# Patient Record
Sex: FEMALE | Race: WHITE | NOT HISPANIC OR LATINO | Employment: OTHER | URBAN - NONMETROPOLITAN AREA
[De-identification: names, ages, dates, MRNs, and addresses within clinical notes are randomized per-mention and may not be internally consistent; named-entity substitution may affect disease eponyms.]

---

## 2017-01-16 RX ORDER — OMEPRAZOLE 20 MG/1
CAPSULE, DELAYED RELEASE ORAL
Qty: 90 CAP | Refills: 0 | Status: SHIPPED | OUTPATIENT
Start: 2017-01-16 | End: 2017-05-05 | Stop reason: SDUPTHER

## 2017-01-20 DIAGNOSIS — R00.2 PALPITATIONS: ICD-10-CM

## 2017-01-20 RX ORDER — METOPROLOL TARTRATE 50 MG/1
TABLET, FILM COATED ORAL
Qty: 180 TAB | Refills: 3 | Status: SHIPPED | OUTPATIENT
Start: 2017-01-20 | End: 2018-02-02 | Stop reason: SDUPTHER

## 2017-01-27 ENCOUNTER — HOSPITAL ENCOUNTER (OUTPATIENT)
Dept: LAB | Facility: MEDICAL CENTER | Age: 72
End: 2017-01-27
Attending: INTERNAL MEDICINE
Payer: MEDICARE

## 2017-01-27 ENCOUNTER — HOSPITAL ENCOUNTER (OUTPATIENT)
Dept: LAB | Facility: MEDICAL CENTER | Age: 72
End: 2017-01-27
Attending: NURSE PRACTITIONER
Payer: MEDICARE

## 2017-01-27 DIAGNOSIS — N18.4 TYPE 2 DIABETES MELLITUS WITH STAGE 4 CHRONIC KIDNEY DISEASE (HCC): ICD-10-CM

## 2017-01-27 DIAGNOSIS — E11.22 TYPE 2 DIABETES MELLITUS WITH STAGE 4 CHRONIC KIDNEY DISEASE (HCC): ICD-10-CM

## 2017-01-27 LAB
ALBUMIN SERPL BCP-MCNC: 4 G/DL (ref 3.2–4.9)
ALBUMIN/GLOB SERPL: 1.3 G/DL
ALP SERPL-CCNC: 63 U/L (ref 30–99)
ALT SERPL-CCNC: 17 U/L (ref 2–50)
ANION GAP SERPL CALC-SCNC: 7 MMOL/L (ref 0–11.9)
AST SERPL-CCNC: 17 U/L (ref 12–45)
BILIRUB SERPL-MCNC: 0.7 MG/DL (ref 0.1–1.5)
BUN SERPL-MCNC: 45 MG/DL (ref 8–22)
CALCIUM SERPL-MCNC: 9.4 MG/DL (ref 8.5–10.5)
CHLORIDE SERPL-SCNC: 107 MMOL/L (ref 96–112)
CHOLEST SERPL-MCNC: 238 MG/DL (ref 100–199)
CO2 SERPL-SCNC: 25 MMOL/L (ref 20–33)
CREAT SERPL-MCNC: 1.92 MG/DL (ref 0.5–1.4)
EST. AVERAGE GLUCOSE BLD GHB EST-MCNC: 200 MG/DL
GLOBULIN SER CALC-MCNC: 3.1 G/DL (ref 1.9–3.5)
GLUCOSE SERPL-MCNC: 174 MG/DL (ref 65–99)
HBA1C MFR BLD: 8.6 % (ref 0–5.6)
HDLC SERPL-MCNC: 52 MG/DL
LDLC SERPL CALC-MCNC: 122 MG/DL
POTASSIUM SERPL-SCNC: 4.6 MMOL/L (ref 3.6–5.5)
PROT SERPL-MCNC: 7.1 G/DL (ref 6–8.2)
SODIUM SERPL-SCNC: 139 MMOL/L (ref 135–145)
TRIGL SERPL-MCNC: 319 MG/DL (ref 0–149)

## 2017-01-27 PROCEDURE — 80053 COMPREHEN METABOLIC PANEL: CPT

## 2017-01-27 PROCEDURE — 83036 HEMOGLOBIN GLYCOSYLATED A1C: CPT

## 2017-01-28 LAB
ALBUMIN SERPL BCP-MCNC: 3.9 G/DL (ref 3.2–4.9)
APPEARANCE UR: ABNORMAL
BACTERIA #/AREA URNS HPF: ABNORMAL /HPF
BASOPHILS # BLD AUTO: 0.08 K/UL (ref 0–0.12)
BASOPHILS NFR BLD AUTO: 1.1 % (ref 0–1.8)
BILIRUB UR QL STRIP.AUTO: NEGATIVE
BUN SERPL-MCNC: 46 MG/DL (ref 8–22)
C3 SERPL-MCNC: 181 MG/DL (ref 87–200)
C4 SERPL-MCNC: 29 MG/DL (ref 19–52)
CALCIUM SERPL-MCNC: 9.3 MG/DL (ref 8.5–10.5)
CHLORIDE SERPL-SCNC: 107 MMOL/L (ref 96–112)
CO2 SERPL-SCNC: 25 MMOL/L (ref 20–33)
COLOR UR AUTO: ABNORMAL
CREAT SERPL-MCNC: 1.93 MG/DL (ref 0.5–1.4)
CREAT UR-MCNC: 106.5 MG/DL
CULTURE IF INDICATED INDCX: NO UA CULTURE
EOSINOPHIL # BLD: 0.33 K/UL (ref 0–0.51)
EOSINOPHIL NFR BLD AUTO: 4.5 % (ref 0–6.9)
EPITHELIAL CELLS 1715: ABNORMAL /HPF
ERYTHROCYTE [DISTWIDTH] IN BLOOD BY AUTOMATED COUNT: 46.9 FL (ref 35.9–50)
GLUCOSE SERPL-MCNC: 173 MG/DL (ref 65–99)
GLUCOSE UR STRIP.AUTO-MCNC: 70 MG/DL
HBV SURFACE AB SER RIA-ACNC: <3.1 MIU/ML (ref 0–10)
HBV SURFACE AG SERPL QL IA: NEGATIVE
HCT VFR BLD AUTO: 39.3 % (ref 37–47)
HCV AB S/CO SERPL IA: NEGATIVE
HGB BLD-MCNC: 12.8 G/DL (ref 12–16)
IMM GRANULOCYTES # BLD AUTO: 0.02 K/UL (ref 0–0.11)
IMM GRANULOCYTES NFR BLD AUTO: 0.3 % (ref 0–0.9)
KETONES UR STRIP.AUTO-MCNC: NEGATIVE MG/DL
LEUKOCYTE ESTERASE UR QL STRIP.AUTO: NEGATIVE
LYMPHOCYTES # BLD: 2.83 K/UL (ref 1–4.8)
LYMPHOCYTES NFR BLD AUTO: 38.5 % (ref 22–41)
MCH RBC QN AUTO: 30 PG (ref 27–33)
MCHC RBC AUTO-ENTMCNC: 32.6 G/DL (ref 33.6–35)
MCV RBC AUTO: 92.3 FL (ref 81.4–97.8)
MICRO URNS: ABNORMAL
MONOCYTES # BLD: 0.39 K/UL (ref 0–0.85)
MONOCYTES NFR BLD AUTO: 5.3 % (ref 0–13.4)
MUCOUS THREADS URNS QL MICRO: ABNORMAL /HPF
NEUTROPHILS # BLD: 3.71 K/UL (ref 2–7.15)
NEUTROPHILS NFR BLD AUTO: 50.3 % (ref 44–72)
NITRITE UR QL STRIP.AUTO: NEGATIVE
NRBC # BLD AUTO: 0 K/UL
NRBC BLD-RTO: 0 /100 WBC
PH UR: 6 [PH]
PHOSPHATE SERPL-MCNC: 3.5 MG/DL (ref 2.5–4.5)
PLATELET # BLD AUTO: 135 K/UL (ref 164–446)
PMV BLD AUTO: 10.6 FL (ref 9–12.9)
POTASSIUM SERPL-SCNC: 4.6 MMOL/L (ref 3.6–5.5)
PROT 24H UR-MRATE: 358.9 MG/DL (ref 0–15)
PROT UR QL STRIP: 300 MG/DL
PROT/CREAT UR: 3370 MG/G (ref 10–107)
RBC # BLD AUTO: 4.26 M/UL (ref 4.2–5.4)
RBC #/AREA URNS HPF: ABNORMAL /HPF
RBC UR QL AUTO: NEGATIVE
SODIUM SERPL-SCNC: 140 MMOL/L (ref 135–145)
SP GR UR STRIP.AUTO: 1.01
WBC # BLD AUTO: 7.4 K/UL (ref 4.8–10.8)
WBC #/AREA URNS HPF: ABNORMAL /HPF

## 2017-01-30 LAB — NUCLEAR IGG SER QL IA: NORMAL

## 2017-01-31 LAB
ALBUMIN SERPL-MCNC: 4.12 G/DL (ref 3.75–5.01)
ALPHA1 GLOB SERPL ELPH-MCNC: 0.36 G/DL (ref 0.19–0.46)
ALPHA2 GLOB SERPL ELPH-MCNC: 1.12 G/DL (ref 0.48–1.05)
B-GLOBULIN SERPL ELPH-MCNC: 0.84 G/DL (ref 0.48–1.1)
GAMMA GLOB SERPL ELPH-MCNC: 0.86 G/DL (ref 0.62–1.51)
INTERPRETATION SERPL IFE-IMP: ABNORMAL
PROT SERPL-MCNC: 7.3 G/DL (ref 6–8.3)

## 2017-02-02 ENCOUNTER — TELEPHONE (OUTPATIENT)
Dept: CARDIOLOGY | Facility: MEDICAL CENTER | Age: 72
End: 2017-02-02

## 2017-02-02 DIAGNOSIS — E78.5 DYSLIPIDEMIA: ICD-10-CM

## 2017-02-02 NOTE — TELEPHONE ENCOUNTER
----- Message from DON oMss sent at 1/30/2017 10:16 AM PST -----  Cholesterol not well managed with simvastatin now. Has her diet changed? Her triglycerides have doubled-watch her sugars, ETOH, and carb's. We can either increase her medication now or re-check in 3 months to review for changes. SC

## 2017-02-02 NOTE — TELEPHONE ENCOUNTER
Called pt re: 1/27 labs. Reviewed dietary measures. She chooses to work more on diet & repeat labs prior to June FV with Dr. Sorto. Lab orders mailed.

## 2017-02-04 ENCOUNTER — OFFICE VISIT (OUTPATIENT)
Dept: URGENT CARE | Facility: PHYSICIAN GROUP | Age: 72
End: 2017-02-04
Payer: MEDICARE

## 2017-02-04 VITALS
DIASTOLIC BLOOD PRESSURE: 78 MMHG | WEIGHT: 293 LBS | HEART RATE: 52 BPM | OXYGEN SATURATION: 97 % | SYSTOLIC BLOOD PRESSURE: 194 MMHG | HEIGHT: 67 IN | TEMPERATURE: 98.4 F | BODY MASS INDEX: 45.99 KG/M2 | RESPIRATION RATE: 16 BRPM

## 2017-02-04 DIAGNOSIS — H61.23 BILATERAL IMPACTED CERUMEN: Primary | ICD-10-CM

## 2017-02-04 DIAGNOSIS — H92.03 OTALGIA OF BOTH EARS: ICD-10-CM

## 2017-02-04 PROCEDURE — G8484 FLU IMMUNIZE NO ADMIN: HCPCS | Performed by: PHYSICIAN ASSISTANT

## 2017-02-04 PROCEDURE — 3017F COLORECTAL CA SCREEN DOC REV: CPT | Performed by: PHYSICIAN ASSISTANT

## 2017-02-04 PROCEDURE — 1036F TOBACCO NON-USER: CPT | Performed by: PHYSICIAN ASSISTANT

## 2017-02-04 PROCEDURE — 4040F PNEUMOC VAC/ADMIN/RCVD: CPT | Performed by: PHYSICIAN ASSISTANT

## 2017-02-04 PROCEDURE — 1101F PT FALLS ASSESS-DOCD LE1/YR: CPT | Performed by: PHYSICIAN ASSISTANT

## 2017-02-04 PROCEDURE — 3014F SCREEN MAMMO DOC REV: CPT | Performed by: PHYSICIAN ASSISTANT

## 2017-02-04 PROCEDURE — 69210 REMOVE IMPACTED EAR WAX UNI: CPT | Performed by: PHYSICIAN ASSISTANT

## 2017-02-04 PROCEDURE — G8432 DEP SCR NOT DOC, RNG: HCPCS | Performed by: PHYSICIAN ASSISTANT

## 2017-02-04 PROCEDURE — G8598 ASA/ANTIPLAT THER USED: HCPCS | Performed by: PHYSICIAN ASSISTANT

## 2017-02-04 PROCEDURE — G8419 CALC BMI OUT NRM PARAM NOF/U: HCPCS | Performed by: PHYSICIAN ASSISTANT

## 2017-02-04 NOTE — PROGRESS NOTES
"Subjective:      Whitley Frederick is a 71 y.o. female who presents with Ear Fullness    Pt PMH, SocHx, SurgHx, FamHx, Drug allergies and medications reviewed with pt/EPIC.      Family history reviewed, it is not pertinent to this complaint.           HPI Comments: Patient presents with:  Ear Fullness: bilateral ear stuffiness for \" a long time, with ringing in my ears, sort of feel painful in the canals.\" no recent URI , fever chills or HA        Ear Fullness  This is a new problem. The current episode started more than 1 month ago. The problem occurs constantly. The problem has been gradually worsening. Nothing aggravates the symptoms. Treatments tried: diluted hydrogen peroxide. The treatment provided no relief.       Review of Systems   HENT: Positive for ear pain and tinnitus. Negative for ear discharge.    All other systems reviewed and are negative.         Objective:     /78 mmHg  Pulse 52  Temp(Src) 36.9 °C (98.4 °F)  Resp 16  Ht 1.702 m (5' 7.01\")  Wt 135.626 kg (299 lb)  BMI 46.82 kg/m2  SpO2 97%     Physical Exam   Constitutional: She is oriented to person, place, and time. She appears well-developed and well-nourished. No distress.   HENT:   Head: Normocephalic.   Nose: Nose normal.   Mouth/Throat: Uvula is midline, oropharynx is clear and moist and mucous membranes are normal.   Bilateral cerumen impaction, TM not visualized.  Will irrigate and reevaluate.    Eyes: EOM are normal. Pupils are equal, round, and reactive to light.   Neck: Normal range of motion. Neck supple.   Cardiovascular: Normal rate and regular rhythm.    Pulmonary/Chest: Effort normal.   Musculoskeletal: Normal range of motion.   Neurological: She is alert and oriented to person, place, and time.   Skin: Skin is warm and dry.   Psychiatric: She has a normal mood and affect.   Nursing note and vitals reviewed.         Procedure: Cerumen Removal  Risks and benefits of procedure discussed  Cerumen removed with curette " and lavage after softening agent instilled  Patient tolerated well  Post procedure exam with clear canal and normal TM       Assessment/Plan:     1. Bilateral impacted cerumen     2. Otalgia of both ears       PT should follow up with PCP in 1-2 days for re-evaluation if symptoms have not improved.  Discussed red flags and reasons to return to UC or ED.  Pt and/or family verbalized understanding of diagnosis and follow up instructions and was given informational handout on diagnosis.  PT discharged.

## 2017-02-04 NOTE — PATIENT INSTRUCTIONS
Cerumen Impaction  The structures of the external ear canal secrete a waxy substance known as cerumen. Excess cerumen can build up in the ear canal, causing a condition known as cerumen impaction. Cerumen impaction can cause ear pain and disrupt the function of the ear.  The rate of cerumen production differs for each individual. In certain individuals, the configuration of the ear canal may decrease his or her ability to naturally remove cerumen.  CAUSES  Cerumen impaction is caused by excessive cerumen production or buildup.  RISK FACTORS  · Frequent use of swabs to clean ears.  · Having narrow ear canals.  · Having eczema.  · Being dehydrated.  SIGNS AND SYMPTOMS  · Diminished hearing.  · Ear drainage.  · Ear pain.  · Ear itch.  TREATMENT  Treatment may involve:  · Over-the-counter or prescription ear drops to soften the cerumen.  · Removal of cerumen by a health care provider. This may be done with:  ¨ Irrigation with warm water. This is the most common method of removal.  ¨ Ear curettes and other instruments.  ¨ Surgery. This may be done in severe cases.  HOME CARE INSTRUCTIONS  · Take medicines only as directed by your health care provider.  · Do not insert objects into the ear with the intent of cleaning the ear.  PREVENTION  · Do not insert objects into the ear, even with the intent of cleaning the ear. Removing cerumen as a part of normal hygiene is not necessary, and the use of swabs in the ear canal is not recommended.  · Drink enough water to keep your urine clear or pale yellow.  · Control your eczema if you have it.  SEEK MEDICAL CARE IF:  · You develop ear pain.  · You develop bleeding from the ear.  · The cerumen does not clear after you use ear drops as directed.     This information is not intended to replace advice given to you by your health care provider. Make sure you discuss any questions you have with your health care provider.     Document Released: 01/25/2006 Document Revised: 01/08/2016  Document Reviewed: 03/17/2011  [employer] Interactive Patient Education ©2016 [employer]

## 2017-02-04 NOTE — MR AVS SNAPSHOT
"        Whitley Frederick   2017 12:55 PM   Office Visit   MRN: [id number]    Department:  [facility]   Dept Phone:  [phone]    Description:  Female : 1945   Provider:  Larissa Cevallos PA-C           Reason for Visit     Ear Fullness bilateral ear stuffiness      Allergies as of 2017     Allergen Noted Reactions    Amlodipine 2016   Itching    Amaryl 2011       GI Problems    Avandia [Rosiglitazone Maleate] 2011       GI problems    Cipro Xr 2011       Possibly causes Chills.    Contrast Media With Iodine [Iodine] 2016   Hives    Glipizide 2011       GI Problems    Glucophage [Metformin Hydrochloride] 2011       dizzy    Hydralazine 2016       Rapid heart rate, chest tightness    Levaquin 2011       Metformin 2011       Relafen [Nabumetone] 2011       Itching      You were diagnosed with     Bilateral impacted cerumen   [684553]  -  Primary     Otalgia of both ears   [443976]         Vital Signs     Blood Pressure Pulse Temperature Respirations Height Weight    194/78 mmHg 52 36.9 °C (98.4 °F) 16 1.702 m (5' 7.01\") 135.626 kg (299 lb)    Body Mass Index Oxygen Saturation Smoking Status             46.82 kg/m2 97% Former Smoker         Basic Information     Date Of Birth Sex Race Ethnicity Preferred Language    1945 Female White Non- English      Your appointments     2017  8:20 AM   Established Patient with Shani Tineo M.D.   [facility])    [address] [place] [address] [phone]           You will be receiving a confirmation call a few days before your appointment from our automated call confirmation system.            Feb 10, 2017 11:45 AM   [facility]   [address] [address] [phone]            2017  1:00 PM   Pulmonary Function Test with " SPIROMETRY/6MW   [facility] (--)    [address]  [address]  [place] [id number] [phone]            Feb 28, 2017  2:00 PM   Established Patient Pul with EDILMA Mosher   [facility] (--)    [address]  [address]  [place] [id number] [phone]            Mar 21, 2017  2:40 PM   Diabetes Care Visit with EDILMA Leiva, LEAH DIABETES RN   [facility]    [address] [place] [address] [place] [address] [phone]           You will be receiving a confirmation call a few days before your appointment from our automated call confirmation system.            Jun 20, 2017 11:00 AM   FOLLOW UP with Fernando Sorto M.D.   [facility]    [address] [facility] [address] [phone]              Problem List              ICD-10-CM Priority Class Noted - Resolved    Type 2 diabetes mellitus with stage 4 chronic kidney disease (CMS-HCC) E11.22, N18.4 High  Unknown - Present    Left bundle branch block I44.7 Medium  3/5/2012 - Present    PAF (paroxysmal atrial fibrillation) (CMS-HCC) I48.0 High  7/12/2013 - Present    Hypothyroid E03.9 High  12/12/2013 - Present    Anxiety F41.9 High  1/9/2014 - Present    Carotid stenosis I65.29 Low  5/30/2014 - Present    Insulin long-term use (CMS-HCC) Z79.4 High  4/29/2015 - Present    Atherosclerosis of aorta (CMS-HCC) I70.0 High  4/29/2015 - Present    Chronic respiratory failure (CMS-HCC) J96.10 High  4/29/2015 - Present    Long term current use of anticoagulant therapy Z79.01 High  4/29/2015 - Present    Morbid obesity with BMI of 45.0-49.9, adult (CMS-HCC) E66.01, Z68.42 High  4/29/2015 - Present    Chronic gout M1A.9XX0 Medium  4/30/2015 - Present    Multiple thyroid nodules E04.2 Low  10/7/2015 - Present    Essential hypertension I10 High  5/10/2016 - Present    AVERY (obstructive sleep apnea) G47.33 Medium   5/10/2016 - Present    Chronic anticoagulation Z79.01 Medium  6/10/2016 - Present    Mild intermittent asthma without complication J45.20 Medium  8/24/2016 - Present    Strain of lumbar paraspinal muscle S39.012A High  9/23/2016 - Present    Combined forms of age-related cataract of right eye H25.811   10/25/2016 - Present    Dyslipidemia E78.5   12/14/2016 - Present      Health Maintenance        Date Due Completion Dates    COLON CANCER SCREENING ANNUAL FIT 1945 ---    IMM DTaP/Tdap/Td Vaccine (1 - Tdap) 3/19/1965 ---    IMM ZOSTER VACCINE 3/19/2006 ---    COLONOSCOPY 11/30/2012 11/30/2007 (Prv Comp)    Override on 11/30/2007: Previously completed    IMM INFLUENZA (1) 9/1/2016 ---    RETINAL SCREENING 10/7/2016 10/7/2015, 3/5/2015 (Prv Comp)    Override on 3/5/2015: Previously completed    URINE ACR / MICROALBUMIN 10/7/2016 10/7/2015, 10/10/2014, 8/12/2013 (Done), 3/5/2012    Override on 8/12/2013: Done    DIABETES MONOFILAMENT / LE EXAM 10/7/2016 10/7/2015 (Done)    Override on 10/7/2015: Done    IMM PNEUMOCOCCAL 65+ (ADULT) HIGH/HIGHEST RISK SERIES (2 of 2 - PPSV23) 10/19/2016 8/24/2016    MAMMOGRAM 5/13/2017 5/13/2016, 4/9/2015, 1/31/2013, 7/14/2011, 10/5/2007, 10/5/2007, 8/7/2006    A1C SCREENING 7/27/2017 1/27/2017, 1/7/2016, 10/7/2015, 5/27/2015, 1/5/2015 (Prv Comp), 1/6/2014, 5/2/2013 (Done), 3/5/2012    Override on 1/5/2015: Previously completed    Override on 5/2/2013: Done    FASTING LIPID PROFILE 1/27/2018 1/27/2017, 10/7/2015, 5/27/2015, 9/4/2014, 1/6/2014, 4/12/2013, 3/5/2012    SERUM CREATININE 1/27/2018 1/27/2017, 1/27/2017, 11/14/2016, 10/14/2016, 8/10/2016, 4/12/2016, 1/15/2016, 10/7/2015, 10/7/2015, 5/27/2015, 5/27/2015, 2/23/2015, 10/30/2014, 10/10/2014, 9/4/2014, 8/15/2014, 5/12/2014, 4/19/2014, 4/18/2014, 1/6/2014, 1/6/2014, 8/12/2013, 4/12/2013, 4/12/2013, 2/12/2013, 1/21/2013, 1/8/2013, 3/5/2012, 4/27/2006    BONE DENSITY 1/31/2018 1/31/2013            Current Immunizations      13-VALENT PCV PREVNAR 8/24/2016 10:45 AM      Below and/or attached are the medications your provider expects you to take. Review all of your home medications and newly ordered medications with your provider and/or pharmacist. Follow medication instructions as directed by your provider and/or pharmacist. Please keep your medication list with you and share with your provider. Update the information when medications are discontinued, doses are changed, or new medications (including over-the-counter products) are added; and carry medication information at all times in the event of emergency situations     Allergies:  AMLODIPINE - Itching     AMARYL - (reactions not documented)     AVANDIA - (reactions not documented)     CIPRO XR - (reactions not documented)     CONTRAST MEDIA WITH IODINE - Hives     GLIPIZIDE - (reactions not documented)     GLUCOPHAGE - (reactions not documented)     HYDRALAZINE - (reactions not documented)     LEVAQUIN - (reactions not documented)     METFORMIN - (reactions not documented)     RELAFEN - (reactions not documented)               Medications  Valid as of: February 04, 2017 -  4:27 PM    Generic Name Brand Name Tablet Size Instructions for use    Allopurinol (Tab) ZYLOPRIM 100 MG Take 1/2 pill qd        Blood Glucose Monitoring Suppl (Misc) Blood Glucose Monitoring Suppl SUPPLIES Test strips order: Test strips for Chely Contour meter. Sig: use tid  and prn ssx high or low sugar #100 RF x 3        Cholecalciferol (Cap) Vitamin D 2000 UNITS Take 4,000 Units by mouth every day.        ClonazePAM (Tab) KLONOPIN 0.5 MG Take 1 Tab by mouth 2 times a day as needed (insomnia, anxiety).        CloNIDine HCl (Tab) CATAPRES 0.1 MG Use between doses of isosorbide mononitrate if your blood pressure is higher than 170/90.        Diclofenac Sodium (Gel) Diclofenac Sodium 1 % Apply 2 g to skin as directed 4 times a day.        Dronedarone HCl (Tab) MULTAQ 400 MG TAKE ONE TABLET BY MOUTH TWICE DAILY  WITH MEALS        Glucose Blood (Strip) HAYLEE CONTOUR NEXT TEST          Insulin Glargine (Solution Pen-injector) TOUJEO SOLOSTAR 300 UNIT/ML INJECT 65 UNITS SUBCUTANEOUSLY AS INSTRUCTED EVERY DAY.        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        Levothyroxine Sodium (Tab) SYNTHROID 50 MCG Take 1 Tab by mouth every day.        Losartan Potassium (Tab) COZAAR 50 MG TAKE ONE TABLET BY MOUTH TWICE DAILY        Magnesium Oxide (Tab) MAG- (241.3 MG) MG Take 400 mg by mouth every day.        Metoprolol Tartrate (Tab) LOPRESSOR 50 MG TAKE ONE TABLET BY MOUTH TWICE DAILY ,  PATIENT  MAY  TAKE  AN  ADDITIONAL  HALF  TABLET  FOR  INCREASED  HEART  RATE.        Misc. Devices (Misc) Misc. Devices  Pen needles for Lantus Solstar. 29g 12mm.        Montelukast Sodium (Tab) SINGULAIR 10 MG 1 po qhs        Omeprazole (CAPSULE DELAYED RELEASE) PRILOSEC 20 MG TAKE ONE CAPSULE BY MOUTH ONCE DAILY        Potassium Chloride (Tab CR) KLOR-CON 10 MEQ Take 1 Tab by mouth every day.        Simvastatin (Tab) ZOCOR 20 MG TAKE ONE TABLET BY MOUTH IN THE EVENING        Warfarin Sodium (Tab) COUMADIN 3 MG Take 1 to 1.5 tablets by mouth daily as directed by the coumadin clinic        .                 Medicines prescribed today were sent to:     [address] [address]    Phone: [phone] Fax: [phone]    Open 24 Hours?: No      Medication refill instructions:       If your prescription bottle indicates you have medication refills left, it is not necessary to call your provider’s office. Please contact your pharmacy and they will refill your medication.    If your prescription bottle indicates you do not have any refills left, you may request  refills at any time through one of the following ways: The online [employer] system (except Urgent Care), by calling your provider’s office, or by asking your pharmacy to contact your provider’s office with a refill request. Medication refills are processed only during regular business hours and may not be available until the next business day. Your provider may request additional information or to have a follow-up visit with you prior to refilling your medication.   *Please Note: Medication refills are assigned a new Rx number when refilled electronically. Your pharmacy may indicate that no refills were authorized even though a new prescription for the same medication is available at the pharmacy. Please request the medicine by name with the pharmacy before contacting your provider for a refill.        Instructions    Cerumen Impaction  The structures of the external ear canal secrete a waxy substance known as cerumen. Excess cerumen can build up in the ear canal, causing a condition known as cerumen impaction. Cerumen impaction can cause ear pain and disrupt the function of the ear.  The rate of cerumen production differs for each individual. In certain individuals, the configuration of the ear canal may decrease his or her ability to naturally remove cerumen.  CAUSES  Cerumen impaction is caused by excessive cerumen production or buildup.  RISK FACTORS  · Frequent use of swabs to clean ears.  · Having narrow ear canals.  · Having eczema.  · Being dehydrated.  SIGNS AND SYMPTOMS  · Diminished hearing.  · Ear drainage.  · Ear pain.  · Ear itch.  TREATMENT  Treatment may involve:  · Over-the-counter or prescription ear drops to soften the cerumen.  · Removal of cerumen by a health care provider. This may be done with:  ¨ Irrigation with warm water. This is the most common method of removal.  ¨ Ear curettes and other instruments.  ¨ Surgery. This may be done in severe cases.  HOME CARE INSTRUCTIONS  · Take medicines  only as directed by your health care provider.  · Do not insert objects into the ear with the intent of cleaning the ear.  PREVENTION  · Do not insert objects into the ear, even with the intent of cleaning the ear. Removing cerumen as a part of normal hygiene is not necessary, and the use of swabs in the ear canal is not recommended.  · Drink enough water to keep your urine clear or pale yellow.  · Control your eczema if you have it.  SEEK MEDICAL CARE IF:  · You develop ear pain.  · You develop bleeding from the ear.  · The cerumen does not clear after you use ear drops as directed.     This information is not intended to replace advice given to you by your health care provider. Make sure you discuss any questions you have with your health care provider.     Document Released: 01/25/2006 Document Revised: 01/08/2016 Document Reviewed: 03/17/2011  1[employer] Interactive Patient Education ©2016 1[employer]         Other Notes About Your Plan     Cards: HTN, AF.           MyChart Access Code: Activation code not generated  Current [place] Pelletshart Status: Active

## 2017-02-06 ENCOUNTER — TELEPHONE (OUTPATIENT)
Dept: MEDICAL GROUP | Facility: PHYSICIAN GROUP | Age: 72
End: 2017-02-06

## 2017-02-08 DIAGNOSIS — I48.0 PAF (PAROXYSMAL ATRIAL FIBRILLATION) (HCC): Primary | ICD-10-CM

## 2017-02-13 ENCOUNTER — PATIENT MESSAGE (OUTPATIENT)
Dept: MEDICAL GROUP | Facility: PHYSICIAN GROUP | Age: 72
End: 2017-02-13

## 2017-02-13 DIAGNOSIS — E11.65 TYPE 2 DIABETES MELLITUS WITH HYPERGLYCEMIA, WITH LONG-TERM CURRENT USE OF INSULIN (HCC): ICD-10-CM

## 2017-02-13 DIAGNOSIS — Z79.4 TYPE 2 DIABETES MELLITUS WITH HYPERGLYCEMIA, WITH LONG-TERM CURRENT USE OF INSULIN (HCC): ICD-10-CM

## 2017-02-14 NOTE — TELEPHONE ENCOUNTER
From: Whitley Frederick  To: EDILMA Leiva  Sent: 2/13/2017 11:02 PM PST  Subject: Non-Urgent Medical Question    Whitley Bill 1945 .... requesting Toujeo Solo injectable long lasting insulin to be called into [facility] ... I have an appointment with Haydee Rice and the diabetic nurse to take over my diabetic care but not until March 17th ... I'm on my last pen . Could you please fill prescription as soon as possible ... my previous endocrinologist Dr Garnett was replaced by Dr Belinda De Leon I have never seen but has filled my scripts .... Thank You [phone]....

## 2017-02-15 ENCOUNTER — OFFICE VISIT (OUTPATIENT)
Dept: URGENT CARE | Facility: PHYSICIAN GROUP | Age: 72
End: 2017-02-15
Payer: MEDICARE

## 2017-02-15 VITALS
TEMPERATURE: 98.8 F | BODY MASS INDEX: 45.99 KG/M2 | HEIGHT: 67 IN | DIASTOLIC BLOOD PRESSURE: 72 MMHG | OXYGEN SATURATION: 96 % | RESPIRATION RATE: 16 BRPM | WEIGHT: 293 LBS | HEART RATE: 50 BPM | SYSTOLIC BLOOD PRESSURE: 150 MMHG

## 2017-02-15 DIAGNOSIS — L08.9 INFECTED CYST OF SKIN: ICD-10-CM

## 2017-02-15 DIAGNOSIS — L72.9 INFECTED CYST OF SKIN: ICD-10-CM

## 2017-02-15 PROCEDURE — 3014F SCREEN MAMMO DOC REV: CPT | Performed by: PHYSICIAN ASSISTANT

## 2017-02-15 PROCEDURE — 1101F PT FALLS ASSESS-DOCD LE1/YR: CPT | Performed by: PHYSICIAN ASSISTANT

## 2017-02-15 PROCEDURE — G8598 ASA/ANTIPLAT THER USED: HCPCS | Performed by: PHYSICIAN ASSISTANT

## 2017-02-15 PROCEDURE — 1036F TOBACCO NON-USER: CPT | Performed by: PHYSICIAN ASSISTANT

## 2017-02-15 PROCEDURE — G8484 FLU IMMUNIZE NO ADMIN: HCPCS | Performed by: PHYSICIAN ASSISTANT

## 2017-02-15 PROCEDURE — G8432 DEP SCR NOT DOC, RNG: HCPCS | Performed by: PHYSICIAN ASSISTANT

## 2017-02-15 PROCEDURE — G8419 CALC BMI OUT NRM PARAM NOF/U: HCPCS | Performed by: PHYSICIAN ASSISTANT

## 2017-02-15 PROCEDURE — 4040F PNEUMOC VAC/ADMIN/RCVD: CPT | Performed by: PHYSICIAN ASSISTANT

## 2017-02-15 PROCEDURE — 3017F COLORECTAL CA SCREEN DOC REV: CPT | Performed by: PHYSICIAN ASSISTANT

## 2017-02-15 PROCEDURE — 99214 OFFICE O/P EST MOD 30 MIN: CPT | Performed by: PHYSICIAN ASSISTANT

## 2017-02-15 RX ORDER — TERAZOSIN 1 MG/1
1 CAPSULE ORAL DAILY
COMMUNITY
Start: 2017-02-09 | End: 2017-12-13

## 2017-02-15 RX ORDER — HYDROCHLOROTHIAZIDE 25 MG/1
TABLET ORAL
COMMUNITY
Start: 2017-01-15 | End: 2017-05-18

## 2017-02-15 RX ORDER — DOXYCYCLINE HYCLATE 100 MG
100 TABLET ORAL 2 TIMES DAILY
Qty: 20 TAB | Refills: 0 | Status: SHIPPED | OUTPATIENT
Start: 2017-02-15 | End: 2017-05-18

## 2017-02-15 NOTE — Clinical Note
Just a heads up - I put her on doxy for an infected cyst... Didn't know if that would change her follow up appt. Thanks! Nora

## 2017-02-15 NOTE — MR AVS SNAPSHOT
"Whitley Frederick   2/15/2017 11:20 AM   Office Visit   MRN: [id number]    Department:  [facility]   Dept Phone:  [phone]    Description:  Female : 1945   Provider:  Sayra Castro PA-C           Reason for Visit     Cyst in genital area X 10 days      Allergies as of 2/15/2017     Allergen Noted Reactions    Amlodipine 2016   Itching    Amaryl 2011       GI Problems    Avandia [Rosiglitazone Maleate] 2011       GI problems    Cipro Xr 2011       Possibly causes Chills.    Contrast Media With Iodine [Iodine] 2016   Hives    Glipizide 2011       GI Problems    Glucophage [Metformin Hydrochloride] 2011       dizzy    Hydralazine 2016       Rapid heart rate, chest tightness    Levaquin 2011       Metformin 2011       Relafen [Nabumetone] 2011       Itching      You were diagnosed with     Infected cyst of skin   [962173]         Vital Signs     Blood Pressure Pulse Temperature Respirations Height Weight    150/72 mmHg 50 37.1 °C (98.8 °F) 16 1.702 m (5' 7.01\") 134.718 kg (297 lb)    Body Mass Index Oxygen Saturation Breastfeeding? Smoking Status          46.51 kg/m2 96% No Former Smoker        Basic Information     Date Of Birth Sex Race Ethnicity Preferred Language    1945 Female White Non- English      Your appointments     2017  3:30 PM   Anti-Coag Routine with LEAH ANTI-COAG   [facility] ([facility]    [address] [place] [address] [phone]            2017  1:00 PM   Pulmonary Function Test with SPIROMETRY/6MW   [facility] (--)    [address]  [place] [address] [phone]            2017  2:00 PM   Established Patient Pul with EDILMA Mosher   [facility] (--)    [address]  [place] [id number] [phone]            Mar 21, 2017  2:40 PM   "   Diabetes Care Visit with EDILMA Leiva, LEAH DIABETES RN   [facility] [place]    [address] [place] [address] [phone]           You will be receiving a confirmation call a few days before your appointment from our automated call confirmation system.            Jun 20, 2017 11:00 AM   FOLLOW UP with Fernando Sorto M.D.   [facility] (--)    [address] [place] [address] [phone]              Problem List              ICD-10-CM Priority Class Noted - Resolved    Type 2 diabetes mellitus with stage 4 chronic kidney disease (CMS-HCC) E11.22, N18.4 High  Unknown - Present    Left bundle branch block I44.7 Medium  3/5/2012 - Present    PAF (paroxysmal atrial fibrillation) (CMS-HCC) I48.0 High  7/12/2013 - Present    Hypothyroid E03.9 High  12/12/2013 - Present    Anxiety F41.9 High  1/9/2014 - Present    Carotid stenosis I65.29 Low  5/30/2014 - Present    Insulin long-term use (CMS-HCC) Z79.4 High  4/29/2015 - Present    Atherosclerosis of aorta (CMS-HCC) I70.0 High  4/29/2015 - Present    Chronic respiratory failure (CMS-HCC) J96.10 High  4/29/2015 - Present    Long term current use of anticoagulant therapy Z79.01 High  4/29/2015 - Present    Morbid obesity with BMI of 45.0-49.9, adult (CMS-HCC) E66.01, Z68.42 High  4/29/2015 - Present    Chronic gout M1A.9XX0 Medium  4/30/2015 - Present    Multiple thyroid nodules E04.2 Low  10/7/2015 - Present    Essential hypertension I10 High  5/10/2016 - Present    AVERY (obstructive sleep apnea) G47.33 Medium  5/10/2016 - Present    Chronic anticoagulation Z79.01 Medium  6/10/2016 - Present    Mild intermittent asthma without complication J45.20 Medium  8/24/2016 - Present    Strain of lumbar paraspinal muscle S39.012A High  9/23/2016 - Present    Combined forms of age-related cataract of right eye H25.811   10/25/2016 - Present    Dyslipidemia E78.5   12/14/2016  - Present      Health Maintenance        Date Due Completion Dates    COLON CANCER SCREENING ANNUAL FIT 1945 ---    IMM DTaP/Tdap/Td Vaccine (1 - Tdap) 3/19/1965 ---    IMM ZOSTER VACCINE 3/19/2006 ---    COLONOSCOPY 11/30/2012 11/30/2007 (Prv Comp)    Override on 11/30/2007: Previously completed    IMM INFLUENZA (1) 9/1/2016 ---    RETINAL SCREENING 10/7/2016 10/7/2015, 3/5/2015 (Prv Comp)    Override on 3/5/2015: Previously completed    URINE ACR / MICROALBUMIN 10/7/2016 10/7/2015, 10/10/2014, 8/12/2013 (Done), 3/5/2012    Override on 8/12/2013: Done    DIABETES MONOFILAMENT / LE EXAM 10/7/2016 10/7/2015 (Done)    Override on 10/7/2015: Done    IMM PNEUMOCOCCAL 65+ (ADULT) HIGH/HIGHEST RISK SERIES (2 of 2 - PPSV23) 10/19/2016 8/24/2016    MAMMOGRAM 5/13/2017 5/13/2016, 4/9/2015, 1/31/2013, 7/14/2011, 10/5/2007, 10/5/2007, 8/7/2006    A1C SCREENING 7/27/2017 1/27/2017, 1/7/2016, 10/7/2015, 5/27/2015, 1/5/2015 (Prv Comp), 1/6/2014, 5/2/2013 (Done), 3/5/2012    Override on 1/5/2015: Previously completed    Override on 5/2/2013: Done    FASTING LIPID PROFILE 1/27/2018 1/27/2017, 10/7/2015, 5/27/2015, 9/4/2014, 1/6/2014, 4/12/2013, 3/5/2012    SERUM CREATININE 1/27/2018 1/27/2017, 1/27/2017, 11/14/2016, 10/14/2016, 8/10/2016, 4/12/2016, 1/15/2016, 10/7/2015, 10/7/2015, 5/27/2015, 5/27/2015, 2/23/2015, 10/30/2014, 10/10/2014, 9/4/2014, 8/15/2014, 5/12/2014, 4/19/2014, 4/18/2014, 1/6/2014, 1/6/2014, 8/12/2013, 4/12/2013, 4/12/2013, 2/12/2013, 1/21/2013, 1/8/2013, 3/5/2012, 4/27/2006    BONE DENSITY 1/31/2018 1/31/2013            Current Immunizations     13-VALENT PCV PREVNAR 8/24/2016 10:45 AM      Below and/or attached are the medications your provider expects you to take. Review all of your home medications and newly ordered medications with your provider and/or pharmacist. Follow medication instructions as directed by your provider and/or pharmacist. Please keep your medication list with you and share with your  provider. Update the information when medications are discontinued, doses are changed, or new medications (including over-the-counter products) are added; and carry medication information at all times in the event of emergency situations     Allergies:  AMLODIPINE - Itching     AMARYL - (reactions not documented)     AVANDIA - (reactions not documented)     CIPRO XR - (reactions not documented)     CONTRAST MEDIA WITH IODINE - Hives     GLIPIZIDE - (reactions not documented)     GLUCOPHAGE - (reactions not documented)     HYDRALAZINE - (reactions not documented)     LEVAQUIN - (reactions not documented)     METFORMIN - (reactions not documented)     RELAFEN - (reactions not documented)               Medications  Valid as of: February 15, 2017 - 12:00 PM    Generic Name Brand Name Tablet Size Instructions for use    Allopurinol (Tab) ZYLOPRIM 100 MG Take 1/2 pill qd        Blood Glucose Monitoring Suppl (Misc) Blood Glucose Monitoring Suppl SUPPLIES Test strips order: Test strips for Chely Contour meter. Sig: use tid  and prn ssx high or low sugar #100 RF x 3        Cholecalciferol (Cap) Vitamin D 2000 UNITS Take 4,000 Units by mouth every day.        ClonazePAM (Tab) KLONOPIN 0.5 MG Take 1 Tab by mouth 2 times a day as needed (insomnia, anxiety).        CloNIDine HCl (Tab) CATAPRES 0.1 MG Use between doses of isosorbide mononitrate if your blood pressure is higher than 170/90.        Diclofenac Sodium (Gel) Diclofenac Sodium 1 % Apply 2 g to skin as directed 4 times a day.        Doxycycline Hyclate (Tab) VIBRAMYCIN 100 MG Take 1 Tab by mouth 2 times a day.        Dronedarone HCl (Tab) MULTAQ 400 MG TAKE ONE TABLET BY MOUTH TWICE DAILY WITH MEALS        Glucose Blood (Strip) CHELY CONTOUR NEXT TEST          HydroCHLOROthiazide (Tab) HYDRODIURIL 25 MG         Insulin Glargine (Solution Pen-injector) Insulin Glargine 300 UNIT/ML Inject 65 Units/oz/day as instructed every day.        Insulin Pen Needle (Misc) RELION  PEN NEEDLES 29G X 12MM USE ONE PEN NEEDLE SUBCUTANEOUSLY FOR LANTUS SOLOSTAR        Levalbuterol Tartrate (Aerosol) XOPENEX HFA 45 MCG/ACT Inhale 2 Puffs by mouth every four hours as needed for Shortness of Breath.        Levothyroxine Sodium (Tab) SYNTHROID 50 MCG Take 1 Tab by mouth every day.        Losartan Potassium (Tab) COZAAR 50 MG TAKE ONE TABLET BY MOUTH TWICE DAILY        Magnesium Oxide (Tab) MAG- (241.3 MG) MG Take 400 mg by mouth every day.        Metoprolol Tartrate (Tab) LOPRESSOR 50 MG TAKE ONE TABLET BY MOUTH TWICE DAILY ,  PATIENT  MAY  TAKE  AN  ADDITIONAL  HALF  TABLET  FOR  INCREASED  HEART  RATE.        Misc. Devices (Misc) Misc. Devices  Pen needles for Lantus Solstar. 29g 12mm.        Montelukast Sodium (Tab) SINGULAIR 10 MG 1 po qhs        Omeprazole (CAPSULE DELAYED RELEASE) PRILOSEC 20 MG TAKE ONE CAPSULE BY MOUTH ONCE DAILY        Potassium Chloride (Tab CR) KLOR-CON 10 MEQ Take 1 Tab by mouth every day.        Simvastatin (Tab) ZOCOR 20 MG TAKE ONE TABLET BY MOUTH IN THE EVENING        Terazosin HCl (Cap) HYTRIN 1 MG         Warfarin Sodium (Tab) COUMADIN 3 MG Take 1 to 1.5 tablets by mouth daily as directed by the coumadin clinic        .                 Medicines prescribed today were sent to:     [facility] [address] [place] [address]    Phone: [phone] Fax: [phone]    Open 24 Hours?: No      Medication refill instructions:       If your prescription bottle indicates you have medication refills left, it is not necessary to call your provider’s office. Please contact your pharmacy and they will refill your medication.    If your prescription bottle indicates you do not have any refills left, you may request refills at any time through one of the following ways: The online [employer] system (except Urgent Care), by calling your provider’s office, or by asking your pharmacy to contact your provider’s  office with a refill request. Medication refills are processed only during regular business hours and may not be available until the next business day. Your provider may request additional information or to have a follow-up visit with you prior to refilling your medication.   *Please Note: Medication refills are assigned a new Rx number when refilled electronically. Your pharmacy may indicate that no refills were authorized even though a new prescription for the same medication is available at the pharmacy. Please request the medicine by name with the pharmacy before contacting your provider for a refill.        Other Notes About Your Plan     Cards: HTN, AF.           MyChart Access Code: Activation code not generated  Current [employer] Status: Active

## 2017-02-15 NOTE — PROGRESS NOTES
Chief Complaint   Patient presents with   • Cyst     in genital area X 10 days       HISTORY OF PRESENT ILLNESS: Patient is a 71 y.o. female who presents today for evaluation of an infected cyst to the vaginal area. Patient states her symptoms started 2-3 days ago. She has had worsening pain and swelling in the area. She states that she always has nodules there. They are not usually painful. She has pain with simply sitting. She has not taken any over-the-counter medication. She denies any fever.    Patient Active Problem List    Diagnosis Date Noted   • Strain of lumbar paraspinal muscle 09/23/2016     Priority: High   • Essential hypertension 05/10/2016     Priority: High   • Insulin long-term use (CMS-HCC) 04/29/2015     Priority: High   • Atherosclerosis of aorta (CMS-HCC) 04/29/2015     Priority: High   • Chronic respiratory failure (CMS-HCC) 04/29/2015     Priority: High   • Long term current use of anticoagulant therapy 04/29/2015     Priority: High   • Morbid obesity with BMI of 45.0-49.9, adult (CMS-HCC) 04/29/2015     Priority: High   • Anxiety 01/09/2014     Priority: High   • Hypothyroid 12/12/2013     Priority: High   • PAF (paroxysmal atrial fibrillation) (CMS-HCC) 07/12/2013     Priority: High   • Type 2 diabetes mellitus with stage 4 chronic kidney disease (CMS-HCC)      Priority: High   • Mild intermittent asthma without complication 08/24/2016     Priority: Medium   • Chronic anticoagulation 06/10/2016     Priority: Medium   • AVERY (obstructive sleep apnea) 05/10/2016     Priority: Medium   • Chronic gout 04/30/2015     Priority: Medium   • Left bundle branch block 03/05/2012     Priority: Medium   • Multiple thyroid nodules 10/07/2015     Priority: Low   • Carotid stenosis 05/30/2014     Priority: Low   • Dyslipidemia 12/14/2016   • Combined forms of age-related cataract of right eye 10/25/2016       Allergies:Amlodipine; Amaryl; Avandia; Cipro xr; Contrast media with iodine; Glipizide; Glucophage;  Hydralazine; Levaquin; Metformin; and Relafen    Current Outpatient Prescriptions Ordered in [facility]   Medication Sig Dispense Refill   • hydrochlorothiazide (HYDRODIURIL) 25 MG Tab      • doxycycline (VIBRAMYCIN) 100 MG Tab Take 1 Tab by mouth 2 times a day. 20 Tab 0   • metoprolol (LOPRESSOR) 50 MG Tab TAKE ONE TABLET BY MOUTH TWICE DAILY ,  PATIENT  MAY  TAKE  AN  ADDITIONAL  HALF  TABLET  FOR  INCREASED  HEART  RATE. 180 Tab 3   • clonidine (CATAPRES) 0.1 MG Tab Use between doses of isosorbide mononitrate if your blood pressure is higher than 170/90. 30 Tab 0   • CHELY CONTOUR NEXT TEST strip      • warfarin (COUMADIN) 3 MG Tab Take 1 to 1.5 tablets by mouth daily as directed by the coumadin clinic 135 Tab 1   • Blood Glucose Monitoring Suppl SUPPLIES Misc Test strips order: Test strips for Chely Contour meter. Sig: use tid  and prn ssx high or low sugar #100 RF x 3 100 Strip 3   • montelukast (SINGULAIR) 10 MG Tab 1 po qhs 30 Tab 11   • MULTAQ 400 MG Tab TAKE ONE TABLET BY MOUTH TWICE DAILY WITH MEALS 60 Tab 6   • potassium chloride ER (KLOR-CON) 10 MEQ tablet Take 1 Tab by mouth every day. 90 Tab 3   • losartan (COZAAR) 50 MG Tab TAKE ONE TABLET BY MOUTH TWICE DAILY 180 Tab 3   • simvastatin (ZOCOR) 20 MG Tab TAKE ONE TABLET BY MOUTH IN THE EVENING 90 Tab 3   • RELION PEN NEEDLES 29G X 12MM Misc USE ONE PEN NEEDLE SUBCUTANEOUSLY FOR LANTUS SOLOSTAR 100 Each 3   • levothyroxine (SYNTHROID) 50 MCG Tab Take 1 Tab by mouth every day. (Patient taking differently: Take 50 mcg by mouth every day. Indications: Underactive Thyroid) 90 Tab 4   • Misc. Devices MISC Pen needles for Lantus Solstar. 29g 12mm. 90 Each 3   • allopurinol (ZYLOPRIM) 100 MG TABS Take 1/2 pill qd (Patient taking differently: Take 100 mg by mouth 2 Times a Day. Indications: Primary Gout) 90 Tab 4   • Cholecalciferol (VITAMIN D) 2000 UNITS CAPS Take 4,000 Units by mouth every day.     • magnesium oxide (MAGNESIUM OXIDE) 400 (241.3 MG) MG TABS tablet  Take 400 mg by mouth every day.     • terazosin (HYTRIN) 1 MG Cap      • Insulin Glargine (TOUJEO SOLOSTAR) 300 UNIT/ML Solution Pen-injector Inject 65 Units/oz/day as instructed every day. 6 PEN 0   • omeprazole (PRILOSEC) 20 MG delayed-release capsule TAKE ONE CAPSULE BY MOUTH ONCE DAILY 90 Cap 0   • levalbuterol (XOPENEX HFA) 45 MCG/ACT inhaler Inhale 2 Puffs by mouth every four hours as needed for Shortness of Breath. 1 Inhaler 3   • Diclofenac Sodium (VOLTAREN) 1 % Gel Apply 2 g to skin as directed 4 times a day. (Patient not taking: Reported on 2/15/2017) 100 g 0   • clonazepam (KLONOPIN) 0.5 MG Tab Take 1 Tab by mouth 2 times a day as needed (insomnia, anxiety). 60 Tab 0     No current Epic-ordered facility-administered medications on file.       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   • Allergy    • Anxiety    • Arrhythmia    • Arthritis    • CATARACT    • Goiter    • Headache(784.0)    • Migraine      Silent migraine   • Heart murmur    • IBD (inflammatory bowel disease)    • Urinary tract infection, site not specified    • Paroxysmal atrial fibrillation (CMS-HCC) 14     woke with palps, AFib per ekg in am.   • Asthma    • AVERY (obstructive sleep apnea)    • Positive reaction to tuberculin skin test    • Pneumonia      2015       Social History   Substance Use Topics   • Smoking status: Former Smoker -- 1.00 packs/day for 20 years     Types: Cigarettes     Quit date: 1983   • Smokeless tobacco: Never Used   • Alcohol Use: No       Family Status   Relation Status Death Age   • Maternal Aunt     • Mother     • Father     • Brother     • Maternal Grandmother     • Maternal Grandfather     • Paternal  "Grandmother     • Paternal Grandfather     • Son Alive    • Daughter Alive    • Grandchild Alive      Family History   Problem Relation Age of Onset   • Cancer Maternal Aunt    • Diabetes Maternal Aunt    • Heart Disease Maternal Aunt    • Hypertension Maternal Aunt    • Hyperlipidemia Maternal Aunt    • Cancer Mother      Leukemia   • Diabetes Mother    • Heart Disease Mother    • Hypertension Mother    • Hyperlipidemia Mother    • Lung Disease Father    • Cancer Father      Lung   • Hyperlipidemia Father    • Hypertension Father    • Heart Disease Father    • Diabetes Father    • Lung Disease Brother    • Stroke Brother    • Diabetes Brother    • Heart Disease Brother    • Hypertension Brother    • Hyperlipidemia Brother    • Diabetes Maternal Grandmother        ROS:   Review of Systems   Constitutional: Negative for fever, chills, weight loss and malaise/fatigue.   HENT: Negative for ear pain, nosebleeds, congestion, sore throat and neck pain.    Eyes: Negative for blurred vision.   Respiratory: Negative for cough, sputum production, shortness of breath and wheezing.    Cardiovascular: Negative for chest pain, palpitations, orthopnea and leg swelling.   Gastrointestinal: Negative for heartburn, nausea, vomiting and abdominal pain.   Genitourinary: Negative for dysuria, urgency and frequency.       Exam:  Blood pressure 150/72, pulse 50, temperature 37.1 °C (98.8 °F), resp. rate 16, height 1.702 m (5' 7.01\"), weight 134.718 kg (297 lb), SpO2 96 %, not currently breastfeeding.  General: Normal appearing. No distress.  HEENT:  Head is grossly normal.  Pulmonary:  No respiratory distress noted.  : Patient with multiple nodular lesions on the edge of the labia majora on the left side, in the skin fold of the groin. Most appear to be pustural in nature without erythematous or irritated appearing skin, appearing to be chronic in nature. One is approximately 1 cm diameter with fluctuance, very mild " erythema, and tenderness. No drainage is noted.  Psych: Normal mood. Alert and oriented x3. Judgment and insight is normal.    Assessment/Plan:  Discussed the benefits and risks of I&D given the patient's warfarin use and location of the cyst. Patient prefers to only take antibiotics at this time and declined I&D. She agrees to return to clinic for worsening symptoms. Recommend applying moist heat and/or soaking anytime to help promote drainage. Follow up for worsening symptoms.  1. Infected cyst of skin  doxycycline (VIBRAMYCIN) 100 MG Tab

## 2017-02-17 ENCOUNTER — ANTICOAGULATION VISIT (OUTPATIENT)
Dept: MEDICAL GROUP | Facility: PHYSICIAN GROUP | Age: 72
End: 2017-02-17
Payer: MEDICARE

## 2017-02-17 DIAGNOSIS — I48.0 PAF (PAROXYSMAL ATRIAL FIBRILLATION) (HCC): ICD-10-CM

## 2017-02-17 LAB — INR PPP: 2.1 (ref 2–3.5)

## 2017-02-17 PROCEDURE — 85610 PROTHROMBIN TIME: CPT | Performed by: PHYSICIAN ASSISTANT

## 2017-02-17 NOTE — PROGRESS NOTES
Anticoagulation Summary as of 2/17/2017     INR goal 2.0-3.0   Selected INR 2.1 (2/17/2017)   Maintenance plan 1.5 mg (3 mg x 0.5) on Fri; 3 mg (3 mg x 1) all other days   Weekly total 19.5 mg   Weekly max dose 21 mg   Plan last modified SEPIDEH DamonD (12/2/2016)   Next INR check 4/7/2017   Target end date Indefinite    Indications   PAF (paroxysmal atrial fibrillation) (CMS-HCC) [I48.0]  Warfarin therapy started (Resolved) [Z79.01]         Anticoagulation Episode Summary     INR check location     Preferred lab     Send INR reminders to     Comments       Anticoagulation Care Providers     Provider Role Specialty Phone number    Amy Lincoln M.D. Referring Cardiology [phone]    [facility] Responsible  [phone]        Anticoagulation Patient Findings      Whitley Laureen Otoniel seen in clinic today  INR  therapeutic.    Denies signs/symptoms of bleeding and/or thrombosis.    Denies changes to diet or medications.   Follow up appointment in 7 week(s).      Continue weekly warfarin dose as noted    Beto Sherwood, PHARMD

## 2017-02-17 NOTE — MR AVS SNAPSHOT
Whitley Frederick   2017 1:45 PM   Anticoagulation Visit   MRN: [id number]    Department:  [facility]   Dept Phone:  [phone]    Description:  Female : 1945   Provider:  LEAH ANTI-COAG           Allergies as of 2017     Allergen Noted Reactions    Amlodipine 2016   Itching    Amaryl 2011       GI Problems    Avandia [Rosiglitazone Maleate] 2011       GI problems    Cipro Xr 2011       Possibly causes Chills.    Contrast Media With Iodine [Iodine] 2016   Hives    Glipizide 2011       GI Problems    Glucophage [Metformin Hydrochloride] 2011       dizzy    Hydralazine 2016       Rapid heart rate, chest tightness    Levaquin 2011       Metformin 2011       Relafen [Nabumetone] 2011       Itching      You were diagnosed with     PAF (paroxysmal atrial fibrillation) (CMS-HCC)   [571679]         Vital Signs     Smoking Status                   Former Smoker           Basic Information     Date Of Birth Sex Race Ethnicity Preferred Language    1945 Female White Non- English      Your appointments     2017  1:45 PM   Anti-Coag Routine with LEAH ANTI-COAG   [facility] ([place])    [address] [facility] [address] [phone]            2017  1:00 PM   Pulmonary Function Test with SPIROMETRY/6MW   [facility] (--)    [address]  [facility] [address] [phone]            2017  2:00 PM   Established Patient Pul with EDILMA Mosher   [facility] (--)    [address]  [facility] [address] [phone]            Mar 21, 2017  2:40 PM   Diabetes Care Visit with EDILMA Leiva, LEAH DIABETES RN   [facility] ([place])    [facility] [facility] [address] [phone]           You will be receiving a confirmation call a  few days before your appointment from our automated call confirmation system.            Apr 07, 2017 11:00 AM   Anti-Coag Routine with ESTELA ANTI-COAG   [facility] [place] ([place])    [address] [facility]  [place] [address] [phone]            Jun 20, 2017 11:00 AM   FOLLOW UP with Fernando Sorto M.D.   [facility]    [address] [place] [address] [phone]              Problem List              ICD-10-CM Priority Class Noted - Resolved    Type 2 diabetes mellitus with stage 4 chronic kidney disease (CMS-HCC) E11.22, N18.4 High  Unknown - Present    Left bundle branch block I44.7 Medium  3/5/2012 - Present    PAF (paroxysmal atrial fibrillation) (CMS-HCC) I48.0 High  7/12/2013 - Present    Hypothyroid E03.9 High  12/12/2013 - Present    Anxiety F41.9 High  1/9/2014 - Present    Carotid stenosis I65.29 Low  5/30/2014 - Present    Insulin long-term use (CMS-HCC) Z79.4 High  4/29/2015 - Present    Atherosclerosis of aorta (CMS-HCC) I70.0 High  4/29/2015 - Present    Chronic respiratory failure (CMS-HCC) J96.10 High  4/29/2015 - Present    Long term current use of anticoagulant therapy Z79.01 High  4/29/2015 - Present    Morbid obesity with BMI of 45.0-49.9, adult (CMS-HCC) E66.01, Z68.42 High  4/29/2015 - Present    Chronic gout M1A.9XX0 Medium  4/30/2015 - Present    Multiple thyroid nodules E04.2 Low  10/7/2015 - Present    Essential hypertension I10 High  5/10/2016 - Present    AVERY (obstructive sleep apnea) G47.33 Medium  5/10/2016 - Present    Chronic anticoagulation Z79.01 Medium  6/10/2016 - Present    Mild intermittent asthma without complication J45.20 Medium  8/24/2016 - Present    Strain of lumbar paraspinal muscle S39.012A High  9/23/2016 - Present    Combined forms of age-related cataract of right eye H25.811   10/25/2016 - Present    Dyslipidemia E78.5   12/14/2016 - Present      Health Maintenance        Date  Due Completion Dates    COLON CANCER SCREENING ANNUAL FIT 1945 ---    IMM DTaP/Tdap/Td Vaccine (1 - Tdap) 3/19/1965 ---    IMM ZOSTER VACCINE 3/19/2006 ---    COLONOSCOPY 11/30/2012 11/30/2007 (Prv Comp)    Override on 11/30/2007: Previously completed    IMM INFLUENZA (1) 9/1/2016 ---    RETINAL SCREENING 10/7/2016 10/7/2015, 3/5/2015 (Prv Comp)    Override on 3/5/2015: Previously completed    URINE ACR / MICROALBUMIN 10/7/2016 10/7/2015, 10/10/2014, 8/12/2013 (Done), 3/5/2012    Override on 8/12/2013: Done    DIABETES MONOFILAMENT / LE EXAM 10/7/2016 10/7/2015 (Done)    Override on 10/7/2015: Done    IMM PNEUMOCOCCAL 65+ (ADULT) HIGH/HIGHEST RISK SERIES (2 of 2 - PPSV23) 10/19/2016 8/24/2016    MAMMOGRAM 5/13/2017 5/13/2016, 4/9/2015, 1/31/2013, 7/14/2011, 10/5/2007, 10/5/2007, 8/7/2006    A1C SCREENING 7/27/2017 1/27/2017, 1/7/2016, 10/7/2015, 5/27/2015, 1/5/2015 (Prv Comp), 1/6/2014, 5/2/2013 (Done), 3/5/2012    Override on 1/5/2015: Previously completed    Override on 5/2/2013: Done    FASTING LIPID PROFILE 1/27/2018 1/27/2017, 10/7/2015, 5/27/2015, 9/4/2014, 1/6/2014, 4/12/2013, 3/5/2012    SERUM CREATININE 1/27/2018 1/27/2017, 1/27/2017, 11/14/2016, 10/14/2016, 8/10/2016, 4/12/2016, 1/15/2016, 10/7/2015, 10/7/2015, 5/27/2015, 5/27/2015, 2/23/2015, 10/30/2014, 10/10/2014, 9/4/2014, 8/15/2014, 5/12/2014, 4/19/2014, 4/18/2014, 1/6/2014, 1/6/2014, 8/12/2013, 4/12/2013, 4/12/2013, 2/12/2013, 1/21/2013, 1/8/2013, 3/5/2012, 4/27/2006    BONE DENSITY 1/31/2018 1/31/2013            Results     POCT Protime      Component    INR    2.1    Comment:     ic valid [id number] 160 exp 10/2017                        Current Immunizations     13-VALENT PCV PREVNAR 8/24/2016 10:45 AM      Below and/or attached are the medications your provider expects you to take. Review all of your home medications and newly ordered medications with your provider and/or pharmacist. Follow medication instructions as directed by your provider  and/or pharmacist. Please keep your medication list with you and share with your provider. Update the information when medications are discontinued, doses are changed, or new medications (including over-the-counter products) are added; and carry medication information at all times in the event of emergency situations     Allergies:  AMLODIPINE - Itching     AMARYL - (reactions not documented)     AVANDIA - (reactions not documented)     CIPRO XR - (reactions not documented)     CONTRAST MEDIA WITH IODINE - Hives     GLIPIZIDE - (reactions not documented)     GLUCOPHAGE - (reactions not documented)     HYDRALAZINE - (reactions not documented)     LEVAQUIN - (reactions not documented)     METFORMIN - (reactions not documented)     RELAFEN - (reactions not documented)               Medications  Valid as of: February 17, 2017 -  1:18 PM    Generic Name Brand Name Tablet Size Instructions for use    Allopurinol (Tab) ZYLOPRIM 100 MG Take 1/2 pill qd        Blood Glucose Monitoring Suppl (Misc) Blood Glucose Monitoring Suppl SUPPLIES Test strips order: Test strips for Chely Contour meter. Sig: use tid  and prn ssx high or low sugar #100 RF x 3        Cholecalciferol (Cap) Vitamin D 2000 UNITS Take 4,000 Units by mouth every day.        ClonazePAM (Tab) KLONOPIN 0.5 MG Take 1 Tab by mouth 2 times a day as needed (insomnia, anxiety).        CloNIDine HCl (Tab) CATAPRES 0.1 MG Use between doses of isosorbide mononitrate if your blood pressure is higher than 170/90.        Diclofenac Sodium (Gel) Diclofenac Sodium 1 % Apply 2 g to skin as directed 4 times a day.        Doxycycline Hyclate (Tab) VIBRAMYCIN 100 MG Take 1 Tab by mouth 2 times a day.        Dronedarone HCl (Tab) MULTAQ 400 MG TAKE ONE TABLET BY MOUTH TWICE DAILY WITH MEALS        Glucose Blood (Strip) CHELY CONTOUR NEXT TEST          HydroCHLOROthiazide (Tab) HYDRODIURIL 25 MG         Insulin Glargine (Solution Pen-injector) Insulin Glargine 300 UNIT/ML Inject 65  Units/oz/day as instructed every day.        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        Levothyroxine Sodium (Tab) SYNTHROID 50 MCG Take 1 Tab by mouth every day.        Losartan Potassium (Tab) COZAAR 50 MG TAKE ONE TABLET BY MOUTH TWICE DAILY        Magnesium Oxide (Tab) MAG- (241.3 MG) MG Take 400 mg by mouth every day.        Metoprolol Tartrate (Tab) LOPRESSOR 50 MG TAKE ONE TABLET BY MOUTH TWICE DAILY ,  PATIENT  MAY  TAKE  AN  ADDITIONAL  HALF  TABLET  FOR  INCREASED  HEART  RATE.        Misc. Devices (Misc) Misc. Devices  Pen needles for Lantus Solstar. 29g 12mm.        Montelukast Sodium (Tab) SINGULAIR 10 MG 1 po qhs        Omeprazole (CAPSULE DELAYED RELEASE) PRILOSEC 20 MG TAKE ONE CAPSULE BY MOUTH ONCE DAILY        Potassium Chloride (Tab CR) KLOR-CON 10 MEQ Take 1 Tab by mouth every day.        Simvastatin (Tab) ZOCOR 20 MG TAKE ONE TABLET BY MOUTH IN THE EVENING        Terazosin HCl (Cap) HYTRIN 1 MG         Warfarin Sodium (Tab) COUMADIN 3 MG Take 1 to 1.5 tablets by mouth daily as directed by the coumadin clinic        .                 Medicines prescribed today were sent to:     [facility] [address] [place] [id number]    Phone: [phone] Fax: [phone]    Open 24 Hours?: No      Medication refill instructions:       If your prescription bottle indicates you have medication refills left, it is not necessary to call your provider’s office. Please contact your pharmacy and they will refill your medication.    If your prescription bottle indicates you do not have any refills left, you may request refills at any time through one of the following ways: The online [employer] system (except Urgent Care), by calling your  provider’s office, or by asking your pharmacy to contact your provider’s office with a refill request. Medication refills are processed only during regular business hours and may not be available until the next business day. Your provider may request additional information or to have a follow-up visit with you prior to refilling your medication.   *Please Note: Medication refills are assigned a new Rx number when refilled electronically. Your pharmacy may indicate that no refills were authorized even though a new prescription for the same medication is available at the pharmacy. Please request the medicine by name with the pharmacy before contacting your provider for a refill.        Other Notes About Your Plan     Cards: HTN, AF.        Warfarin Dosing Calendar   February 2017 Details    Sun Mon Tue Wed Thu Fri Sat        1               2               3               4                 5               6               7               8               9               10               11                 12               13               14               15               16               17   2.1   1.5 mg   See details      18      3 mg           19      3 mg         20      3 mg         21      3 mg         22      3 mg         23      3 mg         24      1.5 mg         25      3 mg           26      3 mg         27      3 mg         28      3 mg              Date Details   02/17 This INR check   INR: 2.1   ic valid [id number] 160 exp 10/2017               How to take your warfarin dose     To take:  1.5 mg Take 0.5 of a 3 mg tablet.    To take:  3 mg Take 1 of the 3 mg tablets.           Warfarin Dosing Calendar   March 2017 Details    Sun Mon Tue Wed Thu Fri Sat        1      3 mg         2      3 mg         3      1.5 mg         4      3 mg           5      3 mg         6      3 mg         7      3 mg         8      3 mg         9      3 mg         10      1.5 mg         11      3 mg           12      3 mg          13      3 mg         14      3 mg         15      3 mg         16      3 mg         17      1.5 mg         18      3 mg           19      3 mg         20      3 mg         21      3 mg         22      3 mg         23      3 mg         24      1.5 mg         25      3 mg           26      3 mg         27      3 mg         28      3 mg         29      3 mg         30      3 mg         31      1.5 mg           Date Details   No additional details            How to take your warfarin dose     To take:  1.5 mg Take 0.5 of a 3 mg tablet.    To take:  3 mg Take 1 of the 3 mg tablets.           Warfarin Dosing Calendar   April 2017 Details    Sun Mon Tue Wed Thu Fri Sat           1      3 mg           2      3 mg         3      3 mg         4      3 mg         5      3 mg         6      3 mg         7      1.5 mg         8                 9               10               11               12               13               14               15                 16               17               18               19               20               21               22                 23               24               25               26               27               28               29                 30                      Date Details   No additional details    Date of next INR:  4/7/2017         How to take your warfarin dose     To take:  1.5 mg Take 0.5 of a 3 mg tablet.    To take:  3 mg Take 1 of the 3 mg tablets.              [employer] Access Code: Activation code not generated  Current Educents Status: Active

## 2017-02-24 DIAGNOSIS — E03.9 HYPOTHYROIDISM, UNSPECIFIED TYPE: ICD-10-CM

## 2017-02-26 RX ORDER — LEVOTHYROXINE SODIUM 0.05 MG/1
50 TABLET ORAL
Qty: 90 TAB | Refills: 0 | Status: SHIPPED | OUTPATIENT
Start: 2017-02-26 | End: 2017-06-01 | Stop reason: SDUPTHER

## 2017-02-28 ENCOUNTER — NON-PROVIDER VISIT (OUTPATIENT)
Dept: PULMONOLOGY | Facility: HOSPICE | Age: 72
End: 2017-02-28
Payer: MEDICARE

## 2017-02-28 ENCOUNTER — OFFICE VISIT (OUTPATIENT)
Dept: PULMONOLOGY | Facility: HOSPICE | Age: 72
End: 2017-02-28
Payer: MEDICARE

## 2017-02-28 VITALS
TEMPERATURE: 97.3 F | HEIGHT: 67 IN | OXYGEN SATURATION: 95 % | SYSTOLIC BLOOD PRESSURE: 126 MMHG | RESPIRATION RATE: 16 BRPM | DIASTOLIC BLOOD PRESSURE: 80 MMHG | WEIGHT: 293 LBS | BODY MASS INDEX: 45.99 KG/M2 | HEART RATE: 44 BPM

## 2017-02-28 DIAGNOSIS — G47.33 OSA (OBSTRUCTIVE SLEEP APNEA): ICD-10-CM

## 2017-02-28 DIAGNOSIS — E66.01 MORBID OBESITY WITH BMI OF 45.0-49.9, ADULT (HCC): ICD-10-CM

## 2017-02-28 DIAGNOSIS — J45.20 MILD INTERMITTENT ASTHMA WITHOUT COMPLICATION: ICD-10-CM

## 2017-02-28 DIAGNOSIS — I10 ESSENTIAL HYPERTENSION: ICD-10-CM

## 2017-02-28 DIAGNOSIS — Z79.01 CHRONIC ANTICOAGULATION: ICD-10-CM

## 2017-02-28 DIAGNOSIS — J30.9 ALLERGIC RHINITIS, UNSPECIFIED ALLERGIC RHINITIS TRIGGER, UNSPECIFIED RHINITIS SEASONALITY: ICD-10-CM

## 2017-02-28 DIAGNOSIS — I48.0 PAF (PAROXYSMAL ATRIAL FIBRILLATION) (HCC): ICD-10-CM

## 2017-02-28 PROCEDURE — G8598 ASA/ANTIPLAT THER USED: HCPCS | Performed by: NURSE PRACTITIONER

## 2017-02-28 PROCEDURE — 1101F PT FALLS ASSESS-DOCD LE1/YR: CPT | Performed by: NURSE PRACTITIONER

## 2017-02-28 PROCEDURE — 99214 OFFICE O/P EST MOD 30 MIN: CPT | Performed by: NURSE PRACTITIONER

## 2017-02-28 PROCEDURE — G8484 FLU IMMUNIZE NO ADMIN: HCPCS | Performed by: NURSE PRACTITIONER

## 2017-02-28 PROCEDURE — G8432 DEP SCR NOT DOC, RNG: HCPCS | Performed by: NURSE PRACTITIONER

## 2017-02-28 PROCEDURE — 3017F COLORECTAL CA SCREEN DOC REV: CPT | Performed by: NURSE PRACTITIONER

## 2017-02-28 PROCEDURE — G8419 CALC BMI OUT NRM PARAM NOF/U: HCPCS | Performed by: NURSE PRACTITIONER

## 2017-02-28 PROCEDURE — 1036F TOBACCO NON-USER: CPT | Performed by: NURSE PRACTITIONER

## 2017-02-28 PROCEDURE — 4040F PNEUMOC VAC/ADMIN/RCVD: CPT | Performed by: NURSE PRACTITIONER

## 2017-02-28 PROCEDURE — 3014F SCREEN MAMMO DOC REV: CPT | Performed by: NURSE PRACTITIONER

## 2017-02-28 RX ORDER — AZITHROMYCIN 250 MG/1
TABLET, FILM COATED ORAL
Qty: 6 TAB | Refills: 1 | Status: SHIPPED
Start: 2017-02-28 | End: 2017-05-18

## 2017-02-28 RX ORDER — HYDROCHLOROTHIAZIDE 12.5 MG/1
CAPSULE, GELATIN COATED ORAL
COMMUNITY
Start: 2017-01-14 | End: 2017-06-07

## 2017-02-28 RX ORDER — MONTELUKAST SODIUM 10 MG/1
TABLET ORAL
Qty: 90 TAB | Refills: 3 | Status: SHIPPED | OUTPATIENT
Start: 2017-02-28 | End: 2018-03-05 | Stop reason: SDUPTHER

## 2017-02-28 ASSESSMENT — PULMONARY FUNCTION TESTS
FEV1/FVC_PERCENT_CHANGE: 183
FEV1/FVC: 79.85
FVC: 2.56
FEV1_PREDICTED: 76
FVC_PERCENT_PREDICTED: 76
FEV1: 1.95
FEV1_PREDICTED: 2.53
FEV1_PERCENT_CHANGE: 6
FVC: 2.73
FEV1: 2.18
FVC_PREDICTED: 3.34
FEV1/FVC_PREDICTED: 75.75
FEV1_PERCENT_CHANGE: 11
FEV1/FVC: 76
FEV1/FVC_PERCENT_PREDICTED: 100

## 2017-02-28 NOTE — MR AVS SNAPSHOT
"        Whitley Frederick   2017 2:00 PM   Office Visit   MRN: [id number]    Department:  [facility]   Dept Phone:  [phone]    Description:  Female : 1945   Provider:  EDILMA Mosher           Reason for Visit     Apnea           Allergies as of 2017     Allergen Noted Reactions    Amlodipine 2016   Itching    Amaryl 2011       GI Problems    Avandia [Rosiglitazone Maleate] 2011       GI problems    Cipro Xr 2011       Possibly causes Chills.    Contrast Media With Iodine [Iodine] 2016   Hives    Glipizide 2011       GI Problems    Glucophage [Metformin Hydrochloride] 2011       dizzy    Hydralazine 2016       Rapid heart rate, chest tightness    Levaquin 2011       Metformin 2011       Relafen [Nabumetone] 2011       Itching      You were diagnosed with     AVERY (obstructive sleep apnea)   [671444]       Mild intermittent asthma without complication   [270554]       Morbid obesity with BMI of 45.0-49.9, adult (CMS-HCC)   [616858]       PAF (paroxysmal atrial fibrillation) (CMS-HCC)   [798489]       Chronic anticoagulation   [493270]       Essential hypertension   [9162465]       Allergic rhinitis, unspecified allergic rhinitis trigger, unspecified rhinitis seasonality   [1527648]         Vital Signs     Blood Pressure Pulse Temperature Respirations Height Weight    126/80 mmHg 44 36.3 °C (97.3 °F) 16 1.702 m (5' 7.01\") 134.265 kg (296 lb)    Body Mass Index Oxygen Saturation Smoking Status             46.35 kg/m2 95% Former Smoker         Basic Information     Date Of Birth Sex Race Ethnicity Preferred Language    1945 Female White Non- English      Your appointments     Mar 21, 2017  2:40 PM   Diabetes Care Visit with ANU LeivaPLASHELL, LEAH DIABETES RN   [facility] [place] ([place]    [address] [place] [id number] [phone]           You will be " receiving a confirmation call a few days before your appointment from our automated call confirmation system.            Apr 07, 2017 11:00 AM   Anti-Coag Routine with LEAH ANTI-COAG   [facility] [place]    [address]  [place] [id number] [phone]            Jun 20, 2017 11:00 AM   FOLLOW UP with Fernando Sorto M.D.   [facility]    [address] [id number] [phone]            Aug 17, 2017  1:00 PM   Established Patient Pul with EDILMA Mosher   [facility] (--)    [address] [facility] [address]  [facility] [id number] [phone]              Problem List              ICD-10-CM Priority Class Noted - Resolved    Type 2 diabetes mellitus with stage 4 chronic kidney disease (CMS-HCC) E11.22, N18.4 High  Unknown - Present    Left bundle branch block I44.7 Medium  3/5/2012 - Present    PAF (paroxysmal atrial fibrillation) (CMS-HCC) I48.0 High  7/12/2013 - Present    Hypothyroid E03.9 High  12/12/2013 - Present    Anxiety F41.9 High  1/9/2014 - Present    Carotid stenosis I65.29 Low  5/30/2014 - Present    Insulin long-term use (CMS-HCC) Z79.4 High  4/29/2015 - Present    Atherosclerosis of aorta (CMS-HCC) I70.0 High  4/29/2015 - Present    Chronic respiratory failure (CMS-HCC) J96.10 High  4/29/2015 - Present    Long term current use of anticoagulant therapy Z79.01 High  4/29/2015 - Present    Morbid obesity with BMI of 45.0-49.9, adult (CMS-HCC) E66.01, Z68.42 High  4/29/2015 - Present    Chronic gout M1A.9XX0 Medium  4/30/2015 - Present    Multiple thyroid nodules E04.2 Low  10/7/2015 - Present    Essential hypertension I10 High  5/10/2016 - Present    AVERY (obstructive sleep apnea) G47.33 Medium  5/10/2016 - Present    Chronic anticoagulation Z79.01 Medium  6/10/2016 - Present    Mild intermittent asthma without complication J45.20 Medium  8/24/2016 - Present    Strain  of lumbar paraspinal muscle S39.012A High  9/23/2016 - Present    Combined forms of age-related cataract of right eye H25.811   10/25/2016 - Present    Dyslipidemia E78.5   12/14/2016 - Present      Health Maintenance        Date Due Completion Dates    COLON CANCER SCREENING ANNUAL FIT 1945 ---    IMM DTaP/Tdap/Td Vaccine (1 - Tdap) 3/19/1965 ---    IMM ZOSTER VACCINE 3/19/2006 ---    COLONOSCOPY 11/30/2012 11/30/2007 (Prv Comp)    Override on 11/30/2007: Previously completed    IMM INFLUENZA (1) 9/1/2016 ---    RETINAL SCREENING 10/7/2016 10/7/2015, 3/5/2015 (Prv Comp)    Override on 3/5/2015: Previously completed    URINE ACR / MICROALBUMIN 10/7/2016 10/7/2015, 10/10/2014, 8/12/2013 (Done), 3/5/2012    Override on 8/12/2013: Done    DIABETES MONOFILAMENT / LE EXAM 10/7/2016 10/7/2015 (Done)    Override on 10/7/2015: Done    IMM PNEUMOCOCCAL 65+ (ADULT) HIGH/HIGHEST RISK SERIES (2 of 2 - PPSV23) 10/19/2016 8/24/2016    MAMMOGRAM 5/13/2017 5/13/2016, 4/9/2015, 1/31/2013, 7/14/2011, 10/5/2007, 10/5/2007, 8/7/2006    A1C SCREENING 7/27/2017 1/27/2017, 1/7/2016, 10/7/2015, 5/27/2015, 1/5/2015 (Prv Comp), 1/6/2014, 5/2/2013 (Done), 3/5/2012    Override on 1/5/2015: Previously completed    Override on 5/2/2013: Done    FASTING LIPID PROFILE 1/27/2018 1/27/2017, 10/7/2015, 5/27/2015, 9/4/2014, 1/6/2014, 4/12/2013, 3/5/2012    SERUM CREATININE 1/27/2018 1/27/2017, 1/27/2017, 11/14/2016, 10/14/2016, 8/10/2016, 4/12/2016, 1/15/2016, 10/7/2015, 10/7/2015, 5/27/2015, 5/27/2015, 2/23/2015, 10/30/2014, 10/10/2014, 9/4/2014, 8/15/2014, 5/12/2014, 4/19/2014, 4/18/2014, 1/6/2014, 1/6/2014, 8/12/2013, 4/12/2013, 4/12/2013, 2/12/2013, 1/21/2013, 1/8/2013, 3/5/2012, 4/27/2006    BONE DENSITY 1/31/2018 1/31/2013            Current Immunizations     13-VALENT PCV PREVNAR 8/24/2016 10:45 AM      Below and/or attached are the medications your provider expects you to take. Review all of your home medications and newly ordered  medications with your provider and/or pharmacist. Follow medication instructions as directed by your provider and/or pharmacist. Please keep your medication list with you and share with your provider. Update the information when medications are discontinued, doses are changed, or new medications (including over-the-counter products) are added; and carry medication information at all times in the event of emergency situations     Allergies:  AMLODIPINE - Itching     AMARYL - (reactions not documented)     AVANDIA - (reactions not documented)     CIPRO XR - (reactions not documented)     CONTRAST MEDIA WITH IODINE - Hives     GLIPIZIDE - (reactions not documented)     GLUCOPHAGE - (reactions not documented)     HYDRALAZINE - (reactions not documented)     LEVAQUIN - (reactions not documented)     METFORMIN - (reactions not documented)     RELAFEN - (reactions not documented)               Medications  Valid as of: February 28, 2017 -  3:09 PM    Generic Name Brand Name Tablet Size Instructions for use    Allopurinol (Tab) ZYLOPRIM 100 MG Take 1/2 pill qd        Azithromycin (Tab) ZITHROMAX 250 MG Take 2 tablets on day 1, then take 1 tablet a day for 4 days.        Beclomethasone Dipropionate (Aero Soln) QVAR 40 MCG/ACT Inhale 2 Puffs by mouth 2 Times a Day. Use spacer. Rinse mouth after each use.        Blood Glucose Monitoring Suppl (Misc) Blood Glucose Monitoring Suppl SUPPLIES Test strips order: Test strips for Chely Contour meter. Sig: use tid  and prn ssx high or low sugar #100 RF x 3        Cholecalciferol (Cap) Vitamin D 2000 UNITS Take 4,000 Units by mouth every day.        ClonazePAM (Tab) KLONOPIN 0.5 MG Take 1 Tab by mouth 2 times a day as needed (insomnia, anxiety).        CloNIDine HCl (Tab) CATAPRES 0.1 MG Use between doses of isosorbide mononitrate if your blood pressure is higher than 170/90.        Diclofenac Sodium (Gel) Diclofenac Sodium 1 % Apply 2 g to skin as directed 4 times a day.         Doxycycline Hyclate (Tab) VIBRAMYCIN 100 MG Take 1 Tab by mouth 2 times a day.        Dronedarone HCl (Tab) MULTAQ 400 MG TAKE ONE TABLET BY MOUTH TWICE DAILY WITH MEALS        Glucose Blood (Strip) HAYLEE CONTOUR NEXT TEST          HydroCHLOROthiazide (Tab) HYDRODIURIL 25 MG         HydroCHLOROthiazide (Cap) MICROZIDE 12.5 MG         Insulin Glargine (Solution Pen-injector) Insulin Glargine 300 UNIT/ML Inject 65 Units/oz/day as instructed every day.        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        Levothyroxine Sodium (Tab) SYNTHROID 50 MCG Take 1 Tab by mouth every day. Indications: Underactive Thyroid        Losartan Potassium (Tab) COZAAR 50 MG TAKE ONE TABLET BY MOUTH TWICE DAILY        Magnesium Oxide (Tab) MAG- (241.3 MG) MG Take 400 mg by mouth every day.        Metoprolol Tartrate (Tab) LOPRESSOR 50 MG TAKE ONE TABLET BY MOUTH TWICE DAILY ,  PATIENT  MAY  TAKE  AN  ADDITIONAL  HALF  TABLET  FOR  INCREASED  HEART  RATE.        Misc. Devices (Misc) Misc. Devices  Pen needles for Lantus Solstar. 29g 12mm.        Montelukast Sodium (Tab) SINGULAIR 10 MG 1 po qhs        Omeprazole (CAPSULE DELAYED RELEASE) PRILOSEC 20 MG TAKE ONE CAPSULE BY MOUTH ONCE DAILY        Potassium Chloride (Tab CR) KLOR-CON 10 MEQ Take 1 Tab by mouth every day.        Simvastatin (Tab) ZOCOR 20 MG TAKE ONE TABLET BY MOUTH IN THE EVENING        Terazosin HCl (Cap) HYTRIN 1 MG         Warfarin Sodium (Tab) COUMADIN 3 MG Take 1 to 1.5 tablets by mouth daily as directed by the coumadin clinic        .                 Medicines prescribed today were sent to:     [facility] KRISSY LYNN - [facility] [facility]    [address] [address]    Phone: [phone] Fax: [phone]    Open 24  Hours?: No      Medication refill instructions:       If your prescription bottle indicates you have medication refills left, it is not necessary to call your provider’s office. Please contact your pharmacy and they will refill your medication.    If your prescription bottle indicates you do not have any refills left, you may request refills at any time through one of the following ways: The online [employer] system (except Urgent Care), by calling your provider’s office, or by asking your pharmacy to contact your provider’s office with a refill request. Medication refills are processed only during regular business hours and may not be available until the next business day. Your provider may request additional information or to have a follow-up visit with you prior to refilling your medication.   *Please Note: Medication refills are assigned a new Rx number when refilled electronically. Your pharmacy may indicate that no refills were authorized even though a new prescription for the same medication is available at the pharmacy. Please request the medicine by name with the pharmacy before contacting your provider for a refill.        Instructions      Plan:    1) Continue CPAP 10 CM H20 with 02 bleed in. She would like to change DME. Order sent to [facility] for new mask and supplies.  2) Add Qvar 40 mcg 2 puffs INH bid with spacer, rinse mouth after use. Continue Singulair 10 mg qhs and Xopenex HFA inhaler as needed. RX sent to local pharmacy for 90 day supply.   3) RX for Zpak provided to have on hand for prompt treatment.   4)  Sleep hygiene discussed. Weight loss recommended.  5) She is up to date on Prevnar 13 and Influenza vaccines. She will require an updated Pneumovax 23 in August 2017.   6) Follow up in 6 months, sooner if needed.        Other Notes About Your Plan     Cards: HTN, AF.           [employer] Access Code: Activation code not generated  Current [employer] Status: Active

## 2017-02-28 NOTE — PROCEDURES
Good patient effort & cooperation.  The results of this test meet the ATS standards for acceptability and repeatability.  Test was performed on the [employer] Branch[employer]/D spirometry system.  Predicted values were N-Prem.  A bronchodilator of Ventolin HFA - 2 puffs with a spacer was administered to the patient.    This is a pre-and post-bronchodilators spirometry  SPIROMETRY:  1. FVC was 2.56 L, 70% predicted  2. FEV1 was 1.95 L, 76% predicted   3. FEV1/FVC ratio was 80%  4. Flow volume loop was normal in shape   5. There was  no significant response to bronchodilators    IMPRESSION:  Patient has small FVC which is suggestive of restriction. Definitive restrictive ventilatory defect cannot be diagnosed without measuring the total lung capacity.  Recommend doing complete pulmonary function testing including lung volumes. Clinical correlation is required.

## 2017-02-28 NOTE — PROGRESS NOTES
Chief Complaint   Patient presents with   • Apnea       HPI:  Whitley Frederick is a 71 y.o. year old female here today for follow-up on her obstructive sleep apnea. Polysomnogram indicated an AHI of 29.3 with a REM related index of 90 and a low 02 saturation of 74%. She was titrated to CPAP at 8 cm water pressure with 5 L of oxygen per minute in addition because of persisting hypoxemia. She is currently on CPAP at 10 CM H20. Her compliance card download indicates an AHI of 0.7 with an average use of 7-8 hours at night. She tolerates the pressure well. She sleeps better on therapy. She wakes more refreshed. She denies any morning headaches. She has a nasal mask which she feels is a good fit.      She also has a history of mild Asthma. She notes an occasional cough which she feels is related to post nasal drip. She denies purulent mucous production. She denies any wheezing. She notes occasional tightness with exertion. She notes allergies as a trigger for her. She is using her Xopenex in have 3 times a month. he states she did require more frequent use of her Xopenex during the Spring months. Spirometry today in the office indicates an FEV1 of 1.95 L, 76% predicted with an FEV1/FVC ratio of 76 and a borderline positive bronchodilator response. She was placed on Singulair 10 mg qhs at her last office visit. She is unsure of whether or not the Singulair helps. However, she feels she would benefit from a maintenance inhaler. She states she had 2 respiratory infections since she was last seen. Once in November and one in December. She did not use antibiotics or Prednisone.     She had been on Xarelto in the past, apparently for paroxysmal atrial fibrillation, and is now on warfarin without unusual bleeding. She is followed by Dr. Sorto.        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   • Allergy    • Anxiety    • Arrhythmia    • Arthritis    • CATARACT    • Goiter    • Headache(784.0)    • Migraine      Silent migraine   • Heart murmur    • IBD (inflammatory bowel disease)    • Urinary tract infection, site not specified    • Paroxysmal atrial fibrillation (CMS-HCC) 7/12/13     woke with palps, AFib per ekg in am.   • Asthma    • AVERY (obstructive sleep apnea)    • Positive reaction to tuberculin skin test    • Pneumonia      Nov. 2015       Past Surgical History   Procedure Laterality Date   • Tonsillectomy     • Cholecystectomy     • Abdominal hysterectomy total     • Us-needle core bx-breast panel     • Cataract phaco with iol Right 10/25/2016     Procedure: CATARACT PHACO WITH IOL;  Surgeon: Zroan Gamble M.D.;  Location: SURGERY SAME DAY [facility];  Service:        Family History   Problem Relation Age of Onset   • Cancer Maternal Aunt    • Diabetes Maternal Aunt    • Heart Disease Maternal Aunt    • Hypertension Maternal Aunt    • Hyperlipidemia Maternal Aunt    • Cancer Mother      Leukemia   • Diabetes Mother    • Heart Disease Mother    • Hypertension Mother    • Hyperlipidemia Mother    • Lung Disease Father    • Cancer Father      Lung   • Hyperlipidemia Father    • Hypertension Father    • Heart Disease Father    • Diabetes Father    • Lung Disease Brother    • Stroke Brother    • Diabetes Brother    • Heart Disease Brother    • Hypertension Brother    • Hyperlipidemia Brother    • Diabetes Maternal Grandmother        Social History     Social History   • Marital Status:      Spouse Name: N/A   • Number of Children: N/A   • Years of Education: N/A     Occupational History   • Not on file.     Social History Main Topics   • Smoking status: Former Smoker -- 1.00 packs/day for 20 years     Types: Cigarettes     Quit date: 01/01/1983   • Smokeless tobacco: Never  Used   • Alcohol Use: No   • Drug Use: No   • Sexual Activity: No     Other Topics Concern   • Not on file     Social History Narrative       ROS:  Constitutional: Denies fevers, chills, sweats, fatigue, weight loss  Eyes: Denies glasses, vision loss, pain, drainage, double vision  Ears/Nose/Mouth/Throat: Denies ear ache, difficulty hearing, sore throat, persistent hoarseness, decayed teeth/toothache  Cardiovascular: Denies chest pain, tightness, palpitations, swelling in feet/legs, fainting, difficulty breathing when laying down  Respiratory: See HPI   GI: Denies heartburn, difficulty swallowing, nausea, vomiting, abdominal pain, diarrhea, constipation  : Denies frequent urination, painful urination  Integumentary: Denies rashes, lumps or color changes  MSK: Denies painful joints, sore muscles, and back pain.   Neurological: Denies frequent headaches, dizziness, weakness  Sleep: See HPI       Current Outpatient Prescriptions on File Prior to Visit   Medication Sig Dispense Refill   • Blood Glucose Monitoring Suppl SUPPLIES Misc Test strips order: Test strips for Outright Contour meter. Sig: use tid  and prn ssx high or low sugar #100 RF x 3 100 Strip 3   • allopurinol (ZYLOPRIM) 100 MG TABS Take 1/2 pill qd (Patient taking differently: Take 100 mg by mouth 2 Times a Day. Indications: Primary Gout) 90 Tab 4   • levothyroxine (SYNTHROID) 50 MCG Tab Take 1 Tab by mouth every day. Indications: Underactive Thyroid 90 Tab 0   • hydrochlorothiazide (HYDRODIURIL) 25 MG Tab      • terazosin (HYTRIN) 1 MG Cap      • doxycycline (VIBRAMYCIN) 100 MG Tab Take 1 Tab by mouth 2 times a day. 20 Tab 0   • Insulin Glargine (TOUJEO SOLOSTAR) 300 UNIT/ML Solution Pen-injector Inject 65 Units/oz/day as instructed every day. 6 PEN 0   • metoprolol (LOPRESSOR) 50 MG Tab TAKE ONE TABLET BY MOUTH TWICE DAILY ,  PATIENT  MAY  TAKE  AN  ADDITIONAL  HALF  TABLET  FOR  INCREASED  HEART  RATE. 180 Tab 3   • omeprazole (PRILOSEC) 20 MG  "delayed-release capsule TAKE ONE CAPSULE BY MOUTH ONCE DAILY 90 Cap 0   • levalbuterol (XOPENEX HFA) 45 MCG/ACT inhaler Inhale 2 Puffs by mouth every four hours as needed for Shortness of Breath. 1 Inhaler 3   • clonidine (CATAPRES) 0.1 MG Tab Use between doses of isosorbide mononitrate if your blood pressure is higher than 170/90. 30 Tab 0   • HAYLEE CONTOUR NEXT TEST strip      • warfarin (COUMADIN) 3 MG Tab Take 1 to 1.5 tablets by mouth daily as directed by the coumadin clinic 135 Tab 1   • montelukast (SINGULAIR) 10 MG Tab 1 po qhs 30 Tab 11   • MULTAQ 400 MG Tab TAKE ONE TABLET BY MOUTH TWICE DAILY WITH MEALS 60 Tab 6   • potassium chloride ER (KLOR-CON) 10 MEQ tablet Take 1 Tab by mouth every day. 90 Tab 3   • Diclofenac Sodium (VOLTAREN) 1 % Gel Apply 2 g to skin as directed 4 times a day. (Patient not taking: Reported on 2/15/2017) 100 g 0   • losartan (COZAAR) 50 MG Tab TAKE ONE TABLET BY MOUTH TWICE DAILY 180 Tab 3   • clonazepam (KLONOPIN) 0.5 MG Tab Take 1 Tab by mouth 2 times a day as needed (insomnia, anxiety). 60 Tab 0   • simvastatin (ZOCOR) 20 MG Tab TAKE ONE TABLET BY MOUTH IN THE EVENING 90 Tab 3   • RELION PEN NEEDLES 29G X 12MM Misc USE ONE PEN NEEDLE SUBCUTANEOUSLY FOR LANTUS SOLOSTAR 100 Each 3   • Misc. Devices MISC Pen needles for Lantus Solstar. 29g 12mm. 90 Each 3   • Cholecalciferol (VITAMIN D) 2000 UNITS CAPS Take 4,000 Units by mouth every day.     • magnesium oxide (MAGNESIUM OXIDE) 400 (241.3 MG) MG TABS tablet Take 400 mg by mouth every day.       No current facility-administered medications on file prior to visit.     Amlodipine; Amaryl; Avandia; Cipro xr; Contrast media with iodine; Glipizide; Glucophage; Hydralazine; Levaquin; Metformin; and Relafen    Blood pressure 126/80, pulse 44, temperature 36.3 °C (97.3 °F), resp. rate 16, height 1.702 m (5' 7.01\"), weight 134.265 kg (296 lb), SpO2 95 %.  PE:   Appearance: Well developed, well nourished, no acute distress  Eyes: PERRL, EOM " intact, sclera white, conjunctiva moist  Ears: no lesions or deformities  Hearing: grossly intact  Nose: no lesions or deformities  Oropharynx: tongue normal, posterior pharynx without erythema or exudate  Mallampati Classification: class 3  Neck: supple, trachea midline, no masses   Respiratory effort: no intercostal retractions or use of accessory muscles  Lung auscultation: no rales, rhonchi or wheezes  Heart auscultation: no murmur rub or gallop  Extremities: no cyanosis. Trace non pitting lower extremity edema.  Abdomen: soft ,non tender, no masses  Gait and Station: normal  Digits and nails: no clubbing, cyanosis, petechiae or nodes.  Cranial nerves: grossly intact  Skin: no rashes, lesions or ulcers noted  Orientation: Oriented to time, person and place  Mood and affect: mood and affect appropriate, normal interaction with examiner  Judgement: Intact          Assessment:  1. AVERY (obstructive sleep apnea)     2. Mild intermittent asthma without complication     3. Morbid obesity with BMI of 45.0-49.9, adult (CMS-[facility])     4. PAF (paroxysmal atrial fibrillation) (CMS-HCC)     5. Chronic anticoagulation     6. Essential hypertension     7. Allergic rhinitis, unspecified allergic rhinitis trigger, unspecified rhinitis seasonality           Plan:    1) Continue CPAP 10 CM H20 with 02 bleed in. She would like to change DME. Order [facility] for new mask and supplies.  2) Add Qvar 40 mcg 2 puffs INH bid with spacer, rinse mouth after use. Continue Singulair 10 mg qhs and Xopenex HFA inhaler as needed. RX sent to local pharmacy for 90 day supply.   3) RX for Zpak provided to have on hand for prompt treatment.   4)  Sleep hygiene discussed. Weight loss recommended.  5) She is up to date on Prevnar 13 and Influenza vaccines. She will require an updated Pneumovax 23 in August 2017.   6) Follow up in 6 months, sooner if needed.

## 2017-02-28 NOTE — PATIENT INSTRUCTIONS
Plan:    1) Continue CPAP 10 CM H20 with 02 bleed in. She would like to change DME. Order sent to [facility] for new mask and supplies.  2) Add Qvar 40 mcg 2 puffs INH bid with spacer, rinse mouth after use. Continue Singulair 10 mg qhs and Xopenex HFA inhaler as needed. RX sent to local pharmacy for 90 day supply.   3) RX for Zpak provided to have on hand for prompt treatment.   4)  Sleep hygiene discussed. Weight loss recommended.  5) She is up to date on Prevnar 13 and Influenza vaccines. She will require an updated Pneumovax 23 in August 2017.   6) Follow up in 6 months, sooner if needed.

## 2017-02-28 NOTE — MR AVS SNAPSHOT
Whitley Frederick   2017 1:00 PM   Non-Provider Visit   MRN: [id number]    Department:  [facility]   Dept Phone:  [phone]    Description:  Female : 1945   Provider:  Cristina Peralta M.D.           Reason for Visit     Asthma           Allergies as of 2017     Allergen Noted Reactions    Amlodipine 2016   Itching    Amaryl 2011       GI Problems    Avandia [Rosiglitazone Maleate] 2011       GI problems    Cipro Xr 2011       Possibly causes Chills.    Contrast Media With Iodine [Iodine] 2016   Hives    Glipizide 2011       GI Problems    Glucophage [Metformin Hydrochloride] 2011       dizzy    Hydralazine 2016       Rapid heart rate, chest tightness    Levaquin 2011       Metformin 2011       Relafen [Nabumetone] 2011       Itching      You were diagnosed with     Mild intermittent asthma without complication   [127925]         Vital Signs     Smoking Status                   Former Smoker           Basic Information     Date Of Birth Sex Race Ethnicity Preferred Language    1945 Female White Non- English      Your appointments     2017  2:00 PM   Established Patient Pul with EDILMA Mosher   [facility] Pul[facility] (--)    [address] [place] [address] [address]  [facility] [id number] [phone]            Mar 21, 2017  2:40 PM   Diabetes Care Visit with EDILMA Leiva, LEAH DIABETES RN   [facility] ([place])    [address] [place] [address] [phone]           You will be receiving a confirmation call a few days before your appointment from our automated call confirmation system.            2017 11:00 AM   Anti-Coag Routine with LEAH ANTI-COAG   [facility] ([address] [address] [phone]            2017 11:00 AM   FOLLOW UP with Fernando Sorto M.D.      [facility]    [address] [facility] [id number] [phone]              Problem List              ICD-10-CM Priority Class Noted - Resolved    Type 2 diabetes mellitus with stage 4 chronic kidney disease (CMS-HCC) E11.22, N18.4 High  Unknown - Present    Left bundle branch block I44.7 Medium  3/5/2012 - Present    PAF (paroxysmal atrial fibrillation) (CMS-HCC) I48.0 High  7/12/2013 - Present    Hypothyroid E03.9 High  12/12/2013 - Present    Anxiety F41.9 High  1/9/2014 - Present    Carotid stenosis I65.29 Low  5/30/2014 - Present    Insulin long-term use (CMS-HCC) Z79.4 High  4/29/2015 - Present    Atherosclerosis of aorta (CMS-HCC) I70.0 High  4/29/2015 - Present    Chronic respiratory failure (CMS-HCC) J96.10 High  4/29/2015 - Present    Long term current use of anticoagulant therapy Z79.01 High  4/29/2015 - Present    Morbid obesity with BMI of 45.0-49.9, adult (CMS-HCC) E66.01, Z68.42 High  4/29/2015 - Present    Chronic gout M1A.9XX0 Medium  4/30/2015 - Present    Multiple thyroid nodules E04.2 Low  10/7/2015 - Present    Essential hypertension I10 High  5/10/2016 - Present    AVERY (obstructive sleep apnea) G47.33 Medium  5/10/2016 - Present    Chronic anticoagulation Z79.01 Medium  6/10/2016 - Present    Mild intermittent asthma without complication J45.20 Medium  8/24/2016 - Present    Strain of lumbar paraspinal muscle S39.012A High  9/23/2016 - Present    Combined forms of age-related cataract of right eye H25.811   10/25/2016 - Present    Dyslipidemia E78.5   12/14/2016 - Present      Health Maintenance        Date Due Completion Dates    COLON CANCER SCREENING ANNUAL FIT 1945 ---    IMM DTaP/Tdap/Td Vaccine (1 - Tdap) 3/19/1965 ---    IMM ZOSTER VACCINE 3/19/2006 ---    COLONOSCOPY 11/30/2012 11/30/2007 (Prv Comp)    Override on 11/30/2007: Previously completed    IMM INFLUENZA (1) 9/1/2016 ---    RETINAL SCREENING 10/7/2016  10/7/2015, 3/5/2015 (Prv Comp)    Override on 3/5/2015: Previously completed    URINE ACR / MICROALBUMIN 10/7/2016 10/7/2015, 10/10/2014, 8/12/2013 (Done), 3/5/2012    Override on 8/12/2013: Done    DIABETES MONOFILAMENT / LE EXAM 10/7/2016 10/7/2015 (Done)    Override on 10/7/2015: Done    IMM PNEUMOCOCCAL 65+ (ADULT) HIGH/HIGHEST RISK SERIES (2 of 2 - PPSV23) 10/19/2016 8/24/2016    MAMMOGRAM 5/13/2017 5/13/2016, 4/9/2015, 1/31/2013, 7/14/2011, 10/5/2007, 10/5/2007, 8/7/2006    A1C SCREENING 7/27/2017 1/27/2017, 1/7/2016, 10/7/2015, 5/27/2015, 1/5/2015 (Prv Comp), 1/6/2014, 5/2/2013 (Done), 3/5/2012    Override on 1/5/2015: Previously completed    Override on 5/2/2013: Done    FASTING LIPID PROFILE 1/27/2018 1/27/2017, 10/7/2015, 5/27/2015, 9/4/2014, 1/6/2014, 4/12/2013, 3/5/2012    SERUM CREATININE 1/27/2018 1/27/2017, 1/27/2017, 11/14/2016, 10/14/2016, 8/10/2016, 4/12/2016, 1/15/2016, 10/7/2015, 10/7/2015, 5/27/2015, 5/27/2015, 2/23/2015, 10/30/2014, 10/10/2014, 9/4/2014, 8/15/2014, 5/12/2014, 4/19/2014, 4/18/2014, 1/6/2014, 1/6/2014, 8/12/2013, 4/12/2013, 4/12/2013, 2/12/2013, 1/21/2013, 1/8/2013, 3/5/2012, 4/27/2006    BONE DENSITY 1/31/2018 1/31/2013            Current Immunizations     13-VALENT PCV PREVNAR 8/24/2016 10:45 AM      Below and/or attached are the medications your provider expects you to take. Review all of your home medications and newly ordered medications with your provider and/or pharmacist. Follow medication instructions as directed by your provider and/or pharmacist. Please keep your medication list with you and share with your provider. Update the information when medications are discontinued, doses are changed, or new medications (including over-the-counter products) are added; and carry medication information at all times in the event of emergency situations     Allergies:  AMLODIPINE - Itching     AMARYL - (reactions not documented)     AVANDIA - (reactions not documented)     CIPRO XR  - (reactions not documented)     CONTRAST MEDIA WITH IODINE - Hives     GLIPIZIDE - (reactions not documented)     GLUCOPHAGE - (reactions not documented)     HYDRALAZINE - (reactions not documented)     LEVAQUIN - (reactions not documented)     METFORMIN - (reactions not documented)     RELAFEN - (reactions not documented)               Medications  Valid as of: February 28, 2017 -  1:18 PM    Generic Name Brand Name Tablet Size Instructions for use    Allopurinol (Tab) ZYLOPRIM 100 MG Take 1/2 pill qd        Blood Glucose Monitoring Suppl (Misc) Blood Glucose Monitoring Suppl SUPPLIES Test strips order: Test strips for Chely Contour meter. Sig: use tid  and prn ssx high or low sugar #100 RF x 3        Cholecalciferol (Cap) Vitamin D 2000 UNITS Take 4,000 Units by mouth every day.        ClonazePAM (Tab) KLONOPIN 0.5 MG Take 1 Tab by mouth 2 times a day as needed (insomnia, anxiety).        CloNIDine HCl (Tab) CATAPRES 0.1 MG Use between doses of isosorbide mononitrate if your blood pressure is higher than 170/90.        Diclofenac Sodium (Gel) Diclofenac Sodium 1 % Apply 2 g to skin as directed 4 times a day.        Doxycycline Hyclate (Tab) VIBRAMYCIN 100 MG Take 1 Tab by mouth 2 times a day.        Dronedarone HCl (Tab) MULTAQ 400 MG TAKE ONE TABLET BY MOUTH TWICE DAILY WITH MEALS        Glucose Blood (Strip) CHELY CONTOUR NEXT TEST          HydroCHLOROthiazide (Tab) HYDRODIURIL 25 MG         Insulin Glargine (Solution Pen-injector) Insulin Glargine 300 UNIT/ML Inject 65 Units/oz/day as instructed every day.        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        Levothyroxine Sodium (Tab) SYNTHROID 50 MCG Take 1 Tab by mouth every day. Indications: Underactive Thyroid        Losartan Potassium (Tab) COZAAR 50  MG TAKE ONE TABLET BY MOUTH TWICE DAILY        Magnesium Oxide (Tab) MAG- (241.3 MG) MG Take 400 mg by mouth every day.        Metoprolol Tartrate (Tab) LOPRESSOR 50 MG TAKE ONE TABLET BY MOUTH TWICE DAILY ,  PATIENT  MAY  TAKE  AN  ADDITIONAL  HALF  TABLET  FOR  INCREASED  HEART  RATE.        Misc. Devices (Misc) Misc. Devices  Pen needles for Lalitha Gastonar. 29g 12mm.        Montelukast Sodium (Tab) SINGULAIR 10 MG 1 po qhs        Omeprazole (CAPSULE DELAYED RELEASE) PRILOSEC 20 MG TAKE ONE CAPSULE BY MOUTH ONCE DAILY        Potassium Chloride (Tab CR) KLOR-CON 10 MEQ Take 1 Tab by mouth every day.        Simvastatin (Tab) ZOCOR 20 MG TAKE ONE TABLET BY MOUTH IN THE EVENING        Terazosin HCl (Cap) HYTRIN 1 MG         Warfarin Sodium (Tab) COUMADIN 3 MG Take 1 to 1.5 tablets by mouth daily as directed by the coumadin clinic        .                 Medicines prescribed today were sent to:     [facility] [address] [place] [address]    Phone: [phone] Fax: [phone]    Open 24 Hours?: No      Medication refill instructions:       If your prescription bottle indicates you have medication refills left, it is not necessary to call your provider’s office. Please contact your pharmacy and they will refill your medication.    If your prescription bottle indicates you do not have any refills left, you may request refills at any time through one of the following ways: The online [employer] system (except Urgent Care), by calling your provider’s office, or by asking your pharmacy to contact your provider’s office with a refill request. Medication refills are processed only during regular business hours and may not be available until the next business day. Your provider may request additional information or to have a follow-up visit with you prior to refilling your medication.   *Please Note: Medication refills are assigned a new Rx number when  refilled electronically. Your pharmacy may indicate that no refills were authorized even though a new prescription for the same medication is available at the pharmacy. Please request the medicine by name with the pharmacy before contacting your provider for a refill.        Other Notes About Your Plan     Cards: HTN, AF.           MyChart Access Code: Activation code not generated  Current MyChart Status: Active

## 2017-03-09 ENCOUNTER — PATIENT OUTREACH (OUTPATIENT)
Dept: HEALTH INFORMATION MANAGEMENT | Facility: OTHER | Age: 72
End: 2017-03-09

## 2017-03-10 ENCOUNTER — PATIENT MESSAGE (OUTPATIENT)
Dept: MEDICAL GROUP | Facility: PHYSICIAN GROUP | Age: 72
End: 2017-03-10

## 2017-03-10 DIAGNOSIS — E11.65 TYPE 2 DIABETES MELLITUS WITH HYPERGLYCEMIA, WITH LONG-TERM CURRENT USE OF INSULIN (HCC): ICD-10-CM

## 2017-03-10 DIAGNOSIS — Z79.4 TYPE 2 DIABETES MELLITUS WITH HYPERGLYCEMIA, WITH LONG-TERM CURRENT USE OF INSULIN (HCC): ICD-10-CM

## 2017-03-20 ENCOUNTER — NON-PROVIDER VISIT (OUTPATIENT)
Dept: OCCUPATIONAL MEDICINE | Facility: CLINIC | Age: 72
End: 2017-03-20

## 2017-03-20 DIAGNOSIS — Z11.1 PPD SCREENING TEST: ICD-10-CM

## 2017-03-20 PROCEDURE — 86580 TB INTRADERMAL TEST: CPT | Performed by: PREVENTIVE MEDICINE

## 2017-03-20 NOTE — MR AVS SNAPSHOT
Whitley Frederick   3/20/2017 4:20 PM   Non-Provider Visit   MRN: [id number]    Department:  [facility]   Dept Phone:  [phone]    Description:  Female : 1945   Provider:  CHERYL DARBY MA           Reason for Visit     PPD Placement           Allergies as of 3/20/2017     Allergen Noted Reactions    Amlodipine 2016   Itching    Amaryl 2011       GI Problems    Avandia [Rosiglitazone Maleate] 2011       GI problems    Cipro Xr 2011       Possibly causes Chills.    Contrast Media With Iodine [Iodine] 2016   Hives    Glipizide 2011       GI Problems    Glucophage [Metformin Hydrochloride] 2011       dizzy    Hydralazine 2016       Rapid heart rate, chest tightness    Levaquin 2011       Metformin 2011       Relafen [Nabumetone] 2011       Itching      You were diagnosed with     PPD screening test   [882809]         Vital Signs     Smoking Status                   Former Smoker           Basic Information     Date Of Birth Sex Race Ethnicity Preferred Language    1945 Female White Non- English      Your appointments     Mar 23, 2017 11:00 AM   Access New To You with EDILMA Elder   [facility])    [address] [facility] [id number] [phone]            2017 11:00 AM   Anti-Coag Routine with LAYANLFAYE ANTI-COAG   [facility] ([place])    [address] [facility] [address] [phone]            2017  1:15 PM   ECHO with ECHO [facility] RM2   ECHOCARDIOLOGY [facility])    [address]  [facility] [address] [phone]           No prep            2017 11:00 AM   FOLLOW UP with Fernando Sorto M.D.   [facility]    [address] [facility] [address]   [phone]            Aug 17, 2017  1:00 PM   Established Patient Pul with Lubna OCHOA  EDILMA Lara   [facility] (--)    [address]  [place] [address]  [facility] [address] [phone]              Problem List              ICD-10-CM Priority Class Noted - Resolved    Type 2 diabetes mellitus with stage 4 chronic kidney disease (CMS-HCC) E11.22, N18.4 High  Unknown - Present    Left bundle branch block I44.7 Medium  3/5/2012 - Present    PAF (paroxysmal atrial fibrillation) (CMS-HCC) I48.0 High  7/12/2013 - Present    Hypothyroid E03.9 High  12/12/2013 - Present    Anxiety F41.9 High  1/9/2014 - Present    Carotid stenosis I65.29 Low  5/30/2014 - Present    Insulin long-term use (CMS-HCC) Z79.4 High  4/29/2015 - Present    Atherosclerosis of aorta (CMS-HCC) I70.0 High  4/29/2015 - Present    Chronic respiratory failure (CMS-HCC) J96.10 High  4/29/2015 - Present    Long term current use of anticoagulant therapy Z79.01 High  4/29/2015 - Present    Morbid obesity with BMI of 45.0-49.9, adult (CMS-HCC) E66.01, Z68.42 High  4/29/2015 - Present    Chronic gout M1A.9XX0 Medium  4/30/2015 - Present    Multiple thyroid nodules E04.2 Low  10/7/2015 - Present    Essential hypertension I10 High  5/10/2016 - Present    AVERY (obstructive sleep apnea) G47.33 Medium  5/10/2016 - Present    Chronic anticoagulation Z79.01 Medium  6/10/2016 - Present    Mild intermittent asthma without complication J45.20 Medium  8/24/2016 - Present    Strain of lumbar paraspinal muscle S39.012A High  9/23/2016 - Present    Combined forms of age-related cataract of right eye H25.811   10/25/2016 - Present    Dyslipidemia E78.5   12/14/2016 - Present      Health Maintenance        Date Due Completion Dates    COLON CANCER SCREENING ANNUAL FIT 1945 ---    IMM DTaP/Tdap/Td Vaccine (1 - Tdap) 3/19/1965 ---    IMM ZOSTER VACCINE 3/19/2006 ---    COLONOSCOPY 11/30/2012 11/30/2007 (Prv Comp)    Override on 11/30/2007: Previously completed    IMM INFLUENZA (1) 9/1/2016 ---    RETINAL SCREENING 10/7/2016 10/7/2015,  3/5/2015 (Prv Comp)    Override on 3/5/2015: Previously completed    URINE ACR / MICROALBUMIN 10/7/2016 10/7/2015, 10/10/2014, 8/12/2013 (Done), 3/5/2012    Override on 8/12/2013: Done    DIABETES MONOFILAMENT / LE EXAM 10/7/2016 10/7/2015 (Done)    Override on 10/7/2015: Done    IMM PNEUMOCOCCAL 65+ (ADULT) HIGH/HIGHEST RISK SERIES (2 of 2 - PPSV23) 10/19/2016 8/24/2016    MAMMOGRAM 5/13/2017 5/13/2016, 4/9/2015, 1/31/2013, 7/14/2011, 10/5/2007, 10/5/2007, 8/7/2006    A1C SCREENING 7/27/2017 1/27/2017, 1/7/2016, 10/7/2015, 5/27/2015, 1/5/2015 (Prv Comp), 1/6/2014, 5/2/2013 (Done), 3/5/2012    Override on 1/5/2015: Previously completed    Override on 5/2/2013: Done    FASTING LIPID PROFILE 1/27/2018 1/27/2017, 10/7/2015, 5/27/2015, 9/4/2014, 1/6/2014, 4/12/2013, 3/5/2012    SERUM CREATININE 1/27/2018 1/27/2017, 1/27/2017, 11/14/2016, 10/14/2016, 8/10/2016, 4/12/2016, 1/15/2016, 10/7/2015, 10/7/2015, 5/27/2015, 5/27/2015, 2/23/2015, 10/30/2014, 10/10/2014, 9/4/2014, 8/15/2014, 5/12/2014, 4/19/2014, 4/18/2014, 1/6/2014, 1/6/2014, 8/12/2013, 4/12/2013, 4/12/2013, 2/12/2013, 1/21/2013, 1/8/2013, 3/5/2012, 4/27/2006    BONE DENSITY 1/31/2018 1/31/2013            Current Immunizations     13-VALENT PCV PREVNAR 8/24/2016 10:45 AM    Tuberculin Skin Test 3/20/2017  4:39 PM      Below and/or attached are the medications your provider expects you to take. Review all of your home medications and newly ordered medications with your provider and/or pharmacist. Follow medication instructions as directed by your provider and/or pharmacist. Please keep your medication list with you and share with your provider. Update the information when medications are discontinued, doses are changed, or new medications (including over-the-counter products) are added; and carry medication information at all times in the event of emergency situations     Allergies:  AMLODIPINE - Itching     AMARYL - (reactions not documented)     AVANDIA -  (reactions not documented)     CIPRO XR - (reactions not documented)     CONTRAST MEDIA WITH IODINE - Hives     GLIPIZIDE - (reactions not documented)     GLUCOPHAGE - (reactions not documented)     HYDRALAZINE - (reactions not documented)     LEVAQUIN - (reactions not documented)     METFORMIN - (reactions not documented)     RELAFEN - (reactions not documented)               Medications  Valid as of: March 20, 2017 -  5:18 PM    Generic Name Brand Name Tablet Size Instructions for use    Allopurinol (Tab) ZYLOPRIM 100 MG Take 1/2 pill qd        Azithromycin (Tab) ZITHROMAX 250 MG Take 2 tablets on day 1, then take 1 tablet a day for 4 days.        Beclomethasone Dipropionate (Aero Soln) QVAR 40 MCG/ACT Inhale 2 Puffs by mouth 2 Times a Day. Use spacer. Rinse mouth after each use.        Blood Glucose Monitoring Suppl (Misc) Blood Glucose Monitoring Suppl SUPPLIES Test strips order: Test strips for Chely Contour meter. Sig: use tid  and prn ssx high or low sugar #100 RF x 3        Cholecalciferol (Cap) Vitamin D 2000 UNITS Take 4,000 Units by mouth every day.        ClonazePAM (Tab) KLONOPIN 0.5 MG Take 1 Tab by mouth 2 times a day as needed (insomnia, anxiety).        CloNIDine HCl (Tab) CATAPRES 0.1 MG Use between doses of isosorbide mononitrate if your blood pressure is higher than 170/90.        Diclofenac Sodium (Gel) Diclofenac Sodium 1 % Apply 2 g to skin as directed 4 times a day.        Doxycycline Hyclate (Tab) VIBRAMYCIN 100 MG Take 1 Tab by mouth 2 times a day.        Dronedarone HCl (Tab) MULTAQ 400 MG TAKE ONE TABLET BY MOUTH TWICE DAILY WITH MEALS        Glucose Blood (Strip) CHELY CONTOUR NEXT TEST          HydroCHLOROthiazide (Tab) HYDRODIURIL 25 MG         HydroCHLOROthiazide (Cap) MICROZIDE 12.5 MG         Insulin Glargine (Solution Pen-injector) Insulin Glargine 300 UNIT/ML Inject 65 Units/oz/day as instructed every day.        Insulin Pen Needle (Misc) RELION PEN NEEDLES 29G X 12MM USE ONE PEN  NEEDLE SUBCUTANEOUSLY FOR LANTUS SOLOSTAR        Levalbuterol Tartrate (Aerosol) XOPENEX HFA 45 MCG/ACT Inhale 2 Puffs by mouth every four hours as needed for Shortness of Breath.        Levothyroxine Sodium (Tab) SYNTHROID 50 MCG Take 1 Tab by mouth every day. Indications: Underactive Thyroid        Losartan Potassium (Tab) COZAAR 50 MG TAKE ONE TABLET BY MOUTH TWICE DAILY        Magnesium Oxide (Tab) MAG- (241.3 MG) MG Take 400 mg by mouth every day.        Metoprolol Tartrate (Tab) LOPRESSOR 50 MG TAKE ONE TABLET BY MOUTH TWICE DAILY ,  PATIENT  MAY  TAKE  AN  ADDITIONAL  HALF  TABLET  FOR  INCREASED  HEART  RATE.        Misc. Devices (Misc) Misc. Devices  Pen needles for Lantus Solstar. 29g 12mm.        Montelukast Sodium (Tab) SINGULAIR 10 MG 1 po qhs        Omeprazole (CAPSULE DELAYED RELEASE) PRILOSEC 20 MG TAKE ONE CAPSULE BY MOUTH ONCE DAILY        Potassium Chloride (Tab CR) KLOR-CON 10 MEQ Take 1 Tab by mouth every day.        Simvastatin (Tab) ZOCOR 20 MG TAKE ONE TABLET BY MOUTH IN THE EVENING        Terazosin HCl (Cap) HYTRIN 1 MG         Warfarin Sodium (Tab) COUMADIN 3 MG Take 1 to 1.5 tablets by mouth daily as directed by the coumadin clinic        .                 Medicines prescribed today were sent to:     [facility] [address] [place] [address]    Phone: [phone] Fax: [phone]    Open 24 Hours?: No      Medication refill instructions:       If your prescription bottle indicates you have medication refills left, it is not necessary to call your provider’s office. Please contact your pharmacy and they will refill your medication.    If your prescription bottle indicates you do not have any refills left, you may request refills at any time through one of the following ways: The online [employer] system (except Urgent Care), by calling your provider’s office, or by asking your pharmacy to contact your provider’s office  with a refill request. Medication refills are processed only during regular business hours and may not be available until the next business day. Your provider may request additional information or to have a follow-up visit with you prior to refilling your medication.   *Please Note: Medication refills are assigned a new Rx number when refilled electronically. Your pharmacy may indicate that no refills were authorized even though a new prescription for the same medication is available at the pharmacy. Please request the medicine by name with the pharmacy before contacting your provider for a refill.        Other Notes About Your Plan     Cards: HTN, AF.           MyChart Access Code: Activation code not generated  Current Nitrohart Status: Active

## 2017-03-22 ENCOUNTER — NON-PROVIDER VISIT (OUTPATIENT)
Dept: OCCUPATIONAL MEDICINE | Facility: CLINIC | Age: 72
End: 2017-03-22

## 2017-03-22 LAB — TB WHEAL 3D P 5 TU DIAM: NORMAL MM

## 2017-03-23 ENCOUNTER — OFFICE VISIT (OUTPATIENT)
Dept: MEDICAL GROUP | Facility: PHYSICIAN GROUP | Age: 72
End: 2017-03-23
Payer: MEDICARE

## 2017-03-23 ENCOUNTER — TELEPHONE (OUTPATIENT)
Dept: MEDICAL GROUP | Facility: PHYSICIAN GROUP | Age: 72
End: 2017-03-23

## 2017-03-23 ENCOUNTER — HOSPITAL ENCOUNTER (OUTPATIENT)
Dept: LAB | Facility: MEDICAL CENTER | Age: 72
End: 2017-03-23
Attending: INTERNAL MEDICINE
Payer: MEDICARE

## 2017-03-23 VITALS
TEMPERATURE: 97.7 F | OXYGEN SATURATION: 99 % | RESPIRATION RATE: 16 BRPM | WEIGHT: 291.2 LBS | SYSTOLIC BLOOD PRESSURE: 140 MMHG | HEART RATE: 60 BPM | HEIGHT: 67 IN | BODY MASS INDEX: 45.71 KG/M2 | DIASTOLIC BLOOD PRESSURE: 58 MMHG

## 2017-03-23 DIAGNOSIS — I48.0 PAF (PAROXYSMAL ATRIAL FIBRILLATION) (HCC): ICD-10-CM

## 2017-03-23 DIAGNOSIS — Z76.89 ENCOUNTER TO ESTABLISH CARE WITH NEW DOCTOR: ICD-10-CM

## 2017-03-23 DIAGNOSIS — E11.22 TYPE 2 DIABETES MELLITUS WITH STAGE 4 CHRONIC KIDNEY DISEASE, UNSPECIFIED LONG TERM INSULIN USE STATUS: ICD-10-CM

## 2017-03-23 DIAGNOSIS — I10 ESSENTIAL HYPERTENSION: ICD-10-CM

## 2017-03-23 DIAGNOSIS — G89.29 CHRONIC PAIN OF BOTH SHOULDERS: ICD-10-CM

## 2017-03-23 DIAGNOSIS — J32.9 SINUSITIS, UNSPECIFIED CHRONICITY, UNSPECIFIED LOCATION: ICD-10-CM

## 2017-03-23 DIAGNOSIS — E11.8 TYPE 2 DIABETES MELLITUS WITH COMPLICATION, WITH LONG-TERM CURRENT USE OF INSULIN (HCC): ICD-10-CM

## 2017-03-23 DIAGNOSIS — N18.4 TYPE 2 DIABETES MELLITUS WITH STAGE 4 CHRONIC KIDNEY DISEASE, UNSPECIFIED LONG TERM INSULIN USE STATUS: ICD-10-CM

## 2017-03-23 DIAGNOSIS — M25.511 CHRONIC PAIN OF BOTH SHOULDERS: ICD-10-CM

## 2017-03-23 DIAGNOSIS — M25.512 CHRONIC PAIN OF BOTH SHOULDERS: ICD-10-CM

## 2017-03-23 DIAGNOSIS — Z79.4 TYPE 2 DIABETES MELLITUS WITH COMPLICATION, WITH LONG-TERM CURRENT USE OF INSULIN (HCC): ICD-10-CM

## 2017-03-23 LAB
ALBUMIN SERPL BCP-MCNC: 3.6 G/DL (ref 3.2–4.9)
BASOPHILS # BLD AUTO: 0.05 K/UL (ref 0–0.12)
BASOPHILS NFR BLD AUTO: 0.7 % (ref 0–1.8)
BUN SERPL-MCNC: 75 MG/DL (ref 8–22)
CALCIUM SERPL-MCNC: 9 MG/DL (ref 8.5–10.5)
CHLORIDE SERPL-SCNC: 106 MMOL/L (ref 96–112)
CO2 SERPL-SCNC: 23 MMOL/L (ref 20–33)
CREAT SERPL-MCNC: 2.35 MG/DL (ref 0.5–1.4)
EOSINOPHIL # BLD: 0.18 K/UL (ref 0–0.51)
EOSINOPHIL NFR BLD AUTO: 2.4 % (ref 0–6.9)
ERYTHROCYTE [DISTWIDTH] IN BLOOD BY AUTOMATED COUNT: 47.8 FL (ref 35.9–50)
GLUCOSE SERPL-MCNC: 175 MG/DL (ref 65–99)
HCT VFR BLD AUTO: 35.7 % (ref 37–47)
HGB BLD-MCNC: 11.7 G/DL (ref 12–16)
IMM GRANULOCYTES # BLD AUTO: 0.02 K/UL (ref 0–0.11)
IMM GRANULOCYTES NFR BLD AUTO: 0.3 % (ref 0–0.9)
LYMPHOCYTES # BLD: 2.23 K/UL (ref 1–4.8)
LYMPHOCYTES NFR BLD AUTO: 30.1 % (ref 22–41)
MCH RBC QN AUTO: 29.9 PG (ref 27–33)
MCHC RBC AUTO-ENTMCNC: 32.8 G/DL (ref 33.6–35)
MCV RBC AUTO: 91.3 FL (ref 81.4–97.8)
MONOCYTES # BLD: 0.43 K/UL (ref 0–0.85)
MONOCYTES NFR BLD AUTO: 5.8 % (ref 0–13.4)
NEUTROPHILS # BLD: 4.51 K/UL (ref 2–7.15)
NEUTROPHILS NFR BLD AUTO: 60.7 % (ref 44–72)
NRBC # BLD AUTO: 0 K/UL
NRBC BLD-RTO: 0 /100 WBC
PHOSPHATE SERPL-MCNC: 3.6 MG/DL (ref 2.5–4.5)
PLATELET # BLD AUTO: 152 K/UL (ref 164–446)
PMV BLD AUTO: 11.3 FL (ref 9–12.9)
POTASSIUM SERPL-SCNC: 4.2 MMOL/L (ref 3.6–5.5)
RBC # BLD AUTO: 3.91 M/UL (ref 4.2–5.4)
SODIUM SERPL-SCNC: 140 MMOL/L (ref 135–145)
WBC # BLD AUTO: 7.4 K/UL (ref 4.8–10.8)

## 2017-03-23 PROCEDURE — 99214 OFFICE O/P EST MOD 30 MIN: CPT | Performed by: NURSE PRACTITIONER

## 2017-03-23 PROCEDURE — 3014F SCREEN MAMMO DOC REV: CPT | Performed by: NURSE PRACTITIONER

## 2017-03-23 PROCEDURE — 82784 ASSAY IGA/IGD/IGG/IGM EACH: CPT

## 2017-03-23 PROCEDURE — 36415 COLL VENOUS BLD VENIPUNCTURE: CPT

## 2017-03-23 PROCEDURE — 82784 ASSAY IGA/IGD/IGG/IGM EACH: CPT | Mod: 91

## 2017-03-23 PROCEDURE — 80069 RENAL FUNCTION PANEL: CPT

## 2017-03-23 PROCEDURE — 86334 IMMUNOFIX E-PHORESIS SERUM: CPT

## 2017-03-23 PROCEDURE — G8484 FLU IMMUNIZE NO ADMIN: HCPCS | Performed by: NURSE PRACTITIONER

## 2017-03-23 PROCEDURE — 4040F PNEUMOC VAC/ADMIN/RCVD: CPT | Performed by: NURSE PRACTITIONER

## 2017-03-23 PROCEDURE — G8432 DEP SCR NOT DOC, RNG: HCPCS | Performed by: NURSE PRACTITIONER

## 2017-03-23 PROCEDURE — 1036F TOBACCO NON-USER: CPT | Performed by: NURSE PRACTITIONER

## 2017-03-23 PROCEDURE — G8598 ASA/ANTIPLAT THER USED: HCPCS | Performed by: NURSE PRACTITIONER

## 2017-03-23 PROCEDURE — 84160 ASSAY OF PROTEIN ANY SOURCE: CPT

## 2017-03-23 PROCEDURE — G8419 CALC BMI OUT NRM PARAM NOF/U: HCPCS | Performed by: NURSE PRACTITIONER

## 2017-03-23 PROCEDURE — 84165 PROTEIN E-PHORESIS SERUM: CPT

## 2017-03-23 PROCEDURE — 1101F PT FALLS ASSESS-DOCD LE1/YR: CPT | Performed by: NURSE PRACTITIONER

## 2017-03-23 PROCEDURE — 3045F PR MOST RECENT HEMOGLOBIN A1C LEVEL 7.0-9.0%: CPT | Performed by: NURSE PRACTITIONER

## 2017-03-23 PROCEDURE — 85025 COMPLETE CBC W/AUTO DIFF WBC: CPT

## 2017-03-23 RX ORDER — AMOXICILLIN AND CLAVULANATE POTASSIUM 875; 125 MG/1; MG/1
1 TABLET, FILM COATED ORAL 2 TIMES DAILY
Qty: 20 TAB | Refills: 0 | Status: SHIPPED | OUTPATIENT
Start: 2017-03-23 | End: 2017-05-18

## 2017-03-23 RX ORDER — FUROSEMIDE 80 MG
80 TABLET ORAL
COMMUNITY
End: 2018-05-07 | Stop reason: SDUPTHER

## 2017-03-23 NOTE — TELEPHONE ENCOUNTER
1. Caller Name: [facility] pharmacy                                         Call Back Number: [phone]      Patient approves a detailed voicemail message: N\A    Pharmacy sent fax asking to specify frequency.  Thank you

## 2017-03-23 NOTE — MR AVS SNAPSHOT
"Whitley Frederick   3/23/2017 11:00 AM   Office Visit   MRN: [id number]    Department:  [facility]   Dept Phone:  [phone]    Description:  Female : 1945   Provider:  EDILMA Elder           Reason for Visit     Establish Care           Allergies as of 3/23/2017     Allergen Noted Reactions    Amlodipine 2016   Itching    Amaryl 2011       GI Problems    Avandia [Rosiglitazone Maleate] 2011       GI problems    Cipro Xr 2011       Possibly causes Chills.    Contrast Media With Iodine [Iodine] 2016   Hives    Glipizide 2011       GI Problems    Glucophage [Metformin Hydrochloride] 2011       dizzy    Hydralazine 2016       Rapid heart rate, chest tightness    Levaquin 2011       Metformin 2011       Relafen [Nabumetone] 2011       Itching      You were diagnosed with     Encounter to establish care with new doctor   [[id number]]       Sinusitis, unspecified chronicity, unspecified location   [[id number]]       Chronic pain of both shoulders   [[id number]]       Type 2 diabetes mellitus with complication, with long-term current use of insulin (CMS-[facility])   [[id number]]         Vital Signs     Blood Pressure Pulse Temperature Respirations Height Weight    140/58 mmHg 60 36.5 °C (97.7 °F) 16 1.702 m (5' 7.01\") 132.087 kg (291 lb 3.2 oz)    Body Mass Index Oxygen Saturation Smoking Status             45.60 kg/m2 99% Former Smoker         Basic Information     Date Of Birth Sex Race Ethnicity Preferred Language    1945 Female White Non- English      Your appointments     2017 11:00 AM   Anti-Coag Routine with LEAH ANTI-COAG   [facility] [place]    [address]  [place] [address] [phone]            2017  1:15 PM   ECHO with ECHO [facility]   [facility]ARDIOLOGY [facility])    [address]  [place] [address]   [phone]           No prep           "    Jun 20, 2017 11:00 AM   FOLLOW UP with Fernando Sorto M.D.   [facility] ([facility]    [address] [place] [id number] [phone]            Aug 17, 2017  1:00 PM   Established Patient Pul with EDILMA Mosher   [facility] (--)    [address]  [address]  [place] [id number] [phone]              Problem List              ICD-10-CM Priority Class Noted - Resolved    Type 2 diabetes mellitus with stage 4 chronic kidney disease (CMS-HCC) E11.22, N18.4 High  Unknown - Present    Left bundle branch block I44.7 Medium  3/5/2012 - Present    PAF (paroxysmal atrial fibrillation) (CMS-HCC) I48.0 High  7/12/2013 - Present    Hypothyroid E03.9 High  12/12/2013 - Present    Anxiety F41.9 High  1/9/2014 - Present    Carotid stenosis I65.29 Low  5/30/2014 - Present    Insulin long-term use (CMS-HCC) Z79.4 High  4/29/2015 - Present    Atherosclerosis of aorta (CMS-HCC) I70.0 High  4/29/2015 - Present    Chronic respiratory failure (CMS-HCC) J96.10 High  4/29/2015 - Present    Long term current use of anticoagulant therapy Z79.01 High  4/29/2015 - Present    Morbid obesity with BMI of 45.0-49.9, adult (CMS-HCC) E66.01, Z68.42 High  4/29/2015 - Present    Chronic gout M1A.9XX0 Medium  4/30/2015 - Present    Multiple thyroid nodules E04.2 Low  10/7/2015 - Present    Essential hypertension I10 High  5/10/2016 - Present    AVERY (obstructive sleep apnea) G47.33 Medium  5/10/2016 - Present    Chronic anticoagulation Z79.01 Medium  6/10/2016 - Present    Mild intermittent asthma without complication J45.20 Medium  8/24/2016 - Present    Strain of lumbar paraspinal muscle S39.012A High  9/23/2016 - Present    Combined forms of age-related cataract of right eye H25.811   10/25/2016 - Present    Dyslipidemia E78.5   12/14/2016 - Present    Encounter to establish care with new doctor Z71.89   3/23/2017 - Present      Health Maintenance         Date Due Completion Dates    COLON CANCER SCREENING ANNUAL FIT 1945 ---    IMM DTaP/Tdap/Td Vaccine (1 - Tdap) 3/19/1965 ---    IMM ZOSTER VACCINE 3/19/2006 ---    COLONOSCOPY 11/30/2012 11/30/2007 (Prv Comp)    Override on 11/30/2007: Previously completed    IMM INFLUENZA (1) 9/1/2016 ---    RETINAL SCREENING 10/7/2016 10/7/2015, 3/5/2015 (Prv Comp)    Override on 3/5/2015: Previously completed    URINE ACR / MICROALBUMIN 10/7/2016 10/7/2015, 10/10/2014, 8/12/2013 (Done), 3/5/2012    Override on 8/12/2013: Done    DIABETES MONOFILAMENT / LE EXAM 10/7/2016 10/7/2015 (Done)    Override on 10/7/2015: Done    IMM PNEUMOCOCCAL 65+ (ADULT) HIGH/HIGHEST RISK SERIES (2 of 2 - PPSV23) 10/19/2016 8/24/2016    MAMMOGRAM 5/13/2017 5/13/2016, 4/9/2015, 1/31/2013, 7/14/2011, 10/5/2007, 10/5/2007, 8/7/2006    A1C SCREENING 7/27/2017 1/27/2017, 1/7/2016, 10/7/2015, 5/27/2015, 1/5/2015 (Prv Comp), 1/6/2014, 5/2/2013 (Done), 3/5/2012    Override on 1/5/2015: Previously completed    Override on 5/2/2013: Done    FASTING LIPID PROFILE 1/27/2018 1/27/2017, 10/7/2015, 5/27/2015, 9/4/2014, 1/6/2014, 4/12/2013, 3/5/2012    SERUM CREATININE 1/27/2018 1/27/2017, 1/27/2017, 11/14/2016, 10/14/2016, 8/10/2016, 4/12/2016, 1/15/2016, 10/7/2015, 10/7/2015, 5/27/2015, 5/27/2015, 2/23/2015, 10/30/2014, 10/10/2014, 9/4/2014, 8/15/2014, 5/12/2014, 4/19/2014, 4/18/2014, 1/6/2014, 1/6/2014, 8/12/2013, 4/12/2013, 4/12/2013, 2/12/2013, 1/21/2013, 1/8/2013, 3/5/2012, 4/27/2006    BONE DENSITY 1/31/2018 1/31/2013            Current Immunizations     13-VALENT PCV PREVNAR 8/24/2016 10:45 AM    Tuberculin Skin Test 3/20/2017  4:39 PM      Below and/or attached are the medications your provider expects you to take. Review all of your home medications and newly ordered medications with your provider and/or pharmacist. Follow medication instructions as directed by your provider and/or pharmacist. Please keep your medication list with you and share with  your provider. Update the information when medications are discontinued, doses are changed, or new medications (including over-the-counter products) are added; and carry medication information at all times in the event of emergency situations     Allergies:  AMLODIPINE - Itching     AMARYL - (reactions not documented)     AVANDIA - (reactions not documented)     CIPRO XR - (reactions not documented)     CONTRAST MEDIA WITH IODINE - Hives     GLIPIZIDE - (reactions not documented)     GLUCOPHAGE - (reactions not documented)     HYDRALAZINE - (reactions not documented)     LEVAQUIN - (reactions not documented)     METFORMIN - (reactions not documented)     RELAFEN - (reactions not documented)               Medications  Valid as of: March 23, 2017 - 11:43 AM    Generic Name Brand Name Tablet Size Instructions for use    Allopurinol (Tab) ZYLOPRIM 100 MG Take 1/2 pill qd        Amoxicillin-Pot Clavulanate (Tab) AUGMENTIN 875-125 MG Take 1 Tab by mouth 2 times a day.        Azithromycin (Tab) ZITHROMAX 250 MG Take 2 tablets on day 1, then take 1 tablet a day for 4 days.        Beclomethasone Dipropionate (Aero Soln) QVAR 40 MCG/ACT Inhale 2 Puffs by mouth 2 Times a Day. Use spacer. Rinse mouth after each use.        Blood Glucose Monitoring Suppl (Misc) Blood Glucose Monitoring Suppl SUPPLIES Test strips order: Test strips for [employer] Contour meter. Sig: use tid  and prn ssx high or low sugar #100 RF x 3        Cholecalciferol (Cap) Vitamin D 2000 UNITS Take 4,000 Units by mouth every day.        ClonazePAM (Tab) KLONOPIN 0.5 MG Take 1 Tab by mouth 2 times a day as needed (insomnia, anxiety).        CloNIDine HCl (Tab) CATAPRES 0.1 MG Use between doses of isosorbide mononitrate if your blood pressure is higher than 170/90.        Diclofenac Sodium (Gel) Diclofenac Sodium 1 % Apply 2 g to skin as directed 4 times a day.        Diclofenac Sodium (Gel) Diclofenac Sodium 1 % Apply 2 gm to bilateral shoulder as needed.         Doxycycline Hyclate (Tab) VIBRAMYCIN 100 MG Take 1 Tab by mouth 2 times a day.        Dronedarone HCl (Tab) MULTAQ 400 MG TAKE ONE TABLET BY MOUTH TWICE DAILY WITH MEALS        Furosemide (Tab) LASIX 80 MG Take 80 mg by mouth every day.        Glucose Blood (Strip) HAYLEE CONTOUR NEXT TEST          HydroCHLOROthiazide (Tab) HYDRODIURIL 25 MG         HydroCHLOROthiazide (Cap) MICROZIDE 12.5 MG         Insulin Glargine (Solution Pen-injector) Insulin Glargine 300 UNIT/ML Inject 65 Units/oz/day as instructed every day.        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        Levothyroxine Sodium (Tab) SYNTHROID 50 MCG Take 1 Tab by mouth every day. Indications: Underactive Thyroid        Losartan Potassium (Tab) COZAAR 50 MG TAKE ONE TABLET BY MOUTH TWICE DAILY        Magnesium Oxide (Tab) MAG- (241.3 MG) MG Take 400 mg by mouth every day.        Metoprolol Tartrate (Tab) LOPRESSOR 50 MG TAKE ONE TABLET BY MOUTH TWICE DAILY ,  PATIENT  MAY  TAKE  AN  ADDITIONAL  HALF  TABLET  FOR  INCREASED  HEART  RATE.        Misc. Devices (Misc) Misc. Devices  Pen needles for Lantus Solstar. 29g 12mm.        Montelukast Sodium (Tab) SINGULAIR 10 MG 1 po qhs        Omeprazole (CAPSULE DELAYED RELEASE) PRILOSEC 20 MG TAKE ONE CAPSULE BY MOUTH ONCE DAILY        Potassium Chloride (Tab CR) KLOR-CON 10 MEQ Take 1 Tab by mouth every day.        Simvastatin (Tab) ZOCOR 20 MG TAKE ONE TABLET BY MOUTH IN THE EVENING        Terazosin HCl (Cap) HYTRIN 1 MG         Warfarin Sodium (Tab) COUMADIN 3 MG Take 1 to 1.5 tablets by mouth daily as directed by the coumadin clinic        .                 Medicines prescribed today were sent to:     [facility] [address] [place] [address], [place] - [address]    [address] [place] [address]     Phone: [phone] Fax: [phone]    Open 24 Hours?: No      Medication refill instructions:       If your prescription bottle indicates you have medication refills left, it is not necessary to call your provider’s office. Please contact your pharmacy and they will refill your medication.    If your prescription bottle indicates you do not have any refills left, you may request refills at any time through one of the following ways: The online [employer] system (except Urgent Care), by calling your provider’s office, or by asking your pharmacy to contact your provider’s office with a refill request. Medication refills are processed only during regular business hours and may not be available until the next business day. Your provider may request additional information or to have a follow-up visit with you prior to refilling your medication.   *Please Note: Medication refills are assigned a new Rx number when refilled electronically. Your pharmacy may indicate that no refills were authorized even though a new prescription for the same medication is available at the pharmacy. Please request the medicine by name with the pharmacy before contacting your provider for a refill.        Referral     A referral request has been sent to our patient care coordination department. Please allow 3-5 business days for us to process this request and contact you either by phone or mail. If you do not hear from us by the 5th business day, please call us at [phone].        Instructions    Wei Patterson PA-C, endocrinology  Dr. Farhad Carvalho - [facility]       Other Notes About Your Plan     Cards: HTN, AF.           MyChart Access Code: Activation code not generated  Current MyChart Status: Active

## 2017-03-24 NOTE — ASSESSMENT & PLAN NOTE
Started about 2 weeks ago with sinus pressure and congestion.  Has not improved.  Drainage is yellow/green.  Discussed plan.

## 2017-03-24 NOTE — PROGRESS NOTES
Chief Complaint   Patient presents with   • Establish Care       HISTORY OF PRESENT ILLNESS: Patient is a 72 y.o. female new patient who presents today to discuss the following issues:    Encounter to establish care with new doctor  Is here to establish with a new primary care provider.  Was previously seen in [place].      Type 2 diabetes mellitus with stage 4 chronic kidney disease  Would like a referral to a new endocrinologist.  Discussed options, and she would like a referral to Wei Patterson PA-C.    Essential hypertension  Stable on meds, followed by cardiology.    PAF (paroxysmal atrial fibrillation)  Stable on meds, followed by cardiology.    Chronic pain of both shoulders  Has dealt with chronic shoulder discomfort for years.  Would like a refill of voltaren gel.      Patient Active Problem List    Diagnosis Date Noted   • Strain of lumbar paraspinal muscle 09/23/2016     Priority: High   • Essential hypertension 05/10/2016     Priority: High   • Insulin long-term use (CMS-HCC) 04/29/2015     Priority: High   • Atherosclerosis of aorta (CMS-HCC) 04/29/2015     Priority: High   • Chronic respiratory failure (CMS-HCC) 04/29/2015     Priority: High   • Long term current use of anticoagulant therapy 04/29/2015     Priority: High   • Morbid obesity with BMI of 45.0-49.9, adult (CMS-HCC) 04/29/2015     Priority: High   • Anxiety 01/09/2014     Priority: High   • Hypothyroid 12/12/2013     Priority: High   • PAF (paroxysmal atrial fibrillation) (CMS-HCC) 07/12/2013     Priority: High   • Type 2 diabetes mellitus with stage 4 chronic kidney disease (CMS-HCC)      Priority: High   • Mild intermittent asthma without complication 08/24/2016     Priority: Medium   • Chronic anticoagulation 06/10/2016     Priority: Medium   • AVERY (obstructive sleep apnea) 05/10/2016     Priority: Medium   • Chronic gout 04/30/2015     Priority: Medium   • Left bundle branch block 03/05/2012     Priority: Medium   • Multiple thyroid  nodules 10/07/2015     Priority: Low   • Carotid stenosis 05/30/2014     Priority: Low   • Encounter to establish care with new doctor 03/23/2017   • Chronic pain of both shoulders 03/23/2017   • Sinusitis 03/23/2017   • Dyslipidemia 12/14/2016   • Combined forms of age-related cataract of right eye 10/25/2016       Allergies:Amlodipine; Amaryl; Avandia; Cipro xr; Contrast media with iodine; Glipizide; Glucophage; Hydralazine; Levaquin; Metformin; and Relafen    Current Outpatient Prescriptions   Medication Sig Dispense Refill   • furosemide (LASIX) 80 MG Tab Take 80 mg by mouth every day.     • amoxicillin-clavulanate (AUGMENTIN) 875-125 MG Tab Take 1 Tab by mouth 2 times a day. 20 Tab 0   • Diclofenac Sodium 1 % Gel Apply 2 gm to bilateral shoulder as needed. 6 Tube 2   • Insulin Glargine (TOUJEO SOLOSTAR) 300 UNIT/ML Solution Pen-injector Inject 65 Units/oz/day as instructed every day. 6 PEN 0   • montelukast (SINGULAIR) 10 MG Tab 1 po qhs 90 Tab 3   • beclomethasone (QVAR) 40 MCG/ACT inhaler Inhale 2 Puffs by mouth 2 Times a Day. Use spacer. Rinse mouth after each use. 3 Inhaler 3   • levothyroxine (SYNTHROID) 50 MCG Tab Take 1 Tab by mouth every day. Indications: Underactive Thyroid 90 Tab 0   • terazosin (HYTRIN) 1 MG Cap      • doxycycline (VIBRAMYCIN) 100 MG Tab Take 1 Tab by mouth 2 times a day. 20 Tab 0   • metoprolol (LOPRESSOR) 50 MG Tab TAKE ONE TABLET BY MOUTH TWICE DAILY ,  PATIENT  MAY  TAKE  AN  ADDITIONAL  HALF  TABLET  FOR  INCREASED  HEART  RATE. 180 Tab 3   • omeprazole (PRILOSEC) 20 MG delayed-release capsule TAKE ONE CAPSULE BY MOUTH ONCE DAILY 90 Cap 0   • levalbuterol (XOPENEX HFA) 45 MCG/ACT inhaler Inhale 2 Puffs by mouth every four hours as needed for Shortness of Breath. 1 Inhaler 3   • HAYLEE CONTOUR NEXT TEST strip      • warfarin (COUMADIN) 3 MG Tab Take 1 to 1.5 tablets by mouth daily as directed by the coumadin clinic 135 Tab 1   • Blood Glucose Monitoring Suppl SUPPLIES Misc Test  strips order: Test strips for [employer] Contour meter. Sig: use tid  and prn ssx high or low sugar #100 RF x 3 100 Strip 3   • MULTAQ 400 MG Tab TAKE ONE TABLET BY MOUTH TWICE DAILY WITH MEALS 60 Tab 6   • potassium chloride ER (KLOR-CON) 10 MEQ tablet Take 1 Tab by mouth every day. 90 Tab 3   • Diclofenac Sodium (VOLTAREN) 1 % Gel Apply 2 g to skin as directed 4 times a day. 100 g 0   • losartan (COZAAR) 50 MG Tab TAKE ONE TABLET BY MOUTH TWICE DAILY 180 Tab 3   • clonazepam (KLONOPIN) 0.5 MG Tab Take 1 Tab by mouth 2 times a day as needed (insomnia, anxiety). 60 Tab 0   • simvastatin (ZOCOR) 20 MG Tab TAKE ONE TABLET BY MOUTH IN THE EVENING 90 Tab 3   • RELION PEN NEEDLES 29G X 12MM Misc USE ONE PEN NEEDLE SUBCUTANEOUSLY FOR LANTUS SOLOSTAR 100 Each 3   • Misc. Devices MISC Pen needles for Lantus Solstar. 29g 12mm. 90 Each 3   • allopurinol (ZYLOPRIM) 100 MG TABS Take 1/2 pill qd (Patient taking differently: Take 100 mg by mouth 2 Times a Day. Indications: Primary Gout) 90 Tab 4   • Cholecalciferol (VITAMIN D) 2000 UNITS CAPS Take 4,000 Units by mouth every day.     • magnesium oxide (MAGNESIUM OXIDE) 400 (241.3 MG) MG TABS tablet Take 400 mg by mouth every day.     • hydrochlorothiazide (MICROZIDE) 12.5 MG capsule      • azithromycin (ZITHROMAX) 250 MG Tab Take 2 tablets on day 1, then take 1 tablet a day for 4 days. 6 Tab 1   • hydrochlorothiazide (HYDRODIURIL) 25 MG Tab      • clonidine (CATAPRES) 0.1 MG Tab Use between doses of isosorbide mononitrate if your blood pressure is higher than 170/90. 30 Tab 0     No current facility-administered medications for this visit.       Social History   Substance Use Topics   • Smoking status: Former Smoker -- 1.00 packs/day for 20 years     Types: Cigarettes     Quit date: 1983   • Smokeless tobacco: Never Used   • Alcohol Use: No       Family Status   Relation Status Death Age   • Maternal Aunt     • Mother     • Father     • Brother      • Maternal Grandmother     • Maternal Grandfather     • Paternal Grandmother     • Paternal Grandfather     • Son Alive    • Daughter Alive    • Grandchild Alive      Family History   Problem Relation Age of Onset   • Cancer Maternal Aunt    • Diabetes Maternal Aunt    • Heart Disease Maternal Aunt    • Hypertension Maternal Aunt    • Hyperlipidemia Maternal Aunt    • Cancer Mother      Leukemia   • Diabetes Mother    • Heart Disease Mother    • Hypertension Mother    • Hyperlipidemia Mother    • Lung Disease Father    • Cancer Father      Lung   • Hyperlipidemia Father    • Hypertension Father    • Heart Disease Father    • Diabetes Father    • Lung Disease Brother    • Stroke Brother    • Diabetes Brother    • Heart Disease Brother    • Hypertension Brother    • Hyperlipidemia Brother    • Diabetes Maternal Grandmother        Review of Systems:   Constitutional: Negative for fever, chills, weight loss and malaise/fatigue.   HENT: Negative for ear pain, nosebleeds, sore throat, and neck pain. Positive for sinus pressure and congestion.  Eyes: Negative for blurred vision.   Respiratory: Negative for cough, sputum production, shortness of breath and wheezing.    Cardiovascular: Negative for chest pain, palpitations, orthopnea and leg swelling.   Gastrointestinal: Negative for heartburn, nausea, vomiting and abdominal pain.   Genitourinary: Negative for dysuria, urgency and frequency.   Musculoskeletal: Negative for myalgias, joint pain, and back pain.  Skin: Negative for rash and itching.   Neurological: Negative for dizziness, tingling, tremors, sensory change, focal weakness and headaches.   Endo/Heme/Allergies: Does not bruise/bleed easily.   Psychiatric/Behavioral: Negative for depression, suicidal ideas and memory loss.  The patient is not nervous/anxious and does not have insomnia.    All other systems reviewed and are negative except as in HPI.    Exam:  Blood pressure 140/58,  "pulse 60, temperature 36.5 °C (97.7 °F), resp. rate 16, height 1.702 m (5' 7.01\"), weight 132.087 kg (291 lb 3.2 oz), SpO2 99 %.  General:  Well nourished, well developed female in NAD  Head: Grossly normal. Turbinates red and enlarged, with exudate.  Neck: Supple without JVD or bruit. Thyroid is not enlarged.  Pulmonary: Clear to ausculation. Normal effort. No rales, ronchi, or wheezing.  Cardiovascular: Regular rate and rhythm without murmur.   Extremities: No clubbing, cyanosis, or edema.  Skin: Intact with no obvious rashes or lesions.  Neuro: Grossly intact.  Psych: Alert and oriented x 3.  Mood and affect appropriate.    Medical decision-making and discussion: Whitley is here to establish with a new primary care provider.  We reviewed her past medical history and discussed her current medications. Prescriptions for augmentin and voltaren gel were sent to her pharmacy, and she was referred to endocrinology.  She will sign up with [facility], and she will plan to follow-up here as needed.         Assessment/Plan:  1. Encounter to establish care with new doctor     2. Sinusitis, unspecified chronicity, unspecified location  amoxicillin-clavulanate (AUGMENTIN) 875-125 MG Tab   3. Chronic pain of both shoulders  Diclofenac Sodium 1 % Gel   4. Type 2 diabetes mellitus with complication, with long-term current use of insulin ([facility])  REFERRAL TO ENDOCRINOLOGY   5. Type 2 diabetes mellitus with stage 4 chronic kidney disease, unspecified long term insulin use status (CMS-[facility])     6. Essential hypertension     7. PAF (paroxysmal atrial fibrillation) (CMS-HCC)         Return if symptoms worsen or fail to improve.    Please note that this dictation was created using voice recognition software. I have made every reasonable attempt to correct obvious errors, but I expect that there are errors of grammar and possibly content that I did not discover before finalizing the note.      "

## 2017-03-24 NOTE — ASSESSMENT & PLAN NOTE
Would like a referral to a new endocrinologist.  Discussed options, and she would like a referral to Wei Patterson PA-C.

## 2017-03-26 LAB
ALBUMIN SERPL-MCNC: 3.79 G/DL (ref 3.75–5.01)
ALPHA1 GLOB SERPL ELPH-MCNC: 0.37 G/DL (ref 0.19–0.46)
ALPHA2 GLOB SERPL ELPH-MCNC: 1.12 G/DL (ref 0.48–1.05)
B-GLOBULIN SERPL ELPH-MCNC: 0.81 G/DL (ref 0.48–1.1)
GAMMA GLOB SERPL ELPH-MCNC: 0.81 G/DL (ref 0.62–1.51)
IGA SERPL-MCNC: 145 MG/DL (ref 68–408)
IGG SERPL-MCNC: 708 MG/DL (ref 768–1632)
IGM SERPL-MCNC: 170 MG/DL (ref 35–263)
INTERPRETATION SERPL IFE-IMP: ABNORMAL
INTERPRETATION SERPL IFE-IMP: ABNORMAL
PATHOLOGY STUDY: ABNORMAL
PROT SERPL-MCNC: 6.9 G/DL (ref 6–8.3)

## 2017-03-27 ENCOUNTER — HOSPITAL ENCOUNTER (OUTPATIENT)
Dept: LAB | Facility: MEDICAL CENTER | Age: 72
End: 2017-03-27
Attending: INTERNAL MEDICINE
Payer: MEDICARE

## 2017-03-27 ENCOUNTER — HOSPITAL ENCOUNTER (OUTPATIENT)
Facility: MEDICAL CENTER | Age: 72
End: 2017-03-27
Attending: NURSE PRACTITIONER
Payer: MEDICARE

## 2017-03-27 PROCEDURE — 36415 COLL VENOUS BLD VENIPUNCTURE: CPT

## 2017-03-27 PROCEDURE — 80053 COMPREHEN METABOLIC PANEL: CPT

## 2017-03-27 RX ORDER — INSULIN GLARGINE 300 U/ML
INJECTION, SOLUTION SUBCUTANEOUS
Qty: 6 PEN | Refills: 0 | Status: SHIPPED | OUTPATIENT
Start: 2017-03-27 | End: 2017-04-21 | Stop reason: SDUPTHER

## 2017-03-27 NOTE — TELEPHONE ENCOUNTER
Was the patient seen in the last year in this department? Yes     Does patient have an active prescription for medications requested? No     Received Request Via: Pharmacy      Pt met protocol?: Yes, dx discussed at OV last week. A1c checked 1/17

## 2017-03-27 NOTE — TELEPHONE ENCOUNTER
Pt has referral to see Dr. Patterson from a few days ago. Will send and RX for 6 pens to pharmacy.

## 2017-03-28 LAB
ALBUMIN SERPL BCP-MCNC: 3.8 G/DL (ref 3.2–4.9)
ALBUMIN SERPL BCP-MCNC: NORMAL G/DL (ref 3.2–4.9)
ALBUMIN/GLOB SERPL: 1.1 G/DL
ALBUMIN/GLOB SERPL: NORMAL G/DL
ALP SERPL-CCNC: 67 U/L (ref 30–99)
ALP SERPL-CCNC: NORMAL U/L (ref 30–99)
ALT SERPL-CCNC: 15 U/L (ref 2–50)
ALT SERPL-CCNC: NORMAL U/L (ref 2–50)
ANION GAP SERPL CALC-SCNC: 10 MMOL/L (ref 0–11.9)
ANION GAP SERPL CALC-SCNC: NORMAL MMOL/L (ref 0–11.9)
AST SERPL-CCNC: 17 U/L (ref 12–45)
AST SERPL-CCNC: NORMAL U/L (ref 12–45)
BILIRUB SERPL-MCNC: 0.7 MG/DL (ref 0.1–1.5)
BILIRUB SERPL-MCNC: NORMAL MG/DL (ref 0.1–1.5)
BUN SERPL-MCNC: 77 MG/DL (ref 8–22)
BUN SERPL-MCNC: NORMAL MG/DL (ref 8–22)
CALCIUM SERPL-MCNC: 9.5 MG/DL (ref 8.5–10.5)
CALCIUM SERPL-MCNC: NORMAL MG/DL (ref 8.5–10.5)
CHLORIDE SERPL-SCNC: 104 MMOL/L (ref 96–112)
CHLORIDE SERPL-SCNC: NORMAL MMOL/L (ref 96–112)
CO2 SERPL-SCNC: 25 MMOL/L (ref 20–33)
CO2 SERPL-SCNC: NORMAL MMOL/L (ref 20–33)
CREAT SERPL-MCNC: 2.58 MG/DL (ref 0.5–1.4)
CREAT SERPL-MCNC: NORMAL MG/DL (ref 0.5–1.4)
GLOBULIN SER CALC-MCNC: 3.5 G/DL (ref 1.9–3.5)
GLOBULIN SER CALC-MCNC: NORMAL G/DL (ref 1.9–3.5)
GLUCOSE SERPL-MCNC: 112 MG/DL (ref 65–99)
GLUCOSE SERPL-MCNC: NORMAL MG/DL (ref 65–99)
POTASSIUM SERPL-SCNC: 4 MMOL/L (ref 3.6–5.5)
POTASSIUM SERPL-SCNC: NORMAL MMOL/L (ref 3.6–5.5)
PROT SERPL-MCNC: 7.3 G/DL (ref 6–8.2)
PROT SERPL-MCNC: NORMAL G/DL (ref 6–8.2)
SODIUM SERPL-SCNC: 139 MMOL/L (ref 135–145)
SODIUM SERPL-SCNC: NORMAL MMOL/L (ref 135–145)

## 2017-04-07 ENCOUNTER — HOSPITAL ENCOUNTER (OUTPATIENT)
Dept: LAB | Facility: MEDICAL CENTER | Age: 72
End: 2017-04-07
Attending: INTERNAL MEDICINE
Payer: MEDICARE

## 2017-04-07 ENCOUNTER — ANTICOAGULATION VISIT (OUTPATIENT)
Dept: VASCULAR LAB | Facility: MEDICAL CENTER | Age: 72
End: 2017-04-07
Attending: INTERNAL MEDICINE
Payer: MEDICARE

## 2017-04-07 DIAGNOSIS — I48.0 PAF (PAROXYSMAL ATRIAL FIBRILLATION) (HCC): ICD-10-CM

## 2017-04-07 LAB
ANION GAP SERPL CALC-SCNC: 11 MMOL/L (ref 0–11.9)
BUN SERPL-MCNC: 74 MG/DL (ref 8–22)
CALCIUM SERPL-MCNC: 9.5 MG/DL (ref 8.5–10.5)
CHLORIDE SERPL-SCNC: 101 MMOL/L (ref 96–112)
CO2 SERPL-SCNC: 24 MMOL/L (ref 20–33)
CREAT SERPL-MCNC: 2.81 MG/DL (ref 0.5–1.4)
GFR SERPL CREATININE-BSD FRML MDRD: 17 ML/MIN/1.73 M 2
GLUCOSE SERPL-MCNC: 242 MG/DL (ref 65–99)
INR PPP: 2.6 (ref 2–3.5)
POTASSIUM SERPL-SCNC: 4.8 MMOL/L (ref 3.6–5.5)
SODIUM SERPL-SCNC: 136 MMOL/L (ref 135–145)

## 2017-04-07 PROCEDURE — 99211 OFF/OP EST MAY X REQ PHY/QHP: CPT

## 2017-04-07 PROCEDURE — 85610 PROTHROMBIN TIME: CPT

## 2017-04-07 NOTE — PROGRESS NOTES
Anticoagulation Summary as of 4/7/2017     INR goal 2.0-3.0   Selected INR 2.6 (4/7/2017)   Maintenance plan 1.5 mg (3 mg x 0.5) on Fri; 3 mg (3 mg x 1) all other days   Weekly total 19.5 mg   Weekly max dose 21 mg   Plan last modified SEPIDEH DamonD (12/2/2016)   Next INR check 6/2/2017   Target end date Indefinite    Indications   PAF (paroxysmal atrial fibrillation) (CMS-HCC) [I48.0]  Warfarin therapy started (Resolved) [Z79.01]         Anticoagulation Episode Summary     INR check location     Preferred lab     Send INR reminders to     Comments       Anticoagulation Care Providers     Provider Role Specialty Phone number    Amy Lincoln M.D. Referring Cardiology [phone]    [facility] Responsible  [phone]        Anticoagulation Patient Findings    Patient's INR was therapeutic today in clinic.    Pt denies any unusual s/s of bleeding, bruising, clotting or any changes to diet or medications.  Confirmed dosing regimen.  Pt is to continue with current warfarin dosing regimen.  Follow up in 8 weeks.    Juan Bonds, PHARMD

## 2017-04-07 NOTE — MR AVS SNAPSHOT
Whitley Frederick   2017 1:00 PM   Anticoagulation Visit   MRN: [id number]    Department:  Vascular Medicine   Dept Phone:  [phone]    Description:  Female : 1945   Provider:  [facility] EXAM 4           Allergies as of 2017     Allergen Noted Reactions    Amlodipine 2016   Itching    Amaryl 2011       GI Problems    Avandia [Rosiglitazone Maleate] 2011       GI problems    Cipro Xr 2011       Possibly causes Chills.    Contrast Media With Iodine [Iodine] 2016   Hives    Glipizide 2011       GI Problems    Glucophage [Metformin Hydrochloride] 2011       dizzy    Hydralazine 2016       Rapid heart rate, chest tightness    Levaquin 2011       Metformin 2011       Relafen [Nabumetone] 2011       Itching      You were diagnosed with     PAF (paroxysmal atrial fibrillation) (CMS-HCC)   [586436]         Vital Signs     Smoking Status                   Former Smoker           Basic Information     Date Of Birth Sex Race Ethnicity Preferred Language    1945 Female White Non- English      Your appointments     2017  1:15 PM   ECHO with ECHO [facility] RM2   ECHOCARDIOLOGY [facility])    [address]  [facility] [address]   [phone]           No prep            2017  1:00 PM   Established Patient with [facility] EXAM 5   [facility]  (--)    [address] [address]   [phone]            2017 11:00 AM   FOLLOW UP with Fernando Sorto M.D.   [facility]    [address] [address] [phone]            Aug 17, 2017  1:00 PM   Established Patient Pul with EDILMA Mosher   [facility] (--)    [address]  [address]  [facility] [id number] [phone]              Problem List              ICD-10-CM Priority Class Noted -  Resolved    Type 2 diabetes mellitus with stage 4 chronic kidney disease (CMS-HCC) E11.22, N18.4 High  Unknown - Present    Left bundle branch block I44.7 Medium  3/5/2012 - Present    PAF (paroxysmal atrial fibrillation) (CMS-HCC) I48.0 High  7/12/2013 - Present    Hypothyroid E03.9 High  12/12/2013 - Present    Anxiety F41.9 High  1/9/2014 - Present    Carotid stenosis I65.29 Low  5/30/2014 - Present    Insulin long-term use (CMS-HCC) Z79.4 High  4/29/2015 - Present    Atherosclerosis of aorta (CMS-HCC) I70.0 High  4/29/2015 - Present    Chronic respiratory failure (CMS-HCC) J96.10 High  4/29/2015 - Present    Long term current use of anticoagulant therapy Z79.01 High  4/29/2015 - Present    Morbid obesity with BMI of 45.0-49.9, adult (CMS-HCC) E66.01, Z68.42 High  4/29/2015 - Present    Chronic gout M1A.9XX0 Medium  4/30/2015 - Present    Multiple thyroid nodules E04.2 Low  10/7/2015 - Present    Essential hypertension I10 High  5/10/2016 - Present    AVERY (obstructive sleep apnea) G47.33 Medium  5/10/2016 - Present    Chronic anticoagulation Z79.01 Medium  6/10/2016 - Present    Mild intermittent asthma without complication J45.20 Medium  8/24/2016 - Present    Strain of lumbar paraspinal muscle S39.012A High  9/23/2016 - Present    Combined forms of age-related cataract of right eye H25.811   10/25/2016 - Present    Dyslipidemia E78.5   12/14/2016 - Present    Encounter to establish care with new doctor Z71.89   3/23/2017 - Present    Chronic pain of both shoulders M25.512, G89.29, M25.511   3/23/2017 - Present    Sinusitis J32.9   3/23/2017 - Present      Health Maintenance        Date Due Completion Dates    COLON CANCER SCREENING ANNUAL FIT 1945 ---    IMM DTaP/Tdap/Td Vaccine (1 - Tdap) 3/19/1965 ---    IMM ZOSTER VACCINE 3/19/2006 ---    COLONOSCOPY 11/30/2012 11/30/2007 (Prv Comp)    Override on 11/30/2007: Previously completed    RETINAL SCREENING 10/7/2016 10/7/2015, 3/5/2015 (Prv Comp)    Override  on 3/5/2015: Previously completed    URINE ACR / MICROALBUMIN 10/7/2016 10/7/2015, 10/10/2014, 8/12/2013 (Done), 3/5/2012    Override on 8/12/2013: Done    DIABETES MONOFILAMENT / LE EXAM 10/7/2016 10/7/2015 (Done)    Override on 10/7/2015: Done    IMM PNEUMOCOCCAL 65+ (ADULT) HIGH/HIGHEST RISK SERIES (2 of 2 - PPSV23) 10/19/2016 8/24/2016    MAMMOGRAM 5/13/2017 5/13/2016, 4/9/2015, 1/31/2013, 7/14/2011, 10/5/2007, 10/5/2007, 8/7/2006    A1C SCREENING 7/27/2017 1/27/2017, 1/7/2016, 10/7/2015, 5/27/2015, 1/5/2015 (Prv Comp), 1/6/2014, 5/2/2013 (Done), 3/5/2012    Override on 1/5/2015: Previously completed    Override on 5/2/2013: Done    FASTING LIPID PROFILE 1/27/2018 1/27/2017, 10/7/2015, 5/27/2015, 9/4/2014, 1/6/2014, 4/12/2013, 3/5/2012    BONE DENSITY 1/31/2018 1/31/2013    SERUM CREATININE 3/27/2018 3/27/2017, 3/27/2017, 3/23/2017, 1/27/2017, 1/27/2017, 11/14/2016, 10/14/2016, 8/10/2016, 4/12/2016, 1/15/2016, 10/7/2015, 10/7/2015, 5/27/2015, 5/27/2015, 2/23/2015, 10/30/2014, 10/10/2014, 9/4/2014, 8/15/2014, 5/12/2014, 4/19/2014, 4/18/2014, 1/6/2014, 1/6/2014, 8/12/2013, 4/12/2013, 4/12/2013, 2/12/2013, 1/21/2013, 1/8/2013, 3/5/2012, 4/27/2006            Results     POCT Protime      Component    INR    2.6                        Current Immunizations     13-VALENT PCV PREVNAR 8/24/2016 10:45 AM    Tuberculin Skin Test 3/20/2017  4:39 PM      Below and/or attached are the medications your provider expects you to take. Review all of your home medications and newly ordered medications with your provider and/or pharmacist. Follow medication instructions as directed by your provider and/or pharmacist. Please keep your medication list with you and share with your provider. Update the information when medications are discontinued, doses are changed, or new medications (including over-the-counter products) are added; and carry medication information at all times in the event of emergency situations     Allergies:   AMLODIPINE - Itching     AMARYL - (reactions not documented)     AVANDIA - (reactions not documented)     CIPRO XR - (reactions not documented)     CONTRAST MEDIA WITH IODINE - Hives     GLIPIZIDE - (reactions not documented)     GLUCOPHAGE - (reactions not documented)     HYDRALAZINE - (reactions not documented)     LEVAQUIN - (reactions not documented)     METFORMIN - (reactions not documented)     RELAFEN - (reactions not documented)               Medications  Valid as of: April 07, 2017 -  1:06 PM    Generic Name Brand Name Tablet Size Instructions for use    Allopurinol (Tab) ZYLOPRIM 100 MG Take 1/2 pill qd        Amoxicillin-Pot Clavulanate (Tab) AUGMENTIN 875-125 MG Take 1 Tab by mouth 2 times a day.        Azithromycin (Tab) ZITHROMAX 250 MG Take 2 tablets on day 1, then take 1 tablet a day for 4 days.        Beclomethasone Dipropionate (Aero Soln) QVAR 40 MCG/ACT Inhale 2 Puffs by mouth 2 Times a Day. Use spacer. Rinse mouth after each use.        Blood Glucose Monitoring Suppl (Misc) Blood Glucose Monitoring Suppl SUPPLIES Test strips order: Test strips for ECOtality Contour meter. Sig: use tid  and prn ssx high or low sugar #100 RF x 3        Cholecalciferol (Cap) Vitamin D 2000 UNITS Take 4,000 Units by mouth every day.        ClonazePAM (Tab) KLONOPIN 0.5 MG Take 1 Tab by mouth 2 times a day as needed (insomnia, anxiety).        CloNIDine HCl (Tab) CATAPRES 0.1 MG Use between doses of isosorbide mononitrate if your blood pressure is higher than 170/90.        Diclofenac Sodium (Gel) Diclofenac Sodium 1 % Apply 2 g to skin as directed 4 times a day.        Diclofenac Sodium (Gel) Diclofenac Sodium 1 % Apply 2 gm to bilateral shoulder as needed.        Doxycycline Hyclate (Tab) VIBRAMYCIN 100 MG Take 1 Tab by mouth 2 times a day.        Dronedarone HCl (Tab) MULTAQ 400 MG TAKE ONE TABLET BY MOUTH TWICE DAILY WITH MEALS        Furosemide (Tab) LASIX 80 MG Take 80 mg by mouth every day.        Glucose Blood  (Strip) HAYLEE CONTOUR NEXT TEST          HydroCHLOROthiazide (Tab) HYDRODIURIL 25 MG         HydroCHLOROthiazide (Cap) MICROZIDE 12.5 MG         Insulin Glargine (Solution Pen-injector) TOUJEO SOLOSTAR 300 UNIT/ML INJECT 65 UNITS SUBCUTANEOUSLY ONCE DAILY AS DIRECTED        Insulin Pen Needle (Misc) RELION PEN NEEDLES 29G X 12MM USE ONE PEN NEEDLE SUBCUTANEOUSLY FOR LANTUS SOLOSTAR        Levalbuterol Tartrate (Aerosol) XOPENEX HFA 45 MCG/ACT Inhale 2 Puffs by mouth every four hours as needed for Shortness of Breath.        Levothyroxine Sodium (Tab) SYNTHROID 50 MCG Take 1 Tab by mouth every day. Indications: Underactive Thyroid        Losartan Potassium (Tab) COZAAR 50 MG TAKE ONE TABLET BY MOUTH TWICE DAILY        Magnesium Oxide (Tab) MAG- (241.3 MG) MG Take 400 mg by mouth every day.        Metoprolol Tartrate (Tab) LOPRESSOR 50 MG TAKE ONE TABLET BY MOUTH TWICE DAILY ,  PATIENT  MAY  TAKE  AN  ADDITIONAL  HALF  TABLET  FOR  INCREASED  HEART  RATE.        Misc. Devices (Misc) Misc. Devices  Pen needles for Lantus Solstar. 29g 12mm.        Montelukast Sodium (Tab) SINGULAIR 10 MG 1 po qhs        Omeprazole (CAPSULE DELAYED RELEASE) PRILOSEC 20 MG TAKE ONE CAPSULE BY MOUTH ONCE DAILY        Potassium Chloride (Tab CR) KLOR-CON 10 MEQ Take 1 Tab by mouth every day.        Simvastatin (Tab) ZOCOR 20 MG TAKE ONE TABLET BY MOUTH IN THE EVENING        Terazosin HCl (Cap) HYTRIN 1 MG         Warfarin Sodium (Tab) COUMADIN 3 MG Take 1 to 1.5 tablets by mouth daily as directed by the coumadin clinic        .                 Medicines prescribed today were sent to:     [facility] [address] [place] [address] - [address] [place] [address]    Phone: [phone] Fax: [phone]    Open 24 Hours?: No      Medication refill instructions:       If your prescription bottle indicates you have medication refills left, it is not necessary to call your provider’s office. Please contact your  pharmacy and they will refill your medication.    If your prescription bottle indicates you do not have any refills left, you may request refills at any time through one of the following ways: The online [employer] system (except Urgent Care), by calling your provider’s office, or by asking your pharmacy to contact your provider’s office with a refill request. Medication refills are processed only during regular business hours and may not be available until the next business day. Your provider may request additional information or to have a follow-up visit with you prior to refilling your medication.   *Please Note: Medication refills are assigned a new Rx number when refilled electronically. Your pharmacy may indicate that no refills were authorized even though a new prescription for the same medication is available at the pharmacy. Please request the medicine by name with the pharmacy before contacting your provider for a refill.        Other Notes About Your Plan     Cards: HTN, AF.        Warfarin Dosing Calendar   April 2017 Details    Sun Mon Tue Wed Thu Fri Sat           1                 2               3               4               5               6               7   2.6   1.5 mg   See details      8      3 mg           9      3 mg         10      3 mg         11      3 mg         12      3 mg         13      3 mg         14      1.5 mg         15      3 mg           16      3 mg         17      3 mg         18      3 mg         19      3 mg         20      3 mg         21      1.5 mg         22      3 mg           23      3 mg         24      3 mg         25      3 mg         26      3 mg         27      3 mg         28      1.5 mg         29      3 mg           30      3 mg                Date Details   04/07 This INR check   INR: 2.6               How to take your warfarin dose     To take:  1.5 mg Take 0.5 of a 3 mg tablet.    To take:  3 mg Take 1 of the 3 mg tablets.           Warfarin Dosing Calendar     May 2017 Details    Sun Mon Tue Wed Thu Fri Sat      1      3 mg         2      3 mg         3      3 mg         4      3 mg         5      1.5 mg         6      3 mg           7      3 mg         8      3 mg         9      3 mg         10      3 mg         11      3 mg         12      1.5 mg         13      3 mg           14      3 mg         15      3 mg         16      3 mg         17      3 mg         18      3 mg         19      1.5 mg         20      3 mg           21      3 mg         22      3 mg         23      3 mg         24      3 mg         25      3 mg         26      1.5 mg         27      3 mg           28      3 mg         29      3 mg         30      3 mg         31      3 mg             Date Details   No additional details            How to take your warfarin dose     To take:  1.5 mg Take 0.5 of a 3 mg tablet.    To take:  3 mg Take 1 of the 3 mg tablets.           Warfarin Dosing Calendar   June 2017 Details    Sun Mon Tue Wed Thu Fri Sat         1      3 mg         2      1.5 mg         3                 4               5               6               7               8               9               10                 11               12               13               14               15               16               17                 18               19               20               21               22               23               24                 25               26               27               28               29               30                 Date Details   No additional details    Date of next INR:  6/2/2017         How to take your warfarin dose     To take:  1.5 mg Take 0.5 of a 3 mg tablet.    To take:  3 mg Take 1 of the 3 mg tablets.              MD-IT Access Code: Activation code not generated  Current MD-IT Status: Active

## 2017-04-10 LAB — INR BLD: 2.6 (ref 0.9–1.2)

## 2017-04-11 ENCOUNTER — HOSPITAL ENCOUNTER (OUTPATIENT)
Dept: CARDIOLOGY | Facility: MEDICAL CENTER | Age: 72
End: 2017-04-11
Attending: INTERNAL MEDICINE
Payer: MEDICARE

## 2017-04-11 DIAGNOSIS — I15.9 SECONDARY HYPERTENSION: ICD-10-CM

## 2017-04-11 LAB
LV EJECT FRACT  99904: 55
LV EJECT FRACT MOD 2C 99903: 55.08
LV EJECT FRACT MOD 4C 99902: 53.57
LV EJECT FRACT MOD BP 99901: 48.62

## 2017-04-11 PROCEDURE — 93306 TTE W/DOPPLER COMPLETE: CPT

## 2017-04-11 PROCEDURE — 93306 TTE W/DOPPLER COMPLETE: CPT | Mod: 26 | Performed by: INTERNAL MEDICINE

## 2017-04-18 DIAGNOSIS — I48.0 PAF (PAROXYSMAL ATRIAL FIBRILLATION) (HCC): ICD-10-CM

## 2017-04-18 NOTE — TELEPHONE ENCOUNTER
Was the patient seen in the last year in this department? Yes     Does patient have an active prescription for medications requested? No     Received Request Via: Pharmacy      Pt met protocol?: Yes, OV last month, INR checked 4/7/17 (2.5 H)

## 2017-04-19 RX ORDER — WARFARIN SODIUM 3 MG/1
TABLET ORAL
Qty: 135 TAB | Refills: 1 | Status: SHIPPED | OUTPATIENT
Start: 2017-04-19 | End: 2017-08-18 | Stop reason: SDUPTHER

## 2017-04-20 ENCOUNTER — HOSPITAL ENCOUNTER (OUTPATIENT)
Dept: LAB | Facility: MEDICAL CENTER | Age: 72
End: 2017-04-20
Attending: NURSE PRACTITIONER
Payer: MEDICARE

## 2017-04-20 LAB
ALBUMIN SERPL BCP-MCNC: 3.8 G/DL (ref 3.2–4.9)
ALBUMIN/GLOB SERPL: 1.2 G/DL
ALP SERPL-CCNC: 75 U/L (ref 30–99)
ALT SERPL-CCNC: 16 U/L (ref 2–50)
ANION GAP SERPL CALC-SCNC: 11 MMOL/L (ref 0–11.9)
AST SERPL-CCNC: 19 U/L (ref 12–45)
BILIRUB SERPL-MCNC: 0.8 MG/DL (ref 0.1–1.5)
BUN SERPL-MCNC: 63 MG/DL (ref 8–22)
CALCIUM SERPL-MCNC: 9.4 MG/DL (ref 8.5–10.5)
CHLORIDE SERPL-SCNC: 106 MMOL/L (ref 96–112)
CO2 SERPL-SCNC: 23 MMOL/L (ref 20–33)
CREAT SERPL-MCNC: 2.62 MG/DL (ref 0.5–1.4)
EST. AVERAGE GLUCOSE BLD GHB EST-MCNC: 192 MG/DL
GFR SERPL CREATININE-BSD FRML MDRD: 18 ML/MIN/1.73 M 2
GLOBULIN SER CALC-MCNC: 3.3 G/DL (ref 1.9–3.5)
GLUCOSE SERPL-MCNC: 141 MG/DL (ref 65–99)
HBA1C MFR BLD: 8.3 % (ref 0–5.6)
POTASSIUM SERPL-SCNC: 4.4 MMOL/L (ref 3.6–5.5)
PROT SERPL-MCNC: 7.1 G/DL (ref 6–8.2)
SODIUM SERPL-SCNC: 140 MMOL/L (ref 135–145)

## 2017-04-20 PROCEDURE — 80053 COMPREHEN METABOLIC PANEL: CPT

## 2017-04-20 PROCEDURE — 83036 HEMOGLOBIN GLYCOSYLATED A1C: CPT

## 2017-04-20 PROCEDURE — 36415 COLL VENOUS BLD VENIPUNCTURE: CPT

## 2017-04-21 NOTE — TELEPHONE ENCOUNTER
Was the patient seen in the last year in this department? Yes     Does patient have an active prescription for medications requested? No     Received Request Via: Pharmacy      Pt met protocol?: Yes, OV 3/17 A1C 4/17 8.3, ON PHARMACY END THEY RECEIVED RX ELECTRONIC AND IT SAID 6 SO THEY TOOK IT AS 6ML AND GAVE HER ONE BOX, THEY ASKED TO HAVE A NEW RX SENT OVER STATING 6 PENS

## 2017-04-21 NOTE — TELEPHONE ENCOUNTER
Was the patient seen in the last year in this department? Yes 03/23/2017    Does patient have an active prescription for medications requested? No     Received Request Via: Patient

## 2017-05-01 ENCOUNTER — HOSPITAL ENCOUNTER (OUTPATIENT)
Dept: LAB | Facility: MEDICAL CENTER | Age: 72
End: 2017-05-01
Attending: INTERNAL MEDICINE
Payer: MEDICARE

## 2017-05-01 ENCOUNTER — TELEPHONE (OUTPATIENT)
Dept: CARDIOLOGY | Facility: MEDICAL CENTER | Age: 72
End: 2017-05-01

## 2017-05-01 LAB
ALBUMIN SERPL BCP-MCNC: 3.7 G/DL (ref 3.2–4.9)
BUN SERPL-MCNC: 98 MG/DL (ref 8–22)
CALCIUM SERPL-MCNC: 9.1 MG/DL (ref 8.5–10.5)
CHLORIDE SERPL-SCNC: 107 MMOL/L (ref 96–112)
CO2 SERPL-SCNC: 21 MMOL/L (ref 20–33)
CREAT SERPL-MCNC: 2.91 MG/DL (ref 0.5–1.4)
GFR SERPL CREATININE-BSD FRML MDRD: 16 ML/MIN/1.73 M 2
GLUCOSE SERPL-MCNC: 252 MG/DL (ref 65–99)
MAGNESIUM SERPL-MCNC: 2.1 MG/DL (ref 1.5–2.5)
PHOSPHATE SERPL-MCNC: 4.7 MG/DL (ref 2.5–4.5)
POTASSIUM SERPL-SCNC: 4.2 MMOL/L (ref 3.6–5.5)
SODIUM SERPL-SCNC: 138 MMOL/L (ref 135–145)
URATE SERPL-MCNC: 6.7 MG/DL (ref 1.9–8.2)

## 2017-05-01 PROCEDURE — 83735 ASSAY OF MAGNESIUM: CPT

## 2017-05-01 PROCEDURE — 36415 COLL VENOUS BLD VENIPUNCTURE: CPT

## 2017-05-01 PROCEDURE — 84550 ASSAY OF BLOOD/URIC ACID: CPT

## 2017-05-01 PROCEDURE — 80069 RENAL FUNCTION PANEL: CPT

## 2017-05-01 NOTE — TELEPHONE ENCOUNTER
WOJCIECH/Virgie     Please call Rd at [facility] in [place] at [phone]. He needs to discuss drug interaction between Multaq and Simvastatin.            Discussed with pharmacist that per last OV note on 12/14/2016, MD was aware that pt was taking both medications. Pharmacist to note in system. Pt has FU with Dr. Sorto 6/20

## 2017-05-02 ENCOUNTER — HOSPITAL ENCOUNTER (OUTPATIENT)
Facility: MEDICAL CENTER | Age: 72
End: 2017-05-02
Attending: INTERNAL MEDICINE
Payer: MEDICARE

## 2017-05-02 LAB
CREAT UR-MCNC: 100.1 MG/DL
PROT UR-MCNC: 75.7 MG/DL (ref 0–15)
PROT/CREAT UR: 756 MG/G (ref 10–107)

## 2017-05-02 PROCEDURE — 84156 ASSAY OF PROTEIN URINE: CPT

## 2017-05-02 PROCEDURE — 82570 ASSAY OF URINE CREATININE: CPT

## 2017-05-05 DIAGNOSIS — K21.9 GASTROESOPHAGEAL REFLUX DISEASE, ESOPHAGITIS PRESENCE NOT SPECIFIED: ICD-10-CM

## 2017-05-05 NOTE — TELEPHONE ENCOUNTER
Was the patient seen in the last year in this department? Yes     Does patient have an active prescription for medications requested? No     Received Request Via: Pharmacy      Pt met protocol?: Yes    LAST OV 03/23/2017

## 2017-05-07 RX ORDER — OMEPRAZOLE 20 MG/1
20 CAPSULE, DELAYED RELEASE ORAL DAILY
Qty: 90 CAP | Refills: 3 | Status: SHIPPED | OUTPATIENT
Start: 2017-05-07 | End: 2018-05-09 | Stop reason: SDUPTHER

## 2017-05-17 ENCOUNTER — PATIENT OUTREACH (OUTPATIENT)
Dept: HEALTH INFORMATION MANAGEMENT | Facility: OTHER | Age: 72
End: 2017-05-17

## 2017-05-17 DIAGNOSIS — I10 ESSENTIAL (PRIMARY) HYPERTENSION: ICD-10-CM

## 2017-05-18 ENCOUNTER — PATIENT MESSAGE (OUTPATIENT)
Dept: HEALTH INFORMATION MANAGEMENT | Facility: OTHER | Age: 72
End: 2017-05-18

## 2017-05-18 ENCOUNTER — OFFICE VISIT (OUTPATIENT)
Dept: MEDICAL GROUP | Facility: PHYSICIAN GROUP | Age: 72
End: 2017-05-18
Payer: MEDICARE

## 2017-05-18 VITALS
SYSTOLIC BLOOD PRESSURE: 128 MMHG | DIASTOLIC BLOOD PRESSURE: 70 MMHG | WEIGHT: 285 LBS | BODY MASS INDEX: 44.73 KG/M2 | HEIGHT: 67 IN | RESPIRATION RATE: 16 BRPM | TEMPERATURE: 98 F | OXYGEN SATURATION: 98 % | HEART RATE: 48 BPM

## 2017-05-18 DIAGNOSIS — E66.3 OVERWEIGHT(278.02): ICD-10-CM

## 2017-05-18 DIAGNOSIS — L03.90 CELLULITIS, UNSPECIFIED CELLULITIS SITE: ICD-10-CM

## 2017-05-18 DIAGNOSIS — R15.9 INCONTINENCE OF FECES, UNSPECIFIED FECAL INCONTINENCE TYPE: ICD-10-CM

## 2017-05-18 PROBLEM — Z76.89 ENCOUNTER TO ESTABLISH CARE WITH NEW DOCTOR: Status: RESOLVED | Noted: 2017-03-23 | Resolved: 2017-05-18

## 2017-05-18 PROCEDURE — 4040F PNEUMOC VAC/ADMIN/RCVD: CPT | Performed by: NURSE PRACTITIONER

## 2017-05-18 PROCEDURE — 1101F PT FALLS ASSESS-DOCD LE1/YR: CPT | Performed by: NURSE PRACTITIONER

## 2017-05-18 PROCEDURE — 3014F SCREEN MAMMO DOC REV: CPT | Performed by: NURSE PRACTITIONER

## 2017-05-18 PROCEDURE — G8432 DEP SCR NOT DOC, RNG: HCPCS | Performed by: NURSE PRACTITIONER

## 2017-05-18 PROCEDURE — G8419 CALC BMI OUT NRM PARAM NOF/U: HCPCS | Performed by: NURSE PRACTITIONER

## 2017-05-18 PROCEDURE — 99214 OFFICE O/P EST MOD 30 MIN: CPT | Performed by: NURSE PRACTITIONER

## 2017-05-18 PROCEDURE — 1036F TOBACCO NON-USER: CPT | Performed by: NURSE PRACTITIONER

## 2017-05-18 PROCEDURE — G8598 ASA/ANTIPLAT THER USED: HCPCS | Performed by: NURSE PRACTITIONER

## 2017-05-18 RX ORDER — DOXYCYCLINE HYCLATE 100 MG
100 TABLET ORAL 2 TIMES DAILY
Qty: 20 TAB | Refills: 0 | Status: SHIPPED | OUTPATIENT
Start: 2017-05-18 | End: 2017-06-07

## 2017-05-18 ASSESSMENT — PATIENT HEALTH QUESTIONNAIRE - PHQ9: CLINICAL INTERPRETATION OF PHQ2 SCORE: 0

## 2017-05-18 NOTE — PROGRESS NOTES
Outbound call to patient for 2nd medication reconciliation. Updated allergy and medication lists.    Patient demonstrates adherence to medication schedule and understanding of indications for medications.    Meds that meet Beer's criteria for potentially inappropriate use in patients over 65 include: terazosin. Patient states she had a difficult time tolerating antihypertensive agents in the past (clonidine caused intolerable daytime drowsiness, amlodipine caused edema). Denies any orthostatic hypotension, drowsiness or dizziness on current regimen.     Interaction with simvastatin and Multaq has been addressed by Dr. Sorto. Patient denies muscle pain.  No CPK level noted.    She states she does not take QVar because she does not feel she needs it.  [place]  States she has only used Xopenex once since March.      Patient denies any side effects from medications.     Has appropriate medication regimen for current disease states. She states her BP has been difficult to control, but has since d/c'd the clonidine and amlodipine and started lasix. States she has no side effects and BP have been at goal.  Patient interested in RHM program. Will reach out to Dr. Vallejo for RHM order.    Patient feels satisfied with medication regimen.      Patient can afford medications.  Qualified for state assistance and has no copay while in the [facility].    Plan:  Offer remote home monitoring for patient to Dr. Vallejo.  Keep appt with diabetic nurse educator.  Send pt mychart msg with [facility] contact info, per pt request.

## 2017-05-19 NOTE — ASSESSMENT & PLAN NOTE
Started about a week ago with redness around her left great toe nail.  Has since had a pedicure and her nail lady cleaned it up a bit.  Tried to make an appointment with her podiatrist, but he can't see here until early June.  Discussed plan.

## 2017-05-19 NOTE — PROGRESS NOTES
Chief Complaint   Patient presents with   • Nail Problem     possible infection        HISTORY OF PRESENT ILLNESS: Patient is a 72 y.o. female established patient who presents today to discuss the following issues:    Cellulitis  Started about a week ago with redness around her left great toe nail.  Has since had a pedicure and her nail lady cleaned it up a bit.  Tried to make an appointment with her podiatrist, but he can't see here until early June.  Discussed plan.    Incontinence of feces  Has been having issues with fecal incontinence.  Would like a referral to GI.  Discussed plan.        Patient Active Problem List    Diagnosis Date Noted   • Strain of lumbar paraspinal muscle 09/23/2016     Priority: High   • Essential hypertension 05/10/2016     Priority: High   • Insulin long-term use (CMS-HCC) 04/29/2015     Priority: High   • Atherosclerosis of aorta (CMS-HCC) 04/29/2015     Priority: High   • Chronic respiratory failure (CMS-HCC) 04/29/2015     Priority: High   • Long term current use of anticoagulant therapy 04/29/2015     Priority: High   • Morbid obesity with BMI of 45.0-49.9, adult (CMS-HCC) 04/29/2015     Priority: High   • Anxiety 01/09/2014     Priority: High   • Hypothyroid 12/12/2013     Priority: High   • PAF (paroxysmal atrial fibrillation) (CMS-HCC) 07/12/2013     Priority: High   • Type 2 diabetes mellitus with stage 4 chronic kidney disease (CMS-HCC)      Priority: High   • Mild intermittent asthma without complication 08/24/2016     Priority: Medium   • Chronic anticoagulation 06/10/2016     Priority: Medium   • AVERY (obstructive sleep apnea) 05/10/2016     Priority: Medium   • Chronic gout 04/30/2015     Priority: Medium   • Left bundle branch block 03/05/2012     Priority: Medium   • Multiple thyroid nodules 10/07/2015     Priority: Low   • Carotid stenosis 05/30/2014     Priority: Low   • Cellulitis 05/18/2017   • Incontinence of feces 05/18/2017   • Chronic pain of both shoulders  03/23/2017   • Sinusitis 03/23/2017   • Dyslipidemia 12/14/2016   • Combined forms of age-related cataract of right eye 10/25/2016       Allergies:Amlodipine; Amaryl; Avandia; Cipro xr; Contrast media with iodine; Glipizide; Glucophage; Hydralazine; Levaquin; Metformin; and Relafen    Current Outpatient Prescriptions   Medication Sig Dispense Refill   • doxycycline (VIBRAMYCIN) 100 MG Tab Take 1 Tab by mouth 2 times a day. 20 Tab 0   • omeprazole (PRILOSEC) 20 MG delayed-release capsule Take 1 Cap by mouth every day. 90 Cap 3   • Insulin Glargine (TOUJEO SOLOSTAR) 300 UNIT/ML Solution Pen-injector Inject 65 Units as instructed every day. 5 PEN 2   • warfarin (COUMADIN) 3 MG Tab Take 1 to 1.5 tablets by mouth daily as directed by the coumadin clinic 135 Tab 1   • furosemide (LASIX) 80 MG Tab Take 80 mg by mouth every day.     • hydrochlorothiazide (MICROZIDE) 12.5 MG capsule      • montelukast (SINGULAIR) 10 MG Tab 1 po qhs 90 Tab 3   • beclomethasone (QVAR) 40 MCG/ACT inhaler Inhale 2 Puffs by mouth 2 Times a Day. Use spacer. Rinse mouth after each use. 3 Inhaler 3   • levothyroxine (SYNTHROID) 50 MCG Tab Take 1 Tab by mouth every day. Indications: Underactive Thyroid 90 Tab 0   • terazosin (HYTRIN) 1 MG Cap      • metoprolol (LOPRESSOR) 50 MG Tab TAKE ONE TABLET BY MOUTH TWICE DAILY ,  PATIENT  MAY  TAKE  AN  ADDITIONAL  HALF  TABLET  FOR  INCREASED  HEART  RATE. 180 Tab 3   • levalbuterol (XOPENEX HFA) 45 MCG/ACT inhaler Inhale 2 Puffs by mouth every four hours as needed for Shortness of Breath. 1 Inhaler 3   • CHELY CONTOUR NEXT TEST strip      • Blood Glucose Monitoring Suppl SUPPLIES Misc Test strips order: Test strips for Chely Contour meter. Sig: use tid  and prn ssx high or low sugar #100 RF x 3 100 Strip 3   • MULTAQ 400 MG Tab TAKE ONE TABLET BY MOUTH TWICE DAILY WITH MEALS 60 Tab 6   • potassium chloride ER (KLOR-CON) 10 MEQ tablet Take 1 Tab by mouth every day. 90 Tab 3   • Diclofenac Sodium (VOLTAREN) 1  % Gel Apply 2 g to skin as directed 4 times a day. 100 g 0   • losartan (COZAAR) 50 MG Tab TAKE ONE TABLET BY MOUTH TWICE DAILY 180 Tab 3   • clonazepam (KLONOPIN) 0.5 MG Tab Take 1 Tab by mouth 2 times a day as needed (insomnia, anxiety). 60 Tab 0   • simvastatin (ZOCOR) 20 MG Tab TAKE ONE TABLET BY MOUTH IN THE EVENING 90 Tab 3   • RELION PEN NEEDLES 29G X 12MM Misc USE ONE PEN NEEDLE SUBCUTANEOUSLY FOR LANTUS SOLOSTAR 100 Each 3   • Misc. Devices MISC Pen needles for Lantus Solstar. 29g 12mm. 90 Each 3   • allopurinol (ZYLOPRIM) 100 MG TABS Take 1/2 pill qd (Patient taking differently: Take 100 mg by mouth 2 Times a Day. Indications: Primary Gout) 90 Tab 4   • Cholecalciferol (VITAMIN D) 2000 UNITS CAPS Take 4,000 Units by mouth every day.     • magnesium oxide (MAGNESIUM OXIDE) 400 (241.3 MG) MG TABS tablet Take 400 mg by mouth every day.       No current facility-administered medications for this visit.       Social History   Substance Use Topics   • Smoking status: Former Smoker -- 1.00 packs/day for 20 years     Types: Cigarettes     Quit date: 1983   • Smokeless tobacco: Never Used   • Alcohol Use: No       Family Status   Relation Status Death Age   • Maternal Aunt     • Mother     • Father     • Brother     • Maternal Grandmother     • Maternal Grandfather     • Paternal Grandmother     • Paternal Grandfather     • Son Alive    • Daughter Alive    • Grandchild Alive      Family History   Problem Relation Age of Onset   • Cancer Maternal Aunt    • Diabetes Maternal Aunt    • Heart Disease Maternal Aunt    • Hypertension Maternal Aunt    • Hyperlipidemia Maternal Aunt    • Cancer Mother      Leukemia   • Diabetes Mother    • Heart Disease Mother    • Hypertension Mother    • Hyperlipidemia Mother    • Lung Disease Father    • Cancer Father      Lung   • Hyperlipidemia Father    • Hypertension Father    • Heart Disease Father    • Diabetes  "Father    • Lung Disease Brother    • Stroke Brother    • Diabetes Brother    • Heart Disease Brother    • Hypertension Brother    • Hyperlipidemia Brother    • Diabetes Maternal Grandmother        Review of Systems:   Constitutional: Negative for fever, chills, weight loss and malaise/fatigue.   HENT: Negative for ear pain, nosebleeds, congestion, sore throat and neck pain.    Eyes: Negative for blurred vision.   Respiratory: Negative for cough, sputum production, shortness of breath and wheezing.    Cardiovascular: Negative for chest pain, palpitations, orthopnea and leg swelling.   Gastrointestinal: Negative for heartburn, nausea, vomiting and abdominal pain.  Positive for fecal incontinence.  Genitourinary: Negative for dysuria, urgency and frequency.   Musculoskeletal: Negative for myalgias, joint pain, and back pain.  Skin: Negative for rash and itching.  Redness and swelling of right great toe.  Neurological: Negative for dizziness, tingling, tremors, sensory change, focal weakness and headaches.   Endo/Heme/Allergies: Does not bruise/bleed easily.   Psychiatric/Behavioral: Negative for depression, suicidal ideas and memory loss.  The patient is not nervous/anxious and does not have insomnia.    All other systems reviewed and are negative except as in HPI.    Exam:  Blood pressure 128/70, pulse 48, temperature 36.7 °C (98 °F), resp. rate 16, height 1.702 m (5' 7.01\"), weight 129.275 kg (285 lb), SpO2 98 %.  General:  Well nourished, well developed female in NAD  Head: Grossly normal.  Pulmonary: Clear to ausculation. Normal effort. No rales, ronchi, or wheezing.  Cardiovascular: Regular rate and rhythm without murmur.   Extremities: No clubbing, cyanosis, or edema.  Skin: Intact with no obvious rashes or lesions.  Cellulitis right great toe.  Neuro: Grossly intact.  Psych: Alert and oriented x 3.  Mood and affect appropriate.    Medical decision-making and discussion: Whitley is here today to discuss an " infected toe.  A prescription for vibramycin was sent to her pharmacy, and she will proceed with warm epsom salt soaks.  A referral was also sent to gastroenterology.  She is to keep her appointment with podiatry for follow-up.  She understands that if her symptoms get worse, she is to proceed to the ER for possible IV antibiotics. She will follow-up here as needed.          Assessment/Plan:  1. Cellulitis, unspecified cellulitis site  doxycycline (VIBRAMYCIN) 100 MG Tab   2. Incontinence of feces, unspecified fecal incontinence type  REFERRAL TO GASTROENTEROLOGY   3. Overweight  Patient identified as having weight management issue.  Appropriate orders and counseling given.       Return if symptoms worsen or fail to improve.    Please note that this dictation was created using voice recognition software. I have made every reasonable attempt to correct obvious errors, but I expect that there are errors of grammar and possibly content that I did not discover before finalizing the note.

## 2017-05-22 ENCOUNTER — TELEPHONE (OUTPATIENT)
Dept: HEALTH INFORMATION MANAGEMENT | Facility: OTHER | Age: 72
End: 2017-05-22

## 2017-05-22 DIAGNOSIS — I10 ESSENTIAL HYPERTENSION: ICD-10-CM

## 2017-05-22 NOTE — TELEPHONE ENCOUNTER
Dear Dr. Sorto,    I am a clinical pharmacist with [facility]. I had the opportunity to complete a med review with Whitley. She states her blood pressures have varied recently and is interested in remote home monitoring.  If you would be interested in enrolling the patient in the pharmacist managed hypertension protocol and RHM, please consider signing pended order for AMB Referral to Pharmacotherapy Service in [facility].    Thanks for your time,  Zi Madrid, AraceliD

## 2017-05-24 ENCOUNTER — TELEPHONE (OUTPATIENT)
Dept: PULMONOLOGY | Facility: HOSPICE | Age: 72
End: 2017-05-24

## 2017-05-24 DIAGNOSIS — G47.33 OBSTRUCTIVE SLEEP APNEA: ICD-10-CM

## 2017-05-24 DIAGNOSIS — J45.909 UNCOMPLICATED ASTHMA, UNSPECIFIED ASTHMA SEVERITY: ICD-10-CM

## 2017-05-30 ENCOUNTER — PATIENT OUTREACH (OUTPATIENT)
Dept: HEALTH INFORMATION MANAGEMENT | Facility: OTHER | Age: 72
End: 2017-05-30

## 2017-05-30 DIAGNOSIS — E11.65 UNCONTROLLED TYPE 2 DIABETES MELLITUS WITH COMPLICATION, UNSPECIFIED LONG TERM INSULIN USE STATUS: ICD-10-CM

## 2017-05-30 DIAGNOSIS — E11.8 UNCONTROLLED TYPE 2 DIABETES MELLITUS WITH COMPLICATION, UNSPECIFIED LONG TERM INSULIN USE STATUS: ICD-10-CM

## 2017-05-30 DIAGNOSIS — Z59.9 FINANCIAL DIFFICULTIES: ICD-10-CM

## 2017-05-30 SDOH — ECONOMIC STABILITY - INCOME SECURITY: PROBLEM RELATED TO HOUSING AND ECONOMIC CIRCUMSTANCES, UNSPECIFIED: Z59.9

## 2017-05-30 NOTE — TELEPHONE ENCOUNTER
Per WOJCIECH, okay to enroll pt in program. Called Zi at ext [phone] and notified that JI agreed to enroll. Pending orders signed.

## 2017-05-30 NOTE — PROGRESS NOTES
Outbound call to patient to let her know that Dr. Sorto approved order for RHM and she will be receiving a kit from [facility] within the next week.  Patient states that she would like all values sent to Dr. Vallejo at [facility] as well as Dr. Sorto.  Info for the clinic is as follows:    RN-Kina  P) [phone]  F) [phone]    Patient has been living with daughter because she cannot find an alternate living situation that is affordable to her.  Would like a referral to [facility] SW to see if she qualifies for any reduced income housing.      Patient admits she needs help controlling her DM and would like some accountability (A1c 8.3 on 4/20/17).  Would also like a referral to CC RN for follow up calls regarding DM.    Plan:  Set up Whitley as a new patient in [facility].  Will follow up with patient next week to make sure delivery and set up of RHM equipment was completed.  Place referral to patient to CC SW and CC RN.

## 2017-06-01 ENCOUNTER — PATIENT OUTREACH (OUTPATIENT)
Dept: HEALTH INFORMATION MANAGEMENT | Facility: OTHER | Age: 72
End: 2017-06-01

## 2017-06-01 ENCOUNTER — ANTICOAGULATION MONITORING (OUTPATIENT)
Dept: VASCULAR LAB | Facility: MEDICAL CENTER | Age: 72
End: 2017-06-01

## 2017-06-01 DIAGNOSIS — E11.8 TYPE 2 DIABETES MELLITUS WITH COMPLICATION, UNSPECIFIED LONG TERM INSULIN USE STATUS: ICD-10-CM

## 2017-06-01 DIAGNOSIS — I48.0 PAF (PAROXYSMAL ATRIAL FIBRILLATION) (HCC): ICD-10-CM

## 2017-06-01 DIAGNOSIS — E03.9 HYPOTHYROIDISM, UNSPECIFIED TYPE: ICD-10-CM

## 2017-06-01 NOTE — TELEPHONE ENCOUNTER
Was the patient seen in the last year in this department? Yes     Does patient have an active prescription for medications requested? No     Received Request Via: Pharmacy      Pt met protocol?: No    LAST OV 05/18/2017    LAST TSH 10/07/2015

## 2017-06-01 NOTE — PROGRESS NOTES
Spoke with pt on the phone regarding HTN referral. Pt states her HTN is managmed by Dr. Vallejo and her kidney doctor and would not like to come in for this appointment.     Nydia Michel, PHARMD

## 2017-06-01 NOTE — PROGRESS NOTES
New referral to  from CC Manager. Manager reports that pt needs affordable housing assistance.    LSW outreach phone call to pt. No answer. Left voicemail.

## 2017-06-01 NOTE — TELEPHONE ENCOUNTER
Was the patient seen in the last year in this department? Yes     Does patient have an active prescription for medications requested? No     Received Request Via: Pharmacy      Pt met protocol?: No    LAST OV 05/18/2017    LAST TSH 10/07/2015    Lab Results   Component Value Date/Time    GLYCOHEMOGLOBIN 8.3* 04/20/2017 10:07 AM        Lab Results   Component Value Date/Time    HDL 52 01/27/2017 12:36 PM

## 2017-06-02 RX ORDER — LEVOTHYROXINE SODIUM 0.05 MG/1
50 TABLET ORAL
Qty: 90 TAB | Refills: 0 | Status: SHIPPED | OUTPATIENT
Start: 2017-06-02 | End: 2017-09-05 | Stop reason: SDUPTHER

## 2017-06-02 NOTE — TELEPHONE ENCOUNTER
Please advise pt that she is due for fasting thyroid labs. Will send 3 months to pharmacy. Ordered TSH and T4.

## 2017-06-06 RX ORDER — SIMVASTATIN 20 MG
TABLET ORAL
Qty: 90 TAB | Refills: 0 | Status: SHIPPED | OUTPATIENT
Start: 2017-06-06 | End: 2017-09-05 | Stop reason: SDUPTHER

## 2017-06-07 ENCOUNTER — PATIENT OUTREACH (OUTPATIENT)
Dept: HEALTH INFORMATION MANAGEMENT | Facility: OTHER | Age: 72
End: 2017-06-07

## 2017-06-07 NOTE — PROGRESS NOTES
RHM alerted 199/82 HR 53. Outbound call to Whitley. Whitley retested and BP transmitted 166/81 HR 48. She also has an [employer] monitor which she checked and it read 160/82 HR 55. Patient denies dizziness, sob, or chest pain.     Whitley does describe having a sharp pain that worsens with movement in her shoulder region. She states the pain does not radiate to arm or neck. Denies jaw pain. She believes it is muscle related and states that it has been occuring on and off for about a month. She has tried heat and acetaminophen with little to no relief. I advised patient to get this checked out by her provider. Advised patient to go to ER if having chest pain, if pain radiates, if patient feels weak or very fatigued, if vomiting, or having trouble breathing.     Reviewed proper BP technique with patient. Instructed patient to bring in RHM monitor to her clinic visit this Friday 6/9 to have pharmacist assess technique and compare monitor against clinic monitor. Patient is agreeable.     Discussed DASH eating plan and lifestyle modifications. Discussed portion control and increasing physical activity. Patient states she has neuropathic pain in feet. Recommended patient can try OTC lidocaine cream. If not relieved, advised patient to follow-up with Dr. Patterson - may consider gabapentin. Patient was prescribed this in the past and was concerned about interactions. Performed a medication review and advised patient that side effects could occur with combination of gabapentin and clonazepam. Patient reports not having to use clonazepam often. Patient will follow-up with Dr. Patterson at next appointment on 6/28.     Plan:     Set alarm on [email or web address] equipment to have patient take BP on Mondays, Wednesdays, and Fridays at 7:30 am. Patient is agreeable. She states will also take her BP with [employer] meter 2 hours after taking BP medications.     Mailed the following handouts to patient regarding HTN:     1) Hypertension (Elsevier 2016)      -- Risk Factors    -- Signs and Symptoms    -- Diagnosis     -- Treatment    -- Home Care Instructions    -- Seeking Medical Care    -- Seeking Immediate Medical Care    2) Controlling High Blood Pressure (Justin)    -- Choose Heart Healthy Foods    -- Maintaining a Healthy Weight    -- Get Up and Get Active    -- Manage Stress    -- Limit Alcohol and Quit Smoking    3) Taking Your Blood Pressure (Justin)    4) DASH Eating Plan (Elsevier 2016)    LPOC goal added to achieve BP goal <140/90.     Provided patient with my contact information should patient have questions or concerns.     Patient will bring in RHM monitor to clinic visit on 6/9 to have pharmacist assess technique and compare RHM monitor to clinic monitor.     Will continue to monitor BP on RHM.     Laila Corral, PHARMD

## 2017-06-08 ENCOUNTER — PATIENT OUTREACH (OUTPATIENT)
Dept: HEALTH INFORMATION MANAGEMENT | Facility: OTHER | Age: 72
End: 2017-06-08

## 2017-06-08 NOTE — PROGRESS NOTES
Received incoming call from [facility] requesting call back regarding BP readings. Outbound call to [facility]. [facility] equipment was set up to remind patient to transmit vitals Monday, Wednesday, and Fridays. Patient stated that the machine still alerted her this morning. [facility] and confirmed that patient will only transmit Mondays, Wednesdays, and Fridays. She will bring BP cuff and monitor to her scheduled appointment tomorrow 6/9.     Continue to monitor.     Laila Corral, PHARMD

## 2017-06-09 ENCOUNTER — ANTICOAGULATION VISIT (OUTPATIENT)
Dept: VASCULAR LAB | Facility: MEDICAL CENTER | Age: 72
End: 2017-06-09
Attending: INTERNAL MEDICINE
Payer: MEDICARE

## 2017-06-09 ENCOUNTER — PATIENT OUTREACH (OUTPATIENT)
Dept: HEALTH INFORMATION MANAGEMENT | Facility: OTHER | Age: 72
End: 2017-06-09

## 2017-06-09 VITALS — HEART RATE: 58 BPM | SYSTOLIC BLOOD PRESSURE: 160 MMHG | DIASTOLIC BLOOD PRESSURE: 60 MMHG

## 2017-06-09 DIAGNOSIS — I48.0 PAF (PAROXYSMAL ATRIAL FIBRILLATION) (HCC): ICD-10-CM

## 2017-06-09 LAB — INR PPP: 2.6 (ref 2–3.5)

## 2017-06-09 PROCEDURE — 99211 OFF/OP EST MAY X REQ PHY/QHP: CPT

## 2017-06-09 PROCEDURE — 85610 PROTHROMBIN TIME: CPT

## 2017-06-09 NOTE — MR AVS SNAPSHOT
Whitley Frederick   2017 1:45 PM   Anticoagulation Visit   MRN: [id number]    Department:  Vascular Medicine   Dept Phone:  [phone]    Description:  Female : 1945   Provider:  [facility] EXAM 4           Allergies as of 2017     Allergen Noted Reactions    Amlodipine 2016   Itching    Amaryl 2011       GI Problems    Avandia [Rosiglitazone Maleate] 2011       GI problems    Cipro Xr 2011       Possibly causes Chills.    Clonidine 2017       Rapid heart rate, swelling     Contrast Media With Iodine [Iodine] 2016   Hives    Glipizide 2011       GI Problems    Glucophage [Metformin Hydrochloride] 2011       dizzy    Hydralazine 2016       Rapid heart rate, chest tightness    Levaquin 2011       Metformin 2011       Relafen [Nabumetone] 2011       Itching      You were diagnosed with     PAF (paroxysmal atrial fibrillation) (CMS-[facility])   [802281]         Vital Signs     Blood Pressure Pulse Smoking Status             160/60 mmHg 58 Former Smoker         Basic Information     Date Of Birth Sex Race Ethnicity Preferred Language    1945 Female White Non- English      Your appointments     Rai 15, 2017  3:00 PM   CARE MANAGEMENT OUTREACH with Cesilia Feldman R.N.   [facility] (--)    [address] [place] [address] [phone]           ***IMPORTANT**** This is a phone visit only. Do not come into the office. The Care Manager will call you at the scheduled time for Care Manager Telephone Visit.            2017  1:00 PM   KRISTOPHER OWENSN10 with [facility] MG 3   [facility] ( [address])    [address]  [address]  [facility] [id number] [phone]           No deodorant, powder, perfume or lotion under the arm or breast area.            2017 10:30 AM   New Patient with José Luis Patterson PA-C   [facility]    [address], [address]  [place] [address]   [phone]           Please bring Photo ID, Insurance Cards, All Medication Bottles and copies of any legal documents (such as Living Will, Power of ) If speaking a language besides English please bring an adult . Please arrive 30 minutes prior for check in and registration. You will be receiving a confirmation call a few days before your appointment from our automated call confirmation system.            Jul 07, 2017  1:15 PM   Established Patient with [facility] EXAM 4   [facility]  (--)    [address]  [place] [address]   [phone]            Aug 17, 2017  1:20 PM   Established Patient Pul with EDILMA Mosher   [facility] (--)    [address]  [address]  [place] [address] [phone]            Aug 18, 2017 11:00 AM   FOLLOW UP with Fernando Sorto M.D.   [facility] (--)    [address]  [place] [address]   [phone]              Problem List              ICD-10-CM Priority Class Noted - Resolved    Type 2 diabetes mellitus with stage 4 chronic kidney disease (CMS-HCC) E11.22, N18.4 High  Unknown - Present    Left bundle branch block I44.7 Medium  3/5/2012 - Present    PAF (paroxysmal atrial fibrillation) (CMS-HCC) I48.0 High  7/12/2013 - Present    Hypothyroid E03.9 High  12/12/2013 - Present    Anxiety F41.9 High  1/9/2014 - Present    Carotid stenosis I65.29 Low  5/30/2014 - Present    Insulin long-term use (CMS-HCC) Z79.4 High  4/29/2015 - Present    Atherosclerosis of aorta (CMS-HCC) I70.0 High  4/29/2015 - Present    Chronic respiratory failure (CMS-HCC) J96.10 High  4/29/2015 - Present    Long term current use of anticoagulant therapy Z79.01 High  4/29/2015 - Present    Morbid obesity with BMI of 45.0-49.9, adult (CMS-HCC) E66.01, Z68.42 High  4/29/2015 - Present    Chronic gout M1A.9XX0 Medium  4/30/2015  - Present    Multiple thyroid nodules E04.2 Low  10/7/2015 - Present    Essential hypertension I10 High  5/10/2016 - Present    AVERY (obstructive sleep apnea) G47.33 Medium  5/10/2016 - Present    Chronic anticoagulation Z79.01 Medium  6/10/2016 - Present    Mild intermittent asthma without complication J45.20 Medium  8/24/2016 - Present    Strain of lumbar paraspinal muscle S39.012A High  9/23/2016 - Present    Combined forms of age-related cataract of right eye H25.811   10/25/2016 - Present    Dyslipidemia E78.5   12/14/2016 - Present    Chronic pain of both shoulders M25.512, G89.29, M25.511   3/23/2017 - Present    Sinusitis J32.9   3/23/2017 - Present    Cellulitis L03.90   5/18/2017 - Present    Incontinence of feces R15.9   5/18/2017 - Present      Health Maintenance        Date Due Completion Dates    COLON CANCER SCREENING ANNUAL FIT 1945 ---    IMM DTaP/Tdap/Td Vaccine (1 - Tdap) 3/19/1965 ---    IMM ZOSTER VACCINE 3/19/2006 ---    COLONOSCOPY 11/30/2012 11/30/2007 (Prv Comp)    Override on 11/30/2007: Previously completed    RETINAL SCREENING 10/7/2016 10/7/2015, 3/5/2015 (Prv Comp)    Override on 3/5/2015: Previously completed    URINE ACR / MICROALBUMIN 10/7/2016 10/7/2015, 10/10/2014, 8/12/2013 (Done), 3/5/2012    Override on 8/12/2013: Done    DIABETES MONOFILAMENT / LE EXAM 10/7/2016 10/7/2015 (Done)    Override on 10/7/2015: Done    IMM PNEUMOCOCCAL 65+ (ADULT) HIGH/HIGHEST RISK SERIES (2 of 2 - PPSV23) 10/19/2016 8/24/2016    MAMMOGRAM 5/13/2017 5/13/2016, 4/9/2015, 1/31/2013, 7/14/2011, 10/5/2007, 10/5/2007, 8/7/2006    A1C SCREENING 10/20/2017 4/20/2017, 1/27/2017, 1/7/2016, 10/7/2015, 5/27/2015, 1/5/2015 (Prv Comp), 1/6/2014, 5/2/2013 (Done), 3/5/2012    Override on 1/5/2015: Previously completed    Override on 5/2/2013: Done    FASTING LIPID PROFILE 1/27/2018 1/27/2017, 10/7/2015, 5/27/2015, 9/4/2014, 1/6/2014, 4/12/2013, 3/5/2012    BONE DENSITY 1/31/2018 1/31/2013    SERUM CREATININE  5/1/2018 5/1/2017, 4/20/2017, 4/7/2017, 3/27/2017, 3/27/2017, 3/23/2017, 1/27/2017, 1/27/2017, 11/14/2016, 10/14/2016, 8/10/2016, 4/12/2016, 1/15/2016, 10/7/2015, 10/7/2015, 5/27/2015, 5/27/2015, 2/23/2015, 10/30/2014, 10/10/2014, 9/4/2014, 8/15/2014, 5/12/2014, 4/19/2014, 4/18/2014, 1/6/2014, 1/6/2014, 8/12/2013, 4/12/2013, 4/12/2013, 2/12/2013, 1/21/2013, 1/8/2013, 3/5/2012, 4/27/2006            Results     POCT Protime      Component    INR    2.6                        Current Immunizations     13-VALENT PCV PREVNAR 8/24/2016 10:45 AM    Tuberculin Skin Test 3/20/2017  4:39 PM      Below and/or attached are the medications your provider expects you to take. Review all of your home medications and newly ordered medications with your provider and/or pharmacist. Follow medication instructions as directed by your provider and/or pharmacist. Please keep your medication list with you and share with your provider. Update the information when medications are discontinued, doses are changed, or new medications (including over-the-counter products) are added; and carry medication information at all times in the event of emergency situations     Allergies:  AMLODIPINE - Itching     AMARYL - (reactions not documented)     AVANDIA - (reactions not documented)     CIPRO XR - (reactions not documented)     CLONIDINE - (reactions not documented)     CONTRAST MEDIA WITH IODINE - Hives     GLIPIZIDE - (reactions not documented)     GLUCOPHAGE - (reactions not documented)     HYDRALAZINE - (reactions not documented)     LEVAQUIN - (reactions not documented)     METFORMIN - (reactions not documented)     RELAFEN - (reactions not documented)               Medications  Valid as of: June 09, 2017 -  2:04 PM    Generic Name Brand Name Tablet Size Instructions for use    Allopurinol (Tab) ZYLOPRIM 100 MG Take 1/2 pill qd        Beclomethasone Dipropionate (Aero Soln) QVAR 40 MCG/ACT Inhale 2 Puffs by mouth 2 Times a Day. Use spacer.  Rinse mouth after each use.        Blood Glucose Monitoring Suppl (Misc) Blood Glucose Monitoring Suppl SUPPLIES Test strips order: Test strips for Chely Contour meter. Sig: use tid  and prn ssx high or low sugar #100 RF x 3        Blood Glucose Monitoring Suppl (Kit) Blood Glucose Monitoring Suppl W/DEVICE 1 Each by Does not apply route 2 times a day as needed.        Blood Glucose Monitoring Suppl (Misc) Blood Glucose Monitoring Suppl SUPPLIES Test strips order: Test strips for Accucheck meter. Sig: use BID and prn ssx high or low sugar #100 RF x 3        Cholecalciferol (Cap) Vitamin D 2000 UNITS Take 4,000 Units by mouth every day.        ClonazePAM (Tab) KLONOPIN 0.5 MG Take 1 Tab by mouth 2 times a day as needed (insomnia, anxiety).        Diclofenac Sodium (Gel) Diclofenac Sodium 1 % Apply 2 g to skin as directed 4 times a day.        Dronedarone HCl (Tab) MULTAQ 400 MG TAKE ONE TABLET BY MOUTH TWICE DAILY WITH MEALS        Furosemide (Tab) LASIX 80 MG Take 80 mg by mouth every 48 hours.        Glucose Blood (Strip) CHELY CONTOUR NEXT TEST          Insulin Glargine (Solution Pen-injector) Insulin Glargine 300 UNIT/ML Inject 65 Units as instructed every day.        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        Levothyroxine Sodium (Tab) SYNTHROID 50 MCG Take 1 Tab by mouth every day. Indications: Underactive Thyroid        Losartan Potassium (Tab) COZAAR 50 MG TAKE ONE TABLET BY MOUTH TWICE DAILY        Magnesium Oxide (Tab) MAG- (241.3 MG) MG Take 400 mg by mouth every day.        Metoprolol Tartrate (Tab) LOPRESSOR 50 MG TAKE ONE TABLET BY MOUTH TWICE DAILY ,  PATIENT  MAY  TAKE  AN  ADDITIONAL  HALF  TABLET  FOR  INCREASED  HEART  RATE.        Misc. Devices (Misc) Misc. Devices  Pen needles for  Lalitha Cooley. 29g 12mm.        Montelukast Sodium (Tab) SINGULAIR 10 MG 1 po qhs        Omeprazole (CAPSULE DELAYED RELEASE) PRILOSEC 20 MG Take 1 Cap by mouth every day.        Simvastatin (Tab) ZOCOR 20 MG TAKE ONE TABLET BY MOUTH ONCE DAILY IN THE EVENING        Terazosin HCl (Cap) HYTRIN 1 MG 1 Cap every day.        Warfarin Sodium (Tab) COUMADIN 3 MG Take 1 to 1.5 tablets by mouth daily as directed by the coumadin clinic        .                 Medicines prescribed today were sent to:     [facility] [address] [place] [address], [place] - [address] [place] [address]    Phone: [phone] Fax: [phone]    Open 24 Hours?: No      Medication refill instructions:       If your prescription bottle indicates you have medication refills left, it is not necessary to call your provider’s office. Please contact your pharmacy and they will refill your medication.    If your prescription bottle indicates you do not have any refills left, you may request refills at any time through one of the following ways: The online [employer] system (except Urgent Care), by calling your provider’s office, or by asking your pharmacy to contact your provider’s office with a refill request. Medication refills are processed only during regular business hours and may not be available until the next business day. Your provider may request additional information or to have a follow-up visit with you prior to refilling your medication.   *Please Note: Medication refills are assigned a new Rx number when refilled electronically. Your pharmacy may indicate that no refills were authorized even though a new prescription for the same medication is available at the pharmacy. Please request the medicine by name with the pharmacy before contacting your provider for a refill.        Other Notes About Your Plan     Cards: HTN, AF.        Warfarin Dosing Calendar   June 2017 Details    Sun Mon Tue Wed Thu Fri Sat         1                2               3                 4               5               6               7               8               9   2.6   1.5 mg   See details      10      3 mg           11      3 mg         12      3 mg         13      3 mg         14      3 mg         15      3 mg         16      1.5 mg         17      3 mg           18      3 mg         19      3 mg         20      3 mg         21      3 mg         22      3 mg         23      1.5 mg         24      3 mg           25      3 mg         26      3 mg         27      3 mg         28      3 mg         29      3 mg         30      1.5 mg           Date Details   06/09 This INR check   INR: 2.6               How to take your warfarin dose     To take:  1.5 mg Take 0.5 of a 3 mg tablet.    To take:  3 mg Take 1 of the 3 mg tablets.           Warfarin Dosing Calendar   July 2017 Details    Sun Mon Tue Wed Thu Fri Sat           1      3 mg           2      3 mg         3      3 mg         4      3 mg         5      3 mg         6      3 mg         7      1.5 mg         8                 9               10               11               12               13               14               15                 16               17               18               19               20               21               22                 23               24               25               26               27               28               29                 30               31                     Date Details   No additional details    Date of next INR:  7/7/2017         How to take your warfarin dose     To take:  1.5 mg Take 0.5 of a 3 mg tablet.    To take:  3 mg Take 1 of the 3 mg tablets.              [employer] Access Code: Activation code not generated  Current [employer] Status: Active

## 2017-06-09 NOTE — PROGRESS NOTES
Anticoagulation Summary as of 6/9/2017     INR goal 2.0-3.0   Selected INR 2.6 (6/9/2017)   Maintenance plan 1.5 mg (3 mg x 0.5) on Fri; 3 mg (3 mg x 1) all other days   Weekly total 19.5 mg   Weekly max dose 21 mg   Plan last modified Brigitte Horn, PHARMD (12/2/2016)   Next INR check 7/7/2017   Target end date Indefinite    Indications   PAF (paroxysmal atrial fibrillation) (CMS-HCC) [I48.0]  Warfarin therapy started (Resolved) [Z79.01]         Anticoagulation Episode Summary     INR check location     Preferred lab     Send INR reminders to     Comments       Anticoagulation Care Providers     Provider Role Specialty Phone number    Amy Lincoln M.D. Referring Cardiology [phone]    [facility] Responsible  [phone]        Anticoagulation Patient Findings   Negatives Missed Doses, Extra Doses, Medication Changes, Antibiotic Use, Diet Changes, Dental/Other Procedures, Hospitalization, Bleeding Gums, Nose Bleeds, Blood in Urine, Blood in Stool, Any Bruising, Other Complaints        Saw patient in clinic with therapeutic INR of 2.6. Denies any changes to her diet or medication.    Will have patient continue with current dosing regimen as outlined above.    Compared pt home blood pressure monitors to clinic- they were within range with similar readings.    Follow up in 4 weeks.    Cortney Olsen, PharmD.  PGY-1 Resident  [phone]

## 2017-06-09 NOTE — PROGRESS NOTES
RHM alerted 171/67. Patient is scheduled to be seen in coumadin clinic and will be taking RHM monitor into clinic to have pharmacist assess technique and compare RHM monitor to clinic monitor. Will continue to monitor.     Laila Corral, PHARMD

## 2017-06-12 ENCOUNTER — PATIENT OUTREACH (OUTPATIENT)
Dept: HEALTH INFORMATION MANAGEMENT | Facility: OTHER | Age: 72
End: 2017-06-12

## 2017-06-12 ENCOUNTER — HOSPITAL ENCOUNTER (OUTPATIENT)
Dept: LAB | Facility: MEDICAL CENTER | Age: 72
End: 2017-06-12
Attending: INTERNAL MEDICINE
Payer: MEDICARE

## 2017-06-12 ENCOUNTER — PATIENT MESSAGE (OUTPATIENT)
Dept: HEALTH INFORMATION MANAGEMENT | Facility: OTHER | Age: 72
End: 2017-06-12

## 2017-06-12 LAB
25(OH)D3 SERPL-MCNC: 33 NG/ML (ref 30–100)
ALBUMIN SERPL BCP-MCNC: 3.7 G/DL (ref 3.2–4.9)
BASOPHILS # BLD AUTO: 0.9 % (ref 0–1.8)
BASOPHILS # BLD: 0.06 K/UL (ref 0–0.12)
BUN SERPL-MCNC: 68 MG/DL (ref 8–22)
CALCIUM SERPL-MCNC: 8.9 MG/DL (ref 8.5–10.5)
CHLORIDE SERPL-SCNC: 108 MMOL/L (ref 96–112)
CO2 SERPL-SCNC: 25 MMOL/L (ref 20–33)
CREAT SERPL-MCNC: 2.36 MG/DL (ref 0.5–1.4)
CREAT UR-MCNC: 81 MG/DL
EOSINOPHIL # BLD AUTO: 0.26 K/UL (ref 0–0.51)
EOSINOPHIL NFR BLD: 3.9 % (ref 0–6.9)
ERYTHROCYTE [DISTWIDTH] IN BLOOD BY AUTOMATED COUNT: 50.5 FL (ref 35.9–50)
GFR SERPL CREATININE-BSD FRML MDRD: 20 ML/MIN/1.73 M 2
GLUCOSE SERPL-MCNC: 208 MG/DL (ref 65–99)
HCT VFR BLD AUTO: 33.1 % (ref 37–47)
HGB BLD-MCNC: 10.8 G/DL (ref 12–16)
IMM GRANULOCYTES # BLD AUTO: 0.02 K/UL (ref 0–0.11)
IMM GRANULOCYTES NFR BLD AUTO: 0.3 % (ref 0–0.9)
INR BLD: 2.6 (ref 0.9–1.2)
LYMPHOCYTES # BLD AUTO: 2.59 K/UL (ref 1–4.8)
LYMPHOCYTES NFR BLD: 39 % (ref 22–41)
MCH RBC QN AUTO: 29.9 PG (ref 27–33)
MCHC RBC AUTO-ENTMCNC: 32.6 G/DL (ref 33.6–35)
MCV RBC AUTO: 91.7 FL (ref 81.4–97.8)
MONOCYTES # BLD AUTO: 0.4 K/UL (ref 0–0.85)
MONOCYTES NFR BLD AUTO: 6 % (ref 0–13.4)
NEUTROPHILS # BLD AUTO: 3.31 K/UL (ref 2–7.15)
NEUTROPHILS NFR BLD: 49.9 % (ref 44–72)
NRBC # BLD AUTO: 0 K/UL
NRBC BLD AUTO-RTO: 0 /100 WBC
PHOSPHATE SERPL-MCNC: 3.3 MG/DL (ref 2.5–4.5)
PLATELET # BLD AUTO: 128 K/UL (ref 164–446)
PMV BLD AUTO: 11.2 FL (ref 9–12.9)
POTASSIUM SERPL-SCNC: 4.8 MMOL/L (ref 3.6–5.5)
PROT UR-MCNC: 80.1 MG/DL (ref 0–15)
PROT/CREAT UR: 989 MG/G (ref 10–107)
PTH-INTACT SERPL-MCNC: 137.9 PG/ML (ref 14–72)
RBC # BLD AUTO: 3.61 M/UL (ref 4.2–5.4)
SODIUM SERPL-SCNC: 140 MMOL/L (ref 135–145)
WBC # BLD AUTO: 6.6 K/UL (ref 4.8–10.8)

## 2017-06-12 PROCEDURE — 36415 COLL VENOUS BLD VENIPUNCTURE: CPT

## 2017-06-12 PROCEDURE — 82570 ASSAY OF URINE CREATININE: CPT

## 2017-06-12 PROCEDURE — 85025 COMPLETE CBC W/AUTO DIFF WBC: CPT

## 2017-06-12 PROCEDURE — 80069 RENAL FUNCTION PANEL: CPT

## 2017-06-12 PROCEDURE — 82306 VITAMIN D 25 HYDROXY: CPT

## 2017-06-12 PROCEDURE — 84156 ASSAY OF PROTEIN URINE: CPT

## 2017-06-12 PROCEDURE — 83970 ASSAY OF PARATHORMONE: CPT

## 2017-06-12 NOTE — PROGRESS NOTES
Received incoming call from Whitley regarding montelukast. Patient was concerned that medication name states montelukast sodium and this may affect her blood pressure. Informed patient that this is the chemical name and should not affect her sodium levels or blood pressure.     Patient was advised by Dr. Vallejo to take BP readings 2 hours after taking her BP medication. Requested alarm on [employer] equipment to be changed to 10 am. Alarm was reset. Patient will take her medications in the morning and test BP as soon as she is home from her morning errands.     Will continue to monitor BP on M.     Laila Corral, PHARMD

## 2017-06-12 NOTE — PROGRESS NOTES
New referral to  from CC Manager. Manager indicates that pt needs assistance with housing.    LSW outreach phone call to pt and introduced self. LSW inquired if pt had received list of housing options from LSW. Pt reports that she lives with daughter and is unaware if it has come, but that pt will check tonight with daughter. LSW agreeable.     LSW inquired if pt would like to discuss housing options upon receiving list. Pt reports that she would and requested that LSW send contact information via My Chart, as pt was driving at time of telephone call. LSW agreeable.    Pt to follow up with LSW regarding questions or concerns with housing.

## 2017-06-14 ENCOUNTER — TELEPHONE (OUTPATIENT)
Dept: PULMONOLOGY | Facility: HOSPICE | Age: 72
End: 2017-06-14

## 2017-06-14 NOTE — TELEPHONE ENCOUNTER
MEDICATION PRIOR AUTHORIZATION NEEDED:    1. Name of Medication: Xopenex HFA 45 mcg    2. Requested By (Name of Pharmacy): [facility]     3. Is insurance on file current? yes    4. What is the name & phone number of the 3rd party payor? [facility] [phone]

## 2017-06-15 NOTE — TELEPHONE ENCOUNTER
DOCUMENTATION OF PRIOR AUTH STATUS    1. Medication name and dose: Xopenex HFA 45 mcg    2. Name and Phone # of Prescription coverage company: [facility] [phone]    3. Date Prior Auth was submitted: 6/14/2017    4. What information was given to obtain insurance decision: Clinical notes    5. Prior Auth letter Approved or Denied: Approved    6. Pharmacy notified: Yes    7. Patient notified: Yes

## 2017-06-19 DIAGNOSIS — R06.02 SOB (SHORTNESS OF BREATH): ICD-10-CM

## 2017-06-19 RX ORDER — LEVALBUTEROL TARTRATE 45 UG/1
2 AEROSOL, METERED ORAL EVERY 4 HOURS PRN
Qty: 1 INHALER | Refills: 3 | Status: SHIPPED | OUTPATIENT
Start: 2017-06-19 | End: 2018-12-05 | Stop reason: SDUPTHER

## 2017-06-20 ENCOUNTER — PATIENT OUTREACH (OUTPATIENT)
Dept: HEALTH INFORMATION MANAGEMENT | Facility: OTHER | Age: 72
End: 2017-06-20

## 2017-06-20 NOTE — PROGRESS NOTES
RHM alerted /66 on 6/19. Patient states she was riled up from caring for foster children. She re-tested BP on her Omron meter and it was 146/75. She denies any symptoms of hypertension - no chest pain, no headaches, no dizziness. Patient is transmitting vitals 2 hours after taking her BP medications as directed by her physician. She confirmed that she received the hypertension materials that were sent via mail. She has not had a chance to read them over. Patient has my contact information should any questions arise regarding the material sent.     Will continue to monitor BP on RH.     Laila Corral, PHARMD

## 2017-06-22 ENCOUNTER — PATIENT OUTREACH (OUTPATIENT)
Dept: HEALTH INFORMATION MANAGEMENT | Facility: OTHER | Age: 72
End: 2017-06-22

## 2017-06-22 NOTE — PROGRESS NOTES
RHM alerted /70. Establishing baseline readings for patient. Will continue to monitor.     Laila Corral, SEPIDEHD

## 2017-06-26 ENCOUNTER — PATIENT OUTREACH (OUTPATIENT)
Dept: HEALTH INFORMATION MANAGEMENT | Facility: OTHER | Age: 72
End: 2017-06-26

## 2017-06-26 NOTE — PROGRESS NOTES
M alerted on empty packets. Outbound call to Whitley. Patient took BP but vitals did not transmit. Whitley states her systolic BP was 170 and 169 on [employer] monitor, then it was 155 on her Omron monitor. Patient will retest. Reviewed proper BP technique. Whitley denies any chest pain, dizziness, or headaches.     Will continue to monitor.     Laila Corral, SEPIDEHD

## 2017-06-27 ENCOUNTER — PATIENT OUTREACH (OUTPATIENT)
Dept: HEALTH INFORMATION MANAGEMENT | Facility: OTHER | Age: 72
End: 2017-06-27

## 2017-06-27 NOTE — PROGRESS NOTES
Patient emailed me her past week's BP readings from her Omron home monitor:       Will continue to monitor.     Laila Corral, PHARMD

## 2017-06-27 NOTE — PROGRESS NOTES
RHM alerted of HR 45 yesterday evening 6/26 at 6:14 pm. BP reading 131/74. Outbound call to Whitley. Patient reports her usual HR trends in the upper 40s to low 50s. She denies dizziness or lightheadedness. She states her cardiologist and PCP are aware of her lower HRs. She is taking metoprolol for blood pressure and atrial fibrillation. She has been on a beta-blocker for several years and tolerates this well.     Whitley will begin emailing me her BP values from her Omron meter. Will continue to monitor BP on [facility].     Laila Corral, PHARMD

## 2017-06-29 ENCOUNTER — TELEPHONE (OUTPATIENT)
Dept: PULMONOLOGY | Facility: HOSPICE | Age: 72
End: 2017-06-29

## 2017-06-29 DIAGNOSIS — G47.33 OBSTRUCTIVE SLEEP APNEA: ICD-10-CM

## 2017-06-29 NOTE — TELEPHONE ENCOUNTER
Pt states her concentrator has been set at 3 lpm and had a CNOX that showed this was sufficient    She is switching to [facility] and does not want the high flow concentrator if she does not have to have it    Holding for results from CLAIRE

## 2017-06-29 NOTE — TELEPHONE ENCOUNTER
Received CNOX from Alex  Verified that it was done on PAP and 3 lpm    Please sign for o2 @ 3 lpm    cnox scanned into MEDIA

## 2017-07-03 ENCOUNTER — PATIENT OUTREACH (OUTPATIENT)
Dept: HEALTH INFORMATION MANAGEMENT | Facility: OTHER | Age: 72
End: 2017-07-03

## 2017-07-03 NOTE — PROGRESS NOTES
RHM alerted BP on 6/28 of 155/61 HR 50 and on 6/30 of 157/60 HR 47. These are trending in patient's usual RHM values. Patient is also reporting BP values to her cardiologist, Dr. Vallejo. Will continue to monitor.     Laila Corral, SEPIDEHD

## 2017-07-06 ENCOUNTER — PATIENT OUTREACH (OUTPATIENT)
Dept: HEALTH INFORMATION MANAGEMENT | Facility: OTHER | Age: 72
End: 2017-07-06

## 2017-07-06 NOTE — PROGRESS NOTES
RHM alerted /68 on 7/5. This is trending in patient's usual RHM values. Will continue to monitor.     Laila Corral, SEPIDEHD'

## 2017-07-07 ENCOUNTER — APPOINTMENT (OUTPATIENT)
Dept: VASCULAR LAB | Facility: MEDICAL CENTER | Age: 72
End: 2017-07-07
Payer: MEDICARE

## 2017-07-10 ENCOUNTER — PATIENT OUTREACH (OUTPATIENT)
Dept: HEALTH INFORMATION MANAGEMENT | Facility: OTHER | Age: 72
End: 2017-07-10

## 2017-07-10 NOTE — PROGRESS NOTES
RHM alerted /65 HR 47 on 7/7. BP and HR are trending in patient's usual RHM values. Will continue to monitor.    Laila Corral, SEPIDEHD

## 2017-07-12 ENCOUNTER — PATIENT OUTREACH (OUTPATIENT)
Dept: HEALTH INFORMATION MANAGEMENT | Facility: OTHER | Age: 72
End: 2017-07-12

## 2017-07-12 NOTE — PROGRESS NOTES
RHM alerted BP of 181/67 HR 51 at 16:57 on 7/10 and 162/68 HR 46 at 17:00 on 7/10. Outbound call to Whitley. Patient states she is feeling fine. She has been stressed lately due to her dog and housing situation. Patient reports taking BP on Omron monitor last night and reports reading of 162/78 HR 54.     Patient emailed me the following values from her Omron monitor:       These are the Lifestream readings from her Trapollo monitor:       Patient states she typically uses a large cuff but her Trapollo monitor is a standard cuff size. This could be attributing to the higher readings. Submitted a ticket to [employer] for large cuff to be sent to patient. Scheduled initial hypertension appointment for patient on Thursday 7/20 at 2pm to have pharmacist verify technique and appropriate cuff size for RHM equipment. Patient instructed to bring RHM monitor to her appointment. Patient is agreeable and states she will also bring her Omron monitor to appointment.     Provided patient with [facility]'s contact information so patient can discuss her housing situation.  Will continue to monitor BP on RHM.     Laila Corral, PHARMD

## 2017-07-13 ENCOUNTER — ANTICOAGULATION VISIT (OUTPATIENT)
Dept: VASCULAR LAB | Facility: MEDICAL CENTER | Age: 72
End: 2017-07-13
Attending: INTERNAL MEDICINE
Payer: MEDICARE

## 2017-07-13 VITALS — HEART RATE: 56 BPM | DIASTOLIC BLOOD PRESSURE: 57 MMHG | SYSTOLIC BLOOD PRESSURE: 181 MMHG

## 2017-07-13 DIAGNOSIS — I48.0 PAF (PAROXYSMAL ATRIAL FIBRILLATION) (HCC): ICD-10-CM

## 2017-07-13 LAB — INR PPP: 2.2 (ref 2–3.5)

## 2017-07-13 PROCEDURE — 85610 PROTHROMBIN TIME: CPT

## 2017-07-13 PROCEDURE — 99211 OFF/OP EST MAY X REQ PHY/QHP: CPT

## 2017-07-13 NOTE — PROGRESS NOTES
Anticoagulation Summary as of 7/13/2017     INR goal 2.0-3.0   Selected INR 2.2 (7/13/2017)   Maintenance plan 1.5 mg (3 mg x 0.5) on Fri; 3 mg (3 mg x 1) all other days   Weekly total 19.5 mg   Weekly max dose 21 mg   Plan last modified Brigitte Horn, PHARMD (12/2/2016)   Next INR check 8/24/2017   Target end date Indefinite    Indications   PAF (paroxysmal atrial fibrillation) (CMS-HCC) [I48.0]  Warfarin therapy started (Resolved) [Z79.01]         Anticoagulation Episode Summary     INR check location     Preferred lab     Send INR reminders to     Comments       Anticoagulation Care Providers     Provider Role Specialty Phone number    Amy Lincoln M.D. Referring Cardiology [phone]    [facility] Responsible  [phone]        Anticoagulation Patient Findings      Current Outpatient Prescriptions on File Prior to Visit   Medication Sig Dispense Refill   • levalbuterol (XOPENEX HFA) 45 MCG/ACT inhaler Inhale 2 Puffs by mouth every four hours as needed for Shortness of Breath. 1 Inhaler 3   • simvastatin (ZOCOR) 20 MG Tab TAKE ONE TABLET BY MOUTH ONCE DAILY IN THE EVENING 90 Tab 0   • levothyroxine (SYNTHROID) 50 MCG Tab Take 1 Tab by mouth every day. Indications: Underactive Thyroid 90 Tab 0   • Insulin Pen Needle (RELION PEN NEEDLES) 29G X 12MM Misc USE ONE PEN NEEDLE SUBCUTANEOUSLY FOR LANTUS SOLOSTAR 100 Each 3   • Blood Glucose Monitoring Suppl W/DEVICE Kit 1 Each by Does not apply route 2 times a day as needed. 1 Kit 0   • Blood Glucose Monitoring Suppl SUPPLIES Misc Test strips order: Test strips for Accucheck meter. Sig: use BID and prn ssx high or low sugar #100 RF x 3 100 Strip 3   • omeprazole (PRILOSEC) 20 MG delayed-release capsule Take 1 Cap by mouth every day. 90 Cap 3   • Insulin Glargine (TOUJEO SOLOSTAR) 300 UNIT/ML Solution Pen-injector Inject 65 Units as instructed every day. 5 PEN 2   • warfarin (COUMADIN) 3 MG Tab Take 1 to 1.5 tablets by mouth daily as  directed by the coumadin clinic 135 Tab 1   • furosemide (LASIX) 80 MG Tab Take 80 mg by mouth every 48 hours.     • montelukast (SINGULAIR) 10 MG Tab 1 po qhs 90 Tab 3   • beclomethasone (QVAR) 40 MCG/ACT inhaler Inhale 2 Puffs by mouth 2 Times a Day. Use spacer. Rinse mouth after each use. 3 Inhaler 3   • terazosin (HYTRIN) 1 MG Cap 1 Cap every day.     • metoprolol (LOPRESSOR) 50 MG Tab TAKE ONE TABLET BY MOUTH TWICE DAILY ,  PATIENT  MAY  TAKE  AN  ADDITIONAL  HALF  TABLET  FOR  INCREASED  HEART  RATE. 180 Tab 3   • CHELY CONTOUR NEXT TEST strip      • Blood Glucose Monitoring Suppl SUPPLIES Misc Test strips order: Test strips for Chely Contour meter. Sig: use tid  and prn ssx high or low sugar #100 RF x 3 100 Strip 3   • MULTAQ 400 MG Tab TAKE ONE TABLET BY MOUTH TWICE DAILY WITH MEALS 60 Tab 6   • Diclofenac Sodium (VOLTAREN) 1 % Gel Apply 2 g to skin as directed 4 times a day. 100 g 0   • losartan (COZAAR) 50 MG Tab TAKE ONE TABLET BY MOUTH TWICE DAILY 180 Tab 3   • clonazepam (KLONOPIN) 0.5 MG Tab Take 1 Tab by mouth 2 times a day as needed (insomnia, anxiety). 60 Tab 0   • Misc. Devices MISC Pen needles for Lantus Solstar. 29g 12mm. 90 Each 3   • allopurinol (ZYLOPRIM) 100 MG TABS Take 1/2 pill qd (Patient taking differently: Take 100 mg by mouth 2 Times a Day. Indications: Primary Gout) 90 Tab 4   • Cholecalciferol (VITAMIN D) 2000 UNITS CAPS Take 4,000 Units by mouth every day.     • magnesium oxide (MAGNESIUM OXIDE) 400 (241.3 MG) MG TABS tablet Take 400 mg by mouth every day.       No current facility-administered medications on file prior to visit.       Lab Results   Component Value Date/Time    SODIUM 140 06/12/2017 02:12 PM    POTASSIUM 4.8 06/12/2017 02:12 PM    CHLORIDE 108 06/12/2017 02:12 PM    CO2 25 06/12/2017 02:12 PM    GLUCOSE 208* 06/12/2017 02:12 PM    BUN 68* 06/12/2017 02:12 PM    CREATININE 2.36* 06/12/2017 02:12 PM          Whitley Frederick seen in clinic today  INR  therapeutic.     Denies signs/symptoms of bleeding and/or thrombosis.    Denies changes to diet or medications.   Follow up appointment in 6 week(s).      Continue weekly warfarin dose as noted     Beto Sherwood, PHARMD

## 2017-07-17 ENCOUNTER — PATIENT OUTREACH (OUTPATIENT)
Dept: HEALTH INFORMATION MANAGEMENT | Facility: OTHER | Age: 72
End: 2017-07-17

## 2017-07-17 NOTE — PROGRESS NOTES
Received incoming email from Whitley regarding her BP readings from her Omrom monitor:         Patient has initial HTN visit scheduled 7/20 to compare home monitors vs. Clinic monitor. Will continue to monitor BP on RHM.     Laila Corral, PHARMD

## 2017-07-18 ENCOUNTER — PATIENT OUTREACH (OUTPATIENT)
Dept: HEALTH INFORMATION MANAGEMENT | Facility: OTHER | Age: 72
End: 2017-07-18

## 2017-07-18 PROBLEM — Z91.89 POTENTIAL FOR DEFICIENT KNOWLEDGE OF DIABETES MELLITUS: Status: ACTIVE | Noted: 2017-06-15

## 2017-07-18 NOTE — PROGRESS NOTES
RHM alerted BP of 173/65 HR 53 at 20:18 on 7/17. This is trending within patient's usual values on RHM. Patient has scheduled appointment with HTN clinic on 7/20 to validate BP technique and compare RHM monitor to clinic monitor.     Will continue to monitor BP on RHM.     Laila Corral, PHARMD

## 2017-07-20 ENCOUNTER — PATIENT OUTREACH (OUTPATIENT)
Dept: HEALTH INFORMATION MANAGEMENT | Facility: OTHER | Age: 72
End: 2017-07-20

## 2017-07-20 ENCOUNTER — APPOINTMENT (OUTPATIENT)
Dept: VASCULAR LAB | Facility: MEDICAL CENTER | Age: 72
End: 2017-07-20
Payer: MEDICARE

## 2017-07-20 NOTE — PROGRESS NOTES
Received message from patient requesting call back. Outbound call to Whitley. Whitley states she has not yet received the large cuff size for her RHM equipment. She would like to reschedule the initial HTN clinic visit for when she has the appropriate cuff. Rescheduled appointment for next Thursday 7/27 at 3 pm. Will contact Amy to determine status of large cuff order.     Laila Corral, PHARMD

## 2017-07-20 NOTE — PROGRESS NOTES
Contacted Amy. There had been an error in the sending of the equipment but the order now went through. The status on the cuff states shipped. Outbound call to Whitley to notify her that the large cuff was just shipped today and she should be receiving it early next week. Whitley agrees to continue monitoring BP on her Omron monitor and will email me her results on Monday.     Laila Corral, PHARMD

## 2017-07-24 ENCOUNTER — PATIENT OUTREACH (OUTPATIENT)
Dept: HEALTH INFORMATION MANAGEMENT | Facility: OTHER | Age: 72
End: 2017-07-24

## 2017-07-24 NOTE — PROGRESS NOTES
RHM alerted /58 HR 54. Patient is awaiting Large cuff from [employer].   Whitley emailed me her blood pressures from her Omron meter.         Patient has initial HTN visit scheduled in clinic on 7/27 to evaluate technique and compare BP monitor to clinic monitor.   Will continue to monitor.     Laila Corral, PHARMD

## 2017-07-25 ENCOUNTER — PATIENT OUTREACH (OUTPATIENT)
Dept: HEALTH INFORMATION MANAGEMENT | Facility: OTHER | Age: 72
End: 2017-07-25

## 2017-07-25 NOTE — PROGRESS NOTES
Outbound call to patient on CCM, monthly follow up. Patient requested call back as she is in a public place.

## 2017-07-25 NOTE — PROGRESS NOTES
Received incoming call from Whitley to discuss BP medications. Patient was concerned on Friday evening that her BP was too low - they read 117/70 and 119/71. She denies dizziness or lightheadedness/ I educated patient that these BPs are ideal. If she is having pressures of <90/60 or is symptomatic (ie.experiencing dizziness or lightheadedness) then we would want to adjust her BP medications. Because she thought these values were too low, she did not take her BP medications that evening. Advised patient to take BP medications at the same time every day as prescribed. If her values are trending too low, we can make adjustments. Patient is agreeable.     Patient is not expected to get her large BP cuff for RHM until late Thursday 7/27. Rescheduled initial HTN visit for Tuesday 8/1 to evaluate BP technique and compare RHM monitor with clinic monitor. Patient will switch to using large BP cuff once she receives and will transmit BP values daily until seen in clinic.     Patient had question regarding her warfarin and an upcoming procedure at the end of August. Directed patient to address this with [facility].     Will continue to monitor.     Laila Corral, SEPIDEHD

## 2017-07-25 NOTE — PROGRESS NOTES
Received incoming message from Whitley requesting call back to discuss BP medications. Outbound call to Whitley. Whitley was unavailable but will return my call later.     Laila Corral, PHARMD

## 2017-07-27 ENCOUNTER — PATIENT OUTREACH (OUTPATIENT)
Dept: HEALTH INFORMATION MANAGEMENT | Facility: OTHER | Age: 72
End: 2017-07-27

## 2017-07-27 ENCOUNTER — APPOINTMENT (OUTPATIENT)
Dept: VASCULAR LAB | Facility: MEDICAL CENTER | Age: 72
End: 2017-07-27
Payer: MEDICARE

## 2017-07-27 NOTE — PROGRESS NOTES
RHM alerted BP of 162/67 HR 49. This is trending in patient's usual RHM values. Patient is expected to receive large BP cuff by the end of today. Patient has scheduled visit in HTN clinic on Tues. 8/1 to evaluate technique and compare RHM monitor to clinic monitor.     Will continue to monitor.     Laila Corral, PHARMD

## 2017-07-27 NOTE — PROGRESS NOTES
Received incoming call from Whitley informing me that she received her large BP cuff from [employer]. She states that when she removed the cuff from the box and uncurled the tube there was a crimp in the tubing. I advised patient to contact Amy. She verbalized understanding. Advised patient to bring in both cuffs to her HTN clinic visit on Tues. 8/1 to have pharmacist evaluate appropriate cuff size. Patient is agreeable.     Whitley reports her BP was 158/80 on her Omron meter yesterday. She plans to take her BP on the [facility] monitor using the large cuff tomorrow. Will continue to monitor.     Laila Corral, PHARMD

## 2017-07-28 ENCOUNTER — PATIENT OUTREACH (OUTPATIENT)
Dept: HEALTH INFORMATION MANAGEMENT | Facility: OTHER | Age: 72
End: 2017-07-28

## 2017-07-28 NOTE — PROGRESS NOTES
RHM alerted BP of 148/76 HR 49. Patient obtained her large BP cuff yesterday. Patient will bring both the standard cuff and large cuff to the appointment scheduled 8/1 to evaluate which is the appropriate cuff size for the patient.     Will continue to monitor.     Laila Corral, PHARMD

## 2017-07-31 ENCOUNTER — PATIENT OUTREACH (OUTPATIENT)
Dept: HEALTH INFORMATION MANAGEMENT | Facility: OTHER | Age: 72
End: 2017-07-31

## 2017-07-31 NOTE — PROGRESS NOTES
Received email from patient documenting her blood pressures from her Omron home monitor.         Patient has initial visit in HTN clinic tomorrow and will bring RHM monitor and both cuff sizes (standard and large) to have BP technique evaluated and compare monitor to clinic monitor.     Will continue to monitor.     Laila Corral, PHARMD

## 2017-08-01 ENCOUNTER — PATIENT OUTREACH (OUTPATIENT)
Dept: HEALTH INFORMATION MANAGEMENT | Facility: OTHER | Age: 72
End: 2017-08-01

## 2017-08-01 NOTE — PROGRESS NOTES
Received incoming call from Whitley requesting to reschedule her HTN appointment. She is unable to make it because her son-in-law is out of town and she is watching the children. Rescheduled appointment for next Tuesday 8/8 at 4 pm. Patient will bring in the RHM monitor and Omron monitors to compare against clinic monitor.    Whitley states she tried to take her vitals yesterday on RHM but the value did not appear on the screen - RHM alerted of empty packet. She states that her value was in the 150s. She reports that she missed her morning medications. Discussed importance of adherence to BP medications. Patient will attempt to re-test BP this afternoon. If the value does not transmit she will call and I will open a ticket with [employer].     Patient would like me to inform her CC RN that she would prefer to be reached after August 12th in the late afternoon. Left message with CC RN Francia.     Will continue to monitor BP on RHM.     Laila Corral, PHARMD

## 2017-08-03 ENCOUNTER — PATIENT OUTREACH (OUTPATIENT)
Dept: HEALTH INFORMATION MANAGEMENT | Facility: OTHER | Age: 72
End: 2017-08-03

## 2017-08-03 NOTE — PROGRESS NOTES
[facility] has been alerting empty packet. Outbound call to Whitley. Whitley states that she is able to take her blood pressure but the value has not been showing up on her tablet. She states that it seems to be timing out. Opened a ticket with Tj and informed patient that a technician from [facility] should be reaching out today or tomorrow to troubleshoot.     Will continue to monitor on RHM.     Laila Corral, PHARMD

## 2017-08-07 ENCOUNTER — PATIENT OUTREACH (OUTPATIENT)
Dept: HEALTH INFORMATION MANAGEMENT | Facility: OTHER | Age: 72
End: 2017-08-07

## 2017-08-07 NOTE — PROGRESS NOTES
RHM alerted on empty packet on Friday. Outbound call to Whitley. A ticket was opened with Amy on Thursday 8/3 but patient has not yet heard from a technician. Requested Amy to reach out to patient as soon as possible to troubleshoot equipment. Whitley emailed me her readings from her Omron meter:         Discussed patient's upcoming appointment with HTN clinic tomorrow 8/8 at 4 pm. Requested patient to bring her RHM BP monitor along with the standard cuff and large cuff as well as her Omron monitor. Patient agreeable.     Will continue to monitor.     Laila Corral, PHARMD

## 2017-08-07 NOTE — PROGRESS NOTES
Received incoming call from Whitley informing me that while she was on the phone with [employer] technician, the BP value transmitted. Transmitted value of 149/66 HR 48. This is trending in patient's usual RHM values.     Will continue to monitor BP on RHM.     Laila Corral, PHARMD

## 2017-08-08 ENCOUNTER — PATIENT OUTREACH (OUTPATIENT)
Dept: HEALTH INFORMATION MANAGEMENT | Facility: OTHER | Age: 72
End: 2017-08-08

## 2017-08-08 ENCOUNTER — NON-PROVIDER VISIT (OUTPATIENT)
Dept: VASCULAR LAB | Facility: MEDICAL CENTER | Age: 72
End: 2017-08-08
Attending: INTERNAL MEDICINE
Payer: MEDICARE

## 2017-08-08 PROCEDURE — 99212 OFFICE O/P EST SF 10 MIN: CPT

## 2017-08-08 NOTE — PROGRESS NOTES
Received incoming call from Whitley. Whitley wanted to know if an earlier appointment was available in the HTN clinic today. Unfortunately, there are no available appointments. Patient confirmed she will attend the 4 pm appointment. Will forward her BP values from [facility] to pharmacist Kaela who will be seeing patient in clinic today.         Laila Corral, PHARMD

## 2017-08-08 NOTE — MR AVS SNAPSHOT
Whitley Frederick   2017 4:00 PM   Non-Provider Visit   MRN: [id number]    Department:  Vascular Medicine   Dept Phone:  [phone]    Description:  Female : 1945   Provider:  [facility] EXAM 5           Reason for Visit     Hypertension           Allergies as of 2017     Allergen Noted Reactions    Amlodipine 2016   Itching    Amaryl 2011       GI Problems    Avandia [Rosiglitazone Maleate] 2011       GI problems    Cipro Xr 2011       Possibly causes Chills.    Clonidine 2017       Rapid heart rate, swelling     Contrast Media With Iodine [Iodine] 2016   Hives    Glipizide 2011       GI Problems    Glucophage [Metformin Hydrochloride] 2011       dizzy    Hydralazine 2016       Rapid heart rate, chest tightness    Levaquin 2011       Metformin 2011       Relafen [Nabumetone] 2011       Itching      Vital Signs     Smoking Status                   Former Smoker           Basic Information     Date Of Birth Sex Race Ethnicity Preferred Language    1945 Female White Non- English      Your appointments     Aug 11, 2017  2:00 PM   New Patient with José Luis Patterson PA-C   [facility]    [address], [address]  [place] [address] [phone]           Please bring Photo ID, Insurance Cards, All Medication Bottles and copies of any legal documents (such as Living Will, Power of ) If speaking a language besides English please bring an adult . Please arrive 30 minutes prior for check in and registration. You will be receiving a confirmation call a few days before your appointment from our automated call confirmation system.            Aug 18, 2017 11:15 AM   Established Patient with [facility] EXAM 5   [facility]  (--)    [address]  [place] [address] [phone]            Aug 21, 2017   2:40 PM   MA [id number] with [facility] MG 2   [facility] ([address])    [address]  [place] [id number] [phone]           No deodorant, powder, perfume or lotion under the arm or breast area.            Sep 06, 2017 11:00 AM   Access New To You with EDILMA Rodríguez   [facility] (--)    [address]  [place] [id number] [phone]            Sep 08, 2017  1:30 PM   CARE MANAGEMENT OUTREACH with Cesilia Feldman R.N.   [facility] (--)    [address]  [place] [address]   [phone]           ***IMPORTANT**** This is a phone visit only. Do not come into the office. The Care Manager will call you at the scheduled time for Care Manager Telephone Visit.            Sep 27, 2017 12:00 PM   Established Patient Pul with EDILMA Mosher   [facility] (--)    [address]  [address]  [place] [id number] [phone]            Nov 02, 2017  1:20 PM   FOLLOW UP with Fernando Sorto M.D.   [facility] (--)    [address]  [place] [id number] [phone]              Problem List              ICD-10-CM Priority Class Noted - Resolved    Type 2 diabetes mellitus with stage 4 chronic kidney disease (CMS-HCC) E11.22, N18.4 High  Unknown - Present    Left bundle branch block I44.7 Medium  3/5/2012 - Present    PAF (paroxysmal atrial fibrillation) (CMS-HCC) I48.0 High  7/12/2013 - Present    Hypothyroid E03.9 High  12/12/2013 - Present    Anxiety F41.9 High  1/9/2014 - Present    Carotid stenosis I65.29 Low  5/30/2014 - Present    Insulin long-term use (CMS-HCC) Z79.4 High  4/29/2015 - Present    Atherosclerosis of aorta (CMS-HCC) I70.0 High  4/29/2015 - Present    Chronic respiratory failure (CMS-HCC) J96.10 High  4/29/2015 - Present    Long term current use of anticoagulant therapy Z79.01 High  4/29/2015 - Present    Morbid obesity with  BMI of 45.0-49.9, adult (CMS-HCC) E66.01, Z68.42 High  4/29/2015 - Present    Chronic gout M1A.9XX0 Medium  4/30/2015 - Present    Multiple thyroid nodules E04.2 Low  10/7/2015 - Present    Essential hypertension I10 High  5/10/2016 - Present    AVERY (obstructive sleep apnea) G47.33 Medium  5/10/2016 - Present    Chronic anticoagulation Z79.01 Medium  6/10/2016 - Present    Mild intermittent asthma without complication J45.20 Medium  8/24/2016 - Present    Strain of lumbar paraspinal muscle S39.012A High  9/23/2016 - Present    Combined forms of age-related cataract of right eye H25.811   10/25/2016 - Present    Dyslipidemia E78.5   12/14/2016 - Present    Chronic pain of both shoulders M25.512, G89.29, M25.511   3/23/2017 - Present    Sinusitis J32.9   3/23/2017 - Present    Cellulitis L03.90   5/18/2017 - Present    Incontinence of feces R15.9   5/18/2017 - Present    Potential for deficient knowledge of diabetes mellitus Z91.89   6/15/2017 - Present      Health Maintenance        Date Due Completion Dates    IMM DTaP/Tdap/Td Vaccine (1 - Tdap) 3/19/1965 ---    COLON CANCER SCREENING ANNUAL FIT 3/19/1996 ---    IMM ZOSTER VACCINE 3/19/2006 ---    COLONOSCOPY 11/30/2012 11/30/2007 (Prv Comp)    Override on 11/30/2007: Previously completed    RETINAL SCREENING 10/7/2016 10/7/2015, 3/5/2015 (Prv Comp)    Override on 3/5/2015: Previously completed    DIABETES MONOFILAMENT / LE EXAM 10/7/2016 10/7/2015 (Done)    Override on 10/7/2015: Done    IMM PNEUMOCOCCAL 65+ (ADULT) HIGH/HIGHEST RISK SERIES (2 of 2 - PPSV23) 10/19/2016 8/24/2016    MAMMOGRAM 5/13/2017 5/13/2016, 4/9/2015, 1/31/2013, 7/14/2011, 10/5/2007, 10/5/2007, 8/7/2006    IMM INFLUENZA (1) 9/1/2017 ---    A1C SCREENING 10/20/2017 4/20/2017, 1/27/2017, 1/7/2016, 10/7/2015, 5/27/2015, 1/5/2015 (Prv Comp), 1/6/2014, 5/2/2013 (Done), 3/5/2012    Override on 1/5/2015: Previously completed    Override on 5/2/2013: Done    FASTING LIPID PROFILE 1/27/2018 1/27/2017,  10/7/2015, 5/27/2015, 9/4/2014, 1/6/2014, 4/12/2013, 3/5/2012    BONE DENSITY 1/31/2018 1/31/2013    URINE ACR / MICROALBUMIN 6/12/2018 6/12/2017, 5/2/2017, 1/27/2017, 11/14/2016, 10/14/2016, 8/10/2016, 1/15/2016, 10/7/2015, 10/7/2015, 5/27/2015, 2/23/2015, 10/10/2014, 9/4/2014, 5/12/2014, 8/12/2013 (Done), 8/12/2013, 4/12/2013, 1/21/2013, 3/5/2012    Override on 8/12/2013: Done    SERUM CREATININE 6/12/2018 6/12/2017, 5/1/2017, 4/20/2017, 4/7/2017, 3/27/2017, 3/27/2017, 3/23/2017, 1/27/2017, 1/27/2017, 11/14/2016, 10/14/2016, 8/10/2016, 4/12/2016, 1/15/2016, 10/7/2015, 10/7/2015, 5/27/2015, 5/27/2015, 2/23/2015, 10/30/2014, 10/10/2014, 9/4/2014, 8/15/2014, 5/12/2014, 4/19/2014, 4/18/2014, 1/6/2014, 1/6/2014, 8/12/2013, 4/12/2013, 4/12/2013, 2/12/2013, 1/21/2013, 1/8/2013, 3/5/2012, 4/27/2006            Current Immunizations     13-VALENT PCV PREVNAR 8/24/2016 10:45 AM    Tuberculin Skin Test 3/20/2017  4:39 PM      Below and/or attached are the medications your provider expects you to take. Review all of your home medications and newly ordered medications with your provider and/or pharmacist. Follow medication instructions as directed by your provider and/or pharmacist. Please keep your medication list with you and share with your provider. Update the information when medications are discontinued, doses are changed, or new medications (including over-the-counter products) are added; and carry medication information at all times in the event of emergency situations     Allergies:  AMLODIPINE - Itching     AMARYL - (reactions not documented)     AVANDIA - (reactions not documented)     CIPRO XR - (reactions not documented)     CLONIDINE - (reactions not documented)     CONTRAST MEDIA WITH IODINE - Hives     GLIPIZIDE - (reactions not documented)     GLUCOPHAGE - (reactions not documented)     HYDRALAZINE - (reactions not documented)     LEVAQUIN - (reactions not documented)     METFORMIN - (reactions not documented)      RELAFEN - (reactions not documented)               Medications  Valid as of: August 08, 2017 -  4:40 PM    Generic Name Brand Name Tablet Size Instructions for use    Allopurinol (Tab) ZYLOPRIM 100 MG Take 1/2 pill qd        Beclomethasone Dipropionate (Aero Soln) QVAR 40 MCG/ACT Inhale 2 Puffs by mouth 2 Times a Day. Use spacer. Rinse mouth after each use.        Blood Glucose Monitoring Suppl (Misc) Blood Glucose Monitoring Suppl SUPPLIES Test strips order: Test strips for Chely Contour meter. Sig: use tid  and prn ssx high or low sugar #100 RF x 3        Blood Glucose Monitoring Suppl (Kit) Blood Glucose Monitoring Suppl W/DEVICE 1 Each by Does not apply route 2 times a day as needed.        Blood Glucose Monitoring Suppl (Misc) Blood Glucose Monitoring Suppl SUPPLIES Test strips order: Test strips for Accucheck meter. Sig: use BID and prn ssx high or low sugar #100 RF x 3        Cholecalciferol (Cap) Vitamin D 2000 UNITS Take 4,000 Units by mouth every day.        ClonazePAM (Tab) KLONOPIN 0.5 MG Take 1 Tab by mouth 2 times a day as needed (insomnia, anxiety).        Diclofenac Sodium (Gel) Diclofenac Sodium 1 % Apply 2 g to skin as directed 4 times a day.        Dronedarone HCl (Tab) MULTAQ 400 MG TAKE ONE TABLET BY MOUTH TWICE DAILY WITH MEALS        Furosemide (Tab) LASIX 80 MG Take 80 mg by mouth every 48 hours.        Glucose Blood (Strip) CHELY CONTOUR NEXT TEST          Insulin Glargine (Solution Pen-injector) Insulin Glargine 300 UNIT/ML Inject 65 Units as instructed every day.        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        Levothyroxine Sodium (Tab) SYNTHROID 50 MCG Take 1 Tab by mouth every day. Indications: Underactive Thyroid        Losartan Potassium (Tab) COZAAR 50 MG TAKE ONE TABLET  BY MOUTH TWICE DAILY        Magnesium Oxide (Tab) MAG- (241.3 MG) MG Take 400 mg by mouth every day.        Metoprolol Tartrate (Tab) LOPRESSOR 50 MG TAKE ONE TABLET BY MOUTH TWICE DAILY ,  PATIENT  MAY  TAKE  AN  ADDITIONAL  HALF  TABLET  FOR  INCREASED  HEART  RATE.        Misc. Devices (Misc) Misc. Devices  Pen needles for Lancyrus Mccarthystar. 29g 12mm.        Montelukast Sodium (Tab) SINGULAIR 10 MG 1 po qhs        Omeprazole (CAPSULE DELAYED RELEASE) PRILOSEC 20 MG Take 1 Cap by mouth every day.        Simvastatin (Tab) ZOCOR 20 MG TAKE ONE TABLET BY MOUTH ONCE DAILY IN THE EVENING        Terazosin HCl (Cap) HYTRIN 1 MG 1 Cap every day.        Warfarin Sodium (Tab) COUMADIN 3 MG Take 1 to 1.5 tablets by mouth daily as directed by the coumadin clinic        .                 Medicines prescribed today were sent to:     [facility] [address] [place] [address] - [address] [address]    Phone: [phone] Fax: [phone]    Open 24 Hours?: No      Medication refill instructions:       If your prescription bottle indicates you have medication refills left, it is not necessary to call your provider’s office. Please contact your pharmacy and they will refill your medication.    If your prescription bottle indicates you do not have any refills left, you may request refills at any time through one of the following ways: The online Ever[employer]ge system (except Urgent Care), by calling your provider’s office, or by asking your pharmacy to contact your provider’s office with a refill request. Medication refills are processed only during regular business hours and may not be available until the next business day. Your provider may request additional information or to have a follow-up visit with you prior to refilling your medication.   *Please Note: Medication refills are assigned a new Rx number when refilled electronically. Your pharmacy may indicate that no refills were authorized even  though a new prescription for the same medication is available at the pharmacy. Please request the medicine by name with the pharmacy before contacting your provider for a refill.        Other Notes About Your Plan     Cards: HTN, AF.           MyChart Access Code: Activation code not generated  Current MyChart Status: Active

## 2017-08-08 NOTE — PROGRESS NOTES
Date of Service: 8/8/2017    Initial Visit.   She was referred to us via [facility]. We will verify her home BP monitors to our BP monitors to make sure the HMs are accurate. She has a very complicated medical history related to her BP.  Most recent BPs not in range as seen below. She has CKD which complicates and is a contributor to her BP.  She takes her BP meds about 5 hrs before she takes her BP reading    Home BP Readings:       8/8/2017  Omron home monitor-   LA-139/66  LA-145/64  LA-150/62  LA-138/61     RHM monitor-  LA -149/63 hr 58 (1st reading)  LA -143/58 58    Clinic monitor   RA- 151/45 hr 55  LA- 134/42 hr 54  LA- 125/45 hr 52      Patient Active Problem List    Diagnosis Date Noted   • Strain of lumbar paraspinal muscle 09/23/2016     Priority: High   • Essential hypertension 05/10/2016     Priority: High   • Insulin long-term use (CMS-HCC) 04/29/2015     Priority: High   • Atherosclerosis of aorta (CMS-HCC) 04/29/2015     Priority: High   • Chronic respiratory failure (CMS-HCC) 04/29/2015     Priority: High   • Long term current use of anticoagulant therapy 04/29/2015     Priority: High   • Morbid obesity with BMI of 45.0-49.9, adult (CMS-HCC) 04/29/2015     Priority: High   • Anxiety 01/09/2014     Priority: High   • Hypothyroid 12/12/2013     Priority: High   • PAF (paroxysmal atrial fibrillation) (CMS-HCC) 07/12/2013     Priority: High   • Type 2 diabetes mellitus with stage 4 chronic kidney disease (CMS-HCC)      Priority: High   • Mild intermittent asthma without complication 08/24/2016     Priority: Medium   • Chronic anticoagulation 06/10/2016     Priority: Medium   • AVERY (obstructive sleep apnea) 05/10/2016     Priority: Medium   • Chronic gout 04/30/2015     Priority: Medium   • Left bundle branch block 03/05/2012     Priority: Medium   • Multiple thyroid nodules 10/07/2015     Priority: Low   • Carotid stenosis 05/30/2014     Priority: Low   • Potential for deficient knowledge of  diabetes mellitus 06/15/2017   • Cellulitis 05/18/2017   • Incontinence of feces 05/18/2017   • Chronic pain of both shoulders 03/23/2017   • Sinusitis 03/23/2017   • Dyslipidemia 12/14/2016   • Combined forms of age-related cataract of right eye 10/25/2016     Current Outpatient Prescriptions on File Prior to Visit   Medication Sig Dispense Refill   • levalbuterol (XOPENEX HFA) 45 MCG/ACT inhaler Inhale 2 Puffs by mouth every four hours as needed for Shortness of Breath. 1 Inhaler 3   • simvastatin (ZOCOR) 20 MG Tab TAKE ONE TABLET BY MOUTH ONCE DAILY IN THE EVENING 90 Tab 0   • levothyroxine (SYNTHROID) 50 MCG Tab Take 1 Tab by mouth every day. Indications: Underactive Thyroid 90 Tab 0   • Insulin Pen Needle (RELION PEN NEEDLES) 29G X 12MM Misc USE ONE PEN NEEDLE SUBCUTANEOUSLY FOR LANTUS SOLOSTAR 100 Each 3   • Blood Glucose Monitoring Suppl W/DEVICE Kit 1 Each by Does not apply route 2 times a day as needed. 1 Kit 0   • Blood Glucose Monitoring Suppl SUPPLIES Misc Test strips order: Test strips for Accucheck meter. Sig: use BID and prn ssx high or low sugar #100 RF x 3 100 Strip 3   • omeprazole (PRILOSEC) 20 MG delayed-release capsule Take 1 Cap by mouth every day. 90 Cap 3   • Insulin Glargine (TOUJEO SOLOSTAR) 300 UNIT/ML Solution Pen-injector Inject 65 Units as instructed every day. 5 PEN 2   • warfarin (COUMADIN) 3 MG Tab Take 1 to 1.5 tablets by mouth daily as directed by the coumadin clinic 135 Tab 1   • furosemide (LASIX) 80 MG Tab Take 80 mg by mouth every 48 hours.     • montelukast (SINGULAIR) 10 MG Tab 1 po qhs 90 Tab 3   • beclomethasone (QVAR) 40 MCG/ACT inhaler Inhale 2 Puffs by mouth 2 Times a Day. Use spacer. Rinse mouth after each use. 3 Inhaler 3   • terazosin (HYTRIN) 1 MG Cap 1 Cap every day.     • metoprolol (LOPRESSOR) 50 MG Tab TAKE ONE TABLET BY MOUTH TWICE DAILY ,  PATIENT  MAY  TAKE  AN  ADDITIONAL  HALF  TABLET  FOR  INCREASED  HEART  RATE. 180 Tab 3   • HAYLEE CONTOUR NEXT TEST strip       • Blood Glucose Monitoring Suppl SUPPLIES Misc Test strips order: Test strips for Chely Contour meter. Sig: use tid  and prn ssx high or low sugar #100 RF x 3 100 Strip 3   • MULTAQ 400 MG Tab TAKE ONE TABLET BY MOUTH TWICE DAILY WITH MEALS 60 Tab 6   • Diclofenac Sodium (VOLTAREN) 1 % Gel Apply 2 g to skin as directed 4 times a day. 100 g 0   • losartan (COZAAR) 50 MG Tab TAKE ONE TABLET BY MOUTH TWICE DAILY 180 Tab 3   • clonazepam (KLONOPIN) 0.5 MG Tab Take 1 Tab by mouth 2 times a day as needed (insomnia, anxiety). 60 Tab 0   • Misc. Devices MISC Pen needles for Lantus Solstar. 29g 12mm. 90 Each 3   • allopurinol (ZYLOPRIM) 100 MG TABS Take 1/2 pill qd (Patient taking differently: Take 100 mg by mouth 2 Times a Day. Indications: Primary Gout) 90 Tab 4   • Cholecalciferol (VITAMIN D) 2000 UNITS CAPS Take 4,000 Units by mouth every day.     • magnesium oxide (MAGNESIUM OXIDE) 400 (241.3 MG) MG TABS tablet Take 400 mg by mouth every day.       No current facility-administered medications on file prior to visit.        Any Current Side Effects Potentially Related to Antihypertensive Medications ?  NO    Current Adherence to BP Medications:  complete     Previously Attempted BP medications:   Amlodipine-itching and high HR.   Clonidine-rapid HR  Hydralazine-rapid HR  HCTZ-did not get her to goal.   Lisinopril/HCTZ    Any Current Interfering Substances?  no        Any Current Lifestyle Issues Affecting BP?   Yes, not exercising and overweight.     Most Recent Electrolytes and GFR:    Lab Results   Component Value Date/Time    SODIUM 140 06/12/2017 02:12 PM    POTASSIUM 4.8 06/12/2017 02:12 PM    CHLORIDE 108 06/12/2017 02:12 PM    CO2 25 06/12/2017 02:12 PM    GLUCOSE 208* 06/12/2017 02:12 PM    BUN 68* 06/12/2017 02:12 PM    CREATININE 2.36* 06/12/2017 02:12 PM       ASSESSMENT AND PLAN  Her BP in clinic is near the same as her BP with her [facility] (about 5-10 less in clinic). She is using proper technique with both.   I will not make any changes to her meds today as she is near her goal. The Omron home monitor is more accurate and easier to use then the other cuff.   She is very sensitive to small changes in meds and her BP is very labile. All the readings above were back to back, one was 150 then the next was 138 without changing the cuff.  She has reported low BP at times and being on a anticoagulant had fears about increasing her BP meds today. In the past she has had SBP near 200 but did not tolerate clonidine well.      JNC8 BP Goal Office:  140/90    Home BP Goal:  135/85    BP Considered Controlled:  SBP at or near goal in clinic today, DBP at goal.    Details: most of her BPs at home are not controlled but the longer we sit in clinic the lower her BPs are.     Lifestyle Recommendations From Today’s Visit:   Continue to exercise and eat low salt and sugar diet    Medication Recommendations from Today’s Visit  ACEI/ARB: continue with Losartan 50mg   Diuretic: continue with Lasix 80mg every 48hrs   Calcium Channel Blocker, none  Other Class(s): continue with metoprolol 50mg and Hytrin      Importance of Adherence Discussed.   Medications Reconciled    Follow-Up: population health team and a 24hr monitor might be a good option if BP continue to be labile then referral to resistant hypertension clinic if needed.    Cc:  Bloch.

## 2017-08-08 NOTE — PROGRESS NOTES
Outbound call to patient on [facility], monthly follow up.     Patient states she checks her blood sugar at least 2-3x weekly, and has not been really good about checking it more frequently. She reports her numbers to be .    Educated on signs and symptoms of hypoglycemia and hyperglycemia, and what to do.     Recommended using a blood sugar log and including a food diary to have a better understanding of what causes her blood sugar to be elevated. Patient does not watch what she eats, stating that there are too many restrictions that she just gets tired of it.Educated on the importance of adhering to diabetic diet, and the complications that can arise from uncontrolled diabetes and how it damages end organs.    Patient has an appointment with José Luis Patterson 8/11/17. Reminded patient about this appointment and instructed to bring her glucose meter and blood sugar log.    Verbalized understanding of all education provided.    Will follow up in a month, sooner if needed.

## 2017-08-09 ENCOUNTER — HOSPITAL ENCOUNTER (OUTPATIENT)
Dept: LAB | Facility: MEDICAL CENTER | Age: 72
End: 2017-08-09
Attending: INTERNAL MEDICINE
Payer: MEDICARE

## 2017-08-09 ENCOUNTER — TELEPHONE (OUTPATIENT)
Dept: PULMONOLOGY | Facility: HOSPICE | Age: 72
End: 2017-08-09

## 2017-08-09 LAB
ALBUMIN SERPL BCP-MCNC: 3.8 G/DL (ref 3.2–4.9)
BASOPHILS # BLD AUTO: 0.8 % (ref 0–1.8)
BASOPHILS # BLD: 0.07 K/UL (ref 0–0.12)
BUN SERPL-MCNC: 56 MG/DL (ref 8–22)
CALCIUM SERPL-MCNC: 9.6 MG/DL (ref 8.5–10.5)
CHLORIDE SERPL-SCNC: 107 MMOL/L (ref 96–112)
CO2 SERPL-SCNC: 29 MMOL/L (ref 20–33)
CREAT SERPL-MCNC: 2.2 MG/DL (ref 0.5–1.4)
EOSINOPHIL # BLD AUTO: 0.31 K/UL (ref 0–0.51)
EOSINOPHIL NFR BLD: 3.5 % (ref 0–6.9)
ERYTHROCYTE [DISTWIDTH] IN BLOOD BY AUTOMATED COUNT: 49.9 FL (ref 35.9–50)
FERRITIN SERPL-MCNC: 60.4 NG/ML (ref 10–291)
GFR SERPL CREATININE-BSD FRML MDRD: 22 ML/MIN/1.73 M 2
GLUCOSE SERPL-MCNC: 79 MG/DL (ref 65–99)
HCT VFR BLD AUTO: 36.8 % (ref 37–47)
HGB BLD-MCNC: 11.9 G/DL (ref 12–16)
IMM GRANULOCYTES # BLD AUTO: 0.03 K/UL (ref 0–0.11)
IMM GRANULOCYTES NFR BLD AUTO: 0.3 % (ref 0–0.9)
IRON SATN MFR SERPL: 15 % (ref 15–55)
IRON SERPL-MCNC: 55 UG/DL (ref 40–170)
LYMPHOCYTES # BLD AUTO: 3.11 K/UL (ref 1–4.8)
LYMPHOCYTES NFR BLD: 34.7 % (ref 22–41)
MAGNESIUM SERPL-MCNC: 2.2 MG/DL (ref 1.5–2.5)
MCH RBC QN AUTO: 30 PG (ref 27–33)
MCHC RBC AUTO-ENTMCNC: 32.3 G/DL (ref 33.6–35)
MCV RBC AUTO: 92.7 FL (ref 81.4–97.8)
MONOCYTES # BLD AUTO: 0.6 K/UL (ref 0–0.85)
MONOCYTES NFR BLD AUTO: 6.7 % (ref 0–13.4)
NEUTROPHILS # BLD AUTO: 4.84 K/UL (ref 2–7.15)
NEUTROPHILS NFR BLD: 54 % (ref 44–72)
NRBC # BLD AUTO: 0 K/UL
NRBC BLD AUTO-RTO: 0 /100 WBC
PHOSPHATE SERPL-MCNC: 3.7 MG/DL (ref 2.5–4.5)
PLATELET # BLD AUTO: 151 K/UL (ref 164–446)
PMV BLD AUTO: 11.5 FL (ref 9–12.9)
POTASSIUM SERPL-SCNC: 4.2 MMOL/L (ref 3.6–5.5)
RBC # BLD AUTO: 3.97 M/UL (ref 4.2–5.4)
SODIUM SERPL-SCNC: 140 MMOL/L (ref 135–145)
TIBC SERPL-MCNC: 358 UG/DL (ref 250–450)
WBC # BLD AUTO: 9 K/UL (ref 4.8–10.8)

## 2017-08-09 PROCEDURE — 83550 IRON BINDING TEST: CPT

## 2017-08-09 PROCEDURE — 80069 RENAL FUNCTION PANEL: CPT

## 2017-08-09 PROCEDURE — 85025 COMPLETE CBC W/AUTO DIFF WBC: CPT

## 2017-08-09 PROCEDURE — 83540 ASSAY OF IRON: CPT

## 2017-08-09 PROCEDURE — 36415 COLL VENOUS BLD VENIPUNCTURE: CPT

## 2017-08-09 PROCEDURE — 83735 ASSAY OF MAGNESIUM: CPT

## 2017-08-09 PROCEDURE — 82728 ASSAY OF FERRITIN: CPT

## 2017-08-09 NOTE — TELEPHONE ENCOUNTER
MEDICATION PRIOR AUTHORIZATION NEEDED:    1. Name of Medication: Xpoenex HFA 45 mcg    2. Requested By (Name of Pharmacy): [facility]     3. Is insurance on file current? yes    4. What is the name & phone number of the 3rd party payor? [facility] [phone]

## 2017-08-11 ENCOUNTER — OFFICE VISIT (OUTPATIENT)
Dept: ENDOCRINOLOGY | Facility: MEDICAL CENTER | Age: 72
End: 2017-08-11
Payer: MEDICARE

## 2017-08-11 VITALS
SYSTOLIC BLOOD PRESSURE: 120 MMHG | OXYGEN SATURATION: 97 % | BODY MASS INDEX: 44.26 KG/M2 | WEIGHT: 282 LBS | DIASTOLIC BLOOD PRESSURE: 42 MMHG | HEART RATE: 65 BPM | HEIGHT: 67 IN

## 2017-08-11 DIAGNOSIS — N18.4 TYPE 2 DIABETES MELLITUS WITH STAGE 4 CHRONIC KIDNEY DISEASE, UNSPECIFIED LONG TERM INSULIN USE STATUS: ICD-10-CM

## 2017-08-11 DIAGNOSIS — E11.22 TYPE 2 DIABETES MELLITUS WITH STAGE 4 CHRONIC KIDNEY DISEASE, UNSPECIFIED LONG TERM INSULIN USE STATUS: ICD-10-CM

## 2017-08-11 DIAGNOSIS — Z79.4 ENCOUNTER FOR LONG-TERM (CURRENT) USE OF INSULIN (HCC): ICD-10-CM

## 2017-08-11 LAB
HBA1C MFR BLD: 7.6 % (ref ?–5.8)
INT CON NEG: NEGATIVE
INT CON POS: POSITIVE

## 2017-08-11 PROCEDURE — 99214 OFFICE O/P EST MOD 30 MIN: CPT | Performed by: PHYSICIAN ASSISTANT

## 2017-08-11 PROCEDURE — 83036 HEMOGLOBIN GLYCOSYLATED A1C: CPT | Performed by: PHYSICIAN ASSISTANT

## 2017-08-11 NOTE — MR AVS SNAPSHOT
"Whitley Frederick   2017 2:00 PM   Office Visit   MRN: [id number]    Department:  [facility]   Dept Phone:  [phone]    Description:  Female : 1945   Provider:  José Luis Patterson PA-C           Reason for Visit     New Patient           Allergies as of 2017     Allergen Noted Reactions    Amlodipine 2016   Itching    Amaryl 2011       GI Problems    Avandia [Rosiglitazone Maleate] 2011       GI problems    Cipro Xr 2011       Possibly causes Chills.    Clonidine 2017       Rapid heart rate, swelling     Contrast Media With Iodine [Iodine] 2016   Hives    Glipizide 2011       GI Problems    Glucophage [Metformin Hydrochloride] 2011       dizzy    Hydralazine 2016       Rapid heart rate, chest tightness    Levaquin 2011       Metformin 2011       Relafen [Nabumetone] 2011       Itching      You were diagnosed with     Type 2 diabetes mellitus with stage 4 chronic kidney disease, unspecified long term insulin use status (CMS-HCC)   [1626384]       Encounter for long-term (current) use of insulin (CMS-HCC)   [V58.67.ICD-9-CM]         Vital Signs     Blood Pressure Pulse Height Weight Body Mass Index Oxygen Saturation    120/42 mmHg 65 1.702 m (5' 7.01\") 127.914 kg (282 lb) 44.16 kg/m2 97%    Smoking Status                   Former Smoker           Basic Information     Date Of Birth Sex Race Ethnicity Preferred Language    1945 Female White Non- English      Your appointments     Aug 18, 2017 11:15 AM   Established Patient with IHV EXAM 5   [facility]  (--)    [address]  [place] [address]   [phone]            Aug 21, 2017  2:40 PM   MA SCRN10 with RBHC MG 2   [facility] [address]    [address]  [place] [address] [phone]           No deodorant, powder, perfume or lotion " under the arm or breast area.            Aug 23, 2017 11:00 AM   Established Patient with Haley Trevino M.D.   [facility]    [address]  [place] [id number] [phone]           You will be receiving a confirmation call a few days before your appointment from our automated call confirmation system.            Sep 06, 2017 11:00 AM   Access New To You with EDILMA Rodríguez   [facility] (--)    [address]  [address]  [place] [id number] [phone]            Sep 08, 2017  1:30 PM   CARE MANAGEMENT OUTREACH with Cesilia Feldman R.N.   [facility] (--)    [address] [place] [address] [phone]           ***IMPORTANT**** This is a phone visit only. Do not come into the office. The Care Manager will call you at the scheduled time for Care Manager Telephone Visit.            Sep 27, 2017 12:00 PM   Established Patient Pul with EDILMA Mohser   [facility] (--)    [address]  [address]  [place] [id number] [phone]            Nov 02, 2017  1:20 PM   FOLLOW UP with Fernando Sorto M.D.   [facility] (--)    [address]  [place] [id number] [phone]              Problem List              ICD-10-CM Priority Class Noted - Resolved    Type 2 diabetes mellitus with stage 4 chronic kidney disease (CMS-[facility]) E11.22, N18.4 High  Unknown - Present    Left bundle branch block I44.7 Medium  3/5/2012 - Present    PAF (paroxysmal atrial fibrillation) (CMS-HCC) I48.0 High  7/12/2013 - Present    Hypothyroid E03.9 High  12/12/2013 - Present    Anxiety F41.9 High  1/9/2014 - Present    Carotid stenosis I65.29 Low  5/30/2014 - Present    Insulin long-term use (CMS-HCC) Z79.4 High  4/29/2015 - Present    Atherosclerosis of aorta (CMS-HCC) I70.0 High  4/29/2015 - Present    Chronic respiratory failure (CMS-HCC)  J96.10 High  4/29/2015 - Present    Long term current use of anticoagulant therapy Z79.01 High  4/29/2015 - Present    Morbid obesity with BMI of 45.0-49.9, adult (CMS-HCC) E66.01, Z68.42 High  4/29/2015 - Present    Chronic gout M1A.9XX0 Medium  4/30/2015 - Present    Multiple thyroid nodules E04.2 Low  10/7/2015 - Present    Essential hypertension I10 High  5/10/2016 - Present    AVERY (obstructive sleep apnea) G47.33 Medium  5/10/2016 - Present    Chronic anticoagulation Z79.01 Medium  6/10/2016 - Present    Mild intermittent asthma without complication J45.20 Medium  8/24/2016 - Present    Strain of lumbar paraspinal muscle S39.012A High  9/23/2016 - Present    Combined forms of age-related cataract of right eye H25.811   10/25/2016 - Present    Dyslipidemia E78.5   12/14/2016 - Present    Chronic pain of both shoulders M25.512, G89.29, M25.511   3/23/2017 - Present    Sinusitis J32.9   3/23/2017 - Present    Cellulitis L03.90   5/18/2017 - Present    Incontinence of feces R15.9   5/18/2017 - Present    Potential for deficient knowledge of diabetes mellitus Z91.89   6/15/2017 - Present    Encounter for long-term (current) use of insulin (CMS-HCC) Z79.4   8/11/2017 - Present      Health Maintenance        Date Due Completion Dates    IMM DTaP/Tdap/Td Vaccine (1 - Tdap) 3/19/1965 ---    COLON CANCER SCREENING ANNUAL FIT 3/19/1996 ---    IMM ZOSTER VACCINE 3/19/2006 ---    COLONOSCOPY 11/30/2012 11/30/2007 (Prv Comp)    Override on 11/30/2007: Previously completed    RETINAL SCREENING 10/7/2016 10/7/2015, 3/5/2015 (Prv Comp)    Override on 3/5/2015: Previously completed    DIABETES MONOFILAMENT / LE EXAM 10/7/2016 10/7/2015 (Done)    Override on 10/7/2015: Done    IMM PNEUMOCOCCAL 65+ (ADULT) HIGH/HIGHEST RISK SERIES (2 of 2 - PPSV23) 10/19/2016 8/24/2016    MAMMOGRAM 5/13/2017 5/13/2016, 4/9/2015, 1/31/2013, 7/14/2011, 10/5/2007, 10/5/2007, 8/7/2006    IMM INFLUENZA (1) 9/1/2017 ---    A1C SCREENING 10/20/2017  4/20/2017, 1/27/2017, 1/7/2016, 10/7/2015, 5/27/2015, 1/5/2015 (Prv Comp), 1/6/2014, 5/2/2013 (Done), 3/5/2012    Override on 1/5/2015: Previously completed    Override on 5/2/2013: Done    FASTING LIPID PROFILE 1/27/2018 1/27/2017, 10/7/2015, 5/27/2015, 9/4/2014, 1/6/2014, 4/12/2013, 3/5/2012    BONE DENSITY 1/31/2018 1/31/2013    URINE ACR / MICROALBUMIN 6/12/2018 6/12/2017, 5/2/2017, 1/27/2017, 11/14/2016, 10/14/2016, 8/10/2016, 1/15/2016, 10/7/2015, 10/7/2015, 5/27/2015, 2/23/2015, 10/10/2014, 9/4/2014, 5/12/2014, 8/12/2013 (Done), 8/12/2013, 4/12/2013, 1/21/2013, 3/5/2012    Override on 8/12/2013: Done    SERUM CREATININE 8/9/2018 8/9/2017, 6/12/2017, 5/1/2017, 4/20/2017, 4/7/2017, 3/27/2017, 3/27/2017, 3/23/2017, 1/27/2017, 1/27/2017, 11/14/2016, 10/14/2016, 8/10/2016, 4/12/2016, 1/15/2016, 10/7/2015, 10/7/2015, 5/27/2015, 5/27/2015, 2/23/2015, 10/30/2014, 10/10/2014, 9/4/2014, 8/15/2014, 5/12/2014, 4/19/2014, 4/18/2014, 1/6/2014, 1/6/2014, 8/12/2013, 4/12/2013, 4/12/2013, 2/12/2013, 1/21/2013, 1/8/2013, 3/5/2012, 4/27/2006            Results     POCT A1C      Component    Glycohemoglobin    7.6    Internal Control Negative    Negative    Internal Control Positive    Positive                        Current Immunizations     13-VALENT PCV PREVNAR 8/24/2016 10:45 AM    Tuberculin Skin Test 3/20/2017  4:39 PM      Below and/or attached are the medications your provider expects you to take. Review all of your home medications and newly ordered medications with your provider and/or pharmacist. Follow medication instructions as directed by your provider and/or pharmacist. Please keep your medication list with you and share with your provider. Update the information when medications are discontinued, doses are changed, or new medications (including over-the-counter products) are added; and carry medication information at all times in the event of emergency situations     Allergies:  AMLODIPINE - Itching     AMARYL -  (reactions not documented)     AVANDIA - (reactions not documented)     CIPRO XR - (reactions not documented)     CLONIDINE - (reactions not documented)     CONTRAST MEDIA WITH IODINE - Hives     GLIPIZIDE - (reactions not documented)     GLUCOPHAGE - (reactions not documented)     HYDRALAZINE - (reactions not documented)     LEVAQUIN - (reactions not documented)     METFORMIN - (reactions not documented)     RELAFEN - (reactions not documented)               Medications  Valid as of: August 11, 2017 -  2:17 PM    Generic Name Brand Name Tablet Size Instructions for use    Allopurinol (Tab) ZYLOPRIM 100 MG Take 1/2 pill qd        Beclomethasone Dipropionate (Aero Soln) QVAR 40 MCG/ACT Inhale 2 Puffs by mouth 2 Times a Day. Use spacer. Rinse mouth after each use.        Blood Glucose Monitoring Suppl (Misc) Blood Glucose Monitoring Suppl SUPPLIES Test strips order: Test strips for Chely Contour meter. Sig: use tid  and prn ssx high or low sugar #100 RF x 3        Blood Glucose Monitoring Suppl (Kit) Blood Glucose Monitoring Suppl W/DEVICE 1 Each by Does not apply route 2 times a day as needed.        Blood Glucose Monitoring Suppl (Misc) Blood Glucose Monitoring Suppl SUPPLIES Test strips order: Test strips for Accucheck meter. Sig: use BID and prn ssx high or low sugar #100 RF x 3        Cholecalciferol (Cap) Vitamin D 2000 UNITS Take 4,000 Units by mouth every day.        ClonazePAM (Tab) KLONOPIN 0.5 MG Take 1 Tab by mouth 2 times a day as needed (insomnia, anxiety).        Diclofenac Sodium (Gel) Diclofenac Sodium 1 % Apply 2 g to skin as directed 4 times a day.        Dronedarone HCl (Tab) MULTAQ 400 MG TAKE ONE TABLET BY MOUTH TWICE DAILY WITH MEALS        Furosemide (Tab) LASIX 80 MG Take 80 mg by mouth every 48 hours.        Glucose Blood (Strip) CHELY CONTOUR NEXT TEST          Insulin Glargine (Solution Pen-injector) Insulin Glargine 300 UNIT/ML Inject 65 Units as instructed every day.        Insulin Pen  Needle (Misc) Insulin Pen Needle 29G X 12MM USE ONE PEN NEEDLE SUBCUTANEOUSLY FOR LANTUS SOLOSTAR        Levalbuterol Tartrate (Aerosol) XOPENEX HFA 45 MCG/ACT Inhale 2 Puffs by mouth every four hours as needed for Shortness of Breath.        Levothyroxine Sodium (Tab) SYNTHROID 50 MCG Take 1 Tab by mouth every day. Indications: Underactive Thyroid        Losartan Potassium (Tab) COZAAR 50 MG TAKE ONE TABLET BY MOUTH TWICE DAILY        Magnesium Oxide (Tab) MAG- (241.3 MG) MG Take 400 mg by mouth every day.        Metoprolol Tartrate (Tab) LOPRESSOR 50 MG TAKE ONE TABLET BY MOUTH TWICE DAILY ,  PATIENT  MAY  TAKE  AN  ADDITIONAL  HALF  TABLET  FOR  INCREASED  HEART  RATE.        Misc. Devices (Misc) Misc. Devices  Pen needles for Lantus Solstar. 29g 12mm.        Montelukast Sodium (Tab) SINGULAIR 10 MG 1 po qhs        Omeprazole (CAPSULE DELAYED RELEASE) PRILOSEC 20 MG Take 1 Cap by mouth every day.        Simvastatin (Tab) ZOCOR 20 MG TAKE ONE TABLET BY MOUTH ONCE DAILY IN THE EVENING        Terazosin HCl (Cap) HYTRIN 1 MG 1 Cap every day.        Warfarin Sodium (Tab) COUMADIN 3 MG Take 1 to 1.5 tablets by mouth daily as directed by the coumadin clinic        .                 Medicines prescribed today were sent to:     [facility] [address] [place] [id number]    Phone: [phone] Fax: [phone]    Open 24 Hours?: No      Medication refill instructions:       If your prescription bottle indicates you have medication refills left, it is not necessary to call your provider’s office. Please contact your pharmacy and they will refill your medication.    If your prescription bottle indicates you do not have any refills left, you may request refills at any time through one of the following ways: The online [employer] system (except Urgent Care), by calling your provider’s office, or by asking your pharmacy to contact your provider’s office with a refill  request. Medication refills are processed only during regular business hours and may not be available until the next business day. Your provider may request additional information or to have a follow-up visit with you prior to refilling your medication.   *Please Note: Medication refills are assigned a new Rx number when refilled electronically. Your pharmacy may indicate that no refills were authorized even though a new prescription for the same medication is available at the pharmacy. Please request the medicine by name with the pharmacy before contacting your provider for a refill.        Your To Do List     Future Labs/Procedures Complete By Expires    C-PEPTIDE  As directed 8/11/2018    NINOSKA-65  As directed 8/11/2018    TSH  As directed 8/11/2018      Referral     A referral request has been sent to our patient care coordination department. Please allow 3-5 business days for us to process this request and contact you either by phone or mail. If you do not hear from us by the 5th business day, please call us at [phone].        Instructions    STOP:  1.  Toujeo    START:  1.   Tresiba 40 units at night  3.   Victoza 5 clicks.  At night on 8/18/17 go to 0.6       Other Notes About Your Plan     Cards: HTN, AF.           MyChart Access Code: Activation code not generated  Current [facility] Status: Active

## 2017-08-11 NOTE — PATIENT INSTRUCTIONS
STOP:  1.  Toujeo    START:  1.   Tresiba 40 units at night  3.   Victoza 5 clicks.  At night on 8/18/17 go to 0.6

## 2017-08-11 NOTE — PROGRESS NOTES
New Patient Consult Note  Referred by: EDILMA Bledsoe    Reason for consult: Diabetes Management Type 2    HPI:  Whitley Frederick is a 72 y.o. old patient who is seeing us today for diabetes care.  This is a pleasant patient with diabetes and I appreciate the opportunity to participate in the care of this patient.  This is a new patient with me today.    Her labs of 8/11/17 HbA1c 7.6, GFR 22,     BG Diary:8/11/2017  In the AM: did not bring     Has been Diabetic since 1985  Has a Glucagon pen at home: no    Has seen 3 different endos in the past.  And is just on an Basal insulin.      Having a fistula placed next month    1. Type 2 diabetes mellitus with stage 4 chronic kidney disease, unspecified long term insulin use status (CMS-HCC)  This is a new patient on 8/11/17  She is on:  1.  Toujeo  65    STOP:  1.  Toujeo    START:  1.   Tresiba 40 units at night  3.   Victoza 5 clicks.  At night on 8/18/17 go to 0.6    2. Encounter for long-term (current) use of insulin (CMS-HCC)  Is on a high risk medication Insulin and we will continue to follow       ROS:   Constitutional: No change in weight , No fatigue, No night sweats.  HEENT: No Headache.  Eyes:  No blurred vision, No visual changes.  Cardiac: No chest pain, No palpitations.  Resp: No shortness of breath, No cough,   Gastro: No nausea or vomiting, No diarrhea.  Neuro: Denies numbness or tinging in bilateral feet or hands, and no loss of sensation.  Endo: No heat or cold intolerance.  : No polyuria, No polydipsia, No chronic UTI's.  Lower extremities: No lower leg edema bilateral.  All other systems were reviewed and were negative.    Past Medical History:  Patient Active Problem List    Diagnosis Date Noted   • Strain of lumbar paraspinal muscle 09/23/2016     Priority: High   • Essential hypertension 05/10/2016     Priority: High   • Insulin long-term use (CMS-HCC) 04/29/2015     Priority: High   • Atherosclerosis of aorta (CMS-HCC) 04/29/2015      Priority: High   • Chronic respiratory failure (CMS-HCC) 04/29/2015     Priority: High   • Long term current use of anticoagulant therapy 04/29/2015     Priority: High   • Morbid obesity with BMI of 45.0-49.9, adult (CMS-HCC) 04/29/2015     Priority: High   • Anxiety 01/09/2014     Priority: High   • Hypothyroid 12/12/2013     Priority: High   • PAF (paroxysmal atrial fibrillation) (CMS-HCC) 07/12/2013     Priority: High   • Type 2 diabetes mellitus with stage 4 chronic kidney disease (CMS-HCC)      Priority: High   • Mild intermittent asthma without complication 08/24/2016     Priority: Medium   • Chronic anticoagulation 06/10/2016     Priority: Medium   • AVERY (obstructive sleep apnea) 05/10/2016     Priority: Medium   • Chronic gout 04/30/2015     Priority: Medium   • Left bundle branch block 03/05/2012     Priority: Medium   • Multiple thyroid nodules 10/07/2015     Priority: Low   • Carotid stenosis 05/30/2014     Priority: Low   • Encounter for long-term (current) use of insulin (CMS-HCC) 08/11/2017   • Potential for deficient knowledge of diabetes mellitus 06/15/2017   • Cellulitis 05/18/2017   • Incontinence of feces 05/18/2017   • Chronic pain of both shoulders 03/23/2017   • Sinusitis 03/23/2017   • Dyslipidemia 12/14/2016   • Combined forms of age-related cataract of right eye 10/25/2016       Past Surgical History:  Past Surgical History   Procedure Laterality Date   • Tonsillectomy     • Cholecystectomy     • Abdominal hysterectomy total     • Us-needle core bx-breast panel     • Cataract phaco with iol Right 10/25/2016     Procedure: CATARACT PHACO WITH IOL;  Surgeon: Zoran Gamble M.D.;  Location: SURGERY SAME DAY [facility];  Service:        Allergies:  Amlodipine; Amaryl; Avandia; Cipro xr; Clonidine; Contrast media with iodine; Glipizide; Glucophage; Hydralazine; Levaquin; Metformin; and Relafen    Social History:  Social History     Social History   • Marital Status:      Spouse  Name: N/A   • Number of Children: N/A   • Years of Education: N/A     Occupational History   • Not on file.     Social History Main Topics   • Smoking status: Former Smoker -- 1.00 packs/day for 20 years     Types: Cigarettes     Quit date: 01/01/1983   • Smokeless tobacco: Never Used   • Alcohol Use: No   • Drug Use: No   • Sexual Activity: No     Other Topics Concern   • Not on file     Social History Narrative       Family History:  Family History   Problem Relation Age of Onset   • Cancer Maternal Aunt    • Diabetes Maternal Aunt    • Heart Disease Maternal Aunt    • Hypertension Maternal Aunt    • Hyperlipidemia Maternal Aunt    • Cancer Mother      Leukemia   • Diabetes Mother    • Heart Disease Mother    • Hypertension Mother    • Hyperlipidemia Mother    • Lung Disease Father    • Cancer Father      Lung   • Hyperlipidemia Father    • Hypertension Father    • Heart Disease Father    • Diabetes Father    • Lung Disease Brother    • Stroke Brother    • Diabetes Brother    • Heart Disease Brother    • Hypertension Brother    • Hyperlipidemia Brother    • Diabetes Maternal Grandmother        Medications:    Current outpatient prescriptions:   •  levalbuterol (XOPENEX HFA) 45 MCG/ACT inhaler, Inhale 2 Puffs by mouth every four hours as needed for Shortness of Breath., Disp: 1 Inhaler, Rfl: 3  •  simvastatin (ZOCOR) 20 MG Tab, TAKE ONE TABLET BY MOUTH ONCE DAILY IN THE EVENING, Disp: 90 Tab, Rfl: 0  •  levothyroxine (SYNTHROID) 50 MCG Tab, Take 1 Tab by mouth every day. Indications: Underactive Thyroid, Disp: 90 Tab, Rfl: 0  •  Insulin Pen Needle (RELION PEN NEEDLES) 29G X 12MM Misc, USE ONE PEN NEEDLE SUBCUTANEOUSLY FOR LANTUS SOLOSTAR, Disp: 100 Each, Rfl: 3  •  Blood Glucose Monitoring Suppl W/DEVICE Kit, 1 Each by Does not apply route 2 times a day as needed., Disp: 1 Kit, Rfl: 0  •  Blood Glucose Monitoring Suppl SUPPLIES Misc, Test strips order: Test strips for Accucheck meter. Sig: use BID and prn ssx high  or low sugar #100 RF x 3, Disp: 100 Strip, Rfl: 3  •  omeprazole (PRILOSEC) 20 MG delayed-release capsule, Take 1 Cap by mouth every day., Disp: 90 Cap, Rfl: 3  •  Insulin Glargine (TOUJEO SOLOSTAR) 300 UNIT/ML Solution Pen-injector, Inject 65 Units as instructed every day., Disp: 5 PEN, Rfl: 2  •  warfarin (COUMADIN) 3 MG Tab, Take 1 to 1.5 tablets by mouth daily as directed by the coumadin clinic, Disp: 135 Tab, Rfl: 1  •  montelukast (SINGULAIR) 10 MG Tab, 1 po qhs, Disp: 90 Tab, Rfl: 3  •  beclomethasone (QVAR) 40 MCG/ACT inhaler, Inhale 2 Puffs by mouth 2 Times a Day. Use spacer. Rinse mouth after each use., Disp: 3 Inhaler, Rfl: 3  •  metoprolol (LOPRESSOR) 50 MG Tab, TAKE ONE TABLET BY MOUTH TWICE DAILY ,  PATIENT  MAY  TAKE  AN  ADDITIONAL  HALF  TABLET  FOR  INCREASED  HEART  RATE., Disp: 180 Tab, Rfl: 3  •  Blood Glucose Monitoring Suppl SUPPLIES Misc, Test strips order: Test strips for Chely Contour meter. Sig: use tid  and prn ssx high or low sugar #100 RF x 3, Disp: 100 Strip, Rfl: 3  •  MULTAQ 400 MG Tab, TAKE ONE TABLET BY MOUTH TWICE DAILY WITH MEALS, Disp: 60 Tab, Rfl: 6  •  Diclofenac Sodium (VOLTAREN) 1 % Gel, Apply 2 g to skin as directed 4 times a day., Disp: 100 g, Rfl: 0  •  losartan (COZAAR) 50 MG Tab, TAKE ONE TABLET BY MOUTH TWICE DAILY, Disp: 180 Tab, Rfl: 3  •  clonazepam (KLONOPIN) 0.5 MG Tab, Take 1 Tab by mouth 2 times a day as needed (insomnia, anxiety)., Disp: 60 Tab, Rfl: 0  •  Misc. Devices MISC, Pen needles for Lantus Solstar. 29g 12mm., Disp: 90 Each, Rfl: 3  •  allopurinol (ZYLOPRIM) 100 MG TABS, Take 1/2 pill qd (Patient taking differently: Take 100 mg by mouth 2 Times a Day. Indications: Primary Gout), Disp: 90 Tab, Rfl: 4  •  furosemide (LASIX) 80 MG Tab, Take 80 mg by mouth every 48 hours., Disp: , Rfl:   •  terazosin (HYTRIN) 1 MG Cap, 1 Cap every day., Disp: , Rfl:   •  CHELY CONTOUR NEXT TEST strip, , Disp: , Rfl:   •  Cholecalciferol (VITAMIN D) 2000 UNITS CAPS, Take  "4,000 Units by mouth every day., Disp: , Rfl:   •  magnesium oxide (MAGNESIUM OXIDE) 400 (241.3 MG) MG TABS tablet, Take 400 mg by mouth every day., Disp: , Rfl:       Physical Examination:   Vital signs: /42 mmHg  Pulse 65  Ht 1.702 m (5' 7.01\")  Wt 127.914 kg (282 lb)  BMI 44.16 kg/m2  SpO2 97%  General: No distress, cooperative, well dressed and well nourished.   Eyes: No scleral icterus or discharge, No hyposphagma  ENMT: Normal on external inspection of nose, lips, No nasal drainage   Neck: No abnormal masses on inspection  Resp: Normal effort, Bilateral clear to auscultation, No wheezing, No rales  CVS: Regular rate and rhythm, S1 S2 normal, No murmur. No gallop  Extremities: No edema bilateral extremities  Neuro: Alert and oriented  Skin: No rash, No Ulcers  Psych: Normal mood and affect    Assessment and Plan:    1. Type 2 diabetes mellitus with stage 4 chronic kidney disease, unspecified long term insulin use status (CMS-HCC)    STOP:  1.  Toujeo    START:  1.   Tresiba 40 units at night  3.   Victoza 5 clicks.  At night on 8/18/17 go to 0.6      2. Encounter for long-term (current) use of insulin (CMS-HCC)  Is on a high risk medication Insulin and we will continue to follow     Return in about 2 weeks (around 8/25/2017).    Blood glucose log: Check BG in the morning when wake up, before lunch or dinner and before bed.  So three times a day.  Always bring BG diary to the next office visit.     This patient during there office visit was started on new medication Victoza and Tresiba.  Side effects of new medications were discussed with the patient today in the office. The patient was supplied paperwork on this new medication.    Thank you kindly for allowing me to participate in the diabetes care plan for this patient.    Wei Patterson PA-C, BC-ADM  Board Certified - Advanced Diabetes Management  08/11/2017    CC:   Kin Pina, A.PMarquisNMarquis      "

## 2017-08-14 ENCOUNTER — PATIENT OUTREACH (OUTPATIENT)
Dept: HEALTH INFORMATION MANAGEMENT | Facility: OTHER | Age: 72
End: 2017-08-14

## 2017-08-14 NOTE — PROGRESS NOTES
Received incoming email from Whitley regarding her Omron BP values over past week. Listed below:         Whitley had initial HTN clinic visit on 8/8. Verified with pharmacist Bozeat that the large cuff size is appropriate for patient. [facility] has had empty packet alerts last Wednesday and Friday. Outbound call to Whitley to see if there are problems with monitor. Left a voice message requesting patient to call back.     Will continue to monitor.     Laila Corral, PHARMD

## 2017-08-14 NOTE — PROGRESS NOTES
"Received incoming call from Whitley. Whitley states that her blood pressure monitor transmitted fine today but would not transmit last week. She described the machine as \"tempermental.\" Instructed patient to call if transmission errors continue and I will open a ticket to have technician troubleshoot. Patient agreeable. Whitley transmitted BP of 135/76.     Will continue to monitor.     Laila Corral, PHARMD    "

## 2017-08-18 ENCOUNTER — PATIENT OUTREACH (OUTPATIENT)
Dept: HEALTH INFORMATION MANAGEMENT | Facility: OTHER | Age: 72
End: 2017-08-18

## 2017-08-18 ENCOUNTER — HOSPITAL ENCOUNTER (OUTPATIENT)
Dept: LAB | Facility: MEDICAL CENTER | Age: 72
End: 2017-08-18
Attending: ORTHOPAEDIC SURGERY
Payer: MEDICARE

## 2017-08-18 ENCOUNTER — APPOINTMENT (OUTPATIENT)
Dept: VASCULAR LAB | Facility: MEDICAL CENTER | Age: 72
End: 2017-08-18
Payer: MEDICARE

## 2017-08-18 ENCOUNTER — ANTICOAGULATION VISIT (OUTPATIENT)
Dept: VASCULAR LAB | Facility: MEDICAL CENTER | Age: 72
End: 2017-08-18
Attending: INTERNAL MEDICINE
Payer: MEDICARE

## 2017-08-18 DIAGNOSIS — N18.4 TYPE 2 DIABETES MELLITUS WITH STAGE 4 CHRONIC KIDNEY DISEASE, UNSPECIFIED LONG TERM INSULIN USE STATUS: ICD-10-CM

## 2017-08-18 DIAGNOSIS — I48.0 PAF (PAROXYSMAL ATRIAL FIBRILLATION) (HCC): ICD-10-CM

## 2017-08-18 DIAGNOSIS — Z79.4 ENCOUNTER FOR LONG-TERM (CURRENT) USE OF INSULIN (HCC): ICD-10-CM

## 2017-08-18 DIAGNOSIS — E11.22 TYPE 2 DIABETES MELLITUS WITH STAGE 4 CHRONIC KIDNEY DISEASE, UNSPECIFIED LONG TERM INSULIN USE STATUS: ICD-10-CM

## 2017-08-18 LAB
INR PPP: 2.3 (ref 2–3.5)
TSH SERPL DL<=0.005 MIU/L-ACNC: 1.49 UIU/ML (ref 0.3–3.7)

## 2017-08-18 PROCEDURE — 85610 PROTHROMBIN TIME: CPT

## 2017-08-18 PROCEDURE — 99211 OFF/OP EST MAY X REQ PHY/QHP: CPT | Performed by: NURSE PRACTITIONER

## 2017-08-18 RX ORDER — WARFARIN SODIUM 3 MG/1
TABLET ORAL
Qty: 135 TAB | Refills: 1 | Status: SHIPPED | OUTPATIENT
Start: 2017-08-18 | End: 2019-01-10 | Stop reason: SDUPTHER

## 2017-08-18 NOTE — PROGRESS NOTES
M alerted empty packet Wednesday 8/16 and today. Outbound call to Whitley. Left a voice message requesting patient to call back.     Will continue to monitor.     Laila Corral, SEPIDEHD

## 2017-08-18 NOTE — PROGRESS NOTES
Anticoagulation Summary as of 8/18/2017     INR goal 2.0-3.0   Selected INR 2.3 (8/18/2017)   Maintenance plan 1.5 mg (3 mg x 0.5) on Fri; 3 mg (3 mg x 1) all other days   Weekly total 19.5 mg   Weekly max dose 21 mg   Plan last modified Brigitte Horn, PHARMD (12/2/2016)   Next INR check 10/5/2017   Target end date Indefinite    Indications   PAF (paroxysmal atrial fibrillation) (CMS-HCC) [I48.0]  Warfarin therapy started (Resolved) [Z79.01]         Anticoagulation Episode Summary     INR check location     Preferred lab     Send INR reminders to     Comments       Anticoagulation Care Providers     Provider Role Specialty Phone number    Amy Lincoln M.D. Referring Cardiology [phone]    [facility] Responsible  [phone]        Patient is therapeutic today. Denies any medication or diet changes. No current symptoms of bleeding or thrombosis reported. Continue current regimen. Follow up in 7 weeks.    Next Appointment: Thursday, October 5, 2017 at 1:30 pm.     Jaylene BURGER

## 2017-08-18 NOTE — MR AVS SNAPSHOT
Whitley Frederick   2017 1:45 PM   Anticoagulation Visit   MRN: [id number]    Department:  Vascular Medicine   Dept Phone:  [phone]    Description:  Female : 1945   Provider:  [facility] EXAM 5           Allergies as of 2017     Allergen Noted Reactions    Amlodipine 2016   Itching    Amaryl 2011       GI Problems    Avandia [Rosiglitazone Maleate] 2011       GI problems    Cipro Xr 2011       Possibly causes Chills.    Clonidine 2017       Rapid heart rate, swelling     Contrast Media With Iodine [Iodine] 2016   Hives    Glipizide 2011       GI Problems    Glucophage [Metformin Hydrochloride] 2011       dizzy    Hydralazine 2016       Rapid heart rate, chest tightness    Levaquin 2011       Metformin 2011       Relafen [Nabumetone] 2011       Itching      You were diagnosed with     PAF (paroxysmal atrial fibrillation) (CMS-HCC)   [027899]         Vital Signs     Smoking Status                   Former Smoker           Basic Information     Date Of Birth Sex Race Ethnicity Preferred Language    1945 Female White Non- English      Your appointments     Aug 21, 2017  2:40 PM   MA [id number] with [facility] MG 2   [facility] ([address])    [address] [place] [address] [address]  [place] [address] [phone]           No deodorant, powder, perfume or lotion under the arm or breast area.            Aug 23, 2017 11:00 AM   Established Patient with Haley Trevino M.D.   [facility])    [address], [address]  [place] [address] [phone]           You will be receiving a confirmation call a few days before your appointment from our automated call confirmation system.            Sep 06, 2017 11:00 AM   Access New To You with EDILMA Rodríguez   [facility] (--)    [address] [address]  [place] [address]      [phone]            Sep 08, 2017  1:30 PM   CARE MANAGEMENT OUTREACH with Cesilia Feldman R.N.   [facility] (--)    [address] [facility] [address]   [phone]           ***IMPORTANT**** This is a phone visit only. Do not come into the office. The Care Manager will call you at the scheduled time for Care Manager Telephone Visit.            Sep 27, 2017 12:00 PM   Established Patient Pul with EDILMA Mosher   [facility] (--)    [address]  [address]  [place] [address] [phone]            Oct 05, 2017  1:30 PM   Established Patient with [facility] EXAM 4   [facility]  (--)    [address] [facility] [address]   [phone]            Nov 02, 2017  1:20 PM   FOLLOW UP with Fernando Sorto M.D.   [facility] (--)    [address]  [place] [id number] [phone]              Problem List              ICD-10-CM Priority Class Noted - Resolved    Type 2 diabetes mellitus with stage 4 chronic kidney disease (CMS-HCC) E11.22, N18.4 High  Unknown - Present    Left bundle branch block I44.7 Medium  3/5/2012 - Present    PAF (paroxysmal atrial fibrillation) (CMS-HCC) I48.0 High  7/12/2013 - Present    Hypothyroid E03.9 High  12/12/2013 - Present    Anxiety F41.9 High  1/9/2014 - Present    Carotid stenosis I65.29 Low  5/30/2014 - Present    Insulin long-term use (CMS-HCC) Z79.4 High  4/29/2015 - Present    Atherosclerosis of aorta (CMS-HCC) I70.0 High  4/29/2015 - Present    Chronic respiratory failure (CMS-HCC) J96.10 High  4/29/2015 - Present    Long term current use of anticoagulant therapy Z79.01 High  4/29/2015 - Present    Morbid obesity with BMI of 45.0-49.9, adult (CMS-HCC) E66.01, Z68.42 High  4/29/2015 - Present    Chronic gout M1A.9XX0 Medium  4/30/2015 - Present    Multiple thyroid nodules E04.2 Low  10/7/2015 - Present    Essential hypertension  I10 High  5/10/2016 - Present    AVERY (obstructive sleep apnea) G47.33 Medium  5/10/2016 - Present    Chronic anticoagulation Z79.01 Medium  6/10/2016 - Present    Mild intermittent asthma without complication J45.20 Medium  8/24/2016 - Present    Strain of lumbar paraspinal muscle S39.012A High  9/23/2016 - Present    Combined forms of age-related cataract of right eye H25.811   10/25/2016 - Present    Dyslipidemia E78.5   12/14/2016 - Present    Chronic pain of both shoulders M25.512, G89.29, M25.511   3/23/2017 - Present    Sinusitis J32.9   3/23/2017 - Present    Cellulitis L03.90   5/18/2017 - Present    Incontinence of feces R15.9   5/18/2017 - Present    Potential for deficient knowledge of diabetes mellitus Z91.89   6/15/2017 - Present    Encounter for long-term (current) use of insulin (CMS-[facility]) Z79.4   8/11/2017 - Present      Health Maintenance        Date Due Completion Dates    IMM DTaP/Tdap/Td Vaccine (1 - Tdap) 3/19/1965 ---    COLON CANCER SCREENING ANNUAL FIT 3/19/1996 ---    IMM ZOSTER VACCINE 3/19/2006 ---    COLONOSCOPY 11/30/2012 11/30/2007 (Prv Comp)    Override on 11/30/2007: Previously completed    RETINAL SCREENING 10/7/2016 10/7/2015, 3/5/2015 (Prv Comp)    Override on 3/5/2015: Previously completed    DIABETES MONOFILAMENT / LE EXAM 10/7/2016 10/7/2015 (Done)    Override on 10/7/2015: Done    IMM PNEUMOCOCCAL 65+ (ADULT) HIGH/HIGHEST RISK SERIES (2 of 2 - PPSV23) 10/19/2016 8/24/2016    MAMMOGRAM 5/13/2017 5/13/2016, 4/9/2015, 1/31/2013, 7/14/2011, 10/5/2007, 10/5/2007, 8/7/2006    IMM INFLUENZA (1) 9/1/2017 ---    FASTING LIPID PROFILE 1/27/2018 1/27/2017, 10/7/2015, 5/27/2015, 9/4/2014, 1/6/2014, 4/12/2013, 3/5/2012    BONE DENSITY 1/31/2018 1/31/2013    A1C SCREENING 2/11/2018 8/11/2017, 4/20/2017, 1/27/2017, 1/7/2016, 10/7/2015, 5/27/2015, 1/5/2015 (Prv Comp), 1/6/2014, 5/2/2013 (Done), 3/5/2012    Override on 1/5/2015: Previously completed    Override on 5/2/2013: Done    URINE ACR /  MICROALBUMIN 6/12/2018 6/12/2017, 5/2/2017, 1/27/2017, 11/14/2016, 10/14/2016, 8/10/2016, 1/15/2016, 10/7/2015, 10/7/2015, 5/27/2015, 2/23/2015, 10/10/2014, 9/4/2014, 5/12/2014, 8/12/2013 (Done), 8/12/2013, 4/12/2013, 1/21/2013, 3/5/2012    Override on 8/12/2013: Done    SERUM CREATININE 8/9/2018 8/9/2017, 6/12/2017, 5/1/2017, 4/20/2017, 4/7/2017, 3/27/2017, 3/27/2017, 3/23/2017, 1/27/2017, 1/27/2017, 11/14/2016, 10/14/2016, 8/10/2016, 4/12/2016, 1/15/2016, 10/7/2015, 10/7/2015, 5/27/2015, 5/27/2015, 2/23/2015, 10/30/2014, 10/10/2014, 9/4/2014, 8/15/2014, 5/12/2014, 4/19/2014, 4/18/2014, 1/6/2014, 1/6/2014, 8/12/2013, 4/12/2013, 4/12/2013, 2/12/2013, 1/21/2013, 1/8/2013, 3/5/2012, 4/27/2006            Results     POCT Protime      Component    INR    2.3                        Current Immunizations     13-VALENT PCV PREVNAR 8/24/2016 10:45 AM    Tuberculin Skin Test 3/20/2017  4:39 PM      Below and/or attached are the medications your provider expects you to take. Review all of your home medications and newly ordered medications with your provider and/or pharmacist. Follow medication instructions as directed by your provider and/or pharmacist. Please keep your medication list with you and share with your provider. Update the information when medications are discontinued, doses are changed, or new medications (including over-the-counter products) are added; and carry medication information at all times in the event of emergency situations     Allergies:  AMLODIPINE - Itching     AMARYL - (reactions not documented)     AVANDIA - (reactions not documented)     CIPRO XR - (reactions not documented)     CLONIDINE - (reactions not documented)     CONTRAST MEDIA WITH IODINE - Hives     GLIPIZIDE - (reactions not documented)     GLUCOPHAGE - (reactions not documented)     HYDRALAZINE - (reactions not documented)     LEVAQUIN - (reactions not documented)     METFORMIN - (reactions not documented)     RELAFEN - (reactions  not documented)               Medications  Valid as of: August 18, 2017 -  1:46 PM    Generic Name Brand Name Tablet Size Instructions for use    Allopurinol (Tab) ZYLOPRIM 100 MG Take 1/2 pill qd        Beclomethasone Dipropionate (Aero Soln) QVAR 40 MCG/ACT Inhale 2 Puffs by mouth 2 Times a Day. Use spacer. Rinse mouth after each use.        Blood Glucose Monitoring Suppl (Misc) Blood Glucose Monitoring Suppl Supplies Test strips order: Test strips for Chely Contour meter. Sig: use tid  and prn ssx high or low sugar #100 RF x 3        Blood Glucose Monitoring Suppl (Kit) Blood Glucose Monitoring Suppl w/Device 1 Each by Does not apply route 2 times a day as needed.        Blood Glucose Monitoring Suppl (Misc) Blood Glucose Monitoring Suppl Supplies Test strips order: Test strips for Accucheck meter. Sig: use BID and prn ssx high or low sugar #100 RF x 3        Cholecalciferol (Cap) Vitamin D 2000 units Take 4,000 Units by mouth every day.        ClonazePAM (Tab) KLONOPIN 0.5 MG Take 1 Tab by mouth 2 times a day as needed (insomnia, anxiety).        Diclofenac Sodium (Gel) Diclofenac Sodium 1 % Apply 2 g to skin as directed 4 times a day.        Dronedarone HCl (Tab) MULTAQ 400 MG TAKE ONE TABLET BY MOUTH TWICE DAILY WITH MEALS        Furosemide (Tab) LASIX 80 MG Take 80 mg by mouth every 48 hours.        Glucose Blood (Strip) CHELY CONTOUR NEXT TEST          Insulin Glargine (Solution Pen-injector) Insulin Glargine 300 UNIT/ML Inject 65 Units as instructed every day.        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        Levothyroxine Sodium (Tab) SYNTHROID 50 MCG Take 1 Tab by mouth every day. Indications: Underactive Thyroid        Losartan Potassium (Tab) COZAAR 50 MG TAKE ONE TABLET BY MOUTH TWICE DAILY         Magnesium Oxide (Tab) MAG- (241.3 Mg) MG Take 400 mg by mouth every day.        Metoprolol Tartrate (Tab) LOPRESSOR 50 MG TAKE ONE TABLET BY MOUTH TWICE DAILY ,  PATIENT  MAY  TAKE  AN  ADDITIONAL  HALF  TABLET  FOR  INCREASED  HEART  RATE.        Misc. Devices (Misc) Misc. Devices  Pen needles for Lancyrus Gastonar. 29g 12mm.        Montelukast Sodium (Tab) SINGULAIR 10 MG 1 po qhs        Omeprazole (CAPSULE DELAYED RELEASE) PRILOSEC 20 MG Take 1 Cap by mouth every day.        Simvastatin (Tab) ZOCOR 20 MG TAKE ONE TABLET BY MOUTH ONCE DAILY IN THE EVENING        Terazosin HCl (Cap) HYTRIN 1 MG 1 Cap every day.        Warfarin Sodium (Tab) COUMADIN 3 MG Take 1 to 1.5 tablets by mouth daily as directed by the coumadin clinic        .                 Medicines prescribed today were sent to:     [facility] [address] [place] [address] - [address] [place] [address]    Phone: [phone] Fax: [phone]    Open 24 Hours?: No      Medication refill instructions:       If your prescription bottle indicates you have medication refills left, it is not necessary to call your provider’s office. Please contact your pharmacy and they will refill your medication.    If your prescription bottle indicates you do not have any refills left, you may request refills at any time through one of the following ways: The online [employer] system (except Urgent Care), by calling your provider’s office, or by asking your pharmacy to contact your provider’s office with a refill request. Medication refills are processed only during regular business hours and may not be available until the next business day. Your provider may request additional information or to have a follow-up visit with you prior to refilling your medication.   *Please Note: Medication refills are assigned a new Rx number when refilled electronically. Your pharmacy may indicate that no refills were authorized even though a new  prescription for the same medication is available at the pharmacy. Please request the medicine by name with the pharmacy before contacting your provider for a refill.        Other Notes About Your Plan     Cards: HTN, AF.        Warfarin Dosing Calendar   August 2017 Details    Sun Mon Tue Wed Thu Fri Sat       1               2               3               4               5                 6               7               8               9               10               11               12                 13               14               15               16               17               18   2.3   1.5 mg   See details      19      3 mg           20      3 mg         21      3 mg         22      3 mg         23      3 mg         24      3 mg         25      1.5 mg         26      3 mg           27      3 mg         28      3 mg         29      3 mg         30      3 mg         31      3 mg            Date Details   08/18 This INR check   INR: 2.3               How to take your warfarin dose     To take:  1.5 mg Take 0.5 of a 3 mg tablet.    To take:  3 mg Take 1 of the 3 mg tablets.           Warfarin Dosing Calendar   September 2017 Details    Sun Mon Tue Wed Thu Fri Sat          1      1.5 mg         2      3 mg           3      3 mg         4      3 mg         5      3 mg         6      3 mg         7      3 mg         8      1.5 mg         9      3 mg           10      3 mg         11      3 mg         12      3 mg         13      3 mg         14      3 mg         15      1.5 mg         16      3 mg           17      3 mg         18      3 mg         19      3 mg         20      3 mg         21      3 mg         22      1.5 mg         23      3 mg           24      3 mg         25      3 mg         26      3 mg         27      3 mg         28      3 mg         29      1.5 mg         30      3 mg          Date Details   No additional details            How to take your warfarin dose     To take:  1.5 mg Take  0.5 of a 3 mg tablet.    To take:  3 mg Take 1 of the 3 mg tablets.           Warfarin Dosing Calendar   October 2017 Details    Sun Mon Tue Wed Thu Fri Sat     1      3 mg         2      3 mg         3      3 mg         4      3 mg         5      3 mg         6               7                 8               9               10               11               12               13               14                 15               16               17               18               19               20               21                 22               23               24               25               26               27               28                 29               30               31                    Date Details   No additional details    Date of next INR:  10/5/2017         How to take your warfarin dose     To take:  3 mg Take 1 of the 3 mg tablets.              [facility] Access Code: Activation code not generated  Current Hudl Status: Active

## 2017-08-20 LAB — C PEPTIDE SERPL-MCNC: 3.1 NG/ML (ref 0.8–3.5)

## 2017-08-21 ENCOUNTER — APPOINTMENT (OUTPATIENT)
Dept: RADIOLOGY | Facility: MEDICAL CENTER | Age: 72
End: 2017-08-21
Attending: SURGERY
Payer: MEDICARE

## 2017-08-21 ENCOUNTER — PATIENT OUTREACH (OUTPATIENT)
Dept: HEALTH INFORMATION MANAGEMENT | Facility: OTHER | Age: 72
End: 2017-08-21

## 2017-08-21 LAB — GAD65 AB SER IA-ACNC: <5 IU/ML (ref 0–5)

## 2017-08-21 NOTE — PROGRESS NOTES
RHM alerted BP of 152/74 HR 64 on Sunday 8/20. Received email from Whitley with this past week's BP values from her home [employer] meter:         Will continue to monitor.     Laila Corral, SEPIDEHD

## 2017-08-22 ENCOUNTER — PATIENT OUTREACH (OUTPATIENT)
Dept: HEALTH INFORMATION MANAGEMENT | Facility: OTHER | Age: 72
End: 2017-08-22

## 2017-08-22 NOTE — PROGRESS NOTES
Received incoming message from Whitley regarding BP readings. Patient states that the values will not transmit because the tablet keeps timing out. Placed a call to [employer] customer support. Spoke with Jeffry at [employer] who created a ticket for a technician to contact patient to troubleshoot the issue. Outbound call to Whitley to inform her of above. Left a voice message informing patient that [employer] will be reaching out to her.     Laila Corral, PHARMD

## 2017-08-22 NOTE — TELEPHONE ENCOUNTER
DOCUMENTATION OF PRIOR AUTH STATUS    1. Medication name and dose: Xopenex HFA 45 mcg    2. Name and Phone # of Prescription coverage company: [facility] [phone]    3. Date Prior Auth was submitted: 8/9/2017    4. What information was given to obtain insurance decision: Clinical notes    5. Prior Auth letter Approved or Denied: Approved through 12/31/2017    6. Pharmacy notified: Yes    7. Patient notified: Yes

## 2017-08-23 ENCOUNTER — PATIENT OUTREACH (OUTPATIENT)
Dept: HEALTH INFORMATION MANAGEMENT | Facility: OTHER | Age: 72
End: 2017-08-23

## 2017-08-23 NOTE — PROGRESS NOTES
LSW outreach to pt to discuss housing resource provided to pt by LSW Darwin on housing. No answer, LVM with contact information requesting TC back.     Plan:  Will attempt to reach out to pt at a later time/date.

## 2017-08-23 NOTE — PROGRESS NOTES
Received incoming call from Whitley. Patient states that she was contacted by [employer] technician to troubleshoot the RHM equipment. She stated that after multiple attempts the reading finally transmitted when the cuff was on her right arm. She states the technician attributed it to cuff sensitivity/ improper placement. Whitley had technique evaluated in HTN clinic. She states while at HTN clinic, the values would not transmit either while having BP measurement done by pharmacist.     Whitley states her reading was elevated because she is undergoing stress and she was talking on the phone at the time of the measurement. RHM reading alerted 157/71 HR 56. Patient denies chest pain, headache, or dizziness. Whitley states she had some dizziness last night but that has resolved. Patient mentions she may just want to use her own home monitor if RHM equipment continues to not work properly. Advised patient not to stress over equipment, if the monitor will not transmit I will initiate another ticket.     Whitley inquired if her recent medication changes of victoza and tresiba could be contributing to higher heart rate. Patient's HR has been running in the 50s-60s as opposed to the 40s. Informed patient that typical heart rate is .  Informed patient that victoza has been attributed to increased heart rate in clinical trials - it has low incidence of tachycardia - about 0.6%. Advised patient to follow-up with her endocrinologist if she is concerned.     Patient admits to having stressors at home due to her housing situation. She had been given a list of housing options by EBONY Granados but would like to discuss this list. Asked EBONY Zhu to reach out to patient.     Will continue to monitor BP on RHM.     Laila Corral, PHARMD

## 2017-08-24 LAB — INR BLD: 2.3 (ref 0.9–1.2)

## 2017-08-25 ENCOUNTER — PATIENT OUTREACH (OUTPATIENT)
Dept: HEALTH INFORMATION MANAGEMENT | Facility: OTHER | Age: 72
End: 2017-08-25

## 2017-08-25 NOTE — PROGRESS NOTES
Incoming TC from pt returning this LSW's phone call. Pt reports living with a lot of family members and is hoping to move out soon. Pt inquiring re: list sent to her by LSSANDEE Granados. LSW discussed pt calling locations that interest her and adding herself to the waiting list. Pt verbalized understanding. LSW discussed that if pt is having a difficult time filling out applications etc, LSW could assist in completing applications. Pt agreeable.     Provided pt with contact information and encouraged pt to call LSW with any questions/concerns.

## 2017-08-25 NOTE — PROGRESS NOTES
RHM alerted of empty packet. Outbound call to Whitley to see if this was because she did not take vitals or if the machine is still not transmitting vitals. Left message requesting patient to call back.     Laila Corral, SEPIDEHD

## 2017-08-27 ENCOUNTER — SUPERVISING PHYSICIAN REVIEW (OUTPATIENT)
Dept: ENDOCRINOLOGY | Facility: MEDICAL CENTER | Age: 72
End: 2017-08-27

## 2017-08-27 NOTE — PROGRESS NOTES
I have reviewed and agree with history, assessment and plan for office encounter on 08/11/17 with midlevel provider: José Luis Patterson PA-C.  Face to face encounter/direct observation: No  Suggested changes to plan or follow-up: none    Specific comments on Progress Note:  Assessment and plan sections does not need to be repeated in HPI section. This will allow note to be short and more precise.     Felipe Ng M.D.

## 2017-08-29 ENCOUNTER — PATIENT OUTREACH (OUTPATIENT)
Dept: HEALTH INFORMATION MANAGEMENT | Facility: OTHER | Age: 72
End: 2017-08-29

## 2017-08-29 NOTE — PROGRESS NOTES
RHM transmitted reading of 128/71 HR 61. Received email of BPs from Whitley for this past week from her [employer] home monitor:      Will continue to monitor.     Laila Corral, PharmD

## 2017-08-31 ENCOUNTER — OFFICE VISIT (OUTPATIENT)
Dept: ENDOCRINOLOGY | Facility: MEDICAL CENTER | Age: 72
End: 2017-08-31
Payer: MEDICARE

## 2017-08-31 VITALS
WEIGHT: 275 LBS | HEART RATE: 69 BPM | SYSTOLIC BLOOD PRESSURE: 136 MMHG | HEIGHT: 67 IN | BODY MASS INDEX: 43.16 KG/M2 | DIASTOLIC BLOOD PRESSURE: 60 MMHG | OXYGEN SATURATION: 97 %

## 2017-08-31 DIAGNOSIS — N18.4 TYPE 2 DIABETES MELLITUS WITH STAGE 4 CHRONIC KIDNEY DISEASE, WITHOUT LONG-TERM CURRENT USE OF INSULIN (HCC): ICD-10-CM

## 2017-08-31 DIAGNOSIS — E11.22 TYPE 2 DIABETES MELLITUS WITH STAGE 4 CHRONIC KIDNEY DISEASE, WITHOUT LONG-TERM CURRENT USE OF INSULIN (HCC): ICD-10-CM

## 2017-08-31 PROCEDURE — 99214 OFFICE O/P EST MOD 30 MIN: CPT | Performed by: PHYSICIAN ASSISTANT

## 2017-08-31 NOTE — PATIENT INSTRUCTIONS
She is now on:  1.   Tresiba 40 units at night (DECREASE to 20)  3.   Victoza  0.6 at night (INCREASE to 1.2)  If doing well no nausea by 9/10 then INCREASE Victoza to 1.8 and stop Tresiba altogether.

## 2017-08-31 NOTE — PROGRESS NOTES
Return to office Patient Consult Note  Referred by: EDILMA Rodríguez    Reason for consult: Diabetes Management Type 2    HPI:  Whitley Frederick is a 72 y.o. old patient who is seeing us today for diabetes care.  This is a pleasant patient with diabetes and I appreciate the opportunity to participate in the care of this patient.    BG Diary:8/31/2017  In the AM:  Did not bring    Her labs of 8/11/17 HbA1c 7.6, GFR 22,      BG Diary:8/11/2017  In the AM: did not bring      Has been Diabetic since 1985  Has a Glucagon pen at home: no     Has seen 3 different endos in the past.  And is just on an Basal insulin.    Having a fistula placed next month     1. Type 2 diabetes mellitus with stage 4 chronic kidney disease, unspecified long term insulin use status (CMS-HCC)  This is a new patient on 8/11/17  She is on:  1.  Toujeo  65     STOP:  1.  Toujeo     She is now on:  1.   Tresiba 40 units at night  3.   Victoza  0.6 at night      ROS:   Constitutional: No night sweats.  Eyes:  No visual changes.  Cardiac: No chest pain, No palpitations or racing heart rate.  Resp: No shortness of breath, No cough,   Gi: No Diarrhea    All other systems were reviewed and were/are negative.  The ROS was revised/revisited during this office visit from the patients first office visit with me on 8/11/17  Please review the full ROS during the first office visit.    Past Medical History:  Patient Active Problem List    Diagnosis Date Noted   • Strain of lumbar paraspinal muscle 09/23/2016     Priority: High   • Essential hypertension 05/10/2016     Priority: High   • Insulin long-term use (CMS-HCC) 04/29/2015     Priority: High   • Atherosclerosis of aorta (CMS-HCC) 04/29/2015     Priority: High   • Chronic respiratory failure (CMS-HCC) 04/29/2015     Priority: High   • Long term current use of anticoagulant therapy 04/29/2015     Priority: High   • Morbid obesity with BMI of 45.0-49.9, adult (CMS-HCC) 04/29/2015     Priority:  High   • Anxiety 01/09/2014     Priority: High   • Hypothyroid 12/12/2013     Priority: High   • PAF (paroxysmal atrial fibrillation) (CMS-HCC) 07/12/2013     Priority: High   • Type 2 diabetes mellitus with stage 4 chronic kidney disease (CMS-HCC)      Priority: High   • Mild intermittent asthma without complication 08/24/2016     Priority: Medium   • Chronic anticoagulation 06/10/2016     Priority: Medium   • AVERY (obstructive sleep apnea) 05/10/2016     Priority: Medium   • Chronic gout 04/30/2015     Priority: Medium   • Left bundle branch block 03/05/2012     Priority: Medium   • Multiple thyroid nodules 10/07/2015     Priority: Low   • Carotid stenosis 05/30/2014     Priority: Low   • Encounter for long-term (current) use of insulin (CMS-HCC) 08/11/2017   • Potential for deficient knowledge of diabetes mellitus 06/15/2017   • Cellulitis 05/18/2017   • Incontinence of feces 05/18/2017   • Chronic pain of both shoulders 03/23/2017   • Sinusitis 03/23/2017   • Dyslipidemia 12/14/2016   • Combined forms of age-related cataract of right eye 10/25/2016       Past Surgical History:  Past Surgical History:   Procedure Laterality Date   • CATARACT PHACO WITH IOL Right 10/25/2016    Procedure: CATARACT PHACO WITH IOL;  Surgeon: Zoran Gamble M.D.;  Location: SURGERY SAME DAY [facility];  Service:    • ABDOMINAL HYSTERECTOMY TOTAL     • CHOLECYSTECTOMY     • TONSILLECTOMY     • US-NEEDLE CORE BX-BREAST PANEL         Allergies:  Amlodipine; Amaryl; Avandia [rosiglitazone maleate]; Cipro xr; Clonidine; Contrast media with iodine [iodine]; Glipizide; Glucophage [metformin hydrochloride]; Hydralazine; Levaquin; Metformin; and Relafen [nabumetone]    Social History:  Social History     Social History   • Marital status:      Spouse name: N/A   • Number of children: N/A   • Years of education: N/A     Occupational History   • Not on file.     Social History Main Topics   • Smoking status: Former Smoker      Packs/day: 1.00     Years: 20.00     Types: Cigarettes     Quit date: 1/1/1983   • Smokeless tobacco: Never Used   • Alcohol use No   • Drug use: No   • Sexual activity: No     Other Topics Concern   • Not on file     Social History Narrative   • No narrative on file       Family History:  Family History   Problem Relation Age of Onset   • Cancer Maternal Aunt    • Diabetes Maternal Aunt    • Heart Disease Maternal Aunt    • Hypertension Maternal Aunt    • Hyperlipidemia Maternal Aunt    • Cancer Mother      Leukemia   • Diabetes Mother    • Heart Disease Mother    • Hypertension Mother    • Hyperlipidemia Mother    • Lung Disease Father    • Cancer Father      Lung   • Hyperlipidemia Father    • Hypertension Father    • Heart Disease Father    • Diabetes Father    • Lung Disease Brother    • Stroke Brother    • Diabetes Brother    • Heart Disease Brother    • Hypertension Brother    • Hyperlipidemia Brother    • Diabetes Maternal Grandmother        Medications:    Current Outpatient Prescriptions:   •  levalbuterol (XOPENEX HFA) 45 MCG/ACT inhaler, Inhale 2 Puffs by mouth every four hours as needed for Shortness of Breath., Disp: 1 Inhaler, Rfl: 3  •  simvastatin (ZOCOR) 20 MG Tab, TAKE ONE TABLET BY MOUTH ONCE DAILY IN THE EVENING, Disp: 90 Tab, Rfl: 0  •  levothyroxine (SYNTHROID) 50 MCG Tab, Take 1 Tab by mouth every day. Indications: Underactive Thyroid, Disp: 90 Tab, Rfl: 0  •  Insulin Pen Needle (RELION PEN NEEDLES) 29G X 12MM Misc, USE ONE PEN NEEDLE SUBCUTANEOUSLY FOR LANTUS SOLOSTAR, Disp: 100 Each, Rfl: 3  •  Blood Glucose Monitoring Suppl W/DEVICE Kit, 1 Each by Does not apply route 2 times a day as needed., Disp: 1 Kit, Rfl: 0  •  Blood Glucose Monitoring Suppl SUPPLIES Misc, Test strips order: Test strips for Accucheck meter. Sig: use BID and prn ssx high or low sugar #100 RF x 3, Disp: 100 Strip, Rfl: 3  •  omeprazole (PRILOSEC) 20 MG delayed-release capsule, Take 1 Cap by mouth every day., Disp: 90  Cap, Rfl: 3  •  Insulin Glargine (TOUJEO SOLOSTAR) 300 UNIT/ML Solution Pen-injector, Inject 65 Units as instructed every day., Disp: 5 PEN, Rfl: 2  •  montelukast (SINGULAIR) 10 MG Tab, 1 po qhs, Disp: 90 Tab, Rfl: 3  •  beclomethasone (QVAR) 40 MCG/ACT inhaler, Inhale 2 Puffs by mouth 2 Times a Day. Use spacer. Rinse mouth after each use., Disp: 3 Inhaler, Rfl: 3  •  metoprolol (LOPRESSOR) 50 MG Tab, TAKE ONE TABLET BY MOUTH TWICE DAILY ,  PATIENT  MAY  TAKE  AN  ADDITIONAL  HALF  TABLET  FOR  INCREASED  HEART  RATE., Disp: 180 Tab, Rfl: 3  •  Blood Glucose Monitoring Suppl SUPPLIES Misc, Test strips order: Test strips for Chely Contour meter. Sig: use tid  and prn ssx high or low sugar #100 RF x 3, Disp: 100 Strip, Rfl: 3  •  MULTAQ 400 MG Tab, TAKE ONE TABLET BY MOUTH TWICE DAILY WITH MEALS, Disp: 60 Tab, Rfl: 6  •  Diclofenac Sodium (VOLTAREN) 1 % Gel, Apply 2 g to skin as directed 4 times a day., Disp: 100 g, Rfl: 0  •  losartan (COZAAR) 50 MG Tab, TAKE ONE TABLET BY MOUTH TWICE DAILY, Disp: 180 Tab, Rfl: 3  •  clonazepam (KLONOPIN) 0.5 MG Tab, Take 1 Tab by mouth 2 times a day as needed (insomnia, anxiety)., Disp: 60 Tab, Rfl: 0  •  Misc. Devices MISC, Pen needles for Lantus Solstar. 29g 12mm., Disp: 90 Each, Rfl: 3  •  allopurinol (ZYLOPRIM) 100 MG TABS, Take 1/2 pill qd (Patient taking differently: Take 100 mg by mouth 2 Times a Day. Indications: Primary Gout), Disp: 90 Tab, Rfl: 4  •  warfarin (COUMADIN) 3 MG Tab, Take 1 to 1.5 tablets by mouth daily as directed by the coumadin clinic, Disp: 135 Tab, Rfl: 1  •  furosemide (LASIX) 80 MG Tab, Take 80 mg by mouth every 48 hours., Disp: , Rfl:   •  terazosin (HYTRIN) 1 MG Cap, 1 Cap every day., Disp: , Rfl:   •  CHELY CONTOUR NEXT TEST strip, , Disp: , Rfl:   •  Cholecalciferol (VITAMIN D) 2000 UNITS CAPS, Take 4,000 Units by mouth every day., Disp: , Rfl:   •  magnesium oxide (MAGNESIUM OXIDE) 400 (241.3 MG) MG TABS tablet, Take 400 mg by mouth every day.,  "Disp: , Rfl:         Physical Examination:   Vital signs: /60   Pulse 69   Ht 1.702 m (5' 7\")   Wt 124.7 kg (275 lb)   SpO2 97%   BMI 43.07 kg/m²   General: No distress, cooperative, well dressed and well nourished.   Eyes: No scleral icterus or discharge, No hyposphagma  ENMT: Normal on external inspection of nose, lips, No nasal drainage   Neck: No abnormal masses on inspection  Resp: Normal effort, Bilateral clear to auscultation, No wheezing, No rales  CVS: Regular rate and rhythm, S1 S2 normal, No murmur. No gallop  Extremities: No edema bilateral extremities  Neuro: Alert and oriented  Skin: No rash, No Ulcers  Psych: Normal mood and affect      Assessment and Plan:    1. Type 2 diabetes mellitus with stage 4 chronic kidney disease, without long-term current use of insulin (CMS-[facility])    She is now on:  1.   Tresiba 40 units at night (DECREASE to 20)  3.   Victoza  0.6 at night (INCREASE to 1.2) If doing well no nausea by 9/10 then INCREASE Victoza to 1.8 and stop Tresiba altogether.     The total time spent seeing this patient today face to face in consultation, and formulating an action plan for this visit was greater than 25 minutes. > Than 50% of this time was spent counseling, discussing problems documented above and below, coordinating care and answering questions by the physician assistant.  We developed a diabetes care plan for this patient today.    Return in about 1 month (around 9/30/2017).    Blood glucose log: Check BG in the morning when wake up, before lunch or dinner and before bed.  So three times a day.  Always bring BG diary to the next office visit.     Thank you kindly for allowing me to participate in the diabetes care plan for this patient.    Wei Patterson PA-C, BC-ADM  Board Certified - Advanced Diabetes Management  08/31/17    CC:   EDILMA Rodríguez    "

## 2017-09-01 ENCOUNTER — PATIENT OUTREACH (OUTPATIENT)
Dept: HEALTH INFORMATION MANAGEMENT | Facility: OTHER | Age: 72
End: 2017-09-01

## 2017-09-01 DIAGNOSIS — I48.0 PAF (PAROXYSMAL ATRIAL FIBRILLATION) (HCC): ICD-10-CM

## 2017-09-01 RX ORDER — SIMVASTATIN 20 MG
TABLET ORAL
Refills: 0 | OUTPATIENT
Start: 2017-09-01

## 2017-09-01 RX ORDER — DRONEDARONE 400 MG/1
TABLET, FILM COATED ORAL
Qty: 60 TAB | Refills: 2 | Status: SHIPPED | OUTPATIENT
Start: 2017-09-01 | End: 2017-12-03 | Stop reason: SDUPTHER

## 2017-09-01 NOTE — PROGRESS NOTES
Received incoming message from patient requesting a call back. Outbound call to Whitley. Whitley states that she had a headache earlier but she took tylenol and it has completely subsided. Denies dizziness or chest pain. Patient re-tested BP while on the phone and states her reading was 154/80 HR 54.      Patient states she was seen by endocrinology yesterday and her BP was 136/60. She had questions regarding her lab work - C-peptide and NINOSKA-65. I informed patient that C-peptide can show how much insulin is being made by the pancreas. Encouraged patient to use [employer] to ask her provider any questions she may have regarding her labs.     Discussed recent medication changes. Patient has increased her victoza and decreased the tresiba as instructed by endocrinology. Patient reports that she had a 7 lb weight loss since seeing endocrinology. Reviewed patient's A1C and discussed dietary and lifestyle changes. Patient states that she is getting more activity and trying to make better choices with her diet - she is limiting fast food.     Will continue to monitor BP on RHM.     Laila Corral, PharmD

## 2017-09-01 NOTE — TELEPHONE ENCOUNTER
CALLED [facility] 09/01 ASKED FOR THIS REQUEST TO BE RE-ROUTED TO CORRECT PCP OFFICE.

## 2017-09-01 NOTE — PROGRESS NOTES
M alerted BP of 185/78. Received incoming email from patient stating that she forgot to take her terazosin last night and plans to re-transmit her BP reading 2 hours after taking her medications. Outbound call to Whitley. Left a voice message requesting patient to call back.     Laila Corral, PharmD

## 2017-09-05 ENCOUNTER — TELEPHONE (OUTPATIENT)
Dept: MEDICAL GROUP | Facility: LAB | Age: 72
End: 2017-09-05

## 2017-09-05 ENCOUNTER — PATIENT OUTREACH (OUTPATIENT)
Dept: HEALTH INFORMATION MANAGEMENT | Facility: OTHER | Age: 72
End: 2017-09-05

## 2017-09-05 RX ORDER — SIMVASTATIN 20 MG
TABLET ORAL
Qty: 90 TAB | Refills: 0 | Status: SHIPPED | OUTPATIENT
Start: 2017-09-05 | End: 2017-09-06 | Stop reason: SDUPTHER

## 2017-09-05 RX ORDER — LEVOTHYROXINE SODIUM 0.05 MG/1
50 TABLET ORAL
Qty: 90 TAB | Refills: 1 | Status: SHIPPED | OUTPATIENT
Start: 2017-09-05 | End: 2018-02-28 | Stop reason: SDUPTHER

## 2017-09-05 NOTE — TELEPHONE ENCOUNTER
Was the patient seen in the last year in this department? No     Does patient have an active prescription for medications requested? No     Received Request Via: Patient     Patient is going to see you tomorrow for the first time

## 2017-09-05 NOTE — TELEPHONE ENCOUNTER
ESTABLISHED PATIENT PRE-VISIT PLANNING     Note: Patient will not be contacted if there is no indication to call.     1.  Reviewed notes from the last few office visits within the medical group: Yes.  Has been a patient in [place].  Last seen locally at [facility] with Kin Castaneda on 5/18/17 for cellulitis of the toe.  [place] Lubna L[place] [facility] [place] for AVERY and performed PFT.  Last seen on 8/31/17 with Dr. Patterson at Endocrinology  for DM management.  Had ov with Dr. Badillo at Nephrology  on 6/21/17 for ART, HTN, and CKD.  Saw Dr. Sorto at [facility] on 12/14/16 for annual exam for PAF.  Seen Dr. Gamble on 11/17/16 for post op fu and retinal eye exam.         2.  If any orders were placed at last visit or intended to be done for this visit (i.e. 6 mos follow-up), do we have Results/Consult Notes?        •  Labs - Labs were not ordered at last office visit.  Done 8/18/17 with Dr. Patterson.       •  Imaging - Imaging was not ordered at last office visit.  Mammogram completed on 5/13/16.  Has appointment on 9/8/17.  Bone density on 1/31/13.  Had ECHO done on 4/11/17.  Last colonoscopy done with Dr. Wong at [facility] on 11/30/07.  Last scheduled procedure on 8/31/17 was declined by patient.       •  Referrals - No referrals were ordered at last office visit.    3. Is this appointment scheduled as a Hospital Follow-Up? No    4.  Immunizations were updated in [facility] using WebIZ?: Yes       •  Web Iz Recommendations: FLU, PNEUMOVAX (PPSV23), TDAP and ZOSTAVAX (Shingles)    5.  Patient is due for the following Health Maintenance Topics:   Health Maintenance Due   Topic Date Due   • IMM DTaP/Tdap/Td Vaccine (1 - Tdap) 03/19/1964   • COLON CANCER SCREENING ANNUAL FIT  03/19/1996   • IMM ZOSTER VACCINE  03/19/2006   • COLONOSCOPY  11/30/2012   • RETINAL SCREENING  10/07/2016   • DIABETES MONOFILAMENT / LE EXAM  10/07/2016   • IMM PNEUMOCOCCAL 65+ (ADULT) HIGH/HIGHEST RISK SERIES (2 of 2 - PPSV23) 10/19/2016    • Annual Wellness Visit  05/11/2017   • MAMMOGRAM  05/13/2017   • IMM INFLUENZA (1) 09/01/2017       - Patient has completed PREVNAR (PCV13)  Immunization(s) per WebIZ. Chart has been updated.      6.  Patient was NOT informed to arrive 15 min prior to their scheduled appointment and bring in their medication bottles.

## 2017-09-05 NOTE — PROGRESS NOTES
Received incoming message from patient requesting call back regarding weight loss and Victoza and Tresiba. Outbound call to Whitley. Left a voice message requesting patient call back.     Laila Corral, AraceliD

## 2017-09-06 ENCOUNTER — OFFICE VISIT (OUTPATIENT)
Dept: MEDICAL GROUP | Facility: LAB | Age: 72
End: 2017-09-06
Payer: MEDICARE

## 2017-09-06 VITALS
WEIGHT: 271.3 LBS | OXYGEN SATURATION: 97 % | SYSTOLIC BLOOD PRESSURE: 120 MMHG | TEMPERATURE: 98.7 F | DIASTOLIC BLOOD PRESSURE: 58 MMHG | BODY MASS INDEX: 42.58 KG/M2 | HEART RATE: 70 BPM | RESPIRATION RATE: 12 BRPM | HEIGHT: 67 IN

## 2017-09-06 DIAGNOSIS — F41.9 ANXIETY: ICD-10-CM

## 2017-09-06 DIAGNOSIS — N18.4 CHRONIC KIDNEY DISEASE, STAGE 4 (SEVERE) (HCC): ICD-10-CM

## 2017-09-06 DIAGNOSIS — N18.4 TYPE 2 DIABETES MELLITUS WITH STAGE 4 CHRONIC KIDNEY DISEASE, WITH LONG-TERM CURRENT USE OF INSULIN (HCC): ICD-10-CM

## 2017-09-06 DIAGNOSIS — R15.9 INCONTINENCE OF FECES, UNSPECIFIED FECAL INCONTINENCE TYPE: ICD-10-CM

## 2017-09-06 DIAGNOSIS — Z23 NEED FOR PNEUMOCOCCAL VACCINATION: ICD-10-CM

## 2017-09-06 DIAGNOSIS — I70.0 ATHEROSCLEROSIS OF AORTA (HCC): ICD-10-CM

## 2017-09-06 DIAGNOSIS — Z79.4 TYPE 2 DIABETES MELLITUS WITH STAGE 4 CHRONIC KIDNEY DISEASE, WITH LONG-TERM CURRENT USE OF INSULIN (HCC): ICD-10-CM

## 2017-09-06 DIAGNOSIS — I10 ESSENTIAL HYPERTENSION: ICD-10-CM

## 2017-09-06 DIAGNOSIS — Z12.11 SPECIAL SCREENING FOR MALIGNANT NEOPLASM OF COLON: ICD-10-CM

## 2017-09-06 DIAGNOSIS — B37.2 CANDIDAL INTERTRIGO: ICD-10-CM

## 2017-09-06 DIAGNOSIS — I48.0 PAF (PAROXYSMAL ATRIAL FIBRILLATION) (HCC): ICD-10-CM

## 2017-09-06 DIAGNOSIS — E03.9 HYPOTHYROIDISM, UNSPECIFIED TYPE: ICD-10-CM

## 2017-09-06 DIAGNOSIS — J96.11 CHRONIC RESPIRATORY FAILURE WITH HYPOXIA (HCC): ICD-10-CM

## 2017-09-06 DIAGNOSIS — E11.22 TYPE 2 DIABETES MELLITUS WITH STAGE 4 CHRONIC KIDNEY DISEASE, WITH LONG-TERM CURRENT USE OF INSULIN (HCC): ICD-10-CM

## 2017-09-06 DIAGNOSIS — E78.5 DYSLIPIDEMIA: ICD-10-CM

## 2017-09-06 PROBLEM — J32.9 SINUSITIS: Status: RESOLVED | Noted: 2017-03-23 | Resolved: 2017-09-06

## 2017-09-06 PROCEDURE — 90732 PPSV23 VACC 2 YRS+ SUBQ/IM: CPT | Performed by: NURSE PRACTITIONER

## 2017-09-06 PROCEDURE — G0009 ADMIN PNEUMOCOCCAL VACCINE: HCPCS | Performed by: NURSE PRACTITIONER

## 2017-09-06 PROCEDURE — 99214 OFFICE O/P EST MOD 30 MIN: CPT | Mod: 25 | Performed by: NURSE PRACTITIONER

## 2017-09-06 RX ORDER — NYSTATIN 100000 U/G
OINTMENT TOPICAL
Qty: 240 G | Refills: 2 | Status: SHIPPED | OUTPATIENT
Start: 2017-09-06 | End: 2017-11-07

## 2017-09-06 RX ORDER — SIMVASTATIN 20 MG
TABLET ORAL
Qty: 90 TAB | Refills: 3 | Status: SHIPPED | OUTPATIENT
Start: 2017-09-06 | End: 2018-11-28 | Stop reason: SDUPTHER

## 2017-09-06 RX ORDER — FERROUS GLUCONATE 324(38)MG
324 TABLET ORAL
COMMUNITY
End: 2019-01-09

## 2017-09-06 NOTE — ASSESSMENT & PLAN NOTE
Chronic issue.  Dx DM 1985.  Followed by Wei @ our diabetes clinic.  Currently Victoza 1.2, increasing to 1.8 9/10 and dropping Tresiba 20 units.  Checking BG tid.  Blood sugars running around 170 right now.  Not exercising.  On ARB and statin.  Eye doctor - Dr. Gamble.  Cardiology: Dr. Vallejo and Noreen.  Podiatry (Dr. Menezes) and has clean pedicure monthly.  Nephrology : Dr. Badillo.

## 2017-09-06 NOTE — ASSESSMENT & PLAN NOTE
Chronic issue for pt.  Anticoagulated with coumadin.  Dx 7/2013.  Followed by cardiology - Dr. Sorto or Dr. Vallejo in [place], [place].

## 2017-09-06 NOTE — PROGRESS NOTES
Chief Complaint   Patient presents with   • Establish Care       HPI  Whitley is a 72 year-old female, new patient to me today. She is here to follow-up on chronic medical issues, to establish care as well as obtain a few medication refills. She is followed by cardiology, nephrology, ophthalmology, occasionally podiatry and gastroenterology. She is currently single and living with a friend. She quit smoking a few decades ago. She rarely drinks alcohol. She does not exercise regularly. She is retired.    PAF (paroxysmal atrial fibrillation) (CMS-HCC)  Chronic issue for pt.  Anticoagulated with coumadin.  Dx 7/2013.  Followed by cardiology - Dr. Sorto or Dr. Vallejo in [place], [place].      Type 2 diabetes mellitus with stage 4 chronic kidney disease (CMS-HCC)  Chronic issue.  Dx DM 1984.  Followed by Wei @ our diabetes clinic.  Currently Victoza 1.2, increasing to 1.8 9/10 and dropping Tresiba 20 units.  Checking BG tid.  Blood sugars running around 170 right now.  Not exercising.  On ARB and statin.  Eye doctor - Dr. Gamble.  Cardiology: Dr. Vallejo and Noreen.  Podiatry (Dr. Menezes) and has clean pedicure monthly.  Nephrology : Dr. Badillo.      Hypothyroid  Chronic issue.  Dx in the past few years.  Taking 50 mcg levothyroxine.  Last blood work 8/2017.      Anxiety  Chronic issue.  Rarely taking clonazepam.  Denies anxiety attacks.  Worries a lot.      Atherosclerosis of aorta (CMS-HCC)  Seen on cxr 2014 and checked via US 2015 - mild. On statin.  Quit smoking over 20 years ago.     Chronic respiratory failure  Sleep study showed sleep apnea a few years ago.  Using cpap nightly x 5 years - oxygen 3 L is bled in.  Last overnight pulse oximetry was 6 mo ago.  Seeing Lubna Lara at least yearly.  Pneumonia shot due, last one year ago.     Essential hypertension  Chronic and stable.  Taking metoprolol, losartan, lasix q 48 hours, terazosin.  Reports that she is not having any issues with chest pain.    Incontinence of  "feces  Hx of per pt - improved with iron supplementation. Last colonoscopy showed a polyp, she is due this year.     Chronic renal failure:  Followed by nephrology, Dr. Badillo.  Patient reports that her last GFR was up to 22, has been as low as 16. Met with Dr. Marcial but did not have a fistula placed yet. She is trying to stay well-hydrated, decrease her protein intake, she avoids NSAIDs and tries to follow a low-sodium diet. She reports that she sees nephrology about every 6 weeks.    HCM:  Working on scheduling mammogram - has been bothered with yeast under breasts  Planning on scheduling colonoscopy with Dr. Bonilla.   Has blood work done regularly.     Past medical, surgical, family, and social history is reviewed and updated in Epic chart by me today.   Medications and allergies reviewed and updated in Epic chart by me today.     ROS:   As documented in history of present illness above    Exam:  Blood pressure 120/58, pulse 70, temperature 37.1 °C (98.7 °F), resp. rate 12, height 1.702 m (5' 7\"), weight 123.1 kg (271 lb 4.8 oz), SpO2 97 %.  Constitutional: Overweight female, Alert, no distress, plus 3 vital signs  Skin:   Warm, dry, no rashes invisible areas  Eye: Equal, round and reactive, conjunctiva clear  ENMT: Lips without lesions, good dentition, oropharynx clear    Neck: Trachea midline, no masses, no thyromegaly  Respiratory: Unlabored respiration, lungs clear to auscultation, no wheezes, no rhonchi  Cardiovascular: Regular rate  Psych: Alert, pleasant, well-groomed, normal affect    A/P:  1. PAF (paroxysmal atrial fibrillation) (CMS-HCC)  -Anticoagulated and in normal sinus rhythm on multaq. Followed by cardiology yearly.    2. Type 2 diabetes mellitus with stage 4 chronic kidney disease, with long-term current use of insulin (CMS-HCC)  -Followed closely by endocrinology. Tolerating new medications well. Encouraged her to start an exercise program. Updated her pneumonia vaccine.    3. Hypothyroidism, " unspecified type  -Labs are up-to-date.    4. Anxiety  -Sparing use of clonazepam. Stable.    5. Atherosclerosis of aorta (CMS-[facility])  -Reported as mild. On statin therapy. Discussed a high vegetable diet.    6. Chronic respiratory failure with hypoxia (CMS-[facility])  -Compliant with nightly CPAP, oxygen fed him. Has rescue inhaler. Not using maintenance inhaler. Use of rescue inhaler is less than twice per week.    7. Essential hypertension  -Stable today. Continue same.    8. Incontinence of feces, unspecified fecal incontinence type  -Improved with iron supplementation.    9. Need for pneumococcal vaccination  I have placed the below orders and discussed them with Dr. Celestin. the MA is performing the below orders under the direction of Dr. GUEVARA  - PNEUMOCOCCAL POLYSACCHARIDE VACCINE 23-VALENT =>3YO SQ/IM    10. Dyslipidemia  -Recommended an updated lipid panel with next lab work for her nephrologist  - simvastatin (ZOCOR) 20 MG Tab; TAKE ONE TABLET BY MOUTH ONCE DAILY IN THE EVENING  Dispense: 90 Tab; Refill: 3  - LIPID PROFILE; Future    11. Special screening for malignant neoplasm of colon  - OCCULT BLOOD FECES IMMUNOASSAY; Future      Total face to face time over 40 minutes of which over 50% of this visit is spent in counseling, education and outlining a plan of treatment and coordination of care for the above conditions. This included but was not limited to discussion of medication options and potential risks related to the medications, referral and specialty care options. All patient questions were answered

## 2017-09-06 NOTE — ASSESSMENT & PLAN NOTE
Chronic issue.  Dx in the past few years.  Taking 50 mcg levothyroxine.  Last blood work 8/2017.

## 2017-09-06 NOTE — ASSESSMENT & PLAN NOTE
Sleep study showed sleep apnea a few years ago.  Using cpap nightly x 5 years - oxygen 3 L is bled in.  Last overnight pulse oximetry was 6 mo ago.  Seeing Lubna Lara at least yearly.  Pneumonia shot due, last one year ago.

## 2017-09-08 ENCOUNTER — HOSPITAL ENCOUNTER (OUTPATIENT)
Dept: RADIOLOGY | Facility: MEDICAL CENTER | Age: 72
End: 2017-09-08
Attending: SURGERY
Payer: MEDICARE

## 2017-09-08 DIAGNOSIS — Z30.09 SCREENING AND EVALUATION FOR FEMALE STERILIZATION: ICD-10-CM

## 2017-09-08 PROCEDURE — G0202 SCR MAMMO BI INCL CAD: HCPCS

## 2017-09-11 ENCOUNTER — PATIENT OUTREACH (OUTPATIENT)
Dept: HEALTH INFORMATION MANAGEMENT | Facility: OTHER | Age: 72
End: 2017-09-11

## 2017-09-11 NOTE — PROGRESS NOTES
"Outbound call to patient on [facility], monthly follow up. Patient states she checks her blood sugar daily, could not give her ranges but reports these to be \"good\". Recommended keeping a blood sugar log but she reports to use an kassidy on her phone called [employer] that connects to her glucometer via bluetooth and records her readings. Reviewed changes in her DM medications per recommended by ZUHAIR Patterson and she reports to be compliant with the changes. Reviewed signs and symptoms of hypoglycemia and hyperglycemia and what to do. Recommended getting glucose tablets in case of hypoglycemic episodes especially when she not home. Discussed the importance of adhering to her diet, medications, and performing simple exercises to keep her blood sugar within acceptable levels. Discussed the effects of uncontrolled blood sugar levels to end organs. Reviewed diet for diabetics.    Patient reports that she is looking to move into a senior apartment as she is currently living with her girlfriend. She states she already talked to [facility] [employer] and she is choosing from quite a few.    Verbalized understanding of all education provided.    Will follow up in a month, sooner if needed  "

## 2017-09-11 NOTE — PROGRESS NOTES
M alerted of empty packet x 2. Outbound call to Whitley. Whitley reports that she just moved into her friend's house and is getting settled. She will try to transmit a reading this evening. Whitley reports feeling well. She is tolerating the Victoza and is now using 1.8 mg nightly and is off of the Tresiba per endocrinologist's recommendation. Last week's BP readings have been mostly at goal. Will continue to monitor.         Laila Corral, AraceliD

## 2017-09-15 ENCOUNTER — PATIENT OUTREACH (OUTPATIENT)
Dept: HEALTH INFORMATION MANAGEMENT | Facility: OTHER | Age: 72
End: 2017-09-15

## 2017-09-22 ENCOUNTER — PATIENT OUTREACH (OUTPATIENT)
Dept: HEALTH INFORMATION MANAGEMENT | Facility: OTHER | Age: 72
End: 2017-09-22

## 2017-09-22 NOTE — PROGRESS NOTES
RHM alerted of empty packet x 2. Outbound call to Whitley. Whitley plans to transmit a reading shortly. Reviewed patient's BP readings. Have been within goal for past 3 weeks. Patient states that when she went up to Victoza 1.8 mg she felt very tired and weak. She states she has gone back to 1.2 mg and several clicks. Recommended patient follow-up with her endocrinologist. Patient has follow-up appointment on 9/29. Patient admits that she is more aware of her diet but since moving in with her friend, has not been as strict. Encouraged heart healthy and diabetic diet.         Will continue to monitor BP on RHM.     Laila Corral, PharmD

## 2017-09-27 ENCOUNTER — OFFICE VISIT (OUTPATIENT)
Dept: PULMONOLOGY | Facility: HOSPICE | Age: 72
End: 2017-09-27
Payer: MEDICARE

## 2017-09-27 ENCOUNTER — PATIENT OUTREACH (OUTPATIENT)
Dept: HEALTH INFORMATION MANAGEMENT | Facility: OTHER | Age: 72
End: 2017-09-27

## 2017-09-27 ENCOUNTER — TELEPHONE (OUTPATIENT)
Dept: PULMONOLOGY | Facility: HOSPICE | Age: 72
End: 2017-09-27

## 2017-09-27 VITALS
WEIGHT: 275 LBS | OXYGEN SATURATION: 96 % | BODY MASS INDEX: 43.16 KG/M2 | HEART RATE: 64 BPM | HEIGHT: 67 IN | SYSTOLIC BLOOD PRESSURE: 138 MMHG | RESPIRATION RATE: 16 BRPM | DIASTOLIC BLOOD PRESSURE: 74 MMHG

## 2017-09-27 DIAGNOSIS — G47.33 OSA (OBSTRUCTIVE SLEEP APNEA): ICD-10-CM

## 2017-09-27 DIAGNOSIS — I48.0 PAF (PAROXYSMAL ATRIAL FIBRILLATION) (HCC): ICD-10-CM

## 2017-09-27 DIAGNOSIS — J45.20 MILD INTERMITTENT ASTHMA WITHOUT COMPLICATION: ICD-10-CM

## 2017-09-27 DIAGNOSIS — Z79.01 CHRONIC ANTICOAGULATION: ICD-10-CM

## 2017-09-27 DIAGNOSIS — J30.9 ALLERGIC RHINITIS, UNSPECIFIED ALLERGIC RHINITIS TRIGGER, UNSPECIFIED RHINITIS SEASONALITY: ICD-10-CM

## 2017-09-27 DIAGNOSIS — R09.81 SINUS CONGESTION: ICD-10-CM

## 2017-09-27 PROCEDURE — 99214 OFFICE O/P EST MOD 30 MIN: CPT | Performed by: NURSE PRACTITIONER

## 2017-09-27 RX ORDER — FLUTICASONE PROPIONATE 50 MCG
1-2 SPRAY, SUSPENSION (ML) NASAL DAILY
Qty: 1 BOTTLE | Refills: 6 | Status: SHIPPED | OUTPATIENT
Start: 2017-09-27 | End: 2018-11-06 | Stop reason: SDUPTHER

## 2017-09-27 NOTE — PATIENT INSTRUCTIONS
Plan:    1) Continue CPAP at 10 CM H20 with 02 bleed in. Order to DME for new mask and supplies and to fit for chin strap of choice.  2) Add Flonase nasal spray 1-2 sprays/nostril 30 minutes prior to use of CPAP.  3) Continue Singulair and Xopenex HFA inhaler.   4) She is up to date on Prevnar 13 and Pneumovax 23 vaccinations. She would like to hold off on getting her Influenza vaccine.  5) Sleep hygiene discussed. Weight loss recommended.  6) Follow up in 6 months with updated PFT's, sooner if needed.

## 2017-09-27 NOTE — PROGRESS NOTES
Chief Complaint   Patient presents with   • Apnea     10CM H2O   • Asthma       HPI:  Whitley Frederick is a 72 y.o. year old female here today for follow-up on her Asthma and obstructive sleep apnea. Polysomnogram indicated an AHI of 29.3 with a REM related index of 90 and a low 02 saturation of 74%. She was titrated to CPAP at 8 cm water pressure with 5 L of oxygen per minute in addition because of persisting hypoxemia. She is currently on CPAP at 10 CM H20. Her compliance card download indicates an AHI of 0.2 with an average use of 8.5 hours at night. She tolerates the pressure well. She sleeps better on therapy. She wakes more refreshed. She denies any morning headaches. She has a nasal mask which she feels is a good fit. She has had some dry mouth and feels she may be opening her mouth at night. She does have occasional sinus pressure that effects tolerance.      She also has a history of mild Asthma. She notes allergies as a trigger for her. She has felt her Allergy symptoms are often worse in the Spring months. She is compliant on Singulair and a Xopenex HFA inhaler. Spirometry 2/28/2017 indicated an FEV1 of 1.95 L, 76% predicted with an FEV1/FVC ratio of 76 and a borderline positive bronchodilator response. She was experiencing an increased cough at her last office visit. She was ordered Qvar 40 mcg 2 puffs INH bid. She states she never started the Qvar as her Asthma symptoms have been more stable. She notes mild dyspnea with exertion which she feels is related to her weight. She denies current cough, mucous or wheeze. She has not required use of Xopenex recently for rescue.      She had been on Xarelto in the past, apparently for paroxysmal atrial fibrillation, and is now on warfarin without unusual bleeding. She is followed by Dr. Sorto, Cardiology.        Past Medical History:   Diagnosis Date   • Paroxysmal atrial fibrillation (CMS-[facility]) 7/12/13    woke with palps, AFib per ekg in am.   • Allergy    •  Anxiety    • Arrhythmia    • Arthritis    • Asthma    • Carotid bruit     Left   • CATARACT    • Chest pain    • DIABETES MELLITUS    • Esophageal reflux    • Goiter    • Headache    • Heart murmur    • Hyperlipidemia    • Hypertension    • IBD (inflammatory bowel disease)    • Migraine     Silent migraine   • AVERY (obstructive sleep apnea)    • Overweight    • Palpitations    • Pneumonia     Nov. 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   • Urinary tract infection, site not specified        Past Surgical History:   Procedure Laterality Date   • CATARACT PHACO WITH IOL Right 10/25/2016    Procedure: CATARACT PHACO WITH IOL;  Surgeon: Zoran Gamble M.D.;  Location: SURGERY SAME [facility];  Service:    • ABDOMINAL HYSTERECTOMY TOTAL     • CHOLECYSTECTOMY     • TONSILLECTOMY     • US-NEEDLE CORE BX-BREAST PANEL         Family History   Problem Relation Age of Onset   • Cancer Maternal Aunt    • Diabetes Maternal Aunt    • Heart Disease Maternal Aunt    • Hypertension Maternal Aunt    • Hyperlipidemia Maternal Aunt    • Cancer Mother      Leukemia   • Diabetes Mother    • Heart Disease Mother    • Hypertension Mother    • Hyperlipidemia Mother    • Lung Disease Father    • Cancer Father      Lung   • Hyperlipidemia Father    • Hypertension Father    • Heart Disease Father    • Diabetes Father    • Lung Disease Brother    • Stroke Brother    • Diabetes Brother    • Heart Disease Brother    • Hypertension Brother    • Hyperlipidemia Brother    • Diabetes Maternal Grandmother        Social History     Social History   • Marital status:      Spouse name: N/A   • Number of children: N/A   • Years of education: N/A     Occupational History   • Not on file.     Social History Main Topics   • Smoking status: Former Smoker     Packs/day: 1.00     Years: 20.00     Types: Cigarettes     Quit date: 1/1/1983   • Smokeless tobacco:  Never Used   • Alcohol use No   • Drug use: No   • Sexual activity: No     Other Topics Concern   • Not on file     Social History Narrative   • No narrative on file         ROS:  Constitutional: Denies fevers, chills, sweats, fatigue, weight loss  Eyes: Denies glasses, vision loss, pain, drainage, double vision  Ears/Nose/Mouth/Throat: Denies  ear ache, difficulty hearing, sore throat, persistent hoarseness, decayed teeth/toothache  Cardiovascular: Denies chest pain, tightness, palpitations, swelling in feet/legs, fainting, difficulty breathing when laying down  Respiratory: See HPI   GI: Denies heartburn, difficulty swallowing, nausea, vomiting, abdominal pain, diarrhea, constipation  : Denies frequent urination, painful urination  Integumentary: Denies rashes, lumps or color changes  MSK: Denies painful joints, sore muscles, and back pain.   Neurological: Denies frequent headaches, dizziness, weakness        Current Outpatient Prescriptions   Medication Sig Dispense Refill   • ferrous gluconate (FERGON) 324 (38 Fe) MG Tab Take 324 mg by mouth 3 times a day, with meals.     • liraglutide (VICTOZA) 18 MG/3ML Solution Pen-injector injection Inject 0.2 mL as instructed every day. 6 mL    • simvastatin (ZOCOR) 20 MG Tab TAKE ONE TABLET BY MOUTH ONCE DAILY IN THE EVENING 90 Tab 3   • nystatin (MYCOSTATIN) 420839 UNIT/GM Ointment AAA up to twice daily 240 g 2   • levothyroxine (SYNTHROID) 50 MCG Tab Take 1 Tab by mouth every day. 90 Tab 1   • MULTAQ 400 MG Tab TAKE ONE TABLET BY MOUTH TWICE DAILY WITH MEALS 60 Tab 2   • warfarin (COUMADIN) 3 MG Tab Take 1 to 1.5 tablets by mouth daily as directed by the coumadin clinic 135 Tab 1   • Insulin Pen Needle (RELION PEN NEEDLES) 29G X 12MM Misc USE ONE PEN NEEDLE SUBCUTANEOUSLY FOR LANTUS SOLOSTAR 100 Each 3   • Blood Glucose Monitoring Suppl W/DEVICE Kit 1 Each by Does not apply route 2 times a day as needed. 1 Kit 0   • Blood Glucose Monitoring Suppl SUPPLIES Misc Test  strips order: Test strips for Accucheck meter. Sig: use BID and prn ssx high or low sugar #100 RF x 3 100 Strip 3   • omeprazole (PRILOSEC) 20 MG delayed-release capsule Take 1 Cap by mouth every day. 90 Cap 3   • furosemide (LASIX) 80 MG Tab Take 80 mg by mouth every 48 hours.     • montelukast (SINGULAIR) 10 MG Tab 1 po qhs 90 Tab 3   • terazosin (HYTRIN) 1 MG Cap 1 Cap every day.     • metoprolol (LOPRESSOR) 50 MG Tab TAKE ONE TABLET BY MOUTH TWICE DAILY ,  PATIENT  MAY  TAKE  AN  ADDITIONAL  HALF  TABLET  FOR  INCREASED  HEART  RATE. 180 Tab 3   • CHELY CONTOUR NEXT TEST strip      • Blood Glucose Monitoring Suppl SUPPLIES Misc Test strips order: Test strips for Chely Contour meter. Sig: use tid  and prn ssx high or low sugar #100 RF x 3 100 Strip 3   • losartan (COZAAR) 50 MG Tab TAKE ONE TABLET BY MOUTH TWICE DAILY 180 Tab 3   • Misc. Devices MISC Pen needles for Lancyrus Mccarthystar. 29g 12mm. 90 Each 3   • allopurinol (ZYLOPRIM) 100 MG TABS Take 1/2 pill qd (Patient taking differently: Take 100 mg by mouth 2 Times a Day. Indications: Primary Gout) 90 Tab 4   • Cholecalciferol (VITAMIN D) 2000 UNITS CAPS Take 4,000 Units by mouth every day.     • magnesium oxide (MAGNESIUM OXIDE) 400 (241.3 MG) MG TABS tablet Take 400 mg by mouth every day.     • levalbuterol (XOPENEX HFA) 45 MCG/ACT inhaler Inhale 2 Puffs by mouth every four hours as needed for Shortness of Breath. 1 Inhaler 3   • Diclofenac Sodium (VOLTAREN) 1 % Gel Apply 2 g to skin as directed 4 times a day. 100 g 0   • clonazepam (KLONOPIN) 0.5 MG Tab Take 1 Tab by mouth 2 times a day as needed (insomnia, anxiety). 60 Tab 0     No current facility-administered medications for this visit.        Allergies   Allergen Reactions   • Amlodipine Itching   • Amaryl      GI Problems   • Avandia [Rosiglitazone Maleate]      GI problems   • Cipro Xr      Possibly causes Chills.   • Clonidine      Rapid heart rate, swelling    • Contrast Media With Iodine [Iodine] Hives  "  • Glipizide      GI Problems   • Glucophage [Metformin Hydrochloride]      dizzy   • Hydralazine      Rapid heart rate, chest tightness   • Levaquin      Possible allergy - chills with cipro but sick   • Relafen [Nabumetone]      Itching; avoids all nsaids       Blood pressure 138/74, pulse 64, resp. rate 16, height 1.702 m (5' 7\"), weight 124.7 kg (275 lb), SpO2 96 %.    PE:   Appearance: Well developed, well nourished, no acute distress  Eyes: PERRL, EOM intact, sclera white, conjunctiva moist  Ears: no lesions or deformities  Hearing: grossly intact  Nose: no lesions or deformities  Oropharynx: tongue normal, posterior pharynx without erythema or exudate  Mallampati Classification: class 3   Neck: supple, trachea midline, no masses   Respiratory effort: no intercostal retractions or use of accessory muscles  Lung auscultation: no rales, rhonchi or wheezes  Heart auscultation: no murmur rub or gallop  Extremities: no cyanosis or edema  Abdomen: soft ,non tender, no masses  Gait and Station: normal  Digits and nails: no clubbing, cyanosis, petechiae or nodes.  Cranial nerves: grossly intact  Skin: no rashes, lesions or ulcers noted  Orientation: Oriented to time, person and place  Mood and affect: mood and affect appropriate, normal interaction with examiner  Judgement: Intact          Assessment:  1. Mild intermittent asthma without complication     2. AVERY (obstructive sleep apnea)     3. BMI 40.0-44.9, adult (CMS-HCC)     4. Allergic rhinitis, unspecified allergic rhinitis trigger, unspecified rhinitis seasonality     5. PAF (paroxysmal atrial fibrillation) (CMS-HCC)     6. Chronic anticoagulation           Plan:    1) Continue CPAP at 10 CM H20 with 02 bleed in. Order to DME for new mask and supplies and to fit for chin strap of choice.  2) Add Flonase nasal spray 1-2 sprays/nostril 30 minutes prior to use of CPAP.  3) Continue Singulair and Xopenex HFA inhaler.   4) She is up to date on Prevnar 13 and " Pneumovax 23 vaccinations. She would like to hold off on getting her Influenza vaccine.  5) Sleep hygiene discussed. Weight loss recommended.  6) Follow up in 6 months with updated PFT's, sooner if needed.

## 2017-09-29 ENCOUNTER — OFFICE VISIT (OUTPATIENT)
Dept: ENDOCRINOLOGY | Facility: MEDICAL CENTER | Age: 72
End: 2017-09-29
Payer: MEDICARE

## 2017-09-29 ENCOUNTER — PATIENT OUTREACH (OUTPATIENT)
Dept: HEALTH INFORMATION MANAGEMENT | Facility: OTHER | Age: 72
End: 2017-09-29

## 2017-09-29 VITALS
HEART RATE: 75 BPM | BODY MASS INDEX: 42.85 KG/M2 | OXYGEN SATURATION: 96 % | HEIGHT: 67 IN | SYSTOLIC BLOOD PRESSURE: 136 MMHG | DIASTOLIC BLOOD PRESSURE: 62 MMHG | WEIGHT: 273 LBS

## 2017-09-29 DIAGNOSIS — E11.22 TYPE 2 DIABETES MELLITUS WITH STAGE 4 CHRONIC KIDNEY DISEASE, WITHOUT LONG-TERM CURRENT USE OF INSULIN (HCC): ICD-10-CM

## 2017-09-29 DIAGNOSIS — N18.4 TYPE 2 DIABETES MELLITUS WITH STAGE 4 CHRONIC KIDNEY DISEASE, WITHOUT LONG-TERM CURRENT USE OF INSULIN (HCC): ICD-10-CM

## 2017-09-29 PROCEDURE — 99214 OFFICE O/P EST MOD 30 MIN: CPT | Performed by: PHYSICIAN ASSISTANT

## 2017-09-29 NOTE — PROGRESS NOTES
Return to office Patient Consult Note  Referred by: EDILMA Whelan    Reason for consult: Diabetes Management Type 2    HPI:  Wihtley Frederick is a 72 y.o. old patient who is seeing us today for diabetes care.  This is a pleasant patient with diabetes and I appreciate the opportunity to participate in the care of this patient.    BG Diary:9/29/2017  In the AM: did not bring    BG Diary:8/31/2017  In the AM:  Did not bring     Her labs of 8/11/17 HbA1c 7.6, GFR 22, c-peptide is 3.1     BG Diary:8/11/2017  In the AM: did not bring      Has been Diabetic since 1985  Has a Glucagon pen at home: no     Has seen 3 different endos in the past.  And is just on an Basal insulin.    Having a fistula placed next month     1. Type 2 diabetes mellitus with stage 4 chronic kidney disease, without long-term current use of insulin (CMS-HCC)  This is a new patient on 8/11/17  She was on:  1.  Toujeo  65     STOP:  1.  Toujeo     She is now on:  1.   Tresiba 40 units at night  3.   Victoza  0.6 at night       ROS:   Constitutional: No night sweats.  Eyes:  No visual changes.  Cardiac: No chest pain, No palpitations or racing heart rate.  Resp: No shortness of breath, No cough,   Gi: No Diarrhea    All other systems were reviewed and were/are negative.  The ROS was revised/revisited during this office visit from the patients first office visit with me on 8/11/17Please review the full ROS during the first office visit.    Past Medical History:  Patient Active Problem List    Diagnosis Date Noted   • Essential hypertension 05/10/2016     Priority: High   • Insulin long-term use (CMS-HCC) 04/29/2015     Priority: High   • Atherosclerosis of aorta (CMS-HCC) 04/29/2015     Priority: High   • Chronic respiratory failure (CMS-HCC) 04/29/2015     Priority: High   • Long term current use of anticoagulant therapy 04/29/2015     Priority: High   • Morbid obesity with BMI of 45.0-49.9, adult (CMS-HCC) 04/29/2015     Priority:  High   • Anxiety 01/09/2014     Priority: High   • Hypothyroid 12/12/2013     Priority: High   • PAF (paroxysmal atrial fibrillation) (CMS-HCC) 07/12/2013     Priority: High   • Type 2 diabetes mellitus with stage 4 chronic kidney disease (CMS-HCC)      Priority: High   • Mild intermittent asthma without complication 08/24/2016     Priority: Medium   • Chronic anticoagulation 06/10/2016     Priority: Medium   • AVERY (obstructive sleep apnea) 05/10/2016     Priority: Medium   • Chronic gout 04/30/2015     Priority: Medium   • Left bundle branch block 03/05/2012     Priority: Medium   • Multiple thyroid nodules 10/07/2015     Priority: Low   • Carotid stenosis 05/30/2014     Priority: Low   • Encounter for long-term (current) use of insulin (CMS-HCC) 08/11/2017   • Potential for deficient knowledge of diabetes mellitus 06/15/2017   • Cellulitis 05/18/2017   • Incontinence of feces 05/18/2017   • Chronic pain of both shoulders 03/23/2017   • Dyslipidemia 12/14/2016   • Combined forms of age-related cataract of right eye 10/25/2016       Past Surgical History:  Past Surgical History:   Procedure Laterality Date   • CATARACT PHACO WITH IOL Right 10/25/2016    Procedure: CATARACT PHACO WITH IOL;  Surgeon: Zoran Gamble M.D.;  Location: SURGERY SAME DAY [facility];  Service:    • ABDOMINAL HYSTERECTOMY TOTAL     • CHOLECYSTECTOMY     • TONSILLECTOMY     • US-NEEDLE CORE BX-BREAST PANEL         Allergies:  Amlodipine; Amaryl; Avandia [rosiglitazone maleate]; Cipro xr; Clonidine; Contrast media with iodine [iodine]; Glipizide; Glucophage [metformin hydrochloride]; Hydralazine; Levaquin; and Relafen [nabumetone]    Social History:  Social History     Social History   • Marital status:      Spouse name: N/A   • Number of children: N/A   • Years of education: N/A     Occupational History   • Not on file.     Social History Main Topics   • Smoking status: Former Smoker     Packs/day: 1.00     Years: 20.00     Types:  Cigarettes     Quit date: 1/1/1983   • Smokeless tobacco: Never Used   • Alcohol use No   • Drug use: No   • Sexual activity: No     Other Topics Concern   • Not on file     Social History Narrative   • No narrative on file       Family History:  Family History   Problem Relation Age of Onset   • Cancer Maternal Aunt    • Diabetes Maternal Aunt    • Heart Disease Maternal Aunt    • Hypertension Maternal Aunt    • Hyperlipidemia Maternal Aunt    • Cancer Mother      Leukemia   • Diabetes Mother    • Heart Disease Mother    • Hypertension Mother    • Hyperlipidemia Mother    • Lung Disease Father    • Cancer Father      Lung   • Hyperlipidemia Father    • Hypertension Father    • Heart Disease Father    • Diabetes Father    • Lung Disease Brother    • Stroke Brother    • Diabetes Brother    • Heart Disease Brother    • Hypertension Brother    • Hyperlipidemia Brother    • Diabetes Maternal Grandmother        Medications:    Current Outpatient Prescriptions:   •  ferrous gluconate (FERGON) 324 (38 Fe) MG Tab, Take 324 mg by mouth 3 times a day, with meals., Disp: , Rfl:   •  liraglutide (VICTOZA) 18 MG/3ML Solution Pen-injector injection, Inject 0.2 mL as instructed every day., Disp: 6 mL, Rfl:   •  simvastatin (ZOCOR) 20 MG Tab, TAKE ONE TABLET BY MOUTH ONCE DAILY IN THE EVENING, Disp: 90 Tab, Rfl: 3  •  nystatin (MYCOSTATIN) 945456 UNIT/GM Ointment, AAA up to twice daily, Disp: 240 g, Rfl: 2  •  levothyroxine (SYNTHROID) 50 MCG Tab, Take 1 Tab by mouth every day., Disp: 90 Tab, Rfl: 1  •  MULTAQ 400 MG Tab, TAKE ONE TABLET BY MOUTH TWICE DAILY WITH MEALS, Disp: 60 Tab, Rfl: 2  •  warfarin (COUMADIN) 3 MG Tab, Take 1 to 1.5 tablets by mouth daily as directed by the coumadin clinic, Disp: 135 Tab, Rfl: 1  •  Insulin Pen Needle (RELION PEN NEEDLES) 29G X 12MM Misc, USE ONE PEN NEEDLE SUBCUTANEOUSLY FOR LANTUS SOLOSTAR, Disp: 100 Each, Rfl: 3  •  omeprazole (PRILOSEC) 20 MG delayed-release capsule, Take 1 Cap by mouth  every day., Disp: 90 Cap, Rfl: 3  •  furosemide (LASIX) 80 MG Tab, Take 80 mg by mouth every 48 hours., Disp: , Rfl:   •  montelukast (SINGULAIR) 10 MG Tab, 1 po qhs, Disp: 90 Tab, Rfl: 3  •  terazosin (HYTRIN) 1 MG Cap, 1 Cap every day., Disp: , Rfl:   •  metoprolol (LOPRESSOR) 50 MG Tab, TAKE ONE TABLET BY MOUTH TWICE DAILY ,  PATIENT  MAY  TAKE  AN  ADDITIONAL  HALF  TABLET  FOR  INCREASED  HEART  RATE., Disp: 180 Tab, Rfl: 3  •  CHELY CONTOUR NEXT TEST strip, , Disp: , Rfl:   •  Blood Glucose Monitoring Suppl SUPPLIES Misc, Test strips order: Test strips for Chely Contour meter. Sig: use tid  and prn ssx high or low sugar #100 RF x 3, Disp: 100 Strip, Rfl: 3  •  losartan (COZAAR) 50 MG Tab, TAKE ONE TABLET BY MOUTH TWICE DAILY, Disp: 180 Tab, Rfl: 3  •  clonazepam (KLONOPIN) 0.5 MG Tab, Take 1 Tab by mouth 2 times a day as needed (insomnia, anxiety)., Disp: 60 Tab, Rfl: 0  •  Misc. Devices MISC, Pen needles for Lantus Solstar. 29g 12mm., Disp: 90 Each, Rfl: 3  •  allopurinol (ZYLOPRIM) 100 MG TABS, Take 1/2 pill qd (Patient taking differently: Take 100 mg by mouth 2 Times a Day. Indications: Primary Gout), Disp: 90 Tab, Rfl: 4  •  Cholecalciferol (VITAMIN D) 2000 UNITS CAPS, Take 4,000 Units by mouth every day., Disp: , Rfl:   •  magnesium oxide (MAGNESIUM OXIDE) 400 (241.3 MG) MG TABS tablet, Take 400 mg by mouth every day., Disp: , Rfl:   •  fluticasone (FLONASE) 50 MCG/ACT nasal spray, Spray 1-2 Sprays in nose every day. Each nostil, 30 minutes prior to use of CPAP, Disp: 1 Bottle, Rfl: 6  •  levalbuterol (XOPENEX HFA) 45 MCG/ACT inhaler, Inhale 2 Puffs by mouth every four hours as needed for Shortness of Breath., Disp: 1 Inhaler, Rfl: 3  •  Blood Glucose Monitoring Suppl W/DEVICE Kit, 1 Each by Does not apply route 2 times a day as needed., Disp: 1 Kit, Rfl: 0  •  Blood Glucose Monitoring Suppl SUPPLIES Misc, Test strips order: Test strips for Accucheck meter. Sig: use BID and prn ssx high or low sugar #100  "RF x 3, Disp: 100 Strip, Rfl: 3  •  Diclofenac Sodium (VOLTAREN) 1 % Gel, Apply 2 g to skin as directed 4 times a day., Disp: 100 g, Rfl: 0        Physical Examination:   Vital signs: /62   Pulse 75   Ht 1.702 m (5' 7\")   Wt 123.8 kg (273 lb)   SpO2 96%   BMI 42.76 kg/m²   General: No distress, cooperative, well dressed and well nourished.   Eyes: No scleral icterus or discharge, No hyposphagma  ENMT: Normal on external inspection of nose, lips, No nasal drainage   Neck: No abnormal masses on inspection  Resp: Normal effort, Bilateral clear to auscultation, No wheezing, No rales  CVS: Regular rate and rhythm, S1 S2 normal, No murmur. No gallop  Extremities: No edema bilateral extremities  Neuro: Alert and oriented  Skin: No rash, No Ulcers  Psych: Normal mood and affect      Assessment and Plan:    1. Type 2 diabetes mellitus with stage 4 chronic kidney disease, without long-term current use of insulin (CMS-[facility])    She is now on:  1.   Tresiba 10 units at night  3.   Victoza  1.8 at night     Return in about 2 weeks (around 10/13/2017).    Blood glucose log: Check BG in the morning when wake up, before lunch or dinner and before bed.  So three times a day.  Always bring BG diary to the next office visit.     Thank you kindly for allowing me to participate in the diabetes care plan for this patient.    Wei Patterson PA-C, BC-ADM  Board Certified - Advanced Diabetes Management  09/29/17    CC:   EDILMA Whelan    "

## 2017-09-29 NOTE — PATIENT INSTRUCTIONS
WRITE NUMBERS IN A BOOK    She is now on:  1.   Tresiba 10 units at night (INCREASE to 16 if not betwenn 100-130 in the AM after 4 days)  3.   Victoza  0.6 at night (INCREASE TO 1.2)

## 2017-10-04 ENCOUNTER — HOSPITAL ENCOUNTER (OUTPATIENT)
Dept: LAB | Facility: MEDICAL CENTER | Age: 72
End: 2017-10-04
Attending: NURSE PRACTITIONER
Payer: MEDICARE

## 2017-10-04 ENCOUNTER — ANTICOAGULATION VISIT (OUTPATIENT)
Dept: VASCULAR LAB | Facility: MEDICAL CENTER | Age: 72
End: 2017-10-04
Attending: INTERNAL MEDICINE
Payer: MEDICARE

## 2017-10-04 ENCOUNTER — HOSPITAL ENCOUNTER (OUTPATIENT)
Dept: LAB | Facility: MEDICAL CENTER | Age: 72
End: 2017-10-04
Attending: INTERNAL MEDICINE
Payer: MEDICARE

## 2017-10-04 VITALS — HEART RATE: 76 BPM | SYSTOLIC BLOOD PRESSURE: 159 MMHG | DIASTOLIC BLOOD PRESSURE: 76 MMHG

## 2017-10-04 DIAGNOSIS — E78.5 DYSLIPIDEMIA: ICD-10-CM

## 2017-10-04 DIAGNOSIS — I48.0 PAF (PAROXYSMAL ATRIAL FIBRILLATION) (HCC): ICD-10-CM

## 2017-10-04 LAB
25(OH)D3 SERPL-MCNC: 36 NG/ML (ref 30–100)
ALBUMIN SERPL BCP-MCNC: 3.7 G/DL (ref 3.2–4.9)
BASOPHILS # BLD AUTO: 0.9 % (ref 0–1.8)
BASOPHILS # BLD: 0.06 K/UL (ref 0–0.12)
BUN SERPL-MCNC: 68 MG/DL (ref 8–22)
CALCIUM SERPL-MCNC: 9.4 MG/DL (ref 8.5–10.5)
CHLORIDE SERPL-SCNC: 107 MMOL/L (ref 96–112)
CHOLEST SERPL-MCNC: 171 MG/DL (ref 100–199)
CO2 SERPL-SCNC: 20 MMOL/L (ref 20–33)
CREAT SERPL-MCNC: 2.54 MG/DL (ref 0.5–1.4)
CREAT UR-MCNC: 130.2 MG/DL
EOSINOPHIL # BLD AUTO: 0.22 K/UL (ref 0–0.51)
EOSINOPHIL NFR BLD: 3.3 % (ref 0–6.9)
ERYTHROCYTE [DISTWIDTH] IN BLOOD BY AUTOMATED COUNT: 48.6 FL (ref 35.9–50)
GFR SERPL CREATININE-BSD FRML MDRD: 19 ML/MIN/1.73 M 2
GLUCOSE SERPL-MCNC: 188 MG/DL (ref 65–99)
HCT VFR BLD AUTO: 35.9 % (ref 37–47)
HDLC SERPL-MCNC: 40 MG/DL
HGB BLD-MCNC: 11.9 G/DL (ref 12–16)
IMM GRANULOCYTES # BLD AUTO: 0.01 K/UL (ref 0–0.11)
IMM GRANULOCYTES NFR BLD AUTO: 0.2 % (ref 0–0.9)
INR BLD: 2.8 (ref 0.9–1.2)
INR PPP: 2.8 (ref 2–3.5)
LDLC SERPL CALC-MCNC: 73 MG/DL
LYMPHOCYTES # BLD AUTO: 2.14 K/UL (ref 1–4.8)
LYMPHOCYTES NFR BLD: 32.6 % (ref 22–41)
MCH RBC QN AUTO: 30.3 PG (ref 27–33)
MCHC RBC AUTO-ENTMCNC: 33.1 G/DL (ref 33.6–35)
MCV RBC AUTO: 91.3 FL (ref 81.4–97.8)
MONOCYTES # BLD AUTO: 0.38 K/UL (ref 0–0.85)
MONOCYTES NFR BLD AUTO: 5.8 % (ref 0–13.4)
NEUTROPHILS # BLD AUTO: 3.76 K/UL (ref 2–7.15)
NEUTROPHILS NFR BLD: 57.2 % (ref 44–72)
NRBC # BLD AUTO: 0 K/UL
NRBC BLD AUTO-RTO: 0 /100 WBC
PHOSPHATE SERPL-MCNC: 3.1 MG/DL (ref 2.5–4.5)
PLATELET # BLD AUTO: 134 K/UL (ref 164–446)
PMV BLD AUTO: 11.4 FL (ref 9–12.9)
POTASSIUM SERPL-SCNC: 4.6 MMOL/L (ref 3.6–5.5)
PROT UR-MCNC: 147.8 MG/DL (ref 0–15)
PROT/CREAT UR: 1135 MG/G (ref 10–107)
PTH-INTACT SERPL-MCNC: 106.3 PG/ML (ref 14–72)
RBC # BLD AUTO: 3.93 M/UL (ref 4.2–5.4)
SODIUM SERPL-SCNC: 139 MMOL/L (ref 135–145)
TRIGL SERPL-MCNC: 288 MG/DL (ref 0–149)
URATE SERPL-MCNC: 6.8 MG/DL (ref 1.9–8.2)
WBC # BLD AUTO: 6.6 K/UL (ref 4.8–10.8)

## 2017-10-04 PROCEDURE — 85025 COMPLETE CBC W/AUTO DIFF WBC: CPT

## 2017-10-04 PROCEDURE — 83970 ASSAY OF PARATHORMONE: CPT

## 2017-10-04 PROCEDURE — 82306 VITAMIN D 25 HYDROXY: CPT

## 2017-10-04 PROCEDURE — 85610 PROTHROMBIN TIME: CPT

## 2017-10-04 PROCEDURE — 82570 ASSAY OF URINE CREATININE: CPT

## 2017-10-04 PROCEDURE — 84550 ASSAY OF BLOOD/URIC ACID: CPT

## 2017-10-04 PROCEDURE — 99211 OFF/OP EST MAY X REQ PHY/QHP: CPT

## 2017-10-04 PROCEDURE — 84156 ASSAY OF PROTEIN URINE: CPT

## 2017-10-04 PROCEDURE — 36415 COLL VENOUS BLD VENIPUNCTURE: CPT

## 2017-10-04 PROCEDURE — 80069 RENAL FUNCTION PANEL: CPT

## 2017-10-04 PROCEDURE — 80061 LIPID PANEL: CPT

## 2017-10-04 NOTE — PROGRESS NOTES
Anticoagulation Summary  As of 10/4/2017    INR goal:   2.0-3.0   TTR:   81.7 % (2 y)   Today's INR:   2.8   Maintenance plan:   1.5 mg (3 mg x 0.5) on Fri; 3 mg (3 mg x 1) all other days   Weekly total:   19.5 mg   Weekly max dose:   21 mg   Plan last modified:   Brigitte Horn, PharmD (12/2/2016)   Next INR check:   11/29/2017   Target end date:   Indefinite    Indications    PAF (paroxysmal atrial fibrillation) (CMS-HCC) [I48.0]  Warfarin therapy started (Resolved) [Z79.01]             Anticoagulation Episode Summary     INR check location:       Preferred lab:       Send INR reminders to:       Comments:         Anticoagulation Care Providers     Provider Role Specialty Phone number    Amy Lincoln M.D. Referring Cardiology [phone]    [facility]tico[facility] Responsible  [phone]        Anticoagulation Patient Findings      HPI:  Whitleyeben Frederick seen in clinic today, on anticoagulation therapy with warfarin for PAF  Changes to current medical/health status since last appt: None  Denies signs/symptoms of bleeding and/or thrombosis since the last appt.    Denies any interval changes to diet  Denies any interval changes to medications since last appt.   Denies any complications or cost restrictions with current therapy.   BP recorded in vitals.  Pt will report High BP values to community pharmacist.      A/P   INR  therapeutic.   Pt is to continue with current warfarin dosing regimen.     Follow up appointment in 8 week(s).    Juan Bonds, PharmD

## 2017-10-06 ENCOUNTER — PATIENT OUTREACH (OUTPATIENT)
Dept: HEALTH INFORMATION MANAGEMENT | Facility: OTHER | Age: 72
End: 2017-10-06

## 2017-10-10 ENCOUNTER — NON-PROVIDER VISIT (OUTPATIENT)
Dept: MEDICAL GROUP | Facility: LAB | Age: 72
End: 2017-10-10
Payer: MEDICARE

## 2017-10-10 DIAGNOSIS — Z23 NEED FOR INFLUENZA VACCINATION: ICD-10-CM

## 2017-10-10 PROCEDURE — 90662 IIV NO PRSV INCREASED AG IM: CPT | Performed by: NURSE PRACTITIONER

## 2017-10-10 PROCEDURE — G0008 ADMIN INFLUENZA VIRUS VAC: HCPCS | Performed by: NURSE PRACTITIONER

## 2017-10-10 NOTE — PROGRESS NOTES
"Whitley Frederick is a 72 y.o. female here for a non-provider visit for:   FLU    Reason for immunization: Annual Flu Vaccine  Immunization records indicate need for vaccine: Yes, confirmed with Epic  Minimum interval has been met for this vaccine: Yes  ABN completed: Not Indicated    Order and dose verified by: DEEPAK  VIS Dated  8/7/15 was given to patient: Yes  All IAC Questionnaire questions were answered \"No.\"    Patient tolerated injection and no adverse effects were observed or reported: Yes    Pt scheduled for next dose in series: Not Indicated    "

## 2017-10-11 ENCOUNTER — PATIENT OUTREACH (OUTPATIENT)
Dept: HEALTH INFORMATION MANAGEMENT | Facility: OTHER | Age: 72
End: 2017-10-11

## 2017-10-11 DIAGNOSIS — Z59.9 INADEQUATE COMMUNITY RESOURCES: ICD-10-CM

## 2017-10-11 PROBLEM — Z75.4 INADEQUATE COMMUNITY RESOURCES: Status: ACTIVE | Noted: 2017-10-11

## 2017-10-11 SDOH — ECONOMIC STABILITY - INCOME SECURITY: PROBLEM RELATED TO HOUSING AND ECONOMIC CIRCUMSTANCES, UNSPECIFIED: Z59.9

## 2017-10-11 NOTE — PROGRESS NOTES
Outbound call to patient on [facility], monthly follow up.     Patient reports that she has been checking her blood sugar 1-2 x daily, with readings ranging from 150 to over 200.     Reviewed signs and symptoms of hypoglycemia and hyperglycemia and, what to do.     Discussed the importance of adhering to diabetic diet to prevent complications. Patient reports that she is working on this.    Instructed patient to keep a log of her blood sugar readings and bring to her next appointment with Wei Patterson (10/17/17).    Reviewed changes in her diabetes medications: Tresiba 40 units at night and Victoza  0.6 at night. Patient reports that she is compliant with these medications.    Patient verbalized understanding of all education provided.    Will follow up in a month, sooner if needed.

## 2017-10-11 NOTE — PROGRESS NOTES
One month follow-up: Outreach call to pt to follow-up resources provided to pt on housing. Pt reports that she is currently living with her best friend right now. She reports that she has put her name on the waitlist on multiple apartment complexes. She mentions that her current housing situation is better than it was previously. LSW encouraged pt to also look for housing listings on [employer]. Pt verbalized understanding and wrote website down. Pt reports that she uses the computer often.     LSW inquired if pt is in need of any additional resources. Pt reports that her provider appointments can be expensive and was wondering if there were any resources to assist with this. LSW discussed that this writer is not aware of any way to reduce her doctor visit costs other than making sure the doctors she visits are covered under her insurance coverage. LSW discussed pt utilizing other resources to save her money on her medical care.     Pt reports she gets most of her medications at [facility], but that her diabetes medications can be expensive. LSW discussed [email or web address]'s program to assist with diabetic supplies monthly as well as their assistance towards medications yearly $100. Informed pt that she would have to meet their income guidelines, but encouraged her to call them to see if she qualifies and fill out an application online. Pt verbalized understanding and wrote down care chest's information.     Pt also reports that she has dental coverage with [employer], but her out-of-pocket for her dental care is around $2,000 sometimes. Provided pt with [facility] program & SHIP. Recommended pt talk to a SHIP counselor to see if pt is in the right dental coverage plan. Encouraged her to call [facility] and discuss their program as well and she can weigh the two to decide which better fits for her. Pt verbalized understanding and wrote down information on above two  resources.    LSW encouraged pt to call this LSW with any questions/concerns. LSW discussed that this LSW will discuss possible graduation from   next month, should pt not need anything further. Pt verbalized understanding and reported being grateful for  Services.     Plan:   LSW to follow-up with pt next month

## 2017-10-13 ENCOUNTER — PATIENT OUTREACH (OUTPATIENT)
Dept: HEALTH INFORMATION MANAGEMENT | Facility: OTHER | Age: 72
End: 2017-10-13

## 2017-10-18 ENCOUNTER — APPOINTMENT (OUTPATIENT)
Dept: ENDOCRINOLOGY | Facility: MEDICAL CENTER | Age: 72
End: 2017-10-18
Payer: MEDICARE

## 2017-10-20 ENCOUNTER — PATIENT OUTREACH (OUTPATIENT)
Dept: HEALTH INFORMATION MANAGEMENT | Facility: OTHER | Age: 72
End: 2017-10-20

## 2017-10-20 NOTE — PROGRESS NOTES
"Received patient email: \" Just been very tired the past few days Been having allergy problems and lungs  Have been crackling .... taking only singular as always and inhaler a couple times... back to blood pressure low Numbers are they ok .... no dizziness... just wobbling/walking off course now and then but that's not unusual....Thank you Whitley.\"      Outbound call to Whitley.     Reviewed BP readings:         Patient denies dizziness or lightheadedness. Informed patient that her readings look great. If her BP drops below 90/60 or patient is exhibiting symptoms that we will reevaluate medications. Instructed patient to continue current regimen. Patient agreeable.     Patient notes having a cold - advised patient she can use her inhaler as directed 2 puffs q 4 hours as needed. Patient reports having some nausea that she believes is related to her iron supplements. Advised patient take with food and space out the iron supplements three times daily.     Will continue to monitor BP on RHM.     Laila Corral, PharmD    "

## 2017-10-23 ENCOUNTER — PATIENT OUTREACH (OUTPATIENT)
Dept: HEALTH INFORMATION MANAGEMENT | Facility: OTHER | Age: 72
End: 2017-10-23

## 2017-10-23 ENCOUNTER — HOSPITAL ENCOUNTER (OUTPATIENT)
Dept: RADIOLOGY | Facility: MEDICAL CENTER | Age: 72
End: 2017-10-23
Attending: PHYSICIAN ASSISTANT
Payer: MEDICARE

## 2017-10-23 ENCOUNTER — OFFICE VISIT (OUTPATIENT)
Dept: MEDICAL GROUP | Age: 72
End: 2017-10-23
Payer: MEDICARE

## 2017-10-23 VITALS
HEIGHT: 67 IN | SYSTOLIC BLOOD PRESSURE: 108 MMHG | TEMPERATURE: 97.4 F | WEIGHT: 269.8 LBS | OXYGEN SATURATION: 95 % | DIASTOLIC BLOOD PRESSURE: 64 MMHG | HEART RATE: 77 BPM | BODY MASS INDEX: 42.35 KG/M2

## 2017-10-23 DIAGNOSIS — J40 BRONCHITIS: ICD-10-CM

## 2017-10-23 DIAGNOSIS — R05.9 COUGH: ICD-10-CM

## 2017-10-23 DIAGNOSIS — J45.21 MILD INTERMITTENT ASTHMA WITH ACUTE EXACERBATION: ICD-10-CM

## 2017-10-23 PROBLEM — Z59.9 INADEQUATE COMMUNITY RESOURCES: Status: RESOLVED | Noted: 2017-10-11 | Resolved: 2017-10-23

## 2017-10-23 PROBLEM — Z91.89 POTENTIAL FOR DEFICIENT KNOWLEDGE OF DIABETES MELLITUS: Status: RESOLVED | Noted: 2017-06-15 | Resolved: 2017-10-23

## 2017-10-23 PROBLEM — Z75.4 INADEQUATE COMMUNITY RESOURCES: Status: RESOLVED | Noted: 2017-10-11 | Resolved: 2017-10-23

## 2017-10-23 PROBLEM — L03.90 CELLULITIS: Status: RESOLVED | Noted: 2017-05-18 | Resolved: 2017-10-23

## 2017-10-23 PROCEDURE — 99214 OFFICE O/P EST MOD 30 MIN: CPT | Performed by: PHYSICIAN ASSISTANT

## 2017-10-23 PROCEDURE — 71020 DX-CHEST-2 VIEWS: CPT

## 2017-10-23 RX ORDER — METHYLPREDNISOLONE 4 MG/1
TABLET ORAL
Qty: 1 KIT | Refills: 0 | Status: SHIPPED | OUTPATIENT
Start: 2017-10-23 | End: 2017-11-03

## 2017-10-23 NOTE — PROGRESS NOTES
"Subjective:     Chief Complaint   Patient presents with   • Cough     Since Wednesday   • Chills   • Shortness of Breath     Whitley Frederick is a 72 y.o. female here today for same day access evaluation and management of:    Cough  Problem and patient are new to me.   Reports she has had postnasal drainage \"forever\" that has worsened over the last couple of weeks.  Approximately 4 days ago she developed a cough, chills, fatigue.  She's been checking her temperature but has not had anything over 98°F.  Reports she does have asthma for which she uses her Xopenex inhaler intermittently.  She does not use any albuterol at other rescue inhalers due to her atrial fibrillation.  She is really worried about her rate and rhythm.  She states she is under the care of pulmonology where she was recently given a sample of a steroid inhaler.  She never started as she was concerned with have similar effects his rescue inhalers on her heart.  Reports her friend is a retired doctor and he listened to her lungs yesterday and was concerned as she had a lot of adventitious sounds.  She is concerned that she had pneumonia 2 years ago in the left lower lobe.    She denies any sick contacts.  Nodes recent travel.  No night sweats.    Treatments tried: APAP 1000 mg every 6 hours. Xopenex this morning.       ROS   No nausea or vomiting. No diarrhea. No chest pains.  Reports shortness of breath.       Allergies   Allergen Reactions   • Amlodipine Itching   • Amaryl      GI Problems   • Avandia [Rosiglitazone Maleate]      GI problems   • Cipro Xr      Possibly causes Chills.   • Clonidine      Rapid heart rate, swelling    • Contrast Media With Iodine [Iodine] Hives   • Glipizide      GI Problems   • Glucophage [Metformin Hydrochloride]      dizzy   • Hydralazine      Rapid heart rate, chest tightness   • Levaquin      Possible allergy - chills with cipro but sick   • Relafen [Nabumetone]      Itching; avoids all nsaids       Current " medicines (including changes today)  Current Outpatient Prescriptions   Medication Sig Dispense Refill   • MethylPREDNISolone (MEDROL DOSEPAK) 4 MG Tablet Therapy Pack follow package directions 1 Kit 0   • liraglutide (VICTOZA) 18 MG/3ML Solution Pen-injector injection Inject 0.2 mL as instructed every day. 6 mL 11   • Insulin Degludec (TRESIBA FLEXTOUCH) 200 UNIT/ML Solution Pen-injector Inject 50 Units as instructed every bedtime. 3 PEN 2   • fluticasone (FLONASE) 50 MCG/ACT nasal spray Spray 1-2 Sprays in nose every day. Each nostil, 30 minutes prior to use of CPAP 1 Bottle 6   • ferrous gluconate (FERGON) 324 (38 Fe) MG Tab Take 324 mg by mouth 3 times a day, with meals.     • simvastatin (ZOCOR) 20 MG Tab TAKE ONE TABLET BY MOUTH ONCE DAILY IN THE EVENING 90 Tab 3   • nystatin (MYCOSTATIN) 416976 UNIT/GM Ointment AAA up to twice daily 240 g 2   • levothyroxine (SYNTHROID) 50 MCG Tab Take 1 Tab by mouth every day. 90 Tab 1   • MULTAQ 400 MG Tab TAKE ONE TABLET BY MOUTH TWICE DAILY WITH MEALS 60 Tab 2   • warfarin (COUMADIN) 3 MG Tab Take 1 to 1.5 tablets by mouth daily as directed by the coumadin clinic 135 Tab 1   • levalbuterol (XOPENEX HFA) 45 MCG/ACT inhaler Inhale 2 Puffs by mouth every four hours as needed for Shortness of Breath. 1 Inhaler 3   • Insulin Pen Needle (RELION PEN NEEDLES) 29G X 12MM Misc USE ONE PEN NEEDLE SUBCUTANEOUSLY FOR LANTUS SOLOSTAR 100 Each 3   • Blood Glucose Monitoring Suppl W/DEVICE Kit 1 Each by Does not apply route 2 times a day as needed. 1 Kit 0   • Blood Glucose Monitoring Suppl SUPPLIES Misc Test strips order: Test strips for Accucheck meter. Sig: use BID and prn ssx high or low sugar #100 RF x 3 100 Strip 3   • omeprazole (PRILOSEC) 20 MG delayed-release capsule Take 1 Cap by mouth every day. 90 Cap 3   • furosemide (LASIX) 80 MG Tab Take 80 mg by mouth every 48 hours.     • montelukast (SINGULAIR) 10 MG Tab 1 po qhs 90 Tab 3   • terazosin (HYTRIN) 1 MG Cap 1 Cap every day.      • metoprolol (LOPRESSOR) 50 MG Tab TAKE ONE TABLET BY MOUTH TWICE DAILY ,  PATIENT  MAY  TAKE  AN  ADDITIONAL  HALF  TABLET  FOR  INCREASED  HEART  RATE. 180 Tab 3   • CHELY CONTOUR NEXT TEST strip      • Blood Glucose Monitoring Suppl SUPPLIES Misc Test strips order: Test strips for Chely Contour meter. Sig: use tid  and prn ssx high or low sugar #100 RF x 3 100 Strip 3   • Diclofenac Sodium (VOLTAREN) 1 % Gel Apply 2 g to skin as directed 4 times a day. 100 g 0   • losartan (COZAAR) 50 MG Tab TAKE ONE TABLET BY MOUTH TWICE DAILY 180 Tab 3   • clonazepam (KLONOPIN) 0.5 MG Tab Take 1 Tab by mouth 2 times a day as needed (insomnia, anxiety). 60 Tab 0   • Misc. Devices MISC Pen needles for Lantus Solstar. 29g 12mm. 90 Each 3   • allopurinol (ZYLOPRIM) 100 MG TABS Take 1/2 pill qd (Patient taking differently: Take 100 mg by mouth 2 Times a Day. Indications: Primary Gout) 90 Tab 4   • Cholecalciferol (VITAMIN D) 2000 UNITS CAPS Take 4,000 Units by mouth every day.     • magnesium oxide (MAGNESIUM OXIDE) 400 (241.3 MG) MG TABS tablet Take 400 mg by mouth every day.       No current facility-administered medications for this visit.        Patient Active Problem List    Diagnosis Date Noted   • Essential hypertension 05/10/2016     Priority: High   • Atherosclerosis of aorta (CMS-HCC) 04/29/2015     Priority: High   • Chronic respiratory failure (CMS-HCC) 04/29/2015     Priority: High   • Morbid obesity with BMI of 45.0-49.9, adult (CMS-HCC) 04/29/2015     Priority: High   • Anxiety 01/09/2014     Priority: High   • Hypothyroid 12/12/2013     Priority: High   • PAF (paroxysmal atrial fibrillation) (CMS-HCC) 07/12/2013     Priority: High   • Type 2 diabetes mellitus with stage 4 chronic kidney disease (CMS-HCC)      Priority: High   • Mild intermittent asthma without complication 08/24/2016     Priority: Medium   • Chronic anticoagulation 06/10/2016     Priority: Medium   • AVERY (obstructive sleep apnea) 05/10/2016      "Priority: Medium   • Chronic gout 04/30/2015     Priority: Medium   • Left bundle branch block 03/05/2012     Priority: Medium   • Multiple thyroid nodules 10/07/2015     Priority: Low   • Carotid stenosis 05/30/2014     Priority: Low   • Encounter for long-term (current) use of insulin (CMS-[facility]) 08/11/2017   • Incontinence of feces 05/18/2017   • Chronic pain of both shoulders 03/23/2017   • Dyslipidemia 12/14/2016   • Combined forms of age-related cataract of right eye 10/25/2016       Family History   Problem Relation Age of Onset   • Cancer Maternal Aunt    • Diabetes Maternal Aunt    • Heart Disease Maternal Aunt    • Hypertension Maternal Aunt    • Hyperlipidemia Maternal Aunt    • Cancer Mother      Leukemia   • Diabetes Mother    • Heart Disease Mother    • Hypertension Mother    • Hyperlipidemia Mother    • Lung Disease Father    • Cancer Father      Lung   • Hyperlipidemia Father    • Hypertension Father    • Heart Disease Father    • Diabetes Father    • Lung Disease Brother    • Stroke Brother    • Diabetes Brother    • Heart Disease Brother    • Hypertension Brother    • Hyperlipidemia Brother    • Diabetes Maternal Grandmother           Objective:     Blood pressure 108/64, pulse 77, temperature 36.3 °C (97.4 °F), height 1.702 m (5' 7\"), weight 122.4 kg (269 lb 12.8 oz), SpO2 95 %. Body mass index is 42.26 kg/m².     Physical Exam:  Gen: alert, No conversational dyspnea. No audible wheeze, nontoxic. Morbidly obese.  PERRL, conjunctiva noninjected. No photophobia. No eye discharge.  Ears: normal pinnae. TM: pearly gray bilaterally  Nose: Nares patent with thin mucus. Mucosa edematous with erythema.  Sinuses non tender over maxillary / frontal sinuses  Throat: erythematous injection. No exudate.   Neck: supple, with  no adenopathy in the neck or supraclavicular regions  CV: Regular rate and rhythm.  Lungs: Effort is normal.  rhonchi diffusely.   Abdomen soft. No HSM.   Skin: warm and dry. No " rash.    Dx-chest-2 Views  Result Date: 10/23/2017  Impression: Unremarkable two view chest.    Reviewed and discussed above CXR with patient in office.      Assessment and Plan:   The following treatment plan was discussed:     1. Bronchitis - chest x-ray in office was negative.  She declines in an office nebulizer treatment.  I advised a prednisone burst, however, patient was insistent on a Medrol Dosepak as it has worked well for her in the past.  We discussed likely viral etiology at this time, however, she does have a follow-up appointment scheduled with her PCP in 2 days for which she can be given antibiotics if needed.  Advised that there is any worsening between now and then she should return to clinic or go to urgent care.   DX-CHEST-2 VIEWS    MethylPREDNISolone (MEDROL DOSEPAK) 4 MG Tablet Therapy Pack   2. Mild intermittent asthma with acute exacerbation - see #1.  She will start her maintenance inhaler prescribed by pulmonology today.  Reminded her to rinse her mouth out after use.  Discussed the difference between rescue and maintenance inhalers.   MethylPREDNISolone (MEDROL DOSEPAK) 4 MG Tablet Therapy Pack     - HM:Patient is under the care of complex care coordination where she is being followed by a nurse , pharmacist, and .  She feels that she is being well taken care of.  We clean up her problem list today and sorted through her medications.  She reports she does have some concerns that her blood pressure may be overtreated as she is recording her readings in the lower 100s systolically.  She does have an upcoming appointment with cardiology in the next couple months.  She also has an appointment with her PCP in a couple days for which she'll discuss this further.   - We did discuss her weight she is currently losing weight on Victoza.  She reports about a 20 pound weight loss the last 5 months.  She is working on her diet and exercise were appropriate. Patient's body  mass index is 42.26 kg/m². Exercise and nutrition counseling were performed at this visit.    -Any change or worsening of signs or symptoms, patient encouraged to follow-up or report to emergency room for further evaluation. Patient verbalizes understanding and agrees.    Followup: Return in about 2 days (around 10/25/2017) for follow-up on with PCP, sooner if needed.

## 2017-10-25 ENCOUNTER — TELEPHONE (OUTPATIENT)
Dept: MEDICAL GROUP | Age: 72
End: 2017-10-25

## 2017-10-25 ENCOUNTER — OFFICE VISIT (OUTPATIENT)
Dept: MEDICAL GROUP | Facility: LAB | Age: 72
End: 2017-10-25
Payer: MEDICARE

## 2017-10-25 ENCOUNTER — TELEPHONE (OUTPATIENT)
Dept: MEDICAL GROUP | Facility: LAB | Age: 72
End: 2017-10-25

## 2017-10-25 VITALS
DIASTOLIC BLOOD PRESSURE: 60 MMHG | RESPIRATION RATE: 12 BRPM | HEIGHT: 67 IN | HEART RATE: 75 BPM | SYSTOLIC BLOOD PRESSURE: 124 MMHG | BODY MASS INDEX: 42.22 KG/M2 | TEMPERATURE: 97.7 F | OXYGEN SATURATION: 94 % | WEIGHT: 269 LBS

## 2017-10-25 DIAGNOSIS — J20.9 ACUTE BRONCHITIS, UNSPECIFIED ORGANISM: ICD-10-CM

## 2017-10-25 PROCEDURE — 99214 OFFICE O/P EST MOD 30 MIN: CPT | Performed by: NURSE PRACTITIONER

## 2017-10-25 RX ORDER — DOXYCYCLINE HYCLATE 100 MG
100 TABLET ORAL 2 TIMES DAILY
Qty: 14 TAB | Refills: 0 | Status: SHIPPED | OUTPATIENT
Start: 2017-10-25 | End: 2017-11-01

## 2017-10-25 NOTE — PROGRESS NOTES
CC  Possible pneumonia    HPI  (72-year-old established female here with complaint of possible pneumonia - new to me. She states that about a week ago she began not feeling well. Onset runny nose, congestion and cough x the past 7 days.  Mentions deep cough / wheezing started 2 d after initial symptoms.  Mucus is yellow.  Was SOB at rest a few d ago but this is improving.  rx medrol pack 2 d ago but did not start it b/c of insomnia side effects she typically gets.  Has xopenex at home - helps.  Has QVAR but not using.  No OTC meds. Not currently on antibiotics. Positive history of pneumonia.  pmhx of sleep apnea and on cpap with 3 L oxygen fed in.  Seeing pulmology every 6 mo.  Quit smoking in 1983.      Past medical, surgical, family, and social history is reviewed and updated in Epic chart by me today.   Medications and allergies reviewed and updated in Epic chart by me today.     ROS:   As documented in history of present illness above    Exam:    Constitutional: Alert, no distress, plus 3 vital signs  Skin:  Warm, dry, no rashes invisible areas  Eye: Equal, round and reactive, conjunctiva clear  ENMT: Lips without lesions, good dentition, oropharynx clear    Neck: Trachea midline  Respiratory: Unlabored respiration. She does have expiratory rhonchi and wheezing in her upper lobes. No rales  Cardiovascular: Normal rate and rhythm, no murmur, no edema  Psych: Alert, pleasant, well-groomed, normal affect    A/P:  1. Acute bronchitis, unspecified organism  -Encouraged her to start using her Qvar 2 puffs twice daily and rinsing her mouth out after use. She prefers to refrain from steroids at this time. Reviewed her chest x-ray with her which was negative for pneumonia. Discussed the importance of deep breathing exercises, high water intake and following up here within 5 days if not improving, sooner with any worsening.  - doxycycline (VIBRAMYCIN) 100 MG Tab; Take 1 Tab by mouth 2 times a day for 7 days.  Dispense: 14  Tab; Refill: 0

## 2017-10-25 NOTE — TELEPHONE ENCOUNTER
VOICEMAIL  1. Caller Name: Whitley Frederick                      Call Back Number: [phone] (home)     2. Message: Pt called stating that she would like a steroid pack and doxycyline. I have tried to call the pt back. But there was no answer. I have left her a message to call us back.    3. Patient approves office to leave a detailed voicemail/Bitstripshart message: no

## 2017-10-25 NOTE — TELEPHONE ENCOUNTER
ANNUAL WELLNESS VISIT PRE-VISIT PLANNING     1.  Reviewed note from last office visit with PCP: YES    2.  If any orders were placed at last visit, do we have Results/Consult Notes?        •  Labs - Labs were not ordered at last office visit.   Note: If patient appointment is for lab review and patient did not complete labs,                check with provider if OK to reschedule patient until labs completed.       •  Imaging - Imaging ordered, completed and results are in chart.       •  Referrals - No referrals were ordered at last office visit.    3.  Immunizations were updated in [facility] using WebIZ?: Yes       •  [employer] Recommendations: TDAP and ZOSTAVAX (Shingles)       •  Is patient due for Tdap? YES. Patient was notified of copay/out of pocket cost.       •  Is patient due for Shingles? YES. Patient was notified of copay/out of pocket cost.     4.  Patient is due for the following Health Maintenance Topics:   Health Maintenance Due   Topic Date Due   • IMM DTaP/Tdap/Td Vaccine (1 - Tdap) 03/19/1964   • COLON CANCER SCREENING ANNUAL FIT  03/19/1996   • IMM ZOSTER VACCINE  03/19/2006   • RETINAL SCREENING  10/07/2016   • DIABETES MONOFILAMENT / LE EXAM  10/07/2016   • Annual Wellness Visit  05/11/2017       - Patient has completed FLU, PNEUMOVAX (PPSV23) and PREVNAR (PCV13)  Immunization(s) per Web[employer]. Chart has been updated.      5.  Reviewed/Updated the following with patient:       •   Preferred Pharmacy? YES       •   Preferred Lab? YES       •   Preferred Communication? YES       •   Allergies? YES       •   Medications? YES. Was Abstract Encounter opened and chart updated? YES       •   Social History? YES. Was Abstract Encounter opened and chart updated? YES       •   Family History (document living status of immediate family members and if + hx of cancer, diabetes, hypertension, hyperlipidemia, heart attack, stroke) YES. Was Abstract Encounter opened and chart updated? YES    6.  Care Team Updated:       •    DME Company (gait device, O2, CPAP, etc.): N\A       •   Other Specialists (eye doctor, derm, GYN, cardiology, endo, etc): YES    7.  Patient has the following Care Path diagnoses on Problem List:  DIABETES    Has patient ever had diabetes education? Yes, and is NOT interested in more at this time.        8.  Specialty Comments was updated with diagnosis information provided by SCP: N\A    9.  Patient was advised: “This is a free wellness visit. The provider will screen for medical conditions to help you stay healthy. If you have other concerns to address you may be asked to discuss these at a separate visit or there may be an additional fee.”     6.  Patient was informed to arrive 15 min prior to their scheduled appointment and bring in their medication bottles.

## 2017-10-30 ENCOUNTER — PATIENT OUTREACH (OUTPATIENT)
Dept: HEALTH INFORMATION MANAGEMENT | Facility: OTHER | Age: 72
End: 2017-10-30

## 2017-10-30 NOTE — PROGRESS NOTES
[facility] alerted empty packet x 2. Outbound call to Whitley. Whitley plans to transmit reading this afternoon. Whitley reports feeling better on the course of antibiotics. She reports using inhalers as prescribed. Reviewed BP readings from last 2 weeks. BP has been well-controlled.         Laila Corral, PharmD

## 2017-11-01 ENCOUNTER — APPOINTMENT (OUTPATIENT)
Dept: MEDICAL GROUP | Facility: LAB | Age: 72
End: 2017-11-01
Payer: MEDICARE

## 2017-11-01 ENCOUNTER — PATIENT OUTREACH (OUTPATIENT)
Dept: HEALTH INFORMATION MANAGEMENT | Facility: OTHER | Age: 72
End: 2017-11-01

## 2017-11-01 NOTE — PROGRESS NOTES
Outbound call to Whitley to discuss BP readings. Patient had concern that her BP values have been too low. She denies dizziness but states that she will get wobbly. Given her recent values of systolic BPs in the low 100s and recommendation to avoid terazosin in treatment of hypertension, advised that Whitley discontinue terazosin 1 mg. Patient states that her nephrologist Dr. Badillo had prescribed this medication and would like her input prior to discontinuing. Placed call to Dr. Badillo's office and left message with May.    Plan: will follow-up with patient after discussion with Dr. Badillo.     Laila Corral, PharmD

## 2017-11-02 ENCOUNTER — PATIENT OUTREACH (OUTPATIENT)
Dept: HEALTH INFORMATION MANAGEMENT | Facility: OTHER | Age: 72
End: 2017-11-02

## 2017-11-02 ENCOUNTER — OFFICE VISIT (OUTPATIENT)
Dept: MEDICAL GROUP | Facility: LAB | Age: 72
End: 2017-11-02
Payer: MEDICARE

## 2017-11-02 VITALS
BODY MASS INDEX: 42.38 KG/M2 | HEIGHT: 67 IN | HEART RATE: 58 BPM | OXYGEN SATURATION: 97 % | DIASTOLIC BLOOD PRESSURE: 62 MMHG | TEMPERATURE: 97.8 F | SYSTOLIC BLOOD PRESSURE: 108 MMHG | RESPIRATION RATE: 14 BRPM | WEIGHT: 270 LBS

## 2017-11-02 DIAGNOSIS — I48.0 PAF (PAROXYSMAL ATRIAL FIBRILLATION) (HCC): ICD-10-CM

## 2017-11-02 DIAGNOSIS — R15.9 INCONTINENCE OF FECES, UNSPECIFIED FECAL INCONTINENCE TYPE: ICD-10-CM

## 2017-11-02 DIAGNOSIS — G47.33 OSA (OBSTRUCTIVE SLEEP APNEA): ICD-10-CM

## 2017-11-02 DIAGNOSIS — E03.9 HYPOTHYROIDISM, UNSPECIFIED TYPE: ICD-10-CM

## 2017-11-02 DIAGNOSIS — Z99.81 DEPENDENCE ON NOCTURNAL OXYGEN THERAPY: ICD-10-CM

## 2017-11-02 DIAGNOSIS — M25.512 CHRONIC PAIN OF BOTH SHOULDERS: ICD-10-CM

## 2017-11-02 DIAGNOSIS — E78.5 DYSLIPIDEMIA: ICD-10-CM

## 2017-11-02 DIAGNOSIS — E11.22 TYPE 2 DIABETES MELLITUS WITH STAGE 4 CHRONIC KIDNEY DISEASE, WITH LONG-TERM CURRENT USE OF INSULIN (HCC): ICD-10-CM

## 2017-11-02 DIAGNOSIS — N18.4 TYPE 2 DIABETES MELLITUS WITH STAGE 4 CHRONIC KIDNEY DISEASE, WITH LONG-TERM CURRENT USE OF INSULIN (HCC): ICD-10-CM

## 2017-11-02 DIAGNOSIS — Z79.4 ENCOUNTER FOR LONG-TERM (CURRENT) USE OF INSULIN (HCC): ICD-10-CM

## 2017-11-02 DIAGNOSIS — M1A.9XX0 CHRONIC GOUT INVOLVING TOE WITHOUT TOPHUS, UNSPECIFIED CAUSE, UNSPECIFIED LATERALITY: ICD-10-CM

## 2017-11-02 DIAGNOSIS — E11.41 DIABETIC MONONEUROPATHY ASSOCIATED WITH TYPE 2 DIABETES MELLITUS (HCC): ICD-10-CM

## 2017-11-02 DIAGNOSIS — M25.511 CHRONIC PAIN OF BOTH SHOULDERS: ICD-10-CM

## 2017-11-02 DIAGNOSIS — I70.0 ATHEROSCLEROSIS OF AORTA (HCC): ICD-10-CM

## 2017-11-02 DIAGNOSIS — Z79.4 TYPE 2 DIABETES MELLITUS WITH STAGE 4 CHRONIC KIDNEY DISEASE, WITH LONG-TERM CURRENT USE OF INSULIN (HCC): ICD-10-CM

## 2017-11-02 DIAGNOSIS — F41.9 ANXIETY: ICD-10-CM

## 2017-11-02 DIAGNOSIS — I65.23 BILATERAL CAROTID ARTERY STENOSIS: ICD-10-CM

## 2017-11-02 DIAGNOSIS — J45.20 MILD INTERMITTENT ASTHMA WITHOUT COMPLICATION: ICD-10-CM

## 2017-11-02 DIAGNOSIS — Z00.00 MEDICARE ANNUAL WELLNESS VISIT, SUBSEQUENT: ICD-10-CM

## 2017-11-02 DIAGNOSIS — I44.7 LEFT BUNDLE BRANCH BLOCK: ICD-10-CM

## 2017-11-02 DIAGNOSIS — E66.01 MORBID OBESITY WITH BMI OF 45.0-49.9, ADULT (HCC): ICD-10-CM

## 2017-11-02 DIAGNOSIS — J96.11 CHRONIC RESPIRATORY FAILURE WITH HYPOXIA (HCC): ICD-10-CM

## 2017-11-02 DIAGNOSIS — E04.2 MULTIPLE THYROID NODULES: ICD-10-CM

## 2017-11-02 DIAGNOSIS — Z79.01 CHRONIC ANTICOAGULATION: ICD-10-CM

## 2017-11-02 DIAGNOSIS — G89.29 CHRONIC PAIN OF BOTH SHOULDERS: ICD-10-CM

## 2017-11-02 DIAGNOSIS — I10 ESSENTIAL HYPERTENSION: ICD-10-CM

## 2017-11-02 PROCEDURE — G0439 PPPS, SUBSEQ VISIT: HCPCS | Performed by: NURSE PRACTITIONER

## 2017-11-02 ASSESSMENT — PATIENT HEALTH QUESTIONNAIRE - PHQ9: CLINICAL INTERPRETATION OF PHQ2 SCORE: 0

## 2017-11-02 NOTE — PROGRESS NOTES
Chief Complaint   Patient presents with   • Annual Wellness Visit         HPI:  Whitley is a 72 y.o. here for Medicare Annual Wellness Visit.  Feeling much better in terms of URI symptoms.      Patient Active Problem List    Diagnosis Date Noted   • Essential hypertension 05/10/2016     Priority: High   • Atherosclerosis of aorta (CMS-HCC) 04/29/2015     Priority: High   • Chronic respiratory failure (CMS-HCC) 04/29/2015     Priority: High   • Morbid obesity with BMI of 45.0-49.9, adult (CMS-HCC) 04/29/2015     Priority: High   • Anxiety 01/09/2014     Priority: High   • Hypothyroid 12/12/2013     Priority: High   • PAF (paroxysmal atrial fibrillation) (CMS-HCC) 07/12/2013     Priority: High   • Type 2 diabetes mellitus with stage 4 chronic kidney disease (CMS-HCC)      Priority: High   • Mild intermittent asthma without complication 08/24/2016     Priority: Medium   • Chronic anticoagulation 06/10/2016     Priority: Medium   • AVERY (obstructive sleep apnea) 05/10/2016     Priority: Medium   • Chronic gout 04/30/2015     Priority: Medium   • Left bundle branch block 03/05/2012     Priority: Medium   • Multiple thyroid nodules 10/07/2015     Priority: Low   • Carotid stenosis 05/30/2014     Priority: Low   • Encounter for long-term (current) use of insulin (CMS-HCC) 08/11/2017   • Incontinence of feces 05/18/2017   • Chronic pain of both shoulders 03/23/2017   • Dyslipidemia 12/14/2016   • Combined forms of age-related cataract of right eye 10/25/2016       Current Outpatient Prescriptions   Medication Sig Dispense Refill   • liraglutide (VICTOZA) 18 MG/3ML Solution Pen-injector injection Inject 0.2 mL as instructed every day. 6 mL 11   • Insulin Degludec (TRESIBA FLEXTOUCH) 200 UNIT/ML Solution Pen-injector Inject 50 Units as instructed every bedtime. 3 PEN 2   • simvastatin (ZOCOR) 20 MG Tab TAKE ONE TABLET BY MOUTH ONCE DAILY IN THE EVENING 90 Tab 3   • nystatin (MYCOSTATIN) 329488 UNIT/GM Ointment AAA up to twice  daily 240 g 2   • levothyroxine (SYNTHROID) 50 MCG Tab Take 1 Tab by mouth every day. 90 Tab 1   • MULTAQ 400 MG Tab TAKE ONE TABLET BY MOUTH TWICE DAILY WITH MEALS 60 Tab 2   • levalbuterol (XOPENEX HFA) 45 MCG/ACT inhaler Inhale 2 Puffs by mouth every four hours as needed for Shortness of Breath. 1 Inhaler 3   • Blood Glucose Monitoring Suppl W/DEVICE Kit 1 Each by Does not apply route 2 times a day as needed. 1 Kit 0   • Blood Glucose Monitoring Suppl SUPPLIES Misc Test strips order: Test strips for Accucheck meter. Sig: use BID and prn ssx high or low sugar #100 RF x 3 100 Strip 3   • omeprazole (PRILOSEC) 20 MG delayed-release capsule Take 1 Cap by mouth every day. 90 Cap 3   • furosemide (LASIX) 80 MG Tab Take 80 mg by mouth every 48 hours.     • montelukast (SINGULAIR) 10 MG Tab 1 po qhs 90 Tab 3   • terazosin (HYTRIN) 1 MG Cap 1 Cap every day.     • metoprolol (LOPRESSOR) 50 MG Tab TAKE ONE TABLET BY MOUTH TWICE DAILY ,  PATIENT  MAY  TAKE  AN  ADDITIONAL  HALF  TABLET  FOR  INCREASED  HEART  RATE. 180 Tab 3   • Blood Glucose Monitoring Suppl SUPPLIES Misc Test strips order: Test strips for Chely Contour meter. Sig: use tid  and prn ssx high or low sugar #100 RF x 3 100 Strip 3   • Diclofenac Sodium (VOLTAREN) 1 % Gel Apply 2 g to skin as directed 4 times a day. 100 g 0   • losartan (COZAAR) 50 MG Tab TAKE ONE TABLET BY MOUTH TWICE DAILY 180 Tab 3   • clonazepam (KLONOPIN) 0.5 MG Tab Take 1 Tab by mouth 2 times a day as needed (insomnia, anxiety). 60 Tab 0   • Misc. Devices MISC Pen needles for Lantus Solstar. 29g 12mm. 90 Each 3   • allopurinol (ZYLOPRIM) 100 MG TABS Take 1/2 pill qd (Patient taking differently: Take 100 mg by mouth 2 Times a Day. Indications: Primary Gout) 90 Tab 4   • Cholecalciferol (VITAMIN D) 2000 UNITS CAPS Take 4,000 Units by mouth every day.     • magnesium oxide (MAGNESIUM OXIDE) 400 (241.3 MG) MG TABS tablet Take 400 mg by mouth every day.     • MethylPREDNISolone (MEDROL DOSEPAK)  4 MG Tablet Therapy Pack follow package directions (Patient not taking: Reported on 11/2/2017) 1 Kit 0   • fluticasone (FLONASE) 50 MCG/ACT nasal spray Spray 1-2 Sprays in nose every day. Each nostil, 30 minutes prior to use of CPAP (Patient not taking: Reported on 11/2/2017) 1 Bottle 6   • ferrous gluconate (FERGON) 324 (38 Fe) MG Tab Take 324 mg by mouth 3 times a day, with meals.     • warfarin (COUMADIN) 3 MG Tab Take 1 to 1.5 tablets by mouth daily as directed by the coumadin clinic 135 Tab 1   • Insulin Pen Needle (RELION PEN NEEDLES) 29G X 12MM Misc USE ONE PEN NEEDLE SUBCUTANEOUSLY FOR LANTUS SOLOSTAR 100 Each 3   • HAYLEE CONTOUR NEXT TEST strip        No current facility-administered medications for this visit.         Patient is taking medications as noted in medication list.  Current supplements as per medication list.     Allergies: Amlodipine; Amaryl; Avandia [rosiglitazone maleate]; Cipro xr; Clonidine; Contrast media with iodine [iodine]; Glipizide; Glucophage [metformin hydrochloride]; Hydralazine; Levaquin; and Relafen [nabumetone]    Current social contact/activities: [facility] [employer] and watching TV    Is patient current with immunizations? No, due for TDAP. Patient is interested in receiving NONE today.    She  reports that she quit smoking about 34 years ago. Her smoking use included Cigarettes. She has a 20.00 pack-year smoking history. She has never used smokeless tobacco. She reports that she does not drink alcohol or use drugs.  Counseling given: Yes        DPA/Advanced directive: Patient has Advanced Directive, but it is not on file. Instructed to bring in a copy to scan into their chart.    ROS:    Gait: Uses no assistive device   Ostomy: no   Other tubes: no   Amputations: no   Chronic oxygen use :  Yes, 3L fed into cpap  Last eye exam 10/2016   Wears hearing aids: no   : Reports incontinence.       Screening:    DIABETES    Has patient ever had  diabetes education? Yes, and is NOT interested in more at this time.      Depression Screening    Little interest or pleasure in doing things?  0 - not at all  Feeling down, depressed, or hopeless? 0 - not at all  Patient Health Questionnaire Score: 0    If depressive symptoms identified deferred to follow up visit unless specifically addressed in assessment and plan.    Interpretation of PHQ-9 Total Score   Score Severity   1-4 No Depression   5-9 Mild Depression   10-14 Moderate Depression   15-19 Moderately Severe Depression   20-27 Severe Depression    Screening for Cognitive Impairment    Three Minute Recall (apple, watch, rose)  2/3 Apple, rose,table  Draw clock face with all 12 numbers set to the hand to show 10 minutes past 11 o'clock  1 5/5  If cognitive concerns identified, deferred for follow up unless specifically addressed in assessment and plan.    Fall Risk Assessment    Has the patient had two or more falls in the last year or any fall with injury in the last year?  No  If fall risk identified, deferred for follow up unless specifically addressed in assessment and plan.    Safety Assessment    Throw rugs on floor.  No  Handrails on all stairs.  Yes  Good lighting in all hallways.  Yes  Difficulty hearing.  Yes  Patient counseled about all safety risks that were identified.    Functional Assessment ADLs    Are there any barriers preventing you from cooking for yourself or meeting nutritional needs?  No.    Are there any barriers preventing you from driving safely or obtaining transportation?  No.    Are there any barriers preventing you from using a telephone or calling for help?  No.    Are there any barriers preventing you from shopping?  No.    Are there any barriers preventing you from taking care of your own finances?  No.    Are there any barriers preventing you from managing your medications?  No.    Are you currently engaging any exercise or physical activity?  Yes.  Walking daily    Health  Maintenance Summary                IMM DTaP/Tdap/Td Vaccine Overdue 3/19/1965     COLON CANCER SCREENING ANNUAL FIT Overdue 3/19/1996     IMM ZOSTER VACCINE Overdue 3/19/2006     RETINAL SCREENING Overdue 10/7/2016      Done 10/7/2015 AMB REFERRAL FOR RETINAL SCREENING EXAM     Patient has more history with this topic...    DIABETES MONOFILAMENT / LE EXAM Overdue 10/7/2016      Done 10/7/2015     Annual Wellness Visit Overdue 5/11/2017      Done 5/10/2016 Visit Dx: Medicare annual wellness visit, subsequent     Patient has more history with this topic...    BONE DENSITY Next Due 1/31/2018      Done 1/31/2013 DS-BONE DENSITY STUDY (DEXA)    A1C SCREENING Next Due 2/11/2018      Done 8/11/2017 POCT A1C     Patient has more history with this topic...    MAMMOGRAM Next Due 9/8/2018      Done 9/8/2017 MA-MAMMO SCREENING BILAT W/TOMOSYNTHESIS W/CAD     Patient has more history with this topic...    FASTING LIPID PROFILE Next Due 10/4/2018      Done 10/4/2017 LIPID PROFILE (A)     Patient has more history with this topic...    URINE ACR / MICROALBUMIN Next Due 10/4/2018      Done 10/4/2017 PROTEIN/CREAT RATIO URINE (A)     Patient has more history with this topic...    SERUM CREATININE Next Due 10/4/2018      Done 10/4/2017 RENAL FUNCTION PANEL (A)     Patient has more history with this topic...          Patient Care Team:  ANU WhelanP.NMarquis as PCP - General (Family Medicine)  [facility]  Ania Badillo M.D. as Consulting Physician (Nephrology)  Zoran Gamble M.D. as Consulting Physician (Ophthalmology)  Fernando Sorto M.D. as Consulting Physician (Cardiology)  Seth Wong M.D. as Consulting Physician (Gastroenterology)  Laila Corral, PharmD as Care Coordinator (Pharmacy)  Wei Patterson P.A.-C. as Consulting Physician (Endocrinology)  ANU MosherPLASHELL as Mid Level Provider (Pulmonary Medicine)    Social History   Substance Use Topics   • Smoking status: Former Smoker     " Packs/day: 1.00     Years: 20.00     Types: Cigarettes     Quit date: 1/1/1983   • Smokeless tobacco: Never Used   • Alcohol use No     Family History   Problem Relation Age of Onset   • Cancer Maternal Aunt    • Diabetes Maternal Aunt    • Heart Disease Maternal Aunt    • Hypertension Maternal Aunt    • Hyperlipidemia Maternal Aunt    • Cancer Mother      Leukemia   • Diabetes Mother    • Heart Disease Mother    • Hypertension Mother    • Hyperlipidemia Mother    • Lung Disease Father    • Cancer Father      Lung   • Hyperlipidemia Father    • Hypertension Father    • Heart Disease Father    • Diabetes Father    • Lung Disease Brother    • Stroke Brother    • Diabetes Brother    • Heart Disease Brother    • Hypertension Brother    • Hyperlipidemia Brother    • Diabetes Maternal Grandmother      She  has a past medical history of Allergy; Anxiety; Arrhythmia; Arthritis; Asthma; Carotid bruit; CATARACT; Chest pain; DIABETES MELLITUS; Esophageal reflux; Goiter; Headache(784.0); Heart murmur; Hyperlipidemia; Hypertension; IBD (inflammatory bowel disease); Migraine; AVERY (obstructive sleep apnea); Overweight(278.02); Palpitations; Paroxysmal atrial fibrillation (CMS-HCC) (7/12/13); Pneumonia; Positive reaction to tuberculin skin test; Renal disease; and Urinary tract infection, site not specified. She also has no past medical history of Breast cancer (CMS-HCC) or Tuberculosis.   Past Surgical History:   Procedure Laterality Date   • CATARACT PHACO WITH IOL Right 10/25/2016    Procedure: CATARACT PHACO WITH IOL;  Surgeon: Zoran Gamble M.D.;  Location: SURGERY SAME DAY [facility];  Service:    • ABDOMINAL HYSTERECTOMY TOTAL     • CHOLECYSTECTOMY     • TONSILLECTOMY     • US-NEEDLE CORE BX-BREAST PANEL             Exam:     Blood pressure 108/62, pulse (!) 58, temperature 36.6 °C (97.8 °F), resp. rate 14, height 1.702 m (5' 7\"), weight 122.5 kg (270 lb), SpO2 97 %. Body mass index is 42.29 kg/m².    Hearing " excellent.    Dentition fair  Alert, oriented in no acute distress.  Eye contact is good, speech goal directed, affect calm    Assessment and Plan. The following treatment and monitoring plan is recommended:    1. Medicare annual wellness visit, subsequent    2. Essential hypertension  -stable.  Possibly coming off terazosin    3. Atherosclerosis of aorta (CMS-[facility])  -stable per US 6/2015.  On statin    4. PAF (paroxysmal atrial fibrillation) (CMS-HCC)  -rare issue per pt, on multaq and coumadin    5. Hypothyroidism, unspecified type  -stable, labs UTD    6. Anxiety  -stable with prn use of clonazepam      7. Chronic respiratory failure with hypoxia (CMS-HCC)  -stable.  o2 use at night.      8. Dependence on nocturnal oxygen therapy  -stable.  Using 3L at night into cpap machine    9. Encounter for long-term (current) use of insulin (CMS-HCC)  -stable.  Also managed by endocrinology.      10. Dyslipidemia  -stable.  LP UTD.  Recommend reduction of white carbs / sugars.     11. 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  No current symptoms.  On allopurinol    12. Incontinence of feces, unspecified fecal incontinence type  -will f/u with Dr. Bonilla as her GI retired. Chronic issue per pt and not worsening.    13. Multiple thyroid nodules  -will update US and refer back to Dr. Simpson if enlarged.     14. Bilateral carotid artery stenosis  -stable per US this year - <50%.  On statin.    15. Mild intermittent asthma without complication  -stable.  URI symptoms improved.     16. Chronic anticoagulation  -stable.  INR 2.8 10/4/2017.  Will return for INR next week    17. AVERY (obstructive sleep apnea)  -stable on cpap.  Followed by pulmonology.      18. Chronic pain of both shoulders  -persistent / stable.  Comes and goes per pt.  No desire for ortho / imaging    19. Type 2 diabetes mellitus with stage 4 chronic kidney disease, with long-term current use of insulin  (CMS-[facility])  -stable.  Avg .  Continues with endocrinology as well.  Monofilament updated today    20.  CKD:  -labs UTD.  Next appt 12/5/2017.          Services suggested: No services needed at this time  Health Care Screening recommendations as per orders if indicated.  Referrals offered: PT/OT/Nutrition counseling/Behavioral Health/Smoking cessation as per orders if indicated.    Discussion today about general wellness and lifestyle habits:    · Prevent falls and reduce trip hazards; Cautioned about securing or removing rugs.  · Have a working fire alarm and carbon monoxide detector;   · Engage in regular physical activity and social activities       Follow-up: No Follow-up on file.

## 2017-11-03 ENCOUNTER — OFFICE VISIT (OUTPATIENT)
Dept: ENDOCRINOLOGY | Facility: MEDICAL CENTER | Age: 72
End: 2017-11-03
Payer: MEDICARE

## 2017-11-03 ENCOUNTER — PATIENT OUTREACH (OUTPATIENT)
Dept: HEALTH INFORMATION MANAGEMENT | Facility: OTHER | Age: 72
End: 2017-11-03

## 2017-11-03 VITALS
OXYGEN SATURATION: 97 % | HEART RATE: 69 BPM | BODY MASS INDEX: 42.06 KG/M2 | DIASTOLIC BLOOD PRESSURE: 58 MMHG | SYSTOLIC BLOOD PRESSURE: 132 MMHG | WEIGHT: 268 LBS | HEIGHT: 67 IN

## 2017-11-03 DIAGNOSIS — E11.22 TYPE 2 DIABETES MELLITUS WITH STAGE 4 CHRONIC KIDNEY DISEASE, WITHOUT LONG-TERM CURRENT USE OF INSULIN (HCC): ICD-10-CM

## 2017-11-03 DIAGNOSIS — N18.4 TYPE 2 DIABETES MELLITUS WITH STAGE 4 CHRONIC KIDNEY DISEASE, WITHOUT LONG-TERM CURRENT USE OF INSULIN (HCC): ICD-10-CM

## 2017-11-03 PROCEDURE — 99214 OFFICE O/P EST MOD 30 MIN: CPT | Performed by: PHYSICIAN ASSISTANT

## 2017-11-03 NOTE — PROGRESS NOTES
Patient with empty packet on RHM. Checked chart notes and pharmacy is following for blood pressure, CC for diabetic management monthly call.

## 2017-11-03 NOTE — PROGRESS NOTES
Return to office Patient Consult Note  Referred by: EDILMA Whelan    Reason for consult: Diabetes Management Type 2    HPI:  Whitley Frederick is a 72 y.o. old patient who is seeing us today for diabetes care.  This is a pleasant patient with diabetes and I appreciate the opportunity to participate in the care of this patient.    BG Diary:11/3/2017  In the AM: 136, 116, 123, 109, 147, 150  Before meal:  Before Bed: 209     BG Diary:8/31/2017  In the AM:  Did not bring     Her labs of 8/11/17 HbA1c 7.6, GFR 22,      BG Diary:8/11/2017  In the AM: did not bring      Has been Diabetic since 1985  Has a Glucagon pen at home: no     Has seen 3 different endos in the past.  And is just on an Basal insulin.    Having a fistula placed next month      1. Type 2 diabetes mellitus with stage 4 chronic kidney disease, without long-term current use of insulin (CMS-HCC)    This is a new patient on 8/11/17  She is on:  1.  Toujeo  65     STOP:  1.  Toujeo     She is now on:  1.   Tresiba 16 units at night  3.   Victoza  1.2 at night         ROS:   Constitutional: No night sweats.  Eyes:  No visual changes.  Cardiac: No chest pain, No palpitations or racing heart rate.  Resp: No shortness of breath, No cough,   Gi: No Diarrhea    All other systems were reviewed and were/are negative.  The ROS was revised/revisited during this office visit from the patients first office visit with me on 8/11/17. Please review the full ROS during the first office visit.    Past Medical History:  Patient Active Problem List    Diagnosis Date Noted   • Essential hypertension 05/10/2016     Priority: High   • Atherosclerosis of aorta (CMS-HCC) 04/29/2015     Priority: High   • Chronic respiratory failure (CMS-HCC) 04/29/2015     Priority: High   • Morbid obesity with BMI of 45.0-49.9, adult (CMS-HCC) 04/29/2015     Priority: High   • Anxiety 01/09/2014     Priority: High   • Hypothyroid 12/12/2013     Priority: High   • PAF (paroxysmal  atrial fibrillation) (CMS-HCC) 07/12/2013     Priority: High   • Type 2 diabetes mellitus with stage 4 chronic kidney disease (CMS-HCC)      Priority: High   • Mild intermittent asthma without complication 08/24/2016     Priority: Medium   • Chronic anticoagulation 06/10/2016     Priority: Medium   • AVERY (obstructive sleep apnea) 05/10/2016     Priority: Medium   • Chronic gout involving toe without tophus 04/30/2015     Priority: Medium   • Left bundle branch block 03/05/2012     Priority: Medium   • Multiple thyroid nodules 10/07/2015     Priority: Low   • Carotid stenosis 05/30/2014     Priority: Low   • Dependence on nocturnal oxygen therapy 11/02/2017   • Diabetic mononeuropathy associated with type 2 diabetes mellitus (CMS-HCC) 11/02/2017   • Encounter for long-term (current) use of insulin (CMS-HCC) 08/11/2017   • Incontinence of feces 05/18/2017   • Chronic pain of both shoulders 03/23/2017   • Dyslipidemia 12/14/2016       Past Surgical History:  Past Surgical History:   Procedure Laterality Date   • CATARACT PHACO WITH IOL Right 10/25/2016    Procedure: CATARACT PHACO WITH IOL;  Surgeon: Zoran Gamble M.D.;  Location: SURGERY SAME DAY [facility];  Service:    • ABDOMINAL HYSTERECTOMY TOTAL     • CHOLECYSTECTOMY     • TONSILLECTOMY     • US-NEEDLE CORE BX-BREAST PANEL         Allergies:  Amlodipine; Amaryl; Avandia [rosiglitazone maleate]; Cipro xr; Clonidine; Contrast media with iodine [iodine]; Glipizide; Glucophage [metformin hydrochloride]; Hydralazine; Levaquin; and Relafen [nabumetone]    Social History:  Social History     Social History   • Marital status:      Spouse name: N/A   • Number of children: N/A   • Years of education: N/A     Occupational History   • Not on file.     Social History Main Topics   • Smoking status: Former Smoker     Packs/day: 1.00     Years: 20.00     Types: Cigarettes     Quit date: 1/1/1983   • Smokeless tobacco: Never Used   • Alcohol use No   • Drug use:  No   • Sexual activity: No     Other Topics Concern   • Not on file     Social History Narrative   • No narrative on file       Family History:  Family History   Problem Relation Age of Onset   • Cancer Maternal Aunt    • Diabetes Maternal Aunt    • Heart Disease Maternal Aunt    • Hypertension Maternal Aunt    • Hyperlipidemia Maternal Aunt    • Cancer Mother      Leukemia   • Diabetes Mother    • Heart Disease Mother    • Hypertension Mother    • Hyperlipidemia Mother    • Lung Disease Father    • Cancer Father      Lung   • Hyperlipidemia Father    • Hypertension Father    • Heart Disease Father    • Diabetes Father    • Lung Disease Brother    • Stroke Brother    • Diabetes Brother    • Heart Disease Brother    • Hypertension Brother    • Hyperlipidemia Brother    • Diabetes Maternal Grandmother        Medications:    Current Outpatient Prescriptions:   •  Insulin Degludec (TRESIBA FLEXTOUCH) 200 UNIT/ML Solution Pen-injector, Inject 50 Units as instructed every bedtime., Disp: 3 PEN, Rfl: 2  •  liraglutide (VICTOZA) 18 MG/3ML Solution Pen-injector injection, Inject 0.3 mL as instructed every day., Disp: 3 PEN, Rfl: 11  •  fluticasone (FLONASE) 50 MCG/ACT nasal spray, Spray 1-2 Sprays in nose every day. Each nostil, 30 minutes prior to use of CPAP, Disp: 1 Bottle, Rfl: 6  •  ferrous gluconate (FERGON) 324 (38 Fe) MG Tab, Take 324 mg by mouth 3 times a day, with meals., Disp: , Rfl:   •  simvastatin (ZOCOR) 20 MG Tab, TAKE ONE TABLET BY MOUTH ONCE DAILY IN THE EVENING, Disp: 90 Tab, Rfl: 3  •  nystatin (MYCOSTATIN) 507842 UNIT/GM Ointment, AAA up to twice daily, Disp: 240 g, Rfl: 2  •  levothyroxine (SYNTHROID) 50 MCG Tab, Take 1 Tab by mouth every day., Disp: 90 Tab, Rfl: 1  •  MULTAQ 400 MG Tab, TAKE ONE TABLET BY MOUTH TWICE DAILY WITH MEALS, Disp: 60 Tab, Rfl: 2  •  warfarin (COUMADIN) 3 MG Tab, Take 1 to 1.5 tablets by mouth daily as directed by the coumadin clinic, Disp: 135 Tab, Rfl: 1  •  levalbuterol  (XOPENEX HFA) 45 MCG/ACT inhaler, Inhale 2 Puffs by mouth every four hours as needed for Shortness of Breath., Disp: 1 Inhaler, Rfl: 3  •  Insulin Pen Needle (RELION PEN NEEDLES) 29G X 12MM Misc, USE ONE PEN NEEDLE SUBCUTANEOUSLY FOR LANTUS SOLOSTAR, Disp: 100 Each, Rfl: 3  •  Blood Glucose Monitoring Suppl W/DEVICE Kit, 1 Each by Does not apply route 2 times a day as needed., Disp: 1 Kit, Rfl: 0  •  Blood Glucose Monitoring Suppl SUPPLIES Misc, Test strips order: Test strips for Accucheck meter. Sig: use BID and prn ssx high or low sugar #100 RF x 3, Disp: 100 Strip, Rfl: 3  •  omeprazole (PRILOSEC) 20 MG delayed-release capsule, Take 1 Cap by mouth every day., Disp: 90 Cap, Rfl: 3  •  furosemide (LASIX) 80 MG Tab, Take 80 mg by mouth every 48 hours., Disp: , Rfl:   •  montelukast (SINGULAIR) 10 MG Tab, 1 po qhs, Disp: 90 Tab, Rfl: 3  •  terazosin (HYTRIN) 1 MG Cap, 1 Cap every day., Disp: , Rfl:   •  metoprolol (LOPRESSOR) 50 MG Tab, TAKE ONE TABLET BY MOUTH TWICE DAILY ,  PATIENT  MAY  TAKE  AN  ADDITIONAL  HALF  TABLET  FOR  INCREASED  HEART  RATE., Disp: 180 Tab, Rfl: 3  •  CHELY CONTOUR NEXT TEST strip, , Disp: , Rfl:   •  Blood Glucose Monitoring Suppl SUPPLIES Misc, Test strips order: Test strips for Chley Contour meter. Sig: use tid  and prn ssx high or low sugar #100 RF x 3, Disp: 100 Strip, Rfl: 3  •  Diclofenac Sodium (VOLTAREN) 1 % Gel, Apply 2 g to skin as directed 4 times a day., Disp: 100 g, Rfl: 0  •  losartan (COZAAR) 50 MG Tab, TAKE ONE TABLET BY MOUTH TWICE DAILY, Disp: 180 Tab, Rfl: 3  •  clonazepam (KLONOPIN) 0.5 MG Tab, Take 1 Tab by mouth 2 times a day as needed (insomnia, anxiety)., Disp: 60 Tab, Rfl: 0  •  Misc. Devices MISC, Pen needles for Lantus Solstar. 29g 12mm., Disp: 90 Each, Rfl: 3  •  allopurinol (ZYLOPRIM) 100 MG TABS, Take 1/2 pill qd (Patient taking differently: Take 100 mg by mouth 2 Times a Day. Indications: Primary Gout), Disp: 90 Tab, Rfl: 4  •  Cholecalciferol (VITAMIN D)  "2000 UNITS CAPS, Take 4,000 Units by mouth every day., Disp: , Rfl:   •  magnesium oxide (MAGNESIUM OXIDE) 400 (241.3 MG) MG TABS tablet, Take 400 mg by mouth every day., Disp: , Rfl:         Physical Examination:   Vital signs: /58   Pulse 69   Ht 1.702 m (5' 7\")   Wt 121.6 kg (268 lb)   SpO2 97%   BMI 41.97 kg/m²   General: No distress, cooperative, well dressed and well nourished.   Eyes: No scleral icterus or discharge, No hyposphagma  ENMT: Normal on external inspection of nose, lips, No nasal drainage   Neck: No abnormal masses on inspection  Resp: Normal effort, Bilateral clear to auscultation, No wheezing, No rales  CVS: Regular rate and rhythm, S1 S2 normal, No murmur. No gallop  Extremities: No edema bilateral extremities  Neuro: Alert and oriented  Skin: No rash, No Ulcers  Psych: Normal mood and affect      Assessment and Plan:    1. Type 2 diabetes mellitus with stage 4 chronic kidney disease, without long-term current use of insulin (CMS-[facility])    She is now on:  1.   Tresiba 16 units at night  3.   Victoza  1.2 at night (INCREASE to 1.8)    The total time spent seeing this patient today face to face in consultation, and formulating an action plan for this visit was greater than 25 minutes. > Than 50% of this time was spent counseling, discussing problems documented above and below, coordinating care and answering questions by the physician assistant.  We developed a diabetes care plan for this patient today.    Return in about 3 months (around 2/3/2018).    Blood glucose log: Check BG in the morning when wake up, before lunch or dinner and before bed.  So three times a day.  Always bring BG diary to the next office visit.     Thank you kindly for allowing me to participate in the diabetes care plan for this patient.    Wei Patterson PA-C, BC-ADM  Board Certified - Advanced Diabetes Management  11/03/17    CC:   EDILMA Whelan    "

## 2017-11-03 NOTE — PATIENT INSTRUCTIONS
She is now on:  1.   Tresiba 16 units at night  3.   Victoza  1.2 at night (INCREASE to 1.8)

## 2017-11-06 ENCOUNTER — HOSPITAL ENCOUNTER (OUTPATIENT)
Dept: RADIOLOGY | Facility: MEDICAL CENTER | Age: 72
End: 2017-11-06
Attending: NURSE PRACTITIONER
Payer: MEDICARE

## 2017-11-06 ENCOUNTER — PATIENT OUTREACH (OUTPATIENT)
Dept: HEALTH INFORMATION MANAGEMENT | Facility: OTHER | Age: 72
End: 2017-11-06

## 2017-11-06 ENCOUNTER — TELEPHONE (OUTPATIENT)
Dept: ENDOCRINOLOGY | Facility: MEDICAL CENTER | Age: 72
End: 2017-11-06

## 2017-11-06 DIAGNOSIS — E04.2 MULTIPLE THYROID NODULES: ICD-10-CM

## 2017-11-06 PROCEDURE — 76536 US EXAM OF HEAD AND NECK: CPT

## 2017-11-06 NOTE — PROGRESS NOTES
11/3 - Received email from Whitley requesting BP values be faxed to Dr. Badillo's office. Faxed past 60 days of BP readings from [facility] to Dr. Badillo's office.     11/5 - Received email from Whitley indicating BP reading of 122/70 HR 59 with plans to send a reading late Monday. Patient notes no problems with BP transmission once she stopped sitting directly in front of tablet when taking her BP.     Will continue to monitor BP on RHM and await response from Dr. Badillo's office regarding terazosin.     Laila Corral, PharmD

## 2017-11-06 NOTE — TELEPHONE ENCOUNTER
Pt insurance [facility] plus called stated Tresiba is not in their formulary, to please change to something covered. Did not give alternatives.

## 2017-11-07 ENCOUNTER — ANTICOAGULATION VISIT (OUTPATIENT)
Dept: VASCULAR LAB | Facility: MEDICAL CENTER | Age: 72
End: 2017-11-07
Attending: INTERNAL MEDICINE
Payer: MEDICARE

## 2017-11-07 VITALS — HEART RATE: 60 BPM | SYSTOLIC BLOOD PRESSURE: 142 MMHG | DIASTOLIC BLOOD PRESSURE: 60 MMHG

## 2017-11-07 DIAGNOSIS — I48.0 PAF (PAROXYSMAL ATRIAL FIBRILLATION) (HCC): ICD-10-CM

## 2017-11-07 LAB
INR BLD: 2.9 (ref 0.9–1.2)
INR PPP: 2.9 (ref 2–3.5)

## 2017-11-07 PROCEDURE — 99211 OFF/OP EST MAY X REQ PHY/QHP: CPT

## 2017-11-07 PROCEDURE — 85610 PROTHROMBIN TIME: CPT

## 2017-11-07 NOTE — PROGRESS NOTES
Anticoagulation Summary  As of 11/7/2017    INR goal:   2.0-3.0   TTR:   82.5 % (2 y)   Today's INR:   2.9   Maintenance plan:   1.5 mg (3 mg x 0.5) on Fri; 3 mg (3 mg x 1) all other days   Weekly total:   19.5 mg   Weekly max dose:   21 mg   Plan last modified:   Brigitte Horn PharmD (12/2/2016)   Next INR check:   1/2/2018   Target end date:   Indefinite    Indications    PAF (paroxysmal atrial fibrillation) (CMS-HCC) [I48.0]  Warfarin therapy started (Resolved) [Z79.01]             Anticoagulation Episode Summary     INR check location:       Preferred lab:       Send INR reminders to:       Comments:         Anticoagulation Care Providers     Provider Role Specialty Phone number    Amy Lincoln M.D. Referring Cardiology [phone]    [facility] Responsible  [phone]        Anticoagulation Patient Findings  Patient Findings     Negatives:   Signs/symptoms of thrombosis, Signs/symptoms of bleeding, Laboratory test error suspected, Change in health, Change in alcohol use, Change in activity, Upcoming invasive procedure, Emergency department visit, Upcoming dental procedure, Missed doses, Extra doses, Change in medications, Change in diet/appetite, Hospital admission, Bruising, Other complaints        History of Present Illness: follow up appointment for chronic anticoagulation for atrial fibrillation      Pt remains therapeutic today.Pt is to continue with current warfarin dosing regimen.  Pt denies any unusual s/s of bleeding, bruising, clotting or any changes to diet or medications.  Follow up in 8 weeks.    Brigitte Horn PharmD

## 2017-11-08 ENCOUNTER — PATIENT OUTREACH (OUTPATIENT)
Dept: HEALTH INFORMATION MANAGEMENT | Facility: OTHER | Age: 72
End: 2017-11-08

## 2017-11-08 NOTE — PROGRESS NOTES
Received incoming call from Whitley. Transmitted BP reading of 109/65 HR 65. Patient heard back from Dr. Badillo to discontinue the terazosin 1 mg. She was asked by Dr. Badillo to keep the medication on-hand in case her pressures increase. Whitley mentioned that she gained about 5 lbs over Halloween due to increased consumption of candy. Encouraged patient to limit sweets due to diabetes. Reviewed the plate method with patient to help with portion control. Patient sees endocrinologist in Feb. 2018.     With recent discontinuation of terazosin - will continue to monitor BP on RHM.     Laila Corral, PharmD

## 2017-11-09 ENCOUNTER — PATIENT OUTREACH (OUTPATIENT)
Dept: HEALTH INFORMATION MANAGEMENT | Facility: OTHER | Age: 72
End: 2017-11-09

## 2017-11-16 ENCOUNTER — PATIENT OUTREACH (OUTPATIENT)
Dept: HEALTH INFORMATION MANAGEMENT | Facility: OTHER | Age: 72
End: 2017-11-16

## 2017-11-22 ENCOUNTER — PATIENT OUTREACH (OUTPATIENT)
Dept: HEALTH INFORMATION MANAGEMENT | Facility: OTHER | Age: 72
End: 2017-11-22

## 2017-11-25 ENCOUNTER — PATIENT MESSAGE (OUTPATIENT)
Dept: PULMONOLOGY | Facility: HOSPICE | Age: 72
End: 2017-11-25

## 2017-11-25 DIAGNOSIS — J45.909 UNCOMPLICATED ASTHMA, UNSPECIFIED ASTHMA SEVERITY, UNSPECIFIED WHETHER PERSISTENT: ICD-10-CM

## 2017-11-27 ENCOUNTER — PATIENT OUTREACH (OUTPATIENT)
Dept: HEALTH INFORMATION MANAGEMENT | Facility: OTHER | Age: 72
End: 2017-11-27

## 2017-11-27 NOTE — TELEPHONE ENCOUNTER
From: Whitley Frederick  To: EDILMA Mosher  Sent: 11/25/2017 8:03 PM PST  Subject: Prescription Question    Hello .... at one of my appointments I received a sample   QVar 40mcg and now I need a prescription for it ...   I use [facility] on [address] ... Thank you ...

## 2017-11-30 ENCOUNTER — PATIENT OUTREACH (OUTPATIENT)
Dept: HEALTH INFORMATION MANAGEMENT | Facility: OTHER | Age: 72
End: 2017-11-30

## 2017-12-01 ENCOUNTER — PATIENT OUTREACH (OUTPATIENT)
Dept: HEALTH INFORMATION MANAGEMENT | Facility: OTHER | Age: 72
End: 2017-12-01

## 2017-12-01 NOTE — PROGRESS NOTES
Received incoming email from Whitley with BP readings from home Omron meter:       Reviewed M readings from last 2 weeks:       BP has been well-controlled. Plan to discuss graduation from [facility]N program.     Laila Corral, PharmD

## 2017-12-03 DIAGNOSIS — I48.0 PAF (PAROXYSMAL ATRIAL FIBRILLATION) (HCC): ICD-10-CM

## 2017-12-04 ENCOUNTER — TELEPHONE (OUTPATIENT)
Dept: ENDOCRINOLOGY | Facility: MEDICAL CENTER | Age: 72
End: 2017-12-04

## 2017-12-05 RX ORDER — DRONEDARONE 400 MG/1
TABLET, FILM COATED ORAL
Qty: 180 TAB | Refills: 0 | Status: SHIPPED | OUTPATIENT
Start: 2017-12-05 | End: 2018-03-04 | Stop reason: SDUPTHER

## 2017-12-06 ENCOUNTER — PATIENT OUTREACH (OUTPATIENT)
Dept: HEALTH INFORMATION MANAGEMENT | Facility: OTHER | Age: 72
End: 2017-12-06

## 2017-12-06 NOTE — PROGRESS NOTES
Follow-up with pt to discuss resources provided to her by this LSW.     Outbound call to pt. Inquired how she was doing. Pt reports that she is doing well, but that she has not utilized any of the resources provided by this LSW as she had forgotten where she wrote down the information. LSW inquired if pt is still needing information on affordable dental care as well as potentially signing up for dental insurance coverage. Pt states that she would like this information again and requested LSW send her this information via mail. Confirmed address on file is correct.     Inquired if pt was able to follow-up with [employer] to see if she could get her diabetic supplies through them free of charge, monthly. Pt reports that she does not have their information as well. LSW offered to meet with pt in person to complete their application. Pt denied, but requested LSW mail her their application. Explained their application process and income guidelines. Discussed that if she is over their income limit, their program can sometimes do an expense report and make exceptions. Pt verbalized understanding. She reports that she has a car to drive herself to their location and has the needed documentation to complete application (proof of income and residency).     Also inquired if pt has heard anything on the housing lists she has placed herself on. She reports that she is still on the housing list for [employer] and is excited to come up on their waiting list. She reports that she believes she will be able to move into there around June-pending openings.     Pt denies needing anything further.     Plan:  Pt to follow-up with resources provided to her by this LSW   Pt to reach out to LSW with any questions/concerns

## 2017-12-06 NOTE — LETTER
December 6, 2017        Whitley Frederick  [address] [place] [address]        Dear Whitley:    It was nice speaking with you over the phone the other day. As per our conversation, attached are the following resources:   1) [employer] application: this company can potentially assist with providing your diabetic supplies monthly-free of cost (if you qualify income-wise). Complete their application and turn it in in-person with proof of income and residency.   2) Access to Healthcare: This program can provide dental and vision services with their dental plus program. They have income guidelines too, but this may be a good option for you. 3) SHIP: This program is available for individuals signing up for Medicare plans, to meet with individuals who are trained in the different Medicare plans and can assist in signing up for supplemental plans for dental coverage.     If you have any questions or concerns, please don't hesitate to call.        Sincerely,        Lynnette Zhu, DARIUSW, MSW  Care Coordination  [phone]  [email or web address]    Electronically Signed

## 2017-12-07 ENCOUNTER — PATIENT OUTREACH (OUTPATIENT)
Dept: HEALTH INFORMATION MANAGEMENT | Facility: OTHER | Age: 72
End: 2017-12-07

## 2017-12-07 ENCOUNTER — HOSPITAL ENCOUNTER (OUTPATIENT)
Dept: LAB | Facility: MEDICAL CENTER | Age: 72
End: 2017-12-07
Attending: INTERNAL MEDICINE
Payer: MEDICARE

## 2017-12-07 LAB
ALBUMIN SERPL BCP-MCNC: 3.5 G/DL (ref 3.2–4.9)
BASOPHILS # BLD AUTO: 0.7 % (ref 0–1.8)
BASOPHILS # BLD: 0.06 K/UL (ref 0–0.12)
BUN SERPL-MCNC: 58 MG/DL (ref 8–22)
CALCIUM SERPL-MCNC: 9.5 MG/DL (ref 8.5–10.5)
CHLORIDE SERPL-SCNC: 107 MMOL/L (ref 96–112)
CO2 SERPL-SCNC: 25 MMOL/L (ref 20–33)
CREAT SERPL-MCNC: 2.25 MG/DL (ref 0.5–1.4)
EOSINOPHIL # BLD AUTO: 0.19 K/UL (ref 0–0.51)
EOSINOPHIL NFR BLD: 2.2 % (ref 0–6.9)
ERYTHROCYTE [DISTWIDTH] IN BLOOD BY AUTOMATED COUNT: 51.2 FL (ref 35.9–50)
GFR SERPL CREATININE-BSD FRML MDRD: 21 ML/MIN/1.73 M 2
GLUCOSE SERPL-MCNC: 138 MG/DL (ref 65–99)
HCT VFR BLD AUTO: 37.3 % (ref 37–47)
HGB BLD-MCNC: 11.9 G/DL (ref 12–16)
IMM GRANULOCYTES # BLD AUTO: 0.02 K/UL (ref 0–0.11)
IMM GRANULOCYTES NFR BLD AUTO: 0.2 % (ref 0–0.9)
LYMPHOCYTES # BLD AUTO: 3.42 K/UL (ref 1–4.8)
LYMPHOCYTES NFR BLD: 40 % (ref 22–41)
MCH RBC QN AUTO: 30 PG (ref 27–33)
MCHC RBC AUTO-ENTMCNC: 31.9 G/DL (ref 33.6–35)
MCV RBC AUTO: 94 FL (ref 81.4–97.8)
MONOCYTES # BLD AUTO: 0.49 K/UL (ref 0–0.85)
MONOCYTES NFR BLD AUTO: 5.7 % (ref 0–13.4)
NEUTROPHILS # BLD AUTO: 4.38 K/UL (ref 2–7.15)
NEUTROPHILS NFR BLD: 51.2 % (ref 44–72)
NRBC # BLD AUTO: 0 K/UL
NRBC BLD AUTO-RTO: 0 /100 WBC
PHOSPHATE SERPL-MCNC: 3.6 MG/DL (ref 2.5–4.5)
PLATELET # BLD AUTO: 152 K/UL (ref 164–446)
PMV BLD AUTO: 10.4 FL (ref 9–12.9)
POTASSIUM SERPL-SCNC: 4.6 MMOL/L (ref 3.6–5.5)
RBC # BLD AUTO: 3.97 M/UL (ref 4.2–5.4)
SODIUM SERPL-SCNC: 140 MMOL/L (ref 135–145)
WBC # BLD AUTO: 8.6 K/UL (ref 4.8–10.8)

## 2017-12-07 PROCEDURE — 80069 RENAL FUNCTION PANEL: CPT

## 2017-12-07 PROCEDURE — 85025 COMPLETE CBC W/AUTO DIFF WBC: CPT

## 2017-12-07 PROCEDURE — 36415 COLL VENOUS BLD VENIPUNCTURE: CPT

## 2017-12-13 ENCOUNTER — PATIENT OUTREACH (OUTPATIENT)
Dept: HEALTH INFORMATION MANAGEMENT | Facility: OTHER | Age: 72
End: 2017-12-13

## 2017-12-13 NOTE — PROGRESS NOTES
Whitley called concerning about low BPs - 97/50 P66; 99/50 P68; and 96/57 P70. She states she is feeling fine, denies any symptoms of hypotension - no dizziness, no weakness, no fatigue, no confusion or blurry vision. Whitley states she took it right after waking from a nap. Requested patient to retest. Patient reports reading of    129/65 P73. Advised patient to take BP in the morning prior to her medications and we will continue to monitor on RHM.     Laila Corral, PharmD

## 2017-12-13 NOTE — PROGRESS NOTES
Received incoming email from Whitley reporting BP readings from Omron home monitor:         Reviewed last 14 days of readings from M:         Outbound call to Whitley to discuss graduation. Whitley would like to continue BP monitoring with [facility] program.    Will continue to monitor.     Laila Corral, PharmD

## 2017-12-14 ENCOUNTER — PATIENT OUTREACH (OUTPATIENT)
Dept: HEALTH INFORMATION MANAGEMENT | Facility: OTHER | Age: 72
End: 2017-12-14

## 2017-12-20 ENCOUNTER — PATIENT OUTREACH (OUTPATIENT)
Dept: HEALTH INFORMATION MANAGEMENT | Facility: OTHER | Age: 72
End: 2017-12-20

## 2017-12-20 NOTE — PROGRESS NOTES
Received incoming email from patient reporting her readings from Omron home monitor.         These readings are similar to readings from RHM monitor:       Values are mainly within goal of <140/90. Will continue to monitor.     Laila Corral, AraceliD

## 2017-12-21 ENCOUNTER — PATIENT OUTREACH (OUTPATIENT)
Dept: HEALTH INFORMATION MANAGEMENT | Facility: OTHER | Age: 72
End: 2017-12-21

## 2017-12-27 ENCOUNTER — PATIENT OUTREACH (OUTPATIENT)
Dept: HEALTH INFORMATION MANAGEMENT | Facility: OTHER | Age: 72
End: 2017-12-27

## 2017-12-27 NOTE — PROGRESS NOTES
Received incoming email from Whitley with BP readings from Omron home monitor:         Continue to monitor BP on RHM.     Laila Corral, PharmD

## 2017-12-28 ENCOUNTER — PATIENT OUTREACH (OUTPATIENT)
Dept: HEALTH INFORMATION MANAGEMENT | Facility: OTHER | Age: 72
End: 2017-12-28

## 2017-12-28 NOTE — PROGRESS NOTES
"Received incoming call from Whitley with a question regarding azelastine nasal spray with her kidney function. Per [employer], \"there are no dosage adjustments provided in the 's labeling.\" Whitley may take as prescribed. Whitley reports feeling well. She believes her BP was slightly elevated on [place] due to diet.       Discussed that I will continue to monitor BPs on [facility] for the next 2 weeks. If BP remains well-controlled, will have patient continue to monitor BP with home Omron meter and graduate from [facility]. Patient agreeable to plan. Patient can send Omron readings to her providers. Whitley reports average blood sugars in the 150s. She continues to follow with endocrinology. Verified appointments with antico and endocrinology.     Laila Corral, PharmD    "

## 2018-01-02 ENCOUNTER — PATIENT OUTREACH (OUTPATIENT)
Dept: HEALTH INFORMATION MANAGEMENT | Facility: OTHER | Age: 73
End: 2018-01-02

## 2018-01-02 ENCOUNTER — APPOINTMENT (OUTPATIENT)
Dept: MEDICAL GROUP | Facility: PHYSICIAN GROUP | Age: 73
End: 2018-01-02
Payer: MEDICARE

## 2018-01-03 ENCOUNTER — PATIENT OUTREACH (OUTPATIENT)
Dept: HEALTH INFORMATION MANAGEMENT | Facility: OTHER | Age: 73
End: 2018-01-03

## 2018-01-03 NOTE — PROGRESS NOTES
Received incoming email from patient reporting her BP values from home Omron meter:         Will continue to monitor BP on RHM.     Laila Corral, PharmD

## 2018-01-04 ENCOUNTER — PATIENT OUTREACH (OUTPATIENT)
Dept: HEALTH INFORMATION MANAGEMENT | Facility: OTHER | Age: 73
End: 2018-01-04

## 2018-01-04 RX ORDER — SIMVASTATIN 20 MG
TABLET ORAL
Qty: 90 TAB | Refills: 1 | Status: SHIPPED | OUTPATIENT
Start: 2018-01-04 | End: 2018-02-23

## 2018-01-04 NOTE — PROGRESS NOTES
Outbound call to patient on CCM. Patient will be discharged from the program effective today.    Patient is able to verbalize signs and symptoms of hypoglycemia and hyperglycemia, their complications, and wht to do. Patient reports compliance with diet, and medications. Reminded on the importance of adhering to diet to keep her readings within acceptable limits.     Patient reports sinus congestion for a week now, accompanied by yellowish discharge, facial pain/pressure and headache. Recommended scheduling an appointment with PCP or going to the urgent care.    Instructed to call RN CC with any questions, concerns, or assistance needed.

## 2018-01-04 NOTE — PROGRESS NOTES
"Received alert from [facility] on elevated /72. Received incoming email from Whitley : \"The BP is before medications... shouldn't of done that .... I also have sinus infection... I just remembered I forgot yesterday's BP ... I was to hasty and I made a disappointing phone call ... ????... otherwise I'm fine no head ache etc... thank you...\"  Asked patient to retest and retransmit BP reading 2 hours after taking medications. Will continue to monitor on [facility].     Laila Corral, PharmD     "

## 2018-01-05 ENCOUNTER — OFFICE VISIT (OUTPATIENT)
Dept: MEDICAL GROUP | Facility: PHYSICIAN GROUP | Age: 73
End: 2018-01-05
Payer: MEDICARE

## 2018-01-05 VITALS
BODY MASS INDEX: 42.85 KG/M2 | SYSTOLIC BLOOD PRESSURE: 124 MMHG | OXYGEN SATURATION: 99 % | DIASTOLIC BLOOD PRESSURE: 68 MMHG | RESPIRATION RATE: 14 BRPM | WEIGHT: 273 LBS | HEIGHT: 67 IN | TEMPERATURE: 98.2 F | HEART RATE: 58 BPM

## 2018-01-05 DIAGNOSIS — J06.9 UPPER RESPIRATORY TRACT INFECTION, UNSPECIFIED TYPE: ICD-10-CM

## 2018-01-05 PROCEDURE — 99214 OFFICE O/P EST MOD 30 MIN: CPT | Performed by: NURSE PRACTITIONER

## 2018-01-05 RX ORDER — DOXYCYCLINE HYCLATE 100 MG
100 TABLET ORAL 2 TIMES DAILY
Qty: 20 TAB | Refills: 0 | Status: SHIPPED | OUTPATIENT
Start: 2018-01-05 | End: 2018-02-23

## 2018-01-05 NOTE — PROGRESS NOTES
Subjective:     Chief Complaint   Patient presents with   • URI     w/ mucas    Established patient of Juana Berger.    HPI:  Whitley Frederick is a 72 y.o. female here today to discuss the following:    URI (upper respiratory infection)  Patient reports upper respiratory symptoms for a week. She reports sore throat, nasal and sinus congestion and cough with clear mucus. She denies any fever, chills, ear pain, chest pain or shortness of breath.         Current medicines (including changes today)  Current Outpatient Prescriptions   Medication Sig Dispense Refill   • doxycycline (VIBRAMYCIN) 100 MG Tab Take 1 Tab by mouth 2 times a day. 20 Tab 0   • simvastatin (ZOCOR) 20 MG Tab TAKE ONE TABLET BY MOUTH IN THE EVENING 90 Tab 1   • MULTAQ 400 MG Tab TAKE ONE TABLET BY MOUTH TWICE DAILY WITH MEALS 180 Tab 0   • liraglutide (VICTOZA) 18 MG/3ML Solution Pen-injector injection Inject 0.3 mL as instructed every day. 3 PEN 11   • beclomethasone (QVAR) 40 MCG/ACT inhaler Inhale 2 Puffs by mouth 2 Times a Day. Use with spacer.  Rinse mouth after each use. 1 Inhaler 11   • Blood Glucose Monitoring Suppl Supplies Misc Test strips order: Test strips for Accucheck meter. Sig: use BID and prn ssx high or low sugar #100 RF x 3 100 Strip 11   • Insulin Pen Needle 32G X 4 MM Misc 1 Applicator by Does not apply route 3 times a day. 100 Each 11   • Insulin Degludec (TRESIBA FLEXTOUCH) 200 UNIT/ML Solution Pen-injector Inject 50 Units as instructed every bedtime. 3 PEN 2   • fluticasone (FLONASE) 50 MCG/ACT nasal spray Spray 1-2 Sprays in nose every day. Each nostil, 30 minutes prior to use of CPAP 1 Bottle 6   • ferrous gluconate (FERGON) 324 (38 Fe) MG Tab Take 324 mg by mouth 3 times a day, with meals.     • simvastatin (ZOCOR) 20 MG Tab TAKE ONE TABLET BY MOUTH ONCE DAILY IN THE EVENING 90 Tab 3   • levothyroxine (SYNTHROID) 50 MCG Tab Take 1 Tab by mouth every day. 90 Tab 1   • warfarin (COUMADIN) 3 MG Tab Take 1 to 1.5  tablets by mouth daily as directed by the coumadin clinic 135 Tab 1   • levalbuterol (XOPENEX HFA) 45 MCG/ACT inhaler Inhale 2 Puffs by mouth every four hours as needed for Shortness of Breath. 1 Inhaler 3   • Insulin Pen Needle (RELION PEN NEEDLES) 29G X 12MM Misc USE ONE PEN NEEDLE SUBCUTANEOUSLY FOR LANTUS SOLOSTAR 100 Each 3   • Blood Glucose Monitoring Suppl W/DEVICE Kit 1 Each by Does not apply route 2 times a day as needed. 1 Kit 0   • omeprazole (PRILOSEC) 20 MG delayed-release capsule Take 1 Cap by mouth every day. 90 Cap 3   • furosemide (LASIX) 80 MG Tab Take 80 mg by mouth every 48 hours.     • montelukast (SINGULAIR) 10 MG Tab 1 po qhs 90 Tab 3   • metoprolol (LOPRESSOR) 50 MG Tab TAKE ONE TABLET BY MOUTH TWICE DAILY ,  PATIENT  MAY  TAKE  AN  ADDITIONAL  HALF  TABLET  FOR  INCREASED  HEART  RATE. 180 Tab 3   • CHELY CONTOUR NEXT TEST strip      • Blood Glucose Monitoring Suppl SUPPLIES Misc Test strips order: Test strips for Chely Contour meter. Sig: use tid  and prn ssx high or low sugar #100 RF x 3 100 Strip 3   • Diclofenac Sodium (VOLTAREN) 1 % Gel Apply 2 g to skin as directed 4 times a day. 100 g 0   • losartan (COZAAR) 50 MG Tab TAKE ONE TABLET BY MOUTH TWICE DAILY 180 Tab 3   • clonazepam (KLONOPIN) 0.5 MG Tab Take 1 Tab by mouth 2 times a day as needed (insomnia, anxiety). 60 Tab 0   • Misc. Devices MISC Pen needles for Lantus Solstar. 29g 12mm. 90 Each 3   • allopurinol (ZYLOPRIM) 100 MG TABS Take 1/2 pill qd (Patient taking differently: Take 100 mg by mouth 2 Times a Day. Indications: Primary Gout) 90 Tab 4   • Cholecalciferol (VITAMIN D) 2000 UNITS CAPS Take 4,000 Units by mouth every day.     • magnesium oxide (MAGNESIUM OXIDE) 400 (241.3 MG) MG TABS tablet Take 400 mg by mouth every day.       No current facility-administered medications for this visit.        She  has a past medical history of Allergy; Anxiety; Arrhythmia; Arthritis; Asthma; Carotid bruit; CATARACT; Chest pain; DIABETES  "MELLITUS; Esophageal reflux; Goiter; Headache(784.0); Heart murmur; Hyperlipidemia; Hypertension; IBD (inflammatory bowel disease); Migraine; AVERY (obstructive sleep apnea); Overweight(278.02); Palpitations; Paroxysmal atrial fibrillation (CMS-HCC) (7/12/13); Pneumonia; Positive reaction to tuberculin skin test; Renal disease; and Urinary tract infection, site not specified. She also has no past medical history of Breast cancer (CMS-HCC) or Tuberculosis.    ROS   Review of Systems   Constitutional: Negative for fever, chills, weight loss and malaise/fatigue.   HENT: Negative for ear pain, nosebleeds, and positive forcongestion, sore throat and neck pain.    Respiratory: positive for productive cough with clear phlegm  Cardiovascular: Negative for chest pain, palpitations,  and leg swelling.   Gastrointestinal: Negative for heartburn, nausea, vomiting, diarrhea and abdominal pain.   Neurological: positive for headache with facial pain  All other systems reviewed and are negative except as in HPI.   Objective:   Physical Exam:  Blood pressure 124/68, pulse (!) 58, temperature 36.8 °C (98.2 °F), resp. rate 14, height 1.702 m (5' 7\"), weight 123.8 kg (273 lb), SpO2 99 %. Body mass index is 42.76 kg/m².   Physical Exam:  Alert, oriented in no acute distress.  Eye contact is good, speech goal directed, affect calm  HEENT: conjunctiva non-injected, sclera non-icteric.  Pinna normal. And ear canals are clear and TMs within normal limits. Oropharynx is clear without erythema or edema, there is visible thick postnasal drip. Frontal and maxillary tenderness to palpation  Neck No adenopathy or masses in the neck or supraclavicular regions.  Lungs: clear to auscultation bilaterally with good excursion.  CV: regular rate and rhythm.   Abdomen: soft, nontender, No CVAT. Normal bowel sounds.  Ext: no edema, color normal, vascularity normal, temperature normal  MS: Normal gait and station    Health Maintenance Due   Topic Date Due "   • COLON CANCER SCREENING ANNUAL FIT  03/19/1996   • RETINAL SCREENING  10/07/2016     Assessment and Plan:   The following treatment plan was discussed   1. Upper respiratory tract infection, unspecified type  doxycycline (VIBRAMYCIN) 100 MG Tab     Acute sinusitis  Followup: Return if symptoms worsen or fail to improve.   Please note that this dictation was created using voice recognition software. I have made every reasonable attempt to correct obvious errors, but I expect that there are errors of grammar and possibly content that I did not discover before finalizing the note.

## 2018-01-08 ENCOUNTER — PATIENT OUTREACH (OUTPATIENT)
Dept: HEALTH INFORMATION MANAGEMENT | Facility: OTHER | Age: 73
End: 2018-01-08

## 2018-01-09 ENCOUNTER — ANTICOAGULATION VISIT (OUTPATIENT)
Dept: MEDICAL GROUP | Facility: PHYSICIAN GROUP | Age: 73
End: 2018-01-09
Payer: MEDICARE

## 2018-01-09 DIAGNOSIS — I48.0 PAF (PAROXYSMAL ATRIAL FIBRILLATION) (HCC): ICD-10-CM

## 2018-01-09 LAB — INR PPP: 1.8 (ref 2–3.5)

## 2018-01-09 PROCEDURE — 85610 PROTHROMBIN TIME: CPT | Performed by: INTERNAL MEDICINE

## 2018-01-09 PROCEDURE — 99211 OFF/OP EST MAY X REQ PHY/QHP: CPT | Performed by: INTERNAL MEDICINE

## 2018-01-09 NOTE — PROGRESS NOTES
Anticoagulation Summary  As of 1/9/2018    INR goal:   2.0-3.0   TTR:   82.5 % (2.2 y)   Today's INR:   1.8!   Maintenance plan:   1.5 mg (3 mg x 0.5) on Fri; 3 mg (3 mg x 1) all other days   Weekly total:   19.5 mg   Weekly max dose:   21 mg   Plan last modified:   Brigitte Horn, PharmD (12/2/2016)   Next INR check:   2/6/2018   Target end date:   Indefinite    Indications    PAF (paroxysmal atrial fibrillation) (CMS-HCC) [I48.0]  Warfarin therapy started (Resolved) [Z79.01]             Anticoagulation Episode Summary     INR check location:       Preferred lab:       Send INR reminders to:       Comments:         Anticoagulation Care Providers     Provider Role Specialty Phone number    Amy Lincoln M.D. Referring Cardiology [phone]    [facility] Responsible  [phone]        Anticoagulation Patient Findings  Patient Findings     Negatives:   Signs/symptoms of thrombosis, Signs/symptoms of bleeding, Laboratory test error suspected, Change in health, Change in alcohol use, Change in activity, Upcoming invasive procedure, Emergency department visit, Upcoming dental procedure, Missed doses, Extra doses, Change in medications, Change in diet/appetite, Hospital admission, Bruising, Other complaints        HPI:   Whitley Frederick seen in clinic today, on anticoagulation therapy with warfarin for stroke prevention due to history of paroxysmal atrial fibrillation.    Patient's previous INR was therapeutic at 2.9 on 11-7-17, at which time patient was instructed to continue with current warfarin regimen.  She returns to clinic today to recheck INR to ensure it is therapeutic and thus preventing possible clotting and/or bleeding/bruising complications.    CHADS-VASc = at least 4  (unadjusted ischemic stroke risk/year:  4.8%, which is moderate risk)    Does patient have any changes to current medical/health status since last appt (Y/N):  NO  Does patient have any signs/symptoms of bleeding  and/or thrombosis since the last appt (Y/N):  NO  Does patient have any interval changes to diet or medications since last appt (Y/N):  NO  Are there any complications or cost restrictions with current therapy (Y/N):  NO      Vitals:  Patient declines BP/vitals check at today's visit as cuff is too small and uncomfortable    Asssessment:      INR therapeutic at 1.8, therefore increasing risk of stroke due to a-fib.   Reason(s) for out of range INR today:  No identifiable causes for low INR      Plan:  Instructed patient to bolus with 4.5mg X 1, then resume current warfarin regimen.  This will return INR to therapeutic range and maintain for patient.     Follow up:  Because warfarin is a high risk medication and current CHEST guidelines recommend regular monitoring intervals (few days up to 12 weeks), will have patient return to clinic in 4 weeks to recheck INR.    Eh Enamorado, PharmD

## 2018-01-09 NOTE — PROGRESS NOTES
Received incoming email from Whitley reporting her BP readings from her home Omron monitor:       Reviewed RHM readings from last 2 weeks:           Overall, patient's BP is well-controlled. Will continue to monitor BP on RHM.     Laila Corral, PharmD

## 2018-01-15 ENCOUNTER — PATIENT OUTREACH (OUTPATIENT)
Dept: HEALTH INFORMATION MANAGEMENT | Facility: OTHER | Age: 73
End: 2018-01-15

## 2018-01-15 NOTE — ASSESSMENT & PLAN NOTE
Patient reports upper respiratory symptoms for a week. She reports sore throat, nasal and sinus congestion and cough with clear mucus. She denies any fever, chills, ear pain, chest pain or shortness of breath.

## 2018-01-15 NOTE — PROGRESS NOTES
Received incoming email from Whitley regarding BP values obtained from her home Omron BP monitor:         Will continue to monitor.     Laila Corral, PharmD

## 2018-01-16 ENCOUNTER — TELEPHONE (OUTPATIENT)
Dept: ENDOCRINOLOGY | Facility: MEDICAL CENTER | Age: 73
End: 2018-01-16

## 2018-01-16 NOTE — TELEPHONE ENCOUNTER
Was the patient seen in the last year in this department? Yes     Does patient have an active prescription for medications requested? Yes     Received Request Via: Pharmacy     We received a request from pharmacy stating we need a new RX for Freestyle strips needed for INS. I believe she is still using the Accu-check Guide.

## 2018-01-24 ENCOUNTER — PATIENT OUTREACH (OUTPATIENT)
Dept: HEALTH INFORMATION MANAGEMENT | Facility: OTHER | Age: 73
End: 2018-01-24

## 2018-01-24 NOTE — PROGRESS NOTES
"Received [facility] alert of /77 HR 57. Received incoming email from Whitley \"Doing my bills and trying to register online with my new dental plan and IRS not working ... Aníbalr Lionel ... that's why it's high ... I take it later.\"     Review of BP readings show over all good control:         Will continue to monitor.     Laila Corral, PharmD    "

## 2018-01-26 ENCOUNTER — PATIENT OUTREACH (OUTPATIENT)
Dept: HEALTH INFORMATION MANAGEMENT | Facility: OTHER | Age: 73
End: 2018-01-26

## 2018-01-26 NOTE — PROGRESS NOTES
One month follow-up scheduled on this date to discuss resources provided to pt including: Care Chest for expensive medications. Inquired if pt qualifies for the Extra Help program through the social security administration. Pt reports that she makes around $1,650/monthly. Informed pt that she is over the income guidelines for this program. Pt reports having the SPAP program for when she is in the Medicare gap. She reports that she is on a few expensive medications, but that she is still purchasing them. DARIUSW discussed manufacturing assistance programs with pt and gave her the website of [email or web address] to look up if she qualifies for any of the programs. Pt declining meeting with this LSW at this time as she would like to look over the applications before meeting.       Plan:  LSW to follow up with pt in one month on 02/26/18 at 3:30pm

## 2018-01-29 ENCOUNTER — PATIENT OUTREACH (OUTPATIENT)
Dept: HEALTH INFORMATION MANAGEMENT | Facility: OTHER | Age: 73
End: 2018-01-29

## 2018-01-29 NOTE — PROGRESS NOTES
"Received incoming email from Whitley on Sat 01/27 explaining low BP value of 80/65 HR 61 received on RHM.     \"80/65 pulse 61..I'm fine no symptoms ...I think cuff wasn't in a good position...I took it again and it's 115/65 pulse 60...I took it on the Omron 108/65 pulse 61...By the way I tried to make an annual appointment with Dr Vallejo and he is no longer seeing patients in [place]...I will send them to Dr Badillo my Kidney Doctor...\"    Will continue to monitor BP on RHM.     Laila Corral, PharmD    "

## 2018-02-01 ENCOUNTER — PATIENT OUTREACH (OUTPATIENT)
Dept: HEALTH INFORMATION MANAGEMENT | Facility: OTHER | Age: 73
End: 2018-02-01

## 2018-02-01 NOTE — PROGRESS NOTES
"Received RHM alert BP of 141/63 HR 67 yesterday afternoon. Received email from patient  \"Oops I forgot to take my meds ... gonna take them now 4:49pm and I will send you a new BP in about 2 hours ... thanks Whitley.\" Patient re-transmitted vitals at 20:26 with reading of 124/66 HR 63.     Will continue to monitor BP on RHM.     Laila Corral, PharmD    "

## 2018-02-02 DIAGNOSIS — R00.2 PALPITATIONS: ICD-10-CM

## 2018-02-02 RX ORDER — METOPROLOL TARTRATE 50 MG/1
TABLET, FILM COATED ORAL
Refills: 11 | OUTPATIENT
Start: 2018-02-02

## 2018-02-02 RX ORDER — METOPROLOL TARTRATE 50 MG/1
50 TABLET, FILM COATED ORAL 2 TIMES DAILY
Qty: 180 TAB | Refills: 3 | Status: SHIPPED | OUTPATIENT
Start: 2018-02-02 | End: 2019-02-01 | Stop reason: SDUPTHER

## 2018-02-03 NOTE — TELEPHONE ENCOUNTER
Spoke with patient who needed urgent refill of metoprolol 50mg PO Bid.  Electronically sent to her pharmacy.

## 2018-02-06 ENCOUNTER — ANTICOAGULATION VISIT (OUTPATIENT)
Dept: MEDICAL GROUP | Facility: PHYSICIAN GROUP | Age: 73
End: 2018-02-06
Payer: MEDICARE

## 2018-02-06 DIAGNOSIS — Z79.01 CHRONIC ANTICOAGULATION: ICD-10-CM

## 2018-02-06 LAB — INR PPP: 1.5 (ref 2–3.5)

## 2018-02-06 PROCEDURE — 85610 PROTHROMBIN TIME: CPT | Performed by: NURSE PRACTITIONER

## 2018-02-06 PROCEDURE — 99211 OFF/OP EST MAY X REQ PHY/QHP: CPT | Performed by: NURSE PRACTITIONER

## 2018-02-06 NOTE — PROGRESS NOTES
Anticoagulation Summary  As of 2/6/2018    INR goal:   2.0-3.0   TTR:   79.7 % (2.3 y)   Today's INR:   1.5!   Maintenance plan:   3 mg (3 mg x 1) every day   Weekly total:   21 mg   Weekly max dose:   21 mg   Plan last modified:   Eh Enamorado, PharmD (2/6/2018)   Next INR check:   2/20/2018   Target end date:   Indefinite    Indications    Chronic anticoagulation [Z79.01]  Chronic anticoagulation (Resolved) [Z79.01]  PAF (paroxysmal atrial fibrillation) (CMS-[facility]) [I48.0]             Anticoagulation Episode Summary     INR check location:       Preferred lab:       Send INR reminders to:       Comments:         Anticoagulation Care Providers     Provider Role Specialty Phone number    Amy Lincoln M.D. Referring Cardiology [phone]    [facility] Responsible  [phone]        Anticoagulation Patient Findings  Patient Findings     Negatives:   Signs/symptoms of thrombosis, Signs/symptoms of bleeding, Laboratory test error suspected, Change in health, Change in alcohol use, Change in activity, Upcoming invasive procedure, Emergency department visit, Upcoming dental procedure, Missed doses, Extra doses, Change in medications, Change in diet/appetite, Hospital admission, Bruising, Other complaints        HPI:   Whitley Frederick seen in clinic today, on anticoagulation therapy with warfarin for stroke prevention due to history of paroxysmal atrial fibrillation.    Patient's previous INR was subtherapeutic at 1.8 on 1-9-18, at which time patient was instructed to bolus with one dose, then resume current warfarin regimen.  She returns to clinic today to recheck INR to ensure it is therapeutic and thus preventing possible clotting and/or bleeding/bruising complications.    CHADS-VASc = at least 4  (unadjusted ischemic stroke risk/year:  4.8%, which is moderate risk)    Does patient have any changes to current medical/health status since last appt (Y/N):  NO  Does patient have any  signs/symptoms of bleeding and/or thrombosis since the last appt (Y/N):  NO  Does patient have any interval changes to diet or medications since last appt (Y/N):  NO  Are there any complications or cost restrictions with current therapy (Y/N):  NO      Vitals:  Patient declines BP/vitals check today as cuff is too small and uncomfortable.     Asssessment:      INR remains subtherapeutic at 1.5, therefore increasing patient's risk of stroke due to a-fib   Reason(s) for out of range INR today:  No identifiable causes for low INR, denies large increase in VitK rich foods, missed doses, or V8 juice.      Plan:  Instructed patient to bolus with 6mg X 1, then increase weekly warfarin regimen by ~7% as detailed above in order to bring INR back to therapeutic range.     Follow up:  Because warfarin is a high risk medication and current CHEST guidelines recommend regular monitoring intervals (few days up to 12 weeks), will have patient return to clinic in 2 weeks to recheck INR.    Eh Enamorado, AraceliD

## 2018-02-09 ENCOUNTER — PATIENT OUTREACH (OUTPATIENT)
Dept: HEALTH INFORMATION MANAGEMENT | Facility: OTHER | Age: 73
End: 2018-02-09

## 2018-02-09 ENCOUNTER — TELEPHONE (OUTPATIENT)
Dept: MEDICAL GROUP | Facility: LAB | Age: 73
End: 2018-02-09

## 2018-02-09 DIAGNOSIS — Z78.0 MENOPAUSE: ICD-10-CM

## 2018-02-09 DIAGNOSIS — Z12.11 SCREENING FOR COLON CANCER: ICD-10-CM

## 2018-02-09 DIAGNOSIS — Z12.11 SPECIAL SCREENING FOR MALIGNANT NEOPLASM OF COLON: ICD-10-CM

## 2018-02-09 NOTE — PROGRESS NOTES
1. Attempt #: Final    2. HealthConnect Verified: yes    3. Verify PCP: yes    4. Care Team Updated:       •   DME Company (gait device, O2, CPAP, etc.): YES/ [facility]       •   Other Specialists (eye doctor, derm, GYN, cardiology, endo, etc): YES    5.  Reviewed/Updated the following with patient:       •   Communication Preference Obtained? YES       •   Preferred Pharmacy? YES       •   Preferred Lab? YES       •   Family History (document living status of immediate family members and if + hx of cancer, diabetes, hypertension, hyperlipidemia, heart attack, stroke) YES. Was Abstract Encounter opened and chart updated? YES    6. [facility] Activation: already active    7. [facility] Dara: yes    8. Annual Wellness Visit Scheduling  Scheduling Status:Scheduled      9. Care Gap Scheduling (Attempt to Schedule EACH Overdue Care Gap!)     Health Maintenance Due   Topic Date Due   • COLON CANCER SCREENING ANNUAL FIT  03/19/1996 / Mailed Fit Test   • RETINAL SCREENING  10/07/2016 Has an appt scheduled   • BONE DENSITY  01/31/2018        Scheduled patient for Annual Wellness Visit/ Dexa    10. Patient was advised: “This is a free wellness visit. The provider will screen for medical conditions to help you stay healthy. If you have other concerns to address you may be asked to discuss these at a separate visit or there may be an additional fee.”     11. Patient was informed to arrive 15 min prior to their scheduled appointment and bring in their medication bottles.

## 2018-02-13 ENCOUNTER — TELEPHONE (OUTPATIENT)
Dept: HEALTH INFORMATION MANAGEMENT | Facility: OTHER | Age: 73
End: 2018-02-13

## 2018-02-13 ENCOUNTER — PATIENT OUTREACH (OUTPATIENT)
Dept: HEALTH INFORMATION MANAGEMENT | Facility: OTHER | Age: 73
End: 2018-02-13

## 2018-02-13 DIAGNOSIS — I10 ESSENTIAL HYPERTENSION: ICD-10-CM

## 2018-02-13 NOTE — TELEPHONE ENCOUNTER
Dear Juana,     Your patient has graduated from the remote health monitoring hypertension program. Her BP values have been well-controlled on metoprolol tartrate 50 mg BID, losartan 50 mg BID, and furosemide 80 mg every other day.         Patient will continue to monitor her blood pressures using her home Omron meter and will follow with you and her nephrologist Dr. Badillo for blood pressure control.     Thank you,   Laila Corral, PharmD  X [phone]

## 2018-02-13 NOTE — PROGRESS NOTES
"Received incoming email from Whitley on 02/12 ....\"On my Carmen immediately after WILNER's BP machine 112/73 pulse 67.\" Reviewed patient's BP readings on [facility]:         Patient's BP has been well-controlled on metoprolol tartrate 50 mg BID, losartan 50 mg BID, and furosemide 80 mg every other day. Discussed graduation from [facility] program and patient agrees with plan. She is familiar with proper BP technique. Discussed maintaining a low-sodium diet and patient will continue to monitor her blood pressure on her Omron meter and will report values to her PCP and nephrologist. Patient has follow-up with her endocrinologist on 02/26. Patient states her blood sugar values are good when she eats the right things. Recently fasting sugars have been around 160.     Sent order for end of program to care coordination MA. Sent message to PCP and Dr. Badillo regarding patient's graduation from [facility] HTN program. Patient has my contact information for medication questions/ concerns.     Laila Corral, PharmD    "

## 2018-02-14 ENCOUNTER — HOSPITAL ENCOUNTER (OUTPATIENT)
Dept: LAB | Facility: MEDICAL CENTER | Age: 73
End: 2018-02-14
Attending: INTERNAL MEDICINE
Payer: MEDICARE

## 2018-02-14 ENCOUNTER — TELEPHONE (OUTPATIENT)
Dept: CARDIOLOGY | Facility: MEDICAL CENTER | Age: 73
End: 2018-02-14

## 2018-02-14 LAB
ALBUMIN SERPL BCP-MCNC: 3.1 G/DL (ref 3.2–4.9)
BASOPHILS # BLD AUTO: 0.8 % (ref 0–1.8)
BASOPHILS # BLD: 0.06 K/UL (ref 0–0.12)
BUN SERPL-MCNC: 49 MG/DL (ref 8–22)
CALCIUM SERPL-MCNC: 9.2 MG/DL (ref 8.5–10.5)
CHLORIDE SERPL-SCNC: 108 MMOL/L (ref 96–112)
CO2 SERPL-SCNC: 25 MMOL/L (ref 20–33)
CREAT SERPL-MCNC: 1.83 MG/DL (ref 0.5–1.4)
CREAT UR-MCNC: 105.6 MG/DL
EOSINOPHIL # BLD AUTO: 0.19 K/UL (ref 0–0.51)
EOSINOPHIL NFR BLD: 2.4 % (ref 0–6.9)
ERYTHROCYTE [DISTWIDTH] IN BLOOD BY AUTOMATED COUNT: 46.8 FL (ref 35.9–50)
FERRITIN SERPL-MCNC: 92 NG/ML (ref 10–291)
GLUCOSE SERPL-MCNC: 251 MG/DL (ref 65–99)
HCT VFR BLD AUTO: 39.8 % (ref 37–47)
HGB BLD-MCNC: 13.2 G/DL (ref 12–16)
IMM GRANULOCYTES # BLD AUTO: 0.02 K/UL (ref 0–0.11)
IMM GRANULOCYTES NFR BLD AUTO: 0.3 % (ref 0–0.9)
IRON SATN MFR SERPL: 21 % (ref 15–55)
IRON SERPL-MCNC: 74 UG/DL (ref 40–170)
LYMPHOCYTES # BLD AUTO: 2.86 K/UL (ref 1–4.8)
LYMPHOCYTES NFR BLD: 36.3 % (ref 22–41)
MAGNESIUM SERPL-MCNC: 2.1 MG/DL (ref 1.5–2.5)
MCH RBC QN AUTO: 31.1 PG (ref 27–33)
MCHC RBC AUTO-ENTMCNC: 33.2 G/DL (ref 33.6–35)
MCV RBC AUTO: 93.6 FL (ref 81.4–97.8)
MONOCYTES # BLD AUTO: 0.51 K/UL (ref 0–0.85)
MONOCYTES NFR BLD AUTO: 6.5 % (ref 0–13.4)
NEUTROPHILS # BLD AUTO: 4.24 K/UL (ref 2–7.15)
NEUTROPHILS NFR BLD: 53.7 % (ref 44–72)
NRBC # BLD AUTO: 0 K/UL
NRBC BLD-RTO: 0 /100 WBC
PHOSPHATE SERPL-MCNC: 2.7 MG/DL (ref 2.5–4.5)
PLATELET # BLD AUTO: 136 K/UL (ref 164–446)
PMV BLD AUTO: 10.3 FL (ref 9–12.9)
POTASSIUM SERPL-SCNC: 4.7 MMOL/L (ref 3.6–5.5)
PROT UR-MCNC: 100.5 MG/DL (ref 0–15)
PROT/CREAT UR: 952 MG/G (ref 10–107)
RBC # BLD AUTO: 4.25 M/UL (ref 4.2–5.4)
SODIUM SERPL-SCNC: 140 MMOL/L (ref 135–145)
TIBC SERPL-MCNC: 358 UG/DL (ref 250–450)
WBC # BLD AUTO: 7.9 K/UL (ref 4.8–10.8)

## 2018-02-14 PROCEDURE — 84156 ASSAY OF PROTEIN URINE: CPT

## 2018-02-14 PROCEDURE — 83540 ASSAY OF IRON: CPT

## 2018-02-14 PROCEDURE — 82570 ASSAY OF URINE CREATININE: CPT

## 2018-02-14 PROCEDURE — 83735 ASSAY OF MAGNESIUM: CPT

## 2018-02-14 PROCEDURE — 83550 IRON BINDING TEST: CPT

## 2018-02-14 PROCEDURE — 85025 COMPLETE CBC W/AUTO DIFF WBC: CPT

## 2018-02-14 PROCEDURE — 82728 ASSAY OF FERRITIN: CPT

## 2018-02-14 PROCEDURE — 80069 RENAL FUNCTION PANEL: CPT

## 2018-02-14 PROCEDURE — 36415 COLL VENOUS BLD VENIPUNCTURE: CPT

## 2018-02-14 NOTE — TELEPHONE ENCOUNTER
----- Message from Preeti Madsen sent at 2/14/2018 10:01 AM PST -----  Regarding: Problem with having PVC's  WOJCIECH/Alfonso    Patient said that she has been having a lot more PVC's in the last 2 weeks and she wants a call back at [phone].    -------------------------------------------------------------------------    S/w pt about symptoms. She reports occasional episodes of palpitations that cause her anxiety but no dizziness, lightheadedness or SOB. She relates the episodes to her positioning (happens when she is lying on her right side or sitting leaning to one side). She is taking all medications as prescribed and has no other concerning symptoms. She did not say that she checks her HR during these episodes.  BP well controlled. She is on Multaq and warfarin for PAF.     Pt is over due for a 1 year follow-up. Scheduled pt a sooner follow-up 2/23 and advised if symptoms worsen to call office to notify.

## 2018-02-15 ENCOUNTER — PATIENT OUTREACH (OUTPATIENT)
Dept: HEALTH INFORMATION MANAGEMENT | Facility: OTHER | Age: 73
End: 2018-02-15

## 2018-02-15 NOTE — PROGRESS NOTES
LSW outreach to pt to graduate her from   as pt has all needed resource to follow-up with independently. No answer, LVM with contact information requesting TC back.       Plan:  LSW to attempt to reach pt once more, unless LSW hears from pt sooner.

## 2018-02-20 ENCOUNTER — PATIENT OUTREACH (OUTPATIENT)
Dept: HEALTH INFORMATION MANAGEMENT | Facility: OTHER | Age: 73
End: 2018-02-20

## 2018-02-20 ENCOUNTER — ANTICOAGULATION VISIT (OUTPATIENT)
Dept: MEDICAL GROUP | Facility: PHYSICIAN GROUP | Age: 73
End: 2018-02-20
Payer: MEDICARE

## 2018-02-20 DIAGNOSIS — Z79.01 CHRONIC ANTICOAGULATION: ICD-10-CM

## 2018-02-20 LAB — INR PPP: 2.2 (ref 2–3.5)

## 2018-02-20 PROCEDURE — 85610 PROTHROMBIN TIME: CPT | Performed by: FAMILY MEDICINE

## 2018-02-20 PROCEDURE — 99999 PR NO CHARGE: CPT | Performed by: FAMILY MEDICINE

## 2018-02-20 NOTE — PROGRESS NOTES
Anticoagulation Summary  As of 2/20/2018    INR goal:   2.0-3.0   TTR:   78.9 % (2.3 y)   Today's INR:   2.2   Maintenance plan:   3 mg (3 mg x 1) every day   Weekly total:   21 mg   Weekly max dose:   21 mg   Plan last modified:   Eh Enamorado, PharmD (2/6/2018)   Next INR check:   3/13/2018   Target end date:   Indefinite    Indications    Chronic anticoagulation [Z79.01]  Chronic anticoagulation (Resolved) [Z79.01]  PAF (paroxysmal atrial fibrillation) (CMS-HCC) [I48.0]             Anticoagulation Episode Summary     INR check location:       Preferred lab:       Send INR reminders to:       Comments:         Anticoagulation Care Providers     Provider Role Specialty Phone number    Amy Lincoln M.D. Referring Cardiology [phone]    [facility] Responsible  [phone]        Anticoagulation Patient Findings    HPI:   Whitley Frederick seen in clinic today, on anticoagulation therapy with warfarin for stroke prevention due to history of paroxysmal atrial fibrillation.    Patient's previous INR was subtherapeutic at 1.5 on 2-6-18, at which time patient was instructed to bolus with one dose, then increase weekly warfarin regimen.  She returns to clinic today to recheck INR to ensure it is therapeutic and thus preventing possible clotting and/or bleeding/bruising complications.    CHADS-VASc = at least 4  (unadjusted ischemic stroke risk/year:  4.8%, which is moderate risk)    Does patient have any changes to current medical/health status since last appt (Y/N):  NO  Does patient have any signs/symptoms of bleeding and/or thrombosis since the last appt (Y/N):  NO  Does patient have any interval changes to diet or medications since last appt (Y/N):  NO  Are there any complications or cost restrictions with current therapy (Y/N):  NO      Vitals:  Patient declines BP/vitals check as cuff is too small and uncomfortable    Asssessment:      INR therapeutic at 2.2, therefore decreasing  patient's risk of stroke and/or bleeding/bruising complications.   Reason(s) for out of range INR today:  n/a      Plan:  Pt is to continue with current warfarin dosing regimen as this dose has brought INR back to therapeutic range.     Follow up:  Because warfarin is a high risk medication and current CHEST guidelines recommend regular monitoring intervals (few days up to 12 weeks), will have patient return to clinic in 3 weeks to recheck INR.    Eh Enamorado, PharmD

## 2018-02-20 NOTE — PROGRESS NOTES
Outbound call to pt to discharge her from  . Pt reports that she is pursuing resources independently such as: Care Chest and needymeds.org. She reports no difficulty in doing this, she just started the process as she has been very busy. No other social determinants to health identified at this time. Pt reports that she plans to purchase her diabetic supplies and meds soon as she has set aside money for this. Pt agreeable to graduation from [facility] as she has all needed resources.     Plan:  PLACIDO to graduate pt from [facility].

## 2018-02-23 ENCOUNTER — OFFICE VISIT (OUTPATIENT)
Dept: CARDIOLOGY | Facility: MEDICAL CENTER | Age: 73
End: 2018-02-23
Payer: MEDICARE

## 2018-02-23 VITALS
OXYGEN SATURATION: 96 % | HEART RATE: 60 BPM | SYSTOLIC BLOOD PRESSURE: 146 MMHG | WEIGHT: 274 LBS | BODY MASS INDEX: 43 KG/M2 | DIASTOLIC BLOOD PRESSURE: 68 MMHG | HEIGHT: 67 IN

## 2018-02-23 DIAGNOSIS — I48.91 ATRIAL FIBRILLATION, UNSPECIFIED TYPE (HCC): ICD-10-CM

## 2018-02-23 DIAGNOSIS — I65.23 BILATERAL CAROTID ARTERY STENOSIS: ICD-10-CM

## 2018-02-23 DIAGNOSIS — Z79.01 CHRONIC ANTICOAGULATION: ICD-10-CM

## 2018-02-23 DIAGNOSIS — I10 ESSENTIAL HYPERTENSION: ICD-10-CM

## 2018-02-23 DIAGNOSIS — E78.5 DYSLIPIDEMIA: ICD-10-CM

## 2018-02-23 DIAGNOSIS — I49.3 PVC (PREMATURE VENTRICULAR CONTRACTION): ICD-10-CM

## 2018-02-23 PROCEDURE — 99214 OFFICE O/P EST MOD 30 MIN: CPT | Performed by: INTERNAL MEDICINE

## 2018-02-23 ASSESSMENT — ENCOUNTER SYMPTOMS
BLURRED VISION: 0
SHORTNESS OF BREATH: 0
INSOMNIA: 0
NERVOUS/ANXIOUS: 1
PALPITATIONS: 1
LOSS OF CONSCIOUSNESS: 0
DEPRESSION: 1
PND: 0
CHILLS: 0
HEARTBURN: 1
ABDOMINAL PAIN: 0
DIZZINESS: 0
ORTHOPNEA: 0
FEVER: 0
MYALGIAS: 0

## 2018-02-23 NOTE — LETTER
[facility]   [address], [facility] [address]  [place] [address]  Phone: [phone]  Fax: [phone]              Whitley Frederick  1945    Encounter Date: 2/23/2018    Fernando Sorto M.D.          PROGRESS NOTE:  Subjective:   Whitley Frederick is a 71 y.o. female who presents today for annual follow up of atrial fibrillation on chronic anticoagulation therapy.    Since the patient's last visit on 12/14/16, she has been doing poorly. She was started on amlodipine and clonidine by Dr. Badillo for elevated blood pressure, however, she could not tolerate either medications due to side effects. She denies shortness of breath, nausea/vomiting or diaphoresis. Her blood pressure has been high.    Past Medical History:   Diagnosis Date   • Allergy    • Anxiety    • Arrhythmia    • Arthritis    • Asthma    • Carotid bruit     Left   • CATARACT    • Chest pain    • DIABETES MELLITUS    • Esophageal reflux    • Goiter    • Headache(784.0)    • Heart murmur    • Hyperlipidemia    • Hypertension    • IBD (inflammatory bowel disease)    • Migraine     Silent migraine   • AVERY (obstructive sleep apnea)    • Overweight(278.02)    • Palpitations    • Paroxysmal atrial fibrillation (CMS-HCC) 7/12/13    woke with palps, AFib per ekg in am.   • Pneumonia     Nov. 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 Badillo   • Urinary tract infection, site not specified      Past Surgical History:   Procedure Laterality Date   • ABDOMINAL HYSTERECTOMY TOTAL     • CATARACT PHACO WITH IOL Right 10/25/2016    Procedure: CATARACT PHACO WITH IOL;  Surgeon: Zoran Gamble M.D.;  Location: SURGERY SAME DAY [facility];  Service:    • CHOLECYSTECTOMY     • TONSILLECTOMY     • US-NEEDLE CORE BX-BREAST PANEL       Family History   Problem Relation Age of Onset   • Cancer Maternal Aunt    • Diabetes  Maternal Aunt    • Heart Disease Maternal Aunt    • Hypertension Maternal Aunt    • Hyperlipidemia Maternal Aunt    • Cancer Mother      Leukemia   • Hypertension Mother    • Diabetes Mother    • Lung Disease Father    • Cancer Father      Lung   • Lung Disease Brother    • Stroke Brother    • Diabetes Brother    • Heart Disease Brother    • Diabetes Maternal Grandmother    • Hypertension Brother    • Diabetes Brother      History   Smoking Status   • Former Smoker   • Packs/day: 1.00   • Years: 20.00   • Types: Cigarettes   • Quit date: 1/1/1983   Smokeless Tobacco   • Never Used     Allergies   Allergen Reactions   • Amlodipine Itching   • Amaryl      GI Problems   • Avandia [Rosiglitazone Maleate]      GI problems   • Cipro Xr      Possibly causes Chills.   • Clonidine      Rapid heart rate, swelling    • Contrast Media With Iodine [Iodine] Hives   • Glipizide      GI Problems   • Glucophage [Metformin Hydrochloride]      dizzy   • Hydralazine      Rapid heart rate, chest tightness   • Levaquin      Possible allergy - chills with cipro but sick   • Relafen [Nabumetone]      Itching; avoids all nsaids     Medications reviewed.    Outpatient Encounter Prescriptions as of 2/23/2018   Medication Sig Dispense Refill   • Urine Glucose-Ketones Test Strip 1 Strip by In Vitro route every day. 100 Strip 2   • metoprolol (LOPRESSOR) 50 MG Tab Take 1 Tab by mouth 2 times a day. 180 Tab 3   • glucose blood (FREESTYLE LITE) strip 1 Strip by Other route 2 times a day, with meals. 50 Strip 11   • MULTAQ 400 MG Tab TAKE ONE TABLET BY MOUTH TWICE DAILY WITH MEALS 180 Tab 0   • liraglutide (VICTOZA) 18 MG/3ML Solution Pen-injector injection Inject 0.3 mL as instructed every day. 3 PEN 11   • beclomethasone (QVAR) 40 MCG/ACT inhaler Inhale 2 Puffs by mouth 2 Times a Day. Use with spacer.  Rinse mouth after each use. 1 Inhaler 11   • Blood Glucose Monitoring Suppl Supplies Misc Test strips order: Test strips for Accucheck meter. Sig:  use BID and prn ssx high or low sugar #100 RF x 3 100 Strip 11   • Insulin Pen Needle 32G X 4 MM Misc 1 Applicator by Does not apply route 3 times a day. 100 Each 11   • Insulin Degludec (TRESIBA FLEXTOUCH) 200 UNIT/ML Solution Pen-injector Inject 50 Units as instructed every bedtime. 3 PEN 2   • fluticasone (FLONASE) 50 MCG/ACT nasal spray Spray 1-2 Sprays in nose every day. Each nostil, 30 minutes prior to use of CPAP 1 Bottle 6   • ferrous gluconate (FERGON) 324 (38 Fe) MG Tab Take 324 mg by mouth 3 times a day, with meals.     • simvastatin (ZOCOR) 20 MG Tab TAKE ONE TABLET BY MOUTH ONCE DAILY IN THE EVENING 90 Tab 3   • levothyroxine (SYNTHROID) 50 MCG Tab Take 1 Tab by mouth every day. 90 Tab 1   • warfarin (COUMADIN) 3 MG Tab Take 1 to 1.5 tablets by mouth daily as directed by the coumadin clinic 135 Tab 1   • levalbuterol (XOPENEX HFA) 45 MCG/ACT inhaler Inhale 2 Puffs by mouth every four hours as needed for Shortness of Breath. 1 Inhaler 3   • Insulin Pen Needle (RELION PEN NEEDLES) 29G X 12MM Misc USE ONE PEN NEEDLE SUBCUTANEOUSLY FOR LANTUS SOLOSTAR 100 Each 3   • Blood Glucose Monitoring Suppl W/DEVICE Kit 1 Each by Does not apply route 2 times a day as needed. 1 Kit 0   • omeprazole (PRILOSEC) 20 MG delayed-release capsule Take 1 Cap by mouth every day. 90 Cap 3   • furosemide (LASIX) 80 MG Tab Take 80 mg by mouth every 48 hours.     • montelukast (SINGULAIR) 10 MG Tab 1 po qhs 90 Tab 3   • Blood Glucose Monitoring Suppl SUPPLIES Misc Test strips order: Test strips for Chely Contour meter. Sig: use tid  and prn ssx high or low sugar #100 RF x 3 100 Strip 3   • Diclofenac Sodium (VOLTAREN) 1 % Gel Apply 2 g to skin as directed 4 times a day. 100 g 0   • losartan (COZAAR) 50 MG Tab TAKE ONE TABLET BY MOUTH TWICE DAILY 180 Tab 3   • clonazepam (KLONOPIN) 0.5 MG Tab Take 1 Tab by mouth 2 times a day as needed (insomnia, anxiety). 60 Tab 0   • Misc. Devices MISC Pen needles for Lantus Solstar. 29g 12mm. 90  "Each 3   • allopurinol (ZYLOPRIM) 100 MG TABS Take 1/2 pill qd (Patient taking differently: Take 100 mg by mouth 2 Times a Day. Indications: Primary Gout) 90 Tab 4   • Cholecalciferol (VITAMIN D) 2000 UNITS CAPS Take 4,000 Units by mouth every day.     • magnesium oxide (MAGNESIUM OXIDE) 400 (241.3 MG) MG TABS tablet Take 400 mg by mouth every day.     • doxycycline (VIBRAMYCIN) 100 MG Tab Take 1 Tab by mouth 2 times a day. (Patient not taking: Reported on 2/23/2018) 20 Tab 0   • simvastatin (ZOCOR) 20 MG Tab TAKE ONE TABLET BY MOUTH IN THE EVENING 90 Tab 1   • HAYLEE CONTOUR NEXT TEST strip        No facility-administered encounter medications on file as of 2/23/2018.      Review of Systems   Constitutional: Negative for chills and fever.        Weight gain.   HENT: Negative for congestion.    Eyes: Negative for blurred vision.   Respiratory: Negative for shortness of breath.         Occasional asthma attacks.   Cardiovascular: Positive for palpitations. Negative for chest pain, orthopnea, leg swelling and PND.   Gastrointestinal: Positive for heartburn. Negative for abdominal pain.   Genitourinary: Negative for dysuria.   Musculoskeletal: Negative for joint pain and myalgias.   Skin: Negative for rash.   Neurological: Negative for dizziness and loss of consciousness.   Psychiatric/Behavioral: Positive for depression. The patient is nervous/anxious. The patient does not have insomnia.         Stress.        Objective:   /68   Pulse 60   Ht 1.702 m (5' 7\")   Wt 124.3 kg (274 lb)   SpO2 96%   BMI 42.91 kg/m²      Physical Exam   Constitutional: She is oriented to person, place, and time. She appears well-developed and well-nourished.   HENT:   Head: Normocephalic and atraumatic.   Eyes: Conjunctivae are normal.   Neck: Normal range of motion. Neck supple.   Cardiovascular: Normal rate and regular rhythm.    Pulmonary/Chest: Effort normal and breath sounds normal.   Abdominal: Soft. Bowel sounds are normal.  "   Musculoskeletal: Normal range of motion. She exhibits no edema.   Neurological: She is alert and oriented to person, place, and time.   Skin: Skin is warm and dry.   Psychiatric: Her mood appears anxious. She exhibits a depressed mood.     CARDIAC STUDIES/PROCEDURES:     CARDIAC CATHETERIZATION CONCLUSIONS (04/18/14)   1. No angiographic evidence of coronary artery disease.   2. Normal left ventricular systolic function with ejection fraction of 50-55%.   3. Elevated left ventricular end-diastolic pressure.     CARDIAC PET (04/08/14)   1. Evidence of distal anterior apical myocardial infarction with a moderate   amount of septal and mid anterior wall ischemia.   2. Left bundle branch block.   3. Dipyridamole-induced angina.   4. Significant left ventricular dysfunction with anterior apical hypokinesis   with ejection fraction of 45% which decreased with stress.     CAROTID ULTRASOUND (06/16/14)  Mild bilateral internal carotid artery stenosis (<50%).   Antegrade flow, bilateral vertebral arteries.     CAROTID ULTRASOUND (03/05/12)  Atherosclerotic disease as described, left greater than right. Resistant stenosis is less than 50%.    ECHOCARDIOGRAM CONCLUSIONS (04/10/14)   Normal left ventricular chamber size. Normal left ventricular systolic   function. Grade II diastolic dysfunction is present. Abnormal   (paradoxical) septal motion consistent with left bundle branch block.   Mild concentric left ventricular hypertrophy. Left ventricular ejection   fraction is 50% to 55%.  Severely dilated left atrium. 48 ml/m^2.  Mild to moderate mitral regurgitation.  The aortic valve is not well visualized. Aortic sclerosis without   stenosis. Trace aortic insufficiency.  Trace tricuspid regurgitation. Right ventricular systolic pressure is   estimated to be 27 mmHg.  No pericardial effusion seen.   Normal aortic root diameter 3.0 cm  Compared to prior study done on 7/12/13 no significant change seen   prior study pt was in  atrial fibrillation. Current study pt in NSR.     EKG performed on (04/18/14) EKG shows normal sinus rhythm with left bundle branch block.     Laboratory results of (10/17/15) were reviewed. Cholesterol profile of 195/138/51/116 noted.    Assessment:     1. Atrial fibrillation    2. Chronic anticoagulation    3. Hypertension    4. Hyperlipidemia    5. Left bundle branch block    6. Carotid stenosis, bilateral      Medical Decision Making:  Today's Assessment / Status / Plan:      1. Atrial fibrillation on anticoagulation therapy (warfarin): She is doing well in normal sinus rhythm on Multaq and chronic anticoagulation therapy.   2. Hypertension: Blood pressure is high today. She could not tolerate amlodipine due to lower extremity edema. She will continue with clonidine half tablet BID and have her follow up with Dr. Badillo.  3. Hyperlipidemia: She is doing well on statin therapy without myalgia symptoms. We will repeat labs including fasting lipid profile.  4. Carotid artery stenosis: Clinically stable on medical therapy.  5. Renal insufficiency (managed by Dr. Badillo)    We will follow up the patient in one year.    CC Haydee Garcia and Ania Garcia           No Recipients

## 2018-02-23 NOTE — PROGRESS NOTES
Subjective:   Whitley Frederick is a 72 y.o. female who presents today for follow up of palpitations with premature ventricular contractions, atrial fibrillation on chronic anticoagulation therapy.    Since the patient's last visit on 12/14/16, she has been doing poorly. She has been experiencing moderate to severe palpitations which occurs daily for the 3 weeks. She denies shortness of breath, nausea/vomiting or diaphoresis.     Past Medical History:   Diagnosis Date   • Allergy    • Anxiety    • Arrhythmia    • Arthritis    • Asthma    • Carotid bruit     Left   • CATARACT    • Chest pain    • DIABETES MELLITUS    • Esophageal reflux    • Goiter    • Headache(784.0)    • Heart murmur    • Hyperlipidemia    • Hypertension    • IBD (inflammatory bowel disease)    • Migraine     Silent migraine   • AVERY (obstructive sleep apnea)    • Overweight(278.02)    • Palpitations    • Paroxysmal atrial fibrillation (CMS-HCC) 7/12/13    woke with palps, AFib per ekg in am.   • Pneumonia     Nov. 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 Badillo   • Urinary tract infection, site not specified      Past Surgical History:   Procedure Laterality Date   • ABDOMINAL HYSTERECTOMY TOTAL     • CATARACT PHACO WITH IOL Right 10/25/2016    Procedure: CATARACT PHACO WITH IOL;  Surgeon: Zoran Gamble M.D.;  Location: SURGERY SAME DAY [facility];  Service:    • CHOLECYSTECTOMY     • TONSILLECTOMY     • US-NEEDLE CORE BX-BREAST PANEL       Family History   Problem Relation Age of Onset   • Cancer Maternal Aunt    • Diabetes Maternal Aunt    • Heart Disease Maternal Aunt    • Hypertension Maternal Aunt    • Hyperlipidemia Maternal Aunt    • Cancer Mother      Leukemia   • Hypertension Mother    • Diabetes Mother    • Lung Disease Father    • Cancer Father      Lung   • Lung Disease Brother    • Stroke Brother    • Diabetes Brother    •  Heart Disease Brother    • Diabetes Maternal Grandmother    • Hypertension Brother    • Diabetes Brother      History   Smoking Status   • Former Smoker   • Packs/day: 1.00   • Years: 20.00   • Types: Cigarettes   • Quit date: 1/1/1983   Smokeless Tobacco   • Never Used     Allergies   Allergen Reactions   • Amlodipine Itching   • Amaryl      GI Problems   • Avandia [Rosiglitazone Maleate]      GI problems   • Cipro Xr      Possibly causes Chills.   • Clonidine      Rapid heart rate, swelling    • Contrast Media With Iodine [Iodine] Hives   • Glipizide      GI Problems   • Glucophage [Metformin Hydrochloride]      dizzy   • Hydralazine      Rapid heart rate, chest tightness   • Levaquin      Possible allergy - chills with cipro but sick   • Relafen [Nabumetone]      Itching; avoids all nsaids     Medications reviewed.    Outpatient Encounter Prescriptions as of 2/23/2018   Medication Sig Dispense Refill   • Urine Glucose-Ketones Test Strip 1 Strip by In Vitro route every day. 100 Strip 2   • metoprolol (LOPRESSOR) 50 MG Tab Take 1 Tab by mouth 2 times a day. 180 Tab 3   • glucose blood (FREESTYLE LITE) strip 1 Strip by Other route 2 times a day, with meals. 50 Strip 11   • MULTAQ 400 MG Tab TAKE ONE TABLET BY MOUTH TWICE DAILY WITH MEALS 180 Tab 0   • liraglutide (VICTOZA) 18 MG/3ML Solution Pen-injector injection Inject 0.3 mL as instructed every day. 3 PEN 11   • beclomethasone (QVAR) 40 MCG/ACT inhaler Inhale 2 Puffs by mouth 2 Times a Day. Use with spacer.  Rinse mouth after each use. 1 Inhaler 11   • Blood Glucose Monitoring Suppl Supplies Misc Test strips order: Test strips for Accucheck meter. Sig: use BID and prn ssx high or low sugar #100 RF x 3 100 Strip 11   • Insulin Pen Needle 32G X 4 MM Misc 1 Applicator by Does not apply route 3 times a day. 100 Each 11   • Insulin Degludec (TRESIBA FLEXTOUCH) 200 UNIT/ML Solution Pen-injector Inject 50 Units as instructed every bedtime. 3 PEN 2   • fluticasone  (FLONASE) 50 MCG/ACT nasal spray Spray 1-2 Sprays in nose every day. Each nostil, 30 minutes prior to use of CPAP 1 Bottle 6   • ferrous gluconate (FERGON) 324 (38 Fe) MG Tab Take 324 mg by mouth 3 times a day, with meals.     • simvastatin (ZOCOR) 20 MG Tab TAKE ONE TABLET BY MOUTH ONCE DAILY IN THE EVENING 90 Tab 3   • levothyroxine (SYNTHROID) 50 MCG Tab Take 1 Tab by mouth every day. 90 Tab 1   • warfarin (COUMADIN) 3 MG Tab Take 1 to 1.5 tablets by mouth daily as directed by the coumadin clinic 135 Tab 1   • levalbuterol (XOPENEX HFA) 45 MCG/ACT inhaler Inhale 2 Puffs by mouth every four hours as needed for Shortness of Breath. 1 Inhaler 3   • Insulin Pen Needle (RELION PEN NEEDLES) 29G X 12MM Misc USE ONE PEN NEEDLE SUBCUTANEOUSLY FOR LANTUS SOLOSTAR 100 Each 3   • Blood Glucose Monitoring Suppl W/DEVICE Kit 1 Each by Does not apply route 2 times a day as needed. 1 Kit 0   • omeprazole (PRILOSEC) 20 MG delayed-release capsule Take 1 Cap by mouth every day. 90 Cap 3   • furosemide (LASIX) 80 MG Tab Take 80 mg by mouth every 48 hours.     • montelukast (SINGULAIR) 10 MG Tab 1 po qhs 90 Tab 3   • Blood Glucose Monitoring Suppl SUPPLIES Misc Test strips order: Test strips for Chely Contour meter. Sig: use tid  and prn ssx high or low sugar #100 RF x 3 100 Strip 3   • Diclofenac Sodium (VOLTAREN) 1 % Gel Apply 2 g to skin as directed 4 times a day. 100 g 0   • losartan (COZAAR) 50 MG Tab TAKE ONE TABLET BY MOUTH TWICE DAILY 180 Tab 3   • clonazepam (KLONOPIN) 0.5 MG Tab Take 1 Tab by mouth 2 times a day as needed (insomnia, anxiety). 60 Tab 0   • Misc. Devices MISC Pen needles for Lantus Solstar. 29g 12mm. 90 Each 3   • allopurinol (ZYLOPRIM) 100 MG TABS Take 1/2 pill qd (Patient taking differently: Take 100 mg by mouth 2 Times a Day. Indications: Primary Gout) 90 Tab 4   • Cholecalciferol (VITAMIN D) 2000 UNITS CAPS Take 4,000 Units by mouth every day.     • magnesium oxide (MAGNESIUM OXIDE) 400 (241.3 MG) MG TABS  "tablet Take 400 mg by mouth every day.     • doxycycline (VIBRAMYCIN) 100 MG Tab Take 1 Tab by mouth 2 times a day. (Patient not taking: Reported on 2/23/2018) 20 Tab 0   • simvastatin (ZOCOR) 20 MG Tab TAKE ONE TABLET BY MOUTH IN THE EVENING 90 Tab 1   • HAYLEE CONTOUR NEXT TEST strip        No facility-administered encounter medications on file as of 2/23/2018.      Review of Systems   Constitutional: Negative for chills and fever.        Weight gain.   HENT: Negative for congestion.    Eyes: Negative for blurred vision.   Respiratory: Negative for shortness of breath.         Occasional asthma attacks.   Cardiovascular: Positive for palpitations. Negative for chest pain, orthopnea, leg swelling and PND.   Gastrointestinal: Positive for heartburn. Negative for abdominal pain.   Genitourinary: Negative for dysuria.   Musculoskeletal: Negative for joint pain and myalgias.   Skin: Negative for rash.   Neurological: Negative for dizziness and loss of consciousness.   Psychiatric/Behavioral: Positive for depression. The patient is nervous/anxious. The patient does not have insomnia.         Stress.        Objective:   /68   Pulse 60   Ht 1.702 m (5' 7\")   Wt 124.3 kg (274 lb)   SpO2 96%   BMI 42.91 kg/m²     Physical Exam   Constitutional: She is oriented to person, place, and time. She appears well-developed and well-nourished.   HENT:   Head: Normocephalic and atraumatic.   Eyes: Conjunctivae are normal.   Neck: Normal range of motion. Neck supple.   Cardiovascular: Normal rate and regular rhythm.    Pulmonary/Chest: Effort normal and breath sounds normal.   Abdominal: Soft. Bowel sounds are normal.   Musculoskeletal: Normal range of motion. She exhibits no edema.   Neurological: She is alert and oriented to person, place, and time.   Skin: Skin is warm and dry.   Psychiatric: Her mood appears anxious. She exhibits a depressed mood.     CARDIAC STUDIES/PROCEDURES:     CARDIAC CATHETERIZATION CONCLUSIONS " (04/18/14)   1. No angiographic evidence of coronary artery disease.   2. Normal left ventricular systolic function with ejection fraction of 50-55%.   3. Elevated left ventricular end-diastolic pressure.     CARDIAC PET (04/08/14)   1. Evidence of distal anterior apical myocardial infarction with a moderate   amount of septal and mid anterior wall ischemia.   2. Left bundle branch block.   3. Dipyridamole-induced angina.   4. Significant left ventricular dysfunction with anterior apical hypokinesis   with ejection fraction of 45% which decreased with stress.     CAROTID ULTRASOUND (06/16/14)  Mild bilateral internal carotid artery stenosis (<50%).   Antegrade flow, bilateral vertebral arteries.     CAROTID ULTRASOUND (03/05/12)  Atherosclerotic disease as described, left greater than right. Resistant stenosis is less than 50%.    ECHOCARDIOGRAM CONCLUSIONS (04/11/17)  Normal left ventricular size and systolic function. Mild concentric   left ventricular hypertrophy. Left ventricular ejection fraction is   visually estimated to be 55%. Normal regional wall motion. Grade I   diastolic dysfunction.  Mildly dilated left atrium. Left atrial volume index is 37 mL/sq m.  Structurally normal mitral valve. Probably moderate mitral   regurgitation. PISA not well visualized.  Compared to the report of the study done 4/2014- there has been an   improvement in LA size and diastolic function.     ECHOCARDIOGRAM CONCLUSIONS (04/10/14)   Normal left ventricular chamber size. Normal left ventricular systolic   function. Grade II diastolic dysfunction is present. Abnormal   (paradoxical) septal motion consistent with left bundle branch block.   Mild concentric left ventricular hypertrophy. Left ventricular ejection   fraction is 50% to 55%.  Severely dilated left atrium. 48 ml/m^2.  Mild to moderate mitral regurgitation.  The aortic valve is not well visualized. Aortic sclerosis without   stenosis. Trace aortic insufficiency.  Trace  tricuspid regurgitation. Right ventricular systolic pressure is   estimated to be 27 mmHg.  No pericardial effusion seen.   Normal aortic root diameter 3.0 cm  Compared to prior study done on 7/12/13 no significant change seen   prior study pt was in atrial fibrillation. Current study pt in NSR.     EKG performed on (04/18/14) EKG shows normal sinus rhythm with left bundle branch block.     Laboratory results of (10/04/17) were reviewed. Cholesterol profile of 171/288/40/73 noted.  Laboratory results of (10/17/15) Cholesterol profile of 195/138/51/116 noted.    Assessment:     1. Atrial fibrillation    2. Chronic anticoagulation    3. Hypertension    4. Hyperlipidemia    5. Left bundle branch block    6. Carotid stenosis, bilateral      Medical Decision Making:  Today's Assessment / Status / Plan:      1. Atrial fibrillation on anticoagulation therapy (warfarin): She is doing well in normal sinus rhythm on Multaq and chronic anticoagulation therapy.   2. Hypertension: Blood pressure is high today. She could not tolerate amlodipine due to lower extremity edema. She will continue with clonidine half tablet BID and have her follow up with Dr. Badillo.  3. Hyperlipidemia: She is doing well on statin therapy without myalgia symptoms. We will repeat labs including fasting lipid profile.  4. Carotid artery stenosis: Clinically stable on medical therapy.  5. Renal insufficiency (managed by Dr. Badillo)    We will follow up the patient in one year.    CC Haydee Garcia and Ania Garcia

## 2018-02-26 ENCOUNTER — OFFICE VISIT (OUTPATIENT)
Dept: MEDICAL GROUP | Facility: LAB | Age: 73
End: 2018-02-26
Payer: MEDICARE

## 2018-02-26 ENCOUNTER — OFFICE VISIT (OUTPATIENT)
Dept: ENDOCRINOLOGY | Facility: MEDICAL CENTER | Age: 73
End: 2018-02-26
Payer: MEDICARE

## 2018-02-26 VITALS
DIASTOLIC BLOOD PRESSURE: 68 MMHG | HEART RATE: 72 BPM | OXYGEN SATURATION: 95 % | SYSTOLIC BLOOD PRESSURE: 156 MMHG | WEIGHT: 275 LBS | HEIGHT: 67 IN | BODY MASS INDEX: 43.16 KG/M2

## 2018-02-26 VITALS
HEART RATE: 61 BPM | SYSTOLIC BLOOD PRESSURE: 122 MMHG | HEIGHT: 67 IN | OXYGEN SATURATION: 95 % | TEMPERATURE: 98.4 F | BODY MASS INDEX: 43.16 KG/M2 | DIASTOLIC BLOOD PRESSURE: 82 MMHG | WEIGHT: 275 LBS | RESPIRATION RATE: 12 BRPM

## 2018-02-26 DIAGNOSIS — I10 ESSENTIAL HYPERTENSION: ICD-10-CM

## 2018-02-26 DIAGNOSIS — E11.22 TYPE 2 DIABETES MELLITUS WITH STAGE 4 CHRONIC KIDNEY DISEASE, WITH LONG-TERM CURRENT USE OF INSULIN (HCC): ICD-10-CM

## 2018-02-26 DIAGNOSIS — F41.9 ANXIETY: ICD-10-CM

## 2018-02-26 DIAGNOSIS — E11.22 TYPE 2 DIABETES MELLITUS WITH STAGE 4 CHRONIC KIDNEY DISEASE, WITHOUT LONG-TERM CURRENT USE OF INSULIN (HCC): ICD-10-CM

## 2018-02-26 DIAGNOSIS — L82.1 SEBORRHEIC KERATOSES: ICD-10-CM

## 2018-02-26 DIAGNOSIS — Z79.4 TYPE 2 DIABETES MELLITUS WITH STAGE 4 CHRONIC KIDNEY DISEASE, WITH LONG-TERM CURRENT USE OF INSULIN (HCC): ICD-10-CM

## 2018-02-26 DIAGNOSIS — N18.4 TYPE 2 DIABETES MELLITUS WITH STAGE 4 CHRONIC KIDNEY DISEASE, WITHOUT LONG-TERM CURRENT USE OF INSULIN (HCC): ICD-10-CM

## 2018-02-26 DIAGNOSIS — N18.4 TYPE 2 DIABETES MELLITUS WITH STAGE 4 CHRONIC KIDNEY DISEASE, WITH LONG-TERM CURRENT USE OF INSULIN (HCC): ICD-10-CM

## 2018-02-26 DIAGNOSIS — R00.2 PALPITATIONS: ICD-10-CM

## 2018-02-26 DIAGNOSIS — I48.0 PAF (PAROXYSMAL ATRIAL FIBRILLATION) (HCC): ICD-10-CM

## 2018-02-26 LAB
HBA1C MFR BLD: 7.6 % (ref ?–5.8)
INT CON NEG: NORMAL
INT CON POS: NORMAL

## 2018-02-26 PROCEDURE — 83036 HEMOGLOBIN GLYCOSYLATED A1C: CPT | Performed by: PHYSICIAN ASSISTANT

## 2018-02-26 PROCEDURE — 99214 OFFICE O/P EST MOD 30 MIN: CPT | Performed by: PHYSICIAN ASSISTANT

## 2018-02-26 PROCEDURE — 99214 OFFICE O/P EST MOD 30 MIN: CPT | Mod: 25 | Performed by: NURSE PRACTITIONER

## 2018-02-26 PROCEDURE — 17110 DESTRUCTION B9 LES UP TO 14: CPT | Performed by: NURSE PRACTITIONER

## 2018-02-26 RX ORDER — CLONAZEPAM 0.5 MG/1
0.5 TABLET ORAL
Qty: 30 TAB | Refills: 1 | Status: SHIPPED
Start: 2018-02-26 | End: 2018-03-14

## 2018-02-26 RX ORDER — PIOGLITAZONEHYDROCHLORIDE 30 MG/1
30 TABLET ORAL DAILY
Qty: 30 TAB | Refills: 11 | Status: SHIPPED | OUTPATIENT
Start: 2018-02-26 | End: 2018-07-24

## 2018-02-26 NOTE — PROGRESS NOTES
Return to office Patient Consult Note  Referred by: EDILMA Whelan    Reason for consult: Diabetes Management Type 2    HPI:  Whitley Frederick is a 72 y.o. old patient who is seeing us today for diabetes care.  This is a pleasant patient with diabetes and I appreciate the opportunity to participate in the care of this patient.    BG Diary:2/26/2018  In the AM: 129, 167, 177, 153, 148, 142  Before Bed: 183, 157, 193, 5, 242, 230, 234    BG Diary:11/3/2017  In the AM: 136, 116, 123, 109, 147, 150  Before Bed: 209      BG Diary:8/31/2017  In the AM:  Did not bring     Labs of 2/26/18 HbA1c is 7.6  labs of 8/11/17 HbA1c 7.6, GFR 22,      BG Diary:8/11/2017  In the AM: did not bring      Has been Diabetic since 1985      1. Type 2 diabetes mellitus with stage 4 chronic kidney disease, without long-term current use of insulin (CMS-HCC)  This is a new patient on 8/11/17  She is on:  1.  Toujeo  65     STOP:  1.  Toujeo     She is now on:  1.   Tresiba 16 units at night  3.   Victoza  1.8 at night    2. Essential hypertension  This is high and I will follow    ROS:   Constitutional: No night sweats.  Eyes:  No visual changes.  Cardiac: No chest pain, No palpitations or racing heart rate.  Resp: No shortness of breath, No cough,   Gi: No Diarrhea    All other systems were reviewed and were/are negative.  The ROS was revised/revisited during this office visit from the patients first office visit with me on 8/11/17Please review the full ROS during the first office visit.    Past Medical History:  Patient Active Problem List    Diagnosis Date Noted   • Essential hypertension 05/10/2016     Priority: High   • Atherosclerosis of aorta (CMS-HCC) 04/29/2015     Priority: High   • Chronic respiratory failure (CMS-HCC) 04/29/2015     Priority: High   • Morbid obesity with BMI of 45.0-49.9, adult (CMS-HCC) 04/29/2015     Priority: High   • Anxiety 01/09/2014     Priority: High   • Hypothyroid 12/12/2013     Priority:  High   • PAF (paroxysmal atrial fibrillation) (CMS-HCC) 07/12/2013     Priority: High   • Type 2 diabetes mellitus with stage 4 chronic kidney disease (CMS-HCC)      Priority: High   • Palpitations      Priority: High   • Mild intermittent asthma without complication 08/24/2016     Priority: Medium   • Chronic anticoagulation 06/10/2016     Priority: Medium   • AVERY (obstructive sleep apnea) 05/10/2016     Priority: Medium   • Chronic gout involving 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 tophus 04/30/2015     Priority: Medium   • Left bundle branch block 03/05/2012     Priority: Medium   • Multiple thyroid nodules 10/07/2015     Priority: Low   • Carotid stenosis 05/30/2014     Priority: Low   • PVC (premature ventricular contraction) 02/23/2018   • Dependence on nocturnal oxygen therapy 11/02/2017   • Diabetic mononeuropathy associated with type 2 diabetes mellitus (CMS-HCC) 11/02/2017   • Encounter for long-term (current) use of insulin (CMS-HCC) 08/11/2017   • Incontinence of feces 05/18/2017   • Chronic pain of both shoulders 03/23/2017   • Dyslipidemia 12/14/2016   • URI (upper respiratory infection) 09/01/2015   • Atrial fibrillation (CMS-HCC) 05/30/2014       Past Surgical History:  Past Surgical History:   Procedure Laterality Date   • CATARACT PHACO WITH IOL Right 10/25/2016    Procedure: CATARACT PHACO WITH IOL;  Surgeon: Zoran Gamble M.D.;  Location: SURGERY SAME DAY [facility];  Service:    • ABDOMINAL HYSTERECTOMY TOTAL     • CHOLECYSTECTOMY     • TONSILLECTOMY     • US-NEEDLE CORE BX-BREAST PANEL         Allergies:  Amlodipine; Amaryl; Avandia [rosiglitazone maleate]; Cipro xr; Clonidine; Contrast media with iodine [iodine]; Glipizide; Glucophage [metformin hydrochloride]; Hydralazine; Levaquin; and Relafen [nabumetone]    Social History:  Social History     Social History   • Marital status:      Spouse name: N/A   • Number of children: N/A   • Years of education: N/A     Occupational History   • Not on file.      Social History Main Topics   • Smoking status: Former Smoker     Packs/day: 1.00     Years: 20.00     Types: Cigarettes     Quit date: 1/1/1983   • Smokeless tobacco: Never Used   • Alcohol use No   • Drug use: No   • Sexual activity: No     Other Topics Concern   • Not on file     Social History Narrative   • No narrative on file       Family History:  Family History   Problem Relation Age of Onset   • Cancer Maternal Aunt    • Diabetes Maternal Aunt    • Heart Disease Maternal Aunt    • Hypertension Maternal Aunt    • Hyperlipidemia Maternal Aunt    • Cancer Mother      Leukemia   • Hypertension Mother    • Diabetes Mother    • Lung Disease Father    • Cancer Father      Lung   • Lung Disease Brother    • Stroke Brother    • Diabetes Brother    • Heart Disease Brother    • Diabetes Maternal Grandmother    • Hypertension Brother    • Diabetes Brother        Medications:    Current Outpatient Prescriptions:   •  Urine Glucose-Ketones Test Strip, 1 Strip by In Vitro route every day., Disp: 100 Strip, Rfl: 2  •  metoprolol (LOPRESSOR) 50 MG Tab, Take 1 Tab by mouth 2 times a day., Disp: 180 Tab, Rfl: 3  •  glucose blood (FREESTYLE LITE) strip, 1 Strip by Other route 2 times a day, with meals., Disp: 50 Strip, Rfl: 11  •  MULTAQ 400 MG Tab, TAKE ONE TABLET BY MOUTH TWICE DAILY WITH MEALS, Disp: 180 Tab, Rfl: 0  •  liraglutide (VICTOZA) 18 MG/3ML Solution Pen-injector injection, Inject 0.3 mL as instructed every day., Disp: 3 PEN, Rfl: 11  •  beclomethasone (QVAR) 40 MCG/ACT inhaler, Inhale 2 Puffs by mouth 2 Times a Day. Use with spacer.  Rinse mouth after each use., Disp: 1 Inhaler, Rfl: 11  •  Blood Glucose Monitoring Suppl Supplies Misc, Test strips order: Test strips for Accucheck meter. Sig: use BID and prn ssx high or low sugar #100 RF x 3, Disp: 100 Strip, Rfl: 11  •  Insulin Pen Needle 32G X 4 MM Misc, 1 Applicator by Does not apply route 3 times a day., Disp: 100 Each, Rfl: 11  •  Insulin Degludec (TRESIBA  FLEXTOUCH) 200 UNIT/ML Solution Pen-injector, Inject 50 Units as instructed every bedtime., Disp: 3 PEN, Rfl: 2  •  fluticasone (FLONASE) 50 MCG/ACT nasal spray, Spray 1-2 Sprays in nose every day. Each nostil, 30 minutes prior to use of CPAP, Disp: 1 Bottle, Rfl: 6  •  ferrous gluconate (FERGON) 324 (38 Fe) MG Tab, Take 324 mg by mouth 3 times a day, with meals., Disp: , Rfl:   •  simvastatin (ZOCOR) 20 MG Tab, TAKE ONE TABLET BY MOUTH ONCE DAILY IN THE EVENING, Disp: 90 Tab, Rfl: 3  •  levothyroxine (SYNTHROID) 50 MCG Tab, Take 1 Tab by mouth every day., Disp: 90 Tab, Rfl: 1  •  warfarin (COUMADIN) 3 MG Tab, Take 1 to 1.5 tablets by mouth daily as directed by the coumadin clinic, Disp: 135 Tab, Rfl: 1  •  levalbuterol (XOPENEX HFA) 45 MCG/ACT inhaler, Inhale 2 Puffs by mouth every four hours as needed for Shortness of Breath., Disp: 1 Inhaler, Rfl: 3  •  Insulin Pen Needle (RELION PEN NEEDLES) 29G X 12MM Misc, USE ONE PEN NEEDLE SUBCUTANEOUSLY FOR LANTUS SOLOSTAR, Disp: 100 Each, Rfl: 3  •  Blood Glucose Monitoring Suppl W/DEVICE Kit, 1 Each by Does not apply route 2 times a day as needed., Disp: 1 Kit, Rfl: 0  •  omeprazole (PRILOSEC) 20 MG delayed-release capsule, Take 1 Cap by mouth every day., Disp: 90 Cap, Rfl: 3  •  furosemide (LASIX) 80 MG Tab, Take 80 mg by mouth every 48 hours., Disp: , Rfl:   •  montelukast (SINGULAIR) 10 MG Tab, 1 po qhs, Disp: 90 Tab, Rfl: 3  •  Blood Glucose Monitoring Suppl SUPPLIES Misc, Test strips order: Test strips for [employer] Contour meter. Sig: use tid  and prn ssx high or low sugar #100 RF x 3, Disp: 100 Strip, Rfl: 3  •  Diclofenac Sodium (VOLTAREN) 1 % Gel, Apply 2 g to skin as directed 4 times a day., Disp: 100 g, Rfl: 0  •  losartan (COZAAR) 50 MG Tab, TAKE ONE TABLET BY MOUTH TWICE DAILY, Disp: 180 Tab, Rfl: 3  •  clonazepam (KLONOPIN) 0.5 MG Tab, Take 1 Tab by mouth 2 times a day as needed (insomnia, anxiety)., Disp: 60 Tab, Rfl: 0  •  Misc. Devices MISC, Pen needles for  "Lalitha Gastonar. 29g 12mm., Disp: 90 Each, Rfl: 3  •  allopurinol (ZYLOPRIM) 100 MG TABS, Take 1/2 pill qd (Patient taking differently: Take 100 mg by mouth 2 Times a Day. Indications: Primary Gout), Disp: 90 Tab, Rfl: 4  •  Cholecalciferol (VITAMIN D) 2000 UNITS CAPS, Take 4,000 Units by mouth every day., Disp: , Rfl:   •  magnesium oxide (MAGNESIUM OXIDE) 400 (241.3 MG) MG TABS tablet, Take 400 mg by mouth every day., Disp: , Rfl:         Physical Examination:  Vital signs: /68   Pulse 72   Ht 1.702 m (5' 7.01\")   Wt 124.7 kg (275 lb)   SpO2 95%   BMI 43.06 kg/m²   General: No distress, cooperative, well dressed and well nourished.   Eyes: No scleral icterus or discharge, No hyposphagma  ENMT: Normal on external inspection of nose, lips, No nasal drainage   Neck: No abnormal masses on inspection  Resp: Normal effort, Bilateral clear to auscultation, No wheezing, No rales  CVS: Regular rate and rhythm, S1 S2 normal, No murmur. No gallop  Extremities: No edema bilateral extremities  Neuro: Alert and oriented  Skin: No rash, No Ulcers  Psych: Normal mood and affect      Assessment and Plan:    1. Type 2 diabetes mellitus with stage 4 chronic kidney disease, without long-term current use of insulin (CMS-[facility])    She is now on:  1.   Tresiba 16 units at night (Increase to 20)  3.   Victoza  1.8 at night  4.   Actos 30mg one a day    I may need to have her start Novolog at dinner?    2. Essential hypertension    This is evelated and I will foollow    Return in about 1 month (around 3/26/2018).    Blood glucose log: Check BG in the morning when wake up, before lunch or dinner and before bed.  So three times a day.  Always bring BG diary to the next office visit.     Thank you kindly for allowing me to participate in the diabetes care plan for this patient.    Wei Patterson PA-C, BC-ADM  Board Certified - Advanced Diabetes Management  02/26/18    CC:   EDILMA Whelan    "

## 2018-02-26 NOTE — PROGRESS NOTES
"Chief Complaint   Patient presents with   • Actinic Keratosis     pt has some spots on her back she would like looked at, possible cryotherpy        HPI  Whitley is a 71 yo est female here to f/u on a few things:   #1- anxiety:  Relieved with rare use of clonazepam, last refill was almost 2 years ago and she is requesting a refill today. Does not take it daily medicine for anxiety. Is not experiencing very on daily basis.  #2- heart palpitations:  Intermittent issue but becoming more common. Requesting a refill of clonazepam that she has found these helpful when she is having increased heart palpitations. She did see her cardiologist and is set up for a zio patch.  #3-keratoses: Chronic issue for the patient. States they are all over her back and periodically will she will have a few that really itch and bother her. She would like a few keratoses frozen today that seemed to really be itching her, despite application of Vaseline and Neosporin.  #4-hypertension: Blood pressure has been elevated periodically at various specialists office. Today her blood pressures great. She has been checking her blood pressure at home and has typically been below 120/80. She's not having chest pain. She continues on metoprolol.  #5-type 2 diabetes: Chronic issue. Seen by her endocrinologist today and A1c was stable at 7.6. Her Tresiba was increased because home blood sugar readings were inconsistent, per patient.    Past medical, surgical, family, and social history is reviewed and updated in Epic chart by me today.   Medications and allergies reviewed and updated in Epic chart by me today.     ROS:   As documented in history of present illness above    Exam:  Blood pressure 122/82, pulse 61, temperature 36.9 °C (98.4 °F), resp. rate 12, height 1.702 m (5' 7.01\"), weight 124.7 kg (275 lb), SpO2 95 %.  Constitutional: Obese female, Alert, no distress, plus 3 vital signs  Skin:  Warm, dry. Her back is covered and seborrheic keratoses. " She has 5 to her mid back that she has obviously been scratching with a back scratcher or her hand, one of which is scabbed. All 5 were frozen with liquid nitrogen for proximally 5 seconds, 3 times. She tolerated freezing very well.  Eye: Equal, round and reactive, conjunctiva clear  Respiratory: Unlabored respiration, lungs clear to auscultation, no wheezes, no rhonchi  Cardiovascular: Normal rate  Psych: Alert, pleasant, well-groomed, normal affect    A/P:  1. Essential hypertension  -Stable today at our visit. Continue same. Continue to monitor.    2. Type 2 diabetes mellitus with stage 4 chronic kidney disease, with long-term current use of insulin (CMS-HCC)  -Reviewed her note with endocrinology as well as all of her current medications. Stable. Reminded her of a diabetic diet. Encouraged her to exercise.    3. SK (seb keratosis)  -see procedure note above.  5 lesions frozen today. Discussed wound care instructions.     4. PAF (paroxysmal atrial fibrillation) (CMS-HCC)  -anticoagulated and on multaq.  Reviewed note from Dr Vaz.      5. Palpitations  -UTD with Dr. Sorto.  Awaiting Zio patch. On beta blocker therapy. Refilled clonazepam. Last  profile was 6/2016. She understands that she should not drive a car or mix clonazepam with alcohol.

## 2018-02-28 ENCOUNTER — PATIENT OUTREACH (OUTPATIENT)
Dept: HEALTH INFORMATION MANAGEMENT | Facility: OTHER | Age: 73
End: 2018-02-28

## 2018-02-28 RX ORDER — LEVOTHYROXINE SODIUM 0.05 MG/1
50 TABLET ORAL
Qty: 90 TAB | Refills: 1 | Status: SHIPPED | OUTPATIENT
Start: 2018-02-28 | End: 2018-09-05 | Stop reason: SDUPTHER

## 2018-02-28 NOTE — PROGRESS NOTES
Received incoming call from Whitley with questions regarding her newly prescribed Actos. She wanted to know if it would affect the heart or kidneys. Discussed warnings associated with actos and encouraged patient to notify provider if she develops rapid weight gain, dyspnea, and edema. Patient does not have history of heart failure.  Informed patient that actos does not need to be renally dose adjusted. Patient reports not yet having the medication. Receipt of prescription was confirmed in [facility] by pharmacy. Informed patient that it would be filled under pioglitazone. Patient plans to  and start the medication today. Encouraged patient to contact her provider if experiencing adverse effects.     Laila Corral, PharmD

## 2018-03-01 ENCOUNTER — HOSPITAL ENCOUNTER (OUTPATIENT)
Facility: MEDICAL CENTER | Age: 73
End: 2018-03-01
Attending: NURSE PRACTITIONER
Payer: MEDICARE

## 2018-03-01 PROCEDURE — 82274 ASSAY TEST FOR BLOOD FECAL: CPT

## 2018-03-04 DIAGNOSIS — I48.0 PAF (PAROXYSMAL ATRIAL FIBRILLATION) (HCC): ICD-10-CM

## 2018-03-05 ENCOUNTER — NON-PROVIDER VISIT (OUTPATIENT)
Dept: CARDIOLOGY | Facility: MEDICAL CENTER | Age: 73
End: 2018-03-05
Attending: INTERNAL MEDICINE
Payer: MEDICARE

## 2018-03-05 DIAGNOSIS — I49.3 PVC (PREMATURE VENTRICULAR CONTRACTION): ICD-10-CM

## 2018-03-05 DIAGNOSIS — I48.91 ATRIAL FIBRILLATION, UNSPECIFIED TYPE (HCC): ICD-10-CM

## 2018-03-05 DIAGNOSIS — J45.20 MILD INTERMITTENT ASTHMA WITHOUT COMPLICATION: ICD-10-CM

## 2018-03-05 RX ORDER — MONTELUKAST SODIUM 10 MG/1
TABLET ORAL
Qty: 30 TAB | Refills: 11 | Status: SHIPPED | OUTPATIENT
Start: 2018-03-05 | End: 2019-01-09

## 2018-03-05 RX ORDER — DRONEDARONE 400 MG/1
TABLET, FILM COATED ORAL
Qty: 180 TAB | Refills: 2 | Status: SHIPPED | OUTPATIENT
Start: 2018-03-05 | End: 2018-10-23 | Stop reason: SDUPTHER

## 2018-03-05 NOTE — TELEPHONE ENCOUNTER
Have we ever prescribed this med? Yes.  If yes, what date? 02/27/2017    Last OV: 09/27/2017 - Lubna Lara    Next OV: 02/28/2017 - Lubna Lara    DX: Asthma    Medications: Montelukast

## 2018-03-06 ENCOUNTER — APPOINTMENT (OUTPATIENT)
Dept: RADIOLOGY | Facility: MEDICAL CENTER | Age: 73
End: 2018-03-06
Payer: MEDICARE

## 2018-03-06 DIAGNOSIS — Z12.11 SPECIAL SCREENING FOR MALIGNANT NEOPLASM OF COLON: ICD-10-CM

## 2018-03-06 LAB — HEMOCCULT STL QL IA: NEGATIVE

## 2018-03-14 ENCOUNTER — NON-PROVIDER VISIT (OUTPATIENT)
Dept: PULMONOLOGY | Facility: HOSPICE | Age: 73
End: 2018-03-14
Payer: MEDICARE

## 2018-03-14 ENCOUNTER — OFFICE VISIT (OUTPATIENT)
Dept: PULMONOLOGY | Facility: HOSPICE | Age: 73
End: 2018-03-14
Payer: MEDICARE

## 2018-03-14 VITALS
OXYGEN SATURATION: 99 % | BODY MASS INDEX: 43.32 KG/M2 | HEIGHT: 67 IN | TEMPERATURE: 98.1 F | DIASTOLIC BLOOD PRESSURE: 66 MMHG | HEART RATE: 59 BPM | WEIGHT: 276 LBS | RESPIRATION RATE: 16 BRPM | SYSTOLIC BLOOD PRESSURE: 134 MMHG

## 2018-03-14 VITALS — WEIGHT: 276 LBS | BODY MASS INDEX: 43.21 KG/M2

## 2018-03-14 DIAGNOSIS — Z79.01 CHRONIC ANTICOAGULATION: ICD-10-CM

## 2018-03-14 DIAGNOSIS — I48.0 PAF (PAROXYSMAL ATRIAL FIBRILLATION) (HCC): ICD-10-CM

## 2018-03-14 DIAGNOSIS — J30.9 ALLERGIC RHINITIS, UNSPECIFIED CHRONICITY, UNSPECIFIED SEASONALITY, UNSPECIFIED TRIGGER: ICD-10-CM

## 2018-03-14 DIAGNOSIS — J45.20 MILD INTERMITTENT ASTHMA WITHOUT COMPLICATION: ICD-10-CM

## 2018-03-14 DIAGNOSIS — J32.9 RECURRENT SINUSITIS: ICD-10-CM

## 2018-03-14 DIAGNOSIS — G47.33 OSA (OBSTRUCTIVE SLEEP APNEA): ICD-10-CM

## 2018-03-14 PROCEDURE — 99214 OFFICE O/P EST MOD 30 MIN: CPT | Performed by: NURSE PRACTITIONER

## 2018-03-14 PROCEDURE — 94729 DIFFUSING CAPACITY: CPT | Performed by: INTERNAL MEDICINE

## 2018-03-14 PROCEDURE — 94726 PLETHYSMOGRAPHY LUNG VOLUMES: CPT | Performed by: INTERNAL MEDICINE

## 2018-03-14 PROCEDURE — 94060 EVALUATION OF WHEEZING: CPT | Performed by: INTERNAL MEDICINE

## 2018-03-14 RX ORDER — METHYLPREDNISOLONE 4 MG/1
TABLET ORAL
Qty: 21 TAB | Refills: 0 | Status: SHIPPED | OUTPATIENT
Start: 2018-03-14 | End: 2018-04-10

## 2018-03-14 RX ORDER — SULFAMETHOXAZOLE AND TRIMETHOPRIM 800; 160 MG/1; MG/1
1 TABLET ORAL 2 TIMES DAILY
Qty: 28 TAB | Refills: 0 | Status: SHIPPED | OUTPATIENT
Start: 2018-03-14 | End: 2018-04-10

## 2018-03-14 ASSESSMENT — PULMONARY FUNCTION TESTS
FEV1_PERCENT_PREDICTED: 99
FEV1/FVC_PERCENT_PREDICTED: 106
FEV1/FVC: 81
FEV1/FVC_PERCENT_CHANGE: 125
FEV1_LLN: 2.09
FEV1_PERCENT_CHANGE: 4
FEV1/FVC_PERCENT_CHANGE: 0
FVC_LLN: 2.76
FVC_PERCENT_PREDICTED: 92
FVC_PREDICTED: 3.3
FEV1: 2.5
FEV1/FVC_PERCENT_PREDICTED: 108
FVC: 3.06
FEV1/FVC: 82
FEV1/FVC_PERCENT_LLN: 63
FEV1/FVC_PERCENT_PREDICTED: 107
FEV1_PREDICTED: 2.5
FEV1_PERCENT_CHANGE: 5
FEV1/FVC_PERCENT_PREDICTED: 107
FVC_PERCENT_PREDICTED: 88
FEV1/FVC_PERCENT_PREDICTED: 76
FEV1/FVC: 81.7
FEV1/FVC: 81
FEV1/FVC_PREDICTED: 76
FEV1: 2.37
FVC: 2.93
FEV1_PERCENT_PREDICTED: 94

## 2018-03-14 NOTE — PROGRESS NOTES
Chief Complaint   Patient presents with   • Apnea     CPAP 10 CM H2O with 2LPM O2 BLEED IN   • Asthma     PFT Results       HPI:  Whitley Frederick is a 72 y.o. year old female here today for follow-up on her Asthma and obstructive sleep apnea. Polysomnogram indicated an AHI of 29.3 with a REM related index of 90 and a low 02 saturation of 74%. She was titrated to CPAP at 8 cm water pressure with 5 L of oxygen per minute in addition because of persisting hypoxemia. She is currently on CPAP at 10 CM H20 with 3 LPM 02 bleed in. Her compliance card download today in the office indicates an AHI of 0.3 with an average use of 7.5 hours at night. She tolerates the pressure well. She sleeps better on therapy. She wakes more refreshed. She denies any morning headaches. She has a nasal mask which she feels is a good fit. She had some dry mouth and a chin strap was added at her last office visit. She does not feel it is a good fit.       She also has a history of mild Asthma. She notes allergies as a trigger for her. She has felt her Allergy symptoms are often worse in the Spring months. She is compliant on Qvar, Singulair and a Xopenex HFA inhaler. Spirometry 2/28/2017 indicated an FEV1 of 1.95 L, 76% predicted with an FEV1/FVC ratio of 76 and a borderline positive bronchodilator response. She had been on Qvar in the past, but stopped due to symptomatic improvement. PFT's today in the office indicate an FEV1 of 2.37 L, 94% predicted with an FEV1/FVC ratio of 81 and a DLCO of 130% predicted.   She notes intermittent mild dyspnea with anxiety or allergy triggers. She denies current cough or mucous. She does have frequent sinus drainage. She states she recently completed a course of Doxycycline for a sinus infection prescribed by the Urgent Care. She does feel it helped and her sinus pressure has improved. She does still have occasional thick sinus drainage. She is using the saline rinse. She is also using a Flonase nasal  spray. She has seen ENT in the past as well. She was referred back to ENT and was placed on an Astelin nasal spray. She states she has had recurrent sinus infections worse over the past year.      She had been on Xarelto in the past, apparently for paroxysmal atrial fibrillation, and is now on warfarin without unusual bleeding. She is followed by Dr. Sorto, Cardiology.        Past Medical History:   Diagnosis Date   • Allergy    • Anxiety    • Arrhythmia    • Arthritis    • Asthma    • Carotid bruit     Left   • CATARACT    • Chest pain    • DIABETES MELLITUS    • Esophageal reflux    • Goiter    • Headache(784.0)    • Heart murmur    • Hyperlipidemia    • Hypertension    • IBD (inflammatory bowel disease)    • Migraine     Silent migraine   • AVERY (obstructive sleep apnea)    • Overweight(278.02)    • Palpitations    • Paroxysmal atrial fibrillation (CMS-HCC) 7/12/13    woke with palps, AFib per ekg in am.   • Pneumonia     Nov. 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 Badillo   • Urinary tract infection, site not specified        Past Surgical History:   Procedure Laterality Date   • CATARACT PHACO WITH IOL Right 10/25/2016    Procedure: CATARACT PHACO WITH IOL;  Surgeon: Zoran Gamble M.D.;  Location: SURGERY SAME DAY [facility];  Service:    • ABDOMINAL HYSTERECTOMY TOTAL     • CHOLECYSTECTOMY     • TONSILLECTOMY     • US-NEEDLE CORE BX-BREAST PANEL         Family History   Problem Relation Age of Onset   • Cancer Maternal Aunt    • Diabetes Maternal Aunt    • Heart Disease Maternal Aunt    • Hypertension Maternal Aunt    • Hyperlipidemia Maternal Aunt    • Cancer Mother      Leukemia   • Hypertension Mother    • Diabetes Mother    • Lung Disease Father    • Cancer Father      Lung   • Lung Disease Brother    • Stroke Brother    • Diabetes Brother    • Heart Disease Brother    • Diabetes Maternal  Grandmother    • Hypertension Brother    • Diabetes Brother        Social History     Social History   • Marital status:      Spouse name: N/A   • Number of children: N/A   • Years of education: N/A     Occupational History   • Not on file.     Social History Main Topics   • Smoking status: Former Smoker     Packs/day: 1.00     Years: 20.00     Types: Cigarettes     Quit date: 1/1/1983   • Smokeless tobacco: Never Used   • Alcohol use No   • Drug use: No   • Sexual activity: No     Other Topics Concern   • Not on file     Social History Narrative   • No narrative on file         ROS:  Constitutional: Denies fevers, chills, sweats, weight loss  Eyes: Denies glasses, vision loss, pain, drainage, double vision  Ears/Nose/Mouth/Throat: Denies ear ache, difficulty hearing, sore throat, persistent hoarseness, decayed teeth/toothache  Cardiovascular: Denies chest pain, tightness, palpitations, swelling in feet/legs, fainting, difficulty breathing when laying down  Respiratory: See HPI   GI: Denies heartburn, difficulty swallowing, nausea, vomiting, abdominal pain, diarrhea, constipation  : Denies frequent urination, painful urination  Integumentary: Denies rashes, lumps or color changes  MSK: Denies painful joints, sore muscles, and back pain.   Neurological: Denies frequent headaches, dizziness, weakness  Sleep: See HPI       Current Outpatient Prescriptions   Medication Sig Dispense Refill   • AZELASTINE-FLUTICASONE NA Spray  in nose.     • MULTAQ 400 MG Tab TAKE ONE TABLET BY MOUTH TWICE DAILY WITH MEALS 180 Tab 2   • montelukast (SINGULAIR) 10 MG Tab TAKE ONE TABLET BY MOUTH ONCE DAILY AT BEDTIME 30 Tab 11   • levothyroxine (SYNTHROID) 50 MCG Tab Take 1 Tab by mouth every day. 90 Tab 1   • pioglitazone (ACTOS) 30 MG Tab Take 1 Tab by mouth every day. 30 Tab 11   • Urine Glucose-Ketones Test Strip 1 Strip by In Vitro route every day. 100 Strip 2   • metoprolol (LOPRESSOR) 50 MG Tab Take 1 Tab by mouth 2 times  a day. 180 Tab 3   • glucose blood (FREESTYLE LITE) strip 1 Strip by Other route 2 times a day, with meals. 50 Strip 11   • liraglutide (VICTOZA) 18 MG/3ML Solution Pen-injector injection Inject 0.3 mL as instructed every day. 3 PEN 11   • beclomethasone (QVAR) 40 MCG/ACT inhaler Inhale 2 Puffs by mouth 2 Times a Day. Use with spacer.  Rinse mouth after each use. 1 Inhaler 11   • Blood Glucose Monitoring Suppl Supplies Misc Test strips order: Test strips for Accucheck meter. Sig: use BID and prn ssx high or low sugar #100 RF x 3 100 Strip 11   • Insulin Pen Needle 32G X 4 MM Misc 1 Applicator by Does not apply route 3 times a day. 100 Each 11   • Insulin Degludec (TRESIBA FLEXTOUCH) 200 UNIT/ML Solution Pen-injector Inject 50 Units as instructed every bedtime. 3 PEN 2   • fluticasone (FLONASE) 50 MCG/ACT nasal spray Spray 1-2 Sprays in nose every day. Each nostil, 30 minutes prior to use of CPAP 1 Bottle 6   • ferrous gluconate (FERGON) 324 (38 Fe) MG Tab Take 324 mg by mouth 3 times a day, with meals.     • simvastatin (ZOCOR) 20 MG Tab TAKE ONE TABLET BY MOUTH ONCE DAILY IN THE EVENING 90 Tab 3   • warfarin (COUMADIN) 3 MG Tab Take 1 to 1.5 tablets by mouth daily as directed by the coumadin clinic 135 Tab 1   • levalbuterol (XOPENEX HFA) 45 MCG/ACT inhaler Inhale 2 Puffs by mouth every four hours as needed for Shortness of Breath. 1 Inhaler 3   • Insulin Pen Needle (RELION PEN NEEDLES) 29G X 12MM Misc USE ONE PEN NEEDLE SUBCUTANEOUSLY FOR LANTUS SOLOSTAR 100 Each 3   • Blood Glucose Monitoring Suppl W/DEVICE Kit 1 Each by Does not apply route 2 times a day as needed. 1 Kit 0   • omeprazole (PRILOSEC) 20 MG delayed-release capsule Take 1 Cap by mouth every day. 90 Cap 3   • furosemide (LASIX) 80 MG Tab Take 80 mg by mouth every 48 hours.     • Blood Glucose Monitoring Suppl SUPPLIES Misc Test strips order: Test strips for Chely Contour meter. Sig: use tid  and prn ssx high or low sugar #100 RF x 3 100 Strip 3   •  "losartan (COZAAR) 50 MG Tab TAKE ONE TABLET BY MOUTH TWICE DAILY 180 Tab 3   • clonazepam (KLONOPIN) 0.5 MG Tab Take 1 Tab by mouth 2 times a day as needed (insomnia, anxiety). 60 Tab 0   • allopurinol (ZYLOPRIM) 100 MG TABS Take 1/2 pill qd (Patient taking differently: Take 100 mg by mouth 2 Times a Day. Indications: Primary Gout) 90 Tab 4   • Cholecalciferol (VITAMIN D) 2000 UNITS CAPS Take 4,000 Units by mouth every day.     • Diclofenac Sodium (VOLTAREN) 1 % Gel Apply 2 g to skin as directed 4 times a day. 100 g 0   • Misc. Devices MISC Pen needles for Lantus Solstar. 29g 12mm. 90 Each 3   • magnesium oxide (MAGNESIUM OXIDE) 400 (241.3 MG) MG TABS tablet Take 400 mg by mouth every day.       No current facility-administered medications for this visit.        Allergies   Allergen Reactions   • Amlodipine Itching   • Amaryl      GI Problems   • Avandia [Rosiglitazone Maleate]      GI problems   • Cipro Xr      Possibly causes Chills.   • Clonidine      Rapid heart rate, swelling    • Contrast Media With Iodine [Iodine] Hives   • Glipizide      GI Problems   • Glucophage [Metformin Hydrochloride]      dizzy   • Hydralazine      Rapid heart rate, chest tightness   • Levaquin      Possible allergy - chills with cipro but sick   • Relafen [Nabumetone]      Itching; avoids all nsaids       Blood pressure 134/66, pulse (!) 59, temperature 36.7 °C (98.1 °F), resp. rate 16, height 1.702 m (5' 7.01\"), weight (!) 125.2 kg (276 lb), SpO2 99 %.    PE:   Appearance: Well developed, well nourished, no acute distress  Eyes: PERRL, EOM intact, sclera white, conjunctiva moist  Ears: no lesions or deformities  Hearing: grossly intact  Nose: no lesions or deformities  Oropharynx: tongue normal, posterior pharynx without erythema or exudate  Mallampati Classification: Class 3  Neck: supple, trachea midline, no masses   Respiratory effort: no intercostal retractions or use of accessory muscles  Lung auscultation: no rales, rhonchi or " wheezes  Heart auscultation: no murmur rub or gallop  Extremities: no cyanosis or edema  Abdomen: soft ,non tender, no masses  Gait and Station: normal  Digits and nails: no clubbing, cyanosis, petechiae or nodes.  Cranial nerves: grossly intact  Skin: no rashes, lesions or ulcers noted  Orientation: Oriented to time, person and place  Mood and affect: mood and affect appropriate, normal interaction with examiner  Judgement: Intact          Assessment:  1. Mild intermittent asthma without complication  beclomethasone (QVAR) 80 MCG/ACT inhaler   2. AVERY (obstructive sleep apnea)     3. BMI 40.0-44.9, adult (CMS-HCC)  Patient identified as having weight management issue.  Appropriate orders and counseling given.   4. Allergic rhinitis, unspecified chronicity, unspecified seasonality, unspecified trigger     5. PAF (paroxysmal atrial fibrillation) (CMS-HCC)     6. Chronic anticoagulation     7. Recurrent sinusitis  sulfamethoxazole-trimethoprim (BACTRIM DS) 800-160 MG tablet    MethylPREDNISolone (MEDROL DOSEPAK) 4 MG Tablet Therapy Pack         Plan:    1) PFT's are stable. She feels her Asthma symptoms have been stable. Continue Qvar 40 mcg, Singulair 10 mg and Xopenex. Cost of the Qvar is an issue. RX sent to sample Pharmacy for Qvar 80 mcg samples.   2) She has had recurrent sinus infections. She does not feel the recent course of Doxycycline helped. RX for Bactrim DS and a Medrol dose pack. She is encouraged to monitor or INR levels closely while on the antibiotic. If her sinus symptoms persist she is encouraged follow up with ENT as further work up with CT sinus and or cultures may be needed. Continue saline rinse, Flonase and Astelin nasal sprays.   3) She is up to date on her Prevnar 13, Pneumovax 23 and Influenza vaccinations.  4) Continue CPAP at 10 CM H20 with 3 LPM 02 bleed in. Sleep hygiene discussed. Weight loss recommended.   5) Follow up in 6 months, sooner if needed.

## 2018-03-14 NOTE — PROCEDURES
Technician Barbara Reyes, [facility] Comments:Good patient effort & cooperation.  The results of this test meet the ATS/ERS standards for acceptability & reproducibility.  Test was performed on the PeakStream Body Plethysmograph-Elite DX system.  Predicted values were NHanes-3 for spirometry, [facility] for DLCO, ITS for Lung Volumes.  The DLCO was uncorrected for Hgb.  A bronchodilator of Ventolin HFA -2puffs via spacer administered.  DLCO performed during dilation period.    Interpretation:    Lung function testing completed on March 14, 2018 shows normal spirometry, no change after bronchodilators. Of note the patient is on long-acting maintenance inhalers, these may have produced better than expected results, clinical correlation required. Lungs lungs demonstrate mild hyperinflation. Oxygen transfer was normal. Delirium loop looks normal but there is coving of the expiratory limb consistent with a history of airway obstruction, although current spirometry looks normal.

## 2018-03-14 NOTE — PATIENT INSTRUCTIONS
Plan:    1) PFT's are stable. She feels her Asthma symptoms have been stable. Continue Qvar 40 mcg, Singulair 10 mg and Xopenex. Cost of the Qvar is an issue. RX sent to sample Pharmacy for Qvar 80 mcg samples.   2) She has had recurrent sinus infections. She does not feel the recent course of Doxycycline helped. RX for Bactrim DS and a Medrol dose pack. She is encouraged to monitor or INR levels closely while on the antibiotic. If her sinus symptoms persist she is encouraged follow up with ENT as further work up with CT sinus and or cultures may be needed. Continue saline rinse, Flonase and Astelin nasal sprays.   3) She is up to date on her Prevnar 13, Pneumovax 23 and Influenza vaccinations.  4) Continue CPAP at 10 CM H20 with 3 LPM 02 bleed in. Sleep hygiene discussed. Weight loss recommended.   5) Follow up in 6 months, sooner if needed.

## 2018-03-16 ENCOUNTER — APPOINTMENT (OUTPATIENT)
Dept: RADIOLOGY | Facility: MEDICAL CENTER | Age: 73
End: 2018-03-16
Attending: NURSE PRACTITIONER
Payer: MEDICARE

## 2018-03-20 ENCOUNTER — ANTICOAGULATION VISIT (OUTPATIENT)
Dept: MEDICAL GROUP | Facility: PHYSICIAN GROUP | Age: 73
End: 2018-03-20
Payer: MEDICARE

## 2018-03-20 DIAGNOSIS — Z79.01 CHRONIC ANTICOAGULATION: ICD-10-CM

## 2018-03-20 DIAGNOSIS — I48.0 PAF (PAROXYSMAL ATRIAL FIBRILLATION) (HCC): ICD-10-CM

## 2018-03-20 LAB — INR PPP: 2.6 (ref 2–3.5)

## 2018-03-20 PROCEDURE — 85610 PROTHROMBIN TIME: CPT | Performed by: FAMILY MEDICINE

## 2018-03-20 PROCEDURE — 99999 PR NO CHARGE: CPT | Performed by: FAMILY MEDICINE

## 2018-03-20 NOTE — PROGRESS NOTES
Anticoagulation Summary  As of 3/20/2018    INR goal:   2.0-3.0   TTR:   79.5 % (2.4 y)   Today's INR:   2.6   Maintenance plan:   3 mg (3 mg x 1) every day   Weekly total:   21 mg   Weekly max dose:   21 mg   Plan last modified:   Eh Enamorado, PharmD (2/6/2018)   Next INR check:   4/24/2018   Target end date:   Indefinite    Indications    Chronic anticoagulation [Z79.01]  Chronic anticoagulation (Resolved) [Z79.01]  PAF (paroxysmal atrial fibrillation) (CMS-[facility]) [I48.0]             Anticoagulation Episode Summary     INR check location:       Preferred lab:       Send INR reminders to:       Comments:         Anticoagulation Care Providers     Provider Role Specialty Phone number    Amy Lincoln M.D. Referring Cardiology [phone]    [facility] Responsible  [phone]        Anticoagulation Patient Findings      HPI:  Whitley Frederick seen in clinic today, on anticoagulation therapy with warfarin for PAF.   Reason for today's visit (per our collaborative practice policy) is because their last INR was 2.2 on 2/20. Intervention at the last visit:none.  Changes to current medical/health status since last appt: none   Some slight epistaxis but it is provoked.  No other signs/symptoms of bleeding and/or thrombosis since the last appt.    No interval changes to diet or any interval changes to medications since last appt.   No complications or cost restrictions with current therapy.   Unable to obtain vitals.    Confirmed dosing regimen.     A/P   INR  therapeutic.     Pt is to continue with current warfarin dosing regimen.     Follow up appointment in 5 weeks to reduce risk of adverse events related to this high risk medication, Warfarin.    Purpose of next visit:  .   They have a TTR of 80% which is not at target (TTR target/goal is 100%) and requires close follow up to prevent a adverse event (the lower the TTR the higher risk of clots, strokes, or bleeding).     Other info:  Pt  educated to contact our clinic with any changes in medications or s/s of bleeding or thrombosis  CHEST guidelines recommend frequent INR monitoring at regular intervals (a few days up to a max of 12 weeks) to ensure they are on the proper dose of warfarin and not having any complications from therapy. INRs can dramatically change over a short time period due to diet, medications, and medical conditions.     Juan Bonds, PharmD

## 2018-03-22 ENCOUNTER — TELEPHONE (OUTPATIENT)
Dept: ENDOCRINOLOGY | Facility: MEDICAL CENTER | Age: 73
End: 2018-03-22

## 2018-03-22 NOTE — TELEPHONE ENCOUNTER
----- Message from Gwyn Patterson P.A.-C. sent at 3/20/2018  1:20 PM PDT -----  Regarding: FW: Prescription Question  Contact: [phone]  Can you call the number this patient supplied and do as she asks please    gwyn  ----- Message -----  From: Whitley Frederick  Sent: 3/20/2018  10:34 AM  To: Gwyn Patterson P.A.-C.  Subject: RE: Prescription Question                        Miles Carvajal, Well I was called by my primary Rx Coverage [employer] and told that your office called them and that i dont need a prior authorization for my Victoza which i knew because i have a verification letter  ... I need for your office to call my GAP coverage [employer] [phone] the [employer] GAP Coverage Help Desk so they can Override the Transaction Field so it wont take 14 days to get the authorization in place.... I am coming to your office shortly to  the sample ... I appreciated your help very much... thank you Whitley Frederick 1945 [phone]....  ----- Message -----  From: Gwyn Patterson P.A.-C.  Sent: 3/20/2018  7:11 AM PDT  To: Whitley Frederick  Subject: RE: Prescription Question  Hello    We are here from 7am to 530pm today    Gwyn    ----- Message -----     From: Whitley Frederick     Sent: 3/19/2018  5:24 PM PDT       To: Gwyn Patterson P.A.-C.  Subject: RE: Prescription Question    Thanks so much I read this message at 5:22 or I would have come right out to your office  ... I’ll be there tomorrow... Whitley  ----- Message -----  From: Gwyn Patterson P.A.-C.  Sent: 3/19/2018  4:18 PM PDT  To: Whitley Frederick  Subject: RE: Prescription Question  Please come in and get a sample at out office while this gets sorted out    gwyn    ----- Message -----     From: Whitley Frederick     Sent: 3/19/2018  1:07 PM PDT       To: Gwyn Patterson P.A.-C.  Subject: Prescription Question    Hi Gwyn:  Whitley Frederick 1945 [phone]. I am in urgent need of a Victoza sample.  I understand [facility] has a SAMPLE pharmacy at [address] [place] [address]. I  utilized it the other day for a QVar Inhaler. I only had a 1/2 dose last night and ran out. I have been dealing now for about 3-4 days trying to get my prescription filled then I find out that I have fallen into the GAP/Donut Hole. I have GAP coverage but now I need a Authorization for my GAP Coverage [employer] for my Victoza. I left a Message for your MA but that could take up to 48 hours. Here is the information again it is [employer] pharmacy HELP Desk number is [phone] so they can override Transaction Field. My member number is [id number].  Thank you for your immediate attentions.

## 2018-03-22 NOTE — TELEPHONE ENCOUNTER
Spoke with her Insurance company (not [employer]) and had them submit a gap coverage request. Should get results back in 1-2 days.

## 2018-03-26 ENCOUNTER — TELEPHONE (OUTPATIENT)
Dept: CARDIOLOGY | Facility: MEDICAL CENTER | Age: 73
End: 2018-03-26

## 2018-03-27 PROCEDURE — 0296T PR EXT ECG > 48HR TO 21 DAY RCRD W/CONECT INTL RCRD: CPT | Performed by: INTERNAL MEDICINE

## 2018-03-27 PROCEDURE — 0298T PR EXT ECG > 48HR TO 21 DAY REVIEW AND INTERPRETATN: CPT | Performed by: INTERNAL MEDICINE

## 2018-04-02 ENCOUNTER — APPOINTMENT (OUTPATIENT)
Dept: RADIOLOGY | Facility: MEDICAL CENTER | Age: 73
End: 2018-04-02
Attending: NURSE PRACTITIONER
Payer: MEDICARE

## 2018-04-02 DIAGNOSIS — E03.9 HYPOTHYROIDISM, UNSPECIFIED TYPE: ICD-10-CM

## 2018-04-02 DIAGNOSIS — E11.9 DIABETES MELLITUS WITHOUT COMPLICATION (HCC): ICD-10-CM

## 2018-04-02 NOTE — TELEPHONE ENCOUNTER
Was the patient seen in the last year in this department? Yes     Does patient have an active prescription for medications requested? No     Received Request Via: Pharmacy     Patient is receiving assistance from the [facility]. Can we fax the request for testing supplies to [phone] does not need to specify which brand

## 2018-04-03 ENCOUNTER — HOSPITAL ENCOUNTER (OUTPATIENT)
Dept: LAB | Facility: MEDICAL CENTER | Age: 73
End: 2018-04-03
Attending: NURSE PRACTITIONER
Payer: MEDICARE

## 2018-04-03 ENCOUNTER — TELEPHONE (OUTPATIENT)
Dept: MEDICAL GROUP | Facility: LAB | Age: 73
End: 2018-04-03

## 2018-04-03 DIAGNOSIS — E03.9 HYPOTHYROIDISM, UNSPECIFIED TYPE: ICD-10-CM

## 2018-04-03 PROCEDURE — 84443 ASSAY THYROID STIM HORMONE: CPT

## 2018-04-03 PROCEDURE — 84439 ASSAY OF FREE THYROXINE: CPT

## 2018-04-03 PROCEDURE — 36415 COLL VENOUS BLD VENIPUNCTURE: CPT

## 2018-04-03 NOTE — TELEPHONE ENCOUNTER
ANNUAL WELLNESS VISIT PRE-VISIT PLANNING WITHOUT OUTREACH  Future Appointments       Provider Department [place]    4/10/2018 12:40 PM Jeffry Montoya M.D. [facility]     4/10/2018 2:00 PM EDILMA Whelan; [facility]  [facility] [place]     4/24/2018 1:15 PM VISTA PHARMACIST [facility] VIST[facility]    5/9/2018 1:20 PM Fernando Sorto M.D. [facility]     6/6/2018 1:05 PM RB BD [address] [facility] [address]    9/12/2018 1:00 PM ANU MosherP.ALICE. [facility] Pulmonary Medicine           1.  Reviewed note from last office visit with PCP: YES    2.  If any orders were placed at last visit, do we have Results/Consult Notes?        •  Labs - Labs were not ordered at last office visit.       •  Imaging - Imaging was not ordered at last office visit.       •  Referrals - No referrals were ordered at last office visit.    3.  Immunizations were updated in [facility] using WebIZ?: Yes       •  WebIZ Recommendations: TDAP and ZOSTAVAX (Shingles)        •  Is patient due for Tdap? YES. Patient was notified of copay/out of pocket cost.       •  Is patient due for Shingles? YES. Patient was notified of copay/out of pocket cost.     4.  Patient is due for the following Health Maintenance Topics:   Health Maintenance Due   Topic Date Due   • RETINAL SCREENING  10/07/2016   • BONE DENSITY  01/31/2018       - Patient has completed FLU, PNEUMOVAX (PPSV23) and PREVNAR (PCV13)  Immunization(s) per WebIZ. Chart has been updated.    5.  Reviewed/Updated the following with patient:       •   Preferred Pharmacy? YES       •   Preferred Lab? YES       •   Preferred Communication? YES       •   Allergies? YES       •   Medications? YES. Was Abstract Encounter opened and chart updated? YES       •   Social History? YES. Was Abstract Encounter opened and chart updated? YES        •   Family History (document living status of immediate family members and if + hx of  cancer, diabetes, hypertension, hyperlipidemia, heart attack, stroke) YES. Was Abstract Encounter opened and chart updated? YES    6.  Care Team Updated:       •   DME Company (gait device, O2, CPAP, etc.): YES       •   Other Specialists (eye doctor, derm, GYN, cardiology, endo, etc): YES    7.  Patient has the following Care Path diagnoses on Problem List:  DIABETES    Has patient ever had diabetes education? Yes, and is NOT interested in more at this time.      8.  MDX printed and highlighted for Provider? YES    9.  Patient was advised: “This is a free wellness visit. The provider will screen for medical conditions to help you stay healthy. If you have other concerns to address you may be asked to discuss these at a separate visit or there may be an additional fee.”     10.  Patient was informed to arrive 15 min prior to their scheduled appointment and bring in their medication bottles.

## 2018-04-04 DIAGNOSIS — I10 ESSENTIAL HYPERTENSION: ICD-10-CM

## 2018-04-04 LAB
T4 FREE SERPL-MCNC: 0.89 NG/DL (ref 0.53–1.43)
TSH SERPL DL<=0.005 MIU/L-ACNC: 1.22 UIU/ML (ref 0.38–5.33)

## 2018-04-05 RX ORDER — LOSARTAN POTASSIUM 50 MG/1
50 TABLET ORAL DAILY
Qty: 180 TAB | Refills: 3 | OUTPATIENT
Start: 2018-04-05 | End: 2019-03-27 | Stop reason: SDUPTHER

## 2018-04-10 ENCOUNTER — OFFICE VISIT (OUTPATIENT)
Dept: MEDICAL GROUP | Facility: LAB | Age: 73
End: 2018-04-10
Payer: MEDICARE

## 2018-04-10 ENCOUNTER — OFFICE VISIT (OUTPATIENT)
Dept: CARDIOLOGY | Facility: MEDICAL CENTER | Age: 73
End: 2018-04-10
Payer: MEDICARE

## 2018-04-10 VITALS
HEIGHT: 67 IN | WEIGHT: 286 LBS | BODY MASS INDEX: 44.89 KG/M2 | DIASTOLIC BLOOD PRESSURE: 80 MMHG | HEART RATE: 70 BPM | SYSTOLIC BLOOD PRESSURE: 140 MMHG | OXYGEN SATURATION: 96 %

## 2018-04-10 VITALS
HEIGHT: 67 IN | RESPIRATION RATE: 16 BRPM | HEART RATE: 58 BPM | TEMPERATURE: 97.9 F | WEIGHT: 287.6 LBS | BODY MASS INDEX: 45.14 KG/M2 | OXYGEN SATURATION: 97 % | SYSTOLIC BLOOD PRESSURE: 128 MMHG | DIASTOLIC BLOOD PRESSURE: 78 MMHG

## 2018-04-10 DIAGNOSIS — Z86.59 HISTORY OF DEPRESSION: ICD-10-CM

## 2018-04-10 DIAGNOSIS — R15.9 INCONTINENCE OF FECES, UNSPECIFIED FECAL INCONTINENCE TYPE: ICD-10-CM

## 2018-04-10 DIAGNOSIS — Z00.00 MEDICARE ANNUAL WELLNESS VISIT, SUBSEQUENT: ICD-10-CM

## 2018-04-10 DIAGNOSIS — M25.512 CHRONIC PAIN OF BOTH SHOULDERS: ICD-10-CM

## 2018-04-10 DIAGNOSIS — I49.3 PVC (PREMATURE VENTRICULAR CONTRACTION): ICD-10-CM

## 2018-04-10 DIAGNOSIS — Z99.81 DEPENDENCE ON NOCTURNAL OXYGEN THERAPY: ICD-10-CM

## 2018-04-10 DIAGNOSIS — E78.5 DYSLIPIDEMIA: ICD-10-CM

## 2018-04-10 DIAGNOSIS — G47.33 OSA (OBSTRUCTIVE SLEEP APNEA): ICD-10-CM

## 2018-04-10 DIAGNOSIS — M1A.0790 CHRONIC IDIOPATHIC GOUT INVOLVING TOE WITHOUT TOPHUS, UNSPECIFIED LATERALITY: ICD-10-CM

## 2018-04-10 DIAGNOSIS — I70.0 ATHEROSCLEROSIS OF AORTA (HCC): ICD-10-CM

## 2018-04-10 DIAGNOSIS — Z79.4 ENCOUNTER FOR LONG-TERM (CURRENT) USE OF INSULIN (HCC): ICD-10-CM

## 2018-04-10 DIAGNOSIS — I10 ESSENTIAL HYPERTENSION: ICD-10-CM

## 2018-04-10 DIAGNOSIS — F41.9 ANXIETY: ICD-10-CM

## 2018-04-10 DIAGNOSIS — E11.41 DIABETIC MONONEUROPATHY ASSOCIATED WITH TYPE 2 DIABETES MELLITUS (HCC): ICD-10-CM

## 2018-04-10 DIAGNOSIS — G89.29 CHRONIC PAIN OF BOTH SHOULDERS: ICD-10-CM

## 2018-04-10 DIAGNOSIS — R00.2 PALPITATIONS: ICD-10-CM

## 2018-04-10 DIAGNOSIS — E04.2 MULTIPLE THYROID NODULES: ICD-10-CM

## 2018-04-10 DIAGNOSIS — N18.4 TYPE 2 DIABETES MELLITUS WITH STAGE 4 CHRONIC KIDNEY DISEASE, WITH LONG-TERM CURRENT USE OF INSULIN (HCC): ICD-10-CM

## 2018-04-10 DIAGNOSIS — E11.22 TYPE 2 DIABETES MELLITUS WITH STAGE 4 CHRONIC KIDNEY DISEASE, WITH LONG-TERM CURRENT USE OF INSULIN (HCC): ICD-10-CM

## 2018-04-10 DIAGNOSIS — Z79.899 ON MULTAQ THERAPY: ICD-10-CM

## 2018-04-10 DIAGNOSIS — E03.4 HYPOTHYROIDISM DUE TO ACQUIRED ATROPHY OF THYROID: ICD-10-CM

## 2018-04-10 DIAGNOSIS — J45.20 MILD INTERMITTENT ASTHMA WITHOUT COMPLICATION: ICD-10-CM

## 2018-04-10 DIAGNOSIS — Z79.4 TYPE 2 DIABETES MELLITUS WITH STAGE 4 CHRONIC KIDNEY DISEASE, WITH LONG-TERM CURRENT USE OF INSULIN (HCC): ICD-10-CM

## 2018-04-10 DIAGNOSIS — I65.29 STENOSIS OF CAROTID ARTERY, UNSPECIFIED LATERALITY: ICD-10-CM

## 2018-04-10 DIAGNOSIS — N25.81 SECONDARY HYPERPARATHYROIDISM OF RENAL ORIGIN (HCC): ICD-10-CM

## 2018-04-10 DIAGNOSIS — M25.511 CHRONIC PAIN OF BOTH SHOULDERS: ICD-10-CM

## 2018-04-10 DIAGNOSIS — I48.0 PAF (PAROXYSMAL ATRIAL FIBRILLATION) (HCC): ICD-10-CM

## 2018-04-10 DIAGNOSIS — Z79.01 CHRONIC ANTICOAGULATION: ICD-10-CM

## 2018-04-10 DIAGNOSIS — J96.11 CHRONIC RESPIRATORY FAILURE WITH HYPOXIA (HCC): ICD-10-CM

## 2018-04-10 DIAGNOSIS — N18.4 CKD (CHRONIC KIDNEY DISEASE) STAGE 4, GFR 15-29 ML/MIN (HCC): ICD-10-CM

## 2018-04-10 DIAGNOSIS — E66.01 MORBID OBESITY WITH BMI OF 45.0-49.9, ADULT (HCC): ICD-10-CM

## 2018-04-10 DIAGNOSIS — I48.91 ATRIAL FIBRILLATION, UNSPECIFIED TYPE (HCC): ICD-10-CM

## 2018-04-10 DIAGNOSIS — I44.7 LEFT BUNDLE BRANCH BLOCK: ICD-10-CM

## 2018-04-10 PROCEDURE — 99214 OFFICE O/P EST MOD 30 MIN: CPT | Performed by: INTERNAL MEDICINE

## 2018-04-10 PROCEDURE — G0439 PPPS, SUBSEQ VISIT: HCPCS | Performed by: NURSE PRACTITIONER

## 2018-04-10 PROCEDURE — 93000 ELECTROCARDIOGRAM COMPLETE: CPT | Performed by: INTERNAL MEDICINE

## 2018-04-10 ASSESSMENT — ACTIVITIES OF DAILY LIVING (ADL): BATHING_REQUIRES_ASSISTANCE: 0

## 2018-04-10 ASSESSMENT — PATIENT HEALTH QUESTIONNAIRE - PHQ9
CLINICAL INTERPRETATION OF PHQ2 SCORE: 1
5. POOR APPETITE OR OVEREATING: 1 - SEVERAL DAYS
SUM OF ALL RESPONSES TO PHQ QUESTIONS 1-9: 3

## 2018-04-10 NOTE — PROGRESS NOTES
Arrhythmia Clinic Note (New patient)     DOS: 4/10/2018    Referring physician: Fernando Sorto    Chief complaint/Reason for consult: PVCs    HPI:  Patient is a 74 yo F with history of PAF, HTN, T2DM, on Multaq therapy. She has been getting stressed out lately about difficulty in her housing situation. She says for the last several months she has been getting more palpitations. She feels pauses in her HR and pounding. Zio was done which was essentially unremarkable. She has a very low PVC/PAC burden. No AF. Single PVCs were symptomatic, resulting in her activating the the event monitor. Otherwise her activations correlated to sinus rhythm.    ROS (+ highlighted in red):  Constitutional: Fevers/chills/fatigue/weightloss  HEENT: Blurry vision/eye pain/sore throat/hearing loss  Respiratory: Shortness of breath/cough  Cardiovascular: Chest pain/palpitations/edema/orthopnea/syncope  GI: Nausea/vomitting/diarrhea  MSK: Arthralgias/myagias/muscle weakness  Skin: Rash/sores  Neurological: Numbness/tremors/vertigo  Endocrine: Excessive thirst/polyuria/cold intolerance/heat intolerance  Psych: Depression/anxiety    Past Medical History:   Diagnosis Date   • Allergy    • Anxiety    • Arrhythmia    • Arthritis    • Asthma    • Carotid bruit     Left   • CATARACT    • Chest pain    • DIABETES MELLITUS    • Esophageal reflux    • Goiter    • Headache(784.0)    • Heart murmur    • Hyperlipidemia    • Hypertension    • IBD (inflammatory bowel disease)    • Migraine     Silent migraine   • AVERY (obstructive sleep apnea)    • Overweight(278.02)    • Palpitations    • Paroxysmal atrial fibrillation (CMS-[facility]) 7/12/13    woke with palps, AFib per ekg in am.   • Pneumonia     Nov. 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 Badillo   • Urinary tract infection, site not specified        Past Surgical History:   Procedure Laterality Date   •  CATARACT PHACO WITH IOL Right 10/25/2016    Procedure: CATARACT PHACO WITH IOL;  Surgeon: Zoran Gamble M.D.;  Location: SURGERY SAME DAY [facility];  Service:    • ABDOMINAL HYSTERECTOMY TOTAL     • CHOLECYSTECTOMY     • TONSILLECTOMY     • US-NEEDLE CORE BX-BREAST PANEL         Social History     Social History   • Marital status:      Spouse name: N/A   • Number of children: N/A   • Years of education: N/A     Occupational History   • Not on file.     Social History Main Topics   • Smoking status: Former Smoker     Packs/day: 1.00     Years: 20.00     Types: Cigarettes     Quit date: 1/1/1983   • Smokeless tobacco: Never Used   • Alcohol use No   • Drug use: No   • Sexual activity: No     Other Topics Concern   • Not on file     Social History Narrative   • No narrative on file       Family History   Problem Relation Age of Onset   • Cancer Maternal Aunt    • Diabetes Maternal Aunt    • Heart Disease Maternal Aunt    • Hypertension Maternal Aunt    • Hyperlipidemia Maternal Aunt    • Cancer Mother      Leukemia   • Hypertension Mother    • Diabetes Mother    • Lung Disease Father    • Cancer Father      Lung   • Lung Disease Brother    • Stroke Brother    • Diabetes Brother    • Heart Disease Brother    • Diabetes Maternal Grandmother    • Hypertension Brother    • Diabetes Brother        Allergies   Allergen Reactions   • Amlodipine Itching   • Amaryl      GI Problems   • Avandia [Rosiglitazone Maleate]      GI problems   • Cipro Xr      Possibly causes Chills.   • Clonidine      Rapid heart rate, swelling    • Contrast Media With Iodine [Iodine] Hives   • Glipizide      GI Problems   • Glucophage [Metformin Hydrochloride]      dizzy   • Hydralazine      Rapid heart rate, chest tightness   • Levaquin      Possible allergy - chills with cipro but sick   • Relafen [Nabumetone]      Itching; avoids all nsaids       Current Outpatient Prescriptions   Medication Sig Dispense Refill   • losartan (COZAAR)  50 MG Tab Take 1 Tab by mouth every day. TWICE DAILY 180 Tab 3   • Blood Glucose Monitoring Suppl Supplies Misc Test strips order: Test strips for Accucheck meter. Sig: use BID and prn ssx high or low sugar #100 RF x 3 100 Strip 11   • Blood Glucose Monitoring Suppl Supplies Misc Test strips order: Test strips for Chely Contour meter. Sig: use tid  and prn ssx high or low sugar #100 RF x 3 100 Strip 3   • AZELASTINE-FLUTICASONE NA Spray  in nose.     • beclomethasone (QVAR) 80 MCG/ACT inhaler Inhale 2 Puffs by mouth 2 times a day. Use with spacer.  Rinse mouth after each use. 2 Inhaler 0   • MULTAQ 400 MG Tab TAKE ONE TABLET BY MOUTH TWICE DAILY WITH MEALS 180 Tab 2   • montelukast (SINGULAIR) 10 MG Tab TAKE ONE TABLET BY MOUTH ONCE DAILY AT BEDTIME 30 Tab 11   • levothyroxine (SYNTHROID) 50 MCG Tab Take 1 Tab by mouth every day. 90 Tab 1   • pioglitazone (ACTOS) 30 MG Tab Take 1 Tab by mouth every day. 30 Tab 11   • Urine Glucose-Ketones Test Strip 1 Strip by In Vitro route every day. 100 Strip 2   • metoprolol (LOPRESSOR) 50 MG Tab Take 1 Tab by mouth 2 times a day. 180 Tab 3   • glucose blood (FREESTYLE LITE) strip 1 Strip by Other route 2 times a day, with meals. 50 Strip 11   • liraglutide (VICTOZA) 18 MG/3ML Solution Pen-injector injection Inject 0.3 mL as instructed every day. 3 PEN 11   • beclomethasone (QVAR) 40 MCG/ACT inhaler Inhale 2 Puffs by mouth 2 Times a Day. Use with spacer.  Rinse mouth after each use. 1 Inhaler 11   • Insulin Pen Needle 32G X 4 MM Misc 1 Applicator by Does not apply route 3 times a day. 100 Each 11   • Insulin Degludec (TRESIBA FLEXTOUCH) 200 UNIT/ML Solution Pen-injector Inject 50 Units as instructed every bedtime. 3 PEN 2   • fluticasone (FLONASE) 50 MCG/ACT nasal spray Spray 1-2 Sprays in nose every day. Each nostil, 30 minutes prior to use of CPAP 1 Bottle 6   • ferrous gluconate (FERGON) 324 (38 Fe) MG Tab Take 324 mg by mouth 3 times a day, with meals.     • simvastatin  "(ZOCOR) 20 MG Tab TAKE ONE TABLET BY MOUTH ONCE DAILY IN THE EVENING 90 Tab 3   • warfarin (COUMADIN) 3 MG Tab Take 1 to 1.5 tablets by mouth daily as directed by the coumadin clinic 135 Tab 1   • levalbuterol (XOPENEX HFA) 45 MCG/ACT inhaler Inhale 2 Puffs by mouth every four hours as needed for Shortness of Breath. 1 Inhaler 3   • Insulin Pen Needle (RELION PEN NEEDLES) 29G X 12MM Misc USE ONE PEN NEEDLE SUBCUTANEOUSLY FOR LANTUS SOLOSTAR 100 Each 3   • Blood Glucose Monitoring Suppl W/DEVICE Kit 1 Each by Does not apply route 2 times a day as needed. 1 Kit 0   • omeprazole (PRILOSEC) 20 MG delayed-release capsule Take 1 Cap by mouth every day. 90 Cap 3   • furosemide (LASIX) 80 MG Tab Take 80 mg by mouth every 48 hours.     • Diclofenac Sodium (VOLTAREN) 1 % Gel Apply 2 g to skin as directed 4 times a day. 100 g 0   • clonazepam (KLONOPIN) 0.5 MG Tab Take 1 Tab by mouth 2 times a day as needed (insomnia, anxiety). 60 Tab 0   • Misc. Devices MISC Pen needles for Lantus Solstar. 29g 12mm. 90 Each 3   • allopurinol (ZYLOPRIM) 100 MG TABS Take 1/2 pill qd (Patient taking differently: Take 100 mg by mouth 2 Times a Day. Indications: Primary Gout) 90 Tab 4   • Cholecalciferol (VITAMIN D) 2000 UNITS CAPS Take 4,000 Units by mouth every day.     • magnesium oxide (MAGNESIUM OXIDE) 400 (241.3 MG) MG TABS tablet Take 400 mg by mouth every day.       No current facility-administered medications for this visit.        Physical Exam:  Vitals:    04/10/18 1241   BP: 140/80   Pulse: 70   SpO2: 96%   Weight: (!) 129.7 kg (286 lb)   Height: 1.702 m (5' 7\")     General appearance: NAD, conversant   Eyes: anicteric sclerae, moist conjunctivae; no lid-lag; PERRLA  HENT: Atraumatic; oropharynx clear with moist mucous membranes and no mucosal ulcerations; normal hard and soft palate  Neck: Trachea midline; FROM, supple, no thyromegaly or lymphadenopathy  Lungs: CTA, with normal respiratory effort and no intercostal retractions  CV: " RRR, no MRGs, no JVD   Abdomen: Soft, non-tender; no masses or HSM  Extremities: No peripheral edema or extremity lymphadenopathy  Skin: Normal temperature, turgor and texture; no rash, ulcers or subcutaneous nodules  Psych: Appropriate affect, alert and oriented to person, place and time    Data:  Labs reviewed    Prior echo/stress results reviewed    Zio reviewed    Impression/Plan:  1. Atrial fibrillation, unspecified type (CMS-HCC)  EKG   2. On Multaq therapy     3. Palpitations     4. CKD (chronic kidney disease) stage 4, GFR 15-29 ml/min (CMS-HCC)     5. Essential hypertension     6. AVERY (obstructive sleep apnea)     7. Chronic anticoagulation       -Zio reviewed with her  -She mostly has events just subsequent to single PVC  -Overall PVC burden is low < 1%  -I reassured her that these were not harmful and that continuing b-blocker therapy was probably enough for this  -If she desires further medical therapy, we can consider AADs but her burden is so low I would not recommend this  -No AF  -She can f/u PRN with me, already has follow-up with Dr. Noreen Montoya MD

## 2018-04-10 NOTE — PROGRESS NOTES
Chief Complaint   Patient presents with   • Annual Wellness Visit         HPI:  Whitley is a 73 y.o. here for Medicare Annual Wellness Visit.  Overall feeling stable.  Followed by numerous specialists including nephrology, endocrinology, cardiology, pulmonology and ophthalmology, she is up-to-date with everyone.    Patient Active Problem List    Diagnosis Date Noted   • Essential hypertension 05/10/2016     Priority: High   • Atherosclerosis of aorta (CMS-HCC) 04/29/2015     Priority: High   • Chronic respiratory failure (CMS-HCC) 04/29/2015     Priority: High   • Morbid obesity with BMI of 45.0-49.9, adult (CMS-HCC) 04/29/2015     Priority: High   • Anxiety 01/09/2014     Priority: High   • Hypothyroid 12/12/2013     Priority: High   • PAF (paroxysmal atrial fibrillation) (CMS-HCC) 07/12/2013     Priority: High   • Type 2 diabetes mellitus with stage 4 chronic kidney disease, with long-term current use of insulin (CMS-HCC)      Priority: High   • Palpitations      Priority: High   • Mild intermittent asthma without complication 08/24/2016     Priority: Medium   • Chronic anticoagulation 06/10/2016     Priority: Medium   • AVERY (obstructive sleep apnea) 05/10/2016     Priority: Medium   • Chronic gout involving toe without tophus 04/30/2015     Priority: Medium   • Left bundle branch block 03/05/2012     Priority: Medium   • Multiple thyroid nodules 10/07/2015     Priority: Low   • Carotid stenosis 05/30/2014     Priority: Low   • PVC (premature ventricular contraction) 02/23/2018   • Dependence on nocturnal oxygen therapy 11/02/2017   • Diabetic mononeuropathy associated with type 2 diabetes mellitus (CMS-HCC) 11/02/2017   • Encounter for long-term (current) use of insulin (CMS-HCC) 08/11/2017   • Incontinence of feces 05/18/2017   • Chronic pain of both shoulders 03/23/2017   • Dyslipidemia 12/14/2016   • URI (upper respiratory infection) 09/01/2015   • Atrial fibrillation (CMS-HCC) 05/30/2014       Current  Outpatient Prescriptions   Medication Sig Dispense Refill   • losartan (COZAAR) 50 MG Tab Take 1 Tab by mouth every day. TWICE DAILY 180 Tab 3   • Blood Glucose Monitoring Suppl Supplies Misc Test strips order: Test strips for Accucheck meter. Sig: use BID and prn ssx high or low sugar #100 RF x 3 100 Strip 11   • Blood Glucose Monitoring Suppl Supplies Misc Test strips order: Test strips for Chely Contour meter. Sig: use tid  and prn ssx high or low sugar #100 RF x 3 100 Strip 3   • AZELASTINE-FLUTICASONE NA [place]  in nose.     • sulfamethoxazole-trimethoprim (BACTRIM DS) 800-160 MG tablet Take 1 Tab by mouth 2 times a day. Take until gone. (Patient not taking: Reported on 4/10/2018) 28 Tab 0   • MethylPREDNISolone (MEDROL DOSEPAK) 4 MG Tablet Therapy Pack Take as directed. (Patient not taking: Reported on 4/10/2018) 21 Tab 0   • beclomethasone (QVAR) 80 MCG/ACT inhaler Inhale 2 Puffs by mouth 2 times a day. Use with spacer.  Rinse mouth after each use. 2 Inhaler 0   • MULTAQ 400 MG Tab TAKE ONE TABLET BY MOUTH TWICE DAILY WITH MEALS 180 Tab 2   • montelukast (SINGULAIR) 10 MG Tab TAKE ONE TABLET BY MOUTH ONCE DAILY AT BEDTIME 30 Tab 11   • levothyroxine (SYNTHROID) 50 MCG Tab Take 1 Tab by mouth every day. 90 Tab 1   • pioglitazone (ACTOS) 30 MG Tab Take 1 Tab by mouth every day. 30 Tab 11   • Urine Glucose-Ketones Test Strip 1 Strip by In Vitro route every day. 100 Strip 2   • metoprolol (LOPRESSOR) 50 MG Tab Take 1 Tab by mouth 2 times a day. 180 Tab 3   • glucose blood (FREESTYLE LITE) strip 1 Strip by Other route 2 times a day, with meals. 50 Strip 11   • liraglutide (VICTOZA) 18 MG/3ML Solution Pen-injector injection Inject 0.3 mL as instructed every day. 3 PEN 11   • beclomethasone (QVAR) 40 MCG/ACT inhaler Inhale 2 Puffs by mouth 2 Times a Day. Use with spacer.  Rinse mouth after each use. 1 Inhaler 11   • Insulin Pen Needle 32G X 4 MM Misc 1 Applicator by Does not apply route 3 times a day. 100 Each 11   •  Insulin Degludec (TRESIBA FLEXTOUCH) 200 UNIT/ML Solution Pen-injector Inject 50 Units as instructed every bedtime. 3 PEN 2   • fluticasone (FLONASE) 50 MCG/ACT nasal spray Spray 1-2 Sprays in nose every day. Each nostil, 30 minutes prior to use of CPAP 1 Bottle 6   • ferrous gluconate (FERGON) 324 (38 Fe) MG Tab Take 324 mg by mouth 3 times a day, with meals.     • simvastatin (ZOCOR) 20 MG Tab TAKE ONE TABLET BY MOUTH ONCE DAILY IN THE EVENING 90 Tab 3   • warfarin (COUMADIN) 3 MG Tab Take 1 to 1.5 tablets by mouth daily as directed by the coumadin clinic 135 Tab 1   • levalbuterol (XOPENEX HFA) 45 MCG/ACT inhaler Inhale 2 Puffs by mouth every four hours as needed for Shortness of Breath. 1 Inhaler 3   • Insulin Pen Needle (RELION PEN NEEDLES) 29G X 12MM Misc USE ONE PEN NEEDLE SUBCUTANEOUSLY FOR LANTUS SOLOSTAR 100 Each 3   • Blood Glucose Monitoring Suppl W/DEVICE Kit 1 Each by Does not apply route 2 times a day as needed. 1 Kit 0   • omeprazole (PRILOSEC) 20 MG delayed-release capsule Take 1 Cap by mouth every day. 90 Cap 3   • furosemide (LASIX) 80 MG Tab Take 80 mg by mouth every 48 hours.     • Diclofenac Sodium (VOLTAREN) 1 % Gel Apply 2 g to skin as directed 4 times a day. 100 g 0   • clonazepam (KLONOPIN) 0.5 MG Tab Take 1 Tab by mouth 2 times a day as needed (insomnia, anxiety). 60 Tab 0   • Misc. Devices MISC Pen needles for Lantus Solstar. 29g 12mm. 90 Each 3   • allopurinol (ZYLOPRIM) 100 MG TABS Take 1/2 pill qd (Patient taking differently: Take 100 mg by mouth 2 Times a Day. Indications: Primary Gout) 90 Tab 4   • Cholecalciferol (VITAMIN D) 2000 UNITS CAPS Take 4,000 Units by mouth every day.     • magnesium oxide (MAGNESIUM OXIDE) 400 (241.3 MG) MG TABS tablet Take 400 mg by mouth every day.       No current facility-administered medications for this visit.         Patient is taking medications as noted in medication list.  Current supplements as per medication list.     Allergies: Amlodipine;  Amaryl; Avandia [rosiglitazone maleate]; Cipro xr; Clonidine; Contrast media with iodine [iodine]; Glipizide; Glucophage [metformin hydrochloride]; Hydralazine; Levaquin; and Relafen [nabumetone]    Current social contact/activities: movies, [facility] for [facility], ceramics, going to [employer]   Patient's perception of their health: fair    Is patient current with immunizations? Yes.    She  reports that she quit smoking about 35 years ago. Her smoking use included Cigarettes. She has a 20.00 pack-year smoking history. She has never used smokeless tobacco. She reports that she does not drink alcohol or use drugs.  Counseling given: Yes        DPA/Advanced directive: Patient does not have an Advanced Directive.  A packet and workshop information was given on Advanced Directives.    ROS:    Gait: Uses no assistive device   Ostomy: no   Other tubes: no   Amputations: no   Chronic oxygen use no   Last eye exam 3/21/18   Wears hearing aids: no   : Reports urinary leakage during the last 6 months that has somewhat interfered with their daily activities or sleep.      Screening:    DIABETES    Has patient ever had diabetes education? Yes, and patient is interested in more. Referral pended.        Depression Screening    Little interest or pleasure in doing things?  0 - not at all  Feeling down, depressed, or hopeless? 1 - several days  Trouble falling or staying asleep, or sleeping too much?  0 - not at all  Feeling tired or having little energy?  1 - several days  Poor appetite or overeating?  1 - several days  Feeling bad about yourself - or that you are a failure or have let yourself or your family down? 0 - not at all  Trouble concentrating on things, such as reading the newspaper or watching television? 0 - not at all  Moving or speaking so slowly that other people could have noticed.  Or the opposite - being so fidgety or restless that you have been moving around a lot more than usual?  0 - not at all  Thoughts that  you would be better off dead, or of hurting yourself?  0 - not at all  Patient Health Questionnaire Score: 3      If depressive symptoms identified deferred to follow up visit unless specifically addressed in assessment and plan.    Interpretation of PHQ-9 Total Score   Score Severity   1-4 No Depression   5-9 Mild Depression   10-14 Moderate Depression   15-19 Moderately Severe Depression   20-27 Severe Depression      Screening for Cognitive Impairment    Three Minute Recall (apple, watch, rose)  3/3 Apple, rose, table  Draw clock face with all 12 numbers set to the hand to show 10 minutes past 11 o'clock  Time-8:20-5/5  If cognitive concerns identified, deferred for follow up unless specifically addressed in assessment and plan.    Fall Risk Assessment    Has the patient had two or more falls in the last year or any fall with injury in the last year?  No  If fall risk identified, deferred for follow up unless specifically addressed in assessment and plan.      Safety Assessment    Throw rugs on floor.  Yes  Handrails on all stairs.  Yes  Good lighting in all hallways.  Yes  Difficulty hearing.  Yes  Patient counseled about all safety risks that were identified.    Functional Assessment ADLs    Are there any barriers preventing you from cooking for yourself or meeting nutritional needs?  No.    Are there any barriers preventing you from driving safely or obtaining transportation?  No.    Are there any barriers preventing you from using a telephone or calling for help?  No.    Are there any barriers preventing you from shopping?  No.    Are there any barriers preventing you from taking care of your own finances?  No.    Are there any barriers preventing you from managing your medications?    No.    Are there any barriers preventing you from showering, bathing or dressing yourself?  No.    Are you currently engaging any exercise or physical activity?  Yes.  Walking once in while. House chores    Health Maintenance  Summary                BONE DENSITY Overdue 1/31/2018      Done 1/31/2013 DS-BONE DENSITY STUDY (DEXA)    IMM ZOSTER VACCINE Postponed 12/12/2018 Originally 3/19/2006. Patient Refused    IMM DTaP/Tdap/Td Vaccine Postponed 12/27/2018 Originally 3/19/1965. Patient Refused    A1C SCREENING Next Due 8/26/2018      Done 2/26/2018 POCT A1C     Patient has more history with this topic...    MAMMOGRAM Next Due 9/8/2018      Done 9/8/2017 MA-MAMMO SCREENING BILAT W/TOMOSYNTHESIS W/CAD     Patient has more history with this topic...    FASTING LIPID PROFILE Next Due 10/4/2018      Done 10/4/2017 LIPID PROFILE      Patient has more history with this topic...    DIABETES MONOFILAMENT / LE EXAM Next Due 11/2/2018      Done 11/2/2017 AMB DIABETIC MONOFILAMENT LOWER EXTREMITY EXAM     Patient has more history with this topic...    Annual Wellness Visit Next Due 11/3/2018      Done 11/2/2017 Visit Dx: Medicare annual wellness visit, subsequent     Patient has more history with this topic...    URINE ACR / MICROALBUMIN Next Due 2/14/2019      Done 2/14/2018 PROTEIN/CREAT RATIO URINE      Patient has more history with this topic...    SERUM CREATININE Next Due 2/14/2019      Done 2/14/2018 RENAL FUNCTION PANEL      Patient has more history with this topic...    COLON CANCER SCREENING ANNUAL FIT Next Due 3/1/2019      Done 3/1/2018 OCCULT BLOOD FECES IMMUNOASSAY    RETINAL SCREENING Next Due 3/21/2019      Done 3/21/2018 REFERRAL FOR RETINAL SCREENING EXAM     Patient has more history with this topic...          Patient Care Team:  EDILMA Whelan as PCP - General (Family Medicine)  [facility]  Ania Badillo M.D. as Consulting Physician (Nephrology)  Zoran Gamble M.D. as Consulting Physician (Ophthalmology)  Fernando Sorto M.D. as Consulting Physician (Cardiology)  Laial Corral, PharmD as Care Coordinator (Pharmacy)  Wei Patterson P.A.-C. as Consulting Physician (Endocrinology)  Lubna OCHOA  EDILMA Lara as Mid Level Provider (Pulmonary Medicine)  Jeffry Montoya M.D. as Consulting Physician (Cardiology)  Zoran Bonilla M.D. as Consulting Physician (Gastroenterology)      Social History   Substance Use Topics   • Smoking status: Former Smoker     Packs/day: 1.00     Years: 20.00     Types: Cigarettes     Quit date: 1/1/1983   • Smokeless tobacco: Never Used   • Alcohol use No     Family History   Problem Relation Age of Onset   • Cancer Maternal Aunt    • Diabetes Maternal Aunt    • Heart Disease Maternal Aunt    • Hypertension Maternal Aunt    • Hyperlipidemia Maternal Aunt    • Cancer Mother      Leukemia   • Hypertension Mother    • Diabetes Mother    • Lung Disease Father    • Cancer Father      Lung   • Lung Disease Brother    • Stroke Brother    • Diabetes Brother    • Heart Disease Brother    • Diabetes Maternal Grandmother    • Hypertension Brother    • Diabetes Brother      She  has a past medical history of Allergy; Anxiety; Arrhythmia; Arthritis; Asthma; Carotid bruit; CATARACT; Chest pain; DIABETES MELLITUS; Esophageal reflux; Goiter; Headache(784.0); Heart murmur; Hyperlipidemia; Hypertension; IBD (inflammatory bowel disease); Migraine; AVERY (obstructive sleep apnea); Overweight(278.02); Palpitations; Paroxysmal atrial fibrillation (CMS-HCC) (7/12/13); Pneumonia; Positive reaction to tuberculin skin test; Renal disease; and Urinary tract infection, site not specified. She also has no past medical history of Breast cancer (CMS-HCC) or Tuberculosis.   Past Surgical History:   Procedure Laterality Date   • CATARACT PHACO WITH IOL Right 10/25/2016    Procedure: CATARACT PHACO WITH IOL;  Surgeon: Zoran Gamble M.D.;  Location: SURGERY SAME DAY [facility];  Service:    • ABDOMINAL HYSTERECTOMY TOTAL     • CHOLECYSTECTOMY     • TONSILLECTOMY     • US-NEEDLE CORE BX-BREAST PANEL           Exam:     Blood pressure 128/78, pulse (!) 58, temperature 36.6 °C (97.9 °F), resp. rate 16, height  "1.702 m (5' 7\"), weight (!) 130.5 kg (287 lb 9.6 oz), SpO2 97 %. Body mass index is 45.04 kg/m².    Hearing excellent.    Dentition good  Alert, oriented in no acute distress.  Eye contact is good, speech goal directed, affect calm      Assessment and Plan. The following treatment and monitoring plan is recommended:   1. Medicare annual wellness visit, subsequent    2. Type 2 diabetes mellitus with stage 4 chronic kidney disease, with long-term current use of insulin (CMS-HCC)  -stable.  A1C7.6% 2/2018.  Taking actos, tresiba and victoa.  Followed by endo also.  Referred for diabetic nutritional counseling.     3. Palpitations  -zio patch done.  Just saw Dr. Montoya today - told not to be concerned per pt.      4. PAF (paroxysmal atrial fibrillation) (CMS-HCC)  -s/p conversion 2014.  On multaq and coumadin.     5. Hypothyroidism due to acquired atrophy of thyroid  -stable.  Labs UTD.  Continue same.     6. Anxiety  -improved.  Not on meds for this.  Typically situational r/t living stress, now in her own apt.      7. Atherosclerosis of aorta (CMS-HCC)  -stable. On statin.  Updated 2015 - recheck 1-2 years.      8. Chronic respiratory failure with hypoxia (CMS-HCC)  -UTD with [facility].  On 3 L oxygen and cpap.  Stable.     9. Morbid obesity with BMI of 45.0-49.9, adult (CMS-HCC)  -stable.  To continue to to work on weight.     10. Essential hypertension  -stable.  Continue same    11. Left bundle branch block  -stable and UTD with cardiology    12. Chronic idiopathic gout involving toe without tophus, unspecified laterality  -no outbreak in years.  Taking allopurinol 200 mg daily     13. AVERY (obstructive sleep apnea)  -cpap nightly.  Stable.     14. Chronic anticoagulation  -stable and followed closely by anticoagulation clinic.     15. Mild intermittent asthma without complication  -stable on qvar and singular.  Using prn xopenex.      16. Stenosis of carotid artery, unspecified laterality  -stable / mild 2014 " - finances tough on pt right now - will recheck 1-2 years.  On statin.      17. Multiple thyroid nodules  -US does show very slight growth but still below 1 cm.  Will recheck 11/2018 and possibly refer to Dr. Coe as Dr. Naranjo has retired.      18. Dyslipidemia  -stable.  LP UTD. On statin    19. Chronic pain of both shoulders  -stable.  Not interrupting ADLS.  Pt does not want to pursue further @ this time.     20. Incontinence of feces, unspecified fecal incontinence type  -stable.  Prolonging general surgery consult.     21. Encounter for long-term (current) use of insulin (CMS-HCC)  -stable.      22. Dependence on nocturnal oxygen therapy  -stable.     23. Diabetic mononeuropathy associated with type 2 diabetes mellitus (CMS-HCC)  -stable.      24. PVC (premature ventricular contraction)  -stable.     25.  Hx of depression:   Resolved.     26.  CKD stage 4:   Last labs improved.  Stable.  Followed by nephrology.     27.  Secondary hyperparathyroidism of renal origin:  Stable.     Services suggested: referred for nutritionist consult.    Health Care Screening: Age-appropriate preventive services recommended by USPTF and ACIP covered by Medicare were discussed today. Services ordered if indicated and agreed upon by the patient.  Referrals offered: PT/OT/Nutrition counseling/Behavioral Health/Smoking cessation as per orders if indicated.    Discussion today about general wellness and lifestyle habits:    · Prevent falls and reduce trip hazards; Cautioned about securing or removing rugs.  · Have a working fire alarm and carbon monoxide detector;   · Engage in regular physical activity and social activities       Follow-up: Follow-up in 6 months

## 2018-04-13 LAB — EKG IMPRESSION: NORMAL

## 2018-04-24 ENCOUNTER — ANTICOAGULATION VISIT (OUTPATIENT)
Dept: MEDICAL GROUP | Facility: PHYSICIAN GROUP | Age: 73
End: 2018-04-24
Payer: MEDICARE

## 2018-04-24 ENCOUNTER — PATIENT OUTREACH (OUTPATIENT)
Dept: HEALTH INFORMATION MANAGEMENT | Facility: OTHER | Age: 73
End: 2018-04-24

## 2018-04-24 DIAGNOSIS — Z79.01 CHRONIC ANTICOAGULATION: ICD-10-CM

## 2018-04-24 LAB — INR PPP: 1.6 (ref 2–3.5)

## 2018-04-24 PROCEDURE — 85610 PROTHROMBIN TIME: CPT | Performed by: NURSE PRACTITIONER

## 2018-04-24 PROCEDURE — 99211 OFF/OP EST MAY X REQ PHY/QHP: CPT | Performed by: NURSE PRACTITIONER

## 2018-04-24 NOTE — PROGRESS NOTES
Anticoagulation Summary  As of 4/24/2018    INR goal:   2.0-3.0   TTR:   78.8 % (2.5 y)   Today's INR:   1.6!   Maintenance plan:   3 mg (3 mg x 1) every day   Weekly total:   21 mg   Weekly max dose:   21 mg   Plan last modified:   Eh Enamorado, PharmD (2/6/2018)   Next INR check:   5/8/2018   Target end date:   Indefinite    Indications    Chronic anticoagulation [Z79.01]  Chronic anticoagulation (Resolved) [Z79.01]  PAF (paroxysmal atrial fibrillation) (CMS-[facility]) [I48.0]             Anticoagulation Episode Summary     INR check location:       Preferred lab:       Send INR reminders to:       Comments:         Anticoagulation Care Providers     Provider Role Specialty Phone number    Amy Lincoln M.D. Referring Cardiology [phone]    [facility] Responsible  [phone]        Anticoagulation Patient Findings  Patient Findings     Positives:   Change in diet/appetite    Negatives:   Signs/symptoms of thrombosis, Signs/symptoms of bleeding, Laboratory test error suspected, Change in health, Change in alcohol use, Change in activity, Upcoming invasive procedure, Emergency department visit, Upcoming dental procedure, Missed doses, Extra doses, Change in medications, Hospital admission, Bruising, Other complaints    Comments:   Possibly more greens this past week        HPI:   Whitley Frederick seen in clinic today, on anticoagulation therapy with warfarin for stroke prevention due to history of paroxysmal atrial fibrillation.    Patient's previous INR was therapeutic at 2.6 on 3-20-18, at which time patient was instructed to continue with current warfarin regimen.  She returns to clinic today to recheck INR to ensure it is therapeutic and thus preventing possible clotting and/or bleeding/bruising complications.    CHADS-VASc = at least 4  (unadjusted ischemic stroke risk/year:  4.8%, which is moderate risk)    Does patient have any changes to current medical/health status since last appt  "(Y/N):  NO  Does patient have any signs/symptoms of bleeding and/or thrombosis since the last appt (Y/N):  NO  Does patient have any interval changes to diet or medications since last appt (Y/N):  NO  Are there any complications or cost restrictions with current therapy (Y/N):  NO      Vitals:  BP check declined at today's visit    Weight  Scale unavailable   Height   5' 7\"     Asssessment:      INR subtherapeutic at 1.6, therefore increasing patient's risk of stroke due to a-fib   Reason(s) for out of range INR today:  A bit more greens than usual during the past week      Plan:  Instructed patient to bolus with 6mg X 1, then resume current warfarin regimen in order to bring INR back to therapeutic range.     Follow up:  Because warfarin is a high risk medication and current CHEST guidelines recommend regular monitoring intervals (few days up to 12 weeks), will have patient return to clinic in 2 weeks to recheck INR.    Eh Enamorado, PharmD    "

## 2018-05-02 ENCOUNTER — OFFICE VISIT (OUTPATIENT)
Dept: MEDICAL GROUP | Facility: LAB | Age: 73
End: 2018-05-02
Payer: MEDICARE

## 2018-05-02 VITALS
TEMPERATURE: 97.4 F | HEIGHT: 67 IN | OXYGEN SATURATION: 95 % | DIASTOLIC BLOOD PRESSURE: 60 MMHG | SYSTOLIC BLOOD PRESSURE: 148 MMHG | WEIGHT: 288 LBS | HEART RATE: 60 BPM | BODY MASS INDEX: 45.2 KG/M2 | RESPIRATION RATE: 16 BRPM

## 2018-05-02 DIAGNOSIS — H93.13 TINNITUS OF BOTH EARS: ICD-10-CM

## 2018-05-02 PROCEDURE — 99213 OFFICE O/P EST LOW 20 MIN: CPT | Performed by: NURSE PRACTITIONER

## 2018-05-02 NOTE — PROGRESS NOTES
"Chief Complaint   Patient presents with   • Ringing in Ear     check for wax       HPI  Whitley is a 73-year-old established female here with complaint of ringing in both ears. Ringing in the ears is a chronic issue for her but it seems to be getting worse/louder and more frequent. She does not have any ear pain. She is hoping there is wax in her ear that may be causing the worsening ringing. She denies any head trauma. She's never had hearing testing. She is on Lasix, a PPI and uses a benzodiazepine periodically.    Past medical, surgical, family, and social history is reviewed and updated in Epic chart by me today.   Medications and allergies reviewed and updated in Epic chart by me today.     ROS:   As documented in history of present illness above    Exam:  Blood pressure 148/60, pulse 60, temperature 36.3 °C (97.4 °F), resp. rate 16, height 1.702 m (5' 7\"), weight (!) 130.6 kg (288 lb), SpO2 95 %.  Constitutional: Alert, no distress, plus 3 vital signs  Skin:  Warm, dry, no rashes invisible areas  Eye: Equal, round and reactive, conjunctiva clear  ENMT: Lips without lesions, good dentition. Tympanic membranes are translucent bilaterally and there is no wax buildup.  Neck: Trachea midline  Respiratory: Unlabored respiration  Cardiovascular: Normal rate and rhythm  Psych: Alert, pleasant, well-groomed, normal affect    A/P:  1. Tinnitus of both ears  -We did discuss medications on her list that are known to potentially cause tinnitus. She's tried dropping omeprazole in the past without any relief of the tinnitus. She feels that tinnitus was been around longer than she has been taking furosemide and states that she cannot stop that medication although she only takes it every other day and has tinnitus daily. She's only taking a benzodiazepine as needed and has ringing every day. Recommend hearing testing.  - REFERRAL TO AUDIOLOGY    "

## 2018-05-07 RX ORDER — FUROSEMIDE 80 MG
80 TABLET ORAL
Qty: 30 TAB | Refills: 3 | Status: SHIPPED | OUTPATIENT
Start: 2018-05-07 | End: 2019-04-16 | Stop reason: SDUPTHER

## 2018-05-07 NOTE — TELEPHONE ENCOUNTER
Was the patient seen in the last year in this department? Yes  Hi ... Whitley Frederick 1945...I need my Lasix 80mg every other day...  refilled ... Richard Vallejo was my original prescriber BUT now he’s practicing at the [facility] I don’t have insurance coverage for that facility... I told my pharmacy to fax the refill request to you ...  Thank you     Does patient have an active prescription for medications requested? No     Received Request Via: Patient

## 2018-05-09 ENCOUNTER — HOSPITAL ENCOUNTER (OUTPATIENT)
Dept: LAB | Facility: MEDICAL CENTER | Age: 73
End: 2018-05-09
Attending: INTERNAL MEDICINE
Payer: MEDICARE

## 2018-05-09 DIAGNOSIS — K21.9 GASTROESOPHAGEAL REFLUX DISEASE, ESOPHAGITIS PRESENCE NOT SPECIFIED: ICD-10-CM

## 2018-05-09 LAB
25(OH)D3 SERPL-MCNC: 37 NG/ML (ref 30–100)
ALBUMIN SERPL BCP-MCNC: 3.7 G/DL (ref 3.2–4.9)
BUN SERPL-MCNC: 51 MG/DL (ref 8–22)
CALCIUM SERPL-MCNC: 9.4 MG/DL (ref 8.5–10.5)
CHLORIDE SERPL-SCNC: 111 MMOL/L (ref 96–112)
CO2 SERPL-SCNC: 23 MMOL/L (ref 20–33)
CREAT SERPL-MCNC: 1.67 MG/DL (ref 0.5–1.4)
GLUCOSE SERPL-MCNC: 102 MG/DL (ref 65–99)
PHOSPHATE SERPL-MCNC: 3.7 MG/DL (ref 2.5–4.5)
POTASSIUM SERPL-SCNC: 4.4 MMOL/L (ref 3.6–5.5)
PTH-INTACT SERPL-MCNC: 71.6 PG/ML (ref 14–72)
SODIUM SERPL-SCNC: 144 MMOL/L (ref 135–145)
URATE SERPL-MCNC: 5.3 MG/DL (ref 1.9–8.2)

## 2018-05-09 PROCEDURE — 83970 ASSAY OF PARATHORMONE: CPT

## 2018-05-09 PROCEDURE — 36415 COLL VENOUS BLD VENIPUNCTURE: CPT

## 2018-05-09 PROCEDURE — 84550 ASSAY OF BLOOD/URIC ACID: CPT

## 2018-05-09 PROCEDURE — 80069 RENAL FUNCTION PANEL: CPT

## 2018-05-09 PROCEDURE — 82306 VITAMIN D 25 HYDROXY: CPT

## 2018-05-09 RX ORDER — OMEPRAZOLE 20 MG/1
CAPSULE, DELAYED RELEASE ORAL
Refills: 11 | Status: CANCELLED | OUTPATIENT
Start: 2018-05-09

## 2018-05-09 RX ORDER — OMEPRAZOLE 20 MG/1
20 CAPSULE, DELAYED RELEASE ORAL DAILY
Qty: 90 CAP | Refills: 3 | Status: SHIPPED | OUTPATIENT
Start: 2018-05-09 | End: 2019-05-03 | Stop reason: SDUPTHER

## 2018-05-14 PROBLEM — N25.81 SECONDARY HYPERPARATHYROIDISM OF RENAL ORIGIN (HCC): Status: ACTIVE | Noted: 2018-05-14

## 2018-05-16 ENCOUNTER — OFFICE VISIT (OUTPATIENT)
Dept: MEDICAL GROUP | Facility: LAB | Age: 73
End: 2018-05-16
Payer: MEDICARE

## 2018-05-16 ENCOUNTER — ANTICOAGULATION VISIT (OUTPATIENT)
Dept: VASCULAR LAB | Facility: MEDICAL CENTER | Age: 73
End: 2018-05-16
Attending: INTERNAL MEDICINE
Payer: MEDICARE

## 2018-05-16 VITALS
DIASTOLIC BLOOD PRESSURE: 62 MMHG | WEIGHT: 286 LBS | OXYGEN SATURATION: 95 % | HEART RATE: 63 BPM | RESPIRATION RATE: 12 BRPM | SYSTOLIC BLOOD PRESSURE: 110 MMHG | BODY MASS INDEX: 44.89 KG/M2 | TEMPERATURE: 97.5 F | HEIGHT: 67 IN

## 2018-05-16 DIAGNOSIS — M25.562 ACUTE PAIN OF LEFT KNEE: ICD-10-CM

## 2018-05-16 DIAGNOSIS — I48.0 PAF (PAROXYSMAL ATRIAL FIBRILLATION) (HCC): ICD-10-CM

## 2018-05-16 DIAGNOSIS — Z79.01 CHRONIC ANTICOAGULATION: ICD-10-CM

## 2018-05-16 LAB — INR PPP: 1.5 (ref 2–3.5)

## 2018-05-16 PROCEDURE — 85610 PROTHROMBIN TIME: CPT

## 2018-05-16 PROCEDURE — 99212 OFFICE O/P EST SF 10 MIN: CPT

## 2018-05-16 PROCEDURE — 99214 OFFICE O/P EST MOD 30 MIN: CPT | Performed by: NURSE PRACTITIONER

## 2018-05-16 NOTE — PROGRESS NOTES
Anticoagulation Summary  As of 5/16/2018    INR goal:   2.0-3.0   TTR:   77.0 % (2.6 y)   Today's INR:   1.5!   Warfarin maintenance plan:   4.5 mg (3 mg x 1.5) on Mon, Fri; 3 mg (3 mg x 1) all other days   Weekly warfarin total:   24 mg   Weekly max warfarin dose:   21 mg   Plan last modified:   Juan Bonds, PharmD (5/16/2018)   Next INR check:   5/23/2018   Target end date:   Indefinite    Indications    Chronic anticoagulation [Z79.01]  Chronic anticoagulation (Resolved) [Z79.01]  PAF (paroxysmal atrial fibrillation) (HCC) [I48.0]             Anticoagulation Episode Summary     INR check location:       Preferred lab:       Send INR reminders to:       Comments:         Anticoagulation Care Providers     Provider Role Specialty Phone number    Amy Lincoln M.D. Referring Cardiology [phone]    [facility]tico[facility] Responsible  [phone]        Anticoagulation Patient Findings      HPI:  Whitley Frederick seen in clinic today, on anticoagulation therapy with warfarin for AF.  Changes to current medical/health status since last appt: none  Denies signs/symptoms of bleeding and/or thrombosis since the last appt.    Denies any interval changes to diet  Denies any interval changes to medications since last appt.   Denies any complications or cost restrictions with current therapy.   Declines vitals.  Confirmed dosing regimen.  Unknown reason for low INR.    A/P   INR  SUB-therapeutic.   Bolus today and begin increased warfarin regimen.     Follow up appointment in 1 week(s).    Juan Bonds, AraceliD

## 2018-05-16 NOTE — PROGRESS NOTES
"Chief Complaint   Patient presents with   • Knee Pain     pt states she has been having left knee pain, in one episode she had acute sharp pain that she could not walk-on, onset 3 weeks        HPI  Whitley is a 71 yo est female here with c/o new onset left knee pain x the past 3 weeks - started in back of knee.  Pain radiates down to shin occasionally.  Pain is coming and going.  Has had periodic popping.  Worse with walking, points to inside of left knee.  Better with rest / elevation / at night in bed.  Puts pillow inbetween legs to sleep which helps.  No injuries.    No hx of knee surgery on either knee.  No other symptoms.  No hx of imaging to knees.  Can't take nsaids b/c of CKD - using topical CBD oils which help.    Past medical, surgical, family, and social history is reviewed and updated in Epic chart by me today.   Medications and allergies reviewed and updated in Epic chart by me today.     ROS:   As documented in history of present illness above    Exam:  Blood pressure 110/62, pulse 63, temperature 36.4 °C (97.5 °F), resp. rate 12, height 1.702 m (5' 7\"), weight (!) 129.7 kg (286 lb), SpO2 95 %.  Constitutional: Alert, no distress, plus 3 vital signs  Skin:  Warm, dry, no rashes invisible areas  Respiratory: Unlabored respiration  Cardiovascular: Normal rate and rhythm   Musculoskeletal: Knee examination somewhat difficult to evaluate because of size but is remarkable only for tenderness with palpation to the popliteal and medial aspect. There is no erythema, increased warmth, or redness. There is no joint laxity. She has full range of motion. No clicking or crepitation. She is even able to stand only on her left leg and rotate right to left without pain.  Psych: Alert, pleasant, well-groomed, normal affect    A/P:  1. Acute pain of left knee  -suspect overuse / inflammation as pt has been very busy moving for the past several weeks. I encouraged her to utilize ice after activity, heat before. She'll " continue with topical ointments that seem to be helping her. She'll refrain from NSAIDs. Discussed appropriate amounts of Tylenol as needed. Encouraged a knee brace when she does have to walk greater than 10 minutes at a time. She would like to start exercising to help weight loss and diabetes, encouraged her to use a stationary bike as well as quadriceps and hamstring extension/flexion machines. I'll follow-up with her the x-ray results when it returns.  - DX-KNEE COMPLETE 4+ LEFT; Future

## 2018-05-23 ENCOUNTER — ANTICOAGULATION VISIT (OUTPATIENT)
Dept: VASCULAR LAB | Facility: MEDICAL CENTER | Age: 73
End: 2018-05-23
Attending: INTERNAL MEDICINE
Payer: MEDICARE

## 2018-05-23 DIAGNOSIS — I48.0 PAF (PAROXYSMAL ATRIAL FIBRILLATION) (HCC): ICD-10-CM

## 2018-05-23 DIAGNOSIS — Z79.01 CHRONIC ANTICOAGULATION: ICD-10-CM

## 2018-05-23 LAB — INR PPP: 2.3 (ref 2–3.5)

## 2018-05-23 PROCEDURE — 99211 OFF/OP EST MAY X REQ PHY/QHP: CPT | Performed by: NURSE PRACTITIONER

## 2018-05-23 PROCEDURE — 85610 PROTHROMBIN TIME: CPT

## 2018-05-23 NOTE — PROGRESS NOTES
Anticoagulation Summary  As of 5/23/2018    INR goal:   2.0-3.0   TTR:   76.7 % (2.6 y)   Today's INR:   2.3   Warfarin maintenance plan:   4.5 mg (3 mg x 1.5) on Mon, Fri; 3 mg (3 mg x 1) all other days   Weekly warfarin total:   24 mg   Weekly max warfarin dose:   21 mg   Plan last modified:   Juan Bonds, PharmD (5/16/2018)   Next INR check:   6/6/2018   Target end date:   Indefinite    Indications    Chronic anticoagulation [Z79.01]  Chronic anticoagulation (Resolved) [Z79.01]  PAF (paroxysmal atrial fibrillation) (HCC) [I48.0]             Anticoagulation Episode Summary     INR check location:       Preferred lab:       Send INR reminders to:       Comments:         Anticoagulation Care Providers     Provider Role Specialty Phone number    Amy Lincoln M.D. Referring Cardiology [phone]    [facility] Anticoagulation Services Responsible  [phone]        Anticoagulation Patient Findings      HPI:  Whitley Frederick seen in clinic today for follow up on anticoagulation therapy in the presence of AF. Denies any changes to current medical/health status since last appointment. Denies any medication or diet changes. No current symptoms of bleeding or thrombosis reported.    A/P:   INR therapeutic. Continue current regimen. BP declined - pt states she was rushing, she knows it will be high and she took her medication late today. She will check BP at home.    Follow up appointment in 2 week(s).    Next Appointment: Wednesday, June 6, 2018 at 2:00 pm.     Jaylene BURGER

## 2018-05-29 ENCOUNTER — PATIENT OUTREACH (OUTPATIENT)
Dept: HEALTH INFORMATION MANAGEMENT | Facility: OTHER | Age: 73
End: 2018-05-29

## 2018-05-29 LAB — INR BLD: 2.3 (ref 0.9–1.2)

## 2018-05-29 NOTE — PROGRESS NOTES
Received incoming call from Whitley. Patient states she is having difficulty getting her warfarin refilled. She states she is now out of the medication. Per chart review, RX was sent today at 2:07 to [facility] pharmacy. Patient notified.     Laila Corral, PharmD

## 2018-06-06 ENCOUNTER — APPOINTMENT (OUTPATIENT)
Dept: RADIOLOGY | Facility: MEDICAL CENTER | Age: 73
End: 2018-06-06
Attending: NURSE PRACTITIONER
Payer: MEDICARE

## 2018-06-06 ENCOUNTER — APPOINTMENT (OUTPATIENT)
Dept: VASCULAR LAB | Facility: MEDICAL CENTER | Age: 73
End: 2018-06-06
Attending: INTERNAL MEDICINE
Payer: MEDICARE

## 2018-06-11 ENCOUNTER — PATIENT OUTREACH (OUTPATIENT)
Dept: HEALTH INFORMATION MANAGEMENT | Facility: OTHER | Age: 73
End: 2018-06-11

## 2018-06-11 NOTE — PROGRESS NOTES
Received incoming call from Whitley regarding medication questions. Patient reports that her actos was recently reduced to 1/2 tablet due to some edema. Patient states her blood sugars have been well-controlled on actos, victoza, and tresiba; averaging around 121. She does not want to discontinue actos entirely - she had questions if furosemide interacts with actos. Educated on interaction. Encouraged patient to follow-up with her endocrinologist. She has appointment scheduled 6/21.     Patient had questions regarding levothyroxine. She states she ran out of refills and was out of the medication for about 5 days. When she went back to her dose that she has been on for years, she states she experienced PVCs/ palpitations. She read on the internet that levothyroxine can cause palpitations. She decided to stop for another day and is questioning what dose of levothyroxine to be on. Encouraged patient not to adjust the dosage of levothyroxine on her own and to contact her PCP. Patient has history of PVCs and had a cardiology work-up.     Patient has my contact information for additional medication questions/ concerns.     Laila Corral, PharmD

## 2018-06-12 DIAGNOSIS — J45.909 UNCOMPLICATED ASTHMA, UNSPECIFIED ASTHMA SEVERITY, UNSPECIFIED WHETHER PERSISTENT: ICD-10-CM

## 2018-06-12 NOTE — TELEPHONE ENCOUNTER
Have we ever prescribed this med? No.  If yes, what date?     Last OV: 03/14/18    Next OV: 09/12/18    DX: Chronic respiratory failure with hypoxia     Medications: Redihaler    pharmacy switched from [place] to the [facility]

## 2018-06-13 ENCOUNTER — ANTICOAGULATION VISIT (OUTPATIENT)
Dept: VASCULAR LAB | Facility: MEDICAL CENTER | Age: 73
End: 2018-06-13
Attending: INTERNAL MEDICINE
Payer: MEDICARE

## 2018-06-13 DIAGNOSIS — I48.0 PAF (PAROXYSMAL ATRIAL FIBRILLATION) (HCC): ICD-10-CM

## 2018-06-13 DIAGNOSIS — Z79.01 CHRONIC ANTICOAGULATION: ICD-10-CM

## 2018-06-13 LAB
INR BLD: 1.7 (ref 0.9–1.2)
INR PPP: 1.7 (ref 2–3.5)

## 2018-06-13 PROCEDURE — 99212 OFFICE O/P EST SF 10 MIN: CPT

## 2018-06-13 PROCEDURE — 85610 PROTHROMBIN TIME: CPT

## 2018-06-13 NOTE — PROGRESS NOTES
Anticoagulation Summary  As of 6/13/2018    INR goal:   2.0-3.0   TTR:   76.1 % (2.6 y)   Today's INR:   1.7!   Warfarin maintenance plan:   3 mg (3 mg x 1) on Sun, Tue, Thu; 4.5 mg (3 mg x 1.5) all other days   Weekly warfarin total:   27 mg   Weekly max warfarin dose:   21 mg   Plan last modified:   Juan Bonds PharmD (6/13/2018)   Next INR check:   6/20/2018   Target end date:   Indefinite    Indications    Chronic anticoagulation [Z79.01]  Chronic anticoagulation (Resolved) [Z79.01]  PAF (paroxysmal atrial fibrillation) (HCC) [I48.0]             Anticoagulation Episode Summary     INR check location:       Preferred lab:       Send INR reminders to:       Comments:         Anticoagulation Care Providers     Provider Role Specialty Phone number    Amy Lincoln M.D. Referring Cardiology [phone]    [facility] Responsible  [phone]        Anticoagulation Patient Findings      HPI:  Whitley Frederick seen in clinic today, on anticoagulation therapy with warfarin for AF.  Changes to current medical/health status since last appt: none  Denies signs/symptoms of bleeding and/or thrombosis since the last appt.    Pt started drinking shakes that include VitK, plans to continue the shakes.   Denies any interval changes to medications since last appt.   Denies any complications or cost restrictions with current therapy.   BP recorded in vitals.  Confirmed dosing regimen.     A/P   INR  SUB-therapeutic.   Begin increased regimen.    Follow up appointment in 1 week(s).    Juan Bonds, AraceliD

## 2018-06-13 NOTE — TELEPHONE ENCOUNTER
attempted to call patient but she did not answer and her mail box was full so I could not leave a voice mail

## 2018-06-20 ENCOUNTER — APPOINTMENT (OUTPATIENT)
Dept: RADIOLOGY | Facility: MEDICAL CENTER | Age: 73
End: 2018-06-20
Attending: NURSE PRACTITIONER
Payer: MEDICARE

## 2018-06-20 ENCOUNTER — ANTICOAGULATION VISIT (OUTPATIENT)
Dept: VASCULAR LAB | Facility: MEDICAL CENTER | Age: 73
End: 2018-06-20
Attending: INTERNAL MEDICINE
Payer: MEDICARE

## 2018-06-20 DIAGNOSIS — Z79.01 CHRONIC ANTICOAGULATION: ICD-10-CM

## 2018-06-20 DIAGNOSIS — I48.0 PAF (PAROXYSMAL ATRIAL FIBRILLATION) (HCC): ICD-10-CM

## 2018-06-20 LAB
INR BLD: 1.9 (ref 0.9–1.2)
INR PPP: 1.9 (ref 2–3.5)

## 2018-06-20 PROCEDURE — 85610 PROTHROMBIN TIME: CPT

## 2018-06-20 PROCEDURE — 99212 OFFICE O/P EST SF 10 MIN: CPT | Performed by: NURSE PRACTITIONER

## 2018-06-20 NOTE — PROGRESS NOTES
Anticoagulation Summary  As of 6/20/2018    INR goal:   2.0-3.0   TTR:   75.6 % (2.7 y)   Today's INR:   1.9!   Warfarin maintenance plan:   3 mg (3 mg x 1) on Tue, Thu; 4.5 mg (3 mg x 1.5) all other days   Weekly warfarin total:   28.5 mg   Weekly max warfarin dose:   21 mg   Plan last modified:   Jaylene Paulson, A.P.NMarquis (6/20/2018)   Next INR check:   6/27/2018   Target end date:   Indefinite    Indications    Chronic anticoagulation [Z79.01]  Chronic anticoagulation (Resolved) [Z79.01]  PAF (paroxysmal atrial fibrillation) (HCC) [I48.0]             Anticoagulation Episode Summary     INR check location:       Preferred lab:       Send INR reminders to:       Comments:         Anticoagulation Care Providers     Provider Role Specialty Phone number    Amy Lincoln M.D. Referring Cardiology [phone]    [facility] Responsible  [phone]        Anticoagulation Patient Findings      HPI:  Whitley Frederick seen in clinic today for follow up on anticoagulation therapy in the presence of AF. Denies any changes to current medical/health status since last appointment. Denies any medication or diet changes. No current symptoms of bleeding or thrombosis reported.    A/P:   INR subtherapeutic. INR trending up. Will increase regimen a little more.     Follow up appointment in 1 week(s).    Next Appointment: Monday, July 2, 2018 at 2:00 pm.     Jaylene BURGER

## 2018-06-21 ENCOUNTER — APPOINTMENT (OUTPATIENT)
Dept: ENDOCRINOLOGY | Facility: MEDICAL CENTER | Age: 73
End: 2018-06-21
Payer: MEDICARE

## 2018-07-02 ENCOUNTER — ANTICOAGULATION VISIT (OUTPATIENT)
Dept: VASCULAR LAB | Facility: MEDICAL CENTER | Age: 73
End: 2018-07-02
Attending: INTERNAL MEDICINE
Payer: MEDICARE

## 2018-07-02 DIAGNOSIS — I48.0 PAF (PAROXYSMAL ATRIAL FIBRILLATION) (HCC): ICD-10-CM

## 2018-07-02 DIAGNOSIS — Z79.01 CHRONIC ANTICOAGULATION: ICD-10-CM

## 2018-07-02 LAB
INR BLD: 2.4 (ref 0.9–1.2)
INR PPP: 2.4 (ref 2–3.5)

## 2018-07-02 PROCEDURE — 99211 OFF/OP EST MAY X REQ PHY/QHP: CPT

## 2018-07-02 PROCEDURE — 85610 PROTHROMBIN TIME: CPT

## 2018-07-02 NOTE — PROGRESS NOTES
Anticoagulation Summary  As of 7/2/2018    INR goal:   2.0-3.0   TTR:   75.6 % (2.7 y)   Today's INR:   2.4   Warfarin maintenance plan:   3 mg (3 mg x 1) on Sun, Tue, Thu; 4.5 mg (3 mg x 1.5) all other days   Weekly warfarin total:   27 mg   Weekly max warfarin dose:   21 mg   Plan last modified:   Gilma Hirsch (7/2/2018)   Next INR check:   7/16/2018   Target end date:   Indefinite    Indications    Chronic anticoagulation [Z79.01]  Chronic anticoagulation (Resolved) [Z79.01]  PAF (paroxysmal atrial fibrillation) (HCC) [I48.0]             Anticoagulation Episode Summary     INR check location:       Preferred lab:       Send INR reminders to:       Comments:         Anticoagulation Care Providers     Provider Role Specialty Phone number    Amy Lincoln M.D. Referring Cardiology [phone]    [facility] Responsible  [phone]        Anticoagulation Patient Findings  Patient Findings     Negatives:   Signs/symptoms of thrombosis, Signs/symptoms of bleeding, Laboratory test error suspected, Change in health, Change in alcohol use, Change in activity, Upcoming invasive procedure, Emergency department visit, Upcoming dental procedure, Missed doses, Extra doses, Change in medications, Change in diet/appetite, Hospital admission, Bruising, Other complaints          HPI:  Whitley Frederick seen in clinic today, on anticoagulation therapy with warfarin for AFib.  Changes to current medical/health status since last appt: none  Denies signs/symptoms of bleeding and/or thrombosis since the last appt.    Denies any interval changes to diet  Denies any interval changes to medications since last appt.   Denies any complications or cost restrictions with current therapy.   Upon reviewing her weekly dosing, the patient discovered that she misunderstood the current dosing regimen, and was only taking 3mg on Sun instead of the documented 4.5mg. Will note the change in calendar.    A/P    INR is therapeutic today at 2.4.   Since she is therapeutic on current dosing regimen, will make the change to reflect this in calendar and patient will continue with the current dosing and will follow up again in 2 weeks.    Next appt:  Monday, July 16, 2016 2:15pm    Nellie Hirsch PharmD

## 2018-07-03 ENCOUNTER — ANTICOAGULATION MONITORING (OUTPATIENT)
Dept: VASCULAR LAB | Facility: MEDICAL CENTER | Age: 73
End: 2018-07-03

## 2018-07-03 ENCOUNTER — PATIENT OUTREACH (OUTPATIENT)
Dept: HEALTH INFORMATION MANAGEMENT | Facility: OTHER | Age: 73
End: 2018-07-03

## 2018-07-03 ENCOUNTER — OFFICE VISIT (OUTPATIENT)
Dept: MEDICAL GROUP | Age: 73
End: 2018-07-03
Payer: MEDICARE

## 2018-07-03 VITALS
DIASTOLIC BLOOD PRESSURE: 66 MMHG | TEMPERATURE: 98.9 F | SYSTOLIC BLOOD PRESSURE: 136 MMHG | HEART RATE: 78 BPM | OXYGEN SATURATION: 92 %

## 2018-07-03 DIAGNOSIS — Z79.01 CHRONIC ANTICOAGULATION: ICD-10-CM

## 2018-07-03 DIAGNOSIS — I48.0 PAF (PAROXYSMAL ATRIAL FIBRILLATION) (HCC): ICD-10-CM

## 2018-07-03 DIAGNOSIS — J45.909 ACUTE ASTHMATIC BRONCHITIS: ICD-10-CM

## 2018-07-03 DIAGNOSIS — Z79.4 TYPE 2 DIABETES MELLITUS WITH STAGE 4 CHRONIC KIDNEY DISEASE, WITH LONG-TERM CURRENT USE OF INSULIN (HCC): ICD-10-CM

## 2018-07-03 DIAGNOSIS — E66.01 MORBID OBESITY WITH BMI OF 45.0-49.9, ADULT (HCC): ICD-10-CM

## 2018-07-03 DIAGNOSIS — E11.22 TYPE 2 DIABETES MELLITUS WITH STAGE 4 CHRONIC KIDNEY DISEASE, WITH LONG-TERM CURRENT USE OF INSULIN (HCC): ICD-10-CM

## 2018-07-03 DIAGNOSIS — N18.4 TYPE 2 DIABETES MELLITUS WITH STAGE 4 CHRONIC KIDNEY DISEASE, WITH LONG-TERM CURRENT USE OF INSULIN (HCC): ICD-10-CM

## 2018-07-03 PROCEDURE — 99203 OFFICE O/P NEW LOW 30 MIN: CPT | Performed by: INTERNAL MEDICINE

## 2018-07-03 RX ORDER — AMOXICILLIN AND CLAVULANATE POTASSIUM 875; 125 MG/1; MG/1
1 TABLET, FILM COATED ORAL 2 TIMES DAILY
Qty: 20 QUANTITY SUFFICIENT | Refills: 0 | Status: SHIPPED | OUTPATIENT
Start: 2018-07-03 | End: 2018-07-19

## 2018-07-03 ASSESSMENT — ENCOUNTER SYMPTOMS
EYES NEGATIVE: 1
PSYCHIATRIC NEGATIVE: 1
CARDIOVASCULAR NEGATIVE: 1
CONSTITUTIONAL NEGATIVE: 1
SHORTNESS OF BREATH: 1
MUSCULOSKELETAL NEGATIVE: 1
NEUROLOGICAL NEGATIVE: 1
GASTROINTESTINAL NEGATIVE: 1
WHEEZING: 1
COUGH: 1

## 2018-07-03 NOTE — PROGRESS NOTES
Subjective:      Whitley Frederick is a 73 y.o. female who presents with URI (x4 days)  Patient presents with a four-day history of progressive cough    Yellow sputum with wheezes.  No fever chills chest pain or dyspnea.  She has diabetes and is on multiple medications.  Also has history of asthma with a flareup recently started.  She takes Coumadin for her atrial fib.          URI    Associated symptoms include coughing and wheezing.       Review of Systems   Constitutional: Negative.    HENT: Negative.    Eyes: Negative.    Respiratory: Positive for cough, shortness of breath and wheezing.    Cardiovascular: Negative.    Gastrointestinal: Negative.    Genitourinary: Negative.    Musculoskeletal: Negative.    Skin: Negative.    Neurological: Negative.    Endo/Heme/Allergies: Negative.    Psychiatric/Behavioral: Negative.           Objective:     /66   Pulse 78   Temp 37.2 °C (98.9 °F)   SpO2 92%      Physical Exam   Constitutional: She is oriented to person, place, and time. She appears well-developed and well-nourished. No distress.   HENT:   Head: Normocephalic and atraumatic.   Right Ear: External ear normal.   Left Ear: External ear normal.   Nose: Nose normal.   Mouth/Throat: Oropharynx is clear and moist. No oropharyngeal exudate.   Eyes: Conjunctivae and EOM are normal. Pupils are equal, round, and reactive to light. Right eye exhibits no discharge. Left eye exhibits no discharge. No scleral icterus.   Neck: Normal range of motion. Neck supple. No JVD present. No tracheal deviation present. No thyromegaly present.   Cardiovascular: Normal rate, regular rhythm, normal heart sounds and intact distal pulses.  Exam reveals no gallop and no friction rub.    No murmur heard.  Pulmonary/Chest: Effort normal and breath sounds normal. No stridor. No respiratory distress. She has no wheezes. She has no rales. She exhibits no tenderness.   Abdominal: Soft. Bowel sounds are normal. She exhibits no distension  and no mass. There is no tenderness. There is no rebound and no guarding.   Musculoskeletal: Normal range of motion. She exhibits no edema or tenderness.   Lymphadenopathy:     She has no cervical adenopathy.   Neurological: She is alert and oriented to person, place, and time. She has normal reflexes. She displays normal reflexes. No cranial nerve deficit. She exhibits normal muscle tone. Coordination normal.   Skin: Skin is warm and dry. No rash noted. She is not diaphoretic. No erythema. No pallor.   Psychiatric: She has a normal mood and affect. Her behavior is normal. Judgment and thought content normal.   Vitals reviewed.              Anticoagulation Visit on 07/02/2018   Component Date Value   • INR 07/02/2018 2.4    • POC INR 07/02/2018 2.4*   Anticoagulation Visit on 06/20/2018   Component Date Value   • INR 06/20/2018 1.9    • POC INR 06/20/2018 1.9*   Anticoagulation Visit on 06/13/2018   Component Date Value   • INR 06/13/2018 1.7    • POC INR 06/13/2018 1.7*      Lab Results   Component Value Date/Time    HBA1C 7.6 02/26/2018 12:37 PM    HBA1C 7.6 08/11/2017 01:44 PM     Lab Results   Component Value Date/Time    SODIUM 144 05/09/2018 03:45 PM    POTASSIUM 4.4 05/09/2018 03:45 PM    CHLORIDE 111 05/09/2018 03:45 PM    CO2 23 05/09/2018 03:45 PM    GLUCOSE 102 (H) 05/09/2018 03:45 PM    BUN 51 (H) 05/09/2018 03:45 PM    CREATININE 1.67 (H) 05/09/2018 03:45 PM    CREATININE 1.1 04/27/2006 09:50 AM    ALKPHOSPHAT 75 04/20/2017 10:07 AM    ASTSGOT 19 04/20/2017 10:07 AM    ALTSGPT 16 04/20/2017 10:07 AM    TBILIRUBIN 0.8 04/20/2017 10:07 AM     Lab Results   Component Value Date/Time    INR 2.4 07/02/2018    INR 1.9 06/20/2018    INR 1.7 06/13/2018     Lab Results   Component Value Date/Time    CHOLSTRLTOT 171 10/04/2017 12:37 PM    LDL 73 10/04/2017 12:37 PM    HDL 40 10/04/2017 12:37 PM    TRIGLYCERIDE 288 (H) 10/04/2017 12:37 PM       No results found for: TESTOSTERONE  No results found for: TSH  Lab  Results   Component Value Date/Time    FREET4 0.89 04/03/2018 02:23 PM    FREET4 0.79 10/07/2015 02:03 PM     Lab Results   Component Value Date/Time    URICACID 5.3 05/09/2018 03:45 PM     No components found for: VITB12  Lab Results   Component Value Date/Time    25HYDROXY 37 05/09/2018 03:45 PM    25HYDROXY 36 10/04/2017 12:30 PM       Assessment/Plan:     1. Acute asthmatic bronchitis      - amoxicillin-clavulanate (AUGMENTIN) 875-125 MG Tab; Take 1 Tab by mouth 2 times a day.  Dispense: 20 Quantity Sufficient; Refill: 0    2. Type 2 diabetes mellitus with stage 4 chronic kidney disease, with long-term current use of insulin (HCC)   Under good control. Continue same regimen.      3. Morbid obesity with BMI of 45.0-49.9, adult (HCC)      diet/exercise/lose 15 lbs.; patient counseled      30 minute face-to-face encounter took place today.  More than half of this time was spent in the coordination of care of the above problems, as well as counseling.

## 2018-07-03 NOTE — PROGRESS NOTES
Received VM from patient stating she was prescribed Augmentin. Patient had question regarding interaction with warfarin and antibiotic. More frequent monitoring has been recommended with this combination. Notified Leilani at [facility] coumadin [facility] that patient was started on new antibiotic and had questions concerning dosing. Leilani will have anticoagulation pharmacist reach out to patient.     Laila Corral, PharmD

## 2018-07-03 NOTE — PROGRESS NOTES
OP Telephone Anticoagulation Service Note    Date: 7/3/2018      Anticoagulation Summary  As of 7/3/2018    INR goal:   2.0-3.0   TTR:   75.6 % (2.7 y)   Today's INR:   No new INR was available at the time of this encounter.   Warfarin maintenance plan:   3 mg (3 mg x 1) on Sun, Tue, Thu; 4.5 mg (3 mg x 1.5) all other days   Weekly warfarin total:   27 mg   Weekly max warfarin dose:   21 mg   Plan last modified:   Gilma Hirsch (7/2/2018)   Next INR check:   7/9/2018   Target end date:   Indefinite    Indications    Chronic anticoagulation [Z79.01]  Chronic anticoagulation (Resolved) [Z79.01]  PAF (paroxysmal atrial fibrillation) (HCC) [I48.0]             Anticoagulation Episode Summary     INR check location:       Preferred lab:       Send INR reminders to:       Comments:         Anticoagulation Care Providers     Provider Role Specialty Phone number    Amy Lincoln M.D. Referring Cardiology [phone]    [facility] Anticoagulation Services Responsible  [phone]        Anticoagulation Patient Findings      Plan: Spoke with patient on the phone. Patient is starting Augmentin 14 day regimen today. Pt has had a decreased appetite in the last week due to not feeling well. Instructed patient to take 4.5 mg Mon, Fri; and 3 mg all other days of the week. Will continue dosing as outlined. Will follow-up with patient in 1 week.      Fifi Montilla, Pharmacy Intern  I have reviewed and agree with the plan above on  7/5/2018. She ran plan by me before speaking with pt.     Nydia Michel, Pharm D

## 2018-07-03 NOTE — PROGRESS NOTES
Received incoming call from Whitley. Patient reports having a cough for 4 days that has worsened yesterday. Patient is using QVAR inhaler 2 puffs BID and has rescue inhaler. Patient had questions regarding instructions for rescue inhaler. Reviewed inhaler technique and directions of Xopenex. Patient reports having coughing, sneezing, and chills and reports yellow phlegm. Denies fever. She states she had antibiotic and medrol dosepak prescribed by pulmonary at a previous visit that she did not take and was wondering if she could take this. Advised patient to be seen. Whitley states she tried to get an appointment with PCP today but they were booked. Informed patient that another physician may be available. Placed conference call to scheduling. Patient scheduled to see Dr. Arizmendi at 2 pm today.     Laila Corral, AraceliD

## 2018-07-10 ENCOUNTER — ANTICOAGULATION VISIT (OUTPATIENT)
Dept: VASCULAR LAB | Facility: MEDICAL CENTER | Age: 73
End: 2018-07-10
Attending: INTERNAL MEDICINE
Payer: MEDICARE

## 2018-07-10 VITALS — SYSTOLIC BLOOD PRESSURE: 127 MMHG | HEART RATE: 60 BPM | DIASTOLIC BLOOD PRESSURE: 60 MMHG

## 2018-07-10 DIAGNOSIS — I48.0 PAF (PAROXYSMAL ATRIAL FIBRILLATION) (HCC): ICD-10-CM

## 2018-07-10 DIAGNOSIS — Z79.01 CHRONIC ANTICOAGULATION: ICD-10-CM

## 2018-07-10 LAB — INR PPP: 2.5 (ref 2–3.5)

## 2018-07-10 PROCEDURE — 99211 OFF/OP EST MAY X REQ PHY/QHP: CPT

## 2018-07-10 PROCEDURE — 85610 PROTHROMBIN TIME: CPT

## 2018-07-10 NOTE — PROGRESS NOTES
.  Anticoagulation Summary  As of 7/10/2018    INR goal:   2.0-3.0   TTR:   75.8 % (2.7 y)   Today's INR:   2.5   Warfarin maintenance plan:   3 mg (3 mg x 1) on Sun, Tue, Thu; 4.5 mg (3 mg x 1.5) all other days   Weekly warfarin total:   27 mg   Weekly max warfarin dose:   21 mg   Plan last modified:   Gilma Hirsch (7/2/2018)   Next INR check:   7/24/2018   Target end date:   Indefinite    Indications    Chronic anticoagulation [Z79.01]  Chronic anticoagulation (Resolved) [Z79.01]  PAF (paroxysmal atrial fibrillation) (HCC) [I48.0]             Anticoagulation Episode Summary     INR check location:       Preferred lab:       Send INR reminders to:       Comments:         Anticoagulation Care Providers     Provider Role Specialty Phone number    Amy Lincoln M.D. Referring Cardiology [phone]    [facility] Anticoagulation Services Responsible  [phone]        Anticoagulation Patient Findings  Patient Findings     Negatives:   Signs/symptoms of thrombosis, Signs/symptoms of bleeding, Laboratory test error suspected, Change in health, Change in alcohol use, Change in activity, Upcoming invasive procedure, Emergency department visit, Upcoming dental procedure, Missed doses, Extra doses, Change in medications, Change in diet/appetite, Hospital admission, Bruising, Other complaints        History of Present Illness: follow up appointment for chronic anticoagulation with the high risk medication, warfarin for atrial fibrillation.    Pt remains therapeutic today. Pt is to continue with current warfarin dosing regimen.  Follow up in 2 weeks, to reduce risk of adverse events related to this high risk medication,  Warfarin.    Brigitte Horn, PharmD

## 2018-07-19 ENCOUNTER — OFFICE VISIT (OUTPATIENT)
Dept: ENDOCRINOLOGY | Facility: MEDICAL CENTER | Age: 73
End: 2018-07-19
Payer: MEDICARE

## 2018-07-19 VITALS
SYSTOLIC BLOOD PRESSURE: 114 MMHG | BODY MASS INDEX: 44.76 KG/M2 | DIASTOLIC BLOOD PRESSURE: 64 MMHG | HEIGHT: 67 IN | OXYGEN SATURATION: 93 % | HEART RATE: 72 BPM | WEIGHT: 285.2 LBS

## 2018-07-19 DIAGNOSIS — E11.41 DIABETIC MONONEUROPATHY ASSOCIATED WITH TYPE 2 DIABETES MELLITUS (HCC): ICD-10-CM

## 2018-07-19 DIAGNOSIS — Z79.4 ENCOUNTER FOR LONG-TERM (CURRENT) USE OF INSULIN (HCC): ICD-10-CM

## 2018-07-19 LAB
HBA1C MFR BLD: 6.3 % (ref ?–5.8)
INT CON NEG: NORMAL
INT CON POS: NORMAL

## 2018-07-19 PROCEDURE — 83036 HEMOGLOBIN GLYCOSYLATED A1C: CPT | Performed by: PHYSICIAN ASSISTANT

## 2018-07-19 PROCEDURE — 99214 OFFICE O/P EST MOD 30 MIN: CPT | Performed by: PHYSICIAN ASSISTANT

## 2018-07-19 NOTE — PROGRESS NOTES
Return to office Patient Consult Note  Referred by: EDILMA Whelan    Reason for consult: Diabetes Management Type 2    HPI:  Whitley Frederick is a 73 y.o. old patient who is seeing us today for diabetes care.  This is a pleasant patient with diabetes and I appreciate the opportunity to participate in the care of this patient.    Labs of 7/19/18 HbA1c is 6.3  Labs of 2/26/18 HbA1c is 7.6  labs of 8/11/17 HbA1c 7.6, GFR 22,     BG Diary:7/19/2018  In the AM:  Did not bring    BG Diary:2/26/2018  In the AM: 129, 167, 177, 153, 148, 142  Before Bed: 183, 157, 193, 5, 242, 230, 234    1. Diabetic mononeuropathy associated with type 2 diabetes mellitus (HCC)    This is a new patient on 8/11/17  She is on:  1.  Toujeo  65     STOP:  1.  Toujeo    She is now on:  1.   Tresiba 14 units at night   3.   Victoza  1.8 at night  4.   Actos 15mg one a day     I may need to have her start Novolog at dinner?     2. Essential hypertension     This is evelated and I will foollow  2. Encounter for long-term (current) use of insulin ([facility])  Is on a high risk medication Insulin and we will continue to follow no hypoglycemic events since last visit          ROS:   Constitutional: No night sweats.  Eyes:  No visual changes.  Cardiac: No chest pain, No palpitations or racing heart rate.  Resp: No shortness of breath, No cough,   Gi: No Diarrhea    All other systems were reviewed and were/are negative.  The ROS was revised/revisited during this office visit from the patients first office visit with me on 8/11/17 Please review the full ROS during the first office visit.    Past Medical History:  Patient Active Problem List    Diagnosis Date Noted   • Essential hypertension 05/10/2016     Priority: High   • Atherosclerosis of aorta (CMS-[facility]) 04/29/2015     Priority: High   • Chronic respiratory failure with hypoxia ([facility]) 04/29/2015     Priority: High   • Morbid obesity with BMI of 45.0-49.9, adult ([facility]) 04/29/2015      Priority: High   • Anxiety 01/09/2014     Priority: High   • PAF (paroxysmal atrial fibrillation) ([facility]) 07/12/2013     Priority: High   • Type 2 diabetes mellitus with stage 4 chronic kidney disease, with long-term current use of insulin ([facility])      Priority: High   • Palpitations      Priority: High   • CKD (chronic kidney disease) stage 4, GFR 15-29 ml/min ([facility])      Priority: High   • Mild intermittent asthma without complication 08/24/2016     Priority: Medium   • Chronic anticoagulation 06/10/2016     Priority: Medium   • AVERY (obstructive sleep apnea) 05/10/2016     Priority: Medium   • Chronic idiopathic gout involving toe without tophus 04/30/2015     Priority: Medium   • Left bundle branch block 03/05/2012     Priority: Medium   • Multiple thyroid nodules 10/07/2015     Priority: Low   • Stenosis of carotid artery 05/30/2014     Priority: Low   • Secondary hyperparathyroidism of renal origin ([facility]) 05/14/2018   • Hypothyroidism due to acquired atrophy of thyroid 04/10/2018   • PVC (premature ventricular contraction) 02/23/2018   • Dependence on nocturnal oxygen therapy 11/02/2017   • Diabetic mononeuropathy associated with type 2 diabetes mellitus ([facility]) 11/02/2017   • Encounter for long-term (current) use of insulin ([facility]) 08/11/2017   • Incontinence of feces 05/18/2017   • Chronic pain of both shoulders 03/23/2017   • Dyslipidemia 12/14/2016       Past Surgical History:  Past Surgical History:   Procedure Laterality Date   • CATARACT PHACO WITH IOL Right 10/25/2016    Procedure: CATARACT PHACO WITH IOL;  Surgeon: Zoran Gamble M.D.;  Location: SURGERY SAME DAY [facility];  Service:    • ABDOMINAL HYSTERECTOMY TOTAL     • CHOLECYSTECTOMY     • TONSILLECTOMY     • US-NEEDLE CORE BX-BREAST PANEL         Allergies:  Amlodipine; Amaryl; Avandia [rosiglitazone maleate]; Cipro xr; Clonidine; Contrast media with iodine [iodine]; Glipizide; Glucophage [metformin hydrochloride]; Hydralazine; Levaquin; and Relafen  [nabumetone]    Social History:  Social History     Social History   • Marital status:      Spouse name: N/A   • Number of children: N/A   • Years of education: N/A     Occupational History   • Not on file.     Social History Main Topics   • Smoking status: Former Smoker     Packs/day: 1.00     Years: 20.00     Types: Cigarettes     Quit date: 1/1/1983   • Smokeless tobacco: Never Used   • Alcohol use No   • Drug use: No   • Sexual activity: No     Other Topics Concern   • Not on file     Social History Narrative   • No narrative on file       Family History:  Family History   Problem Relation Age of Onset   • Cancer Maternal Aunt    • Diabetes Maternal Aunt    • Heart Disease Maternal Aunt    • Hypertension Maternal Aunt    • Hyperlipidemia Maternal Aunt    • Cancer Mother      Leukemia   • Hypertension Mother    • Diabetes Mother    • Lung Disease Father    • Cancer Father      Lung   • Lung Disease Brother    • Stroke Brother    • Diabetes Brother    • Heart Disease Brother    • Diabetes Maternal Grandmother    • Hypertension Brother    • Diabetes Brother        Medications:    Current Outpatient Prescriptions:   •  beclomethasone HFA (QVAR REDIHALER) 40 MCG/ACT inhaler, Inhale 2 Puffs by mouth 2 times a day., Disp: 1 Inhaler, Rfl: 3  •  omeprazole (PRILOSEC) 20 MG delayed-release capsule, Take 1 Cap by mouth every day., Disp: 90 Cap, Rfl: 3  •  furosemide (LASIX) 80 MG Tab, Take 1 Tab by mouth every 48 hours., Disp: 30 Tab, Rfl: 3  •  losartan (COZAAR) 50 MG Tab, Take 1 Tab by mouth every day. TWICE DAILY, Disp: 180 Tab, Rfl: 3  •  Blood Glucose Monitoring Suppl Supplies Misc, Test strips order: Test strips for Accucheck meter. Sig: use BID and prn ssx high or low sugar #100 RF x 3, Disp: 100 Strip, Rfl: 11  •  Blood Glucose Monitoring Suppl Supplies Misc, Test strips order: Test strips for Chely Contour meter. Sig: use tid  and prn ssx high or low sugar #100 RF x 3, Disp: 100 Strip, Rfl: 3  •  MULTAQ  400 MG Tab, TAKE ONE TABLET BY MOUTH TWICE DAILY WITH MEALS, Disp: 180 Tab, Rfl: 2  •  montelukast (SINGULAIR) 10 MG Tab, TAKE ONE TABLET BY MOUTH ONCE DAILY AT BEDTIME, Disp: 30 Tab, Rfl: 11  •  levothyroxine (SYNTHROID) 50 MCG Tab, Take 1 Tab by mouth every day., Disp: 90 Tab, Rfl: 1  •  pioglitazone (ACTOS) 30 MG Tab, Take 1 Tab by mouth every day., Disp: 30 Tab, Rfl: 11  •  metoprolol (LOPRESSOR) 50 MG Tab, Take 1 Tab by mouth 2 times a day., Disp: 180 Tab, Rfl: 3  •  glucose blood (FREESTYLE LITE) strip, 1 Strip by Other route 2 times a day, with meals., Disp: 50 Strip, Rfl: 11  •  liraglutide (VICTOZA) 18 MG/3ML Solution Pen-injector injection, Inject 0.3 mL as instructed every day., Disp: 3 PEN, Rfl: 11  •  Insulin Pen Needle 32G X 4 MM Misc, 1 Applicator by Does not apply route 3 times a day., Disp: 100 Each, Rfl: 11  •  Insulin Degludec (TRESIBA FLEXTOUCH) 200 UNIT/ML Solution Pen-injector, Inject 50 Units as instructed every bedtime., Disp: 3 PEN, Rfl: 2  •  fluticasone (FLONASE) 50 MCG/ACT nasal spray, Spray 1-2 Sprays in nose every day. Each nostil, 30 minutes prior to use of CPAP, Disp: 1 Bottle, Rfl: 6  •  ferrous gluconate (FERGON) 324 (38 Fe) MG Tab, Take 324 mg by mouth 3 times a day, with meals., Disp: , Rfl:   •  simvastatin (ZOCOR) 20 MG Tab, TAKE ONE TABLET BY MOUTH ONCE DAILY IN THE EVENING, Disp: 90 Tab, Rfl: 3  •  warfarin (COUMADIN) 3 MG Tab, Take 1 to 1.5 tablets by mouth daily as directed by the coumadin clinic, Disp: 135 Tab, Rfl: 1  •  levalbuterol (XOPENEX HFA) 45 MCG/ACT inhaler, Inhale 2 Puffs by mouth every four hours as needed for Shortness of Breath., Disp: 1 Inhaler, Rfl: 3  •  Insulin Pen Needle (RELION PEN NEEDLES) 29G X 12MM Misc, USE ONE PEN NEEDLE SUBCUTANEOUSLY FOR LANTUS SOLOSTAR, Disp: 100 Each, Rfl: 3  •  Blood Glucose Monitoring Suppl W/DEVICE Kit, 1 Each by Does not apply route 2 times a day as needed., Disp: 1 Kit, Rfl: 0  •  Misc. Devices MISC, Pen needles for Lantus  "Solstar. 29g 12mm., Disp: 90 Each, Rfl: 3  •  allopurinol (ZYLOPRIM) 100 MG TABS, Take 1/2 pill qd (Patient taking differently: Take 100 mg by mouth 2 Times a Day. Indications: Primary Gout), Disp: 90 Tab, Rfl: 4  •  Cholecalciferol (VITAMIN D) 2000 UNITS CAPS, Take 4,000 Units by mouth every day., Disp: , Rfl:   •  magnesium oxide (MAGNESIUM OXIDE) 400 (241.3 MG) MG TABS tablet, Take 400 mg by mouth every day., Disp: , Rfl:   •  amoxicillin-clavulanate (AUGMENTIN) 875-125 MG Tab, Take 1 Tab by mouth 2 times a day., Disp: 20 Quantity Sufficient, Rfl: 0  •  warfarin (COUMADIN) 3 MG Tab, TAKE ONE TO ONE & ONE-HALF TABLETS BY MOUTH ONCE DAILY AS DIRECTED BY THE COUMADIN CLINIC, Disp: 135 Tab, Rfl: 1  •  AZELASTINE-FLUTICASONE NA, Spray  in nose., Disp: , Rfl:   •  Urine Glucose-Ketones Test Strip, 1 Strip by In Vitro route every day., Disp: 100 Strip, Rfl: 2  •  Diclofenac Sodium (VOLTAREN) 1 % Gel, Apply 2 g to skin as directed 4 times a day., Disp: 100 g, Rfl: 0  •  clonazepam (KLONOPIN) 0.5 MG Tab, Take 1 Tab by mouth 2 times a day as needed (insomnia, anxiety)., Disp: 60 Tab, Rfl: 0        Physical Examination:   Vital signs: /64   Pulse 72   Ht 1.702 m (5' 7.01\")   Wt (!) 129.4 kg (285 lb 3.2 oz)   SpO2 93%   BMI 44.66 kg/m²   General: No distress, cooperative, well dressed and well nourished.   Eyes: No scleral icterus or discharge, No hyposphagma  ENMT: Normal on external inspection of nose, lips, No nasal drainage   Neck: No abnormal masses on inspection  Resp: Normal effort, Bilateral clear to auscultation, No wheezing, No rales  CVS: Regular rate and rhythm, S1 S2 normal, No murmur. No gallop  Extremities: No edema bilateral extremities  Neuro: Alert and oriented  Skin: No rash, No Ulcers  Psych: Normal mood and affect      Assessment and Plan:    1. Diabetic mononeuropathy associated with type 2 diabetes mellitus (HCC)    She is now on:  1.   Tresiba 14 units at night   3.   Victoza  1.8 at " night  4.   Actos 15mg one a day (STOP)    2. Encounter for long-term (current) use of insulin (HCC)  The total time spent seeing this patient today face to face in consultation, and formulating an action plan for this visit was greater than 25 minutes. > Than 50% of this time was spent counseling, discussing problems documented above and below, coordinating care and answering questions by the physician assistant.  We developed a diabetes care plan for this patient today.      Return in about 3 months (around 10/19/2018).    Blood glucose log: Check BG in the morning when wake up, before lunch or dinner and before bed.  So three times a day.  Always bring BG diary to the next office visit.       Thank you kindly for allowing me to participate in the diabetes care plan for this patient.    Wei Patterson PA-C, BC-ADM  Board Certified - Advanced Diabetes Management  07/19/18    CC:   EDILMA Whelan

## 2018-07-19 NOTE — PATIENT INSTRUCTIONS
She is now on:  1.   Tresiba 14 units at night   3.   Victoza  1.8 at night  4.   Actos 15mg one a day (STOP)

## 2018-07-24 ENCOUNTER — ANTICOAGULATION VISIT (OUTPATIENT)
Dept: VASCULAR LAB | Facility: MEDICAL CENTER | Age: 73
End: 2018-07-24
Attending: INTERNAL MEDICINE
Payer: MEDICARE

## 2018-07-24 VITALS — DIASTOLIC BLOOD PRESSURE: 71 MMHG | HEART RATE: 65 BPM | SYSTOLIC BLOOD PRESSURE: 139 MMHG

## 2018-07-24 DIAGNOSIS — I48.0 PAF (PAROXYSMAL ATRIAL FIBRILLATION) (HCC): ICD-10-CM

## 2018-07-24 DIAGNOSIS — Z79.01 CHRONIC ANTICOAGULATION: ICD-10-CM

## 2018-07-24 LAB — INR PPP: 2.5 (ref 2–3.5)

## 2018-07-24 PROCEDURE — 85610 PROTHROMBIN TIME: CPT

## 2018-07-24 PROCEDURE — 99211 OFF/OP EST MAY X REQ PHY/QHP: CPT

## 2018-07-24 NOTE — PROGRESS NOTES
Anticoagulation Summary  As of 7/24/2018    INR goal:   2.0-3.0   TTR:   76.1 % (2.8 y)   Today's INR:   2.5   Warfarin maintenance plan:   3 mg (3 mg x 1) on Sun, Tue, Thu; 4.5 mg (3 mg x 1.5) all other days   Weekly warfarin total:   27 mg   Weekly max warfarin dose:   21 mg   Plan last modified:   Gilma Hirsch (7/2/2018)   Next INR check:      Target end date:   Indefinite    Indications    Chronic anticoagulation [Z79.01]  Chronic anticoagulation (Resolved) [Z79.01]  PAF (paroxysmal atrial fibrillation) (HCC) [I48.0]             Anticoagulation Episode Summary     INR check location:       Preferred lab:       Send INR reminders to:       Comments:         Anticoagulation Care Providers     Provider Role Specialty Phone number    Amy Lincoln M.D. Referring Cardiology [phone]    [facility] Responsible  [phone]        Anticoagulation Patient Findings    Spoke to patient on the phone.   INR  therapeutic.   Denies signs/symptoms of bleeding and/or thrombosis.   Denies changes to diet or medications.   Follow up appointment in 4 week(s).    Continue weekly warfarin dose as noted      Beto Sherwood, PharmD

## 2018-07-27 LAB — INR BLD: 2.5 (ref 0.9–1.2)

## 2018-08-09 ENCOUNTER — HOSPITAL ENCOUNTER (OUTPATIENT)
Dept: LAB | Facility: MEDICAL CENTER | Age: 73
End: 2018-08-09
Attending: INTERNAL MEDICINE
Payer: MEDICARE

## 2018-08-09 LAB
ALBUMIN SERPL BCP-MCNC: 4 G/DL (ref 3.2–4.9)
BASOPHILS # BLD AUTO: 0.6 % (ref 0–1.8)
BASOPHILS # BLD: 0.05 K/UL (ref 0–0.12)
BUN SERPL-MCNC: 50 MG/DL (ref 8–22)
CALCIUM SERPL-MCNC: 9.2 MG/DL (ref 8.5–10.5)
CHLORIDE SERPL-SCNC: 108 MMOL/L (ref 96–112)
CO2 SERPL-SCNC: 26 MMOL/L (ref 20–33)
CREAT SERPL-MCNC: 1.97 MG/DL (ref 0.5–1.4)
EOSINOPHIL # BLD AUTO: 0.15 K/UL (ref 0–0.51)
EOSINOPHIL NFR BLD: 1.9 % (ref 0–6.9)
ERYTHROCYTE [DISTWIDTH] IN BLOOD BY AUTOMATED COUNT: 48.9 FL (ref 35.9–50)
GLUCOSE SERPL-MCNC: 124 MG/DL (ref 65–99)
HCT VFR BLD AUTO: 38.4 % (ref 37–47)
HGB BLD-MCNC: 12.5 G/DL (ref 12–16)
IMM GRANULOCYTES # BLD AUTO: 0.04 K/UL (ref 0–0.11)
IMM GRANULOCYTES NFR BLD AUTO: 0.5 % (ref 0–0.9)
LYMPHOCYTES # BLD AUTO: 2.65 K/UL (ref 1–4.8)
LYMPHOCYTES NFR BLD: 33.4 % (ref 22–41)
MCH RBC QN AUTO: 30.5 PG (ref 27–33)
MCHC RBC AUTO-ENTMCNC: 32.6 G/DL (ref 33.6–35)
MCV RBC AUTO: 93.7 FL (ref 81.4–97.8)
MONOCYTES # BLD AUTO: 0.53 K/UL (ref 0–0.85)
MONOCYTES NFR BLD AUTO: 6.7 % (ref 0–13.4)
NEUTROPHILS # BLD AUTO: 4.52 K/UL (ref 2–7.15)
NEUTROPHILS NFR BLD: 56.9 % (ref 44–72)
NRBC # BLD AUTO: 0 K/UL
NRBC BLD-RTO: 0 /100 WBC
PHOSPHATE SERPL-MCNC: 2.8 MG/DL (ref 2.5–4.5)
PLATELET # BLD AUTO: 133 K/UL (ref 164–446)
PMV BLD AUTO: 10.6 FL (ref 9–12.9)
POTASSIUM SERPL-SCNC: 4.7 MMOL/L (ref 3.6–5.5)
RBC # BLD AUTO: 4.1 M/UL (ref 4.2–5.4)
SODIUM SERPL-SCNC: 142 MMOL/L (ref 135–145)
WBC # BLD AUTO: 7.9 K/UL (ref 4.8–10.8)

## 2018-08-09 PROCEDURE — 85025 COMPLETE CBC W/AUTO DIFF WBC: CPT

## 2018-08-09 PROCEDURE — 80069 RENAL FUNCTION PANEL: CPT

## 2018-08-09 PROCEDURE — 36415 COLL VENOUS BLD VENIPUNCTURE: CPT

## 2018-08-15 ENCOUNTER — TELEPHONE (OUTPATIENT)
Dept: ENDOCRINOLOGY | Facility: MEDICAL CENTER | Age: 73
End: 2018-08-15

## 2018-08-22 ENCOUNTER — PATIENT OUTREACH (OUTPATIENT)
Dept: HEALTH INFORMATION MANAGEMENT | Facility: OTHER | Age: 73
End: 2018-08-22

## 2018-08-22 NOTE — PROGRESS NOTES
Received incoming call from Whitley. Answered patient questions regarding extended-release acetaminophen 650 mg dosing.    Laila Corral, PharmD

## 2018-08-24 ENCOUNTER — ANTICOAGULATION VISIT (OUTPATIENT)
Dept: VASCULAR LAB | Facility: MEDICAL CENTER | Age: 73
End: 2018-08-24
Attending: INTERNAL MEDICINE
Payer: MEDICARE

## 2018-08-24 VITALS — HEART RATE: 59 BPM | SYSTOLIC BLOOD PRESSURE: 143 MMHG | DIASTOLIC BLOOD PRESSURE: 62 MMHG

## 2018-08-24 DIAGNOSIS — Z79.01 CHRONIC ANTICOAGULATION: ICD-10-CM

## 2018-08-24 DIAGNOSIS — I48.0 PAF (PAROXYSMAL ATRIAL FIBRILLATION) (HCC): ICD-10-CM

## 2018-08-24 LAB — INR PPP: 4 (ref 2–3.5)

## 2018-08-24 PROCEDURE — 99212 OFFICE O/P EST SF 10 MIN: CPT

## 2018-08-24 PROCEDURE — 85610 PROTHROMBIN TIME: CPT

## 2018-08-24 NOTE — PROGRESS NOTES
Anticoagulation Summary  As of 8/24/2018    INR goal:   2.0-3.0   TTR:   74.9 % (2.8 y)   Today's INR:   4.0!   Warfarin maintenance plan:   3 mg (3 mg x 1) on Sun, Tue, Thu; 4.5 mg (3 mg x 1.5) all other days   Weekly warfarin total:   27 mg   Weekly max warfarin dose:   21 mg   Plan last modified:   Gilma Hirsch (7/2/2018)   Next INR check:   8/31/2018   Target end date:   Indefinite    Indications    Chronic anticoagulation [Z79.01]  Chronic anticoagulation (Resolved) [Z79.01]  PAF (paroxysmal atrial fibrillation) (HCC) [I48.0]             Anticoagulation Episode Summary     INR check location:       Preferred lab:       Send INR reminders to:       Comments:         Anticoagulation Care Providers     Provider Role Specialty Phone number    Amy Lincoln M.D. Referring Cardiology [phone]    [facility] Responsible  [phone]        Anticoagulation Patient Findings  Patient Findings     Positives:   Signs/symptoms of bleeding (minor nose bleed), Change in health (pt reports weight loss ~4lbs and eGFR decreased by 4), Change in medications (d/c actos), Change in diet/appetite (eating less vegetables than normal), Bruising (minor bruising)    Negatives:   Signs/symptoms of thrombosis, Laboratory test error suspected, Change in alcohol use, Change in activity, Upcoming invasive procedure, Emergency department visit, Upcoming dental procedure, Missed doses, Extra doses, Hospital admission, Other complaints          HPI:  Whitley Frederick seen in clinic today, on anticoagulation therapy with warfarin for AF  Pt reports being stressed and losing appetite d/t a personal event earlier this month. Recently, she's been feeling better and starting to eat more.  Denies signs/symptoms of thrombosis since the last appt.    Denies any complications or cost restrictions with current therapy.   BP recorded in vitals.      A/P   INR  supra-therapeutic.   Instructed pt to hold today's  dose, then continue current dosage regimen.  Recommended to continue her regular diet and eat more greens.    Follow up appointment in 1 week.    Avani Connors, AraceliD

## 2018-08-27 LAB — INR BLD: 4 (ref 0.9–1.2)

## 2018-08-31 ENCOUNTER — ANTICOAGULATION VISIT (OUTPATIENT)
Dept: VASCULAR LAB | Facility: MEDICAL CENTER | Age: 73
End: 2018-08-31
Attending: INTERNAL MEDICINE
Payer: MEDICARE

## 2018-08-31 VITALS — HEART RATE: 52 BPM | DIASTOLIC BLOOD PRESSURE: 66 MMHG | SYSTOLIC BLOOD PRESSURE: 152 MMHG

## 2018-08-31 DIAGNOSIS — Z79.01 CHRONIC ANTICOAGULATION: ICD-10-CM

## 2018-08-31 DIAGNOSIS — I48.0 PAF (PAROXYSMAL ATRIAL FIBRILLATION) (HCC): ICD-10-CM

## 2018-08-31 LAB
INR BLD: 3.6 (ref 0.9–1.2)
INR PPP: 3.6 (ref 2–3.5)

## 2018-08-31 PROCEDURE — 85610 PROTHROMBIN TIME: CPT

## 2018-08-31 PROCEDURE — 99212 OFFICE O/P EST SF 10 MIN: CPT

## 2018-08-31 NOTE — PROGRESS NOTES
Anticoagulation Summary  As of 8/31/2018    INR goal:   2.0-3.0   TTR:   74.4 % (2.9 y)   Today's INR:   3.6!   Warfarin maintenance plan:   3 mg (3 mg x 1) on Sun, Tue, Thu; 4.5 mg (3 mg x 1.5) all other days   Weekly warfarin total:   27 mg   Weekly max warfarin dose:   21 mg   Plan last modified:   Gilma Hirsch (7/2/2018)   Next INR check:   9/7/2018   Target end date:   Indefinite    Indications    Chronic anticoagulation [Z79.01]  Chronic anticoagulation (Resolved) [Z79.01]  PAF (paroxysmal atrial fibrillation) (HCC) [I48.0]             Anticoagulation Episode Summary     INR check location:       Preferred lab:       Send INR reminders to:       Comments:         Anticoagulation Care Providers     Provider Role Specialty Phone number    Amy Lincoln M.D. Referring Cardiology [phone]    [facility] Responsible  [phone]        Anticoagulation Patient Findings  Patient Findings     Negatives:   Signs/symptoms of thrombosis, Signs/symptoms of bleeding, Laboratory test error suspected, Change in health, Change in alcohol use, Change in activity, Upcoming invasive procedure, Emergency department visit, Upcoming dental procedure, Missed doses, Extra doses, Change in medications, Change in diet/appetite, Hospital admission, Bruising, Other complaints          HPI:  Whitley Laureentalya Frederick seen in clinic today, on anticoagulation therapy with warfarin for AF  Changes to current medical/health status since last appt: none  Denies signs/symptoms of bleeding and/or thrombosis since the last appt.    Denies any interval changes to diet  Denies any interval changes to medications since last appt.   Denies any complications or cost restrictions with current therapy.   BP recorded in vitals - BP is high today in clinic; pt believes it is attributed to white coat HTN because her BP runs normal when she checks it at home.       A/P   INR  supra-therapeutic.   Instructed pt to hold  8/31, take 3 mg 9/1, then continue daily regimen.    Follow up appointment in 1 week.    Avani Connors, AraceliD

## 2018-09-01 DIAGNOSIS — F41.9 ANXIETY: ICD-10-CM

## 2018-09-01 DIAGNOSIS — G47.00 INSOMNIA: ICD-10-CM

## 2018-09-01 NOTE — TELEPHONE ENCOUNTER
Was the patient seen in the last year in this department? Yes lov 5/16/18  02/26/2018     Does patient have an active prescription for medications requested? No     Received Request Via: Pharmacy     May need to be phoned in

## 2018-09-04 RX ORDER — CLONAZEPAM 0.5 MG/1
TABLET ORAL
Qty: 30 TAB | Refills: 1 | Status: SHIPPED
Start: 2018-09-04 | End: 2019-12-16 | Stop reason: SDUPTHER

## 2018-09-05 RX ORDER — LEVOTHYROXINE SODIUM 0.05 MG/1
TABLET ORAL
Qty: 90 TAB | Refills: 1 | Status: SHIPPED | OUTPATIENT
Start: 2018-09-05 | End: 2019-03-19 | Stop reason: SDUPTHER

## 2018-09-07 ENCOUNTER — ANTICOAGULATION VISIT (OUTPATIENT)
Dept: VASCULAR LAB | Facility: MEDICAL CENTER | Age: 73
End: 2018-09-07
Attending: INTERNAL MEDICINE
Payer: MEDICARE

## 2018-09-07 VITALS — HEART RATE: 61 BPM | SYSTOLIC BLOOD PRESSURE: 150 MMHG | DIASTOLIC BLOOD PRESSURE: 61 MMHG

## 2018-09-07 DIAGNOSIS — I48.0 PAF (PAROXYSMAL ATRIAL FIBRILLATION) (HCC): ICD-10-CM

## 2018-09-07 DIAGNOSIS — Z79.01 CHRONIC ANTICOAGULATION: ICD-10-CM

## 2018-09-07 LAB — INR PPP: 2.8 (ref 2–3.5)

## 2018-09-07 PROCEDURE — 85610 PROTHROMBIN TIME: CPT

## 2018-09-07 PROCEDURE — 99212 OFFICE O/P EST SF 10 MIN: CPT

## 2018-09-07 NOTE — PROGRESS NOTES
Anticoagulation Summary  As of 9/7/2018    INR goal:   2.0-3.0   TTR:   74.0 % (2.9 y)   Today's INR:   2.8   Warfarin maintenance plan:   4.5 mg (3 mg x 1.5) on Mon, Wed, Fri; 3 mg (3 mg x 1) all other days   Weekly warfarin total:   25.5 mg   Weekly max warfarin dose:   21 mg   Plan last modified:   Avani Connors PharmD (9/7/2018)   Next INR check:   9/21/2018   Target end date:   Indefinite    Indications    Chronic anticoagulation [Z79.01]  Chronic anticoagulation (Resolved) [Z79.01]  PAF (paroxysmal atrial fibrillation) (HCC) [I48.0]             Anticoagulation Episode Summary     INR check location:       Preferred lab:       Send INR reminders to:       Comments:         Anticoagulation Care Providers     Provider Role Specialty Phone number    Amy Lincoln M.D. Referring Cardiology [phone]    [facility] Anticoagulation Services Responsible  [phone]        Anticoagulation Patient Findings  Patient Findings     Negatives:   Signs/symptoms of thrombosis, Signs/symptoms of bleeding, Laboratory test error suspected, Change in health, Change in alcohol use, Change in activity, Upcoming invasive procedure, Emergency department visit, Upcoming dental procedure, Missed doses, Extra doses, Change in medications, Change in diet/appetite, Hospital admission, Bruising, Other complaints          HPI:  Whitley Laureentalya Frederick seen in clinic today, on anticoagulation therapy with warfarin for AF  Changes to current medical/health status since last appt: none  Denies signs/symptoms of bleeding and/or thrombosis since the last appt.    Denies any interval changes to diet  Denies any interval changes to medications since last appt.   Denies any complications or cost restrictions with current therapy.   BP recorded in vitals.      A/P   INR therapeutic after holding x1 and lowering dose x1 last week.   Will decrease daily regimen by ~5%    Follow up appointment in 2 week(s).    Avani Connors, Danyell

## 2018-09-11 LAB — INR BLD: 2.8 (ref 0.9–1.2)

## 2018-09-12 ENCOUNTER — APPOINTMENT (OUTPATIENT)
Dept: PULMONOLOGY | Facility: HOSPICE | Age: 73
End: 2018-09-12
Payer: MEDICARE

## 2018-09-25 DIAGNOSIS — J45.909 ACUTE ASTHMATIC BRONCHITIS: ICD-10-CM

## 2018-09-25 RX ORDER — AMOXICILLIN AND CLAVULANATE POTASSIUM 875; 125 MG/1; MG/1
1 TABLET, FILM COATED ORAL 2 TIMES DAILY
Qty: 14 TAB | Refills: 0 | Status: SHIPPED | OUTPATIENT
Start: 2018-09-25 | End: 2018-10-02

## 2018-09-27 ENCOUNTER — APPOINTMENT (OUTPATIENT)
Dept: VASCULAR LAB | Facility: MEDICAL CENTER | Age: 73
End: 2018-09-27
Attending: INTERNAL MEDICINE
Payer: MEDICARE

## 2018-10-05 ENCOUNTER — APPOINTMENT (OUTPATIENT)
Dept: VASCULAR LAB | Facility: MEDICAL CENTER | Age: 73
End: 2018-10-05
Payer: MEDICARE

## 2018-10-12 ENCOUNTER — ANTICOAGULATION VISIT (OUTPATIENT)
Dept: VASCULAR LAB | Facility: MEDICAL CENTER | Age: 73
End: 2018-10-12
Attending: INTERNAL MEDICINE
Payer: MEDICARE

## 2018-10-12 ENCOUNTER — TELEPHONE (OUTPATIENT)
Dept: MEDICAL GROUP | Facility: LAB | Age: 73
End: 2018-10-12

## 2018-10-12 DIAGNOSIS — I48.0 PAF (PAROXYSMAL ATRIAL FIBRILLATION) (HCC): ICD-10-CM

## 2018-10-12 DIAGNOSIS — Z79.01 CHRONIC ANTICOAGULATION: ICD-10-CM

## 2018-10-12 LAB — INR PPP: 3.8 (ref 2–3.5)

## 2018-10-12 PROCEDURE — 99212 OFFICE O/P EST SF 10 MIN: CPT

## 2018-10-12 PROCEDURE — 85610 PROTHROMBIN TIME: CPT

## 2018-10-12 NOTE — TELEPHONE ENCOUNTER
ESTABLISHED PATIENT PRE-VISIT PLANNING     Note: Patient will not be contacted if there is no indication to call.     1.  Reviewed notes from the last few office visits within the medical group: Yes    2.  If any orders were placed at last visit or intended to be done for this visit (i.e. 6 mos follow-up), do we have Results/Consult Notes?        •  Labs - Labs were not ordered at last office visit.       •  Imaging - Imaging was not ordered at last office visit.       •  Referrals - No referrals were ordered at last office visit.    3. Is this appointment scheduled as a Hospital Follow-Up? No    4.  Immunizations were updated in Epic using WebIZ?: Epic matches WebIZ       •  Web Iz Recommendations: FLU, TDAP and ZOSTAVAX (Shingles)    5.  Patient is due for the following Health Maintenance Topics:   Health Maintenance Due   Topic Date Due   • IMM HEP B VACCINE (1 of 3 - Risk 3-dose series) 03/19/1964   • IMM ZOSTER VACCINES (1 of 2) 03/19/1996   • BONE DENSITY  01/31/2018   • IMM INFLUENZA (1) 09/01/2018   • MAMMOGRAM  09/08/2018   • FASTING LIPID PROFILE  10/04/2018       - Patient has completed PNEUMOVAX (PPSV23) and PREVNAR (PCV13)  Immunization(s) per WebIZ. Chart has been updated.    6.  MDX printed for Provider? NO    7.  Patient was NOT informed to arrive 15 min prior to their scheduled appointment and bring in their medication bottles.

## 2018-10-12 NOTE — PROGRESS NOTES
Anticoagulation Summary  As of 10/12/2018    INR goal:   2.0-3.0   TTR:   72.3 % (3 y)   Today's INR:   3.8!   Warfarin maintenance plan:   4.5 mg (3 mg x 1.5) on Mon, Wed, Fri; 3 mg (3 mg x 1) all other days   Weekly warfarin total:   25.5 mg   Weekly max warfarin dose:   21 mg   Plan last modified:   Avani Connors PharmD (9/7/2018)   Next INR check:   10/18/2018   Target end date:   Indefinite    Indications    Chronic anticoagulation [Z79.01]  Chronic anticoagulation (Resolved) [Z79.01]  PAF (paroxysmal atrial fibrillation) (HCC) [I48.0]             Anticoagulation Episode Summary     INR check location:       Preferred lab:       Send INR reminders to:       Comments:         Anticoagulation Care Providers     Provider Role Specialty Phone number    Amy Lincoln M.D. Referring Cardiology [phone]    [facility] Responsible  [phone]        Anticoagulation Patient Findings  Patient Findings     Positives:   Change in medications (completed a 7d course of amoxicillin 9/25-10/2), Bruising (Bruise on LLQ that has been slowly healing)    Negatives:   Signs/symptoms of thrombosis, Signs/symptoms of bleeding, Laboratory test error suspected, Change in health, Change in alcohol use, Change in activity, Upcoming invasive procedure, Emergency department visit, Upcoming dental procedure, Missed doses, Extra doses, Change in diet/appetite, Hospital admission, Other complaints          HPI:  Whitley Frederick seen in clinic today, on anticoagulation therapy with warfarin for PAF  Changes to current medical/health status since last appt: none   Denies any interval changes to diet  Denies any complications or cost restrictions with current therapy.   BP recorded in vitals.      A/P   INR  SUPRA-therapeutic.   Instructed pt to hold x1 tonight (10/12), then continue current regimen.    Follow up appointment in 1 week.    Avani Connors PharmD

## 2018-10-17 LAB — INR BLD: 3.8 (ref 0.9–1.2)

## 2018-10-18 ENCOUNTER — OFFICE VISIT (OUTPATIENT)
Dept: PULMONOLOGY | Facility: HOSPICE | Age: 73
End: 2018-10-18
Payer: MEDICARE

## 2018-10-18 ENCOUNTER — OFFICE VISIT (OUTPATIENT)
Dept: ENDOCRINOLOGY | Facility: MEDICAL CENTER | Age: 73
End: 2018-10-18
Payer: MEDICARE

## 2018-10-18 VITALS
HEART RATE: 54 BPM | RESPIRATION RATE: 16 BRPM | SYSTOLIC BLOOD PRESSURE: 130 MMHG | DIASTOLIC BLOOD PRESSURE: 70 MMHG | TEMPERATURE: 97.9 F | HEIGHT: 67 IN | OXYGEN SATURATION: 96 % | WEIGHT: 280 LBS | BODY MASS INDEX: 43.95 KG/M2

## 2018-10-18 VITALS
OXYGEN SATURATION: 94 % | DIASTOLIC BLOOD PRESSURE: 70 MMHG | HEIGHT: 67 IN | WEIGHT: 285 LBS | BODY MASS INDEX: 44.73 KG/M2 | HEART RATE: 74 BPM | SYSTOLIC BLOOD PRESSURE: 128 MMHG

## 2018-10-18 DIAGNOSIS — Z79.4 TYPE 2 DIABETES MELLITUS WITH STAGE 4 CHRONIC KIDNEY DISEASE, WITH LONG-TERM CURRENT USE OF INSULIN (HCC): ICD-10-CM

## 2018-10-18 DIAGNOSIS — J45.909 UNCOMPLICATED ASTHMA, UNSPECIFIED ASTHMA SEVERITY, UNSPECIFIED WHETHER PERSISTENT: ICD-10-CM

## 2018-10-18 DIAGNOSIS — J45.20 MILD INTERMITTENT ASTHMA WITHOUT COMPLICATION: ICD-10-CM

## 2018-10-18 DIAGNOSIS — J30.9 ALLERGIC RHINITIS, UNSPECIFIED SEASONALITY, UNSPECIFIED TRIGGER: ICD-10-CM

## 2018-10-18 DIAGNOSIS — G47.33 OSA (OBSTRUCTIVE SLEEP APNEA): ICD-10-CM

## 2018-10-18 DIAGNOSIS — Z23 NEED FOR INFLUENZA VACCINATION: ICD-10-CM

## 2018-10-18 DIAGNOSIS — Z79.4 ENCOUNTER FOR LONG-TERM (CURRENT) USE OF INSULIN (HCC): ICD-10-CM

## 2018-10-18 DIAGNOSIS — E11.22 TYPE 2 DIABETES MELLITUS WITH STAGE 4 CHRONIC KIDNEY DISEASE, WITH LONG-TERM CURRENT USE OF INSULIN (HCC): ICD-10-CM

## 2018-10-18 DIAGNOSIS — N18.4 TYPE 2 DIABETES MELLITUS WITH STAGE 4 CHRONIC KIDNEY DISEASE, WITH LONG-TERM CURRENT USE OF INSULIN (HCC): ICD-10-CM

## 2018-10-18 DIAGNOSIS — I48.0 PAF (PAROXYSMAL ATRIAL FIBRILLATION) (HCC): ICD-10-CM

## 2018-10-18 DIAGNOSIS — Z79.01 CHRONIC ANTICOAGULATION: ICD-10-CM

## 2018-10-18 LAB
HBA1C MFR BLD: 6.6 % (ref ?–5.8)
INT CON NEG: NEGATIVE
INT CON POS: POSITIVE

## 2018-10-18 PROCEDURE — 99214 OFFICE O/P EST MOD 30 MIN: CPT | Mod: 25 | Performed by: NURSE PRACTITIONER

## 2018-10-18 PROCEDURE — 99214 OFFICE O/P EST MOD 30 MIN: CPT | Performed by: PHYSICIAN ASSISTANT

## 2018-10-18 PROCEDURE — 90662 IIV NO PRSV INCREASED AG IM: CPT | Performed by: NURSE PRACTITIONER

## 2018-10-18 PROCEDURE — 83036 HEMOGLOBIN GLYCOSYLATED A1C: CPT | Performed by: PHYSICIAN ASSISTANT

## 2018-10-18 PROCEDURE — G0008 ADMIN INFLUENZA VIRUS VAC: HCPCS | Performed by: NURSE PRACTITIONER

## 2018-10-18 NOTE — PROGRESS NOTES
Chief Complaint   Patient presents with   • Apnea     CPAP at 10 CM H20 with 3 LPM 02 bleed in   • Asthma       HPI:  Whitley Frederick is a 73 y.o. year old female here today for follow-up on her Asthma and obstructive sleep apnea. Polysomnogram indicated an AHI of 29.3 with a REM related index of 90 and a low 02 saturation of 74%. She was titrated to CPAP at 8 cm water pressure with 5 L of oxygen per minute in addition because of persisting hypoxemia. She is currently on CPAP at 10 CM H20 with 3 LPM 02 bleed in. Compliance card download today in the office indicates an AHI of 0.2 with an average use of 4-5 hours at night. She tolerates the CPAP pressure. She has a nasal mask which is a good fit. She is unsure of whether or not she sleeps better on therapy. However she denies daytime fatigue. She denies morning headaches.      She also has a history of mild Asthma. She notes allergies as a trigger for her. She has felt her Allergy symptoms are often worse in the Spring months. She is compliant on Qvar, Singulair and a Xopenex HFA inhaler. Spirometry 2/28/2017 indicated an FEV1 of 1.95 L, 76% predicted with an FEV1/FVC ratio of 76 and a borderline positive bronchodilator response. PFT's March 2018 indicated an FEV1 of 2.37 L, 94% predicted with an FEV1/FVC ratio of 81 and a DLCO of 130% predicted.     She notes dyspnea generally only associates with exercise or with allergy triggers. She feels this has been stable. She notes an occasional cough. She feels this is related to post nasal drip. She denies mucous production. She denies any wheezing. She denies fevers or chills. She did have an infection in July requiring antibiotics.   She has a history of seasonal allergies and prior sinusitis. She is using the saline rinse. She is also using a Flonase nasal spray. She has seen ENT in the past as well. She was referred back to ENT and was placed on an Astelin nasal spray.      She had been on Xarelto in the past,  apparently for paroxysmal atrial fibrillation, and is now on warfarin without unusual bleeding. She is followed by Dr. Sorto, Cardiology.       Past Medical History:   Diagnosis Date   • Allergy    • Anxiety    • Arrhythmia    • Arthritis    • Asthma    • Carotid bruit     Left   • CATARACT    • Chest pain    • DIABETES MELLITUS    • Esophageal reflux    • Goiter    • Headache(784.0)    • Heart murmur    • Hyperlipidemia    • Hypertension    • IBD (inflammatory bowel disease)    • Migraine     Silent migraine   • AVERY (obstructive sleep apnea)    • Overweight(278.02)    • Palpitations    • Paroxysmal atrial fibrillation (HCC) 7/12/13    woke with palps, AFib per ekg in am.   • Pneumonia     Nov. 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 Badillo   • Urinary tract infection, site not specified        Past Surgical History:   Procedure Laterality Date   • CATARACT PHACO WITH IOL Right 10/25/2016    Procedure: CATARACT PHACO WITH IOL;  Surgeon: Zoran Gamble M.D.;  Location: SURGERY SAME DAY [facility];  Service:    • ABDOMINAL HYSTERECTOMY TOTAL     • CHOLECYSTECTOMY     • TONSILLECTOMY     • US-NEEDLE CORE BX-BREAST PANEL         Family History   Problem Relation Age of Onset   • Cancer Maternal Aunt    • Diabetes Maternal Aunt    • Heart Disease Maternal Aunt    • Hypertension Maternal Aunt    • Hyperlipidemia Maternal Aunt    • Cancer Mother         Leukemia   • Hypertension Mother    • Diabetes Mother    • Lung Disease Father    • Cancer Father         Lung   • Lung Disease Brother    • Stroke Brother    • Diabetes Brother    • Heart Disease Brother    • Diabetes Maternal Grandmother    • Hypertension Brother    • Diabetes Brother        Social History     Social History   • Marital status:      Spouse name: N/A   • Number of children: N/A   • Years of education: N/A     Occupational History   • Not on  file.     Social History Main Topics   • Smoking status: Former Smoker     Packs/day: 1.00     Years: 20.00     Types: Cigarettes     Quit date: 1/1/1983   • Smokeless tobacco: Never Used   • Alcohol use No   • Drug use: No   • Sexual activity: No     Other Topics Concern   • Not on file     Social History Narrative   • No narrative on file       ROS:  Constitutional: Denies fevers, chills, sweats,  weight loss  Eyes: Denies glasses, vision loss, pain, drainage, double vision  Ears/Nose/Mouth/Throat: Denies ear ache, difficulty hearing, sore throat, persistent hoarseness, decayed teeth/toothache  Cardiovascular: Denies chest pain, tightness, palpitations, swelling in feet/legs, fainting, difficulty breathing when laying down  Respiratory: See HPI   GI: Denies heartburn, difficulty swallowing, nausea, vomiting, abdominal pain, diarrhea, constipation  : Denies frequent urination, painful urination  Integumentary: Denies rashes, lumps or color changes  MSK: Denies painful joints, sore muscles, and back pain.   Neurological: Denies frequent headaches, dizziness, weakness  Sleep: See HPI       Current Outpatient Prescriptions   Medication Sig Dispense Refill   • levothyroxine (SYNTHROID) 50 MCG Tab TAKE 1 TABLET BY MOUTH ONCE DAILY 90 Tab 1   • beclomethasone HFA (QVAR REDIHALER) 40 MCG/ACT inhaler Inhale 2 Puffs by mouth 2 times a day. 1 Inhaler 3   • omeprazole (PRILOSEC) 20 MG delayed-release capsule Take 1 Cap by mouth every day. 90 Cap 3   • losartan (COZAAR) 50 MG Tab Take 1 Tab by mouth every day. TWICE DAILY 180 Tab 3   • montelukast (SINGULAIR) 10 MG Tab TAKE ONE TABLET BY MOUTH ONCE DAILY AT BEDTIME 30 Tab 11   • metoprolol (LOPRESSOR) 50 MG Tab Take 1 Tab by mouth 2 times a day. 180 Tab 3   • liraglutide (VICTOZA) 18 MG/3ML Solution Pen-injector injection Inject 0.3 mL as instructed every day. 3 PEN 11   • Insulin Degludec (TRESIBA FLEXTOUCH) 200 UNIT/ML Solution Pen-injector Inject 50 Units as instructed  every bedtime. 3 PEN 2   • fluticasone (FLONASE) 50 MCG/ACT nasal spray Spray 1-2 Sprays in nose every day. Each nostil, 30 minutes prior to use of CPAP 1 Bottle 6   • simvastatin (ZOCOR) 20 MG Tab TAKE ONE TABLET BY MOUTH ONCE DAILY IN THE EVENING 90 Tab 3   • warfarin (COUMADIN) 3 MG Tab Take 1 to 1.5 tablets by mouth daily as directed by the coumadin clinic 135 Tab 1   • levalbuterol (XOPENEX HFA) 45 MCG/ACT inhaler Inhale 2 Puffs by mouth every four hours as needed for Shortness of Breath. 1 Inhaler 3   • allopurinol (ZYLOPRIM) 100 MG TABS Take 1/2 pill qd (Patient taking differently: Take 100 mg by mouth 2 Times a Day. Indications: Primary Gout) 90 Tab 4   • furosemide (LASIX) 80 MG Tab Take 1 Tab by mouth every 48 hours. 30 Tab 3   • Blood Glucose Monitoring Suppl Supplies Misc Test strips order: Test strips for Accucheck meter. Sig: use BID and prn ssx high or low sugar #100 RF x 3 100 Strip 11   • Blood Glucose Monitoring Suppl Supplies Misc Test strips order: Test strips for Chely Contour meter. Sig: use tid  and prn ssx high or low sugar #100 RF x 3 100 Strip 3   • AZELASTINE-FLUTICASONE NA Spray  in nose.     • MULTAQ 400 MG Tab TAKE ONE TABLET BY MOUTH TWICE DAILY WITH MEALS 180 Tab 2   • Urine Glucose-Ketones Test Strip 1 Strip by In Vitro route every day. 100 Strip 2   • glucose blood (FREESTYLE LITE) strip 1 Strip by Other route 2 times a day, with meals. 50 Strip 11   • Insulin Pen Needle 32G X 4 MM Misc 1 Applicator by Does not apply route 3 times a day. 100 Each 11   • ferrous gluconate (FERGON) 324 (38 Fe) MG Tab Take 324 mg by mouth 3 times a day, with meals.     • Blood Glucose Monitoring Suppl W/DEVICE Kit 1 Each by Does not apply route 2 times a day as needed. 1 Kit 0   • Diclofenac Sodium (VOLTAREN) 1 % Gel Apply 2 g to skin as directed 4 times a day. 100 g 0   • Misc. Devices MISC Pen needles for Lantus Solstar. 29g 12mm. 90 Each 3   • Cholecalciferol (VITAMIN D) 2000 UNITS CAPS Take 4,000  "Units by mouth every day.     • magnesium oxide (MAGNESIUM OXIDE) 400 (241.3 MG) MG TABS tablet Take 400 mg by mouth every day.       No current facility-administered medications for this visit.        Allergies   Allergen Reactions   • Amlodipine Itching   • Amaryl      GI Problems   • Avandia [Rosiglitazone Maleate]      GI problems   • Cipro Xr      Possibly causes Chills.   • Clonidine      Rapid heart rate, swelling    • Contrast Media With Iodine [Iodine] Hives   • Glipizide      GI Problems   • Glucophage [Metformin Hydrochloride]      dizzy   • Hydralazine      Rapid heart rate, chest tightness   • Levaquin      Possible allergy - chills with cipro but sick   • Relafen [Nabumetone]      Itching; avoids all nsaids       Blood pressure 130/70, pulse (!) 54, temperature 36.6 °C (97.9 °F), temperature source Temporal, resp. rate 16, height 1.702 m (5' 7\"), weight (!) 127 kg (280 lb), SpO2 96 %, not currently breastfeeding.    PE:   Appearance: Well developed, well nourished, no acute distress  Eyes: PERRL, EOM intact, sclera white, conjunctiva moist  Ears: no lesions or deformities  Hearing: grossly intact  Nose: no lesions or deformities  Oropharynx: tongue normal, posterior pharynx without erythema or exudate  Mallampati Classification: Class 2  Neck: supple, trachea midline, no masses   Respiratory effort: no intercostal retractions or use of accessory muscles  Lung auscultation: no rales, rhonchi or wheezes  Heart auscultation: no murmur rub or gallop  Extremities: no cyanosis or edema  Abdomen: soft ,non tender, no masses  Gait and Station: normal  Digits and nails: no clubbing, cyanosis, petechiae or nodes.  Cranial nerves: grossly intact  Skin: no rashes, lesions or ulcers noted  Orientation: Oriented to time, person and place  Mood and affect: mood and affect appropriate, normal interaction with examiner  Judgement: Intact          Assessment:    1. Mild intermittent asthma without complication  " Spirometry    beclomethasone HFA (QVAR REDIHALER) 40 MCG/ACT inhaler   2. AVERY (obstructive sleep apnea)  DME Mask and Supplies   3. BMI 40.0-44.9, adult ([facility])     4. Allergic rhinitis, unspecified seasonality, unspecified trigger     5. PAF (paroxysmal atrial fibrillation) ([facility])     6. Chronic anticoagulation     7. Need for influenza vaccination  Influenza Vaccine, High Dose (65+ Only)   8. Uncomplicated asthma, unspecified asthma severity, unspecified whether persistent           Plan:    1) PFT's at her last office visit were stable. She feels her Asthma symptoms are stable on her current regiment. Continue Qvar 40 mcg, Singulair 10 mg and Xopenex. Cost of the Qvar is an issue. RX sent to sample Pharmacy for Qvar samples.   2) Continue to follow with ENT. Continue saline rinse, Flonase and Astelin nasal sprays. Recommend addition of Mucinex OTC prn.   3) She is up to date on her Prevnar 13 and Pneumovax 23 vaccinations. Updated Influenza vaccine today in the office.   4) Continue CPAP at 10 CM H20 with 3 LPM 02 bleed in. Order for mask and supplies sent to her DME.   5) Sleep hygiene discussed. Weight loss recommended.  6) Continue to follow with Cardiology.   7) Follow up in 6 months at the Pulmonary Clinic with repeat Spirometry, sooner OV if needed.

## 2018-10-18 NOTE — PROGRESS NOTES
Return to office Patient Consult Note  Referred by: EDILMA Whelan    Reason for consult: Diabetes Management Type 2    HPI:  Whitley Frederick is a 73 y.o. old patient who is seeing us today for diabetes care.  This is a pleasant patient with diabetes and I appreciate the opportunity to participate in the care of this patient.    BG Diary:10/18/2018  In the AM: no log    Labs of 10/18/18 HbA1c is 6.6  Labs of 7/19/18 HbA1c is 6.3  Labs of 2/26/18 HbA1c is 7.6  labs of 8/11/17 HbA1c 7.6, GFR 22,      BG Diary:7/19/2018  In the AM:  Did not bring     BG Diary:2/26/2018  In the AM: 129, 167, 177, 153, 148, 142  Before Bed: 183, 157, 193, 185, 242, 230, 234    1. Type 2 diabetes mellitus with stage 4 chronic kidney disease, with long-term current use of insulin (HCC)    This is a new patient on 8/11/17  She is on:  1.  Toujeo  65     STOP:  1.  Toujeo     She is now on:  1.   Tresiba 14 units at night   3.   Victoza  1.8 at night  4.   Actos 15mg one a day (Stopped on 7/19/18)     I may need to have her start Novolog at dinner?     2. Essential hypertension     This is evelated and I will foollow  2. Encounter for long-term (current) use of insulin (HCC)  Is on a high risk medication Insulin and we will continue to follow no hypoglycemic events since last visit                 ROS:   Constitutional: No night sweats.  Eyes:  No visual changes.  Cardiac: No chest pain, No palpitations or racing heart rate.  Resp: No shortness of breath, No cough,   Gi: No Diarrhea    All other systems were reviewed and were/are negative.  The ROS was revised/revisited during this office visit from the patients first office visit with me on 11/3/17 Please review the full ROS during the first office visit.    Past Medical History:  Patient Active Problem List    Diagnosis Date Noted   • Essential hypertension 05/10/2016     Priority: High   • Atherosclerosis of aorta (CMS-HCC) 04/29/2015     Priority: High   • Chronic  respiratory failure with hypoxia ([facility]) 04/29/2015     Priority: High   • Morbid obesity with BMI of 45.0-49.9, adult ([facility]) 04/29/2015     Priority: High   • Anxiety 01/09/2014     Priority: High   • PAF (paroxysmal atrial fibrillation) ([facility]) 07/12/2013     Priority: High   • Type 2 diabetes mellitus with stage 4 chronic kidney disease, with long-term current use of insulin ([facility])      Priority: High   • Palpitations      Priority: High   • CKD (chronic kidney disease) stage 4, GFR 15-29 ml/min ([facility])      Priority: High   • Mild intermittent asthma without complication 08/24/2016     Priority: Medium   • Chronic anticoagulation 06/10/2016     Priority: Medium   • AVERY (obstructive sleep apnea) 05/10/2016     Priority: Medium   • Chronic idiopathic gout involving toe without tophus 04/30/2015     Priority: Medium   • Left bundle branch block 03/05/2012     Priority: Medium   • Multiple thyroid nodules 10/07/2015     Priority: Low   • Stenosis of carotid artery 05/30/2014     Priority: Low   • Secondary hyperparathyroidism of renal origin ([facility]) 05/14/2018   • Hypothyroidism due to acquired atrophy of thyroid 04/10/2018   • PVC (premature ventricular contraction) 02/23/2018   • Dependence on nocturnal oxygen therapy 11/02/2017   • Diabetic mononeuropathy associated with type 2 diabetes mellitus ([facility]) 11/02/2017   • Encounter for long-term (current) use of insulin ([facility]) 08/11/2017   • Incontinence of feces 05/18/2017   • Chronic pain of both shoulders 03/23/2017   • Dyslipidemia 12/14/2016       Past Surgical History:  Past Surgical History:   Procedure Laterality Date   • CATARACT PHACO WITH IOL Right 10/25/2016    Procedure: CATARACT PHACO WITH IOL;  Surgeon: Zoran Gamble M.D.;  Location: SURGERY SAME DAY [facility];  Service:    • ABDOMINAL HYSTERECTOMY TOTAL     • CHOLECYSTECTOMY     • TONSILLECTOMY     • US-NEEDLE CORE BX-BREAST PANEL         Allergies:  Amlodipine; Amaryl; Avandia [rosiglitazone maleate];  Cipro xr; Clonidine; Contrast media with iodine [iodine]; Glipizide; Glucophage [metformin hydrochloride]; Hydralazine; Levaquin; and Relafen [nabumetone]    Social History:  Social History     Social History   • Marital status:      Spouse name: N/A   • Number of children: N/A   • Years of education: N/A     Occupational History   • Not on file.     Social History Main Topics   • Smoking status: Former Smoker     Packs/day: 1.00     Years: 20.00     Types: Cigarettes     Quit date: 1/1/1983   • Smokeless tobacco: Never Used   • Alcohol use No   • Drug use: No   • Sexual activity: No     Other Topics Concern   • Not on file     Social History Narrative   • No narrative on file       Family History:  Family History   Problem Relation Age of Onset   • Cancer Maternal Aunt    • Diabetes Maternal Aunt    • Heart Disease Maternal Aunt    • Hypertension Maternal Aunt    • Hyperlipidemia Maternal Aunt    • Cancer Mother         Leukemia   • Hypertension Mother    • Diabetes Mother    • Lung Disease Father    • Cancer Father         Lung   • Lung Disease Brother    • Stroke Brother    • Diabetes Brother    • Heart Disease Brother    • Diabetes Maternal Grandmother    • Hypertension Brother    • Diabetes Brother        Medications:    Current Outpatient Prescriptions:   •  beclomethasone HFA (QVAR REDIHALER) 40 MCG/ACT inhaler, Inhale 2 Puffs by mouth 2 times a day., Disp: 2 Inhaler, Rfl: 0  •  levothyroxine (SYNTHROID) 50 MCG Tab, TAKE 1 TABLET BY MOUTH ONCE DAILY, Disp: 90 Tab, Rfl: 1  •  omeprazole (PRILOSEC) 20 MG delayed-release capsule, Take 1 Cap by mouth every day., Disp: 90 Cap, Rfl: 3  •  furosemide (LASIX) 80 MG Tab, Take 1 Tab by mouth every 48 hours., Disp: 30 Tab, Rfl: 3  •  losartan (COZAAR) 50 MG Tab, Take 1 Tab by mouth every day. TWICE DAILY, Disp: 180 Tab, Rfl: 3  •  Blood Glucose Monitoring Suppl Supplies Misc, Test strips order: Test strips for Accucheck meter. Sig: use BID and prn ssx high or  low sugar #100 RF x 3, Disp: 100 Strip, Rfl: 11  •  Blood Glucose Monitoring Suppl Supplies Misc, Test strips order: Test strips for Chely Contour meter. Sig: use tid  and prn ssx high or low sugar #100 RF x 3, Disp: 100 Strip, Rfl: 3  •  AZELASTINE-FLUTICASONE NA, Spray  in nose., Disp: , Rfl:   •  MULTAQ 400 MG Tab, TAKE ONE TABLET BY MOUTH TWICE DAILY WITH MEALS, Disp: 180 Tab, Rfl: 2  •  montelukast (SINGULAIR) 10 MG Tab, TAKE ONE TABLET BY MOUTH ONCE DAILY AT BEDTIME, Disp: 30 Tab, Rfl: 11  •  Urine Glucose-Ketones Test Strip, 1 Strip by In Vitro route every day., Disp: 100 Strip, Rfl: 2  •  metoprolol (LOPRESSOR) 50 MG Tab, Take 1 Tab by mouth 2 times a day., Disp: 180 Tab, Rfl: 3  •  glucose blood (FREESTYLE LITE) strip, 1 Strip by Other route 2 times a day, with meals., Disp: 50 Strip, Rfl: 11  •  liraglutide (VICTOZA) 18 MG/3ML Solution Pen-injector injection, Inject 0.3 mL as instructed every day., Disp: 3 PEN, Rfl: 11  •  Insulin Pen Needle 32G X 4 MM Misc, 1 Applicator by Does not apply route 3 times a day., Disp: 100 Each, Rfl: 11  •  Insulin Degludec (TRESIBA FLEXTOUCH) 200 UNIT/ML Solution Pen-injector, Inject 50 Units as instructed every bedtime., Disp: 3 PEN, Rfl: 2  •  fluticasone (FLONASE) 50 MCG/ACT nasal spray, Spray 1-2 Sprays in nose every day. Each nostil, 30 minutes prior to use of CPAP, Disp: 1 Bottle, Rfl: 6  •  ferrous gluconate (FERGON) 324 (38 Fe) MG Tab, Take 324 mg by mouth 3 times a day, with meals., Disp: , Rfl:   •  simvastatin (ZOCOR) 20 MG Tab, TAKE ONE TABLET BY MOUTH ONCE DAILY IN THE EVENING, Disp: 90 Tab, Rfl: 3  •  warfarin (COUMADIN) 3 MG Tab, Take 1 to 1.5 tablets by mouth daily as directed by the coumadin clinic, Disp: 135 Tab, Rfl: 1  •  levalbuterol (XOPENEX HFA) 45 MCG/ACT inhaler, Inhale 2 Puffs by mouth every four hours as needed for Shortness of Breath., Disp: 1 Inhaler, Rfl: 3  •  Blood Glucose Monitoring Suppl W/DEVICE Kit, 1 Each by Does not apply route 2 times a  "day as needed., Disp: 1 Kit, Rfl: 0  •  Diclofenac Sodium (VOLTAREN) 1 % Gel, Apply 2 g to skin as directed 4 times a day., Disp: 100 g, Rfl: 0  •  Misc. Devices MISC, Pen needles for Lantus Solstar. 29g 12mm., Disp: 90 Each, Rfl: 3  •  allopurinol (ZYLOPRIM) 100 MG TABS, Take 1/2 pill qd (Patient taking differently: Take 100 mg by mouth 2 Times a Day. Indications: Primary Gout), Disp: 90 Tab, Rfl: 4  •  Cholecalciferol (VITAMIN D) 2000 UNITS CAPS, Take 4,000 Units by mouth every day., Disp: , Rfl:   •  magnesium oxide (MAGNESIUM OXIDE) 400 (241.3 MG) MG TABS tablet, Take 400 mg by mouth every day., Disp: , Rfl:         Physical Examination:   Vital signs: /70 (BP Location: Left arm, Patient Position: Sitting)   Pulse 74   Ht 1.702 m (5' 7\")   Wt (!) 129.3 kg (285 lb)   SpO2 94%   BMI 44.64 kg/m²   General: No distress, cooperative, well dressed and well nourished.   Eyes: No scleral icterus or discharge, No hyposphagma  ENMT: Normal on external inspection of nose, lips, No nasal drainage   Neck: No abnormal masses on inspection  Resp: Normal effort, Bilateral clear to auscultation, No wheezing, No rales  CVS: Regular rate and rhythm, S1 S2 normal, No murmur. No gallop  Extremities: No edema bilateral extremities  Neuro: Alert and oriented  Skin: No rash, No Ulcers  Psych: Normal mood and affect      Assessment and Plan:    1. Type 2 diabetes mellitus with stage 4 chronic kidney disease, with long-term current use of insulin ([facility])    She is now on:  1.   Tresiba 14 units at night   3.   Victoza  1.8 at night  4.   Actos 15mg one a day (Stopped on 7/19/18)    1.  Start Ozempic 0.25 this week 0.5 next week and 1.0 on the 3rd week and then on the 3rd week stop Tresiba      2. Encounter for long-term (current) use of insulin (HCC)  Is on a high risk medication Insulin and we will continue to follow no hypoglycemic events since last visit    Return in about 3 months (around 1/18/2019).    Blood glucose log: " Check BG in the morning when wake up, before lunch or dinner and before bed.  So three times a day.  Always bring BG diary to the next office visit.     Thank you kindly for allowing me to participate in the diabetes care plan for this patient.    Wei Patterson PA-C, BC-[facility]  Board Certified - Advanced Diabetes Management  10/18/18    CC:   EDILMA Whelan

## 2018-10-18 NOTE — PATIENT INSTRUCTIONS
Plan:    1) PFT's at her last office visit were stable. She feels her Asthma symptoms are stable on her current regiment. Continue Qvar 40 mcg, Singulair 10 mg and Xopenex. Cost of the Qvar is an issue. RX sent to sample Pharmacy for Qvar samples.   2) Continue to follow with ENT. Continue saline rinse, Flonase and Astelin nasal sprays. Recommend addition of Mucinex OTC prn.   3) She is up to date on her Prevnar 13 and Pneumovax 23 vaccinations. Updated Influenza vaccine today in the office.   4) Continue CPAP at 10 CM H20 with 3 LPM 02 bleed in. Order for mask and supplies sent to her DME.   5) Sleep hygiene discussed. Weight loss recommended.  6) Continue to follow with Cardiology.   7) Follow up in 6 months at the Pulmonary Clinic with repeat Spirometry, sooner OV if needed.

## 2018-10-18 NOTE — PATIENT INSTRUCTIONS
She is now on:  1.   Tresiba 14 units at night   3.   Victoza  1.8 at night (Stop on 10/18/18)  4.   Actos 15mg one a day (Stopped on 7/19/18)     1.  Start Ozempic 0.25 this week 0.5 next week and 1.0 on the 3rd week and then on the 3rd week stop Tresiba (thursdays)

## 2018-10-22 ENCOUNTER — ANTICOAGULATION VISIT (OUTPATIENT)
Dept: VASCULAR LAB | Facility: MEDICAL CENTER | Age: 73
End: 2018-10-22
Attending: INTERNAL MEDICINE
Payer: MEDICARE

## 2018-10-22 DIAGNOSIS — Z79.01 CHRONIC ANTICOAGULATION: ICD-10-CM

## 2018-10-22 DIAGNOSIS — I48.0 PAF (PAROXYSMAL ATRIAL FIBRILLATION) (HCC): ICD-10-CM

## 2018-10-22 LAB
INR BLD: 3.1 (ref 0.9–1.2)
INR PPP: 3.1 (ref 2–3.5)

## 2018-10-22 PROCEDURE — 85610 PROTHROMBIN TIME: CPT

## 2018-10-22 PROCEDURE — 99212 OFFICE O/P EST SF 10 MIN: CPT

## 2018-10-22 NOTE — PROGRESS NOTES
OP Anticoagulation Service Note    Date: 10/22/2018      Anticoagulation Summary  As of 10/22/2018    INR goal:   2.0-3.0   TTR:   71.6 % (3 y)   Today's INR:   3.1!   Warfarin maintenance plan:   4.5 mg (3 mg x 1.5) on Mon, Wed; 3 mg (3 mg x 1) all other days   Weekly warfarin total:   24 mg   Weekly max warfarin dose:   21 mg   Plan last modified:   Nydia Michel PharmD (10/22/2018)   Next INR check:   11/5/2018   Target end date:   Indefinite    Indications    Chronic anticoagulation [Z79.01]  Chronic anticoagulation (Resolved) [Z79.01]  PAF (paroxysmal atrial fibrillation) (HCC) [I48.0]             Anticoagulation Episode Summary     INR check location:       Preferred lab:       Send INR reminders to:       Comments:         Anticoagulation Care Providers     Provider Role Specialty Phone number    Amy Lincoln M.D. Referring Cardiology [phone]    [facility] Responsible  [phone]        Anticoagulation Patient Findings      HPI:   Whitley Frederick seen in clinic today, on anticoagulation therapy with warfarin (a high risk medication) for PAF      Pt is here today to evaluate anticoagulation therapy      Confirmed warfarin dosing regimen, denies missed or extra doses of coumadin.   Diet has been consistent with foods rich in vitamin K: Yes  Changes in ETOH:  No  Changes in smoking status: No  Changes in medication: No   Cost restriction: No  S/s of bleeding:  No  Signs/symptoms  thrombosis since the last appt: No    A/P   INR  Supra-therapeutic today  Decrease weekly regimen.     Pt educated to contact our clinic with any changes in medications or s/s of bleeding or thrombosis    Follow up appointment in 2 week(s) to reduce risk of adverse events from warfarin   Nydia Michel, Pharm D

## 2018-10-23 ENCOUNTER — OFFICE VISIT (OUTPATIENT)
Dept: MEDICAL GROUP | Facility: LAB | Age: 73
End: 2018-10-23
Payer: MEDICARE

## 2018-10-23 VITALS
SYSTOLIC BLOOD PRESSURE: 130 MMHG | WEIGHT: 280 LBS | HEART RATE: 54 BPM | TEMPERATURE: 98.1 F | HEIGHT: 67 IN | RESPIRATION RATE: 16 BRPM | DIASTOLIC BLOOD PRESSURE: 62 MMHG | OXYGEN SATURATION: 97 % | BODY MASS INDEX: 43.95 KG/M2

## 2018-10-23 DIAGNOSIS — R22.9 SOFT TISSUE SWELLING: ICD-10-CM

## 2018-10-23 DIAGNOSIS — E04.2 MULTIPLE THYROID NODULES: ICD-10-CM

## 2018-10-23 DIAGNOSIS — Z79.4 TYPE 2 DIABETES MELLITUS WITH STAGE 4 CHRONIC KIDNEY DISEASE, WITH LONG-TERM CURRENT USE OF INSULIN (HCC): ICD-10-CM

## 2018-10-23 DIAGNOSIS — I48.0 PAF (PAROXYSMAL ATRIAL FIBRILLATION) (HCC): ICD-10-CM

## 2018-10-23 DIAGNOSIS — E11.22 TYPE 2 DIABETES MELLITUS WITH STAGE 4 CHRONIC KIDNEY DISEASE, WITH LONG-TERM CURRENT USE OF INSULIN (HCC): ICD-10-CM

## 2018-10-23 DIAGNOSIS — N18.4 TYPE 2 DIABETES MELLITUS WITH STAGE 4 CHRONIC KIDNEY DISEASE, WITH LONG-TERM CURRENT USE OF INSULIN (HCC): ICD-10-CM

## 2018-10-23 PROCEDURE — 99214 OFFICE O/P EST MOD 30 MIN: CPT | Performed by: NURSE PRACTITIONER

## 2018-10-23 NOTE — PROGRESS NOTES
"Chief Complaint   Patient presents with   • Pain     collar bone pain and lump       HPI  Whitley is a 73-year-old established female here with concern that she has swelling overlying her clavicle, appreciated this several weeks ago and has occasional tenderness.  No history of any injuries or trauma.    + popping / cracking.  Denies any achy pain to her right clavicle.  Positive history of thyroid nodules that we are watching.    She does have type 2 diabetes and is closely followed by endocrinology.  Tells me that she has been doing really well and her A1c is down to 6.6.  Stopped victoza 1.8 and started ozempic 0.25 to increase to 1.0 on third week, then stop tresiba.    Paroxysmal atrial fibrillation: Chronic issue.  Denies any chest pain or recent sensations that her heart has been racing.  Her Multitak is extremely expensive and she is wondering if she can have a prescription to  samples at the healthcare center.    Past medical, surgical, family, and social history is reviewed and updated in Epic chart by me today.   Medications and allergies reviewed and updated in Epic chart by me today.     ROS:   As documented in history of present illness above    Exam:  Blood pressure 130/62, pulse (!) 54, temperature 36.7 °C (98.1 °F), resp. rate 16, height 1.702 m (5' 7\"), weight (!) 127 kg (280 lb), SpO2 97 %, not currently breastfeeding.  Constitutional: Overweight female, alert, no distress, plus 3 vital signs  Skin:  Warm, dry, no rashes invisible areas  Eye: Equal, round and reactive, conjunctiva clear  ENMT: Lips without lesions, good dentition, oropharynx clear    Respiratory: Unlabored respiration, lungs clear to auscultation, no wheezes, no rhonchi  Musculoskeletal: She does have soft tissue swelling overlying her medial right clavicle that is slightly tender but non-erythematous or fluctuant.  She does not have any clavicular bony tenderness.  Psych: Alert, pleasant, well-groomed, normal " affect    A/P:  1. Multiple thyroid nodules  -Recommend updated soft tissue ultrasound of her neck nodules and evaluate tissue swelling to medial right clavicle.  - US-SOFT TISSUES OF HEAD - NECK; Future    2. Soft tissue swelling  -As above.  - US-SOFT TISSUES OF HEAD - NECK; Future    3. PAF (paroxysmal atrial fibrillation) (HCC)  -She may  samples at our healthcare center for free anytime she needs to.  - dronedarone (MULTAQ) 400 MG Tab; Take 1 Tab by mouth 2 times a day, with meals.  Dispense: 60 Tab; Refill: 0    4. Type 2 diabetes mellitus with stage 4 chronic kidney disease, with long-term current use of insulin (HCC)  -Doing excellent.  Reviewed her note from endocrinology.

## 2018-10-31 ENCOUNTER — APPOINTMENT (OUTPATIENT)
Dept: RADIOLOGY | Facility: MEDICAL CENTER | Age: 73
End: 2018-10-31
Attending: NURSE PRACTITIONER
Payer: MEDICARE

## 2018-11-05 ENCOUNTER — APPOINTMENT (OUTPATIENT)
Dept: VASCULAR LAB | Facility: MEDICAL CENTER | Age: 73
End: 2018-11-05
Payer: MEDICARE

## 2018-11-06 ENCOUNTER — APPOINTMENT (OUTPATIENT)
Dept: VASCULAR LAB | Facility: MEDICAL CENTER | Age: 73
End: 2018-11-06
Payer: MEDICARE

## 2018-11-06 DIAGNOSIS — R09.81 SINUS CONGESTION: ICD-10-CM

## 2018-11-06 RX ORDER — FLUTICASONE PROPIONATE 50 MCG
SPRAY, SUSPENSION (ML) NASAL
Qty: 3 BOTTLE | Refills: 3 | Status: SHIPPED | OUTPATIENT
Start: 2018-11-06 | End: 2020-08-31 | Stop reason: SDUPTHER

## 2018-11-07 NOTE — TELEPHONE ENCOUNTER
Have we ever prescribed this med? Yes.  If yes, what date? 9/27/17    Last OV: 10/18/18    Next OV: 4/17/19    DX: Sinus congestion (R09.81)    Medications: fluticasone (FLONASE) 50 MCG/ACT nasal spray

## 2018-11-07 NOTE — TELEPHONE ENCOUNTER
Have we ever prescribed this med? Yes.  If yes, what date? 09/27/2017    Last OV: 10/18/2018 - Lubna Lara    Next OV: none scheduled; to follow up in 6 months     DX: sinus congestion     Medications: flonase

## 2018-11-12 ENCOUNTER — ANTICOAGULATION VISIT (OUTPATIENT)
Dept: VASCULAR LAB | Facility: MEDICAL CENTER | Age: 73
End: 2018-11-12
Attending: INTERNAL MEDICINE
Payer: MEDICARE

## 2018-11-12 DIAGNOSIS — I48.0 PAF (PAROXYSMAL ATRIAL FIBRILLATION) (HCC): ICD-10-CM

## 2018-11-12 DIAGNOSIS — Z79.01 CHRONIC ANTICOAGULATION: ICD-10-CM

## 2018-11-12 LAB — INR PPP: 2.7 (ref 2–3.5)

## 2018-11-12 PROCEDURE — 99211 OFF/OP EST MAY X REQ PHY/QHP: CPT | Performed by: PHARMACIST

## 2018-11-12 PROCEDURE — 85610 PROTHROMBIN TIME: CPT

## 2018-11-12 NOTE — PROGRESS NOTES
Anticoagulation Summary  As of 11/12/2018    INR goal:   2.0-3.0   TTR:   71.7 % (3.1 y)   Today's INR:   2.7   Warfarin maintenance plan:   4.5 mg (3 mg x 1.5) on Mon, Wed; 3 mg (3 mg x 1) all other days   Weekly warfarin total:   24 mg   Weekly max warfarin dose:   21 mg   Plan last modified:   Araceli BautistaD (10/22/2018)   Next INR check:   11/30/2018   Target end date:   Indefinite    Indications    Chronic anticoagulation [Z79.01]  Chronic anticoagulation (Resolved) [Z79.01]  PAF (paroxysmal atrial fibrillation) (HCC) [I48.0]             Anticoagulation Episode Summary     INR check location:       Preferred lab:       Send INR reminders to:       Comments:         Anticoagulation Care Providers     Provider Role Specialty Phone number    Amy Lincoln M.D. Referring Cardiology [phone]    [facility] Responsible  [phone]        Anticoagulation Patient Findings      HPI:  Whitley Frederick seen in clinic today, on anticoagulation therapy with warfarin for afib  Changes to current medical/health status since last appt: none  Denies signs/symptoms of bleeding and/or thrombosis since the last appt.    Denies any interval changes to:   -Diet   OR   -Medications since last visit  Denies any complications or cost restrictions with current therapy.   BP declined    Patient missed a dose on 11/7/18    A/P   INR therapeutic.     Patient is to continue weekly regimen.    Follow up appointment in 3 week(s).    Carol Victoria, AraceliD

## 2018-11-13 LAB — INR BLD: 2.7 (ref 0.9–1.2)

## 2018-11-20 ENCOUNTER — TELEPHONE (OUTPATIENT)
Dept: MEDICAL GROUP | Facility: LAB | Age: 73
End: 2018-11-20

## 2018-11-20 DIAGNOSIS — Z79.4 TYPE 2 DIABETES MELLITUS WITH STAGE 4 CHRONIC KIDNEY DISEASE, WITH LONG-TERM CURRENT USE OF INSULIN (HCC): ICD-10-CM

## 2018-11-20 DIAGNOSIS — E11.22 TYPE 2 DIABETES MELLITUS WITH STAGE 4 CHRONIC KIDNEY DISEASE, WITH LONG-TERM CURRENT USE OF INSULIN (HCC): ICD-10-CM

## 2018-11-20 DIAGNOSIS — N18.4 TYPE 2 DIABETES MELLITUS WITH STAGE 4 CHRONIC KIDNEY DISEASE, WITH LONG-TERM CURRENT USE OF INSULIN (HCC): ICD-10-CM

## 2018-11-20 NOTE — TELEPHONE ENCOUNTER
----- Message from Whitley Frederick sent at 11/19/2018 10:18 PM PST -----  Regarding: Prescription Question  Contact: [phone]  Whitley Frederick   1945  [phone]    Miles Arias:  could you please order me  2  injectible pens of Ozempic from the  sample pharmacy ... that will last me a month...  1 pen doesn’t quite make it ...    Thank you

## 2018-11-21 DIAGNOSIS — E11.22 TYPE 2 DIABETES MELLITUS WITH STAGE 4 CHRONIC KIDNEY DISEASE, WITH LONG-TERM CURRENT USE OF INSULIN (HCC): ICD-10-CM

## 2018-11-21 DIAGNOSIS — Z79.4 TYPE 2 DIABETES MELLITUS WITH STAGE 4 CHRONIC KIDNEY DISEASE, WITH LONG-TERM CURRENT USE OF INSULIN (HCC): ICD-10-CM

## 2018-11-21 DIAGNOSIS — N18.4 TYPE 2 DIABETES MELLITUS WITH STAGE 4 CHRONIC KIDNEY DISEASE, WITH LONG-TERM CURRENT USE OF INSULIN (HCC): ICD-10-CM

## 2018-11-26 ENCOUNTER — PATIENT MESSAGE (OUTPATIENT)
Dept: PULMONOLOGY | Facility: HOSPICE | Age: 73
End: 2018-11-26

## 2018-11-26 DIAGNOSIS — J45.909 UNCOMPLICATED ASTHMA, UNSPECIFIED ASTHMA SEVERITY, UNSPECIFIED WHETHER PERSISTENT: ICD-10-CM

## 2018-11-26 RX ORDER — LEVALBUTEROL TARTRATE 45 UG/1
2 AEROSOL, METERED ORAL EVERY 4 HOURS PRN
Qty: 3 INHALER | Refills: 3 | Status: SHIPPED | OUTPATIENT
Start: 2018-11-26 | End: 2019-01-09

## 2018-11-26 RX ORDER — MONTELUKAST SODIUM 10 MG/1
10 TABLET ORAL
Qty: 90 TAB | Refills: 3 | Status: SHIPPED | OUTPATIENT
Start: 2018-11-26 | End: 2018-12-05 | Stop reason: SDUPTHER

## 2018-11-26 NOTE — PATIENT COMMUNICATION
Have we ever prescribed this med? Yes.  If yes, what date?     Last OV: 10/18/2018 - Lubna Lara    Next OV: 04/17/2019 - Lubna Lara    DX: Asthma    Medications: Montelukast, Xopenex

## 2018-11-26 NOTE — TELEPHONE ENCOUNTER
----- Message from Whitley Frederick sent at 11/26/2018  1:43 PM PST -----  Regarding: RE: Prescription Question  Contact: [phone]  Whitley THOMAS Otoniel   1945  [phone]    That prescription for Ozempic was sent to [facility]... I apologize but it should have been sent to [facility] on [address] which I forgot to mention in the second message... thank you ... I canceled the order at [facility]...   ----- Message -----  From: EDILMA Whelan  Sent: 11/21/2018  3:25 PM PST  To: Whitley Frederick  Subject: RE: Prescription Question  No problem!!    ----- Message -----     From: Whitley Frederick     Sent: 11/21/2018  1:56 PM PST       To: EDILMA Whelan  Subject: Prescription Question    Whitley THOMAS Otoniel   1945  [phone]    Miles Arias.... I just picked up the Ozempic ... they only gave me 1 pen shannen Prescription was written for .50mg per week when I’m suppose to take 1.0mg per week ... if you could write me another prescription for  1 more pen at a new dosage of 1.0mg per week , see Wei Patterson’s notes... I would appreciate it .. thank you ...Happy Thanksgiving to you and your family ...

## 2018-11-27 ENCOUNTER — PATIENT MESSAGE (OUTPATIENT)
Dept: PULMONOLOGY | Facility: HOSPICE | Age: 73
End: 2018-11-27

## 2018-11-27 NOTE — TELEPHONE ENCOUNTER
From: Whitley Frederick  To: Tenisha Shah, Med Ass't  Sent: 11/27/2018 8:31 AM PST  Subject: Prescription Question    Thank you ... it was sent to the wrong pharmacy but no worries I will have it transferred Kelly Jerri...  ----- Message -----  From: Tenisha Shah, Med Ass't  Sent: 11/26/2018 2:59 PM PST  To: Whitley Frederick  Subject: RE: Prescription Question  I will let you know when it gets sent over       ----- Message -----   From: Whitley Frederick   Sent: 11/26/2018 1:25 PM PST   To: EDILMA Mosher  Subject: Prescription Question    Whitley Frederick   1945  [phone]    Fly Calvo... [facility] helps me with my Singulair by paying $100 a Calendar Year ... I am wanting a new 90 day Prescription sent to [facility] on [facility] ... [facility] is the choice for [facility]... I appreciate your time n thank you ...

## 2018-11-28 DIAGNOSIS — E78.5 DYSLIPIDEMIA: ICD-10-CM

## 2018-11-28 RX ORDER — SIMVASTATIN 20 MG
TABLET ORAL
Qty: 90 TAB | Refills: 3 | Status: SHIPPED | OUTPATIENT
Start: 2018-11-28 | End: 2019-04-11

## 2018-11-28 NOTE — TELEPHONE ENCOUNTER
----- Message from Whitley Frederick sent at 11/28/2018  3:12 PM PST -----  Regarding: RE: Prescription Question  Contact: [phone]  To the [facility] on [facility] ... thank you   ----- Message -----  From: Jeanette Thakur, Med Ass't  Sent: 11/27/2018  8:45 AM PST  To: Whitley Frederick  Subject: RE: Prescription Question  Where do you want your simvastatin to go, What pharmacy?   Kelly Guthrie to you    ----- Message -----     From: Whitley Frederick     Sent: 11/27/2018  8:34 AM PST       To: Jeanette Thakur, Med Ass't  Subject: RE: Prescription Question    Also new prescription 90 Day Simvastatin  thank you .. Kelly Guthrie   ----- Message -----  From: Jeanette Thakur Med Ass't  Sent: 11/26/2018  2:12 PM PST  To: Whitley Frederick  Subject: RE: Prescription Question  Your request was sent back to jeovanny so we can route to the correct pharmacy     ----- Message -----     From: Whitley Frederick     Sent: 11/26/2018  1:43 PM PST       To: EDILMA Whelan  Subject: RE: Prescription Question    Whitley Ferderick   1945  [phone]    That prescription for Ozempic was sent to [facility]... I apologize but it should have been sent to The [facility] on [address] which I forgot to mention in the second message... thank you ... I canceled the order at [facility]...   ----- Message -----  From: EDILMA Whelan  Sent: 11/21/2018  3:25 PM PST  To: Whitley Frederick  Subject: RE: Prescription Question  No problem!!    ----- Message -----     From: Whitley Frederick     Sent: 11/21/2018  1:56 PM PST       To: EDILMA Whelan  Subject: Prescription Question    Whitley Frederick   1945  [phone]    Miles Arias.... I just picked up the Ozempic ... they only gave me 1 pen shannen Prescription was written for .50mg per week when I’m suppose to take 1.0mg per week ... if you could write me another prescription for  1 more pen at a new dosage of 1.0mg per week  , see Wei Patterson’s notes... I would appreciate it .. thank you ...Happy Thanksgiving to you and your family ...

## 2018-11-30 ENCOUNTER — TELEPHONE (OUTPATIENT)
Dept: MEDICAL GROUP | Facility: LAB | Age: 73
End: 2018-11-30

## 2018-11-30 ENCOUNTER — ANTICOAGULATION VISIT (OUTPATIENT)
Dept: VASCULAR LAB | Facility: MEDICAL CENTER | Age: 73
End: 2018-11-30
Attending: INTERNAL MEDICINE
Payer: MEDICARE

## 2018-11-30 DIAGNOSIS — I48.0 PAF (PAROXYSMAL ATRIAL FIBRILLATION) (HCC): ICD-10-CM

## 2018-11-30 DIAGNOSIS — Z79.01 CHRONIC ANTICOAGULATION: ICD-10-CM

## 2018-11-30 LAB — INR PPP: 4.6 (ref 2–3.5)

## 2018-11-30 PROCEDURE — 99212 OFFICE O/P EST SF 10 MIN: CPT | Performed by: PHARMACIST

## 2018-11-30 PROCEDURE — 85610 PROTHROMBIN TIME: CPT

## 2018-11-30 NOTE — TELEPHONE ENCOUNTER
Returning a voicemail from pt regarding a prescription that was sent to the [facility] on [address]

## 2018-11-30 NOTE — PROGRESS NOTES
Anticoagulation Summary  As of 11/30/2018    INR goal:   2.0-3.0   TTR:   70.8 % (3.1 y)   Today's INR:   4.6!   Warfarin maintenance plan:   4.5 mg (3 mg x 1.5) on Wed; 3 mg (3 mg x 1) all other days   Weekly warfarin total:   22.5 mg   Weekly max warfarin dose:   21 mg   Plan last modified:   Tenisha Castillo, PharmD (11/30/2018)   Next INR check:   12/7/2018   Target end date:   Indefinite    Indications    Chronic anticoagulation [Z79.01]  Chronic anticoagulation (Resolved) [Z79.01]  PAF (paroxysmal atrial fibrillation) (HCC) [I48.0]             Anticoagulation Episode Summary     INR check location:       Preferred lab:       Send INR reminders to:       Comments:         Anticoagulation Care Providers     Provider Role Specialty Phone number    Amy Lincoln M.D. Referring Cardiology [phone]    [facility] Responsible  [phone]        Anticoagulation Patient Findings      HPI:  Whitley Frederick seen in clinic today, on anticoagulation therapy with warfarin for PAF  Changes to current medical/health status since last appt: Pt started Ozempic ~ a month ago. Diet has changed fairly drastically. She also stated that she ate a decent amt of cranberry on Thanksgiving.  Denies signs/symptoms of bleeding and/or thrombosis since the last appt.    Denies any interval changes to medications since last appt.   Denies any complications or cost restrictions with current therapy.   BP declined.    A/P   INR  SUPRA-therapeutic.   Pt to HOLD today only and then begin newly decreased regimen of 4.5 mg on Wed & 3 mg ROW. INR has consistently ran on high end of therapeutic/supra-therapeutic.    Follow up appointment in 1 week(s).    Written by Dayo Dean, Pharmacy Intern    Tenisha Castillo, PharmD

## 2018-12-03 DIAGNOSIS — I48.0 PAF (PAROXYSMAL ATRIAL FIBRILLATION) (HCC): ICD-10-CM

## 2018-12-03 LAB — INR BLD: 4.6 (ref 0.9–1.2)

## 2018-12-05 ENCOUNTER — TELEPHONE (OUTPATIENT)
Dept: PULMONOLOGY | Facility: HOSPICE | Age: 73
End: 2018-12-05

## 2018-12-05 DIAGNOSIS — J45.909 UNCOMPLICATED ASTHMA, UNSPECIFIED ASTHMA SEVERITY, UNSPECIFIED WHETHER PERSISTENT: ICD-10-CM

## 2018-12-05 DIAGNOSIS — R06.02 SOB (SHORTNESS OF BREATH): ICD-10-CM

## 2018-12-05 RX ORDER — LEVALBUTEROL TARTRATE 45 UG/1
2 AEROSOL, METERED ORAL EVERY 4 HOURS PRN
Qty: 1 INHALER | Refills: 11 | Status: SHIPPED | OUTPATIENT
Start: 2018-12-05 | End: 2020-02-20 | Stop reason: SDUPTHER

## 2018-12-05 RX ORDER — MONTELUKAST SODIUM 10 MG/1
10 TABLET ORAL
Qty: 30 TAB | Refills: 11 | Status: SHIPPED | OUTPATIENT
Start: 2018-12-05 | End: 2019-12-16 | Stop reason: SDUPTHER

## 2018-12-05 NOTE — TELEPHONE ENCOUNTER
Whitley called requesting her Singulair and Xopenex be sent to [facility] pharmacy on [address]. It had been sent to [facility] pharmacy on 11/26/2018, but [facility] is going to help pt. With medication and [facility] is the pharmacy.

## 2018-12-05 NOTE — TELEPHONE ENCOUNTER
Prior refills were for 90 days. If [facility] is helping are they requesting 1 month scripts? I sent to [facility] and Xopenex 30 day scripts to [facility].

## 2018-12-06 NOTE — TELEPHONE ENCOUNTER
Have we ever prescribed this med? Yes.  If yes, what date? 10/18/18    Last OV: 10/18/18    Next OV: 4/17/19    DX: Mild intermittent asthma without complication (J45.20)    Medications: beclomethasone HFA (QVAR REDIHALER) 40 MCG/ACT inhaler

## 2018-12-07 ENCOUNTER — ANTICOAGULATION VISIT (OUTPATIENT)
Dept: VASCULAR LAB | Facility: MEDICAL CENTER | Age: 73
End: 2018-12-07
Attending: INTERNAL MEDICINE
Payer: MEDICARE

## 2018-12-07 DIAGNOSIS — I48.0 PAF (PAROXYSMAL ATRIAL FIBRILLATION) (HCC): ICD-10-CM

## 2018-12-07 DIAGNOSIS — Z79.01 CHRONIC ANTICOAGULATION: ICD-10-CM

## 2018-12-07 LAB — INR PPP: 4.1 (ref 2–3.5)

## 2018-12-07 PROCEDURE — 85610 PROTHROMBIN TIME: CPT

## 2018-12-07 PROCEDURE — 99212 OFFICE O/P EST SF 10 MIN: CPT

## 2018-12-07 NOTE — PROGRESS NOTES
Anticoagulation Summary  As of 12/7/2018    INR goal:   2.0-3.0   TTR:   70.3 % (3.1 y)   Today's INR:   4.1!   Warfarin maintenance plan:   3 mg (3 mg x 1) every day   Weekly warfarin total:   21 mg   Weekly max warfarin dose:   21 mg   Plan last modified:   Gilma Hirsch (12/7/2018)   Next INR check:   12/14/2018   Target end date:   Indefinite    Indications    Chronic anticoagulation [Z79.01]  Chronic anticoagulation (Resolved) [Z79.01]  PAF (paroxysmal atrial fibrillation) (HCC) [I48.0]             Anticoagulation Episode Summary     INR check location:       Preferred lab:       Send INR reminders to:       Comments:         Anticoagulation Care Providers     Provider Role Specialty Phone number    Amy Lincoln M.D. Referring Cardiology [phone]    [facility] Responsible  [phone]        Anticoagulation Patient Findings  Patient Findings     Negatives:   Signs/symptoms of thrombosis, Signs/symptoms of bleeding, Laboratory test error suspected, Change in health, Change in alcohol use, Change in activity, Upcoming invasive procedure, Emergency department visit, Upcoming dental procedure, Missed doses, Extra doses, Change in medications, Change in diet/appetite, Hospital admission, Bruising, Other complaints          HPI:  Whitley Laureenchata Frederick seen in clinic today, on anticoagulation therapy with warfarin for PAF.  Changes to current medical/health status since last appt: none  Denies signs/symptoms of bleeding and/or thrombosis since the last appt.    Denies any interval changes to diet  Denies any interval changes to medications since last appt.   Denies any complications or cost restrictions with current therapy.   BP declined today.  Confirmed current dosing regimen. No extra doses, no cranberries, EtOH, illness. Patient will try incorporating more greens in diet.    Patient's previous INR was supratherapeutic at 4.6 on 11/30/18, at which time patient was  instructed to HOLD, then decrease current warfarin regimen. She returns to clinic today to recheck INR to ensure it is therapeutic and thus preventing possible clotting and/or bleeding/bruising complications.    A/P   INR is SUPRA-therapeutic today at 4.1.  Patient will HOLD today, then will begin decreased weekly regimen of 3mg QD.   Patient will follow up again in 1 week.    Next appt: Friday, Dec 14, 2018 2paraceli Hirsch PharmD

## 2018-12-10 LAB — INR BLD: 4.1 (ref 0.9–1.2)

## 2018-12-10 RX ORDER — BECLOMETHASONE DIPROPIONATE HFA 40 UG/1
AEROSOL, METERED RESPIRATORY (INHALATION)
Qty: 3 INHALER | Refills: 3 | Status: SHIPPED | OUTPATIENT
Start: 2018-12-10 | End: 2019-01-02 | Stop reason: SDUPTHER

## 2018-12-14 ENCOUNTER — ANTICOAGULATION VISIT (OUTPATIENT)
Dept: VASCULAR LAB | Facility: MEDICAL CENTER | Age: 73
End: 2018-12-14
Attending: INTERNAL MEDICINE
Payer: MEDICARE

## 2018-12-14 DIAGNOSIS — I48.0 PAF (PAROXYSMAL ATRIAL FIBRILLATION) (HCC): ICD-10-CM

## 2018-12-14 DIAGNOSIS — Z79.01 CHRONIC ANTICOAGULATION: ICD-10-CM

## 2018-12-14 LAB
INR BLD: 4.4 (ref 0.9–1.2)
INR PPP: 4.4 (ref 2–3.5)

## 2018-12-14 PROCEDURE — 99212 OFFICE O/P EST SF 10 MIN: CPT

## 2018-12-14 PROCEDURE — 85610 PROTHROMBIN TIME: CPT

## 2018-12-14 NOTE — PROGRESS NOTES
Anticoagulation Summary  As of 12/14/2018    INR goal:   2.0-3.0   TTR:   70.3 % (3.1 y)   Today's INR:      Warfarin maintenance plan:   3 mg (3 mg x 1) every day   Weekly warfarin total:   21 mg   Weekly max warfarin dose:   21 mg   Plan last modified:   Gilma Hirsch (12/7/2018)   Next INR check:      Target end date:   Indefinite    Indications    Chronic anticoagulation [Z79.01]  Chronic anticoagulation (Resolved) [Z79.01]  PAF (paroxysmal atrial fibrillation) (HCC) [I48.0]             Anticoagulation Episode Summary     INR check location:       Preferred lab:       Send INR reminders to:       Comments:         Anticoagulation Care Providers     Provider Role Specialty Phone number    Amy Lincoln M.D. Referring Cardiology [phone]    [facility] Responsible  [phone]        Anticoagulation Patient Findings  Patient Findings     Positives:   Extra doses    Negatives:   Signs/symptoms of thrombosis, Signs/symptoms of bleeding, Laboratory test error suspected, Change in health, Change in alcohol use, Change in activity, Upcoming invasive procedure, Emergency department visit, Upcoming dental procedure, Missed doses, Change in medications, Change in diet/appetite, Hospital admission, Bruising, Other complaints          HPI:  Whitley Guerrerotalya Frederick seen in clinic today, on anticoagulation therapy with warfarin for PAF.  Changes to current medical/health status since last appt: none  Denies signs/symptoms of bleeding and/or thrombosis since the last appt.    Denies any interval changes to diet  Denies any interval changes to medications since last appt.   Denies any complications or cost restrictions with current therapy.   BP declined today  Confirmed current dosing regimen. Patient misunderstood, and continued taking 4.5mg on Wed instead of beginning reduced regimen.     Patient's previous INR was supratherapeutic at 4.1 on 12/7/18, at which time patient was instructed  to HOLD then begin decreased warfarin regimen. She returns to clinic today to recheck INR to ensure it is therapeutic and thus preventing possible clotting and/or bleeding/bruising complications.    A/P   INR is SUPRA-therapeutic today at 4.4.  Patient instructed to HOLD warfarin TONIGHT, then begin further decreased regimen of 1.5mg on Wed and 3mg ROW. Patient will follow up again in 1 week.     Next appt: Friday, Dec 21, 2018 1:15pm    Nellie Hirsch PharmD

## 2018-12-21 ENCOUNTER — APPOINTMENT (OUTPATIENT)
Dept: VASCULAR LAB | Facility: MEDICAL CENTER | Age: 73
End: 2018-12-21
Attending: INTERNAL MEDICINE
Payer: MEDICARE

## 2018-12-28 ENCOUNTER — APPOINTMENT (OUTPATIENT)
Dept: VASCULAR LAB | Facility: MEDICAL CENTER | Age: 73
End: 2018-12-28
Attending: INTERNAL MEDICINE
Payer: MEDICARE

## 2019-01-02 ENCOUNTER — PATIENT MESSAGE (OUTPATIENT)
Dept: PULMONOLOGY | Facility: HOSPICE | Age: 74
End: 2019-01-02

## 2019-01-02 DIAGNOSIS — J45.909 UNCOMPLICATED ASTHMA, UNSPECIFIED ASTHMA SEVERITY, UNSPECIFIED WHETHER PERSISTENT: ICD-10-CM

## 2019-01-02 DIAGNOSIS — I48.0 PAF (PAROXYSMAL ATRIAL FIBRILLATION) (HCC): ICD-10-CM

## 2019-01-02 NOTE — TELEPHONE ENCOUNTER
From: Whitley Frederick  Sent: 1/2/2019 11:17 AM PST  Subject: Medication Renewal Request    Whitley Frederick would like a refill of the following medications:     dronedarone (MULTAQ) 400 MG Tab [Juana Berger, A.P.N.]   Patient Comment: From [facility]... Thank you :)    Preferred pharmacy: [facility] [place] [facility] [address]

## 2019-01-02 NOTE — TELEPHONE ENCOUNTER
----- Message from Whitley Frederick sent at 1/2/2019 11:40 AM PST -----  Regarding: Prescription Question  Contact: [phone]  Whitley Frederick  1945  [phone]    Miles Calvo:  Could you please send a request to [facility] on [address] for my Qvar RediHaler.  Since I have been in my own place after waiting a year for a [employer] Senior [employer] finances are tight.  On my insurance now that it is the new year is a cost of $47 for just the Qvar.  That plus my Diabetic injection also $47 and my other 14 medications it gets quite costly.  Thank you for your help.    Happy New Year    Whitley

## 2019-01-02 NOTE — PATIENT COMMUNICATION
Have we ever prescribed this med? Yes.  If yes, what date? 12/10/18     Last OV: 10/18/18 GINA BURGER     Next OV: No pending appt     DX: Uncomplicated asthma, unspecified asthma severity, unspecified whether persistent (J45.909)    Medications: Qvar

## 2019-01-04 DIAGNOSIS — Z79.4 TYPE 2 DIABETES MELLITUS WITH STAGE 4 CHRONIC KIDNEY DISEASE, WITH LONG-TERM CURRENT USE OF INSULIN (HCC): ICD-10-CM

## 2019-01-04 DIAGNOSIS — E11.22 TYPE 2 DIABETES MELLITUS WITH STAGE 4 CHRONIC KIDNEY DISEASE, WITH LONG-TERM CURRENT USE OF INSULIN (HCC): ICD-10-CM

## 2019-01-04 DIAGNOSIS — N18.4 TYPE 2 DIABETES MELLITUS WITH STAGE 4 CHRONIC KIDNEY DISEASE, WITH LONG-TERM CURRENT USE OF INSULIN (HCC): ICD-10-CM

## 2019-01-04 NOTE — TELEPHONE ENCOUNTER
Was the patient seen in the last year in this department? Yes lov 10/23/18    Does patient have an active prescription for medications requested? No     Received Request Via: Pharmacy

## 2019-01-05 RX ORDER — SEMAGLUTIDE 1.34 MG/ML
INJECTION, SOLUTION SUBCUTANEOUS
Qty: 1 ML | Refills: 0 | Status: SHIPPED | OUTPATIENT
Start: 2019-01-05 | End: 2019-04-02 | Stop reason: SDUPTHER

## 2019-01-09 ENCOUNTER — PATIENT OUTREACH (OUTPATIENT)
Dept: HEALTH INFORMATION MANAGEMENT | Facility: OTHER | Age: 74
End: 2019-01-09

## 2019-01-09 DIAGNOSIS — Z59.9 INADEQUATE COMMUNITY RESOURCES: ICD-10-CM

## 2019-01-09 RX ORDER — ALLOPURINOL 100 MG/1
200 TABLET ORAL 2 TIMES DAILY
COMMUNITY
End: 2020-03-23 | Stop reason: SDUPTHER

## 2019-01-09 RX ORDER — FERROUS SULFATE 325(65) MG
325 TABLET ORAL NIGHTLY
COMMUNITY

## 2019-01-09 RX ORDER — CLONAZEPAM 0.5 MG/1
.25-.5 TABLET, ORALLY DISINTEGRATING ORAL
COMMUNITY
End: 2020-10-20

## 2019-01-09 RX ORDER — AZELASTINE 1 MG/ML
1 SPRAY, METERED NASAL 2 TIMES DAILY PRN
COMMUNITY
End: 2020-10-20

## 2019-01-09 SDOH — ECONOMIC STABILITY - INCOME SECURITY: PROBLEM RELATED TO HOUSING AND ECONOMIC CIRCUMSTANCES, UNSPECIFIED: Z59.9

## 2019-01-09 ASSESSMENT — ENCOUNTER SYMPTOMS
LOSS OF SENSATION IN FEET: 1
DEPRESSION: 0
OCCASIONAL FEELINGS OF UNSTEADINESS: 0

## 2019-01-09 NOTE — PROGRESS NOTES
Received VM from patient requesting return call regarding question on dosing of her ozempic. Patient received sample from [facility] pharmacy and was not sure how many pens she would need to inject. Per Rosario at [facility] pharmacy, the sample pen can be dialed to the appropriate dose. Patient was prescribed 1 mg once weekly. Outbound call to Whitley. Patient states her ozempic pen can only be dialed up to 0.5 mg. She therefore uses 2 pens weekly and wanted to know how many pens she would need on a monthly basis.     Patient states she has difficulty affording her medications and specialty co-payments. She is currently seeing 4 specialists - nephrology, endocrinology, cardiology, and pulmonary in addition to PCP and coumadin clinic. Encouraged patient to discuss her concerns with her specialists and PCP regarding frequency of visits. Patient is currently receiving samples from [facility]. She states she has the [facility] Senior [facility] program to help with gap coverage. Patient is agreeable to referral to  [facility] for full assessment and assistance with community resources.     Patient mentioned occasionally side stepping. She denies dizziness. Completed fall risk assessment. She has no had any falls in the past year, but scored 6 points indicating she may be at risk for falling. Patient agreeable to receiving education regarding falls. Discussed fall safety measures. Mailed patient brochures from [facility] - What you can do to prevent falls, Chair rise exercise, and Check for Safety: A Home Fall Prevention Checklist for Older Adults. Counseled on risks of clonazepam and using caution with this medication.     Reviewed indication, dose, and timing of each medication. Reconciled med list in EPIC.     See completed medication review pharmacy follow-up [employer] hyperlink for additional documentation.     Plan: Referral to  [facility] placed for assistance with available community resources.   Mailed brochures  from [facility] on Fall prevention to patient.      Laila Corral, PharmD     CC: Juana BURGER

## 2019-01-09 NOTE — LETTER
January 9, 2019        Whitley Frederick  [address]  [place] [address]        Dear hWitley:    Thank you for taking your time to review your medications with me. Enclosed are some materials regarding fall prevention.     If you have any questions/ concerns, please do not hesitate to call me at [phone]. I am available Monday through Friday, 8:00 am to 5:00 pm.         Sincerely,        Laila Corral, PharmD

## 2019-01-10 ENCOUNTER — ANTICOAGULATION VISIT (OUTPATIENT)
Dept: VASCULAR LAB | Facility: MEDICAL CENTER | Age: 74
End: 2019-01-10
Attending: INTERNAL MEDICINE
Payer: MEDICARE

## 2019-01-10 VITALS — SYSTOLIC BLOOD PRESSURE: 160 MMHG | DIASTOLIC BLOOD PRESSURE: 76 MMHG | HEART RATE: 62 BPM

## 2019-01-10 DIAGNOSIS — I48.0 PAF (PAROXYSMAL ATRIAL FIBRILLATION) (HCC): ICD-10-CM

## 2019-01-10 DIAGNOSIS — Z79.01 CHRONIC ANTICOAGULATION: ICD-10-CM

## 2019-01-10 LAB
INR BLD: 2.6 (ref 0.9–1.2)
INR PPP: 2.6 (ref 2–3.5)

## 2019-01-10 PROCEDURE — 99211 OFF/OP EST MAY X REQ PHY/QHP: CPT | Performed by: NURSE PRACTITIONER

## 2019-01-10 PROCEDURE — 85610 PROTHROMBIN TIME: CPT

## 2019-01-10 RX ORDER — WARFARIN SODIUM 3 MG/1
TABLET ORAL
Qty: 135 TAB | Refills: 1 | Status: SHIPPED | OUTPATIENT
Start: 2019-01-10 | End: 2019-09-26 | Stop reason: SDUPTHER

## 2019-01-10 NOTE — PROGRESS NOTES
Anticoagulation Summary  As of 1/10/2019    INR goal:   2.0-3.0   TTR:   68.8 % (3.2 y)   Today's INR:   2.6   Warfarin maintenance plan:   1.5 mg (3 mg x 0.5) on Wed; 3 mg (3 mg x 1) all other days   Weekly warfarin total:   19.5 mg   Weekly max warfarin dose:   21 mg   Plan last modified:   Gilma Hirsch (12/14/2018)   Next INR check:   2/8/2019   Target end date:   Indefinite    Indications    Chronic anticoagulation [Z79.01]  Chronic anticoagulation (Resolved) [Z79.01]  PAF (paroxysmal atrial fibrillation) (HCC) [I48.0]             Anticoagulation Episode Summary     INR check location:       Preferred lab:       Send INR reminders to:       Comments:         Anticoagulation Care Providers     Provider Role Specialty Phone number    Amy Lincoln M.D. Referring Cardiology [phone]    [facility] Responsible  [phone]        Anticoagulation Patient Findings      HPI:  Whitley Frederick seen in clinic today for follow up on anticoagulation therapy in the presence of AF. Denies any changes to current medical/health status since last appointment. Denies any medication or diet changes. No current symptoms of bleeding or thrombosis reported.    A/P:   INR therapeutic. Continue current regimen. BP recorded in vitals. BP elevated. Pt under a lot of stress today. She will recheck at home.    Follow up appointment in 4 week(s).    Next Appointment: Friday, February 8, 2019 at 1:30 pm.     Jaylene BURGER

## 2019-01-11 ENCOUNTER — PATIENT OUTREACH (OUTPATIENT)
Dept: HEALTH INFORMATION MANAGEMENT | Facility: OTHER | Age: 74
End: 2019-01-11

## 2019-01-14 ENCOUNTER — PATIENT OUTREACH (OUTPATIENT)
Dept: HEALTH INFORMATION MANAGEMENT | Facility: OTHER | Age: 74
End: 2019-01-14

## 2019-01-14 NOTE — LETTER
January 14, 2019        Whitley [address]  [facility] [address]          Current Outpatient Prescriptions on File Prior to Visit   Medication Sig Dispense Refill   • warfarin (COUMADIN) 3 MG Tab Take 1 to 1.5 tablets by mouth daily as directed by the coumadin clinic 135 Tab 1   • ferrous sulfate 325 (65 Fe) MG tablet Take 325 mg by mouth every day.     • azelastine (ASTELIN) 137 MCG/SPRAY nasal spray Fay 1 Spray in nose 2 times a day as needed.     • allopurinol (ZYLOPRIM) 100 MG Tab Take 100 mg by mouth 2 Times a Day.     • Clonazepam 0.5 MG TABLET DISPERSIBLE Take 0.25-0.5 tablet by mouth 1 time daily as needed.     • OZEMPIC 0.25 or 0.5 MG/DOSE Solution Pen-injector INJECT 1MG AS DIRECTED EVERY SEVEN (7) DAYS 1 mL 0   • dronedarone (MULTAQ) 400 MG Tab Take 1 Tab by mouth 2 times a day, with meals. 60 Tab 0   • beclomethasone HFA (QVAR REDIHALER) 40 MCG/ACT inhaler INHALE 2 PUFFS BY MOUTH TWICE DAILY 2 Inhaler 0   • levalbuterol (XOPENEX HFA) 45 MCG/ACT inhaler Inhale 2 Puffs by mouth every four hours as needed for Shortness of Breath. 1 Inhaler 11   • montelukast (SINGULAIR) 10 MG Tab Take 1 Tab by mouth every bedtime. 30 Tab 11   • simvastatin (ZOCOR) 20 MG Tab TAKE ONE TABLET BY MOUTH ONCE DAILY IN THE EVENING 90 Tab 3   • Semaglutide (OZEMPIC) 1 MG/DOSE Solution Pen-injector Inject 1 mg as instructed every 7 days. 1 PEN 1   • fluticasone (FLONASE) 50 MCG/ACT nasal spray USE 1-2  SPRAY(S) IN EACH NOSTRIL ONCE DAILY 30 MINUTES PRIOR TO USE OF CPAP 3 Bottle 3   • beclomethasone HFA (QVAR REDIHALER) 40 MCG/ACT inhaler Inhale 2 Puffs by mouth 2 times a day. 2 Inhaler 0   • levothyroxine (SYNTHROID) 50 MCG Tab TAKE 1 TABLET BY MOUTH ONCE DAILY 90 Tab 1   • omeprazole (PRILOSEC) 20 MG delayed-release capsule Take 1 Cap by mouth every day. 90 Cap 3   • furosemide (LASIX) 80 MG Tab Take 1 Tab by mouth every 48 hours. 30 Tab 3   • losartan (COZAAR) 50 MG Tab Take 1 Tab by mouth every day. TWICE DAILY  180 Tab 3   • Blood Glucose Monitoring Suppl Supplies Misc Test strips order: Test strips for Accucheck meter. Sig: use BID and prn ssx high or low sugar #100 RF x 3 100 Strip 11   • Blood Glucose Monitoring Suppl Supplies Misc Test strips order: Test strips for Chely Contour meter. Sig: use tid  and prn ssx high or low sugar #100 RF x 3 100 Strip 3   • Urine Glucose-Ketones Test Strip 1 Strip by In Vitro route every day. 100 Strip 2   • metoprolol (LOPRESSOR) 50 MG Tab Take 1 Tab by mouth 2 times a day. 180 Tab 3   • glucose blood (FREESTYLE LITE) strip 1 Strip by Other route 2 times a day, with meals. 50 Strip 11   • Insulin Pen Needle 32G X 4 MM Misc 1 Applicator by Does not apply route 3 times a day. 100 Each 11   • Blood Glucose Monitoring Suppl W/DEVICE Kit 1 Each by Does not apply route 2 times a day as needed. 1 Kit 0   • Misc. Devices MISC Pen needles for Lantus Solstar. 29g 12mm. 90 Each 3   • Cholecalciferol (VITAMIN D) 2000 UNITS CAPS Take 4,000 Units by mouth every day.     • magnesium oxide (MAGNESIUM OXIDE) 400 (241.3 MG) MG TABS tablet Take 400 mg by mouth every day.       No current facility-administered medications on file prior to visit.

## 2019-01-14 NOTE — PROGRESS NOTES
Per research the following resources are available for pt.    Ozempic:  · Ozempic Savings Card. $25 co pay. Must call [phone]  Semaglutide:  · [employer] Patient Assistance Program. Income must be under $36,420 (pt's is aprox $27,000).   · [employer] (PAN). (deferred)  Multaq:  · [employer] Patient Connection Program. Must spend 5% of income for medication (aprox $1,350). Must not have generic option.   · [employer] (PAN). (deferred)    TC to pt. Pt unable to contact TC as she is about to walk her dog.    Plan:  TC to pt at later time/date

## 2019-01-15 ENCOUNTER — PATIENT OUTREACH (OUTPATIENT)
Dept: HEALTH INFORMATION MANAGEMENT | Facility: OTHER | Age: 74
End: 2019-01-15

## 2019-01-15 ENCOUNTER — TELEPHONE (OUTPATIENT)
Dept: HEALTH INFORMATION MANAGEMENT | Facility: OTHER | Age: 74
End: 2019-01-15

## 2019-01-15 NOTE — PROGRESS NOTES
TC to pt. Reviewed medication PAP programs available. Regarding Multaq, pt does not believe she will meet the $1350 out of pocket this year to apply for program. Pt agreeable to completion of Qvar PAP application pursuit.     Plan:  Message routed to Pulmonary Provider Lubna Lara

## 2019-01-15 NOTE — TELEPHONE ENCOUNTER
Pt requesting financial assistance with Qvar medication. Please complete the follow steps if provider feels service is appropriate for pt.    · Please complete application page faxed to [phone]  · Upload to media  · Have hand written copy of order available at office for pickup by this SW  · Confirm with SW      Plan:  Message routed to PCP  PAP application faxed to [phone] at aprox. [phone] 01/15/2019

## 2019-01-16 NOTE — TELEPHONE ENCOUNTER
Bebe can you help with this? I can sign forms and script tomorrow when I am in the office if needed.

## 2019-01-18 ENCOUNTER — HOSPITAL ENCOUNTER (OUTPATIENT)
Dept: LAB | Facility: MEDICAL CENTER | Age: 74
End: 2019-01-18
Attending: INTERNAL MEDICINE
Payer: MEDICARE

## 2019-01-18 ENCOUNTER — OFFICE VISIT (OUTPATIENT)
Dept: ENDOCRINOLOGY | Facility: MEDICAL CENTER | Age: 74
End: 2019-01-18
Payer: MEDICARE

## 2019-01-18 ENCOUNTER — HOSPITAL ENCOUNTER (OUTPATIENT)
Dept: RADIOLOGY | Facility: MEDICAL CENTER | Age: 74
End: 2019-01-18
Attending: NURSE PRACTITIONER
Payer: MEDICARE

## 2019-01-18 VITALS
BODY MASS INDEX: 43 KG/M2 | HEART RATE: 61 BPM | HEIGHT: 67 IN | RESPIRATION RATE: 16 BRPM | SYSTOLIC BLOOD PRESSURE: 120 MMHG | DIASTOLIC BLOOD PRESSURE: 64 MMHG | OXYGEN SATURATION: 96 % | WEIGHT: 274 LBS

## 2019-01-18 DIAGNOSIS — E66.01 MORBID OBESITY WITH BMI OF 45.0-49.9, ADULT (HCC): ICD-10-CM

## 2019-01-18 DIAGNOSIS — E11.22 TYPE 2 DIABETES MELLITUS WITH STAGE 4 CHRONIC KIDNEY DISEASE, WITH LONG-TERM CURRENT USE OF INSULIN (HCC): ICD-10-CM

## 2019-01-18 DIAGNOSIS — Z79.4 TYPE 2 DIABETES MELLITUS WITH STAGE 4 CHRONIC KIDNEY DISEASE, WITH LONG-TERM CURRENT USE OF INSULIN (HCC): ICD-10-CM

## 2019-01-18 DIAGNOSIS — Z79.4 ENCOUNTER FOR LONG-TERM (CURRENT) USE OF INSULIN (HCC): ICD-10-CM

## 2019-01-18 DIAGNOSIS — Z78.0 MENOPAUSE: ICD-10-CM

## 2019-01-18 DIAGNOSIS — N18.4 TYPE 2 DIABETES MELLITUS WITH STAGE 4 CHRONIC KIDNEY DISEASE, WITH LONG-TERM CURRENT USE OF INSULIN (HCC): ICD-10-CM

## 2019-01-18 DIAGNOSIS — Z12.31 SCREENING MAMMOGRAM, ENCOUNTER FOR: ICD-10-CM

## 2019-01-18 LAB
ALBUMIN SERPL BCP-MCNC: 4.2 G/DL (ref 3.2–4.9)
BUN SERPL-MCNC: 49 MG/DL (ref 8–22)
CALCIUM SERPL-MCNC: 10.1 MG/DL (ref 8.5–10.5)
CHLORIDE SERPL-SCNC: 108 MMOL/L (ref 96–112)
CO2 SERPL-SCNC: 25 MMOL/L (ref 20–33)
CREAT SERPL-MCNC: 2.07 MG/DL (ref 0.5–1.4)
CREAT UR-MCNC: 167.4 MG/DL
GLUCOSE SERPL-MCNC: 136 MG/DL (ref 65–99)
HBA1C MFR BLD: 6.7 % (ref ?–5.8)
INT CON NEG: NEGATIVE
INT CON POS: POSITIVE
MAGNESIUM SERPL-MCNC: 2.1 MG/DL (ref 1.5–2.5)
PHOSPHATE SERPL-MCNC: 3.1 MG/DL (ref 2.5–4.5)
POTASSIUM SERPL-SCNC: 4.6 MMOL/L (ref 3.6–5.5)
PROT UR-MCNC: 72.4 MG/DL (ref 0–15)
PROT/CREAT UR: 432 MG/G (ref 10–107)
SODIUM SERPL-SCNC: 143 MMOL/L (ref 135–145)

## 2019-01-18 PROCEDURE — 83036 HEMOGLOBIN GLYCOSYLATED A1C: CPT | Performed by: PHYSICIAN ASSISTANT

## 2019-01-18 PROCEDURE — 77063 BREAST TOMOSYNTHESIS BI: CPT

## 2019-01-18 PROCEDURE — 77080 DXA BONE DENSITY AXIAL: CPT

## 2019-01-18 PROCEDURE — 36415 COLL VENOUS BLD VENIPUNCTURE: CPT

## 2019-01-18 PROCEDURE — 82570 ASSAY OF URINE CREATININE: CPT

## 2019-01-18 PROCEDURE — 99214 OFFICE O/P EST MOD 30 MIN: CPT | Performed by: PHYSICIAN ASSISTANT

## 2019-01-18 PROCEDURE — 80069 RENAL FUNCTION PANEL: CPT

## 2019-01-18 PROCEDURE — 83735 ASSAY OF MAGNESIUM: CPT

## 2019-01-18 PROCEDURE — 84156 ASSAY OF PROTEIN URINE: CPT

## 2019-01-18 NOTE — PROGRESS NOTES
Return to office Patient Consult Note  Referred by: EDILMA Whelan    Reason for consult: Diabetes Management Type 2    HPI:  Whitley Frederick is a 73 y.o. old patient who is seeing us today for diabetes care.  This is a pleasant patient with diabetes and I appreciate the opportunity to participate in the care of this patient.    Labs of 1/18/19 HbA1c is 6.7  Labs of 10/18/18 HbA1c is 6.6  Labs of 7/19/18 HbA1c is 6.3  Labs of 2/26/18 HbA1c is 7.6  labs of 8/11/17 HbA1c 7.6, GFR 22,     BG Diary:1/18/2019  In the AM:  See scanned in the chart      1. Type 2 diabetes mellitus with stage 4 chronic kidney disease, with long-term current use of insulin (HCC)  This is a new patient on 8/11/17  She was on:  1.  Toujeo  65     STOP:  1.  Toujeo     She is now on:  1.   Ozempic 1.0 once a week  4.   Actos 15mg one a day (Stopped on 7/19/18)    Transitioned off of Tresiba and victoza to Ozempic only     I may need to have her start Novolog at dinner?     2. Essential hypertension     This is evelated and I will follow    2. Encounter for long-term (current) use of insulin (HCC)  Is on a high risk medication Insulin and we will continue to follow no hypoglycemic events since last visit            ROS:   Constitutional: No night sweats.  Eyes:  No visual changes.  Cardiac: No chest pain, No palpitations or racing heart rate.  Resp: No shortness of breath, No cough,   Gi: No Diarrhea    All other systems were reviewed and were/are negative.  The ROS was revised/revisited during this office visit from the patients first office visit with me on 8/11/17 Please review the full ROS during the first office visit.    Past Medical History:  Patient Active Problem List    Diagnosis Date Noted   • Essential hypertension 05/10/2016     Priority: High   • Atherosclerosis of aorta (CMS-HCC) 04/29/2015     Priority: High   • Chronic respiratory failure with hypoxia ([facility]) 04/29/2015     Priority: High   • Morbid obesity with  BMI of 45.0-49.9, adult ([facility]) 04/29/2015     Priority: High   • Anxiety 01/09/2014     Priority: High   • PAF (paroxysmal atrial fibrillation) ([facility]) 07/12/2013     Priority: High   • Type 2 diabetes mellitus with stage 4 chronic kidney disease, with long-term current use of insulin ([facility])      Priority: High   • Palpitations      Priority: High   • CKD (chronic kidney disease) stage 4, GFR 15-29 ml/min ([facility])      Priority: High   • Mild intermittent asthma without complication 08/24/2016     Priority: Medium   • Chronic anticoagulation 06/10/2016     Priority: Medium   • AVERY (obstructive sleep apnea) 05/10/2016     Priority: Medium   • Chronic idiopathic gout involving toe without tophus 04/30/2015     Priority: Medium   • Left bundle branch block 03/05/2012     Priority: Medium   • Multiple thyroid nodules 10/07/2015     Priority: Low   • Stenosis of carotid artery 05/30/2014     Priority: Low   • Secondary hyperparathyroidism of renal origin ([facility]) 05/14/2018   • Hypothyroidism due to acquired atrophy of thyroid 04/10/2018   • PVC (premature ventricular contraction) 02/23/2018   • Dependence on nocturnal oxygen therapy 11/02/2017   • Diabetic mononeuropathy associated with type 2 diabetes mellitus ([facility]) 11/02/2017   • Encounter for long-term (current) use of insulin ([facility]) 08/11/2017   • Incontinence of feces 05/18/2017   • Chronic pain of both shoulders 03/23/2017   • Dyslipidemia 12/14/2016       Past Surgical History:  Past Surgical History:   Procedure Laterality Date   • CATARACT PHACO WITH IOL Right 10/25/2016    Procedure: CATARACT PHACO WITH IOL;  Surgeon: Zoran Gamble M.D.;  Location: SURGERY SAME DAY [facility];  Service:    • ABDOMINAL HYSTERECTOMY TOTAL     • CHOLECYSTECTOMY     • TONSILLECTOMY     • US-NEEDLE CORE BX-BREAST PANEL         Allergies:  Amlodipine; Amaryl; Avandia [rosiglitazone maleate]; Cipro xr; Clonidine; Contrast media with iodine [iodine]; Glipizide; Glucophage [metformin  hydrochloride]; Hydralazine; Levaquin; and Relafen [nabumetone]    Social History:  Social History     Social History   • Marital status:      Spouse name: N/A   • Number of children: N/A   • Years of education: N/A     Occupational History   • Not on file.     Social History Main Topics   • Smoking status: Former Smoker     Packs/day: 1.00     Years: 20.00     Types: Cigarettes     Quit date: 1/1/1983   • Smokeless tobacco: Never Used   • Alcohol use No   • Drug use: No   • Sexual activity: No     Other Topics Concern   • Not on file     Social History Narrative   • No narrative on file       Family History:  Family History   Problem Relation Age of Onset   • Cancer Maternal Aunt    • Diabetes Maternal Aunt    • Heart Disease Maternal Aunt    • Hypertension Maternal Aunt    • Hyperlipidemia Maternal Aunt    • Cancer Mother         Leukemia   • Hypertension Mother    • Diabetes Mother    • Lung Disease Father    • Cancer Father         Lung   • Lung Disease Brother    • Stroke Brother    • Diabetes Brother    • Heart Disease Brother    • Diabetes Maternal Grandmother    • Hypertension Brother    • Diabetes Brother        Medications:    Current Outpatient Prescriptions:   •  warfarin (COUMADIN) 3 MG Tab, Take 1 to 1.5 tablets by mouth daily as directed by the coumadin clinic, Disp: 135 Tab, Rfl: 1  •  ferrous sulfate 325 (65 Fe) MG tablet, Take 325 mg by mouth every day., Disp: , Rfl:   •  azelastine (ASTELIN) 137 MCG/SPRAY nasal spray, Spray 1 Spray in nose 2 times a day as needed., Disp: , Rfl:   •  allopurinol (ZYLOPRIM) 100 MG Tab, Take 100 mg by mouth 2 Times a Day., Disp: , Rfl:   •  Clonazepam 0.5 MG TABLET DISPERSIBLE, Take 0.25-0.5 tablet by mouth 1 time daily as needed., Disp: , Rfl:   •  OZEMPIC 0.25 or 0.5 MG/DOSE Solution Pen-injector, INJECT 1MG AS DIRECTED EVERY SEVEN (7) DAYS, Disp: 1 mL, Rfl: 0  •  dronedarone (MULTAQ) 400 MG Tab, Take 1 Tab by mouth 2 times a day, with meals., Disp: 60 Tab,  Rfl: 0  •  beclomethasone HFA (QVAR REDIHALER) 40 MCG/ACT inhaler, INHALE 2 PUFFS BY MOUTH TWICE DAILY, Disp: 2 Inhaler, Rfl: 0  •  levalbuterol (XOPENEX HFA) 45 MCG/ACT inhaler, Inhale 2 Puffs by mouth every four hours as needed for Shortness of Breath., Disp: 1 Inhaler, Rfl: 11  •  montelukast (SINGULAIR) 10 MG Tab, Take 1 Tab by mouth every bedtime., Disp: 30 Tab, Rfl: 11  •  simvastatin (ZOCOR) 20 MG Tab, TAKE ONE TABLET BY MOUTH ONCE DAILY IN THE EVENING, Disp: 90 Tab, Rfl: 3  •  Semaglutide (OZEMPIC) 1 MG/DOSE Solution Pen-injector, Inject 1 mg as instructed every 7 days., Disp: 1 PEN, Rfl: 1  •  fluticasone (FLONASE) 50 MCG/ACT nasal spray, USE 1-2  SPRAY(S) IN EACH NOSTRIL ONCE DAILY 30 MINUTES PRIOR TO USE OF CPAP, Disp: 3 Bottle, Rfl: 3  •  beclomethasone HFA (QVAR REDIHALER) 40 MCG/ACT inhaler, Inhale 2 Puffs by mouth 2 times a day., Disp: 2 Inhaler, Rfl: 0  •  levothyroxine (SYNTHROID) 50 MCG Tab, TAKE 1 TABLET BY MOUTH ONCE DAILY, Disp: 90 Tab, Rfl: 1  •  omeprazole (PRILOSEC) 20 MG delayed-release capsule, Take 1 Cap by mouth every day., Disp: 90 Cap, Rfl: 3  •  furosemide (LASIX) 80 MG Tab, Take 1 Tab by mouth every 48 hours., Disp: 30 Tab, Rfl: 3  •  losartan (COZAAR) 50 MG Tab, Take 1 Tab by mouth every day. TWICE DAILY, Disp: 180 Tab, Rfl: 3  •  Blood Glucose Monitoring Suppl Supplies Misc, Test strips order: Test strips for Accucheck meter. Sig: use BID and prn ssx high or low sugar #100 RF x 3, Disp: 100 Strip, Rfl: 11  •  Blood Glucose Monitoring Suppl Supplies Misc, Test strips order: Test strips for Chely Contour meter. Sig: use tid  and prn ssx high or low sugar #100 RF x 3, Disp: 100 Strip, Rfl: 3  •  Urine Glucose-Ketones Test Strip, 1 Strip by In Vitro route every day., Disp: 100 Strip, Rfl: 2  •  metoprolol (LOPRESSOR) 50 MG Tab, Take 1 Tab by mouth 2 times a day., Disp: 180 Tab, Rfl: 3  •  glucose blood (FREESTYLE LITE) strip, 1 Strip by Other route 2 times a day, with meals., Disp: 50  "Strip, Rfl: 11  •  Insulin Pen Needle 32G X 4 MM Misc, 1 Applicator by Does not apply route 3 times a day., Disp: 100 Each, Rfl: 11  •  Blood Glucose Monitoring Suppl W/DEVICE Kit, 1 Each by Does not apply route 2 times a day as needed., Disp: 1 Kit, Rfl: 0  •  Misc. Devices MISC, Pen needles for Lantus Solstar. 29g 12mm., Disp: 90 Each, Rfl: 3  •  Cholecalciferol (VITAMIN D) 2000 UNITS CAPS, Take 4,000 Units by mouth every day., Disp: , Rfl:   •  magnesium oxide (MAGNESIUM OXIDE) 400 (241.3 MG) MG TABS tablet, Take 400 mg by mouth every day., Disp: , Rfl:         Physical Examination:   Vital signs: /64 (BP Location: Right arm, Patient Position: Sitting, BP Cuff Size: Large adult)   Pulse 61   Resp 16   Ht 1.702 m (5' 7\")   Wt 124.3 kg (274 lb)   SpO2 96%   BMI 42.91 kg/m²   General: No distress, cooperative, well dressed and well nourished.   Eyes: No scleral icterus or discharge, No hyposphagma  ENMT: Normal on external inspection of nose, lips, No nasal drainage   Neck: No abnormal masses on inspection  Resp: Normal effort, Bilateral clear to auscultation, No wheezing, No rales  CVS: Regular rate and rhythm, S1 S2 normal, No murmur. No gallop  Extremities: No edema bilateral extremities  Neuro: Alert and oriented  Skin: No rash, No Ulcers  Psych: Normal mood and affect      Assessment and Plan:    1. Type 2 diabetes mellitus with stage 4 chronic kidney disease, with long-term current use of insulin (HCC)    She is now on:  1.   Ozempic 1.0 once a week  4.   Actos 15mg one a day (Stopped on 7/19/18)    Transitioned off of Tresiba and victoza to Ozempic only     I may need to have her start Novolog at dinner?     2. Essential hypertension     This is evelated and I will follow    2. Encounter for long-term (current) use of insulin (HCC)  Is on a high risk medication Insulin and we will continue to follow no hypoglycemic events since last visit    Return in about 6 months (around 7/18/2019).    Blood " glucose log: Check BG in the morning when wake up, before lunch or dinner and before bed.  So three times a day.  Always bring BG diary to the next office visit.     Thank you kindly for allowing me to participate in the diabetes care plan for this patient.    Wei Patterson PA-C, BC[facility]  Board Certified - Advanced Diabetes Management  01/18/19    CC:   EDILMA Whelan

## 2019-01-22 ENCOUNTER — TELEPHONE (OUTPATIENT)
Dept: HEALTH INFORMATION MANAGEMENT | Facility: OTHER | Age: 74
End: 2019-01-22

## 2019-01-22 ENCOUNTER — PATIENT OUTREACH (OUTPATIENT)
Dept: HEALTH INFORMATION MANAGEMENT | Facility: OTHER | Age: 74
End: 2019-01-22

## 2019-01-22 DIAGNOSIS — N18.4 TYPE 2 DIABETES MELLITUS WITH STAGE 4 CHRONIC KIDNEY DISEASE, WITH LONG-TERM CURRENT USE OF INSULIN (HCC): ICD-10-CM

## 2019-01-22 DIAGNOSIS — Z79.4 TYPE 2 DIABETES MELLITUS WITH STAGE 4 CHRONIC KIDNEY DISEASE, WITH LONG-TERM CURRENT USE OF INSULIN (HCC): ICD-10-CM

## 2019-01-22 DIAGNOSIS — E11.22 TYPE 2 DIABETES MELLITUS WITH STAGE 4 CHRONIC KIDNEY DISEASE, WITH LONG-TERM CURRENT USE OF INSULIN (HCC): ICD-10-CM

## 2019-01-22 NOTE — TELEPHONE ENCOUNTER
Fidel Arias,    The patient has requested lab orders to complete before her upcoming appointment. She requested a lipid panel and CBC.    Thank you.

## 2019-01-29 ENCOUNTER — PATIENT OUTREACH (OUTPATIENT)
Dept: HEALTH INFORMATION MANAGEMENT | Facility: OTHER | Age: 74
End: 2019-01-29

## 2019-01-29 NOTE — PROGRESS NOTES
TC to pt. Pt anticipates visiting [facility] this weekend. Pt is waiting for PERs statement for 2019 prior to completing taxes. She has an appointment with CPA on 3/1/2019. Agreed to in-person visit following tax completion.    Plan:  TC to pt at later time/date

## 2019-01-31 DIAGNOSIS — R92.8 ABNORMAL MAMMOGRAM: ICD-10-CM

## 2019-02-01 DIAGNOSIS — E11.22 TYPE 2 DIABETES MELLITUS WITH STAGE 4 CHRONIC KIDNEY DISEASE, WITH LONG-TERM CURRENT USE OF INSULIN (HCC): ICD-10-CM

## 2019-02-01 DIAGNOSIS — Z79.4 TYPE 2 DIABETES MELLITUS WITH STAGE 4 CHRONIC KIDNEY DISEASE, WITH LONG-TERM CURRENT USE OF INSULIN (HCC): ICD-10-CM

## 2019-02-01 DIAGNOSIS — I48.0 PAF (PAROXYSMAL ATRIAL FIBRILLATION) (HCC): ICD-10-CM

## 2019-02-01 DIAGNOSIS — N18.4 TYPE 2 DIABETES MELLITUS WITH STAGE 4 CHRONIC KIDNEY DISEASE, WITH LONG-TERM CURRENT USE OF INSULIN (HCC): ICD-10-CM

## 2019-02-01 DIAGNOSIS — R00.2 PALPITATIONS: ICD-10-CM

## 2019-02-04 RX ORDER — METOPROLOL TARTRATE 50 MG/1
50 TABLET, FILM COATED ORAL 2 TIMES DAILY
Qty: 180 TAB | Refills: 1 | Status: SHIPPED | OUTPATIENT
Start: 2019-02-04 | End: 2019-05-03 | Stop reason: SDUPTHER

## 2019-02-08 ENCOUNTER — ANTICOAGULATION VISIT (OUTPATIENT)
Dept: VASCULAR LAB | Facility: MEDICAL CENTER | Age: 74
End: 2019-02-08
Attending: INTERNAL MEDICINE
Payer: MEDICARE

## 2019-02-08 DIAGNOSIS — Z79.01 CHRONIC ANTICOAGULATION: ICD-10-CM

## 2019-02-08 DIAGNOSIS — I48.0 PAF (PAROXYSMAL ATRIAL FIBRILLATION) (HCC): ICD-10-CM

## 2019-02-08 LAB
INR BLD: 3.6 (ref 0.9–1.2)
INR PPP: 3.6 (ref 2–3.5)

## 2019-02-08 PROCEDURE — 99212 OFFICE O/P EST SF 10 MIN: CPT

## 2019-02-08 PROCEDURE — 85610 PROTHROMBIN TIME: CPT

## 2019-02-08 NOTE — PROGRESS NOTES
Anticoagulation Summary  As of 2/8/2019    INR goal:   2.0-3.0   TTR:   68.1 % (3.3 y)   INR used for dosing:   3.6! (2/8/2019)   Warfarin maintenance plan:   1.5 mg (3 mg x 0.5) every Wed; 3 mg (3 mg x 1) all other days   Weekly warfarin total:   19.5 mg   Weekly max warfarin dose:   21 mg   Plan last modified:   Gilma Hirsch (12/14/2018)   Next INR check:   2/15/2019   Target end date:   Indefinite    Indications    Chronic anticoagulation [Z79.01]  Chronic anticoagulation (Resolved) [Z79.01]  PAF (paroxysmal atrial fibrillation) (HCC) [I48.0]             Anticoagulation Episode Summary     INR check location:       Preferred lab:       Send INR reminders to:       Comments:         Anticoagulation Care Providers     Provider Role Specialty Phone number    Amy Lincoln M.D. Referring Cardiology [phone]    [facility] Responsible  [phone]        Anticoagulation Patient Findings      HPI:  Whitley Frederick seen in clinic today for follow up on anticoagulation therapy in the presence of PAF. Denies any changes to current medical/health status since last appointment. Denies any medication or diet changes. No current symptoms of bleeding or thrombosis reported.    A/P:   INR is supra-therapeutic at 3.6. Pt to take reduced dose of 1.5 gm tonight then continue current regimen    Follow up appointment in 1 week(s).    Next Appointment: Friday , 02/15/2019     Araceli Pyle.D

## 2019-02-11 ENCOUNTER — PATIENT MESSAGE (OUTPATIENT)
Dept: PULMONOLOGY | Facility: HOSPICE | Age: 74
End: 2019-02-11

## 2019-02-11 NOTE — TELEPHONE ENCOUNTER
From: Whitley Frederick  To: EDILMA Mosher  Sent: 2/11/2019 2:34 PM PST  Subject: Prescription Question    Whitley Frederick  1945  [phone]    Miles Calvo: I requesting once again a prescription for QVar RediHaler from [facility] on [address]... I am in the process of getting help from a [facility]  by the name of Radha with   [facility] Patient Assistance Program ... Radha has received back from you the form that she sent ... but in the meantime i am out of the QVar...    Thank You    Whitley Frederick

## 2019-02-11 NOTE — PATIENT COMMUNICATION
Please sign for samples to the [facility]      Last OV 10/18/18 GINA Lara APRN   Next OV 4/17/19 GINA Lara APRN

## 2019-02-15 ENCOUNTER — ANTICOAGULATION VISIT (OUTPATIENT)
Dept: VASCULAR LAB | Facility: MEDICAL CENTER | Age: 74
End: 2019-02-15
Attending: INTERNAL MEDICINE
Payer: MEDICARE

## 2019-02-15 VITALS — SYSTOLIC BLOOD PRESSURE: 162 MMHG | DIASTOLIC BLOOD PRESSURE: 63 MMHG | HEART RATE: 58 BPM

## 2019-02-15 DIAGNOSIS — I48.0 PAF (PAROXYSMAL ATRIAL FIBRILLATION) (HCC): ICD-10-CM

## 2019-02-15 DIAGNOSIS — Z79.01 CHRONIC ANTICOAGULATION: ICD-10-CM

## 2019-02-15 LAB
INR BLD: 3.1 (ref 0.9–1.2)
INR PPP: 3.1 (ref 2–3.5)

## 2019-02-15 PROCEDURE — 99212 OFFICE O/P EST SF 10 MIN: CPT

## 2019-02-15 PROCEDURE — 85610 PROTHROMBIN TIME: CPT

## 2019-02-15 NOTE — PROGRESS NOTES
Anticoagulation Summary  As of 2/15/2019    INR goal:   2.0-3.0   TTR:   67.7 % (3.3 y)   INR used for dosing:   3.1! (2/15/2019)   Warfarin maintenance plan:   1.5 mg (3 mg x 0.5) every Wed, Fri; 3 mg (3 mg x 1) all other days   Weekly warfarin total:   18 mg   Weekly max warfarin dose:   21 mg   Plan last modified:   Gilma Hirsch (2/15/2019)   Next INR check:   2/22/2019   Target end date:   Indefinite    Indications    Chronic anticoagulation [Z79.01]  Chronic anticoagulation (Resolved) [Z79.01]  PAF (paroxysmal atrial fibrillation) (HCC) [I48.0]             Anticoagulation Episode Summary     INR check location:       Preferred lab:       Send INR reminders to:       Comments:         Anticoagulation Care Providers     Provider Role Specialty Phone number    Amy Lincoln M.D. Referring Cardiology [phone]    [facility] Responsible  [phone]        Anticoagulation Patient Findings  Patient Findings     Negatives:   Signs/symptoms of thrombosis, Signs/symptoms of bleeding, Laboratory test error suspected, Change in health, Change in alcohol use, Change in activity, Upcoming invasive procedure, Emergency department visit, Upcoming dental procedure, Missed doses, Extra doses, Change in medications, Change in diet/appetite, Hospital admission, Bruising, Other complaints          HPI:  Whitley Frederick seen in clinic today, on anticoagulation therapy with warfarin for PAF.  Changes to current medical/health status since last appt: Patient has big bruise on her upper left arm, she is not sure when or how she got it but was concerned because it is big and feels like a knot is underneath. Patient will watch it to make sure it does not get bigger and/or worse.  Denies signs/symptoms of bleeding and/or thrombosis since the last appt.    Denies any interval changes to diet  Denies any interval changes to medications since last appt.   Denies any complications or cost  restrictions with current therapy.   BP recorded in vitals. BP running high- she will contact her PCP about it.  Confirmed current dosing regimen.     Patient's previous INR was supratherapeutic at 3.6 on 2/8/19, at which time patient was instructed to decrease x1 then continue with current warfarin regimen. She returns to clinic today to recheck INR to ensure it is therapeutic and thus preventing possible clotting and/or bleeding/bruising complications.    A/P   INR is SUPRA-therapeutic today at 3.1.   Patient will begin decreased weekly regimen of 1.5mg on Wed and Fri and 3mg ROW. Patient will follow up again in 1 week.    Next appt: Friday, Feb 22, 2019  1:45pm    Nellie Hirsch PharmD

## 2019-02-22 ENCOUNTER — APPOINTMENT (OUTPATIENT)
Dept: VASCULAR LAB | Facility: MEDICAL CENTER | Age: 74
End: 2019-02-22
Attending: INTERNAL MEDICINE
Payer: MEDICARE

## 2019-02-23 ENCOUNTER — HOSPITAL ENCOUNTER (OUTPATIENT)
Dept: LAB | Facility: MEDICAL CENTER | Age: 74
End: 2019-02-23
Attending: NURSE PRACTITIONER
Payer: MEDICARE

## 2019-02-23 DIAGNOSIS — N18.4 TYPE 2 DIABETES MELLITUS WITH STAGE 4 CHRONIC KIDNEY DISEASE, WITH LONG-TERM CURRENT USE OF INSULIN (HCC): ICD-10-CM

## 2019-02-23 DIAGNOSIS — E11.22 TYPE 2 DIABETES MELLITUS WITH STAGE 4 CHRONIC KIDNEY DISEASE, WITH LONG-TERM CURRENT USE OF INSULIN (HCC): ICD-10-CM

## 2019-02-23 DIAGNOSIS — Z79.4 TYPE 2 DIABETES MELLITUS WITH STAGE 4 CHRONIC KIDNEY DISEASE, WITH LONG-TERM CURRENT USE OF INSULIN (HCC): ICD-10-CM

## 2019-02-23 LAB
BASOPHILS # BLD AUTO: 0.8 % (ref 0–1.8)
BASOPHILS # BLD: 0.07 K/UL (ref 0–0.12)
CHOLEST SERPL-MCNC: 197 MG/DL (ref 100–199)
EOSINOPHIL # BLD AUTO: 0.19 K/UL (ref 0–0.51)
EOSINOPHIL NFR BLD: 2.3 % (ref 0–6.9)
ERYTHROCYTE [DISTWIDTH] IN BLOOD BY AUTOMATED COUNT: 51.1 FL (ref 35.9–50)
FASTING STATUS PATIENT QL REPORTED: NORMAL
HCT VFR BLD AUTO: 42 % (ref 37–47)
HDLC SERPL-MCNC: 55 MG/DL
HGB BLD-MCNC: 13.6 G/DL (ref 12–16)
IMM GRANULOCYTES # BLD AUTO: 0.03 K/UL (ref 0–0.11)
IMM GRANULOCYTES NFR BLD AUTO: 0.4 % (ref 0–0.9)
LDLC SERPL CALC-MCNC: 101 MG/DL
LYMPHOCYTES # BLD AUTO: 2.43 K/UL (ref 1–4.8)
LYMPHOCYTES NFR BLD: 29.1 % (ref 22–41)
MCH RBC QN AUTO: 31.5 PG (ref 27–33)
MCHC RBC AUTO-ENTMCNC: 32.4 G/DL (ref 33.6–35)
MCV RBC AUTO: 97.2 FL (ref 81.4–97.8)
MONOCYTES # BLD AUTO: 0.6 K/UL (ref 0–0.85)
MONOCYTES NFR BLD AUTO: 7.2 % (ref 0–13.4)
NEUTROPHILS # BLD AUTO: 5.04 K/UL (ref 2–7.15)
NEUTROPHILS NFR BLD: 60.2 % (ref 44–72)
NRBC # BLD AUTO: 0.02 K/UL
NRBC BLD-RTO: 0.2 /100 WBC
PLATELET # BLD AUTO: 148 K/UL (ref 164–446)
PMV BLD AUTO: 11.5 FL (ref 9–12.9)
RBC # BLD AUTO: 4.32 M/UL (ref 4.2–5.4)
TRIGL SERPL-MCNC: 205 MG/DL (ref 0–149)
WBC # BLD AUTO: 8.4 K/UL (ref 4.8–10.8)

## 2019-02-23 PROCEDURE — 36415 COLL VENOUS BLD VENIPUNCTURE: CPT

## 2019-02-23 PROCEDURE — 85025 COMPLETE CBC W/AUTO DIFF WBC: CPT

## 2019-02-23 PROCEDURE — 80061 LIPID PANEL: CPT

## 2019-02-26 ENCOUNTER — PATIENT OUTREACH (OUTPATIENT)
Dept: HEALTH INFORMATION MANAGEMENT | Facility: OTHER | Age: 74
End: 2019-02-26

## 2019-02-28 ENCOUNTER — APPOINTMENT (OUTPATIENT)
Dept: VASCULAR LAB | Facility: MEDICAL CENTER | Age: 74
End: 2019-02-28
Attending: INTERNAL MEDICINE
Payer: MEDICARE

## 2019-03-05 ENCOUNTER — PATIENT OUTREACH (OUTPATIENT)
Dept: HEALTH INFORMATION MANAGEMENT | Facility: OTHER | Age: 74
End: 2019-03-05

## 2019-03-05 DIAGNOSIS — I48.0 PAF (PAROXYSMAL ATRIAL FIBRILLATION) (HCC): ICD-10-CM

## 2019-03-05 NOTE — PROGRESS NOTES
VM from pt inquiring about [employer] PAP application.     Per chart review pt has not provided necessary documentation to submit application. A provided did directly submit an application without supporting documents at one time.    TC to [employer] who reports application closed. A new application will need to be submitted. Additional documentation needed includes  Medicaid denial letter, insurance card front/back, proof of income (PERS) statements.     Pt educated on activities to obtain Medicaid denial letter.    Plan:  TC to pt at later time/date

## 2019-03-05 NOTE — TELEPHONE ENCOUNTER
----- Message from Whitley Frederick sent at 3/4/2019  8:00 PM PST -----  Regarding: Prescription Question  Contact: [phone]  Whitley Frederick   1945  [phone]    Hi Juana... I have 4 days left of Multaq ... [facility] [address] ...    Thank you   Jessica.

## 2019-03-05 NOTE — TELEPHONE ENCOUNTER
Was the patient seen in the last year in this department? Yes  LOV 10/23/18    Does patient have an active prescription for medications requested? No     Received Request Via: Patient

## 2019-03-06 ENCOUNTER — ANTICOAGULATION VISIT (OUTPATIENT)
Dept: VASCULAR LAB | Facility: MEDICAL CENTER | Age: 74
End: 2019-03-06
Attending: INTERNAL MEDICINE
Payer: MEDICARE

## 2019-03-06 VITALS — HEART RATE: 58 BPM | DIASTOLIC BLOOD PRESSURE: 60 MMHG | SYSTOLIC BLOOD PRESSURE: 157 MMHG

## 2019-03-06 DIAGNOSIS — I48.0 PAF (PAROXYSMAL ATRIAL FIBRILLATION) (HCC): ICD-10-CM

## 2019-03-06 DIAGNOSIS — Z79.01 CHRONIC ANTICOAGULATION: ICD-10-CM

## 2019-03-06 LAB — INR PPP: 2 (ref 2–3.5)

## 2019-03-06 PROCEDURE — 85610 PROTHROMBIN TIME: CPT

## 2019-03-06 PROCEDURE — 99211 OFF/OP EST MAY X REQ PHY/QHP: CPT

## 2019-03-06 NOTE — PROGRESS NOTES
Anticoagulation Summary  As of 3/6/2019    INR goal:   2.0-3.0   TTR:   68.1 % (3.4 y)   INR used for dosin (3/6/2019)   Warfarin maintenance plan:   1.5 mg (3 mg x 0.5) every Wed; 3 mg (3 mg x 1) all other days   Weekly warfarin total:   19.5 mg   Weekly max warfarin dose:   21 mg   Plan last modified:   Juan Bonds, PharmD (3/6/2019)   Next INR check:   3/20/2019   Target end date:   Indefinite    Indications    Chronic anticoagulation [Z79.01]  Chronic anticoagulation (Resolved) [Z79.01]  PAF (paroxysmal atrial fibrillation) (HCC) [I48.0]             Anticoagulation Episode Summary     INR check location:       Preferred lab:       Send INR reminders to:       Comments:         Anticoagulation Care Providers     Provider Role Specialty Phone number    Amy Lincoln M.D. Referring Cardiology [phone]    [facility] Responsible  [phone]        Anticoagulation Patient Findings      HPI:  Whitley Frederick seen in clinic today, on anticoagulation therapy with warfarin for AFib.   Changes to current medical/health status since last appt: none  Denies signs/symptoms of bleeding and/or thrombosis since the last appt.    Denies any interval changes to diet  Denies any interval changes to medications since last appt.   Denies any complications or cost restrictions with current therapy.   BP recorded in vitals.  BP elevated on two separate readings and consistent with home readings.   Confirmed dosing regimen.    A/P   INR  therapeutic.   Pt would like to start eating more greens, will increase regimen and test in 2 weeks.   Requested pt to reach out to PCP to adjust HTN medications.  Possibly increase losartan.    Follow up appointment in 2 week(s).    Juan Bonds, PharmD

## 2019-03-07 RX ORDER — TERAZOSIN 1 MG/1
1 CAPSULE ORAL DAILY
Qty: 30 CAP | Refills: 4 | Status: SHIPPED | OUTPATIENT
Start: 2019-03-07 | End: 2021-02-11 | Stop reason: SDUPTHER

## 2019-03-08 LAB — INR BLD: 2 (ref 0.9–1.2)

## 2019-03-11 ENCOUNTER — TELEPHONE (OUTPATIENT)
Dept: MEDICAL GROUP | Facility: LAB | Age: 74
End: 2019-03-11

## 2019-03-11 NOTE — TELEPHONE ENCOUNTER
ESTABLISHED PATIENT PRE-VISIT PLANNING     Patient was NOT contacted to complete PVP.     Note: Patient will not be contacted if there is no indication to call.     1.  Reviewed notes from the last few office visits within the medical group: Yes    2.  If any orders were placed at last visit or intended to be done for this visit (i.e. 6 mos follow-up), do we have Results/Consult Notes?        •  Labs - Labs were not ordered at last office visit.       •  Imaging - Imaging ordered, NOT completed. Patient advised to complete prior to next appointment.       •  Referrals - No referrals were ordered at last office visit.    3. Is this appointment scheduled as a Hospital Follow-Up? No    4.  Immunizations were updated in Epic using WebIZ?: Epic matches WebIZ       •  Web Iz Recommendations: TDAP and SHINGRIX (Shingles)    5.  Patient is due for the following Health Maintenance Topics:   Health Maintenance Due   Topic Date Due   • IMM HEP B VACCINE (1 of 3 - Risk 3-dose series) 03/19/1964   • IMM DTaP/Tdap/Td Vaccine (1 - Tdap) 03/19/1964   • IMM ZOSTER VACCINES (1 of 2) 03/19/1996   • DIABETES MONOFILAMENT / LE EXAM  11/02/2018   • COLON CANCER SCREENING ANNUAL FIT  03/01/2019       - Patient has completed FLU, PNEUMOVAX (PPSV23) and PREVNAR (PCV13)  Immunization(s) per WebIZ. Chart has been updated.    6. Orders for overdue Health Maintenance topics pended in Pre-Charting? N\A    7.  AHA (MDX) form printed for Provider? YES    8.  Patient was NOT informed to arrive 15 min prior to their scheduled appointment and bring in their medication bottles.

## 2019-03-12 ENCOUNTER — OFFICE VISIT (OUTPATIENT)
Dept: MEDICAL GROUP | Facility: LAB | Age: 74
End: 2019-03-12
Payer: MEDICARE

## 2019-03-12 VITALS
WEIGHT: 269 LBS | SYSTOLIC BLOOD PRESSURE: 124 MMHG | BODY MASS INDEX: 42.22 KG/M2 | TEMPERATURE: 97.8 F | OXYGEN SATURATION: 94 % | RESPIRATION RATE: 14 BRPM | HEIGHT: 67 IN | HEART RATE: 60 BPM | DIASTOLIC BLOOD PRESSURE: 58 MMHG

## 2019-03-12 DIAGNOSIS — J96.11 CHRONIC RESPIRATORY FAILURE WITH HYPOXIA (HCC): ICD-10-CM

## 2019-03-12 DIAGNOSIS — F41.9 ANXIETY: ICD-10-CM

## 2019-03-12 DIAGNOSIS — E11.22 TYPE 2 DIABETES MELLITUS WITH STAGE 4 CHRONIC KIDNEY DISEASE, WITHOUT LONG-TERM CURRENT USE OF INSULIN (HCC): ICD-10-CM

## 2019-03-12 DIAGNOSIS — I48.0 PAF (PAROXYSMAL ATRIAL FIBRILLATION) (HCC): ICD-10-CM

## 2019-03-12 DIAGNOSIS — I70.0 ATHEROSCLEROSIS OF AORTA (HCC): ICD-10-CM

## 2019-03-12 DIAGNOSIS — N25.81 SECONDARY HYPERPARATHYROIDISM OF RENAL ORIGIN (HCC): ICD-10-CM

## 2019-03-12 DIAGNOSIS — I10 ESSENTIAL HYPERTENSION: ICD-10-CM

## 2019-03-12 DIAGNOSIS — N18.4 CKD (CHRONIC KIDNEY DISEASE) STAGE 4, GFR 15-29 ML/MIN (HCC): ICD-10-CM

## 2019-03-12 DIAGNOSIS — E66.01 MORBID OBESITY WITH BMI OF 40.0-44.9, ADULT (HCC): ICD-10-CM

## 2019-03-12 DIAGNOSIS — N18.4 TYPE 2 DIABETES MELLITUS WITH STAGE 4 CHRONIC KIDNEY DISEASE, WITHOUT LONG-TERM CURRENT USE OF INSULIN (HCC): ICD-10-CM

## 2019-03-12 PROCEDURE — 99214 OFFICE O/P EST MOD 30 MIN: CPT | Performed by: NURSE PRACTITIONER

## 2019-03-12 PROCEDURE — 8041 PR SCP AHA: Performed by: NURSE PRACTITIONER

## 2019-03-12 ASSESSMENT — PATIENT HEALTH QUESTIONNAIRE - PHQ9
SUM OF ALL RESPONSES TO PHQ QUESTIONS 1-9: 7
5. POOR APPETITE OR OVEREATING: 1 - SEVERAL DAYS
CLINICAL INTERPRETATION OF PHQ2 SCORE: 3

## 2019-03-12 NOTE — PROGRESS NOTES
"Chief Complaint   Patient presents with   • Hypertension     follow up meds   • Diabetes       HPI  Whitley is a 73-year-old established female here with a couple of issues:     #1-c/o feeling weak / \"washed out.\"  Neck, shoulder and back pain was present last week.  Has had a lower appetite lately.  Feeling a little bit better today after getting some exercise yesterday.  Last CBC was done February 23 and was within normal limits.  Blood sugars have been doing excellent in the low 100s.  Denies diarrhea or vomiting.  She was concerned that her side effects were attributed to simvastatin but symptoms are improving.    #2-breast nodule - saw Dr. Nagy for this and was told not to worry about it, f/u in one year.      #3-anxiety : She tells me that she worries about everything, even if she really does not have anything to worry about.  She does find that her quality of life is not as good as she feels it could be, because of her anxiety.  She is interested in talking about medication options for anxiety.  She does have Klonopin which she uses as needed but tells me that it actually causes rebound anxiety when it wears off.  She denies depression.  Has done poorly on paxil (thoughts went \"crazy\").      #4-HTN:  Chronic issue.  Taking losartan 50 mg bid, metoprolol 50 mg bid, lasix 80 mg qod.  She was emailing me about her BP over the past few weeks and terazosin was added on but she has not been taking this.  BP at home has ranged 120/60 - 161/86.  She does admit to chest pressure but relates this to stress.  Denies swelling in her legs.  She is also followed by cardiology.  She does have a diagnoses of paroxysmal atrial fibrillation, left bundle branch block and obstructive sleep apnea.    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  Also followed by endocrinology.  Using Ozempic only.  Last A1c was 6.7 in January, follows up with endocrinology every " "6 months.  She is trying to walk more for exercise and has lost some weight.  She is also followed by nephrology, last BUN/creatinine was 2.07/49 in January.    #6- morbid obesity: Weight today is 269 pounds, down from 275 pounds at her visit with endocrinology in January.  She is working harder on walking for exercise and compliancy with a diabetic diet.    #7-paroxysmal atrial fibrillation: Status post cardioversion 2014.  Compliant with Multaq and warfarin.  Also followed by anticoagulation clinic.  Receiving samples of Multaq from [facility] pharmacy as it is extremely expensive.  Still feels heart palpitations periodically.  Denies chronic shortness of breath.    #8-atherosclerosis of the aorta: Chronic issue.  Last ultrasound 2015.  On statin therapy.  Trying to follow a more vegetable based diet.  Denies chronic abdominal pain that radiates to her back.    #9-chronic respiratory failure with hypoxia: Compliant with oxygen fed into her CPAP machine at night.  Denies daytime shortness of breath except for with increased exertion during exercise.    #10-secondary hyperparathyroidism of renal origin: This is followed closely by nephrology.  Last PTH was 71 in May 2018.  She does not take calcium supplements.  Has not had any problems with kidney stones.    Past medical, surgical, family, and social history is reviewed and updated in Epic chart by me today.   Medications and allergies reviewed and updated in Epic chart by me today.     ROS:   As documented in history of present illness above    Exam:  Blood pressure 124/58, pulse 60, temperature 36.6 °C (97.8 °F), resp. rate 14, height 1.702 m (5' 7\"), weight 122 kg (269 lb), SpO2 94 %, not currently breastfeeding.  Constitutional: Morbidly obese female, alert, no distress, plus 3 vital signs  Skin:  Warm, dry, no rashes invisible areas  Eye: Equal, round and reactive, conjunctiva clear  ENMT: Lips without lesions  Respiratory: Unlabored respiration, lungs " clear to auscultation, no wheezes, no rhonchi  Cardiovascular: Normal rate   Psych: Alert, pleasant, well-groomed, normal affect    Assessment / Plan / Medical Decision makin. Morbid obesity with BMI of 40.0-44.9, adult ([facility])  -She has lost about 5 pounds in the past 2 months.  I encouraged her to be more diligent about exercise which will also help with her anxiety and her blood pressure.  She was referred for nutritional counseling.  - REFERRAL TO [facility] (HIP) Services Requested: Registered Dietitian Medicare Obesity Counseling; Reason for Visit: Overweight/Obesity    2. Anxiety  -Discussed various treatment options available to her which she was very skeptical about.  Ultimately I wrote down the names of sertraline and venlafaxine for her to consider.  Also encouraged her to avoid caffeine and to make sure she is exercising daily.    3. Essential hypertension  -Doing well today.  She will continue to monitor her a.m. and p.m.  She will take terazosin if her blood pressure is consistently greater than 140/90.  She does have a cardiology appointment coming up in April which she plans on keeping.    4.  Type 2 diabetes with chronic kidney disease, without long-term use of insulin:  Stable.  Continue same.  Follow-up endocrinology this summer.  Reiterated the importance of a strict diabetic diet and continued efforts at weight loss/exercise.    5.  Paroxysmal atrial fibrillation:  Asymptomatic for the most part in terms of discomfort.  Periodic palpitations.  Continues on anticoagulation, beta-blocker therapy and Multaq.    6.  Atherosclerosis of the aorta:  Stable.  On statin therapy.  Declines updated ultrasound because of finances.  Discussed the importance of a high-fiber diet.  Discussed symptoms of a AAA.    7.  Chronic respiratory failure with hypoxia: Compliant with nocturnal oxygen and CPAP at night.  Continued efforts at weight loss reiterated.    8.  Secondary  hyperparathyroidism of renal origin:  This is a chronic issue for the patient and followed yearly by nephrology.  She does have an appointment coming up with her nephrologist.  She is not taking calcium supplements or having any issues with kidney stones.

## 2019-03-13 PROBLEM — Z79.4 ENCOUNTER FOR LONG-TERM (CURRENT) USE OF INSULIN (HCC): Status: RESOLVED | Noted: 2017-08-11 | Resolved: 2019-03-13

## 2019-03-13 NOTE — PROGRESS NOTES

## 2019-03-15 ENCOUNTER — TELEPHONE (OUTPATIENT)
Dept: ENDOCRINOLOGY | Facility: MEDICAL CENTER | Age: 74
End: 2019-03-15

## 2019-03-15 NOTE — TELEPHONE ENCOUNTER
1. Caller Name: Whitley                                         Call Back Number: [phone] (home)         Patient approves a detailed voicemail message: no    APteitn called saying that she woke up this morning with a blood sugar of 265. She is currentl on 1 mg of Ozempic by itself once a week. Last night she had mexican food for dinner. 1/2 a tamale and 1/2 of some nachos. No Rice beans or sugary drinks. Wondering how she can bring her numbers back down to normal. She said she can be reached on [employer] or phone

## 2019-03-15 NOTE — TELEPHONE ENCOUNTER
Phone Number Called: [phone] (home)       Message: Relayed message to patient    Left Message for patient to call back: no

## 2019-03-19 ENCOUNTER — PATIENT OUTREACH (OUTPATIENT)
Dept: HEALTH INFORMATION MANAGEMENT | Facility: OTHER | Age: 74
End: 2019-03-19

## 2019-03-19 RX ORDER — LEVOTHYROXINE SODIUM 0.05 MG/1
50 TABLET ORAL
Qty: 90 TAB | Refills: 2 | Status: SHIPPED | OUTPATIENT
Start: 2019-03-19 | End: 2019-03-20 | Stop reason: SDUPTHER

## 2019-03-19 NOTE — PROGRESS NOTES
TC to pt. Reviewed Medicaid supporting documents as pt is pursuing [employer] which requires Medicaid denial letter for services. Pt anticipates submitting Medicaid application and contact this SW when denial letter is receiving via mail.     Plan:  TC to pt at later time/date

## 2019-03-19 NOTE — TELEPHONE ENCOUNTER
Was the patient seen in the last year in this department? Yes  3/12/19  Does patient have an active prescription for medications requested? No     Received Request Via: Pharmacy

## 2019-03-20 ENCOUNTER — APPOINTMENT (OUTPATIENT)
Dept: VASCULAR LAB | Facility: MEDICAL CENTER | Age: 74
End: 2019-03-20
Attending: INTERNAL MEDICINE
Payer: MEDICARE

## 2019-03-20 RX ORDER — LEVOTHYROXINE SODIUM 0.05 MG/1
50 TABLET ORAL
Qty: 90 TAB | Refills: 2 | Status: SHIPPED | OUTPATIENT
Start: 2019-03-20 | End: 2019-12-16 | Stop reason: SDUPTHER

## 2019-03-21 ENCOUNTER — TELEPHONE (OUTPATIENT)
Dept: HEALTH INFORMATION MANAGEMENT | Facility: OTHER | Age: 74
End: 2019-03-21

## 2019-03-21 NOTE — TELEPHONE ENCOUNTER
Received VM from patient requesting return call regarding questions on furosemide. Patient states she takes furosemide 80 mg every other day but has been having more swelling in her hands than usual. She is wondering if she can take an additional dose of furosemide. Patient reports that her furosemide is managed by her nephrologist. Advised patient to contact nephrology. Patient agreeable and denies further medication questions/ concerns at this time.     Laila Corral, PharmD

## 2019-03-27 DIAGNOSIS — I10 ESSENTIAL HYPERTENSION: ICD-10-CM

## 2019-03-27 RX ORDER — LOSARTAN POTASSIUM 50 MG/1
50 TABLET ORAL 2 TIMES DAILY
Qty: 180 TAB | Refills: 0 | Status: SHIPPED | OUTPATIENT
Start: 2019-03-27 | End: 2019-04-25 | Stop reason: SDUPTHER

## 2019-03-28 ENCOUNTER — ANTICOAGULATION VISIT (OUTPATIENT)
Dept: VASCULAR LAB | Facility: MEDICAL CENTER | Age: 74
End: 2019-03-28
Attending: INTERNAL MEDICINE
Payer: MEDICARE

## 2019-03-28 VITALS — SYSTOLIC BLOOD PRESSURE: 151 MMHG | DIASTOLIC BLOOD PRESSURE: 65 MMHG | HEART RATE: 56 BPM

## 2019-03-28 DIAGNOSIS — I48.0 PAF (PAROXYSMAL ATRIAL FIBRILLATION) (HCC): ICD-10-CM

## 2019-03-28 DIAGNOSIS — Z79.01 CHRONIC ANTICOAGULATION: ICD-10-CM

## 2019-03-28 LAB
INR BLD: 2.1 (ref 0.9–1.2)
INR PPP: 2.1 (ref 2–3.5)

## 2019-03-28 PROCEDURE — 99211 OFF/OP EST MAY X REQ PHY/QHP: CPT

## 2019-03-28 PROCEDURE — 85610 PROTHROMBIN TIME: CPT

## 2019-03-28 NOTE — PROGRESS NOTES
Anticoagulation Summary  As of 3/28/2019    INR goal:   2.0-3.0   TTR:   68.6 % (3.4 y)   INR used for dosin.1 (3/28/2019)   Warfarin maintenance plan:   1.5 mg (3 mg x 0.5) every Wed; 3 mg (3 mg x 1) all other days   Weekly warfarin total:   19.5 mg   Weekly max warfarin dose:   21 mg   Plan last modified:   Araceli LeijaD (3/6/2019)   Next INR check:   2019   Target end date:   Indefinite    Indications    Chronic anticoagulation [Z79.01]  Chronic anticoagulation (Resolved) [Z79.01]  PAF (paroxysmal atrial fibrillation) (HCC) [I48.0]             Anticoagulation Episode Summary     INR check location:       Preferred lab:       Send INR reminders to:       Comments:         Anticoagulation Care Providers     Provider Role Specialty Phone number    Amy Lincoln M.D. Referring Cardiology [phone]    [facility] Responsible  [phone]        Anticoagulation Patient Findings      HPI:  Whitley Frederick seen in clinic today, on anticoagulation therapy with warfarin for PAF.   Changes to current medical/health status since last appt: none  Denies signs/symptoms of bleeding and/or thrombosis since the last appt.    She did increase VitK intake as we discussed she would at our last visit.   Denies any interval changes to medications since last appt.   Denies any complications or cost restrictions with current therapy.   BP recorded in vitals-- currently working with PCP to improve HTN control.   Confirmed dosing regimen.     A/P   INR  therapeutic.   Pt is to continue with current warfarin dosing regimen.     Follow up appointment in 3 week(s).    Juan Bonds, AraceliD

## 2019-04-02 ENCOUNTER — OFFICE VISIT (OUTPATIENT)
Dept: MEDICAL GROUP | Facility: LAB | Age: 74
End: 2019-04-02
Payer: MEDICARE

## 2019-04-02 ENCOUNTER — PATIENT OUTREACH (OUTPATIENT)
Dept: HEALTH INFORMATION MANAGEMENT | Facility: OTHER | Age: 74
End: 2019-04-02

## 2019-04-02 VITALS
RESPIRATION RATE: 14 BRPM | BODY MASS INDEX: 42.69 KG/M2 | TEMPERATURE: 97.4 F | SYSTOLIC BLOOD PRESSURE: 128 MMHG | DIASTOLIC BLOOD PRESSURE: 56 MMHG | HEART RATE: 66 BPM | OXYGEN SATURATION: 95 % | WEIGHT: 272 LBS | HEIGHT: 67 IN

## 2019-04-02 DIAGNOSIS — S51.011A SKIN TEAR OF RIGHT ELBOW WITHOUT COMPLICATION, INITIAL ENCOUNTER: ICD-10-CM

## 2019-04-02 DIAGNOSIS — J45.20 MILD INTERMITTENT ASTHMA WITHOUT COMPLICATION: ICD-10-CM

## 2019-04-02 DIAGNOSIS — Z79.4 TYPE 2 DIABETES MELLITUS WITH STAGE 4 CHRONIC KIDNEY DISEASE, WITH LONG-TERM CURRENT USE OF INSULIN (HCC): ICD-10-CM

## 2019-04-02 DIAGNOSIS — E11.22 TYPE 2 DIABETES MELLITUS WITH STAGE 4 CHRONIC KIDNEY DISEASE, WITH LONG-TERM CURRENT USE OF INSULIN (HCC): ICD-10-CM

## 2019-04-02 DIAGNOSIS — N18.4 TYPE 2 DIABETES MELLITUS WITH STAGE 4 CHRONIC KIDNEY DISEASE, WITH LONG-TERM CURRENT USE OF INSULIN (HCC): ICD-10-CM

## 2019-04-02 DIAGNOSIS — E03.4 HYPOTHYROIDISM DUE TO ACQUIRED ATROPHY OF THYROID: ICD-10-CM

## 2019-04-02 DIAGNOSIS — I48.0 PAF (PAROXYSMAL ATRIAL FIBRILLATION) (HCC): ICD-10-CM

## 2019-04-02 PROCEDURE — 99214 OFFICE O/P EST MOD 30 MIN: CPT | Performed by: NURSE PRACTITIONER

## 2019-04-02 NOTE — PROGRESS NOTES
"Chief Complaint   Patient presents with   • Arm Injury     right        HPI  Whitley is a 75 yo est female here for a few issues:     #1-balance problem: Tells me this is been an issue intermittently for quite some time, she cannot put a quantity of time on it.  Scribes feeling off balance intermittently, mainly during the day.  Felt better when stopping levothyroxine.  Restarted 1/2 pill one week ago and was off for 3 weeks but has not appreciated worsening balance since restarting levothyroxine.  Also has chronic environmental allergies and uses nasal sprays as well as antihistamines.  Paroxysmal atrial fibrillation as well as chronic respiratory failure but is compliant with all cardiac and respiratory drugs.  Denies chronic headaches.  No falls or head injuries.    #2-skin tear: New issue that occurred last week when her dog ran over her right arm, rating her skin.  She is been treating this at home with bandages and Neosporin.  Tells me the pain is minimal.  Would like it checked out because of her diabetes.    #3-mild asthma with sleep apnea: Scheduled for PFT this month.  Breathing has been doing well.     Past medical, surgical, family, and social history is reviewed and updated in Epic chart by me today.   Medications and allergies reviewed and updated in Epic chart by me today.     ROS:   As documented in history of present illness above    Exam:  Blood pressure 128/56, pulse 66, temperature 36.3 °C (97.4 °F), resp. rate 14, height 1.702 m (5' 7\"), weight 123.4 kg (272 lb), SpO2 95 %, not currently breastfeeding.  Constitutional: Obese female, alert, no distress, plus 3 vital signs  Skin:  Warm, dry.  Skin tear to dorsal aspect of right forearm - measuring a few cm but no surrounding erythema or discharge from wound.   Eye: Equal, round and reactive, conjunctiva clear  ENMT: Lips without lesions, good dentition, oropharynx clear    Neck: Trachea midline  Respiratory: Unlabored respiration  Cardiovascular: " Normal rate and rhythm  Psych: Alert, pleasant, well-groomed, normal affect  Neurologic: Alert and oriented. Cranial nerves II-XII intact, EOMs intact, normal finger-to-nose test, no pronator drift. No focal motor deficits. Symmetric reflexes. Normal station and gait, normal tandem walk. Coordination normal    Assessment / Plan / Medical Decision makin. Hypothyroidism due to acquired atrophy of thyroid  -Recommend updated thyroid lab work next week when she has blood work done for her kidney doctor.  - TSH; Future  - FREE THYROXINE; Future    2. Skin tear of right elbow without complication, initial encounter  -No sign of infection.  Applied Steri-Strips, a nonstick bandage and Tegaderm.  She will change her bandage every 24-48 hours.  She will follow-up here with onset surrounding erythema, purulent discharge or increased tenderness on palpation.    3. PAF (paroxysmal atrial fibrillation) (HCC)  -Patient requested samples from the healthcare center as this is an expensive drug for her.  - dronedarone (MULTAQ) 400 MG Tab; Take 1 Tab by mouth 2 times a day, with meals.  Dispense: 60 Tab; Refill: 0    4. Type 2 diabetes mellitus with stage 4 chronic kidney disease, with long-term current use of insulin ([facility])  -controlled.  Also requested samples from the healthcare center as this is an expensive drug.  - Semaglutide (OZEMPIC) 0.25 or 0.5 MG/DOSE Solution Pen-injector; Inject 1 mg as instructed every 7 days.  Dispense: 2 PEN; Refill: 0    5. Mild intermittent asthma without complication  -Stable.  Continue same.      In terms of her balance issue, suspect this is very multifaceted.  Discussed fall precautions.  She has had somewhat of a workup over the past few years including a carotid artery ultrasound that showed mild plaque, echocardiogram that did not show any significant valvular problems.  Suspect it is mostly related to her inactivity, allergies and age.  Encouraged her to continue on allergy medication  such as her nasal spray and antihistamine which she comes on and off of periodically.  We did discuss the relationship to her thyroid in terms of improving off thyroid medicine and that she has not had thyroid labs drawn in 1 year, she may have been overmedicated.  I will follow-up with her regarding thyroid lab results and how she is feeling.

## 2019-04-02 NOTE — PROGRESS NOTES
"TC to pt. Pt states she has independently stopped Qvar and Flonase due to congestion and seasonal allergies. Pt stated she is taking the Qvar 4 times a day now for seasonal allergies. Pt denies any breathing concerns when not taking medication. Additionally, pt stated she intermittently stopped her synthroid and is now taking half of her normal does; pt states she felt better when she was not taking any synthroid. Pt scheduled to see PCP today's date and was recommended to discuss her independent start and stops of medications.     At this time pt would like to defer applying for [employer] [facility]. Pt would like to have Qvar \"on hand\" but does not identify medication as necessary long term or daily unless other breathing/congestion issues arise.     Pt agreeable to f/u with [facility] for med rec and to discuss impacts of intermittent use of the medications listed above.     Plan:  TC to pt at later time/date  Message routed to [facility]      "

## 2019-04-04 ENCOUNTER — TELEPHONE (OUTPATIENT)
Dept: ENDOCRINOLOGY | Facility: MEDICAL CENTER | Age: 74
End: 2019-04-04

## 2019-04-04 NOTE — TELEPHONE ENCOUNTER
1. Caller Name: Whitley Frederick                                         Call Back Number: [phone] (home)       Patient approves a detailed voicemail message: yes    Pt in need of Ozempic samples. she will  this week.

## 2019-04-09 ENCOUNTER — PATIENT OUTREACH (OUTPATIENT)
Dept: HEALTH INFORMATION MANAGEMENT | Facility: OTHER | Age: 74
End: 2019-04-09

## 2019-04-09 NOTE — PROGRESS NOTES
VM recieved from pt requesting to schedule in-person appointment to complete Medicaid application.     TC to pt. Unable to leave VM(1) as VMbox is full.     Plan:  TC to pt at later time/date

## 2019-04-10 ENCOUNTER — PATIENT OUTREACH (OUTPATIENT)
Dept: HEALTH INFORMATION MANAGEMENT | Facility: OTHER | Age: 74
End: 2019-04-10

## 2019-04-11 ENCOUNTER — PATIENT OUTREACH (OUTPATIENT)
Dept: HEALTH INFORMATION MANAGEMENT | Facility: OTHER | Age: 74
End: 2019-04-11

## 2019-04-11 ENCOUNTER — HOSPITAL ENCOUNTER (EMERGENCY)
Facility: MEDICAL CENTER | Age: 74
End: 2019-04-11
Attending: EMERGENCY MEDICINE
Payer: MEDICARE

## 2019-04-11 ENCOUNTER — TELEPHONE (OUTPATIENT)
Dept: HEALTH INFORMATION MANAGEMENT | Facility: OTHER | Age: 74
End: 2019-04-11

## 2019-04-11 ENCOUNTER — APPOINTMENT (OUTPATIENT)
Dept: RADIOLOGY | Facility: MEDICAL CENTER | Age: 74
End: 2019-04-11
Attending: EMERGENCY MEDICINE
Payer: MEDICARE

## 2019-04-11 VITALS
HEIGHT: 67 IN | DIASTOLIC BLOOD PRESSURE: 78 MMHG | TEMPERATURE: 97.5 F | OXYGEN SATURATION: 94 % | WEIGHT: 272.05 LBS | SYSTOLIC BLOOD PRESSURE: 157 MMHG | HEART RATE: 65 BPM | BODY MASS INDEX: 42.7 KG/M2 | RESPIRATION RATE: 18 BRPM

## 2019-04-11 DIAGNOSIS — S89.91XA INJURY OF RIGHT KNEE, INITIAL ENCOUNTER: ICD-10-CM

## 2019-04-11 DIAGNOSIS — D68.9 COAGULOPATHY (HCC): ICD-10-CM

## 2019-04-11 DIAGNOSIS — T14.8XXA ABRASION: ICD-10-CM

## 2019-04-11 DIAGNOSIS — T07.XXXA MULTIPLE CONTUSIONS: ICD-10-CM
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
## 2019-04-11 DIAGNOSIS — S50.11XA CONTUSION OF RIGHT FOREARM, INITIAL ENCOUNTER: ICD-10-CM

## 2019-04-11 LAB
APTT PPP: 44.1 SEC (ref 24.7–36)
BASOPHILS # BLD AUTO: 0.6 % (ref 0–1.8)
BASOPHILS # BLD: 0.05 K/UL (ref 0–0.12)
EOSINOPHIL # BLD AUTO: 0.16 K/UL (ref 0–0.51)
EOSINOPHIL NFR BLD: 2 % (ref 0–6.9)
ERYTHROCYTE [DISTWIDTH] IN BLOOD BY AUTOMATED COUNT: 45.5 FL (ref 35.9–50)
HCT VFR BLD AUTO: 38.6 % (ref 37–47)
HGB BLD-MCNC: 13 G/DL (ref 12–16)
IMM GRANULOCYTES # BLD AUTO: 0.03 K/UL (ref 0–0.11)
IMM GRANULOCYTES NFR BLD AUTO: 0.4 % (ref 0–0.9)
INR PPP: 2.35 (ref 0.87–1.13)
LYMPHOCYTES # BLD AUTO: 1.86 K/UL (ref 1–4.8)
LYMPHOCYTES NFR BLD: 22.9 % (ref 22–41)
MCH RBC QN AUTO: 31 PG (ref 27–33)
MCHC RBC AUTO-ENTMCNC: 33.7 G/DL (ref 33.6–35)
MCV RBC AUTO: 92.1 FL (ref 81.4–97.8)
MONOCYTES # BLD AUTO: 0.45 K/UL (ref 0–0.85)
MONOCYTES NFR BLD AUTO: 5.5 % (ref 0–13.4)
NEUTROPHILS # BLD AUTO: 5.56 K/UL (ref 2–7.15)
NEUTROPHILS NFR BLD: 68.6 % (ref 44–72)
NRBC # BLD AUTO: 0 K/UL
NRBC BLD-RTO: 0 /100 WBC
PLATELET # BLD AUTO: 134 K/UL (ref 164–446)
PMV BLD AUTO: 10.3 FL (ref 9–12.9)
PROTHROMBIN TIME: 25.4 SEC (ref 12–14.6)
RBC # BLD AUTO: 4.19 M/UL (ref 4.2–5.4)
WBC # BLD AUTO: 8.1 K/UL (ref 4.8–10.8)

## 2019-04-11 PROCEDURE — 85610 PROTHROMBIN TIME: CPT

## 2019-04-11 PROCEDURE — 303485 HCHG DRESSING MEDIUM

## 2019-04-11 PROCEDURE — 700111 HCHG RX REV CODE 636 W/ 250 OVERRIDE (IP): Performed by: EMERGENCY MEDICINE

## 2019-04-11 PROCEDURE — 85025 COMPLETE CBC W/AUTO DIFF WBC: CPT

## 2019-04-11 PROCEDURE — A6403 STERILE GAUZE>16 <= 48 SQ IN: HCPCS

## 2019-04-11 PROCEDURE — 90715 TDAP VACCINE 7 YRS/> IM: CPT | Performed by: EMERGENCY MEDICINE

## 2019-04-11 PROCEDURE — 73562 X-RAY EXAM OF KNEE 3: CPT | Mod: RT

## 2019-04-11 PROCEDURE — 85730 THROMBOPLASTIN TIME PARTIAL: CPT

## 2019-04-11 PROCEDURE — 36415 COLL VENOUS BLD VENIPUNCTURE: CPT

## 2019-04-11 PROCEDURE — 99284 EMERGENCY DEPT VISIT MOD MDM: CPT

## 2019-04-11 PROCEDURE — 90471 IMMUNIZATION ADMIN: CPT

## 2019-04-11 PROCEDURE — 304217 HCHG IRRIGATION SYSTEM

## 2019-04-11 RX ORDER — ROSUVASTATIN CALCIUM 10 MG/1
10 TABLET, COATED ORAL EVERY EVENING
Qty: 30 TAB | Refills: 11 | Status: SHIPPED | OUTPATIENT
Start: 2019-04-11 | End: 2019-04-12 | Stop reason: SDUPTHER

## 2019-04-11 RX ADMIN — CLOSTRIDIUM TETANI TOXOID ANTIGEN (FORMALDEHYDE INACTIVATED), CORYNEBACTERIUM DIPHTHERIAE TOXOID ANTIGEN (FORMALDEHYDE INACTIVATED), BORDETELLA PERTUSSIS TOXOID ANTIGEN (GLUTARALDEHYDE INACTIVATED), BORDETELLA PERTUSSIS FILAMENTOUS HEMAGGLUTININ ANTIGEN (FORMALDEHYDE INACTIVATED), BORDETELLA PERTUSSIS PERTACTIN ANTIGEN, AND BORDETELLA PERTUSSIS FIMBRIAE 2/3 ANTIGEN 0.5 ML: 5; 2; 2.5; 5; 3; 5 INJECTION, SUSPENSION INTRAMUSCULAR at 13:44

## 2019-04-11 NOTE — TELEPHONE ENCOUNTER
Notified pt via email regarding this message. Changed her to rosuvastatin 10 mg and d/c simvastatin. Thanks!

## 2019-04-11 NOTE — TELEPHONE ENCOUNTER
Dear Juana,     I had the pleasure to review medications with your patient Whitley. Patient states she had been instructed to take 40 mg of simvastatin daily but could not tolerate. She is taking 20 mg daily. Interaction noted between simvastatin and multaq with recommended dose of simvastatin not to exceed 10 mg/ day in combination with multaq. Fluvastatin, pravastatin, pitavastatin, and rosuvastatin may be less affected by multaq. Patient states she has previously tolerated rosuvastatin. The maximum recommended dose of rosuvastatin for CrCl < 30 mL/min is 10 mg/day. Please advise. Patient has upcoming appointment scheduled with you 4/22/19.     Thank you,   Laila Corral, PharmD ext [phone]

## 2019-04-11 NOTE — ED PROVIDER NOTES
ED Provider Note    CHIEF COMPLAINT  Fall on Coumadin    HPI  Whitley Frederick is a 74 y.o. female who presents right knee pain and swelling.  The patient states that she is on Coumadin for atrial fibrillation.  She had a ground-level fall today and landed on her right knee skele swelling to the right knee.  Little swelling to her right shoulder as well.  She denies headache or pain nausea or vomiting to her head whatsoever.  Nothing really makes her better or worse.  The nurses put in here that she had a head injury as well but she denies any headache or swelling all she thinks she might have bumped her head on the doorknob on the way down but denies any headache currently.    REVIEW OF SYSTEMS  CONSTITUTIONAL:  Denies fever, chills,  or weakness  EYES:  Denies  discharge   ENT:  Denies sore throat, nose or ear pain  CARDIOVASCULAR:  Denies chest pain,  RESPIRATORY:  Denies cough or shortness of breath or difficulty breathing  GI:  Denies abdominal pain, vomiting, or diarrhea  MUSCULOSKELETAL:  Denies weakness joint swelling or back pain with the exception of some tenderness to her right upper arm and right knee with a bruise  SKIN: Bruising to her right anterior knee a small skin tear to right forearm and some tenderness to right mid arm.  ALLERGIC: No itchy rashes.  NEUROLOGIC:  Denies headache, focal weakness or numbness  PSYCHIATRIC:  Denies depression, suicidal ideation or homicial ideation      PAST MEDICAL HISTORY  Past Medical History:   Diagnosis Date   • Allergy    • Anxiety    • Arrhythmia    • Arthritis    • Asthma    • Carotid bruit     Left   • CATARACT    • Chest pain    • DIABETES MELLITUS    • Esophageal reflux    • Goiter    • Headache(784.0)    • Heart murmur    • Hyperlipidemia    • Hypertension    • IBD (inflammatory bowel disease)    • Migraine     Silent migraine   • AVERY (obstructive sleep apnea)    • Overweight(278.02)    • Palpitations    • Paroxysmal atrial fibrillation (HCC) 7/12/13     woke with palps, AFib per ekg in am.   • Pneumonia     Nov. 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 Badillo   • Urinary tract infection, site not specified        FAMILY HISTORY  Family History   Problem Relation Age of Onset   • Cancer Maternal Aunt    • Diabetes Maternal Aunt    • Heart Disease Maternal Aunt    • Hypertension Maternal Aunt    • Hyperlipidemia Maternal Aunt    • Cancer Mother         Leukemia   • Hypertension Mother    • Diabetes Mother    • Lung Disease Father    • Cancer Father         Lung   • Lung Disease Brother    • Stroke Brother    • Diabetes Brother    • Heart Disease Brother    • Diabetes Maternal Grandmother    • Hypertension Brother    • Diabetes Brother        SOCIAL HISTORY   reports that she quit smoking about 36 years ago. Her smoking use included Cigarettes. She has a 20.00 pack-year smoking history. She has never used smokeless tobacco. She reports that she does not drink alcohol or use drugs.    SURGICAL HISTORY  Past Surgical History:   Procedure Laterality Date   • CATARACT PHACO WITH IOL Right 10/25/2016    Procedure: CATARACT PHACO WITH IOL;  Surgeon: Zoran Gamble M.D.;  Location: SURGERY SAME DAY [facility];  Service:    • ABDOMINAL HYSTERECTOMY TOTAL     • CHOLECYSTECTOMY     • TONSILLECTOMY     • US-NEEDLE CORE BX-BREAST PANEL         CURRENT MEDICATIONS  No current facility-administered medications for this encounter.     Current Outpatient Prescriptions:   •  rosuvastatin (CRESTOR) 10 MG Tab, Take 1 Tab by mouth every evening., Disp: 30 Tab, Rfl: 11  •  dronedarone (MULTAQ) 400 MG Tab, Take 1 Tab by mouth 2 times a day, with meals., Disp: 60 Tab, Rfl: 0  •  dronedarone (MULTAQ) 400 MG Tab, Take 1 Tab by mouth 2 times a day, with meals., Disp: 60 Tab, Rfl: 0  •  Semaglutide (OZEMPIC) 0.25 or 0.5 MG/DOSE Solution Pen-injector, Inject 1 mg as instructed every 7  days., Disp: 2 PEN, Rfl: 0  •  losartan (COZAAR) 50 MG Tab, Take 1 Tab by mouth 2 Times a Day., Disp: 180 Tab, Rfl: 0  •  levothyroxine (SYNTHROID) 50 MCG Tab, Take 1 Tab by mouth Every morning on an empty stomach., Disp: 90 Tab, Rfl: 2  •  terazosin (HYTRIN) 1 MG Cap, Take 1 Cap by mouth every day., Disp: 30 Cap, Rfl: 4  •  metoprolol (LOPRESSOR) 50 MG Tab, Take 1 Tab by mouth 2 times a day., Disp: 180 Tab, Rfl: 1  •  warfarin (COUMADIN) 3 MG Tab, Take 1 to 1.5 tablets by mouth daily as directed by the coumadin clinic, Disp: 135 Tab, Rfl: 1  •  ferrous sulfate 325 (65 Fe) MG tablet, Take 325 mg by mouth every day., Disp: , Rfl:   •  azelastine (ASTELIN) 137 MCG/SPRAY nasal spray, Spray 1 Spray in nose 2 times a day as needed., Disp: , Rfl:   •  allopurinol (ZYLOPRIM) 100 MG Tab, Take 200 mg by mouth 2 Times a Day., Disp: , Rfl:   •  Clonazepam 0.5 MG TABLET DISPERSIBLE, Take 0.25-0.5 tablet by mouth 1 time daily as needed., Disp: , Rfl:   •  beclomethasone HFA (QVAR REDIHALER) 40 MCG/ACT inhaler, INHALE 2 PUFFS BY MOUTH TWICE DAILY, Disp: 2 Inhaler, Rfl: 0  •  levalbuterol (XOPENEX HFA) 45 MCG/ACT inhaler, Inhale 2 Puffs by mouth every four hours as needed for Shortness of Breath., Disp: 1 Inhaler, Rfl: 11  •  montelukast (SINGULAIR) 10 MG Tab, Take 1 Tab by mouth every bedtime., Disp: 30 Tab, Rfl: 11  •  Semaglutide (OZEMPIC) 1 MG/DOSE Solution Pen-injector, Inject 1 mg as instructed every 7 days., Disp: 1 PEN, Rfl: 1  •  fluticasone (FLONASE) 50 MCG/ACT nasal spray, USE 1-2  SPRAY(S) IN EACH NOSTRIL ONCE DAILY 30 MINUTES PRIOR TO USE OF CPAP, Disp: 3 Bottle, Rfl: 3  •  omeprazole (PRILOSEC) 20 MG delayed-release capsule, Take 1 Cap by mouth every day., Disp: 90 Cap, Rfl: 3  •  furosemide (LASIX) 80 MG Tab, Take 1 Tab by mouth every 48 hours., Disp: 30 Tab, Rfl: 3  •  Blood Glucose Monitoring Suppl Supplies Misc, Test strips order: Test strips for Accucheck meter. Sig: use BID and prn ssx high or low sugar #100 RF  "x 3, Disp: 100 Strip, Rfl: 11  •  Blood Glucose Monitoring Suppl Supplies Misc, Test strips order: Test strips for Chely Contour meter. Sig: use tid  and prn ssx high or low sugar #100 RF x 3, Disp: 100 Strip, Rfl: 3  •  Urine Glucose-Ketones Test Strip, 1 Strip by In Vitro route every day., Disp: 100 Strip, Rfl: 2  •  glucose blood (FREESTYLE LITE) strip, 1 Strip by Other route 2 times a day, with meals., Disp: 50 Strip, Rfl: 11  •  Insulin Pen Needle 32G X 4 MM Misc, 1 Applicator by Does not apply route 3 times a day., Disp: 100 Each, Rfl: 11  •  Blood Glucose Monitoring Suppl W/DEVICE Kit, 1 Each by Does not apply route 2 times a day as needed., Disp: 1 Kit, Rfl: 0  •  Misc. Devices MISC, Pen needles for Lantus Solstar. 29g 12mm., Disp: 90 Each, Rfl: 3  •  Cholecalciferol (VITAMIN D) 2000 UNITS CAPS, Take 4,000 Units by mouth every day., Disp: , Rfl:   •  magnesium oxide (MAGNESIUM OXIDE) 400 (241.3 MG) MG TABS tablet, Take 400 mg by mouth every day., Disp: , Rfl:       ALLERGIES  Allergies   Allergen Reactions   • Amlodipine Itching   • Amaryl      GI Problems   • Avandia [Rosiglitazone Maleate]      GI problems   • Cipro Xr      Possibly causes Chills.   • Clonidine      Rapid heart rate, swelling    • Contrast Media With Iodine [Iodine] Hives   • Glipizide      GI Problems   • Glucophage [Metformin Hydrochloride]      dizzy   • Hydralazine      Rapid heart rate, chest tightness   • Levaquin      Possible allergy - chills with cipro but sick   • Relafen [Nabumetone]      Itching; avoids all nsaids       PHYSICAL EXAM  VITAL SIGNS: BP (!) 196/90   Pulse 64   Temp 36.4 °C (97.5 °F) (Temporal)   Resp 16   Ht 1.702 m (5' 7\")   Wt 123.4 kg (272 lb 0.8 oz)   SpO2 99%   BMI 42.61 kg/m²      Constitutional: Patient is awake alert person place and time. No acute respiratory distress Well developed, Well nourished, Non-toxic appearance.   HENT: Normocephalic, Atraumatic, I can find no signs of trauma to her head.  " She denies  a headache currently.  Bilateral external ears normal, Oropharynx pink moist with no exudates, Nose patent.   Eyes: PERRLA, EOMI, Sclera and conjunctiva clear, No discharge.   Neck:  Supple no nuchal rigidity, no thyromegaly or mass. Non-tender  Lymphatic: No supraclavicular, axillary or inguinal lymph nodes.   Cardiovascular: Heart is regular rate and rhythm no murmur  Thorax & Lungs: Chest is symmetrical, with good breath sounds. No wheezing or crackles. No respiratory distress, No chest tenderness.   Abdomen:  Soft, No tenderness   Skin: Warm, Dry, no petechia, purpura or rash.  Except for bruising into her right forearm a skin tear to her right forearm and bruising to right anterior knee  Back: Non tender with palpation, No CVA tenderness.   Extremities: Tenderness and swelling to the right anterior knee with bruising along with bruising to the right forearm and a skin abrasion.  No bony pain except for the knee  Musculoskeletal: Good range of motion wrists, elbows, shoulders, hips,, ankles pulses 2+ radially and femorally. No gross deformities noted..  Positive swelling and tenderness to the anterior knee right side  Neurologic: Alert & oriented to person, place, and time.  Strength is 5 over 5 , bicep triceps, quadriceps, plantar flexion and extension. Sensory is intact to light touch face, arms and legs.  Psychiatric: Cooperative friendly      LABS:  Lab Results   Component Value Date    WBC 8.1 04/11/2019    HEMOGLOBIN 13.0 04/11/2019    HEMATOCRIT 38.6 04/11/2019    PLATELETCT 134 (L) 04/11/2019    SODIUM 143 01/18/2019    POTASSIUM 4.6 01/18/2019    CHLORIDE 108 01/18/2019    CO2 25 01/18/2019    BUN 49 (H) 01/18/2019    GLUCOSE 136 (H) 01/18/2019    CHOLSTRLTOT 197 02/23/2019     (H) 02/23/2019    ALTSGPT 16 04/20/2017    ASTSGOT 19 04/20/2017    INR 2.35 (H) 04/11/2019    HBA1C 6.7 01/18/2019       RADIOLOGY/PROCEDURES  DX-KNEE 3 VIEWS RIGHT   Final Result      1.  No evidence of  acute fracture or dislocation.      2.  Tricompartment degenerative change.      3.  Prepatellar soft tissue swelling.            COURSE & MEDICAL DECISION MAKING  Pertinent Labs & Imaging studies reviewed. (See chart for details)  Patient who fell to the ground level fall.  Count landed on her right knee.  She complains of swelling to her right knee mostly little bit of tenderness to her right forearm where she had a skin abrasion into the upper arm.  She happily denies any headache whatsoever.  She is on Coumadin for atrial fibrillation.  At this point time we x-rayed her knee there is no fractures.  She would like to try crutches does not want a knee immobilizer.  I did not Her Head She Has No Signs of Trauma There No Headache Whatsoever Neurologically Intact.    She is stable for discharge home      FINAL IMPRESSION  1.  Fall  2.  Skin tear right forearm.  Contusion right knee and right upper arm.  3.  High blood pressure     PLAN  1.  Follow-up with her primary care doctor within 3 days  2.htn information sheet/contusion information sheets  3. Return to the emergency department for increased pains, fevers, vomiting or change in condition.  Electronically signed by: Lonnie Brown, 4/11/2019 12:35 PM

## 2019-04-11 NOTE — PROGRESS NOTES
TC to pt. Scheduled in office appointment to review Medicaid and SNAP application w/ pt.     Plan:  In-Office appointment 04/19/2019 at 2:00pm

## 2019-04-11 NOTE — ED NOTES
Pt left prior to receiving or signing discharge paperwork or having repeat temperature done. Pt discharged in stable condition.

## 2019-04-11 NOTE — PROGRESS NOTES
Received message from EBONY Madrigal. Outbound call to Whitley. Reviewed indication, dose, and timing of each medication with patient. Whitley states that she was using her QVAR intermittently due to cost and she had read that it may cause sinusitis. She is currently working with EBONY Madrigal to apply for assistance from mfr.  She confirms taking 2 puffs BID when taking but is unsure if this is improving her symptoms. Discussed that this medication is a controller medication and should not be used PRN. Whitley has upcoming appointment with pulmonology scheduled 4/17/19 and I encouraged she discuss treatment at appointment. She states she will also be getting PFTs done as well.     Whitley states she had been instructed to take 40 mg of simvastatin daily but could not tolerate. She is taking 20 mg daily. Interaction noted between simvastatin and multaq with recommended dose of simvastatin not to exceed 10 mg/ day in combination with multaq. Patient states she has tolerated crestor in the past but brought up concern of her current kidney function with regards to dosing of statins. Patient states she has upcoming lab work to evaluate GFR. Will send message to PCP regarding interaction and potential alternatives.     Patient reported that she had stopped her levothyroxine prior to PCP appointment but has now been taking 1/2 tablet daily as instructed. She states lab work was ordered to evaluate thyroid and she plans on getting the labs taken this week. She has follow-up with PCP scheduled 4/22/19. Patient takes ferrous sulfate and magnesium supplement at night to not interact with morning thyroid dose.     Interaction noted with warfarin and multaq. Patient is followed closely by [facility] anticoagulation services and has appointment scheduled with coumadin clinic 4/18/19. Most recent INR was therapeutic at 2.1 on 3/28/19.     Patient denies additional medication questions/ concerns at this time.     Laila Corral,  PharmD

## 2019-04-11 NOTE — ED NOTES
"Pt states she tripped over a cord today in her bedroom and fell and hit her head on the door jam. No bleeding or deformity to head noted. Pt AAO x4. Pt states she doesn't feel \"too bad\", but because she is on coumadin wants to be evaluated.  "

## 2019-04-11 NOTE — ED TRIAGE NOTES
"Chief Complaint   Patient presents with   • T-5000 GLF     - LOC   • Head Injury   • Knee Injury     right   • Arm Injury     right arm     BP (!) 196/90   Pulse 64   Temp 36.4 °C (97.5 °F) (Temporal)   Resp 16   Ht 1.702 m (5' 7\")   Wt 123.4 kg (272 lb 0.8 oz)   SpO2 99%   BMI 42.61 kg/m²     "

## 2019-04-12 ENCOUNTER — HOSPITAL ENCOUNTER (OUTPATIENT)
Dept: LAB | Facility: MEDICAL CENTER | Age: 74
End: 2019-04-12
Attending: NURSE PRACTITIONER
Payer: MEDICARE

## 2019-04-12 ENCOUNTER — HOSPITAL ENCOUNTER (OUTPATIENT)
Dept: LAB | Facility: MEDICAL CENTER | Age: 74
End: 2019-04-12
Attending: INTERNAL MEDICINE
Payer: MEDICARE

## 2019-04-12 ENCOUNTER — ANTICOAGULATION MONITORING (OUTPATIENT)
Dept: VASCULAR LAB | Facility: MEDICAL CENTER | Age: 74
End: 2019-04-12

## 2019-04-12 DIAGNOSIS — I48.0 PAF (PAROXYSMAL ATRIAL FIBRILLATION) (HCC): ICD-10-CM

## 2019-04-12 DIAGNOSIS — Z79.01 CHRONIC ANTICOAGULATION: ICD-10-CM

## 2019-04-12 DIAGNOSIS — E03.4 HYPOTHYROIDISM DUE TO ACQUIRED ATROPHY OF THYROID: ICD-10-CM

## 2019-04-12 LAB
HCT VFR BLD AUTO: 36 % (ref 37–47)
HGB BLD-MCNC: 11.8 G/DL (ref 12–16)

## 2019-04-12 PROCEDURE — 84439 ASSAY OF FREE THYROXINE: CPT

## 2019-04-12 PROCEDURE — 84443 ASSAY THYROID STIM HORMONE: CPT

## 2019-04-12 PROCEDURE — 85018 HEMOGLOBIN: CPT

## 2019-04-12 PROCEDURE — 85014 HEMATOCRIT: CPT

## 2019-04-12 PROCEDURE — 80069 RENAL FUNCTION PANEL: CPT

## 2019-04-12 PROCEDURE — 36415 COLL VENOUS BLD VENIPUNCTURE: CPT

## 2019-04-12 PROCEDURE — 83970 ASSAY OF PARATHORMONE: CPT

## 2019-04-12 PROCEDURE — 84550 ASSAY OF BLOOD/URIC ACID: CPT

## 2019-04-12 PROCEDURE — 82306 VITAMIN D 25 HYDROXY: CPT

## 2019-04-12 RX ORDER — ROSUVASTATIN CALCIUM 10 MG/1
10 TABLET, COATED ORAL EVERY EVENING
Qty: 100 TAB | Refills: 3 | Status: SHIPPED | OUTPATIENT
Start: 2019-04-12 | End: 2019-06-24 | Stop reason: SDUPTHER

## 2019-04-12 NOTE — PROGRESS NOTES
Anticoagulation Summary  As of 2019    INR goal:   2.0-3.0   TTR:   69.0 % (3.5 y)   INR used for dosin.35 (2019)   Warfarin maintenance plan:   1.5 mg (3 mg x 0.5) every Wed; 3 mg (3 mg x 1) all other days   Weekly warfarin total:   19.5 mg   Weekly max warfarin dose:   21 mg   Plan last modified:   Juan Bonds, PharmD (3/6/2019)   Next INR check:   2019   Target end date:   Indefinite    Indications    Chronic anticoagulation [Z79.01]  Chronic anticoagulation (Resolved) [Z79.01]  PAF (paroxysmal atrial fibrillation) (HCC) [I48.0]             Anticoagulation Episode Summary     INR check location:       Preferred lab:       Send INR reminders to:       Comments:         Anticoagulation Care Providers     Provider Role Specialty Phone number    Amy Lincoln M.D. Referring Cardiology [phone]    [facility] Responsible  [phone]        Anticoagulation Patient Findings      Spoke with patient to report a therapeutic INR.    Pt instructed to continue with current warfarin dosing regimen, confirms dosing.   Pt denies any s/s of bleeding, bruising, clotting or any changes to diet or medication.    Will follow up in 4 week(s).     Tenisha Castillo, PharmD

## 2019-04-13 LAB
25(OH)D3 SERPL-MCNC: 49 NG/ML (ref 30–100)
ALBUMIN SERPL BCP-MCNC: 3.9 G/DL (ref 3.2–4.9)
BUN SERPL-MCNC: 60 MG/DL (ref 8–22)
CALCIUM SERPL-MCNC: 9.1 MG/DL (ref 8.5–10.5)
CHLORIDE SERPL-SCNC: 107 MMOL/L (ref 96–112)
CO2 SERPL-SCNC: 23 MMOL/L (ref 20–33)
CREAT SERPL-MCNC: 2.66 MG/DL (ref 0.5–1.4)
GLUCOSE SERPL-MCNC: 174 MG/DL (ref 65–99)
PHOSPHATE SERPL-MCNC: 3 MG/DL (ref 2.5–4.5)
POTASSIUM SERPL-SCNC: 4.4 MMOL/L (ref 3.6–5.5)
PTH-INTACT SERPL-MCNC: 111.4 PG/ML (ref 14–72)
SODIUM SERPL-SCNC: 139 MMOL/L (ref 135–145)
T4 FREE SERPL-MCNC: 0.74 NG/DL (ref 0.53–1.43)
TSH SERPL DL<=0.005 MIU/L-ACNC: 1.63 UIU/ML (ref 0.38–5.33)
URATE SERPL-MCNC: 8 MG/DL (ref 1.9–8.2)

## 2019-04-15 ENCOUNTER — TELEPHONE (OUTPATIENT)
Dept: MEDICAL GROUP | Facility: LAB | Age: 74
End: 2019-04-15

## 2019-04-15 ENCOUNTER — TELEPHONE (OUTPATIENT)
Dept: ENDOCRINOLOGY | Facility: MEDICAL CENTER | Age: 74
End: 2019-04-15

## 2019-04-15 NOTE — TELEPHONE ENCOUNTER
PVP WITH OUTREACH  Future Appointments       Provider Department Center    4/17/2019 11:00 AM SPIROMETRY/6MW [facility]     4/17/2019 11:30 AM ANU MosherP.ALICE. [facility]     4/22/2019 1:40 PM EDILMA Whelan; [facility]     5/8/2019 1:30 PM IHV EXAM 5 [facility]            ANNUAL WELLNESS VISIT PRE-VISIT PLANNING     1.  Immunizations were updated in Epic using WebIZ?: Epic matches WebIZ       •  WebIZ Recommendations: SHINGRIX (Shingles)       •  Is patient due for Tdap? NO       •  Is patient due for Shingles? NO/vaccine on back order     2.  Specialty Comments was updated with diagnosis information provided by SCP: NO MDX completed 3/12/19

## 2019-04-15 NOTE — TELEPHONE ENCOUNTER
1. Caller Name: Whitley Frederick                                             Call Back Number: [phone] (home)         Patient approves a detailed voicemail message: yes    Patient called stating her morning fasting blood sugars have been running around 180. She also mentioned falling last Thursday and did not eat until 4:00 pm. Once she checked her blood sugars before that it was at 230. She wants to also talk to you about going back on Actos.    Please advise

## 2019-04-16 ENCOUNTER — OFFICE VISIT (OUTPATIENT)
Dept: MEDICAL GROUP | Facility: LAB | Age: 74
End: 2019-04-16
Payer: MEDICARE

## 2019-04-16 VITALS
HEIGHT: 67 IN | SYSTOLIC BLOOD PRESSURE: 138 MMHG | BODY MASS INDEX: 41.91 KG/M2 | HEART RATE: 62 BPM | RESPIRATION RATE: 14 BRPM | OXYGEN SATURATION: 100 % | TEMPERATURE: 98 F | DIASTOLIC BLOOD PRESSURE: 52 MMHG | WEIGHT: 267 LBS

## 2019-04-16 DIAGNOSIS — E11.22 TYPE 2 DIABETES MELLITUS WITH STAGE 4 CHRONIC KIDNEY DISEASE, WITHOUT LONG-TERM CURRENT USE OF INSULIN (HCC): ICD-10-CM

## 2019-04-16 DIAGNOSIS — N64.89 BREAST HEMATOMA: ICD-10-CM

## 2019-04-16 DIAGNOSIS — Z12.11 SCREENING FOR COLORECTAL CANCER: ICD-10-CM

## 2019-04-16 DIAGNOSIS — Z12.12 SCREENING FOR COLORECTAL CANCER: ICD-10-CM

## 2019-04-16 DIAGNOSIS — N18.4 TYPE 2 DIABETES MELLITUS WITH STAGE 4 CHRONIC KIDNEY DISEASE, WITHOUT LONG-TERM CURRENT USE OF INSULIN (HCC): ICD-10-CM

## 2019-04-16 DIAGNOSIS — N18.4 CKD (CHRONIC KIDNEY DISEASE) STAGE 4, GFR 15-29 ML/MIN (HCC): ICD-10-CM

## 2019-04-16 LAB — GLUCOSE BLD-MCNC: 232 MG/DL (ref 70–100)

## 2019-04-16 PROCEDURE — 99214 OFFICE O/P EST MOD 30 MIN: CPT | Performed by: NURSE PRACTITIONER

## 2019-04-16 PROCEDURE — 82962 GLUCOSE BLOOD TEST: CPT | Performed by: NURSE PRACTITIONER

## 2019-04-16 RX ORDER — FUROSEMIDE 80 MG
80 TABLET ORAL
Qty: 45 TAB | Refills: 3 | Status: SHIPPED | OUTPATIENT
Start: 2019-04-16 | End: 2020-02-06 | Stop reason: SDUPTHER

## 2019-04-16 RX ORDER — FUROSEMIDE 80 MG
80 TABLET ORAL
Qty: 30 TAB | Refills: 3 | Status: CANCELLED | OUTPATIENT
Start: 2019-04-16

## 2019-04-16 NOTE — PATIENT INSTRUCTIONS
Try to follow at plant based diet - tomatoes, peppers, cauliflower, cucumber, squash.  Avoid ham, cheeses, mayonnaise.  Add avocado for protein / fiber.    Berries are a good idea for fruit choice.  Unsalted nuts.

## 2019-04-16 NOTE — TELEPHONE ENCOUNTER
Was the patient seen in the last year in this department? Yes  4/2/19  Does patient have an active prescription for medications requested? No     Received Request Via: Pharmacy

## 2019-04-16 NOTE — PROGRESS NOTES
Chief Complaint   Patient presents with   • Fall     ER follow up        TRI Arreaga is a 75 yo eset female here for f/u regarding ER visit.  She tells me that she fell last Thursday - tripped over cord at home and fell onto carpet -landing on her right fist and sustaining contusion to right breast, right knee and a skin tear to her right forearm.  Went to ER b/c she is on coumadin - negative x-ray of right knee for fx.  Noticed extensive bruising to right breast 2 d ago - tender.  Right shoulder also hurts but states this is mild.  No headache and did not hit head.  No abdominal pain, hematuria or hematochezia.  No other associated symptoms.  Using bandages to skin tear on right forearm and has not had to change this in 48 hours.  Denies fevers or chills.    DM type II with chronic kidney disease: Current medication is weekly Ozempic.  Also followed by Wei Patterson, endocrinology PA-she did call their office yesterday and requested to go back on actos b/c of hyperglycemia at home - 180-200.  She started Actos this morning and a half a tablet.  Patient states that she has had consistent blood sugars around 200 for over a week.  Admits that she is struggling to include vegetables in her diet, feels confused about what to eat because of her chronic kidney disease, Coumadin prescription and her diabetes.  Is awaiting a callback from a nutritionist.  Has lost 5 lbs since last visit.  Next appt 4/29/2019 with endocrinology but states she is going to post-pone this b/c of speaking with him on the phone. Last checked BG 2 hours ago and it was 200 - ate a piece of ham / cheese / 1/4 protein bar with milk right before coming here.      Chronic kidney disease: Stage IV.  She will be doing labs tomorrow for Dr. Badillo, her nephrologist but unfortunately her last GFR was 18 on April 12.  Requesting a refill of Lasix, takes 80 mg every other day.  Still urinating numerous times throughout the day.  Trying to work on increasing  "her water intake but struggles with this.    Past medical, surgical, family, and social history is reviewed and updated in Epic chart by me today.   Medications and allergies reviewed and updated in Epic chart by me today.     ROS:   As documented in history of present illness above    Exam:  /52 (BP Location: Left arm, Patient Position: Sitting, BP Cuff Size: Large adult)   Pulse 62   Temp 36.7 °C (98 °F)   Resp 14   Ht 1.702 m (5' 7\")   Wt 121.1 kg (267 lb)   SpO2 100%   Constitutional: Alert, no distress, plus 3 vital signs  Skin/musculoskeletal:  Warm, dry.  She does have a large, circumferential, superficial, fairly tender right breast hematoma surrounding her nipple with no open lesions or wounds.  She has a hematoma overlying her right patella as well as distal quadricep but there is no knee deformity or bony tenderness.  She does have limited flexion of her right knee because of the swelling.  She is not limping.  Eye: Equal, round and reactive, conjunctiva clear  ENMT: Lips without lesions, oropharynx clear    Neck: Trachea midline  Respiratory: Unlabored respiration, lungs clear to auscultation, no wheezes, no rhonchi  Cardiovascular: Normal rate and rhythm  Psych: Alert, pleasant, somewhat anxious, well-groomed, normal affect  Neuro: In the room today she began to feel a bit what she called odd, blood sugar was checked and it was 230.  She was completely alert and oriented.  She was answering all of my questions appropriately.    Assessment / Plan / Medical Decision makin. Breast hematoma  -Discussed benign nature and self resolution.  Discussed symptomatic treatment with a warm heating pad.    2. Type 2 diabetes mellitus with stage 4 chronic kidney disease, without long-term current use of insulin ([facility])  -Blood sugar today in the office was 230.  She is happy to be back on Actos although nervous to tell her nephrologist.  She has been discussing her recent blood sugars with her " endocrinologist.  She continues on weekly Ozempic.  We discussed her diet in depth and it seems that she is eating a diet very high in fatty, processed animal protein.  I gave her a list of low vitamin K containing vegetables to base her diet on.  Discussed including unsalted nuts and berries in small amounts.  Encouraged small amounts of avocado for fiber and protein.  She will email her blood sugars twice daily over the next week and follow-up with me next week.  - POCT Glucose    3. CKD (chronic kidney disease) stage 4, GFR 15-29 ml/min ([facility])  -This is followed very closely by nephrology.  She is repeating labs tomorrow and following up with her nephrologist.  Refilled her furosemide.  Discussed a low-sodium/low protein diet as above.  - furosemide (LASIX) 80 MG Tab; Take 1 Tab by mouth every 48 hours for 90 days.  Dispense: 45 Tab; Refill: 3    4. Screening for colorectal cancer  - ANDIE (FIT DNA)

## 2019-04-17 ENCOUNTER — OFFICE VISIT (OUTPATIENT)
Dept: PULMONOLOGY | Facility: HOSPICE | Age: 74
End: 2019-04-17
Payer: MEDICARE

## 2019-04-17 ENCOUNTER — NON-PROVIDER VISIT (OUTPATIENT)
Dept: PULMONOLOGY | Facility: HOSPICE | Age: 74
End: 2019-04-17
Payer: MEDICARE

## 2019-04-17 VITALS
HEIGHT: 67 IN | OXYGEN SATURATION: 95 % | WEIGHT: 269 LBS | HEART RATE: 66 BPM | BODY MASS INDEX: 42.22 KG/M2 | DIASTOLIC BLOOD PRESSURE: 60 MMHG | SYSTOLIC BLOOD PRESSURE: 130 MMHG | RESPIRATION RATE: 16 BRPM

## 2019-04-17 DIAGNOSIS — Z79.01 CHRONIC ANTICOAGULATION: ICD-10-CM

## 2019-04-17 DIAGNOSIS — J45.20 MILD INTERMITTENT ASTHMA WITHOUT COMPLICATION: ICD-10-CM

## 2019-04-17 DIAGNOSIS — J30.9 ALLERGIC RHINITIS, UNSPECIFIED SEASONALITY, UNSPECIFIED TRIGGER: ICD-10-CM

## 2019-04-17 DIAGNOSIS — I48.0 PAF (PAROXYSMAL ATRIAL FIBRILLATION) (HCC): ICD-10-CM

## 2019-04-17 DIAGNOSIS — G47.33 OSA (OBSTRUCTIVE SLEEP APNEA): ICD-10-CM

## 2019-04-17 PROCEDURE — 99214 OFFICE O/P EST MOD 30 MIN: CPT | Performed by: NURSE PRACTITIONER

## 2019-04-17 PROCEDURE — 94060 EVALUATION OF WHEEZING: CPT | Performed by: INTERNAL MEDICINE

## 2019-04-17 ASSESSMENT — PULMONARY FUNCTION TESTS
FVC: 2.89
FEV1: 2.25
FVC: 2.92
FEV1/FVC: 79
FEV1_PERCENT_CHANGE: 0
FEV1_PERCENT_CHANGE: -2
FEV1/FVC_PERCENT_CHANGE: 0
FEV1/FVC_PREDICTED: 75.46
FEV1/FVC_PERCENT_PREDICTED: 106
FVC_PERCENT_PREDICTED: 88
FEV1_PREDICTED: 93
FEV1: 2.29
FVC_PREDICTED: 3.26
FEV1_PREDICTED: 2.46
FEV1/FVC: 77.05

## 2019-04-17 NOTE — PROGRESS NOTES
Chief Complaint   Patient presents with   • Results     Donaldo        HPI:  Whitley Frederick is a 74 y.o. year old female here today for follow-up on her Asthma and obstructive sleep apnea.     Polysomnogram indicated an AHI of 29.3 with a REM related index of 90 and a low 02 saturation of 74%. She was titrated to CPAP at 8 cm water pressure with 5 L of oxygen per minute in addition because of persisting hypoxemia. She is currently on CPAP at 10 CM H20 with 3 LPM 02 bleed in. Compliance card download today in the office indicates an AHI of 0.4 with an average use of 7 hours at night. She tolerates the pressure. She has a nasal mask. She does occasionally noted breathing through her mouth at night and wakes with a dry mouth. She does feel she sleeps better on therapy and wakes more refreshed. She denies any morning headaches.      She also has a history of mild Asthma. She notes allergies as a trigger for her. She has felt her Allergy symptoms are often worse in the Spring months. She is compliant on Singulair and a Xopenex HFA inhaler. She had been on Qvar in the past. She felt it was adding to increased cough. She states she only used her Xopenex a few times this past winter due to cold weather triggers. Spirometry 2/28/2017 indicated an FEV1 of 1.95 L, 76% predicted with an FEV1/FVC ratio of 76 and a borderline positive bronchodilator response. PFT's March 2018 indicated an FEV1 of 2.37 L, 94% predicted with an FEV1/FVC ratio of 81 and a DLCO of 130% predicted.   Spirometry today in the office indicates an FEV1 of 2.29 L, 93% predicted with an FEV1/FVC ratio of 79.      She denies current cough, mucous or wheeze. She notes mild dyspnea with exertion which is stable. She has a history of seasonal allergies and prior sinusitis. She is using the saline rinse. She is also using a Flonase nasal spray. She has seen ENT in the past as well. She was referred back to ENT and was placed on an Astelin nasal spray.       She had been on Xarelto in the past, apparently for paroxysmal atrial fibrillation, and is now on warfarin without unusual bleeding. She is followed by Dr. Sorto, Cardiology. She is on Multaq.          Past Medical History:   Diagnosis Date   • Allergy    • Anxiety    • Arrhythmia    • Arthritis    • Asthma    • Carotid bruit     Left   • CATARACT    • Chest pain    • DIABETES MELLITUS    • Esophageal reflux    • Goiter    • Headache(784.0)    • Heart murmur    • Hyperlipidemia    • Hypertension    • IBD (inflammatory bowel disease)    • Migraine     Silent migraine   • AVERY (obstructive sleep apnea)    • Overweight(278.02)    • Palpitations    • Paroxysmal atrial fibrillation (HCC) 7/12/13    woke with palps, AFib per ekg in am.   • Pneumonia     Nov. 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 Badillo   • Urinary tract infection, site not specified        Past Surgical History:   Procedure Laterality Date   • CATARACT PHACO WITH IOL Right 10/25/2016    Procedure: CATARACT PHACO WITH IOL;  Surgeon: Zoran Gamble M.D.;  Location: SURGERY SAME DAY [facility];  Service:    • ABDOMINAL HYSTERECTOMY TOTAL     • CHOLECYSTECTOMY     • TONSILLECTOMY     • US-NEEDLE CORE BX-BREAST PANEL         Family History   Problem Relation Age of Onset   • Cancer Maternal Aunt    • Diabetes Maternal Aunt    • Heart Disease Maternal Aunt    • Hypertension Maternal Aunt    • Hyperlipidemia Maternal Aunt    • Cancer Mother         Leukemia   • Hypertension Mother    • Diabetes Mother    • Lung Disease Father    • Cancer Father         Lung   • Lung Disease Brother    • Stroke Brother    • Diabetes Brother    • Heart Disease Brother    • Diabetes Maternal Grandmother    • Hypertension Brother    • Diabetes Brother        Social History     Social History   • Marital status:      Spouse name: N/A   • Number of children: N/A    • Years of education: N/A     Occupational History   • Not on file.     Social History Main Topics   • Smoking status: Former Smoker     Packs/day: 1.00     Years: 20.00     Types: Cigarettes     Quit date: 1/1/1983   • Smokeless tobacco: Never Used   • Alcohol use No   • Drug use: No   • Sexual activity: No     Other Topics Concern   • Not on file     Social History Narrative   • No narrative on file           ROS:  Constitutional: Denies fevers, chills, sweats, weight loss  Eyes: Denies glasses, vision loss, pain, drainage, double vision  Ears/Nose/Mouth/Throat: Denies ear ache, difficulty hearing, sore throat, persistent hoarseness, decayed teeth/toothache  Cardiovascular: Denies chest pain, tightness, palpitations, swelling in feet/legs, fainting, difficulty breathing when laying down  Respiratory: See HPI   GI: Denies heartburn, difficulty swallowing, nausea, vomiting, abdominal pain, diarrhea, constipation  : Denies frequent urination, painful urination  Integumentary: Denies rashes, lumps or color changes  MSK: Denies painful joints, sore muscles, and back pain.   Neurological: Denies frequent headaches, dizziness, weakness  Sleep: See HPI       Current Outpatient Prescriptions   Medication Sig Dispense Refill   • furosemide (LASIX) 80 MG Tab Take 1 Tab by mouth every 48 hours for 90 days. 45 Tab 3   • rosuvastatin (CRESTOR) 10 MG Tab Take 1 Tab by mouth every evening. 100 Tab 3   • dronedarone (MULTAQ) 400 MG Tab Take 1 Tab by mouth 2 times a day, with meals. 60 Tab 0   • Semaglutide (OZEMPIC) 0.25 or 0.5 MG/DOSE Solution Pen-injector Inject 1 mg as instructed every 7 days. 2 PEN 0   • losartan (COZAAR) 50 MG Tab Take 1 Tab by mouth 2 Times a Day. 180 Tab 0   • levothyroxine (SYNTHROID) 50 MCG Tab Take 1 Tab by mouth Every morning on an empty stomach. 90 Tab 2   • terazosin (HYTRIN) 1 MG Cap Take 1 Cap by mouth every day. 30 Cap 4   • metoprolol (LOPRESSOR) 50 MG Tab Take 1 Tab by mouth 2 times a day.  180 Tab 1   • warfarin (COUMADIN) 3 MG Tab Take 1 to 1.5 tablets by mouth daily as directed by the coumadin clinic 135 Tab 1   • ferrous sulfate 325 (65 Fe) MG tablet Take 325 mg by mouth every day.     • azelastine (ASTELIN) 137 MCG/SPRAY nasal spray Andrews 1 Spray in nose 2 times a day as needed.     • allopurinol (ZYLOPRIM) 100 MG Tab Take 200 mg by mouth 2 Times a Day.     • Clonazepam 0.5 MG TABLET DISPERSIBLE Take 0.25-0.5 tablet by mouth 1 time daily as needed.     • beclomethasone HFA (QVAR REDIHALER) 40 MCG/ACT inhaler INHALE 2 PUFFS BY MOUTH TWICE DAILY 2 Inhaler 0   • levalbuterol (XOPENEX HFA) 45 MCG/ACT inhaler Inhale 2 Puffs by mouth every four hours as needed for Shortness of Breath. 1 Inhaler 11   • montelukast (SINGULAIR) 10 MG Tab Take 1 Tab by mouth every bedtime. 30 Tab 11   • fluticasone (FLONASE) 50 MCG/ACT nasal spray USE 1-2  SPRAY(S) IN EACH NOSTRIL ONCE DAILY 30 MINUTES PRIOR TO USE OF CPAP 3 Bottle 3   • omeprazole (PRILOSEC) 20 MG delayed-release capsule Take 1 Cap by mouth every day. 90 Cap 3   • Blood Glucose Monitoring Suppl Supplies Misc Test strips order: Test strips for Accucheck meter. Sig: use BID and prn ssx high or low sugar #100 RF x 3 100 Strip 11   • Blood Glucose Monitoring Suppl Supplies Misc Test strips order: Test strips for Chely Contour meter. Sig: use tid  and prn ssx high or low sugar #100 RF x 3 100 Strip 3   • Urine Glucose-Ketones Test Strip 1 Strip by In Vitro route every day. 100 Strip 2   • glucose blood (FREESTYLE LITE) strip 1 Strip by Other route 2 times a day, with meals. 50 Strip 11   • Insulin Pen Needle 32G X 4 MM Misc 1 Applicator by Does not apply route 3 times a day. 100 Each 11   • Blood Glucose Monitoring Suppl W/DEVICE Kit 1 Each by Does not apply route 2 times a day as needed. 1 Kit 0   • Misc. Devices MISC Pen needles for Lantus Solstar. 29g 12mm. 90 Each 3   • Cholecalciferol (VITAMIN D) 2000 UNITS CAPS Take 4,000 Units by mouth every day.     •  "magnesium oxide (MAGNESIUM OXIDE) 400 (241.3 MG) MG TABS tablet Take 400 mg by mouth every day.     • dronedarone (MULTAQ) 400 MG Tab Take 1 Tab by mouth 2 times a day, with meals. (Patient not taking: Reported on 4/17/2019) 60 Tab 0     No current facility-administered medications for this visit.        Allergies   Allergen Reactions   • Amlodipine Itching   • Amaryl      GI Problems   • Avandia [Rosiglitazone Maleate]      GI problems   • Cipro Xr      Possibly causes Chills.   • Clonidine      Rapid heart rate, swelling    • Contrast Media With Iodine [Iodine] Hives   • Glipizide      GI Problems   • Glucophage [Metformin Hydrochloride]      dizzy   • Hydralazine      Rapid heart rate, chest tightness   • Levaquin      Possible allergy - chills with cipro but sick   • Relafen [Nabumetone]      Itching; avoids all nsaids       /60 (BP Location: Left arm, Patient Position: Sitting, BP Cuff Size: Adult)   Pulse 66   Resp 16   Ht 1.702 m (5' 7\")   Wt 122 kg (269 lb)   SpO2 95%     PE:   Appearance: Well developed, well nourished, no acute distress  Eyes: PERRL, EOM intact, sclera white, conjunctiva moist  Ears: no lesions or deformities  Hearing: grossly intact  Nose: no lesions or deformities  Oropharynx: tongue normal, posterior pharynx without erythema or exudate  Mallampati Classification: Class 2   Neck: supple, trachea midline, no masses   Respiratory effort: no intercostal retractions or use of accessory muscles  Lung auscultation: no rales, rhonchi or wheezes  Heart auscultation: no murmur rub or gallop  Extremities: no cyanosis or edema  Abdomen: soft ,non tender, no masses  Gait and Station: normal  Digits and nails: no clubbing, cyanosis, petechiae or nodes.  Cranial nerves: grossly intact  Skin: no rashes, lesions or ulcers noted  Orientation: Oriented to time, person and place  Mood and affect: mood and affect appropriate, normal interaction with examiner  Judgement: " Intact          Assessment:    1. Mild intermittent asthma without complication     2. AVERY (obstructive sleep apnea)  DME Mask and Supplies   3. BMI 40.0-44.9, adult ([facility])     4. Allergic rhinitis, unspecified seasonality, unspecified trigger     5. PAF (paroxysmal atrial fibrillation) ([facility])     6. Chronic anticoagulation           Plan:    1) Spirometry reviewed, stable. She is off Qvar currently. She felt it was adding to increased cough. We discussed step wise treatment approach to Asthma. Continue Singulair 10 mg and Xopenex HFA inhaler as needed.   2) Continue to follow with ENT. Continue saline rinse, Flonase and Astelin nasal sprays.   3) Patient is up to date on her Prevnar 13, Pneumovax 23 and Influenza vaccinations.  4) Continue CPAP at 10 CM H20 with 3 LPM 02 bleed in. Order to DME to fit for full face mask of choice.   5) Sleep hygiene discussed. Weight loss recommended.  6) Continue to follow with Cardiology.   7) Follow up in 6 months at the Pulmonary Clinic, sooner OV if needed.

## 2019-04-17 NOTE — PATIENT INSTRUCTIONS
Plan:    1) Spirometry reviewed, stable. She is off Qvar currently. She felt it was adding to increased cough. We discussed step wise treatment approach to Asthma. Continue Singulair 10 mg and Xopenex HFA inhaler as needed.   2) Continue to follow with ENT. Continue saline rinse, Flonase and Astelin nasal sprays.   3) Patient is up to date on her Prevnar 13, Pneumovax 23 and Influenza vaccinations.  4) Continue CPAP at 10 CM H20 with 3 LPM 02 bleed in. Order to DME to fit for full face mask of choice.   5) Sleep hygiene discussed. Weight loss recommended.  6) Continue to follow with Cardiology.   7) Follow up in 6 months at the Pulmonary Clinic, sooner OV if needed.

## 2019-04-17 NOTE — PROCEDURES
Good patient effort & cooperation. The results of this test meet the ATS standards for acceptability and repeatability. Test was performed on the [employer]/D spirometry system. Predicted values were N-Prem. A bronchodilator of Ventolin HFA - 2 puffs with a spacer was administered to the patient.    Spirometry performed on April 17, 2019 shows normal mid flows, normal FEV1 and normal forced vital capacity.  Bronchodilator response was not seen.  Coving of the expiratory limb does suggest a potential obstructive pattern but baseline measurements are within normal range.  Good effort noted

## 2019-04-19 ENCOUNTER — PATIENT OUTREACH (OUTPATIENT)
Dept: HEALTH INFORMATION MANAGEMENT | Facility: OTHER | Age: 74
End: 2019-04-19

## 2019-04-19 NOTE — PROGRESS NOTES
"TC from pt who reports she is unable to attend meeting in person due to a fall aprox. One week ago;  PCP recommending rest. Pt would like SW to review documents via e-mail and f/u at later time/date. E-mail received and reviewed by SW with recommendations to adjust pg 3 statement of \"bimonthly\" to \"biweekly.\" Secured return e-mail sent to pt. TC from pt as f/u to e-mail and review off application. Pt anticipates dropping off document at Welfare and Supportive Service Office.     Pt reports she no longer is on Qvar therefore no longer needs to pursue [facility] PAP. Pt is agreeable to receiving this papers for possible future needs if placed back on this medication. SW educated pt that [facility] will be transitioning populations and will be unable to follow pt past 06/01/2019; pt will be referred to community resources past that date. Pt understands and is agreeable to graduation at next TC.    Plan:  TC to pt at later time/date  Graduation from [facility] SW Services if no social needs arise at that time and pt expresses confidence in carrying out Medicaid/SNAP related activities independantly    "

## 2019-04-22 ENCOUNTER — APPOINTMENT (OUTPATIENT)
Dept: MEDICAL GROUP | Facility: LAB | Age: 74
End: 2019-04-22
Payer: MEDICARE

## 2019-04-25 DIAGNOSIS — I10 ESSENTIAL HYPERTENSION: ICD-10-CM

## 2019-04-25 RX ORDER — LOSARTAN POTASSIUM 50 MG/1
50 TABLET ORAL 2 TIMES DAILY
Qty: 200 TAB | Refills: 1 | Status: SHIPPED | OUTPATIENT
Start: 2019-04-25 | End: 2020-01-22 | Stop reason: SDUPTHER

## 2019-05-03 DIAGNOSIS — K21.9 GASTROESOPHAGEAL REFLUX DISEASE, ESOPHAGITIS PRESENCE NOT SPECIFIED: ICD-10-CM

## 2019-05-03 DIAGNOSIS — R00.2 PALPITATIONS: ICD-10-CM

## 2019-05-03 DIAGNOSIS — I48.0 PAF (PAROXYSMAL ATRIAL FIBRILLATION) (HCC): ICD-10-CM

## 2019-05-03 RX ORDER — METOPROLOL TARTRATE 50 MG/1
50 TABLET, FILM COATED ORAL 2 TIMES DAILY
Qty: 180 TAB | Refills: 1 | Status: SHIPPED | OUTPATIENT
Start: 2019-05-03 | End: 2019-10-28 | Stop reason: SDUPTHER

## 2019-05-03 RX ORDER — OMEPRAZOLE 20 MG/1
20 CAPSULE, DELAYED RELEASE ORAL DAILY
Qty: 90 CAP | Refills: 3 | Status: SHIPPED | OUTPATIENT
Start: 2019-05-03 | End: 2019-07-23 | Stop reason: SDUPTHER

## 2019-05-03 NOTE — TELEPHONE ENCOUNTER
----- Message from Whitley Frederick sent at 5/2/2019  6:11 PM PDT -----  Regarding: Prescription Question  Contact: [phone]  Miles Arias... requesting a refill to the [facility] on [address] for my Multaq  400mg two a day and also   a new  90 prescription of  Omeprazol 20mg one a day I think it’s 20mg [facility] [address] ...  appreciate it thank you ... have a great weekend...

## 2019-05-03 NOTE — TELEPHONE ENCOUNTER
Was the patient seen in the last year in this department? Yes  4/16/19  Does patient have an active prescription for medications requested? No     Received Request Via: Patient

## 2019-05-06 DIAGNOSIS — I48.0 PAF (PAROXYSMAL ATRIAL FIBRILLATION) (HCC): ICD-10-CM

## 2019-05-07 ENCOUNTER — PATIENT OUTREACH (OUTPATIENT)
Dept: HEALTH INFORMATION MANAGEMENT | Facility: OTHER | Age: 74
End: 2019-05-07

## 2019-05-07 ENCOUNTER — TELEPHONE (OUTPATIENT)
Dept: ENDOCRINOLOGY | Facility: MEDICAL CENTER | Age: 74
End: 2019-05-07

## 2019-05-07 RX ORDER — PIOGLITAZONEHYDROCHLORIDE 15 MG/1
15 TABLET ORAL DAILY
Qty: 30 TAB | Refills: 11 | Status: SHIPPED | OUTPATIENT
Start: 2019-05-07 | End: 2019-06-05 | Stop reason: SDUPTHER

## 2019-05-07 NOTE — PROGRESS NOTES
TC from pt who inquired whether a PAP program was available for pt's Mutaq; pt has been receiving med through [facility] and they are currently out. Pt paid out of pocket and is concerned about ongoing cost. Pt unable to apply for one PAP program as program requires no insurance, alternative PAP is the PAN program. Contact information provided.    Pt is still collecting medical bills for MAABD application to ensure she qualifies for SNAP.     SW discussed social needs; pt confirms SW has met pt's social needs and no additional social needs exist. SW educated pt [facility] will be transitioning populations nad unable to follow pt past 06/28/2019. Pt educated on social resources to include [facility] walk in  services and a resource guide [facility] will mail. Pt declined f/u TC from [facility] and was agreeable to d/c at this time.     Goal: Whitley identifies potential community resources and services available to help decrease medication and copay costs over the next 90 days.  Barriers:  Pt over income for welfare programs  Pt uses multiple specialists  Pt has multiple medications which are required  Interventions:   [facility] will educate pt on routes of decreasing medical and medication costs and/or re-allocations funds for medications/medical in person budget   01/11/2019 Mailed Food is Medicine  PAP applications for:  Ozempic (Will not meet out of pocket requirements)  QVar (PAP mailed, was taken off medication)  Multaq (PAN contact information provided)   Mailed MAABD application  [facility] will educate pt on routes of completing POA   01/11/2019 Mailed POA hazel  Start Date: 01/11/19  End Date:  05/07/19    Plan:  Pt graduation from [facility] Services as goals have been met and pt denies additional social needs  Mailed:   Resource Guide Book

## 2019-05-07 NOTE — TELEPHONE ENCOUNTER
Was the patient seen in the last year in this department? Yes    Does patient have an active prescription for medications requested? No     Received Request Via: Patient      [facility] to [facility]

## 2019-05-08 ENCOUNTER — ANTICOAGULATION VISIT (OUTPATIENT)
Dept: VASCULAR LAB | Facility: MEDICAL CENTER | Age: 74
End: 2019-05-08
Attending: INTERNAL MEDICINE
Payer: MEDICARE

## 2019-05-08 ENCOUNTER — HOSPITAL ENCOUNTER (OUTPATIENT)
Dept: LAB | Facility: MEDICAL CENTER | Age: 74
End: 2019-05-08
Attending: INTERNAL MEDICINE
Payer: MEDICARE

## 2019-05-08 DIAGNOSIS — Z79.01 CHRONIC ANTICOAGULATION: ICD-10-CM

## 2019-05-08 DIAGNOSIS — I48.0 PAF (PAROXYSMAL ATRIAL FIBRILLATION) (HCC): ICD-10-CM

## 2019-05-08 LAB
ALBUMIN SERPL BCP-MCNC: 3.7 G/DL (ref 3.2–4.9)
BUN SERPL-MCNC: 59 MG/DL (ref 8–22)
CALCIUM SERPL-MCNC: 9.3 MG/DL (ref 8.5–10.5)
CHLORIDE SERPL-SCNC: 107 MMOL/L (ref 96–112)
CO2 SERPL-SCNC: 24 MMOL/L (ref 20–33)
CREAT SERPL-MCNC: 2.02 MG/DL (ref 0.5–1.4)
CREAT UR-MCNC: 160.9 MG/DL
FERRITIN SERPL-MCNC: 134.1 NG/ML (ref 10–291)
GLUCOSE SERPL-MCNC: 147 MG/DL (ref 65–99)
HCT VFR BLD AUTO: 36.6 % (ref 37–47)
HGB BLD-MCNC: 11.8 G/DL (ref 12–16)
INR BLD: 2.3 (ref 0.9–1.2)
INR PPP: 2.3 (ref 2–3.5)
IRON SATN MFR SERPL: 23 % (ref 15–55)
IRON SERPL-MCNC: 81 UG/DL (ref 40–170)
PHOSPHATE SERPL-MCNC: 3.3 MG/DL (ref 2.5–4.5)
POTASSIUM SERPL-SCNC: 4.2 MMOL/L (ref 3.6–5.5)
PROT UR-MCNC: 23 MG/DL (ref 0–15)
PROT/CREAT UR: 143 MG/G (ref 10–107)
SODIUM SERPL-SCNC: 140 MMOL/L (ref 135–145)
TIBC SERPL-MCNC: 351 UG/DL (ref 250–450)

## 2019-05-08 PROCEDURE — 80069 RENAL FUNCTION PANEL: CPT

## 2019-05-08 PROCEDURE — 36415 COLL VENOUS BLD VENIPUNCTURE: CPT

## 2019-05-08 PROCEDURE — 85610 PROTHROMBIN TIME: CPT

## 2019-05-08 PROCEDURE — 82728 ASSAY OF FERRITIN: CPT

## 2019-05-08 PROCEDURE — 83540 ASSAY OF IRON: CPT

## 2019-05-08 PROCEDURE — 83550 IRON BINDING TEST: CPT

## 2019-05-08 PROCEDURE — 99211 OFF/OP EST MAY X REQ PHY/QHP: CPT

## 2019-05-08 PROCEDURE — 82570 ASSAY OF URINE CREATININE: CPT

## 2019-05-08 PROCEDURE — 85018 HEMOGLOBIN: CPT

## 2019-05-08 PROCEDURE — 85014 HEMATOCRIT: CPT

## 2019-05-08 PROCEDURE — 84156 ASSAY OF PROTEIN URINE: CPT

## 2019-05-08 NOTE — PROGRESS NOTES
Anticoagulation Summary  As of 2019    INR goal:   2.0-3.0   TTR:   69.6 % (3.5 y)   INR used for dosin.30 (2019)   Warfarin maintenance plan:   1.5 mg (3 mg x 0.5) every Wed; 3 mg (3 mg x 1) all other days   Weekly warfarin total:   19.5 mg   Weekly max warfarin dose:   21 mg   No change documented:   Gilma Hirsch   Plan last modified:   Juan Bonds, PharmD (3/6/2019)   Next INR check:   2019   Target end date:   Indefinite    Indications    Chronic anticoagulation [Z79.01]  Chronic anticoagulation (Resolved) [Z79.01]  PAF (paroxysmal atrial fibrillation) (HCC) [I48.0]             Anticoagulation Episode Summary     INR check location:       Preferred lab:       Send INR reminders to:       Comments:         Anticoagulation Care Providers     Provider Role Specialty Phone number    Amy Lincoln M.D. Referring Cardiology [phone]    [facility] Anticoagulation Services Responsible  [phone]        Anticoagulation Patient Findings  Patient Findings     Positives:   Signs/symptoms of thrombosis, Bruising    Negatives:   Signs/symptoms of bleeding, Laboratory test error suspected, Change in health, Change in alcohol use, Change in activity, Upcoming invasive procedure, Emergency department visit, Upcoming dental procedure, Missed doses, Extra doses, Change in medications, Change in diet/appetite, Hospital admission, Other complaints    Comments:   Patient took a fall in mid April and was checked in ER with no reprocussions. However, patient has since showed increased swelling in leg and will be going to get it checked out          HPI:  Whitley Frederick seen in clinic today, on anticoagulation therapy with warfarin for PAF.  Changes to current medical/health status since last appt: Patient took a fall in Mid April and was checked in ER. Patient had extensive bruising, but no hematomas or bleeding. Patient having trouble with knee and may get it looked at if continues to  bother her.   Denies signs/symptoms of bleeding and/or thrombosis since the last appt.    Denies any interval changes to diet  Denies any interval changes to medications since last appt.   Denies any complications or cost restrictions with current therapy.   BP declined today.  Confirmed current dosing regimen.     Patient's previous INR was therapeutic at 2.3 on 4/12/19, at which time patient was instructed to continue with current warfarin regimen. She returns to clinic today to recheck INR to ensure it is therapeutic and thus preventing possible clotting and/or bleeding/bruising complications.    A/P   INR is therapeutic today at 2.3.  Patient instructed to continue with the current warfarin dosing regimen, and asked to follow up again in 4 weeks (per pt preference).    Next appt: Friday, June 7, 2019  1:30pm in [facility]    Nellie Hirsch PharmD

## 2019-05-13 ENCOUNTER — APPOINTMENT (OUTPATIENT)
Dept: MEDICAL GROUP | Facility: LAB | Age: 74
End: 2019-05-13
Payer: MEDICARE

## 2019-05-21 ENCOUNTER — TELEPHONE (OUTPATIENT)
Dept: MEDICAL GROUP | Facility: LAB | Age: 74
End: 2019-05-21

## 2019-05-21 NOTE — TELEPHONE ENCOUNTER
ESTABLISHED PATIENT PRE-VISIT PLANNING     Patient was NOT contacted to complete PVP.     Note: Patient will not be contacted if there is no indication to call.     1.  Reviewed notes from the last few office visits within the medical group: Yes    2.  If any orders were placed at last visit or intended to be done for this visit (i.e. 6 mos follow-up), do we have Results/Consult Notes?        •  Labs - Cologuard ordered, Patient not completed yet.   Note: If patient appointment is for lab review and patient did not complete labs, check with provider if OK to reschedule patient until labs completed.       •  Imaging - Imaging was not ordered at last office visit.       •  Referrals - No referrals were ordered at last office visit.    3. Is this appointment scheduled as a Hospital Follow-Up? No    4.  Immunizations were updated in Epic using WebIZ?: Epic matches WebIZ       •  Web Iz Recommendations: SHINGRIX (Shingles)    5.  Patient is due for the following Health Maintenance Topics:   Health Maintenance Due   Topic Date Due   • IMM HEP B VACCINE (1 of 3 - Risk 3-dose series) 03/19/1964   • COLOGUARD STOOL DNA  03/19/1996   • IMM ZOSTER VACCINES (1 of 2) 03/19/1996   • DIABETES MONOFILAMENT / LE EXAM  11/02/2018   • RETINAL SCREENING  03/21/2019   • Annual Wellness Visit  04/11/2019       - Patient has completed FLU, PNEUMOVAX (PPSV23), PREVNAR (PCV13)  and TDAP Immunization(s) per WebIZ. Chart has been updated.    6. Orders for overdue Health Maintenance topics pended in Pre-Charting? N\A    7.  AHA (MDX) form printed for Provider? No, already completed    8.  Patient was NOT informed to arrive 15 min prior to their scheduled appointment and bring in their medication bottles.

## 2019-05-22 ENCOUNTER — OFFICE VISIT (OUTPATIENT)
Dept: MEDICAL GROUP | Facility: LAB | Age: 74
End: 2019-05-22
Payer: MEDICARE

## 2019-05-22 VITALS
BODY MASS INDEX: 41.59 KG/M2 | OXYGEN SATURATION: 97 % | HEART RATE: 56 BPM | HEIGHT: 67 IN | TEMPERATURE: 97.6 F | SYSTOLIC BLOOD PRESSURE: 120 MMHG | DIASTOLIC BLOOD PRESSURE: 50 MMHG | RESPIRATION RATE: 16 BRPM | WEIGHT: 265 LBS

## 2019-05-22 DIAGNOSIS — N18.4 TYPE 2 DIABETES MELLITUS WITH STAGE 4 CHRONIC KIDNEY DISEASE, WITHOUT LONG-TERM CURRENT USE OF INSULIN (HCC): ICD-10-CM

## 2019-05-22 DIAGNOSIS — N18.4 CKD (CHRONIC KIDNEY DISEASE) STAGE 4, GFR 15-29 ML/MIN (HCC): ICD-10-CM

## 2019-05-22 DIAGNOSIS — I70.0 ATHEROSCLEROSIS OF AORTA (HCC): ICD-10-CM

## 2019-05-22 DIAGNOSIS — J96.11 CHRONIC RESPIRATORY FAILURE WITH HYPOXIA (HCC): ICD-10-CM

## 2019-05-22 DIAGNOSIS — B37.2 CANDIDAL INTERTRIGO: ICD-10-CM

## 2019-05-22 DIAGNOSIS — Z00.00 MEDICARE ANNUAL WELLNESS VISIT, SUBSEQUENT: ICD-10-CM

## 2019-05-22 DIAGNOSIS — G47.33 OSA (OBSTRUCTIVE SLEEP APNEA): ICD-10-CM

## 2019-05-22 DIAGNOSIS — N18.6 END STAGE RENAL DISEASE (HCC): ICD-10-CM

## 2019-05-22 DIAGNOSIS — Z99.81 DEPENDENCE ON NOCTURNAL OXYGEN THERAPY: ICD-10-CM

## 2019-05-22 DIAGNOSIS — E66.01 MORBID OBESITY WITH BMI OF 45.0-49.9, ADULT (HCC): ICD-10-CM

## 2019-05-22 DIAGNOSIS — R00.2 PALPITATIONS: ICD-10-CM

## 2019-05-22 DIAGNOSIS — I10 ESSENTIAL HYPERTENSION: ICD-10-CM

## 2019-05-22 DIAGNOSIS — E11.22 TYPE 2 DIABETES MELLITUS WITH STAGE 4 CHRONIC KIDNEY DISEASE, WITHOUT LONG-TERM CURRENT USE OF INSULIN (HCC): ICD-10-CM

## 2019-05-22 DIAGNOSIS — E03.4 HYPOTHYROIDISM DUE TO ACQUIRED ATROPHY OF THYROID: ICD-10-CM

## 2019-05-22 DIAGNOSIS — I48.0 PAF (PAROXYSMAL ATRIAL FIBRILLATION) (HCC): ICD-10-CM

## 2019-05-22 DIAGNOSIS — Z78.9 NO IMMUNIZATION HISTORY RECORD: ICD-10-CM

## 2019-05-22 DIAGNOSIS — G89.29 CHRONIC PAIN OF BOTH SHOULDERS: ICD-10-CM

## 2019-05-22 DIAGNOSIS — N25.81 SECONDARY HYPERPARATHYROIDISM OF RENAL ORIGIN (HCC): ICD-10-CM

## 2019-05-22 DIAGNOSIS — I44.7 LEFT BUNDLE BRANCH BLOCK: ICD-10-CM

## 2019-05-22 DIAGNOSIS — E04.2 MULTIPLE THYROID NODULES: ICD-10-CM

## 2019-05-22 DIAGNOSIS — E78.5 DYSLIPIDEMIA: ICD-10-CM

## 2019-05-22 DIAGNOSIS — I77.1 STRICTURE OF ARTERY (HCC): ICD-10-CM

## 2019-05-22 DIAGNOSIS — F41.9 ANXIETY: ICD-10-CM

## 2019-05-22 DIAGNOSIS — I65.29 STENOSIS OF CAROTID ARTERY, UNSPECIFIED LATERALITY: ICD-10-CM

## 2019-05-22 DIAGNOSIS — M1A.0790 CHRONIC IDIOPATHIC GOUT INVOLVING TOE WITHOUT TOPHUS, UNSPECIFIED LATERALITY: ICD-10-CM

## 2019-05-22 DIAGNOSIS — M25.511 CHRONIC PAIN OF BOTH SHOULDERS: ICD-10-CM

## 2019-05-22 DIAGNOSIS — R15.9 INCONTINENCE OF FECES, UNSPECIFIED FECAL INCONTINENCE TYPE: ICD-10-CM

## 2019-05-22 DIAGNOSIS — J45.20 MILD INTERMITTENT ASTHMA WITHOUT COMPLICATION: ICD-10-CM

## 2019-05-22 DIAGNOSIS — Z79.01 CHRONIC ANTICOAGULATION: ICD-10-CM

## 2019-05-22 DIAGNOSIS — M25.512 CHRONIC PAIN OF BOTH SHOULDERS: ICD-10-CM

## 2019-05-22 PROBLEM — E11.41 DIABETIC MONONEUROPATHY ASSOCIATED WITH TYPE 2 DIABETES MELLITUS (HCC): Status: RESOLVED | Noted: 2017-11-02 | Resolved: 2019-05-22

## 2019-05-22 PROBLEM — I49.3 PVC (PREMATURE VENTRICULAR CONTRACTION): Status: RESOLVED | Noted: 2018-02-23 | Resolved: 2019-05-22

## 2019-05-22 PROCEDURE — G0439 PPPS, SUBSEQ VISIT: HCPCS | Performed by: NURSE PRACTITIONER

## 2019-05-22 RX ORDER — NYSTATIN 100000 U/G
OINTMENT TOPICAL
Qty: 240 G | Refills: 2 | Status: SHIPPED | OUTPATIENT
Start: 2019-05-22 | End: 2020-10-20

## 2019-05-22 ASSESSMENT — ENCOUNTER SYMPTOMS: GENERAL WELL-BEING: FAIR

## 2019-05-22 ASSESSMENT — ACTIVITIES OF DAILY LIVING (ADL): BATHING_REQUIRES_ASSISTANCE: 0

## 2019-05-22 ASSESSMENT — PATIENT HEALTH QUESTIONNAIRE - PHQ9: CLINICAL INTERPRETATION OF PHQ2 SCORE: 0

## 2019-05-22 NOTE — PROGRESS NOTES
Annual wellness    HPI:  Whitley is a 74 y.o. here for Medicare Annual Wellness Visit.  Denies any specific complaints or concerns today.  She is also followed by cardiology, pulmonology, nephrology and the Coumadin clinic.    Patient Active Problem List    Diagnosis Date Noted   • Essential hypertension 05/10/2016     Priority: High   • Atherosclerosis of aorta (CMS-[facility]) 04/29/2015     Priority: High   • Chronic respiratory failure with hypoxia ([facility]) 04/29/2015     Priority: High   • Morbid obesity with BMI of 45.0-49.9, adult ([facility]) 04/29/2015     Priority: High   • Anxiety 01/09/2014     Priority: High   • PAF (paroxysmal atrial fibrillation) ([facility]) 07/12/2013     Priority: High   • Type 2 diabetes mellitus with stage 4 chronic kidney disease, without long-term current use of insulin ([facility])      Priority: High   • Palpitations      Priority: High   • CKD (chronic kidney disease) stage 4, GFR 15-29 ml/min ([facility])      Priority: High   • Mild intermittent asthma without complication 08/24/2016     Priority: Medium   • Chronic anticoagulation 06/10/2016     Priority: Medium   • AVERY (obstructive sleep apnea) 05/10/2016     Priority: Medium   • Chronic idiopathic gout involving toe without tophus 04/30/2015     Priority: Medium   • Left bundle branch block 03/05/2012     Priority: Medium   • Multiple thyroid nodules 10/07/2015     Priority: Low   • Stenosis of carotid artery 05/30/2014     Priority: Low   • Secondary hyperparathyroidism of renal origin ([facility]) 05/14/2018   • Hypothyroidism due to acquired atrophy of thyroid 04/10/2018   • PVC (premature ventricular contraction) 02/23/2018   • Dependence on nocturnal oxygen therapy 11/02/2017   • Diabetic mononeuropathy associated with type 2 diabetes mellitus ([facility]) 11/02/2017   • Incontinence of feces 05/18/2017   • Chronic pain of both shoulders 03/23/2017   • Dyslipidemia 12/14/2016       Current Outpatient Prescriptions   Medication Sig Dispense Refill   • pioglitazone  (ACTOS) 15 MG Tab Take 1 Tab by mouth every day for 30 days. 30 Tab 11   • dronedarone (MULTAQ) 400 MG Tab Take 1 Tab by mouth 2 times a day, with meals. 60 Tab 6   • omeprazole (PRILOSEC) 20 MG delayed-release capsule Take 1 Cap by mouth every day. 90 Cap 3   • metoprolol (LOPRESSOR) 50 MG Tab Take 1 Tab by mouth 2 times a day. 180 Tab 1   • losartan (COZAAR) 50 MG Tab Take 1 Tab by mouth 2 Times a Day. 200 Tab 1   • furosemide (LASIX) 80 MG Tab Take 1 Tab by mouth every 48 hours for 90 days. 45 Tab 3   • rosuvastatin (CRESTOR) 10 MG Tab Take 1 Tab by mouth every evening. 100 Tab 3   • Semaglutide (OZEMPIC) 0.25 or 0.5 MG/DOSE Solution Pen-injector Inject 1 mg as instructed every 7 days. 2 PEN 0   • levothyroxine (SYNTHROID) 50 MCG Tab Take 1 Tab by mouth Every morning on an empty stomach. 90 Tab 2   • terazosin (HYTRIN) 1 MG Cap Take 1 Cap by mouth every day. 30 Cap 4   • warfarin (COUMADIN) 3 MG Tab Take 1 to 1.5 tablets by mouth daily as directed by the coumadin clinic 135 Tab 1   • ferrous sulfate 325 (65 Fe) MG tablet Take 325 mg by mouth every day.     • azelastine (ASTELIN) 137 MCG/SPRAY nasal spray [place] 1 Spray in nose 2 times a day as needed.     • allopurinol (ZYLOPRIM) 100 MG Tab Take 200 mg by mouth 2 Times a Day.     • Clonazepam 0.5 MG TABLET DISPERSIBLE Take 0.25-0.5 tablet by mouth 1 time daily as needed.     • beclomethasone HFA (QVAR REDIHALER) 40 MCG/ACT inhaler INHALE 2 PUFFS BY MOUTH TWICE DAILY 2 Inhaler 0   • levalbuterol (XOPENEX HFA) 45 MCG/ACT inhaler Inhale 2 Puffs by mouth every four hours as needed for Shortness of Breath. 1 Inhaler 11   • montelukast (SINGULAIR) 10 MG Tab Take 1 Tab by mouth every bedtime. 30 Tab 11   • fluticasone (FLONASE) 50 MCG/ACT nasal spray USE 1-2  SPRAY(S) IN EACH NOSTRIL ONCE DAILY 30 MINUTES PRIOR TO USE OF CPAP 3 Bottle 3   • Blood Glucose Monitoring Suppl Supplies Misc Test strips order: Test strips for Accucheck meter. Sig: use BID and prn ssx high or  low sugar #100 RF x 3 100 Strip 11   • Blood Glucose Monitoring Suppl Supplies Misc Test strips order: Test strips for Chely Contour meter. Sig: use tid  and prn ssx high or low sugar #100 RF x 3 100 Strip 3   • Urine Glucose-Ketones Test Strip 1 Strip by In Vitro route every day. 100 Strip 2   • Insulin Pen Needle 32G X 4 MM Misc 1 Applicator by Does not apply route 3 times a day. 100 Each 11   • Blood Glucose Monitoring Suppl W/DEVICE Kit 1 Each by Does not apply route 2 times a day as needed. 1 Kit 0   • Misc. Devices MISC Pen needles for Lantus Solstar. 29g 12mm. 90 Each 3   • Cholecalciferol (VITAMIN D) 2000 UNITS CAPS Take 4,000 Units by mouth every day.     • magnesium oxide (MAGNESIUM OXIDE) 400 (241.3 MG) MG TABS tablet Take 400 mg by mouth every day.     • glucose blood (FREESTYLE LITE) strip 1 Strip by Other route 2 times a day, with meals. 50 Strip 11     No current facility-administered medications for this visit.         Patient is taking medications as noted in medication list.  Current supplements as per medication list.     Allergies: Amlodipine; Amaryl; Avandia [rosiglitazone maleate]; Cipro xr; Clonidine; Contrast media with iodine [iodine]; Glipizide; Glucophage [metformin hydrochloride]; Hydralazine; Levaquin; and Relafen [nabumetone]    Current social contact/activities: She is involved with friends, family and the community    Is patient current with immunizations? Yes.    She  reports that she quit smoking about 36 years ago. Her smoking use included Cigarettes. She has a 20.00 pack-year smoking history. She has never used smokeless tobacco. She reports that she does not drink alcohol or use drugs.  Counseling given: Not Answered        DPA/Advanced directive: Patient does not have an Advanced Directive.  A packet and workshop information was given on Advanced Directives.    ROS:    Gait: Uses no assistive device   Ostomy: No   Other tubes: No   Amputations: No   Chronic oxygen use Yes at  night  Last eye exam 3/21/18 appt scheduled 5/28/19  Wears hearing aids: No   : Reports urinary leakage during the last 6 months that has somewhat interfered with their daily activities or sleep.      Screening:        Depression Screening    Little interest or pleasure in doing things?  0 - not at all  Feeling down, depressed, or hopeless? 0 - not at all  Patient Health Questionnaire Score: 0    If depressive symptoms identified deferred to follow up visit unless specifically addressed in assessment and plan.    Interpretation of PHQ-9 Total Score   Score Severity   1-4 No Depression   5-9 Mild Depression   10-14 Moderate Depression   15-19 Moderately Severe Depression   20-27 Severe Depression    Screening for Cognitive Impairment    Three Minute Recall (village, kitchen, baby)  3/3    Daron clock face with all 12 numbers and set the hands to show 10 past 10.  Yes    If cognitive concerns identified, deferred for follow up unless specifically addressed in assessment and plan.    Fall Risk Assessment    Has the patient had two or more falls in the last year or any fall with injury in the last year?  No  If fall risk identified, deferred for follow up unless specifically addressed in assessment and plan.    Safety Assessment    Throw rugs on floor.  No  Handrails on all stairs.  Yes  Good lighting in all hallways.  Yes  Difficulty hearing.  Yes  Patient counseled about all safety risks that were identified.    Functional Assessment ADLs    Are there any barriers preventing you from cooking for yourself or meeting nutritional needs?  No.    Are there any barriers preventing you from driving safely or obtaining transportation?  No.    Are there any barriers preventing you from using a telephone or calling for help?  No.    Are there any barriers preventing you from shopping?  No.    Are there any barriers preventing you from taking care of your own finances?  No.    Are there any barriers preventing you from managing  your medications?  No.    Are there any barriers preventing you from showering, bathing or dressing yourself?  No.    Are you currently engaging in any exercise or physical activity?  Yes.     What is your perception of your health?  Fair.    Health Maintenance Summary                IMM HEP B VACCINE Overdue 3/19/1965     COLOGUARD STOOL DNA Overdue 3/19/1996     IMM ZOSTER VACCINES Overdue 3/19/1996     DIABETES MONOFILAMENT / LE EXAM Overdue 11/2/2018      Done 11/2/2017 AMB DIABETIC MONOFILAMENT LOWER EXTREMITY EXAM     Patient has more history with this topic...    RETINAL SCREENING Overdue 3/21/2019      Done 3/21/2018 REFERRAL FOR RETINAL SCREENING EXAM     Patient has more history with this topic...    Annual Wellness Visit Overdue 4/11/2019      Done 4/10/2018 Visit Dx: Medicare annual wellness visit, subsequent     Patient has more history with this topic...    A1C SCREENING Next Due 7/18/2019      Done 1/18/2019 POCT A1C     Patient has more history with this topic...    MAMMOGRAM Next Due 1/18/2020      Done 1/18/2019 MA-SCREENING MAMMO BILAT W/TOMOSYNTHESIS W/CAD     Patient has more history with this topic...    FASTING LIPID PROFILE Next Due 2/23/2020      Done 2/23/2019 LIPID PROFILE      Patient has more history with this topic...    URINE ACR / MICROALBUMIN Next Due 5/8/2020      Done 5/8/2019 PROTEIN/CREAT RATIO URINE      Patient has more history with this topic...    SERUM CREATININE Next Due 5/8/2020      Done 5/8/2019 RENAL FUNCTION PANEL      Patient has more history with this topic...    BONE DENSITY Next Due 1/18/2024      Done 1/18/2019 DS-BONE DENSITY STUDY (DEXA)     Patient has more history with this topic...    IMM DTaP/Tdap/Td Vaccine Next Due 4/11/2029      Done 4/11/2019 Imm Admin: Tdap Vaccine          Patient Care Team:  EDILMA Whelan as PCP - General (Family Medicine)  [facility]  Ania Badillo M.D. as Consulting Physician (Nephrology)  Zoran BLAIR  ANISH Gamble as Consulting Physician (Ophthalmology)  Wei Patterson P.A.-C. as Consulting Physician (Endocrinology)  EDILMA Mosher as Mid Level Provider (Pulmonary Medicine)  [facility] as Respiratory  Domenico Olea M.D. (Gastroenterology)  Tj Servin M.D. (Interventional Cardiology)    Social History   Substance Use Topics   • Smoking status: Former Smoker     Packs/day: 1.00     Years: 20.00     Types: Cigarettes     Quit date: 1/1/1983   • Smokeless tobacco: Never Used   • Alcohol use No     Family History   Problem Relation Age of Onset   • Cancer Maternal Aunt    • Diabetes Maternal Aunt    • Heart Disease Maternal Aunt    • Hypertension Maternal Aunt    • Hyperlipidemia Maternal Aunt    • Cancer Mother         Leukemia   • Hypertension Mother    • Diabetes Mother    • Lung Disease Father    • Cancer Father         Lung   • Lung Disease Brother    • Stroke Brother    • Diabetes Brother    • Heart Disease Brother    • Diabetes Maternal Grandmother    • Hypertension Brother    • Diabetes Brother      She  has a past medical history of Allergy; Anxiety; Arrhythmia; Arthritis; Asthma; Carotid bruit; CATARACT; Chest pain; DIABETES MELLITUS; Esophageal reflux; Goiter; Headache(784.0); Heart murmur; Hyperlipidemia; Hypertension; IBD (inflammatory bowel disease); Migraine; AVERY (obstructive sleep apnea); Overweight(278.02); Palpitations; Paroxysmal atrial fibrillation (HCC) (7/12/13); Pneumonia; Positive reaction to tuberculin skin test; Renal disease; and Urinary tract infection, site not specified. She also has no past medical history of Breast cancer (HCC) or Tuberculosis.   Past Surgical History:   Procedure Laterality Date   • CATARACT PHACO WITH IOL Right 10/25/2016    Procedure: CATARACT PHACO WITH IOL;  Surgeon: Zoran Gamble M.D.;  Location: SURGERY SAME DAY [facility];  Service:    • ABDOMINAL HYSTERECTOMY TOTAL     • CHOLECYSTECTOMY     • TONSILLECTOMY     • US-NEEDLE  "CORE BX-BREAST PANEL             Exam:     Resp 16   Ht 1.702 m (5' 7\")   Wt 120.2 kg (265 lb)  Body mass index is 41.5 kg/m².    Hearing good.    Dentition good  Alert, oriented in no acute distress.  Eye contact is good, speech goal directed, affect calm  CV she is in a normal rhythm today with a regular rate  Respiratory: Clear to auscultation bilaterally  Monofilament testing with a 10 gram force: sensation intact in 4/4 bilaterally.   Visual Inspection: Feet without maceration, ulcers, fissures.       Assessment and Plan. The following treatment and monitoring plan is recommended:    1. Medicare annual wellness visit, subsequent    2. Candidal intertrigo  -chronic and stable.  Refilled nystatin as requested.  - nystatin (MYCOSTATIN) 269323 UNIT/GM Ointment; AAA up to twice daily  Dispense: 240 g; Refill: 2    3. Type 2 diabetes mellitus with stage 4 chronic kidney disease, without long-term current use of insulin ([facility])  -stable in Jan with A1C 6.7 - due for f/u July with endocrinology.  Discussed the importance of avoidance of NSAIDs and remaining well-hydrated.  Discussed a low-sodium diet.  Reviewed her last blood work.  Discussed the importance of following a diabetic diet and getting some exercise.  -eye appt next week.     4. CKD (chronic kidney disease) stage 4, GFR 15-29 ml/min ([facility])  -improved - GFR up to 24 from 18.  Followed by Dr. Badillo every 3 mo.     5. PAF (paroxysmal atrial fibrillation) ([facility])  -anticoagulated.  Followed by cardiology - next appt 8/2019.  Taking multaq 400 mg bid, metoprolol bid, losartan.      6. Anxiety  -stable with prn use of clonazepam.     7. Atherosclerosis of aorta ([facility])  -last ECHO showed aorta with stenosis 2017    8. Chronic respiratory failure with hypoxia ([facility])  -chronic issue and compliant with cpap / oxygen therapy at night.  Last appt Lubna beaulieu 4/2019.  Using prn xopenex, singular, astelin.      9. Morbid obesity with BMI of 45.0-49.9, adult " (HCC)  -improving, lost 4 lbs in a month.     10. Essential hypertension  -stable.  Continue same.      11. Left bundle branch block  -chronic / stable.     12. Chronic idiopathic gout involving toe without tophus, unspecified laterality  -chronic issue.  No recent flare ups.  Uric acid is UTD.  Taking allopurinol    13. AVERY (obstructive sleep apnea)  -complaint with cpap. UTD with pulmonology     14. Chronic anticoagulation  -compliant with coumadin.  Last INR WNL 5/8/2019.      15. Mild intermittent asthma without complication  -stable.  Complaint with singular and prn xopenex.  Off qvar    16. Stenosis of carotid artery, unspecified laterality  -due for update - last done 2014.  On statin therapy.      17. Multiple thyroid nodules  -slight increase in left lower lobe nodule 2017 - due for update.     18. Dyslipidemia  -stable as of 2/2019.  Repeat 8/2019.      19. Chronic pain of both shoulders  -stable.      20. Incontinence of feces, unspecified fecal incontinence type  -stable, occurring 2-3 x per month, not worsening.  Hx of ibs since her 30's.   Planning on seeing GI.  Colonoscopy scheduled.     21. Dependence on nocturnal oxygen therapy  -stable.      22. Hypothyroidism due to acquired atrophy of thyroid  -stable.  Repeat 6 mo    23. Secondary hyperparathyroidism of renal origin (HCC)  -chronic.  PTH UTD.  Due for updated calcium level.       Services suggested: No services needed at this time  Health Care Screening recommendations as per orders if indicated.  Referrals offered: PT/OT/Nutrition counseling/Behavioral Health/Smoking cessation as per orders if indicated.    Discussion today about general wellness and lifestyle habits:    · Prevent falls and reduce trip hazards; Cautioned about securing or removing rugs.  · Have a working fire alarm and carbon monoxide detector;   · Engage in regular physical activity and social activities       Follow-up: No Follow-up on file.

## 2019-06-04 ENCOUNTER — PATIENT MESSAGE (OUTPATIENT)
Dept: ENDOCRINOLOGY | Facility: MEDICAL CENTER | Age: 74
End: 2019-06-04

## 2019-06-05 ENCOUNTER — PATIENT MESSAGE (OUTPATIENT)
Dept: ENDOCRINOLOGY | Facility: MEDICAL CENTER | Age: 74
End: 2019-06-05

## 2019-06-05 DIAGNOSIS — I48.0 PAF (PAROXYSMAL ATRIAL FIBRILLATION) (HCC): ICD-10-CM

## 2019-06-05 RX ORDER — PIOGLITAZONEHYDROCHLORIDE 15 MG/1
15 TABLET ORAL DAILY
Qty: 90 TAB | Refills: 3 | Status: SHIPPED | OUTPATIENT
Start: 2019-06-05 | End: 2019-06-07 | Stop reason: SDUPTHER

## 2019-06-05 NOTE — TELEPHONE ENCOUNTER
Was the patient seen in the last year in this department? Yes [facility] out    Does patient have an active prescription for medications requested? No     Received Request Via: Patient     Catherine Herman Ma   Phone Number: [phone]             CATHERINE BROWER   1945   [phone]     Miles Arais:  Its that time again to order my Multaq.... Im hoping that the [facility] [address] [facility] gotten a supply and on the other hand Vicki heard from Dr Sorto's office that they are changing their policy in the amount you can receive not in a Calendar but in a patients Life Time.... Robby... Vicki put into the [facility] [employer] with help for this medication but I have to wait for it to be Re-Funded before I can get help from there... but please just let me know when it has been order and I will call [facility] and then I will let you know Thank You.... I appreciate you and your help....

## 2019-06-05 NOTE — TELEPHONE ENCOUNTER
From: Catherine Frederick  To: Wei Patterson P.A.-C.  Sent: 6/4/2019 6:41 PM PDT  Subject: Prescription Question    CATHERINE THOMAS LEONARDA  1945  [phone]    Hi Wei: Can I please get two OZEMPIC pen and a 90 prescription of ACTOS I asked for 90 days last time from your MA but I only got 30 days.... I thought I was on ACTOS 30mg last time but I see you perscribed me 15mg but I am doing well on 15mg... I am due for a follow-up appointment in July and I will make that appointment tomorrow. To your MA: I have tried to get help with the Ozempic with the help of Radha Madrigal, , at [facility] [phone]... we tried [facility] and [employer] with no results... Sooo Thanks to both of you I appreciate you and your help.... Catherine

## 2019-06-05 NOTE — PATIENT COMMUNICATION
Was the patient seen in the last year in this department? Yes 01/18/19    Does patient have an active prescription for medications requested? No     Received Request Via: Patient requesting 90 day refill     pioglitazone (ACTOS) 15 MG Tab  Sig - Route: Take 1 Tab by mouth every day for 30 days

## 2019-06-07 ENCOUNTER — TELEPHONE (OUTPATIENT)
Dept: MEDICAL GROUP | Facility: LAB | Age: 74
End: 2019-06-07

## 2019-06-07 RX ORDER — PIOGLITAZONEHYDROCHLORIDE 15 MG/1
15 TABLET ORAL DAILY
Qty: 100 TAB | Refills: 3 | Status: SHIPPED | OUTPATIENT
Start: 2019-06-07 | End: 2020-05-19 | Stop reason: SDUPTHER

## 2019-06-07 NOTE — PATIENT COMMUNICATION
Need to change Actos Rx from 90 to 100 days per insurance coverage     Was the patient seen in the last year in this department? Yes 01/18/19     Does patient have an active prescription for medications requested? yes     Received Request Via: Patient requesting 100 day refill      pioglitazone (ACTOS) 15 MG Tab  Sig - Route: Take 1 Tab by mouth every day for 30 days

## 2019-06-07 NOTE — TELEPHONE ENCOUNTER
Patient LVM stating that she has felt weak and has been tracking her BP  Today her reading was 89/58 P 59  The systolic has been 113 to as low as 89 and she wanted to come in to have us check her BP cuff with ours.  Called patient back and LAVERNE to call back or send a my chart

## 2019-06-24 RX ORDER — ROSUVASTATIN CALCIUM 10 MG/1
10 TABLET, COATED ORAL EVERY EVENING
Qty: 100 TAB | Refills: 3 | Status: SHIPPED | OUTPATIENT
Start: 2019-06-24 | End: 2019-12-23 | Stop reason: SDUPTHER

## 2019-06-25 NOTE — TELEPHONE ENCOUNTER
----- Message from Whitley Frederick sent at 6/24/2019  5:10 PM PDT -----  Regarding: Prescription Question  Contact: [phone]  Whitley Frederick   1945  [phone]    Hi Juana... just writing to get a  90 day refill on my ROSUVASTATIN sent to [facility]... thank you ...

## 2019-06-25 NOTE — TELEPHONE ENCOUNTER
Was the patient seen in the last year in this department? Yes  5/22/19  Does patient have an active prescription for medications requested? No     Received Request Via: Patient

## 2019-06-26 ENCOUNTER — APPOINTMENT (OUTPATIENT)
Dept: VASCULAR LAB | Facility: MEDICAL CENTER | Age: 74
End: 2019-06-26
Payer: MEDICARE

## 2019-07-01 DIAGNOSIS — Z79.01 CHRONIC ANTICOAGULATION: ICD-10-CM

## 2019-07-02 ENCOUNTER — PATIENT OUTREACH (OUTPATIENT)
Dept: HEALTH INFORMATION MANAGEMENT | Facility: OTHER | Age: 74
End: 2019-07-02

## 2019-07-02 NOTE — PROGRESS NOTES
"Referral received from Shelly Gimenez stating patient called with questions re: Prilosec. Outbound call to patient to discuss. She states she has been having increase in acidity without increased reflux. She states she heard she could increase daily dose to 40mg daily, however was hesitant to self \"prescribe.\" Patient states her nephrologist tried to discontinue Prilosec in the past d/t impaired renal function. She was prescribed ranitidine to replace Prilosec and patient became symptomatic. Nephrologist (Dr. Badillo) restarted Prilosec 20mg daily. Whitley states she has been taking 1,000mg of calcium carbonate daily (serum calcium WNL on 5/8/2019) with no/minimal relief of symptoms. Patient denies any dietary changes including increased intake of caffeine, acidic or greasy foods.    Patient has not been taking Prilosec on an empty stomach. Advised patient to do so until Friday at which point I will follow up to see if symptoms have improved. I will contact Dr. Badillo's office if necessary for an alternative that will be appropriate given patient's renal impairment.   "

## 2019-07-08 ENCOUNTER — TELEPHONE (OUTPATIENT)
Dept: MEDICAL GROUP | Facility: LAB | Age: 74
End: 2019-07-08

## 2019-07-08 NOTE — TELEPHONE ENCOUNTER
ESTABLISHED PATIENT PRE-VISIT PLANNING     Patient was NOT contacted to complete PVP.     Note: Patient will not be contacted if there is no indication to call.     1.  Reviewed notes from the last few office visits within the medical group: Yes    2.  If any orders were placed at last visit or intended to be done for this visit (i.e. 6 mos follow-up), do we have Results/Consult Notes?        •  Labs - Labs ordered, NOT completed. Patient advised to complete prior to next appointment.   Note: If patient appointment is for lab review and patient did not complete labs, check with provider if OK to reschedule patient until labs completed.       •  Imaging - Imaging ordered, NOT completed. Patient advised to complete prior to next appointment.       •  Referrals - Referral ordered, patient has NOT been seen.    3. Is this appointment scheduled as a Hospital Follow-Up? No    4.  Immunizations were updated in Epic using WebIZ?: Epic matches WebIZ       •  Web Iz Recommendations: SHINGRIX (Shingles)    5.  Patient is due for the following Health Maintenance Topics:   Health Maintenance Due   Topic Date Due   • IMM HEP B VACCINE (1 of 3 - Risk 3-dose series) [birth year]/19/1965   • COLOGUARD STOOL DNA  03/19/1996   • IMM ZOSTER VACCINES (1 of 2) 03/19/1996       - Patient has completed PNEUMOVAX (PPSV23), PREVNAR (PCV13)  and TDAP Immunization(s) per WebIZ. Chart has been updated.    6. Orders for overdue Health Maintenance topics pended in Pre-Charting? N\A    7.  AHA (MDX) form printed for Provider? No, already completed    8.  Patient was NOT informed to arrive 15 min prior to their scheduled appointment and bring in their medication bottles.

## 2019-07-09 ENCOUNTER — PATIENT OUTREACH (OUTPATIENT)
Dept: HEALTH INFORMATION MANAGEMENT | Facility: OTHER | Age: 74
End: 2019-07-09

## 2019-07-09 NOTE — PROGRESS NOTES
Outbound call to patient for f/u on results of GERD symptoms after taking PPI on an empty stomach for 1 week. Unable to reach patient. [facility] for patient to call [phone].

## 2019-07-09 NOTE — PROGRESS NOTES
Return call from patient. She states symptoms have reduced by about 75% since she has been taking omeprazole in the morning on an empty stomach. States she has been taking Tums and will start taking famotidine 10mg PRN. She states has an appointment with her nephrologist next month.     Reports episodes of lightheadedness at which time she checks and finds she has low BP. States she has been adjusting doses of Lasix. Advised patient to drink more water as labs showed dehydration. Patient states she will try. Denies hypoglycemic episodes.   no fever and no chills.

## 2019-07-10 ENCOUNTER — ANTICOAGULATION VISIT (OUTPATIENT)
Dept: VASCULAR LAB | Facility: MEDICAL CENTER | Age: 74
End: 2019-07-10
Attending: INTERNAL MEDICINE
Payer: MEDICARE

## 2019-07-10 VITALS — HEART RATE: 55 BPM | SYSTOLIC BLOOD PRESSURE: 168 MMHG | DIASTOLIC BLOOD PRESSURE: 68 MMHG

## 2019-07-10 DIAGNOSIS — Z79.01 CHRONIC ANTICOAGULATION: ICD-10-CM

## 2019-07-10 DIAGNOSIS — I48.0 PAF (PAROXYSMAL ATRIAL FIBRILLATION) (HCC): ICD-10-CM

## 2019-07-10 LAB
INR BLD: 2.1 (ref 0.9–1.2)
INR PPP: 2.1 (ref 2–3.5)

## 2019-07-10 PROCEDURE — 85610 PROTHROMBIN TIME: CPT

## 2019-07-10 PROCEDURE — 99211 OFF/OP EST MAY X REQ PHY/QHP: CPT | Performed by: NURSE PRACTITIONER

## 2019-07-10 NOTE — PROGRESS NOTES
Anticoagulation Summary  As of 7/10/2019    INR goal:   2.0-3.0   TTR:   71.0 % (3.7 y)   INR used for dosin.10 (7/10/2019)   Warfarin maintenance plan:   1.5 mg (3 mg x 0.5) every Wed; 3 mg (3 mg x 1) all other days   Weekly warfarin total:   19.5 mg   Weekly max warfarin dose:   21 mg   Plan last modified:   Juan Bonds, PharmD (3/6/2019)   Next INR check:   2019   Target end date:   Indefinite    Indications    Chronic anticoagulation [Z79.01]  Chronic anticoagulation (Resolved) [Z79.01]  PAF (paroxysmal atrial fibrillation) (HCC) [I48.0]             Anticoagulation Episode Summary     INR check location:       Preferred lab:       Send INR reminders to:       Comments:         Anticoagulation Care Providers     Provider Role Specialty Phone number    Amy Lincoln M.D. Referring Cardiology [phone]    [facility] Responsible  [phone]        Anticoagulation Patient Findings      HPI:  Whitley Frederick seen in clinic today for follow up on anticoagulation therapy in the presence of AF.   Denies any changes to current medical/health status since last appointment.   Denies any medication or diet changes.   No current symptoms of bleeding or thrombosis reported.  She missed her last appt.  Confirmed dose. No missed doses    A/P:   INR therapeutic.   Continue current regimen.   BP recorded in vitals. BP elevated. States she was hurrying here. She checks BP daily.    Follow up appointment in 7 week(s).    Next Appointment: 2019 at 1:30 pm.    Jaylene BURGER

## 2019-07-15 ENCOUNTER — PATIENT OUTREACH (OUTPATIENT)
Dept: HEALTH INFORMATION MANAGEMENT | Facility: OTHER | Age: 74
End: 2019-07-15

## 2019-07-15 ENCOUNTER — PATIENT MESSAGE (OUTPATIENT)
Dept: PULMONOLOGY | Facility: HOSPICE | Age: 74
End: 2019-07-15

## 2019-07-15 NOTE — PROGRESS NOTES
Received incoming call from patient. Patient reports continued esophageal burning since her  of omeprazole changed from Apotex to Northstar. She states her home pharmacy will try to order the Apotex brand. She had question about taking outdated OTC omeprazole. Advised patient not to take  medication. Patient has message out to PCP regarding symptoms.     Laila Corral, PharmD

## 2019-07-15 NOTE — TELEPHONE ENCOUNTER
From: Whitley Frederick  To: EDILMA Mosher  Sent: 7/15/2019 11:21 AM PDT  Subject: Non-Urgent Medical Question    Whitley Frederick   1945  [phone]    Hi again [place]... as I suspected they need two weeks notice to sent them from [place] can’t figure that plan of action but that’s what it is stew are going to have to take my home one with and the place we are staying all the bedrooms are upstairs... but my previous question was can I go without the oxygen for 4 nights ... ya know I don’t know why I was put on night time oxygen prior to using CPAP anyway... what was my last night time O2 test results... I’m on 3 liters now ... thanks

## 2019-07-15 NOTE — TELEPHONE ENCOUNTER
From: Whitley Frederick  To: EDILMA Mosher  Sent: 7/15/2019 10:09 AM PDT  Subject: Non-Urgent Medical Question    Whitley Frederick   1945  [phone]    Miles Calvo... my son is coming into town on Wednesday July 17th... he gave me short notice aka a surprise... I’m not sure if I can get a portable concentrator on two day notice ... my son has rented a house here in town for all of us to stay in ... it will be for 4 nights ... what’s your thoughts thank you ...

## 2019-07-16 ENCOUNTER — PATIENT MESSAGE (OUTPATIENT)
Dept: PULMONOLOGY | Facility: HOSPICE | Age: 74
End: 2019-07-16

## 2019-07-16 NOTE — PATIENT COMMUNICATION
Emailed contacts at [facility] to see if they have a POC on hand or any suggestions to help her out   18

## 2019-07-23 ENCOUNTER — PATIENT OUTREACH (OUTPATIENT)
Dept: HEALTH INFORMATION MANAGEMENT | Facility: OTHER | Age: 74
End: 2019-07-23

## 2019-07-23 DIAGNOSIS — K21.9 GASTROESOPHAGEAL REFLUX DISEASE, ESOPHAGITIS PRESENCE NOT SPECIFIED: ICD-10-CM

## 2019-07-23 RX ORDER — OMEPRAZOLE 20 MG/1
40 CAPSULE, DELAYED RELEASE ORAL DAILY
Qty: 180 CAP | Refills: 3 | Status: SHIPPED | OUTPATIENT
Start: 2019-07-23 | End: 2019-09-26

## 2019-07-24 NOTE — PROGRESS NOTES
Inbound call from patient. States her PCP recently prescribed omeprazole 20mg BID and she would like to make sure this dose is appropriate for current renal function. Most recent GFR on file is 29 (5/8/19). Patient states she recalls a more recent value of 26. Per Up to Date, no renal adjustment is necessary for omeprazole. Patient states current dose is reliving symptoms. Advised patient to inform nephrologist of medication changes at upcoming o/v. Patient agrees.

## 2019-08-07 ENCOUNTER — TELEPHONE (OUTPATIENT)
Dept: MEDICAL GROUP | Facility: LAB | Age: 74
End: 2019-08-07

## 2019-08-07 NOTE — TELEPHONE ENCOUNTER
ESTABLISHED PATIENT PRE-VISIT PLANNING     Patient was NOT contacted to complete PVP.     Note: Patient will not be contacted if there is no indication to call.     1.  Reviewed notes from the last few office visits within the medical group: Yes    2.  If any orders were placed at last visit or intended to be done for this visit (i.e. 6 mos follow-up), do we have Results/Consult Notes?        •  Labs - Labs ordered, NOT completed. Patient advised to complete prior to next appointment.- Appointment was scheduled on 8/7/19- does not allow patient enough time to have labs done prior.   Note: If patient appointment is for lab review and patient did not complete labs, check with provider if OK to reschedule patient until labs completed.       •  Imaging - Imaging ordered, NOT completed. Patient advised to complete prior to next appointment.       •  Referrals - No referrals were ordered at last office visit.    3. Is this appointment scheduled as a Hospital Follow-Up? No    4.  Immunizations were updated in Epic using WebIZ?: Epic matches WebIZ       •  Web Iz Recommendations: SHINGRIX (Shingles)    5.  Patient is due for the following Health Maintenance Topics:   Health Maintenance Due   Topic Date Due   • IMM HEP B VACCINE (1 of 3 - Risk Recombivax 3-dose series) 03/19/1965   • COLOGUARD STOOL DNA  03/19/1996   • IMM ZOSTER VACCINES (1 of 2) 03/19/1996   • A1C SCREENING  07/18/2019       - Patient has completed PNEUMOVAX (PPSV23), PREVNAR (PCV13)  and TDAP Immunization(s) per WebIZ. Chart has been updated.    6. Orders for overdue Health Maintenance topics pended in Pre-Charting? N\A    7.  AHA (MDX) form printed for Provider? No, already completed    8.  Patient was NOT informed to arrive 15 min prior to their scheduled appointment and bring in their medication bottles.

## 2019-08-08 ENCOUNTER — OFFICE VISIT (OUTPATIENT)
Dept: MEDICAL GROUP | Facility: LAB | Age: 74
End: 2019-08-08
Payer: MEDICARE

## 2019-08-08 VITALS
DIASTOLIC BLOOD PRESSURE: 54 MMHG | RESPIRATION RATE: 13 BRPM | HEIGHT: 67 IN | HEART RATE: 58 BPM | BODY MASS INDEX: 41.44 KG/M2 | TEMPERATURE: 98.2 F | WEIGHT: 264 LBS | SYSTOLIC BLOOD PRESSURE: 126 MMHG | OXYGEN SATURATION: 95 %

## 2019-08-08 DIAGNOSIS — H91.91 HEARING LOSS OF RIGHT EAR, UNSPECIFIED HEARING LOSS TYPE: ICD-10-CM

## 2019-08-08 PROCEDURE — 99213 OFFICE O/P EST LOW 20 MIN: CPT | Performed by: FAMILY MEDICINE

## 2019-08-08 NOTE — PROGRESS NOTES
Subjective:     CC: R cerumen impaction    HPI:   Whitley presents today with     Right ear pain:  This is an acute issue, new to me.  Patient complains of right fullness of the ears and mild pain.  She feels as if she has a cerumen impaction.  She denies abuse but she states that she put in earplugs when she pulled out her right ear plug it came out with a lot of wax.  She denies any fluid or drainage.  She complains mostly of vibration and muffled sounds from the right ear.  She also states she went to a loud place and since then.    Past Medical History:   Diagnosis Date   • Allergy    • Anxiety    • Arrhythmia    • Arthritis    • Asthma    • Carotid bruit     Left   • CATARACT    • Chest pain    • DIABETES MELLITUS    • Esophageal reflux    • Goiter    • Headache(784.0)    • Heart murmur    • Hyperlipidemia    • Hypertension    • IBD (inflammatory bowel disease)    • Migraine     Silent migraine   • AVERY (obstructive sleep apnea)    • Overweight(278.02)    • Palpitations    • Paroxysmal atrial fibrillation (HCC) 13    woke with palps, AFib per ekg in am.   • Pneumonia     2015   • Positive reaction to tuberculin skin test    • Renal disease     Mild diabetic renal disease with mild proteinuria.Moderate Stage 4 Dr. Badillo   • Urinary tract infection, site not specified        Social History     Tobacco Use   • Smoking status: Former Smoker     Packs/day: 1.00     Years: 20.00     Pack years: 20.00     Types: Cigarettes     Last attempt to quit: 1983     Years since quittin.6   • Smokeless tobacco: Never Used   Substance Use Topics   • Alcohol use: No     Alcohol/week: 0.0 oz   • Drug use: No       Current Outpatient Medications Ordered in Epic   Medication Sig Dispense Refill   • omeprazole (PRILOSEC) 20 MG delayed-release capsule Take 2 Caps by mouth every day. 180 Cap 3   • rosuvastatin (CRESTOR) 10 MG Tab Take 1 Tab by mouth every evening. 100 Tab 3   • pioglitazone (ACTOS) 15 MG Tab Take 1 Tab  by mouth every day for 100 days. 100 Tab 3   • dronedarone (MULTAQ) 400 MG Tab Take 1 Tab by mouth 2 times a day, with meals. 60 Tab 6   • nystatin (MYCOSTATIN) 003532 UNIT/GM Ointment AAA up to twice daily 240 g 2   • metoprolol (LOPRESSOR) 50 MG Tab Take 1 Tab by mouth 2 times a day. 180 Tab 1   • losartan (COZAAR) 50 MG Tab Take 1 Tab by mouth 2 Times a Day. 200 Tab 1   • Semaglutide (OZEMPIC) 0.25 or 0.5 MG/DOSE Solution Pen-injector Inject 1 mg as instructed every 7 days. 2 PEN 0   • levothyroxine (SYNTHROID) 50 MCG Tab Take 1 Tab by mouth Every morning on an empty stomach. 90 Tab 2   • terazosin (HYTRIN) 1 MG Cap Take 1 Cap by mouth every day. 30 Cap 4   • warfarin (COUMADIN) 3 MG Tab Take 1 to 1.5 tablets by mouth daily as directed by the coumadin clinic 135 Tab 1   • ferrous sulfate 325 (65 Fe) MG tablet Take 325 mg by mouth every day.     • azelastine (ASTELIN) 137 MCG/SPRAY nasal spray Bowie 1 Spray in nose 2 times a day as needed.     • allopurinol (ZYLOPRIM) 100 MG Tab Take 200 mg by mouth 2 Times a Day.     • Clonazepam 0.5 MG TABLET DISPERSIBLE Take 0.25-0.5 tablet by mouth 1 time daily as needed.     • beclomethasone HFA (QVAR REDIHALER) 40 MCG/ACT inhaler INHALE 2 PUFFS BY MOUTH TWICE DAILY 2 Inhaler 0   • levalbuterol (XOPENEX HFA) 45 MCG/ACT inhaler Inhale 2 Puffs by mouth every four hours as needed for Shortness of Breath. 1 Inhaler 11   • montelukast (SINGULAIR) 10 MG Tab Take 1 Tab by mouth every bedtime. 30 Tab 11   • fluticasone (FLONASE) 50 MCG/ACT nasal spray USE 1-2  SPRAY(S) IN EACH NOSTRIL ONCE DAILY 30 MINUTES PRIOR TO USE OF CPAP 3 Bottle 3   • Blood Glucose Monitoring Suppl Supplies Misc Test strips order: Test strips for Accucheck meter. Sig: use BID and prn ssx high or low sugar #100 RF x 3 100 Strip 11   • Blood Glucose Monitoring Suppl Supplies Misc Test strips order: Test strips for Chely Contour meter. Sig: use tid  and prn ssx high or low sugar #100 RF x 3 100 Strip 3   • Urine  Glucose-Ketones Test Strip 1 Strip by In Vitro route every day. 100 Strip 2   • glucose blood (FREESTYLE LITE) strip 1 Strip by Other route 2 times a day, with meals. 50 Strip 11   • Insulin Pen Needle 32G X 4 MM Misc 1 Applicator by Does not apply route 3 times a day. 100 Each 11   • Blood Glucose Monitoring Suppl W/DEVICE Kit 1 Each by Does not apply route 2 times a day as needed. 1 Kit 0   • Misc. Devices MISC Pen needles for Lantus Solstar. 29g 12mm. 90 Each 3   • Cholecalciferol (VITAMIN D) 2000 UNITS CAPS Take 4,000 Units by mouth every day.     • magnesium oxide (MAGNESIUM OXIDE) 400 (241.3 MG) MG TABS tablet Take 400 mg by mouth every day.       No current [facility]-ordered facility-administered medications on file.        Allergies:  Amlodipine; Amaryl; Avandia [rosiglitazone maleate]; Cipro xr; Clonidine; Contrast media with iodine [iodine]; Glipizide; Glucophage [metformin hydrochloride]; Hydralazine; Levaquin; and Relafen [nabumetone]    ROS:  Gen: no fevers/chill, no changes in weight  Eyes: no changes in vision  ENT: no sore throat, no hearing loss, no bloody nose  Pulm: no sob, no cough  CV: no chest pain, no palpitations  GI: no nausea/vomiting, no diarrhea  : no dysuria  MSk: no myalgias  Skin: no rash  Neuro: no headaches, no numbness/tingling  Heme/Lymph: no easy bruising      Objective:       Exam:  There were no vitals taken for this visit. There is no height or weight on file to calculate BMI.    Gen: Alert and oriented, No apparent distress.  Neck: Neck is supple without lymphadenopathy.  HEENT: Right tympanic membrane slightly erythematous however intact normal canals  Lungs: Normal effort, CTA bilaterally, no wheezes, rhonchi, or rales  CV: Regular rate and rhythm. No murmurs, rubs, or gallops.               Ext: No clubbing, cyanosis, edema.      Assessment & Plan:     74 y.o. female with the following -     1. Hearing loss of right ear, unspecified hearing loss type  Normal physical exam.   Will send to audiology for further evaluation.  - REFERRAL TO AUDIOLOGY      Please note that this dictation was created using voice recognition software. I have made every reasonable attempt to correct obvious errors, but I expect that there are errors of grammar and possibly content that I did not discover before finalizing the note.

## 2019-08-13 ENCOUNTER — TELEPHONE (OUTPATIENT)
Dept: MEDICAL GROUP | Facility: LAB | Age: 74
End: 2019-08-13

## 2019-08-13 NOTE — TELEPHONE ENCOUNTER
ESTABLISHED PATIENT PRE-VISIT PLANNING     Patient was NOT contacted to complete PVP.     Note: Patient will not be contacted if there is no indication to call.     1.  Reviewed notes from the last few office visits within the medical group: Yes    2.  If any orders were placed at last visit or intended to be done for this visit (i.e. 6 mos follow-up), do we have Results/Consult Notes?        •  Labs - Labs were not ordered at last office visit.   Note: If patient appointment is for lab review and patient did not complete labs, check with provider if OK to reschedule patient until labs completed.       •  Imaging - Imaging was not ordered at last office visit.       •  Referrals - No referrals were ordered at last office visit.    3. Is this appointment scheduled as a Hospital Follow-Up? No    4.  Immunizations were updated in Epic using WebIZ?: Epic matches WebIZ       •  Web Iz Recommendations: SHINGRIX (Shingles)    5.  Patient is due for the following Health Maintenance Topics:   Health Maintenance Due   Topic Date Due   • IMM HEP B VACCINE (1 of 3 - Risk Recombivax 3-dose series) 03/19/1965   • COLOGUARD STOOL DNA  03/19/1996   • IMM ZOSTER VACCINES (1 of 2) 03/19/1996   • A1C SCREENING  07/18/2019       - Patient has completed PNEUMOVAX (PPSV23), PREVNAR (PCV13)  and TDAP Immunization(s) per WebIZ. Chart has been updated.    6. Orders for overdue Health Maintenance topics pended in Pre-Charting? N\A    7.  AHA (MDX) form printed for Provider? No, already completed    8.  Patient was NOT informed to arrive 15 min prior to their scheduled appointment and bring in their medication bottles.

## 2019-08-15 ENCOUNTER — HOSPITAL ENCOUNTER (OUTPATIENT)
Dept: LAB | Facility: MEDICAL CENTER | Age: 74
End: 2019-08-15
Attending: INTERNAL MEDICINE
Payer: MEDICARE

## 2019-08-15 LAB
ALBUMIN SERPL BCP-MCNC: 3.8 G/DL (ref 3.2–4.9)
BUN SERPL-MCNC: 43 MG/DL (ref 8–22)
CALCIUM SERPL-MCNC: 9.5 MG/DL (ref 8.5–10.5)
CHLORIDE SERPL-SCNC: 109 MMOL/L (ref 96–112)
CO2 SERPL-SCNC: 23 MMOL/L (ref 20–33)
CREAT SERPL-MCNC: 1.87 MG/DL (ref 0.5–1.4)
GLUCOSE SERPL-MCNC: 116 MG/DL (ref 65–99)
PHOSPHATE SERPL-MCNC: 3.4 MG/DL (ref 2.5–4.5)
POTASSIUM SERPL-SCNC: 4.8 MMOL/L (ref 3.6–5.5)
SODIUM SERPL-SCNC: 141 MMOL/L (ref 135–145)
URATE SERPL-MCNC: 5.4 MG/DL (ref 1.9–8.2)

## 2019-08-15 PROCEDURE — 36415 COLL VENOUS BLD VENIPUNCTURE: CPT

## 2019-08-15 PROCEDURE — 84550 ASSAY OF BLOOD/URIC ACID: CPT

## 2019-08-15 PROCEDURE — 80069 RENAL FUNCTION PANEL: CPT

## 2019-08-19 ENCOUNTER — OFFICE VISIT (OUTPATIENT)
Dept: MEDICAL GROUP | Facility: LAB | Age: 74
End: 2019-08-19
Payer: MEDICARE

## 2019-08-19 VITALS
WEIGHT: 260 LBS | OXYGEN SATURATION: 96 % | HEART RATE: 64 BPM | HEIGHT: 67 IN | TEMPERATURE: 98.3 F | RESPIRATION RATE: 14 BRPM | BODY MASS INDEX: 40.81 KG/M2 | SYSTOLIC BLOOD PRESSURE: 138 MMHG | DIASTOLIC BLOOD PRESSURE: 62 MMHG

## 2019-08-19 DIAGNOSIS — N18.4 TYPE 2 DIABETES MELLITUS WITH STAGE 4 CHRONIC KIDNEY DISEASE, WITHOUT LONG-TERM CURRENT USE OF INSULIN (HCC): ICD-10-CM

## 2019-08-19 DIAGNOSIS — E11.22 TYPE 2 DIABETES MELLITUS WITH STAGE 4 CHRONIC KIDNEY DISEASE, WITHOUT LONG-TERM CURRENT USE OF INSULIN (HCC): ICD-10-CM

## 2019-08-19 DIAGNOSIS — E78.5 DYSLIPIDEMIA: ICD-10-CM

## 2019-08-19 DIAGNOSIS — N18.4 CKD (CHRONIC KIDNEY DISEASE) STAGE 4, GFR 15-29 ML/MIN (HCC): ICD-10-CM

## 2019-08-19 DIAGNOSIS — H93.8X1 SENSATION OF FULLNESS IN RIGHT EAR: ICD-10-CM

## 2019-08-19 LAB
HBA1C MFR BLD: 6.4 % (ref 0–5.6)
INT CON NEG: NEGATIVE
INT CON POS: POSITIVE

## 2019-08-19 PROCEDURE — 83036 HEMOGLOBIN GLYCOSYLATED A1C: CPT | Performed by: NURSE PRACTITIONER

## 2019-08-19 PROCEDURE — 99214 OFFICE O/P EST MOD 30 MIN: CPT | Performed by: NURSE PRACTITIONER

## 2019-08-19 NOTE — ASSESSMENT & PLAN NOTE
Chronic issue.  Taking rosuvastatin nightly as of 2-3 mo ago.  LDL had increased 30 pts in Feb 2019 and simvastatin was changed to rosuvastatin.

## 2019-08-19 NOTE — PROGRESS NOTES
"Chief Complaint   Patient presents with   • Ear Fullness       TRI Arreaga is a 74-year-old established female here to follow-up on a couple of issues:  1.-Right ear fullness: she was seen for this last week and symptoms have resolved.  She would like an ear exam to make sure that she does not have any residual wax or an ear infection.  2-Type 2 diabetes mellitus with stage 4 chronic kidney disease, without long-term current use of insulin (HCC)  Chronic issue. Due for A1C, last was 1/2019 - 6.7.  She is seen by endocrinology but this is very expensive for her and she would like her A1c done here today.  Home blood sugars have been on average 130.  Denies hypoglycemia - lowest 108.  takin 15 mg actos and injecting ozempic once weekly.  Weight is down to 259, down 6 lbs from 5/2019.      #3-dyslipidemia  Chronic issue.  Taking rosuvastatin nightly as of 2-3 mo ago.  LDL had increased 30 pts in Feb 2019 and simvastatin was changed to rosuvastatin.  She does see cardiology tomorrow regarding her history of paroxysmal atrial fibrillation.  She is anticoagulated with Coumadin.  She does have periodic episodes of heart palpitations.    Past medical, surgical, family, and social history is reviewed and updated in Epic chart by me today.   Medications and allergies reviewed and updated in Epic chart by me today.     ROS:   As documented in history of present illness above    Exam:  /62 (BP Location: Left arm, Patient Position: Sitting, BP Cuff Size: Large adult)   Pulse 64   Temp 36.8 °C (98.3 °F)   Resp 14   Ht 1.702 m (5' 7\")   Wt 117.9 kg (260 lb)   SpO2 96%   Constitutional: Overweight female, alert, no distress, plus 3 vital signs  Skin:  Warm, dry, no rashes invisible areas  Eye: Equal, round and reactive, conjunctiva clear  ENMT: Lips without lesions.  Tympanic membranes are translucent bilaterally.  She does not have any wax buildup in either ear canal.  Neck: Trachea midline, no masses, no " thyromegaly  Respiratory: Unlabored respiration, lungs clear to auscultation.  Cardiovascular: Normal rate and rhythm.  Abdomen: Soft, nontender, no masses or hepatosplenomegaly  Psych: Alert, pleasant, well-groomed, normal affect    Assessment / Plan / Medical Decision makin. Type 2 diabetes mellitus with stage 4 chronic kidney disease, without long-term current use of insulin ([facility])  -A1c is excellent today at 6.3.  She will continue current medication regimen, efforts at weight loss and diligent exercise.  Follow-up A1c in 6 months.  Discussed the importance of proper foot care.  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  - POCT  A1C    2. Dyslipidemia  -Doing well with rosuvastatin in terms of lack of side effects.  Recommend updated lipid panel in 3 months with her next kidney lab work.  Encouraged a high-fiber/plant-based diet.    3. CKD (chronic kidney disease) stage 4, GFR 15-29 ml/min ([facility])  -Slightly improved, GFR has slightly improved.  Creatinine has improved, is down below 2.0.  She is up-to-date with her nephrologist.  She understands the importance of avoiding NSAIDs and making sure she stays well-hydrated as well as control his blood pressure and diabetes tightly.    4. Sensation of fullness in right ear  -Resolved.

## 2019-08-19 NOTE — ASSESSMENT & PLAN NOTE
Chronic issue. Due for A1C, last was 1/2019 - 6.7.  Home blood sugars have been on average 130.  Denies hypoglycemia - lowest 108.  takin 15 mg actos and injecting ozempic once weekly.  Weight is down to 259, down 6 lbs from 5/2019.

## 2019-08-20 ENCOUNTER — OFFICE VISIT (OUTPATIENT)
Dept: CARDIOLOGY | Facility: MEDICAL CENTER | Age: 74
End: 2019-08-20
Payer: MEDICARE

## 2019-08-20 ENCOUNTER — HOSPITAL ENCOUNTER (OUTPATIENT)
Dept: RADIOLOGY | Facility: MEDICAL CENTER | Age: 74
End: 2019-08-20
Attending: NURSE PRACTITIONER
Payer: MEDICARE

## 2019-08-20 VITALS
WEIGHT: 263.2 LBS | SYSTOLIC BLOOD PRESSURE: 136 MMHG | BODY MASS INDEX: 41.31 KG/M2 | OXYGEN SATURATION: 98 % | DIASTOLIC BLOOD PRESSURE: 70 MMHG | HEART RATE: 56 BPM | HEIGHT: 67 IN

## 2019-08-20 DIAGNOSIS — Z79.899 HIGH RISK MEDICATION USE: ICD-10-CM

## 2019-08-20 DIAGNOSIS — I34.0 NON-RHEUMATIC MITRAL REGURGITATION: ICD-10-CM

## 2019-08-20 DIAGNOSIS — I48.0 PAF (PAROXYSMAL ATRIAL FIBRILLATION) (HCC): ICD-10-CM

## 2019-08-20 DIAGNOSIS — I44.7 LEFT BUNDLE BRANCH BLOCK: ICD-10-CM

## 2019-08-20 DIAGNOSIS — I77.1 STRICTURE OF ARTERY (HCC): ICD-10-CM

## 2019-08-20 DIAGNOSIS — I70.0 ATHEROSCLEROSIS OF AORTA (HCC): ICD-10-CM

## 2019-08-20 DIAGNOSIS — I65.29 STENOSIS OF CAROTID ARTERY, UNSPECIFIED LATERALITY: ICD-10-CM

## 2019-08-20 DIAGNOSIS — I49.3 VPC'S (VENTRICULAR PREMATURE COMPLEXES): ICD-10-CM

## 2019-08-20 DIAGNOSIS — Z79.01 CHRONIC ANTICOAGULATION: ICD-10-CM

## 2019-08-20 DIAGNOSIS — E04.2 MULTIPLE THYROID NODULES: ICD-10-CM

## 2019-08-20 DIAGNOSIS — I10 ESSENTIAL HYPERTENSION: ICD-10-CM

## 2019-08-20 PROCEDURE — 76775 US EXAM ABDO BACK WALL LIM: CPT

## 2019-08-20 PROCEDURE — 93880 EXTRACRANIAL BILAT STUDY: CPT

## 2019-08-20 PROCEDURE — 99215 OFFICE O/P EST HI 40 MIN: CPT | Performed by: INTERNAL MEDICINE

## 2019-08-20 PROCEDURE — 76536 US EXAM OF HEAD AND NECK: CPT

## 2019-08-20 NOTE — PROGRESS NOTES
Chief Complaint   Patient presents with   • Atrial Fibrillation   • HTN (Controlled)       Subjective:   Whitley Frederick is a 74 y.o. female who presents today for initial consultation for follow-up of paroxysmal atrial fibrillation and PVCs.  She also has a history of moderate mitral regurgitation that she was unaware of from an echocardiogram in 2017.  She is a former patient of Dr. Sorto who wishes to switch her care to me after recommendation from a mutual patient.  She is a history of paroxysmal atrial for ablation is currently not bothering her managed with dronedarone and warfarin.  She has no bleeding issues.  She has not been closely monitored for her Multitak over this time.  She also has a history of PVCs which are rare but symptomatic for her well documented on [facility] and has seen Dr. eckert with regard to this.  She has lost weight recently but does not exert herself to a high degree although she does walk her dog.  She has no exertional symptoms.  She has a history of coronary angiography in the past which showed mild nonobstructive disease.  She has hypertension type 2 diabetes mellitus as well.  She also has chronic kidney disease.    Past Medical History:   Diagnosis Date   • Allergy    • Anxiety    • Arrhythmia    • Arthritis    • Asthma    • Carotid bruit     Left   • CATARACT    • Chest pain    • DIABETES MELLITUS    • Esophageal reflux    • Goiter    • Headache(784.0)    • Heart murmur    • Hyperlipidemia    • Hypertension    • IBD (inflammatory bowel disease)    • Migraine     Silent migraine   • AVERY (obstructive sleep apnea)    • Overweight(278.02)    • Palpitations    • Paroxysmal atrial fibrillation (HCC) 7/12/13    woke with palps, AFib per ekg in am.   • Pneumonia     Nov. 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 Badillo   • Urinary tract infection, site not specified      Past  Surgical History:   Procedure Laterality Date   • CATARACT PHACO WITH IOL Right 10/25/2016    Procedure: CATARACT PHACO WITH IOL;  Surgeon: Zoran Gamble M.D.;  Location: SURGERY SAME DAY [facility];  Service:    • ABDOMINAL HYSTERECTOMY TOTAL     • CHOLECYSTECTOMY     • TONSILLECTOMY     • US-NEEDLE CORE BX-BREAST PANEL       Family History   Problem Relation Age of Onset   • Cancer Maternal Aunt    • Diabetes Maternal Aunt    • Heart Disease Maternal Aunt    • Hypertension Maternal Aunt    • Hyperlipidemia Maternal Aunt    • Cancer Mother         Leukemia   • Hypertension Mother    • Diabetes Mother    • Lung Disease Father    • Cancer Father         Lung   • Lung Disease Brother    • Stroke Brother    • Diabetes Brother    • Heart Disease Brother    • Diabetes Maternal Grandmother    • Hypertension Brother    • Diabetes Brother      Social History     Socioeconomic History   • Marital status:      Spouse name: Not on file   • Number of children: Not on file   • Years of education: Not on file   • Highest education level: Not on file   Occupational History   • Not on file   Social Needs   • Financial resource strain: Not on file   • Food insecurity:     Worry: Not on file     Inability: Not on file   • Transportation needs:     Medical: Not on file     Non-medical: Not on file   Tobacco Use   • Smoking status: Former Smoker     Packs/day: 1.00     Years: 20.00     Pack years: 20.00     Types: Cigarettes     Last attempt to quit: 1983     Years since quittin.6   • Smokeless tobacco: Never Used   Substance and Sexual Activity   • Alcohol use: No     Alcohol/week: 0.0 oz   • Drug use: No   • Sexual activity: Never     Partners: Male   Lifestyle   • Physical activity:     Days per week: Not on file     Minutes per session: Not on file   • Stress: Not on file   Relationships   • Social connections:     Talks on phone: Not on file     Gets together: Not on file     Attends Orthodox service: Not  on file     Active member of club or organization: Not on file     Attends meetings of clubs or organizations: Not on file     Relationship status: Not on file   • Intimate partner violence:     Fear of current or ex partner: Not on file     Emotionally abused: Not on file     Physically abused: Not on file     Forced sexual activity: Not on file   Other Topics Concern   • Not on file   Social History Narrative   • Not on file     Allergies   Allergen Reactions   • Amlodipine Itching   • Amaryl      GI Problems   • Avandia [Rosiglitazone Maleate]      GI problems   • Cipro Xr      Possibly causes Chills.   • Clonidine      Rapid heart rate, swelling    • Contrast Media With Iodine [Iodine] Hives   • Glipizide      GI Problems   • Glucophage [Metformin Hydrochloride]      dizzy   • Hydralazine      Rapid heart rate, chest tightness   • Levaquin      Possible allergy - chills with cipro but sick   • Relafen [Nabumetone]      Itching; avoids all nsaids     Outpatient Encounter Medications as of 8/20/2019   Medication Sig Dispense Refill   • omeprazole (PRILOSEC) 20 MG delayed-release capsule Take 2 Caps by mouth every day. 180 Cap 3   • rosuvastatin (CRESTOR) 10 MG Tab Take 1 Tab by mouth every evening. 100 Tab 3   • pioglitazone (ACTOS) 15 MG Tab Take 1 Tab by mouth every day for 100 days. 100 Tab 3   • metoprolol (LOPRESSOR) 50 MG Tab Take 1 Tab by mouth 2 times a day. 180 Tab 1   • losartan (COZAAR) 50 MG Tab Take 1 Tab by mouth 2 Times a Day. 200 Tab 1   • Semaglutide (OZEMPIC) 0.25 or 0.5 MG/DOSE Solution Pen-injector Inject 1 mg as instructed every 7 days. 2 PEN 0   • levothyroxine (SYNTHROID) 50 MCG Tab Take 1 Tab by mouth Every morning on an empty stomach. 90 Tab 2   • terazosin (HYTRIN) 1 MG Cap Take 1 Cap by mouth every day. 30 Cap 4   • warfarin (COUMADIN) 3 MG Tab Take 1 to 1.5 tablets by mouth daily as directed by the coumadin clinic 135 Tab 1   • ferrous sulfate 325 (65 Fe) MG tablet Take 325 mg by mouth  every day.     • azelastine (ASTELIN) 137 MCG/SPRAY nasal spray [place] 1 Spray in nose 2 times a day as needed.     • allopurinol (ZYLOPRIM) 100 MG Tab Take 200 mg by mouth 2 Times a Day.     • Clonazepam 0.5 MG TABLET DISPERSIBLE Take 0.25-0.5 tablet by mouth 1 time daily as needed.     • levalbuterol (XOPENEX HFA) 45 MCG/ACT inhaler Inhale 2 Puffs by mouth every four hours as needed for Shortness of Breath. 1 Inhaler 11   • montelukast (SINGULAIR) 10 MG Tab Take 1 Tab by mouth every bedtime. 30 Tab 11   • fluticasone (FLONASE) 50 MCG/ACT nasal spray USE 1-2  SPRAY(S) IN EACH NOSTRIL ONCE DAILY 30 MINUTES PRIOR TO USE OF CPAP 3 Bottle 3   • Blood Glucose Monitoring Suppl Supplies Misc Test strips order: Test strips for Accucheck meter. Sig: use BID and prn ssx high or low sugar #100 RF x 3 100 Strip 11   • Blood Glucose Monitoring Suppl Supplies Misc Test strips order: Test strips for Chely Contour meter. Sig: use tid  and prn ssx high or low sugar #100 RF x 3 100 Strip 3   • glucose blood (FREESTYLE LITE) strip 1 Strip by Other route 2 times a day, with meals. 50 Strip 11   • Insulin Pen Needle 32G X 4 MM Misc 1 Applicator by Does not apply route 3 times a day. 100 Each 11   • Blood Glucose Monitoring Suppl W/DEVICE Kit 1 Each by Does not apply route 2 times a day as needed. 1 Kit 0   • Misc. Devices MISC Pen needles for Lantus Solstar. 29g 12mm. 90 Each 3   • Cholecalciferol (VITAMIN D) 2000 UNITS CAPS Take 4,000 Units by mouth every day.     • magnesium oxide (MAGNESIUM OXIDE) 400 (241.3 MG) MG TABS tablet Take 400 mg by mouth every day.     • dronedarone (MULTAQ) 400 MG Tab Take 1 Tab by mouth 2 times a day, with meals. 60 Tab 6   • nystatin (MYCOSTATIN) 099279 UNIT/GM Ointment AAA up to twice daily (Patient not taking: Reported on 8/8/2019) 240 g 2   • beclomethasone HFA (QVAR REDIHALER) 40 MCG/ACT inhaler INHALE 2 PUFFS BY MOUTH TWICE DAILY (Patient not taking: Reported on 8/20/2019) 2 Inhaler 0   • Urine  "Glucose-Ketones Test Strip 1 Strip by In Vitro route every day. (Patient not taking: Reported on 8/20/2019) 100 Strip 2     No facility-administered encounter medications on file as of 8/20/2019.      Review of Systems   All other systems reviewed and are negative.       Objective:   /70 (BP Location: Left arm, Patient Position: Sitting, BP Cuff Size: Adult)   Pulse (!) 56   Ht 1.702 m (5' 7\")   Wt 119.4 kg (263 lb 3.2 oz)   SpO2 98%   BMI 41.22 kg/m²     Physical Exam   Constitutional: She is oriented to person, place, and time. She appears well-developed and well-nourished. No distress.   Obese   HENT:   Head: Normocephalic and atraumatic.   Right Ear: External ear normal.   Left Ear: External ear normal.   Mouth/Throat: No oropharyngeal exudate.   Eyes: Pupils are equal, round, and reactive to light. Conjunctivae and EOM are normal. Right eye exhibits no discharge. Left eye exhibits no discharge. No scleral icterus.   Neck: Normal range of motion. Neck supple. No JVD present. No tracheal deviation present. No thyromegaly present.   Cardiovascular: Normal rate, regular rhythm, S1 normal, S2 normal and intact distal pulses. PMI is not displaced. Exam reveals no gallop, no S3, no S4 and no friction rub.   Murmur (2/6 systolic murmur apex) heard.  Pulses:       Carotid pulses are 2+ on the right side, and 2+ on the left side.       Radial pulses are 2+ on the right side, and 2+ on the left side.        Popliteal pulses are 2+ on the right side, and 2+ on the left side.        Dorsalis pedis pulses are 2+ on the right side, and 2+ on the left side.        Posterior tibial pulses are 2+ on the right side, and 2+ on the left side.   Pulmonary/Chest: Effort normal and breath sounds normal. No respiratory distress. She has no wheezes. She has no rales. She exhibits no tenderness.   Abdominal: Soft. Bowel sounds are normal. She exhibits no distension. There is no tenderness.   Musculoskeletal: Normal range of " motion. She exhibits edema ( Trace). She exhibits no tenderness.   Neurological: She is alert and oriented to person, place, and time. No cranial nerve deficit (Cranial nerves II through XII grossly intact).   Skin: Skin is warm and dry. No rash noted. She is not diaphoretic. No erythema.   Psychiatric: She has a normal mood and affect. Her behavior is normal. Judgment and thought content normal.   Vitals reviewed.    LABS:  Lab Results   Component Value Date/Time    CHOLSTRLTOT 197 02/23/2019 11:30 AM     (H) 02/23/2019 11:30 AM    HDL 55 02/23/2019 11:30 AM    TRIGLYCERIDE 205 (H) 02/23/2019 11:30 AM       Lab Results   Component Value Date/Time    WBC 8.1 04/11/2019 12:44 PM    RBC 4.19 (L) 04/11/2019 12:44 PM    HEMOGLOBIN 11.8 (L) 05/08/2019 02:19 PM    HEMATOCRIT 36.6 (L) 05/08/2019 02:19 PM    MCV 92.1 04/11/2019 12:44 PM    NEUTSPOLYS 68.60 04/11/2019 12:44 PM    LYMPHOCYTES 22.90 04/11/2019 12:44 PM    MONOCYTES 5.50 04/11/2019 12:44 PM    EOSINOPHILS 2.00 04/11/2019 12:44 PM    BASOPHILS 0.60 04/11/2019 12:44 PM    HYPOCHROMIA 1+ 04/19/2014 01:23 AM     Lab Results   Component Value Date/Time    SODIUM 141 08/15/2019 01:02 PM    POTASSIUM 4.8 08/15/2019 01:02 PM    CHLORIDE 109 08/15/2019 01:02 PM    CO2 23 08/15/2019 01:02 PM    GLUCOSE 116 (H) 08/15/2019 01:02 PM    BUN 43 (H) 08/15/2019 01:02 PM    CREATININE 1.87 (H) 08/15/2019 01:02 PM    CREATININE 1.1 04/27/2006 09:50 AM     Lab Results   Component Value Date    HBA1C 6.4 (A) 08/19/2019      Lab Results   Component Value Date/Time    ALKPHOSPHAT 75 04/20/2017 10:07 AM    ASTSGOT 19 04/20/2017 10:07 AM    ALTSGPT 16 04/20/2017 10:07 AM    TBILIRUBIN 0.8 04/20/2017 10:07 AM      Lab Results   Component Value Date/Time    BNPBTYPENAT 68 04/18/2014 10:20 AM      No results found for: TSH  Lab Results   Component Value Date/Time    PROTHROMBTM 25.4 (H) 04/11/2019 12:44 PM    INR 2.10 07/10/2019        ECHO CONCLUSIONS (4/11/2017):  Normal left  ventricular size and systolic function. Mild concentric   left ventricular hypertrophy. Left ventricular ejection fraction is   visually estimated to be 55%. Normal regional wall motion. Grade I   diastolic dysfunction.  Mildly dilated left atrium. Left atrial volume index is 37 mL/sq m.  Structurally normal mitral valve. Probably moderate mitral   regurgitation. PISA not well visualized.  Compared to the report of the study done 4/2014- there has been an   improvement in LA size and diastolic function.     EKG (4/10/2018):  I have personally reviewed the EKG this visit and discussed with the patient.  Sinus rhythm and left bundle branch block        Assessment:     1. PAF (paroxysmal atrial fibrillation) ([facility])     2. High risk medication use  Comp Metabolic Panel   3. Non-rheumatic mitral regurgitation  EC-ECHOCARDIOGRAM COMPLETE W/O CONT   4. Chronic anticoagulation     5. Essential hypertension     6. VPC's (ventricular premature complexes)     7. Left bundle branch block         Medical Decision Making:  Today's Assessment / Status / Plan:     Overall she is doing well with minimal symptoms.  Her PVCs are not dangerous and she was reassured and educated regarding this.  They are also quite infrequent.  She is tolerating her Multitak therapy well as well as her oral anticoagulation without many episodes of symptomatic atrial fibrillation.  She has not had routine follow-up of her dronedarone by her previous cardiologist nor has she had her mitral regurgitation surveilled as it was moderate previously.    1.  Echocardiogram for surveillance of mitral regurgitation  2.  Complete metabolic panel  3.  Continue high risk medication use and oral anticoagulation  4.  Continue other medical therapy    Follow-up in 6 months.

## 2019-08-28 ENCOUNTER — APPOINTMENT (OUTPATIENT)
Dept: VASCULAR LAB | Facility: MEDICAL CENTER | Age: 74
End: 2019-08-28
Payer: MEDICARE

## 2019-08-29 ENCOUNTER — TELEPHONE (OUTPATIENT)
Dept: PULMONOLOGY | Facility: HOSPICE | Age: 74
End: 2019-08-29

## 2019-08-29 NOTE — TELEPHONE ENCOUNTER
1. Caller Name: Whitley                      Call Back Number: [phone] (home)       2. Message: Pt called and said she has an appt with Ioana Lange PA-C on 10/23/19 but was told she would have to have a separate appt with someone at the sleep center.  Pt said she wouldn't be able to do both.  She will have trouble with the co-pays.  Pt said she is off of her inhalers and has not had an asthma attack since 2016. She has a rescuer inhaler and takes Singulair.  Pt's questions is Does she need to be treated? Last PFT per pt was up 20 points.  Maybe pt said she can be just be seen at the sleep center and if she has lung problems she will make an appt here?      Please advise.

## 2019-08-29 NOTE — TELEPHONE ENCOUNTER
Ioana Lange P.A.-C.  You 8 minutes ago (4:18 PM)     That does appear reasonable, her PCP can manage both rescue inhaler and Singulair.  Follow up at least annually at sleep center.        Called and spoke with pt. Notified pt. Pt understood.  cxld pt appt with Ioana Lange PA-C and scheduled pt an appt at the sleep center with Shani BURGER

## 2019-09-05 ENCOUNTER — TELEPHONE (OUTPATIENT)
Dept: CARDIOLOGY | Facility: MEDICAL CENTER | Age: 74
End: 2019-09-05

## 2019-09-05 NOTE — TELEPHONE ENCOUNTER
My company [employer] are requesting a new Script....No i have a concentrator but they need a new prescription for my insurance to cover my current concentrator rental... jeovanny knows all about my circumstances... my [facility] APN, Lubna Lara has resigned from [facility] and my concentrator no longer gets scripts from her and now jeovanny needs to send a new script so i have insurance coverage on my current concentrator... I use 3 liters per hour and i have a blue concentrator... and yes I only use the concentrator at night along with my C-PAP...

## 2019-09-05 NOTE — TELEPHONE ENCOUNTER
Received clearance request from [facility] for pt to have a EGD & Colonoscopy and to hold coumadin for 7 days prior. Form completed and faxed back to [phone] as requested. Confirmation received. Form sent for scanning.

## 2019-09-12 ENCOUNTER — ANTICOAGULATION VISIT (OUTPATIENT)
Dept: VASCULAR LAB | Facility: MEDICAL CENTER | Age: 74
End: 2019-09-12
Attending: INTERNAL MEDICINE
Payer: MEDICARE

## 2019-09-12 DIAGNOSIS — Z79.01 CHRONIC ANTICOAGULATION: ICD-10-CM

## 2019-09-12 DIAGNOSIS — I48.0 PAF (PAROXYSMAL ATRIAL FIBRILLATION) (HCC): ICD-10-CM

## 2019-09-12 LAB
INR BLD: 1.9 (ref 0.9–1.2)
INR PPP: 1.9 (ref 2–3.5)

## 2019-09-12 PROCEDURE — 99211 OFF/OP EST MAY X REQ PHY/QHP: CPT | Performed by: PHARMACIST

## 2019-09-12 PROCEDURE — 85610 PROTHROMBIN TIME: CPT

## 2019-09-12 NOTE — PROGRESS NOTES
Anticoagulation Summary  As of 2019    INR goal:   2.0-3.0   TTR:   70.1 % (3.9 y)   INR used for dosin.90! (2019)   Warfarin maintenance plan:   1.5 mg (3 mg x 0.5) every Wed; 3 mg (3 mg x 1) all other days   Weekly warfarin total:   19.5 mg   Weekly max warfarin dose:   21 mg   No change documented:   Tenisha Castillo, PharmD   Plan last modified:   Juan Bonds, PharmD (3/6/2019)   Next INR check:   2019   Target end date:   Indefinite    Indications    Chronic anticoagulation [Z79.01]  Chronic anticoagulation (Resolved) [Z79.01]  PAF (paroxysmal atrial fibrillation) (HCC) [I48.0]             Anticoagulation Episode Summary     INR check location:       Preferred lab:       Send INR reminders to:       Comments:         Anticoagulation Care Providers     Provider Role Specialty Phone number    Amy Lincoln M.D. Referring Cardiology [phone]    [facility] Anticoagulation Services Responsible  [phone]        Anticoagulation Patient Findings      HPI:  Whitley Frederick seen in clinic today, on anticoagulation therapy with warfarin for PAF.  Changes to current medical/health status since last appt: none  Denies signs/symptoms of bleeding and/or thrombosis since the last appt.    Denies any interval changes to diet  Denies any interval changes to medications since last appt.   Denies any complications or cost restrictions with current therapy.   Pt declined vitals today.      A/P   INR  slightly SUB-therapeutic. Per CHEST guidelines no warfarin adjustment for INR 0.1 out of range.   Pt to continue current weekly regimen.    Follow up appointment in 2 week(s).    Rod Freedman, Pharmacy Intern    Tenisha Castillo, PharmD

## 2019-09-26 ENCOUNTER — ANTICOAGULATION VISIT (OUTPATIENT)
Dept: VASCULAR LAB | Facility: MEDICAL CENTER | Age: 74
End: 2019-09-26
Attending: INTERNAL MEDICINE
Payer: MEDICARE

## 2019-09-26 VITALS — HEART RATE: 51 BPM | SYSTOLIC BLOOD PRESSURE: 134 MMHG | DIASTOLIC BLOOD PRESSURE: 44 MMHG

## 2019-09-26 DIAGNOSIS — I48.0 PAF (PAROXYSMAL ATRIAL FIBRILLATION) (HCC): ICD-10-CM

## 2019-09-26 DIAGNOSIS — Z79.01 CHRONIC ANTICOAGULATION: ICD-10-CM

## 2019-09-26 LAB
INR BLD: 2.1 (ref 0.9–1.2)
INR PPP: 2.1 (ref 2–3.5)

## 2019-09-26 PROCEDURE — 85610 PROTHROMBIN TIME: CPT

## 2019-09-26 PROCEDURE — 99212 OFFICE O/P EST SF 10 MIN: CPT | Performed by: NURSE PRACTITIONER

## 2019-09-26 RX ORDER — WARFARIN SODIUM 3 MG/1
TABLET ORAL
Qty: 135 TAB | Refills: 1 | Status: SHIPPED | OUTPATIENT
Start: 2019-09-26 | End: 2020-01-20 | Stop reason: SDUPTHER

## 2019-09-26 NOTE — PROGRESS NOTES
Anticoagulation Summary  As of 2019    INR goal:   2.0-3.0   TTR:   69.9 % (3.9 y)   INR used for dosin.10 (2019)   Warfarin maintenance plan:   1.5 mg (3 mg x 0.5) every Wed; 3 mg (3 mg x 1) all other days   Weekly warfarin total:   19.5 mg   Weekly max warfarin dose:   21 mg   Plan last modified:   Juan Bonds, PharmD (3/6/2019)   Next INR check:   10/24/2019   Target end date:   Indefinite    Indications    Chronic anticoagulation [Z79.01]  Chronic anticoagulation (Resolved) [Z79.01]  PAF (paroxysmal atrial fibrillation) (HCC) [I48.0]             Anticoagulation Episode Summary     INR check location:       Preferred lab:       Send INR reminders to:       Comments:         Anticoagulation Care Providers     Provider Role Specialty Phone number    Amy Lincoln M.D. Referring Cardiology [phone]    [facility] Responsible  [phone]        Anticoagulation Patient Findings      HPI:  Whitley Frederick seen in clinic today for follow up on anticoagulation therapy in the presence of AF.   Denies any changes to current medical/health status since last appointment.   Pt forgot to mention last visit she is no longer taking PPI. Denies any diet changes.   No current symptoms of bleeding or thrombosis reported.    A/P:   INR therapeutic.   We talked about a slight dose increase due to DDI, but pt prefers to continue current regimen for now.   BP recorded in vitals.    Follow up appointment in 4 week(s).    Next Appointment: Thursday, Oct 24, 2019 at 1:30 pm.    Jaylene BURGER

## 2019-10-03 ENCOUNTER — PATIENT OUTREACH (OUTPATIENT)
Dept: HEALTH INFORMATION MANAGEMENT | Facility: OTHER | Age: 74
End: 2019-10-03

## 2019-10-03 NOTE — PROGRESS NOTES
Received incoming call from Whitley. Patient had questions regarding resources for filling medication organizer for a friend. Provided information on pharmacies that provide bubble packing and delivery and information on the [facility]. Patient did not have medication questions/ concerns for herself and voiced appreciation for the information.     Laila Corral, PharmD

## 2019-10-18 DIAGNOSIS — J45.20 MILD INTERMITTENT ASTHMA WITHOUT COMPLICATION: ICD-10-CM

## 2019-10-18 RX ORDER — BECLOMETHASONE DIPROPIONATE HFA 40 UG/1
AEROSOL, METERED RESPIRATORY (INHALATION)
Qty: 1 EACH | Refills: 0 | Status: SHIPPED | OUTPATIENT
Start: 2019-10-18 | End: 2019-11-27 | Stop reason: SDUPTHER

## 2019-10-18 NOTE — TELEPHONE ENCOUNTER
Was the patient seen in the last year in this department? Yes  8/19/19  Does patient have an active prescription for medications requested? No     Received Request Via: Pharmacy   Statement Selected

## 2019-10-24 ENCOUNTER — ANTICOAGULATION VISIT (OUTPATIENT)
Dept: VASCULAR LAB | Facility: MEDICAL CENTER | Age: 74
End: 2019-10-24
Attending: INTERNAL MEDICINE
Payer: MEDICARE

## 2019-10-24 DIAGNOSIS — I48.0 PAF (PAROXYSMAL ATRIAL FIBRILLATION) (HCC): ICD-10-CM

## 2019-10-24 DIAGNOSIS — Z79.01 CHRONIC ANTICOAGULATION: ICD-10-CM

## 2019-10-24 LAB
INR BLD: 1.6 (ref 0.9–1.2)
INR PPP: 1.6 (ref 2–3.5)

## 2019-10-24 PROCEDURE — 99212 OFFICE O/P EST SF 10 MIN: CPT

## 2019-10-24 PROCEDURE — 85610 PROTHROMBIN TIME: CPT

## 2019-10-24 NOTE — PROGRESS NOTES
Anticoagulation Summary  As of 10/24/2019    INR goal:   2.0-3.0   TTR:   68.9 % (4 y)   INR used for dosin.60! (10/24/2019)   Warfarin maintenance plan:   3 mg (3 mg x 1) every day   Weekly warfarin total:   21 mg   Weekly max warfarin dose:   21 mg   Plan last modified:   Juan Bonds, PharmD (10/24/2019)   Next INR check:   2019   Target end date:   Indefinite    Indications    Chronic anticoagulation [Z79.01]  Chronic anticoagulation (Resolved) [Z79.01]  PAF (paroxysmal atrial fibrillation) (HCC) [I48.0]             Anticoagulation Episode Summary     INR check location:       Preferred lab:       Send INR reminders to:       Comments:         Anticoagulation Care Providers     Provider Role Specialty Phone number    Amy Lincoln M.D. Referring Cardiology [phone]    [facility] Anticoagulation Services Responsible  [phone]        Anticoagulation Patient Findings      HPI:  Whitley Laureentalya Fredercik seen in clinic today, on anticoagulation therapy with warfarin for Afib.   Changes to current medical/health status since last appt: none  Denies signs/symptoms of bleeding and/or thrombosis since the last appt.    Denies any interval changes to diet  Denies any interval changes to medications since last appt.   Denies any complications or cost restrictions with current therapy.   Declines vitals.  Confirmed dosing regimen.     A/P   INR  SUB-therapeutic.   Bolus today then begin increased regimen.     Follow up appointment in 2 week(s).    Juan Bonds, PharmD

## 2019-10-28 ENCOUNTER — PATIENT OUTREACH (OUTPATIENT)
Dept: HEALTH INFORMATION MANAGEMENT | Facility: OTHER | Age: 74
End: 2019-10-28

## 2019-10-28 DIAGNOSIS — R00.2 PALPITATIONS: ICD-10-CM

## 2019-10-28 RX ORDER — METOPROLOL TARTRATE 50 MG/1
50 TABLET, FILM COATED ORAL 2 TIMES DAILY
Qty: 180 TAB | Refills: 1 | Status: SHIPPED | OUTPATIENT
Start: 2019-10-28 | End: 2020-03-23 | Stop reason: SDUPTHER

## 2019-10-28 NOTE — PROGRESS NOTES
Received email from patient requesting return call regarding questions on interactions between her medications and CBD. Interaction  run. Discussed potential interactions between CBD and warfarin, CBD and clonazepam, and CBD and dronedarone. Encouraged patient to follow-up with providers to discuss.

## 2019-10-29 NOTE — TELEPHONE ENCOUNTER
Was the patient seen in the last year in this department? Yes  8/19/19  Does patient have an active prescription for medications requested? No     Received Request Via: Pharmacy

## 2019-11-06 DIAGNOSIS — N18.4 TYPE 2 DIABETES MELLITUS WITH STAGE 4 CHRONIC KIDNEY DISEASE, WITH LONG-TERM CURRENT USE OF INSULIN (HCC): ICD-10-CM

## 2019-11-06 DIAGNOSIS — Z79.4 TYPE 2 DIABETES MELLITUS WITH STAGE 4 CHRONIC KIDNEY DISEASE, WITH LONG-TERM CURRENT USE OF INSULIN (HCC): ICD-10-CM

## 2019-11-06 DIAGNOSIS — E11.22 TYPE 2 DIABETES MELLITUS WITH STAGE 4 CHRONIC KIDNEY DISEASE, WITH LONG-TERM CURRENT USE OF INSULIN (HCC): ICD-10-CM

## 2019-11-06 NOTE — TELEPHONE ENCOUNTER
Was the patient seen in the last year in this department? Yes    Does patient have an active prescription for medications requested? No     Received Request Via: Patient   Prescription Question   Received: Today   Message Contents   Whitley Frederick Frances Herman Ma   Phone Number: [phone]             Whitley Frederick 1945   [facility] on [address] ... could you see if I can get a refill for 2 Ozempic pens minimum and 4 maximum... 1.0mg per week ... thank you ...

## 2019-11-07 ENCOUNTER — APPOINTMENT (OUTPATIENT)
Dept: VASCULAR LAB | Facility: MEDICAL CENTER | Age: 74
End: 2019-11-07
Payer: MEDICARE

## 2019-11-11 ENCOUNTER — PATIENT OUTREACH (OUTPATIENT)
Dept: HEALTH INFORMATION MANAGEMENT | Facility: OTHER | Age: 74
End: 2019-11-11

## 2019-11-11 RX ORDER — FAMOTIDINE 20 MG/1
20 TABLET, FILM COATED ORAL DAILY
COMMUNITY
Start: 2019-09-07 | End: 2019-11-13 | Stop reason: SDUPTHER

## 2019-11-11 NOTE — PROGRESS NOTES
Received incoming call from patient stating that her GI provider switched her from omeprazole to famotidine due to diarrhea. Reviewed potential risks with long-term PPI treatment including c.diff. Patient denies heartburn symptoms with use of famotidine but states the diarrhea has not resolved. Encouraged patient to follow-up with her provider. Patient inquired about famotidine and renal function. Advised patient to notify her nephrologist of new medication and dosing. Updated med list in EPIC. Patient denies further medication questions/ concerns at this time.

## 2019-11-13 ENCOUNTER — PATIENT MESSAGE (OUTPATIENT)
Dept: MEDICAL GROUP | Facility: LAB | Age: 74
End: 2019-11-13

## 2019-11-13 NOTE — TELEPHONE ENCOUNTER
----- Message from Whitley Frederick sent at 11/13/2019 12:32 PM PST -----  Regarding: Prescription Question  Contact: [phone]  Whitley Frederick 1945 [phone]  Miles Arias... I Have been trying to get my Pepcid refilled through [facility] at [facility] they faxed them on Monday and as of just a few minutes ago the pharmacy has not heard from them ... I took my last pill yesterday morning... Pepcid 20mg twice a day morning n bed time ... I‘d appreciate it if you would please expedite this as soon as possible... I’m not having any problems as yet but I’m afraid if I eat it will trigger the acid once again ... thank you sooo much ...

## 2019-11-14 RX ORDER — FAMOTIDINE 20 MG/1
20 TABLET, FILM COATED ORAL 2 TIMES DAILY
Qty: 60 TAB | Refills: 3 | Status: SHIPPED | OUTPATIENT
Start: 2019-11-14 | End: 2020-02-06 | Stop reason: SDUPTHER

## 2019-11-15 ENCOUNTER — APPOINTMENT (OUTPATIENT)
Dept: VASCULAR LAB | Facility: MEDICAL CENTER | Age: 74
End: 2019-11-15
Payer: MEDICARE

## 2019-11-18 ENCOUNTER — PATIENT OUTREACH (OUTPATIENT)
Dept: HEALTH INFORMATION MANAGEMENT | Facility: OTHER | Age: 74
End: 2019-11-18

## 2019-11-18 NOTE — PROGRESS NOTES
Received VM from patient regarding medication questions. Outbound call to Whitlye. Patient wanted to know if there was a minimum dosing interval between Xopenex and QVAR inhalers. Reviewed indication of each. If patient experiencing sob, cough, or wheezing advised using Xopenex for relief. Continue to use QVAR 2 puffs BID and rinse mouth after using. Whitley verbalized understanding and denies further medication questions/ concerns at this time.     Laila Corral, PharmD

## 2019-11-26 NOTE — TELEPHONE ENCOUNTER
----- Message from Whitley Frederick sent at 11/25/2019  8:08 PM PST -----  Regarding: Prescription Question  Contact: [phone]  Whitley Frederick 1945 [phone]  Hi ... asking for your help ... I received only 1 Ozempic pen on the last request ... which only last 2 weeks ... and since you will probably be closed Thursday and Friday this week I’d appreciate it if I can could  samples Tuesday tomorrow  or Wednesday this week ... if not I will have a partial dose not a full dose of 1mg on Saturday ... thank you ...

## 2019-11-27 DIAGNOSIS — J45.20 MILD INTERMITTENT ASTHMA WITHOUT COMPLICATION: ICD-10-CM

## 2019-11-27 NOTE — TELEPHONE ENCOUNTER
----- Message from Whitley Frederick sent at 11/27/2019  1:09 PM PST -----  Regarding: Prescription Question  Contact: [phone]  Whitley Frederick 1945 [phone]    Miles Arias… When you get a minute can you please send a new prescription over to [facility] center on [address] for Qvar 40 mg… I wish you and your family and staff ...happy Thanksgiving and I am definitely thankful for you and your staff...

## 2019-11-27 NOTE — TELEPHONE ENCOUNTER
Was the patient seen in the last year in this department? Yes8/19/19    Does patient have an active prescription for medications requested? No     Received Request Via: Patient

## 2019-12-04 ENCOUNTER — ANTICOAGULATION VISIT (OUTPATIENT)
Dept: VASCULAR LAB | Facility: MEDICAL CENTER | Age: 74
End: 2019-12-04
Attending: INTERNAL MEDICINE
Payer: MEDICARE

## 2019-12-04 ENCOUNTER — HOSPITAL ENCOUNTER (OUTPATIENT)
Dept: CARDIOLOGY | Facility: MEDICAL CENTER | Age: 74
End: 2019-12-04
Attending: INTERNAL MEDICINE
Payer: MEDICARE

## 2019-12-04 DIAGNOSIS — I34.0 NON-RHEUMATIC MITRAL REGURGITATION: ICD-10-CM

## 2019-12-04 DIAGNOSIS — I48.0 PAF (PAROXYSMAL ATRIAL FIBRILLATION) (HCC): ICD-10-CM

## 2019-12-04 DIAGNOSIS — Z79.01 CHRONIC ANTICOAGULATION: ICD-10-CM

## 2019-12-04 LAB
INR PPP: 2.3 (ref 2–3.5)
LV EJECT FRACT  99904: 60
LV EJECT FRACT MOD 2C 99903: 61.33
LV EJECT FRACT MOD 4C 99902: 58.94
LV EJECT FRACT MOD BP 99901: 58.33

## 2019-12-04 PROCEDURE — 99211 OFF/OP EST MAY X REQ PHY/QHP: CPT | Mod: 25 | Performed by: NURSE PRACTITIONER

## 2019-12-04 PROCEDURE — 85610 PROTHROMBIN TIME: CPT

## 2019-12-04 PROCEDURE — 93306 TTE W/DOPPLER COMPLETE: CPT | Mod: 26 | Performed by: INTERNAL MEDICINE

## 2019-12-04 PROCEDURE — 93306 TTE W/DOPPLER COMPLETE: CPT

## 2019-12-04 NOTE — PROGRESS NOTES
Anticoagulation Summary  As of 2019    INR goal:   2.0-3.0   TTR:   68.2 % (4.1 y)   INR used for dosin.30 (2019)   Warfarin maintenance plan:   3 mg (3 mg x 1) every day   Weekly warfarin total:   21 mg   Weekly max warfarin dose:   21 mg   Plan last modified:   Juan Bonds, PharmD (10/24/2019)   Next INR check:   1/15/2020   Target end date:   Indefinite    Indications    Chronic anticoagulation [Z79.01]  Chronic anticoagulation (Resolved) [Z79.01]  PAF (paroxysmal atrial fibrillation) (HCC) [I48.0]             Anticoagulation Episode Summary     INR check location:       Preferred lab:       Send INR reminders to:       Comments:         Anticoagulation Care Providers     Provider Role Specialty Phone number    Amy Lincoln M.D. Referring Cardiology [phone]    [facility] Anticoagulation Services Responsible  [phone]        Anticoagulation Patient Findings      HPI:  Whitley Frederick seen in clinic today for follow up on anticoagulation therapy in the presence of AF.   Denies any changes to current medical/health status since last appointment.   Denies any medication or diet changes.   No current symptoms of bleeding or thrombosis reported.  Pt missed her last appt several weeks ago.    A/P:   INR therapeutic.   Continue current regimen. Reminded about the importance of regular INR follow up.  BP declined.    Follow up appointment in 6 week(s) per patient.    Next Appointment: Wednesday, Liu 15, 2020 at 1:30 pm.    Jaylene BURGER

## 2019-12-05 LAB — INR BLD: 2.3 (ref 0.9–1.2)

## 2019-12-14 ENCOUNTER — HOSPITAL ENCOUNTER (OUTPATIENT)
Dept: LAB | Facility: MEDICAL CENTER | Age: 74
End: 2019-12-14
Attending: NURSE PRACTITIONER
Payer: MEDICARE

## 2019-12-14 ENCOUNTER — HOSPITAL ENCOUNTER (OUTPATIENT)
Dept: LAB | Facility: MEDICAL CENTER | Age: 74
End: 2019-12-14
Attending: INTERNAL MEDICINE
Payer: MEDICARE

## 2019-12-14 DIAGNOSIS — N18.6 END STAGE RENAL DISEASE (HCC): ICD-10-CM

## 2019-12-14 DIAGNOSIS — E03.4 HYPOTHYROIDISM DUE TO ACQUIRED ATROPHY OF THYROID: ICD-10-CM

## 2019-12-14 DIAGNOSIS — E78.5 DYSLIPIDEMIA: ICD-10-CM

## 2019-12-14 DIAGNOSIS — Z78.9 NO IMMUNIZATION HISTORY RECORD: ICD-10-CM

## 2019-12-14 DIAGNOSIS — Z79.899 HIGH RISK MEDICATION USE: ICD-10-CM

## 2019-12-14 LAB
25(OH)D3 SERPL-MCNC: 40 NG/ML (ref 30–100)
ALBUMIN SERPL BCP-MCNC: 3.8 G/DL (ref 3.2–4.9)
ALBUMIN SERPL BCP-MCNC: 3.8 G/DL (ref 3.2–4.9)
ALBUMIN SERPL BCP-MCNC: 4 G/DL (ref 3.2–4.9)
ALBUMIN/GLOB SERPL: 1.2 G/DL
ALBUMIN/GLOB SERPL: 1.2 G/DL
ALP SERPL-CCNC: 50 U/L (ref 30–99)
ALP SERPL-CCNC: 50 U/L (ref 30–99)
ALT SERPL-CCNC: 11 U/L (ref 2–50)
ALT SERPL-CCNC: 13 U/L (ref 2–50)
ANION GAP SERPL CALC-SCNC: 6 MMOL/L (ref 0–11.9)
ANION GAP SERPL CALC-SCNC: 9 MMOL/L (ref 0–11.9)
AST SERPL-CCNC: 17 U/L (ref 12–45)
AST SERPL-CCNC: 17 U/L (ref 12–45)
BILIRUB SERPL-MCNC: 0.9 MG/DL (ref 0.1–1.5)
BILIRUB SERPL-MCNC: 1 MG/DL (ref 0.1–1.5)
BUN SERPL-MCNC: 58 MG/DL (ref 8–22)
BUN SERPL-MCNC: 63 MG/DL (ref 8–22)
BUN SERPL-MCNC: 64 MG/DL (ref 8–22)
CALCIUM SERPL-MCNC: 9.1 MG/DL (ref 8.5–10.5)
CALCIUM SERPL-MCNC: 9.2 MG/DL (ref 8.5–10.5)
CALCIUM SERPL-MCNC: 9.3 MG/DL (ref 8.5–10.5)
CHLORIDE SERPL-SCNC: 109 MMOL/L (ref 96–112)
CHLORIDE SERPL-SCNC: 110 MMOL/L (ref 96–112)
CHLORIDE SERPL-SCNC: 110 MMOL/L (ref 96–112)
CHOLEST SERPL-MCNC: 164 MG/DL (ref 100–199)
CO2 SERPL-SCNC: 23 MMOL/L (ref 20–33)
CO2 SERPL-SCNC: 24 MMOL/L (ref 20–33)
CO2 SERPL-SCNC: 24 MMOL/L (ref 20–33)
CREAT SERPL-MCNC: 2.6 MG/DL (ref 0.5–1.4)
CREAT SERPL-MCNC: 2.61 MG/DL (ref 0.5–1.4)
CREAT SERPL-MCNC: 2.69 MG/DL (ref 0.5–1.4)
CREAT UR-MCNC: 115.2 MG/DL
FASTING STATUS PATIENT QL REPORTED: NORMAL
GLOBULIN SER CALC-MCNC: 3.1 G/DL (ref 1.9–3.5)
GLOBULIN SER CALC-MCNC: 3.1 G/DL (ref 1.9–3.5)
GLUCOSE SERPL-MCNC: 121 MG/DL (ref 65–99)
GLUCOSE SERPL-MCNC: 123 MG/DL (ref 65–99)
GLUCOSE SERPL-MCNC: 124 MG/DL (ref 65–99)
HBV SURFACE AB SERPL IA-ACNC: <3.1 MIU/ML (ref 0–10)
HCT VFR BLD AUTO: 38.2 % (ref 37–47)
HDLC SERPL-MCNC: 46 MG/DL
HGB BLD-MCNC: 12.2 G/DL (ref 12–16)
LDLC SERPL CALC-MCNC: 88 MG/DL
LIPASE SERPL-CCNC: 67 U/L (ref 11–82)
PHOSPHATE SERPL-MCNC: 3.9 MG/DL (ref 2.5–4.5)
POTASSIUM SERPL-SCNC: 4.6 MMOL/L (ref 3.6–5.5)
POTASSIUM SERPL-SCNC: 4.6 MMOL/L (ref 3.6–5.5)
POTASSIUM SERPL-SCNC: 4.7 MMOL/L (ref 3.6–5.5)
PROT SERPL-MCNC: 6.9 G/DL (ref 6–8.2)
PROT SERPL-MCNC: 6.9 G/DL (ref 6–8.2)
PROT UR-MCNC: 26.9 MG/DL (ref 0–15)
PROT/CREAT UR: 234 MG/G (ref 10–107)
PTH-INTACT SERPL-MCNC: 84.7 PG/ML (ref 14–72)
SODIUM SERPL-SCNC: 140 MMOL/L (ref 135–145)
SODIUM SERPL-SCNC: 141 MMOL/L (ref 135–145)
SODIUM SERPL-SCNC: 143 MMOL/L (ref 135–145)
T4 FREE SERPL-MCNC: 0.89 NG/DL (ref 0.53–1.43)
TRIGL SERPL-MCNC: 148 MG/DL (ref 0–149)
TSH SERPL DL<=0.005 MIU/L-ACNC: 1.99 UIU/ML (ref 0.38–5.33)

## 2019-12-14 PROCEDURE — 85018 HEMOGLOBIN: CPT

## 2019-12-14 PROCEDURE — 80053 COMPREHEN METABOLIC PANEL: CPT | Mod: 91

## 2019-12-14 PROCEDURE — 80069 RENAL FUNCTION PANEL: CPT

## 2019-12-14 PROCEDURE — 86706 HEP B SURFACE ANTIBODY: CPT

## 2019-12-14 PROCEDURE — 84156 ASSAY OF PROTEIN URINE: CPT

## 2019-12-14 PROCEDURE — 80053 COMPREHEN METABOLIC PANEL: CPT

## 2019-12-14 PROCEDURE — 82306 VITAMIN D 25 HYDROXY: CPT

## 2019-12-14 PROCEDURE — 84443 ASSAY THYROID STIM HORMONE: CPT

## 2019-12-14 PROCEDURE — 83970 ASSAY OF PARATHORMONE: CPT

## 2019-12-14 PROCEDURE — 82570 ASSAY OF URINE CREATININE: CPT

## 2019-12-14 PROCEDURE — 36415 COLL VENOUS BLD VENIPUNCTURE: CPT

## 2019-12-14 PROCEDURE — 83690 ASSAY OF LIPASE: CPT

## 2019-12-14 PROCEDURE — 84439 ASSAY OF FREE THYROXINE: CPT

## 2019-12-14 PROCEDURE — 85014 HEMATOCRIT: CPT

## 2019-12-14 PROCEDURE — 80061 LIPID PANEL: CPT

## 2019-12-16 ENCOUNTER — PATIENT MESSAGE (OUTPATIENT)
Dept: MEDICAL GROUP | Facility: LAB | Age: 74
End: 2019-12-16

## 2019-12-16 DIAGNOSIS — G47.00 INSOMNIA: ICD-10-CM

## 2019-12-16 DIAGNOSIS — J45.909 UNCOMPLICATED ASTHMA, UNSPECIFIED ASTHMA SEVERITY, UNSPECIFIED WHETHER PERSISTENT: ICD-10-CM

## 2019-12-16 DIAGNOSIS — F41.9 ANXIETY: ICD-10-CM

## 2019-12-16 RX ORDER — CLONAZEPAM 0.5 MG/1
TABLET ORAL
Qty: 30 TAB | Refills: 1 | Status: SHIPPED
Start: 2019-12-16 | End: 2021-01-14 | Stop reason: SDUPTHER

## 2019-12-16 RX ORDER — LEVOTHYROXINE SODIUM 0.05 MG/1
50 TABLET ORAL
Qty: 90 TAB | Refills: 3 | Status: SHIPPED | OUTPATIENT
Start: 2019-12-16 | End: 2020-02-06 | Stop reason: SDUPTHER

## 2019-12-16 RX ORDER — MONTELUKAST SODIUM 10 MG/1
10 TABLET ORAL
Qty: 90 TAB | Refills: 3 | Status: SHIPPED | OUTPATIENT
Start: 2019-12-16 | End: 2020-02-06 | Stop reason: SDUPTHER

## 2019-12-16 NOTE — PATIENT COMMUNICATION
Was the patient seen in the last year in this department? Yes  8/19/19   Last Fill 9/4/18  Does patient have an active prescription for medications requested? No     Received Request Via: Patient

## 2019-12-16 NOTE — TELEPHONE ENCOUNTER
----- Message from Whitley Frederick sent at 12/16/2019  1:58 PM PST -----  Regarding: Prescription Question  Contact: [phone]  Whitley Frederick 1945 [phone]  Miles Arias:  Request for prescriptions to the [facility].     Clonazepam 0.5mg take as needed  Levothyroxine 50mg one a day  Montelukast 10mg one a day this is a medication that was prescribed by Lubna Lara no longer at                                                               [facility]     Thank You   Kelly Guthrie To you and Pat & the rest of your Staff < );o)>

## 2019-12-23 RX ORDER — ROSUVASTATIN CALCIUM 10 MG/1
10 TABLET, COATED ORAL EVERY EVENING
Qty: 100 TAB | Refills: 3 | Status: SHIPPED | OUTPATIENT
Start: 2019-12-23 | End: 2020-03-23 | Stop reason: SDUPTHER

## 2019-12-23 NOTE — TELEPHONE ENCOUNTER
Was the patient seen in the last year in this department? Yes   8/19/19    Does patient have an active prescription for medications requested? No     Received Request Via: Pharmacy/[address] [place] [address]

## 2020-01-09 ENCOUNTER — PATIENT MESSAGE (OUTPATIENT)
Dept: MEDICAL GROUP | Facility: LAB | Age: 75
End: 2020-01-09

## 2020-01-09 DIAGNOSIS — I48.0 PAF (PAROXYSMAL ATRIAL FIBRILLATION) (HCC): ICD-10-CM

## 2020-01-09 DIAGNOSIS — J45.20 MILD INTERMITTENT ASTHMA WITHOUT COMPLICATION: ICD-10-CM

## 2020-01-09 NOTE — TELEPHONE ENCOUNTER
----- Message from Whitley Frederick sent at 1/9/2020  5:52 AM PST -----  Regarding: Prescription Question  Contact: [phone]  Whitley Frederick 1945 [phone]  Miles Arias/Pat: I apologize for such short notice ... and it always on near to a Friday when Juana isn't there or a weekend... BUT I didn't notice until last night  that I only had to 2 Multaq's left(2-400mg per day for A-Fib)  and that will only cover today ... I'm also in need of Quvar 40(2 Puffs per day Asthma maintenance) and Ozempic  (1mg per week for Diabetes 2 pen per 30 day prescription) Could you Please call these [facility] into [facility] [address] and I hope they have these in stock... This is $141.00(for the 3) Co-Pay and I do not have it ... Thank you for you time and considerations... As Always Whitley   patient

## 2020-01-10 DIAGNOSIS — I48.0 PAF (PAROXYSMAL ATRIAL FIBRILLATION) (HCC): ICD-10-CM

## 2020-01-10 NOTE — TELEPHONE ENCOUNTER
----- Message from Whitley Frederick sent at 1/10/2020 12:05 PM PST -----  Regarding: Prescription Question  Contact: [phone]  Whitley Frederick 1945 [phone]  Miles Arias:  I know you are off today I am caught between a rock and a hard spot with my Multaq... I have already been in touch with [facility]... They do not have any of my prescriptions and or medication... Sooo I'm out of [place] there... Sooo my only alternative is to have a new prescription sent over to the [facility] because I am out of refills... I only took one Multaq 400mg(which I take 2 a day normally) yesterday and I only have one for today... so since it is Friday if you could please expedite your call to the pharmacy I would really appreciate it... Thank you for your time and consideration ... As Always Whitley

## 2020-01-15 ENCOUNTER — APPOINTMENT (OUTPATIENT)
Dept: VASCULAR LAB | Facility: MEDICAL CENTER | Age: 75
End: 2020-01-15
Payer: MEDICARE

## 2020-01-17 ENCOUNTER — APPOINTMENT (OUTPATIENT)
Dept: ENDOCRINOLOGY | Facility: MEDICAL CENTER | Age: 75
End: 2020-01-17
Payer: MEDICARE

## 2020-01-20 DIAGNOSIS — I48.0 PAF (PAROXYSMAL ATRIAL FIBRILLATION) (HCC): ICD-10-CM

## 2020-01-20 RX ORDER — WARFARIN SODIUM 3 MG/1
TABLET ORAL
Qty: 100 TAB | Refills: 2 | Status: SHIPPED | OUTPATIENT
Start: 2020-01-20 | End: 2020-03-23 | Stop reason: SDUPTHER

## 2020-01-22 ENCOUNTER — ANTICOAGULATION VISIT (OUTPATIENT)
Dept: VASCULAR LAB | Facility: MEDICAL CENTER | Age: 75
End: 2020-01-22
Attending: INTERNAL MEDICINE
Payer: MEDICARE

## 2020-01-22 VITALS — DIASTOLIC BLOOD PRESSURE: 68 MMHG | HEART RATE: 54 BPM | SYSTOLIC BLOOD PRESSURE: 141 MMHG

## 2020-01-22 DIAGNOSIS — I48.0 PAF (PAROXYSMAL ATRIAL FIBRILLATION) (HCC): ICD-10-CM

## 2020-01-22 DIAGNOSIS — Z79.01 CHRONIC ANTICOAGULATION: ICD-10-CM

## 2020-01-22 DIAGNOSIS — I10 ESSENTIAL HYPERTENSION: ICD-10-CM

## 2020-01-22 LAB
INR BLD: 1.6 (ref 0.9–1.2)
INR PPP: 1.6 (ref 2–3.5)

## 2020-01-22 PROCEDURE — 85610 PROTHROMBIN TIME: CPT

## 2020-01-22 PROCEDURE — 99212 OFFICE O/P EST SF 10 MIN: CPT | Performed by: NURSE PRACTITIONER

## 2020-01-22 NOTE — PROGRESS NOTES
Anticoagulation Summary  As of 2020    INR goal:   2.0-3.0   TTR:   67.4 % (4.3 y)   INR used for dosin.60! (2020)   Warfarin maintenance plan:   3 mg (3 mg x 1) every day   Weekly warfarin total:   21 mg   Weekly max warfarin dose:   21 mg   Plan last modified:   Juan Bonds, PharmD (10/24/2019)   Next INR check:   2020   Target end date:   Indefinite    Indications    Chronic anticoagulation [Z79.01]  Chronic anticoagulation (Resolved) [Z79.01]  PAF (paroxysmal atrial fibrillation) (HCC) [I48.0]             Anticoagulation Episode Summary     INR check location:       Preferred lab:       Send INR reminders to:       Comments:         Anticoagulation Care Providers     Provider Role Specialty Phone number    Amy Lincoln M.D. Referring Cardiology [phone]    [facility] Anticoagulation Services Responsible  [phone]        Anticoagulation Patient Findings      HPI:  Whitley Frederick seen in clinic today for follow up on anticoagulation therapy in the presence of AF.   Denies any changes to current medical/health status since last appointment.   Denies any medication or diet changes.   No current symptoms of bleeding or thrombosis reported.  No missed doses. Confirmed dose.    A/P:   INR subtherapeutic. Low CHADS 2 score.  Increase one dose then continue current regimen.   BP recorded in vitals.    Follow up appointment in 2 week(s).    Next Appointment: Wednesday, 2020 at 2:15 pm.    Jaylene BURGER

## 2020-01-24 RX ORDER — LOSARTAN POTASSIUM 50 MG/1
50 TABLET ORAL 2 TIMES DAILY
Qty: 200 TAB | Refills: 2 | Status: SHIPPED | OUTPATIENT
Start: 2020-01-24 | End: 2020-03-23 | Stop reason: SDUPTHER

## 2020-01-29 ENCOUNTER — HOSPITAL ENCOUNTER (OUTPATIENT)
Dept: LAB | Facility: MEDICAL CENTER | Age: 75
End: 2020-01-29
Attending: INTERNAL MEDICINE
Payer: MEDICARE

## 2020-01-29 LAB
ALBUMIN SERPL BCP-MCNC: 3.7 G/DL (ref 3.2–4.9)
BUN SERPL-MCNC: 46 MG/DL (ref 8–22)
CALCIUM SERPL-MCNC: 9.4 MG/DL (ref 8.5–10.5)
CHLORIDE SERPL-SCNC: 106 MMOL/L (ref 96–112)
CO2 SERPL-SCNC: 26 MMOL/L (ref 20–33)
CREAT SERPL-MCNC: 2.09 MG/DL (ref 0.5–1.4)
GLUCOSE SERPL-MCNC: 112 MG/DL (ref 65–99)
PHOSPHATE SERPL-MCNC: 3.8 MG/DL (ref 2.5–4.5)
POTASSIUM SERPL-SCNC: 4.9 MMOL/L (ref 3.6–5.5)
SODIUM SERPL-SCNC: 139 MMOL/L (ref 135–145)

## 2020-01-29 PROCEDURE — 80069 RENAL FUNCTION PANEL: CPT

## 2020-01-29 PROCEDURE — 36415 COLL VENOUS BLD VENIPUNCTURE: CPT

## 2020-02-05 ENCOUNTER — ANTICOAGULATION VISIT (OUTPATIENT)
Dept: VASCULAR LAB | Facility: MEDICAL CENTER | Age: 75
End: 2020-02-05
Attending: INTERNAL MEDICINE
Payer: MEDICARE

## 2020-02-05 DIAGNOSIS — I48.0 PAF (PAROXYSMAL ATRIAL FIBRILLATION) (HCC): ICD-10-CM

## 2020-02-05 DIAGNOSIS — Z79.01 CHRONIC ANTICOAGULATION: ICD-10-CM

## 2020-02-05 LAB
INR BLD: 1.9 (ref 0.9–1.2)
INR PPP: 1.9 (ref 2–3.5)

## 2020-02-05 PROCEDURE — 85610 PROTHROMBIN TIME: CPT

## 2020-02-05 PROCEDURE — 99212 OFFICE O/P EST SF 10 MIN: CPT

## 2020-02-05 NOTE — PROGRESS NOTES
Anticoagulation Summary  As of 2020    INR goal:   2.0-3.0   TTR:   66.8 % (4.3 y)   INR used for dosin.90! (2020)   Warfarin maintenance plan:   3 mg (3 mg x 1) every day   Weekly warfarin total:   21 mg   Weekly max warfarin dose:   21 mg   Plan last modified:   Juan Bonds, PharmD (10/24/2019)   Next INR check:   2020   Target end date:   Indefinite    Indications    Chronic anticoagulation [Z79.01]  Chronic anticoagulation (Resolved) [Z79.01]  PAF (paroxysmal atrial fibrillation) (HCC) [I48.0]             Anticoagulation Episode Summary     INR check location:       Preferred lab:       Send INR reminders to:       Comments:         Anticoagulation Care Providers     Provider Role Specialty Phone number    Amy Lincoln M.D. Referring Cardiology [phone]    [facility] Anticoagulation Services Responsible  [phone]           Anticoagulation Patient Findings  Patient Findings     Negatives:   Signs/symptoms of thrombosis, Signs/symptoms of bleeding, Laboratory test error suspected, Change in health, Change in alcohol use, Change in activity, Upcoming invasive procedure, Emergency department visit, Upcoming dental procedure, Missed doses, Extra doses, Change in medications, Change in diet/appetite, Hospital admission, Bruising, Other complaints          HPI:  Whitley Frederick seen in clinic today, on anticoagulation therapy with warfarin for atrial fibrillation  Changes to current medical/health status since last appt: none  Denies signs/symptoms of bleeding and/or thrombosis since the last appt.    Denies any interval changes to diet  Denies any interval changes to medications since last appt.   Denies any complications or cost restrictions with current therapy.   Verified current warfarin dosing schedule. Pt states she might've taken 3 mg instead of 6 mg on  as instructed from last visit.  BP check declined      A/P   INR  slightly sub-therapeutic.   Bolus x 1 day, then  continue regimen    Follow up appointment in 2 week(s).    Avani Connors, PharmD

## 2020-02-06 DIAGNOSIS — J45.909 UNCOMPLICATED ASTHMA, UNSPECIFIED ASTHMA SEVERITY, UNSPECIFIED WHETHER PERSISTENT: ICD-10-CM

## 2020-02-06 DIAGNOSIS — N18.4 CKD (CHRONIC KIDNEY DISEASE) STAGE 4, GFR 15-29 ML/MIN (HCC): ICD-10-CM

## 2020-02-06 RX ORDER — MONTELUKAST SODIUM 10 MG/1
10 TABLET ORAL
Qty: 100 TAB | Refills: 3 | Status: SHIPPED | OUTPATIENT
Start: 2020-02-06 | End: 2020-05-19 | Stop reason: SDUPTHER

## 2020-02-06 RX ORDER — LEVOTHYROXINE SODIUM 0.05 MG/1
50 TABLET ORAL
Qty: 100 TAB | Refills: 3 | Status: SHIPPED | OUTPATIENT
Start: 2020-02-06 | End: 2020-05-19 | Stop reason: SDUPTHER

## 2020-02-06 RX ORDER — FUROSEMIDE 80 MG
80 TABLET ORAL
Qty: 100 TAB | Refills: 1 | Status: SHIPPED | OUTPATIENT
Start: 2020-02-06 | End: 2021-07-12 | Stop reason: SDUPTHER

## 2020-02-06 RX ORDER — FAMOTIDINE 20 MG/1
20 TABLET, FILM COATED ORAL 2 TIMES DAILY
Qty: 200 TAB | Refills: 3 | Status: SHIPPED | OUTPATIENT
Start: 2020-02-06 | End: 2021-11-11

## 2020-02-06 NOTE — TELEPHONE ENCOUNTER
----- Message from Whitley Frederick sent at 2/6/2020  1:34 PM PST -----  Regarding: Prescription Question  Contact: [phone]  Whitley Frederick 1945 [phone]    Miles Arias:  Trying to coordinate my medications for auto fill and get 100 days... If that's possible to coordinate lol ;)... Per my insurance it has to be 100 days not 90 days.... I don't know I just dance to the tune lol ;)... Pepcid 20mg, Montelukast 10mg, Levothyroxin 50 mcg, Lasix 80mg ... Save [place] [facility] [place]...  Thank you much as always...

## 2020-02-11 ENCOUNTER — OFFICE VISIT (OUTPATIENT)
Dept: ENDOCRINOLOGY | Facility: MEDICAL CENTER | Age: 75
End: 2020-02-11
Payer: MEDICARE

## 2020-02-11 VITALS
WEIGHT: 270 LBS | SYSTOLIC BLOOD PRESSURE: 110 MMHG | DIASTOLIC BLOOD PRESSURE: 60 MMHG | HEIGHT: 67 IN | HEART RATE: 60 BPM | OXYGEN SATURATION: 98 % | BODY MASS INDEX: 42.38 KG/M2

## 2020-02-11 DIAGNOSIS — Z79.4 TYPE 2 DIABETES MELLITUS WITH STAGE 4 CHRONIC KIDNEY DISEASE, WITH LONG-TERM CURRENT USE OF INSULIN (HCC): ICD-10-CM

## 2020-02-11 DIAGNOSIS — E11.22 TYPE 2 DIABETES MELLITUS WITH STAGE 4 CHRONIC KIDNEY DISEASE, WITHOUT LONG-TERM CURRENT USE OF INSULIN (HCC): ICD-10-CM

## 2020-02-11 DIAGNOSIS — E11.22 TYPE 2 DIABETES MELLITUS WITH STAGE 4 CHRONIC KIDNEY DISEASE, WITH LONG-TERM CURRENT USE OF INSULIN (HCC): ICD-10-CM

## 2020-02-11 DIAGNOSIS — N18.4 TYPE 2 DIABETES MELLITUS WITH STAGE 4 CHRONIC KIDNEY DISEASE, WITH LONG-TERM CURRENT USE OF INSULIN (HCC): ICD-10-CM

## 2020-02-11 DIAGNOSIS — N18.4 TYPE 2 DIABETES MELLITUS WITH STAGE 4 CHRONIC KIDNEY DISEASE, WITHOUT LONG-TERM CURRENT USE OF INSULIN (HCC): ICD-10-CM

## 2020-02-11 DIAGNOSIS — I10 ESSENTIAL HYPERTENSION: ICD-10-CM

## 2020-02-11 LAB
HBA1C MFR BLD: 5.9 % (ref 0–5.6)
INT CON NEG: NEGATIVE
INT CON POS: POSITIVE

## 2020-02-11 PROCEDURE — 83036 HEMOGLOBIN GLYCOSYLATED A1C: CPT | Performed by: PHYSICIAN ASSISTANT

## 2020-02-11 PROCEDURE — 99214 OFFICE O/P EST MOD 30 MIN: CPT | Performed by: PHYSICIAN ASSISTANT

## 2020-02-11 NOTE — PROGRESS NOTES
Return to office Patient Consult Note  Referred by: EDILMA Whelan    Reason for consult: Diabetes Management Type 2    HPI:  Whitley Frederick is a 74 y.o. old patient who is seeing us today for diabetes care.  This is a pleasant patient with diabetes and I appreciate the opportunity to participate in the care of this patient.    Labs of 2/11/2020 HbA1c is 5.9  Labs of 1/18/19 HbA1c is 6.7  Labs of 10/18/18 HbA1c is 6.6  Labs of 7/19/18 HbA1c is 6.3  Labs of 2/26/18 HbA1c is 7.6  labs of 8/11/17 HbA1c 7.6, GFR 22,     BG Diary:2/11/2020  In the AM:   No log      1. Type 2 diabetes mellitus with stage 4 chronic kidney disease, with long-term current use of insulin ([facility])  This is a new patient on 8/11/17  She was on:  1.  Toujeo  65     She is now on:  1.   Ozempic 1.0 once a week    2. Essential hypertension     This is evelated and I will follow     2. Encounter for long-term (current) use of insulin ([facility])  Is on a high risk medication Insulin and we will continue to follow no hypoglycemic events since last visit       ROS:   Constitutional: No night sweats.  Eyes:  No visual changes.  Cardiac: No chest pain, No palpitations or racing heart rate.  Resp: No shortness of breath, No cough,   Gi: No Diarrhea    All other systems were reviewed and were/are negative.      Past Medical History:  Patient Active Problem List    Diagnosis Date Noted   • Essential hypertension 05/10/2016     Priority: High   • Atherosclerosis of aorta (CMS-[facility]) 04/29/2015     Priority: High   • Chronic respiratory failure with hypoxia ([facility]) 04/29/2015     Priority: High   • Morbid obesity with BMI of 45.0-49.9, adult ([facility]) 04/29/2015     Priority: High   • Anxiety 01/09/2014     Priority: High   • PAF (paroxysmal atrial fibrillation) ([facility]) 07/12/2013     Priority: High   • Type 2 diabetes mellitus with stage 4 chronic kidney disease, without long-term current use of insulin ([facility])      Priority: High   • Palpitations       Priority: High   • CKD (chronic kidney disease) stage 4, GFR 15-29 ml/min ([facility])      Priority: High   • Mild intermittent asthma without complication 08/24/2016     Priority: Medium   • Chronic anticoagulation 06/10/2016     Priority: Medium   • AVERY (obstructive sleep apnea) 05/10/2016     Priority: Medium   • Chronic idiopathic gout involving toe without tophus 04/30/2015     Priority: Medium   • Left bundle branch block 03/05/2012     Priority: Medium   • Multiple thyroid nodules 10/07/2015     Priority: Low   • Stenosis of carotid artery 05/30/2014     Priority: Low   • Non-rheumatic mitral regurgitation 08/20/2019   • High risk medication use 08/20/2019   • Secondary hyperparathyroidism of renal origin (HCC) 05/14/2018   • Hypothyroidism due to acquired atrophy of thyroid 04/10/2018   • VPC's (ventricular premature complexes) 02/23/2018   • Dependence on nocturnal oxygen therapy 11/02/2017   • Incontinence of feces 05/18/2017   • Chronic pain of both shoulders 03/23/2017   • Dyslipidemia 12/14/2016       Past Surgical History:  Past Surgical History:   Procedure Laterality Date   • CATARACT PHACO WITH IOL Right 10/25/2016    Procedure: CATARACT PHACO WITH IOL;  Surgeon: Zoran Gamble M.D.;  Location: SURGERY SAME DAY [facility];  Service:    • ABDOMINAL HYSTERECTOMY TOTAL     • CHOLECYSTECTOMY     • TONSILLECTOMY     • US-NEEDLE CORE BX-BREAST PANEL         Allergies:  Amlodipine; Amaryl; Avandia [rosiglitazone maleate]; Cipro xr; Clonidine; Contrast media with iodine [iodine]; Glipizide; Glucophage [metformin hydrochloride]; Hydralazine; Levaquin; and Relafen [nabumetone]    Social History:  Social History     Socioeconomic History   • Marital status:      Spouse name: Not on file   • Number of children: Not on file   • Years of education: Not on file   • Highest education level: Not on file   Occupational History   • Not on file   Social Needs   • Financial resource strain: Not on file   • Food  insecurity:     Worry: Not on file     Inability: Not on file   • Transportation needs:     Medical: Not on file     Non-medical: Not on file   Tobacco Use   • Smoking status: Former Smoker     Packs/day: 1.00     Years: 20.00     Pack years: 20.00     Types: Cigarettes     Last attempt to quit: 1983     Years since quittin.1   • Smokeless tobacco: Never Used   Substance and Sexual Activity   • Alcohol use: No     Alcohol/week: 0.0 oz   • Drug use: No   • Sexual activity: Never     Partners: Male   Lifestyle   • Physical activity:     Days per week: Not on file     Minutes per session: Not on file   • Stress: Not on file   Relationships   • Social connections:     Talks on phone: Not on file     Gets together: Not on file     Attends Voodoo service: Not on file     Active member of club or organization: Not on file     Attends meetings of clubs or organizations: Not on file     Relationship status: Not on file   • Intimate partner violence:     Fear of current or ex partner: Not on file     Emotionally abused: Not on file     Physically abused: Not on file     Forced sexual activity: Not on file   Other Topics Concern   • Not on file   Social History Narrative   • Not on file       Family History:  Family History   Problem Relation Age of Onset   • Cancer Maternal Aunt    • Diabetes Maternal Aunt    • Heart Disease Maternal Aunt    • Hypertension Maternal Aunt    • Hyperlipidemia Maternal Aunt    • Cancer Mother         Leukemia   • Hypertension Mother    • Diabetes Mother    • Lung Disease Father    • Cancer Father         Lung   • Lung Disease Brother    • Stroke Brother    • Diabetes Brother    • Heart Disease Brother    • Diabetes Maternal Grandmother    • Hypertension Brother    • Diabetes Brother        Medications:    Current Outpatient Medications:   •  levothyroxine (SYNTHROID) 50 MCG Tab, Take 1 Tab by mouth Every morning on an empty stomach., Disp: 100 Tab, Rfl: 3  •  montelukast (SINGULAIR)  10 MG Tab, Take 1 Tab by mouth every bedtime., Disp: 100 Tab, Rfl: 3  •  famotidine (PEPCID) 20 MG Tab, Take 1 Tab by mouth 2 times a day., Disp: 200 Tab, Rfl: 3  •  furosemide (LASIX) 80 MG Tab, Take 1 Tab by mouth every 48 hours for 90 days., Disp: 100 Tab, Rfl: 1  •  losartan (COZAAR) 50 MG Tab, Take 1 Tab by mouth 2 Times a Day., Disp: 200 Tab, Rfl: 2  •  warfarin (COUMADIN) 3 MG Tab, Take 1 tab by mouth daily as directed by the coumadin clinic, Disp: 100 Tab, Rfl: 2  •  dronedarone (MULTAQ) 400 MG Tab, Take 1 Tab by mouth 2 times a day, with meals., Disp: 60 Tab, Rfl: 6  •  beclomethasone HFA (QVAR REDIHALER) 40 MCG/ACT inhaler, Inhale 2 Puffs by mouth 2 Times a Day. FREE SAMPLE, Disp: 1 Each, Rfl: 0  •  Semaglutide, 1 MG/DOSE, (OZEMPIC, 1 MG/DOSE,) 2 MG/1.5ML Solution Pen-injector, Inject 1 mg as instructed every 7 days., Disp: 2 PEN, Rfl: 11  •  rosuvastatin (CRESTOR) 10 MG Tab, Take 1 Tab by mouth every evening., Disp: 100 Tab, Rfl: 3  •  clonazePAM (KLONOPIN) 0.5 MG Tab, TAKE 1 TABLET BY MOUTH ONCE DAILY AS NEEDED FOR 30 DAYS. ANXIETY/HEART PALPITATIONS for 30 days, Disp: 30 Tab, Rfl: 1  •  Semaglutide (OZEMPIC) 0.25 or 0.5 MG/DOSE Solution Pen-injector, Inject 1 mg as instructed every 7 days., Disp: 2 PEN, Rfl: 0  •  metoprolol (LOPRESSOR) 50 MG Tab, Take 1 Tab by mouth 2 times a day., Disp: 180 Tab, Rfl: 1  •  NON SPECIFIED, blue concentrator at night along with my C-PAP./3 liters per hour, Disp: 1 Each, Rfl: 0  •  nystatin (MYCOSTATIN) 697852 UNIT/GM Ointment, AAA up to twice daily (Patient not taking: Reported on 8/8/2019), Disp: 240 g, Rfl: 2  •  terazosin (HYTRIN) 1 MG Cap, Take 1 Cap by mouth every day., Disp: 30 Cap, Rfl: 4  •  ferrous sulfate 325 (65 Fe) MG tablet, Take 325 mg by mouth every day., Disp: , Rfl:   •  azelastine (ASTELIN) 137 MCG/SPRAY nasal spray, Spray 1 Spray in nose 2 times a day as needed., Disp: , Rfl:   •  allopurinol (ZYLOPRIM) 100 MG Tab, Take 200 mg by mouth 2 Times a Day.,  "Disp: , Rfl:   •  Clonazepam 0.5 MG TABLET DISPERSIBLE, Take 0.25-0.5 tablet by mouth 1 time daily as needed., Disp: , Rfl:   •  beclomethasone HFA (QVAR REDIHALER) 40 MCG/ACT inhaler, INHALE 2 PUFFS BY MOUTH TWICE DAILY (Patient not taking: Reported on 8/20/2019), Disp: 2 Inhaler, Rfl: 0  •  levalbuterol (XOPENEX HFA) 45 MCG/ACT inhaler, Inhale 2 Puffs by mouth every four hours as needed for Shortness of Breath., Disp: 1 Inhaler, Rfl: 11  •  fluticasone (FLONASE) 50 MCG/ACT nasal spray, USE 1-2  SPRAY(S) IN EACH NOSTRIL ONCE DAILY 30 MINUTES PRIOR TO USE OF CPAP, Disp: 3 Bottle, Rfl: 3  •  Blood Glucose Monitoring Suppl Supplies Misc, Test strips order: Test strips for Accucheck meter. Sig: use BID and prn ssx high or low sugar #100 RF x 3, Disp: 100 Strip, Rfl: 11  •  Blood Glucose Monitoring Suppl Supplies Misc, Test strips order: Test strips for Chely Contour meter. Sig: use tid  and prn ssx high or low sugar #100 RF x 3, Disp: 100 Strip, Rfl: 3  •  Urine Glucose-Ketones Test Strip, 1 Strip by In Vitro route every day. (Patient not taking: Reported on 8/20/2019), Disp: 100 Strip, Rfl: 2  •  glucose blood (FREESTYLE LITE) strip, 1 Strip by Other route 2 times a day, with meals., Disp: 50 Strip, Rfl: 11  •  Insulin Pen Needle 32G X 4 MM Misc, 1 Applicator by Does not apply route 3 times a day., Disp: 100 Each, Rfl: 11  •  Blood Glucose Monitoring Suppl W/DEVICE Kit, 1 Each by Does not apply route 2 times a day as needed., Disp: 1 Kit, Rfl: 0  •  Misc. Devices MISC, Pen needles for Lantus Solstar. 29g 12mm., Disp: 90 Each, Rfl: 3  •  Cholecalciferol (VITAMIN D) 2000 UNITS CAPS, Take 4,000 Units by mouth every day., Disp: , Rfl:   •  magnesium oxide (MAGNESIUM OXIDE) 400 (241.3 MG) MG TABS tablet, Take 400 mg by mouth every day., Disp: , Rfl:         Physical Examination:   Vital signs: /60 (BP Location: Left arm)   Pulse 60   Ht 1.702 m (5' 7\")   Wt 122.5 kg (270 lb)   SpO2 98%   BMI 42.29 kg/m² "   General: No distress, cooperative, well dressed and well nourished.   Eyes: No scleral icterus or discharge, No hyposphagma  ENMT: Normal on external inspection of nose, lips, No nasal drainage   Neck: No abnormal masses on inspection  Resp: Normal effort, Bilateral clear to auscultation, No wheezing, No rales  CVS: Regular rate and rhythm, S1 S2 normal, No murmur. No gallop  Extremities: No edema bilateral extremities  Neuro: Alert and oriented  Skin: No rash, No Ulcers  Psych: Normal mood and affect      Assessment and Plan:    1. Type 2 diabetes mellitus with stage 4 chronic kidney disease, with long-term current use of insulin (HCC)    She is now on:  1.   Ozempic 1.0 once a week (decrease to 0.5)     2. Essential hypertension     This is evelated and I will follow     2. Encounter for long-term (current) use of insulin (HCC)  Is on a high risk medication Insulin and we will continue to follow no hypoglycemic events since last visit     Return in about 6 months (around 8/11/2020).      Tino kindly for allowing me to participate in the diabetes care plan for this patient.    Wei Patterson PA-C, BC-ADM  Board Certified - Advanced Diabetes Management  02/11/20    CC:   EDILMA Whelan

## 2020-02-18 DIAGNOSIS — I48.0 PAF (PAROXYSMAL ATRIAL FIBRILLATION) (HCC): ICD-10-CM

## 2020-02-18 RX ORDER — DRONEDARONE 400 MG/1
400 TABLET, FILM COATED ORAL 2 TIMES DAILY WITH MEALS
Qty: 60 TAB | Refills: 6 | Status: SHIPPED | OUTPATIENT
Start: 2020-02-18 | End: 2020-02-26 | Stop reason: SDUPTHER

## 2020-02-19 ENCOUNTER — APPOINTMENT (OUTPATIENT)
Dept: VASCULAR LAB | Facility: MEDICAL CENTER | Age: 75
End: 2020-02-19
Attending: INTERNAL MEDICINE
Payer: MEDICARE

## 2020-02-19 ENCOUNTER — PATIENT OUTREACH (OUTPATIENT)
Dept: HEALTH INFORMATION MANAGEMENT | Facility: OTHER | Age: 75
End: 2020-02-19

## 2020-02-19 NOTE — PROGRESS NOTES
"Received VM from patient requesting return call regarding medication questions. Outbound call to Whitley. Patient reports having a \"strange feeling around her eyes\" the day after taking her furosemide. She reports only taking the furosemide when she has gained 3-4 lbs, not every other day as listed on her med rec. Patient states she has discussed this dosing with her nephrologist Dr. Badillo. She reports being told the feeling could be attributed to dehydration. Patient has been monitoring her blood pressures and blood sugars. Reports her blood pressure has been running in the 130s but after this episode she states her BP was 100/65. She reports recent blood sugar of 66. Reviewed signs/symptoms of hypoglycemia and how to treat. Patient verbalized understanding. Recommended patient to contact Dr. Badillo to confirm dosing of furosemide and report symptoms if this continues.     Laila oCrral, PharmD    "

## 2020-02-20 DIAGNOSIS — J45.909 UNCOMPLICATED ASTHMA, UNSPECIFIED ASTHMA SEVERITY, UNSPECIFIED WHETHER PERSISTENT: ICD-10-CM

## 2020-02-20 RX ORDER — LEVALBUTEROL TARTRATE 45 UG/1
2 AEROSOL, METERED ORAL EVERY 4 HOURS PRN
Qty: 1 INHALER | Refills: 1 | Status: SHIPPED | OUTPATIENT
Start: 2020-02-20 | End: 2020-06-10 | Stop reason: SDUPTHER

## 2020-02-20 NOTE — TELEPHONE ENCOUNTER
Have we ever prescribed this med? Yes.  If yes, what date? 12/05/2018    Last OV: 04/17/2019- GINA Lara     Next OV: NO APPOINTMENT ON FILE-   Plan:  1) Spirometry reviewed, stable. She is off Qvar currently. She felt it was adding to increased cough. We discussed step wise treatment approach to Asthma. Continue Singulair 10 mg and Xopenex HFA inhaler as needed.   2) Continue to follow with ENT. Continue saline rinse, Flonase and Astelin nasal sprays.   3) Patient is up to date on her Prevnar 13, Pneumovax 23 and Influenza vaccinations.  4) Continue CPAP at 10 CM H20 with 3 LPM 02 bleed in. Order to DME to fit for full face mask of choice.   5) Sleep hygiene discussed. Weight loss recommended.  6) Continue to follow with Cardiology.   7) Follow up in 6 months at the Pulmonary Clinic, sooner OV if needed.     DX: Uncomplicated asthma, unspecified asthma severity, unspecified whether persistent (J45.909)    Medications:   Requested Prescriptions     Pending Prescriptions Disp Refills   • levalbuterol (XOPENEX HFA) 45 MCG/ACT inhaler 1 Inhaler 11     Sig: Inhale 2 Puffs by mouth every four hours as needed for Shortness of Breath.     RX RECEIVED VIA FAX

## 2020-02-26 ENCOUNTER — PATIENT MESSAGE (OUTPATIENT)
Dept: MEDICAL GROUP | Facility: LAB | Age: 75
End: 2020-02-26

## 2020-02-26 ENCOUNTER — HOSPITAL ENCOUNTER (OUTPATIENT)
Dept: RADIOLOGY | Facility: MEDICAL CENTER | Age: 75
End: 2020-02-26
Attending: NURSE PRACTITIONER
Payer: MEDICARE

## 2020-02-26 ENCOUNTER — ANTICOAGULATION VISIT (OUTPATIENT)
Dept: VASCULAR LAB | Facility: MEDICAL CENTER | Age: 75
End: 2020-02-26
Attending: INTERNAL MEDICINE
Payer: MEDICARE

## 2020-02-26 ENCOUNTER — OFFICE VISIT (OUTPATIENT)
Dept: CARDIOLOGY | Facility: MEDICAL CENTER | Age: 75
End: 2020-02-26
Payer: MEDICARE

## 2020-02-26 VITALS
HEART RATE: 54 BPM | DIASTOLIC BLOOD PRESSURE: 62 MMHG | BODY MASS INDEX: 43 KG/M2 | HEIGHT: 67 IN | RESPIRATION RATE: 16 BRPM | SYSTOLIC BLOOD PRESSURE: 138 MMHG | OXYGEN SATURATION: 98 % | WEIGHT: 274 LBS

## 2020-02-26 DIAGNOSIS — Z79.01 CHRONIC ANTICOAGULATION: ICD-10-CM

## 2020-02-26 DIAGNOSIS — I48.0 PAF (PAROXYSMAL ATRIAL FIBRILLATION) (HCC): ICD-10-CM

## 2020-02-26 DIAGNOSIS — Z79.899 HIGH RISK MEDICATION USE: ICD-10-CM

## 2020-02-26 DIAGNOSIS — I34.0 NON-RHEUMATIC MITRAL REGURGITATION: ICD-10-CM

## 2020-02-26 DIAGNOSIS — Z12.31 VISIT FOR SCREENING MAMMOGRAM: ICD-10-CM

## 2020-02-26 DIAGNOSIS — I44.7 LEFT BUNDLE BRANCH BLOCK: ICD-10-CM

## 2020-02-26 DIAGNOSIS — J45.20 MILD INTERMITTENT ASTHMA WITHOUT COMPLICATION: ICD-10-CM

## 2020-02-26 DIAGNOSIS — I10 ESSENTIAL HYPERTENSION: ICD-10-CM

## 2020-02-26 LAB
INR BLD: 1.7 (ref 0.9–1.2)
INR PPP: 1.7 (ref 2–3.5)

## 2020-02-26 PROCEDURE — 99214 OFFICE O/P EST MOD 30 MIN: CPT | Performed by: INTERNAL MEDICINE

## 2020-02-26 PROCEDURE — 99212 OFFICE O/P EST SF 10 MIN: CPT | Performed by: NURSE PRACTITIONER

## 2020-02-26 PROCEDURE — 77067 SCR MAMMO BI INCL CAD: CPT

## 2020-02-26 PROCEDURE — 85610 PROTHROMBIN TIME: CPT

## 2020-02-26 RX ORDER — DRONEDARONE 400 MG/1
400 TABLET, FILM COATED ORAL 2 TIMES DAILY WITH MEALS
Qty: 180 TAB | Refills: 3 | Status: SHIPPED | OUTPATIENT
Start: 2020-02-26 | End: 2020-03-04 | Stop reason: SDUPTHER

## 2020-02-26 RX ORDER — BECLOMETHASONE DIPROPIONATE HFA 40 UG/1
2 AEROSOL, METERED RESPIRATORY (INHALATION) 2 TIMES DAILY
Qty: 3 EACH | Refills: 3 | Status: SHIPPED
Start: 2020-02-26 | End: 2020-08-19

## 2020-02-26 NOTE — PROGRESS NOTES
Anticoagulation Summary  As of 2/26/2020    INR goal:   2.0-3.0   TTR:   66.8 % (4.3 y)   INR used for dosing:      Warfarin maintenance plan:   3 mg (3 mg x 1) every day   Weekly warfarin total:   21 mg   Weekly max warfarin dose:   21 mg   Plan last modified:   Juan Bonds, PharmD (10/24/2019)   Next INR check:      Target end date:   Indefinite    Indications    Chronic anticoagulation [Z79.01]  Chronic anticoagulation (Resolved) [Z79.01]  PAF (paroxysmal atrial fibrillation) (HCC) [I48.0]             Anticoagulation Episode Summary     INR check location:       Preferred lab:       Send INR reminders to:       Comments:         Anticoagulation Care Providers     Provider Role Specialty Phone number    Amy Lincoln M.D. Referring Cardiology [phone]    [facility] Responsible  [phone]        Anticoagulation Patient Findings      HPI:  Whitley Frederick seen in clinic today for follow up on anticoagulation therapy in the presence of AF.   Denies any changes to current medical/health status since last appointment.   She is eating more greens and plans to stay consistent. Denies any medication changes.   No current symptoms of bleeding or thrombosis reported.  Confirmed dose. No missed doses.    A/P:   INR subtherapeutic. CHADS 2 score = 2. No hx of CVA. She does have CKD and most recent creatinine is 2.0. We talked about the possibility of switching to Eliquis. She prefesr to hold off for now but will consider after she speaks with her nephrologist.    Will increase regimen.     Follow up appointment in 2 week(s).    Next Appointment: Wednesday, March 11, 2020 at 1:15 pm.    Jaylene BURGER

## 2020-02-26 NOTE — PROGRESS NOTES
Chief Complaint   Patient presents with   • Atrial Fibrillation       Subjective:   Whitley Frederick is a 74 y.o. female who presents today for for follow-up of paroxysmal atrial fibrillation and PVCs anticoagulation high risk medication use.  She also has a history of moderate mitral regurgitation that she was unaware of from an echocardiogram in 2017.  She is a former patient of Dr. Sorto who wishes to switch her care to me after recommendation from a mutual patient.  She is a history of paroxysmal atrial for ablation is currently not bothering her managed with dronedarone and warfarin.  She has no bleeding issues.  She has not been closely monitored for her Multitak over this time.  She also has a history of PVCs which are rare but symptomatic for her well documented on ZIO Patch and has seen Dr. eckert with regard to this.  She has lost weight recently but does not exert herself to a high degree although she does walk her dog.  She has no exertional symptoms.  She has a history of coronary angiography in the past which showed mild nonobstructive disease.  She has hypertension type 2 diabetes mellitus as well.  She also has chronic kidney disease.    In the interim she feels well is lightheaded only when she does not pay close attention to her hydration status has good blood pressure control and her laboratory panel shows stable CKD and normal liver function and thyroid.  Tolerating her anticoagulation well.    Past Medical History:   Diagnosis Date   • Allergy    • Anxiety    • Arrhythmia    • Arthritis    • Asthma    • Carotid bruit     Left   • CATARACT    • Chest pain    • DIABETES MELLITUS    • Esophageal reflux    • Goiter    • Headache(784.0)    • Heart murmur    • Hyperlipidemia    • Hypertension    • IBD (inflammatory bowel disease)    • Migraine     Silent migraine   • AVERY (obstructive sleep apnea)    • Overweight(278.02)    • Palpitations    • Paroxysmal atrial fibrillation (HCC) 7/12/13    woke with  palps, AFib per ekg in am.   • Pneumonia     2015   • Positive reaction to tuberculin skin test    • Renal disease     Mild diabetic renal disease with mild proteinuria.Moderate Stage 4 Dr. Badillo   • Urinary tract infection, site not specified      Past Surgical History:   Procedure Laterality Date   • CATARACT PHACO WITH IOL Right 10/25/2016    Procedure: CATARACT PHACO WITH IOL;  Surgeon: Zoran Gamble M.D.;  Location: SURGERY SAME DAY [facility];  Service:    • ABDOMINAL HYSTERECTOMY TOTAL     • CHOLECYSTECTOMY     • TONSILLECTOMY     • US-NEEDLE CORE BX-BREAST PANEL       Family History   Problem Relation Age of Onset   • Cancer Maternal Aunt    • Diabetes Maternal Aunt    • Heart Disease Maternal Aunt    • Hypertension Maternal Aunt    • Hyperlipidemia Maternal Aunt    • Cancer Mother         Leukemia   • Hypertension Mother    • Diabetes Mother    • Lung Disease Father    • Cancer Father         Lung   • Lung Disease Brother    • Stroke Brother    • Diabetes Brother    • Heart Disease Brother    • Diabetes Maternal Grandmother    • Hypertension Brother    • Diabetes Brother      Social History     Socioeconomic History   • Marital status:      Spouse name: Not on file   • Number of children: Not on file   • Years of education: Not on file   • Highest education level: Not on file   Occupational History   • Not on file   Social Needs   • Financial resource strain: Not on file   • Food insecurity     Worry: Not on file     Inability: Not on file   • Transportation needs     Medical: Not on file     Non-medical: Not on file   Tobacco Use   • Smoking status: Former Smoker     Packs/day: 1.00     Years: 20.00     Pack years: 20.00     Types: Cigarettes     Last attempt to quit: 1983     Years since quittin.1   • Smokeless tobacco: Never Used   Substance and Sexual Activity   • Alcohol use: No     Alcohol/week: 0.0 oz   • Drug use: No   • Sexual activity: Never     Partners: Male    Lifestyle   • Physical activity     Days per week: Not on file     Minutes per session: Not on file   • Stress: Not on file   Relationships   • Social connections     Talks on phone: Not on file     Gets together: Not on file     Attends Hinduism service: Not on file     Active member of club or organization: Not on file     Attends meetings of clubs or organizations: Not on file     Relationship status: Not on file   • Intimate partner violence     Fear of current or ex partner: Not on file     Emotionally abused: Not on file     Physically abused: Not on file     Forced sexual activity: Not on file   Other Topics Concern   • Not on file   Social History Narrative   • Not on file     Allergies   Allergen Reactions   • Amlodipine Itching   • Amaryl      GI Problems   • Avandia [Rosiglitazone Maleate]      GI problems   • Cipro Xr      Possibly causes Chills.   • Clonidine      Rapid heart rate, swelling    • Contrast Media With Iodine [Iodine] Hives   • Glipizide      GI Problems   • Glucophage [Metformin Hydrochloride]      dizzy   • Hydralazine      Rapid heart rate, chest tightness   • Levaquin      Possible allergy - chills with cipro but sick   • Relafen [Nabumetone]      Itching; avoids all nsaids     Outpatient Encounter Medications as of 2/26/2020   Medication Sig Dispense Refill   • dronedarone (MULTAQ) 400 MG Tab Take 1 Tab by mouth 2 times a day, with meals. 180 Tab 3   • beclomethasone HFA (QVAR REDIHALER) 40 MCG/ACT inhaler Inhale 2 Puffs by mouth 2 Times a Day. FREE SAMPLE 3 Each 3   • levalbuterol (XOPENEX HFA) 45 MCG/ACT inhaler Inhale 2 Puffs by mouth every four hours as needed for Shortness of Breath. 1 Inhaler 1   • levothyroxine (SYNTHROID) 50 MCG Tab Take 1 Tab by mouth Every morning on an empty stomach. 100 Tab 3   • montelukast (SINGULAIR) 10 MG Tab Take 1 Tab by mouth every bedtime. 100 Tab 3   • famotidine (PEPCID) 20 MG Tab Take 1 Tab by mouth 2 times a day. 200 Tab 3   • furosemide  (LASIX) 80 MG Tab Take 1 Tab by mouth every 48 hours for 90 days. 100 Tab 1   • losartan (COZAAR) 50 MG Tab Take 1 Tab by mouth 2 Times a Day. 200 Tab 2   • warfarin (COUMADIN) 3 MG Tab Take 1 tab by mouth daily as directed by the coumadin clinic 100 Tab 2   • Semaglutide, 1 MG/DOSE, (OZEMPIC, 1 MG/DOSE,) 2 MG/1.5ML Solution Pen-injector Inject 1 mg as instructed every 7 days. 2 PEN 11   • rosuvastatin (CRESTOR) 10 MG Tab Take 1 Tab by mouth every evening. 100 Tab 3   • clonazePAM (KLONOPIN) 0.5 MG Tab TAKE 1 TABLET BY MOUTH ONCE DAILY AS NEEDED FOR 30 DAYS. ANXIETY/HEART PALPITATIONS for 30 days 30 Tab 1   • Semaglutide (OZEMPIC) 0.25 or 0.5 MG/DOSE Solution Pen-injector Inject 1 mg as instructed every 7 days. 2 PEN 0   • metoprolol (LOPRESSOR) 50 MG Tab Take 1 Tab by mouth 2 times a day. 180 Tab 1   • NON SPECIFIED blue concentrator at night along with my C-PAP./3 liters per hour 1 Each 0   • nystatin (MYCOSTATIN) 729538 UNIT/GM Ointment AAA up to twice daily 240 g 2   • terazosin (HYTRIN) 1 MG Cap Take 1 Cap by mouth every day. 30 Cap 4   • ferrous sulfate 325 (65 Fe) MG tablet Take 325 mg by mouth every day.     • azelastine (ASTELIN) 137 MCG/SPRAY nasal spray Corry 1 Spray in nose 2 times a day as needed.     • allopurinol (ZYLOPRIM) 100 MG Tab Take 200 mg by mouth 2 Times a Day.     • Clonazepam 0.5 MG TABLET DISPERSIBLE Take 0.25-0.5 tablet by mouth 1 time daily as needed.     • fluticasone (FLONASE) 50 MCG/ACT nasal spray USE 1-2  SPRAY(S) IN EACH NOSTRIL ONCE DAILY 30 MINUTES PRIOR TO USE OF CPAP 3 Bottle 3   • Blood Glucose Monitoring Suppl Supplies Misc Test strips order: Test strips for Accucheck meter. Sig: use BID and prn ssx high or low sugar #100 RF x 3 100 Strip 11   • Blood Glucose Monitoring Suppl Supplies Misc Test strips order: Test strips for Chely Contour meter. Sig: use tid  and prn ssx high or low sugar #100 RF x 3 100 Strip 3   • Urine Glucose-Ketones Test Strip 1 Strip by In Vitro route  "every day. 100 Strip 2   • glucose blood (FREESTYLE LITE) strip 1 Strip by Other route 2 times a day, with meals. 50 Strip 11   • Insulin Pen Needle 32G X 4 MM Misc 1 Applicator by Does not apply route 3 times a day. 100 Each 11   • Blood Glucose Monitoring Suppl W/DEVICE Kit 1 Each by Does not apply route 2 times a day as needed. 1 Kit 0   • Misc. Devices MISC Pen needles for Lancyrus Gastonar. 29g 12mm. 90 Each 3   • Cholecalciferol (VITAMIN D) 2000 UNITS CAPS Take 4,000 Units by mouth every day.     • magnesium oxide (MAGNESIUM OXIDE) 400 (241.3 MG) MG TABS tablet Take 400 mg by mouth every day.     • [DISCONTINUED] dronedarone (MULTAQ) 400 MG Tab Take 1 Tab by mouth 2 times a day, with meals. 60 Tab 6   • [DISCONTINUED] beclomethasone HFA (QVAR REDIHALER) 40 MCG/ACT inhaler Inhale 2 Puffs by mouth 2 Times a Day. FREE SAMPLE 1 Each 0   • beclomethasone HFA (QVAR REDIHALER) 40 MCG/ACT inhaler INHALE 2 PUFFS BY MOUTH TWICE DAILY (Patient not taking: Reported on 8/20/2019) 2 Inhaler 0     No facility-administered encounter medications on file as of 2/26/2020.      Review of Systems   All other systems reviewed and are negative.       Objective:   /62 (BP Location: Left arm, Patient Position: Sitting, BP Cuff Size: Adult)   Pulse (!) 54   Resp 16   Ht 1.702 m (5' 7\")   Wt 124.3 kg (274 lb)   SpO2 98%   BMI 42.91 kg/m²     Physical Exam   Constitutional: She is oriented to person, place, and time. She appears well-developed and well-nourished. No distress.   Obese   HENT:   Head: Normocephalic and atraumatic.   Right Ear: External ear normal.   Left Ear: External ear normal.   Mouth/Throat: No oropharyngeal exudate.   Eyes: Pupils are equal, round, and reactive to light. Conjunctivae and EOM are normal. Right eye exhibits no discharge. Left eye exhibits no discharge. No scleral icterus.   Neck: Normal range of motion. Neck supple. No JVD present. No tracheal deviation present. No thyromegaly present. "   Cardiovascular: Normal rate, regular rhythm, S1 normal, S2 normal and intact distal pulses. PMI is not displaced. Exam reveals no gallop, no S3, no S4 and no friction rub.   Murmur (2/6 systolic murmur apex) heard.  Pulses:       Carotid pulses are 2+ on the right side and 2+ on the left side.       Radial pulses are 2+ on the right side and 2+ on the left side.        Popliteal pulses are 2+ on the right side and 2+ on the left side.        Dorsalis pedis pulses are 2+ on the right side and 2+ on the left side.        Posterior tibial pulses are 2+ on the right side and 2+ on the left side.   Pulmonary/Chest: Effort normal and breath sounds normal. No respiratory distress. She has no wheezes. She has no rales. She exhibits no tenderness.   Abdominal: Soft. Bowel sounds are normal. She exhibits no distension. There is no abdominal tenderness.   Musculoskeletal: Normal range of motion.         General: Edema ( Trace) present. No tenderness.   Neurological: She is alert and oriented to person, place, and time. No cranial nerve deficit (Cranial nerves II through XII grossly intact).   Skin: Skin is warm and dry. No rash noted. She is not diaphoretic. No erythema.   Psychiatric: She has a normal mood and affect. Her behavior is normal. Judgment and thought content normal.   Vitals reviewed.  No changes in physical exam since prior 8/20/2019    LABS:  Lab Results   Component Value Date/Time    CHOLSTRLTOT 164 12/14/2019 10:06 AM    LDL 88 12/14/2019 10:06 AM    HDL 46 12/14/2019 10:06 AM    TRIGLYCERIDE 148 12/14/2019 10:06 AM       Lab Results   Component Value Date/Time    WBC 8.1 04/11/2019 12:44 PM    RBC 4.19 (L) 04/11/2019 12:44 PM    HEMOGLOBIN 12.2 12/14/2019 10:12 AM    HEMATOCRIT 38.2 12/14/2019 10:12 AM    MCV 92.1 04/11/2019 12:44 PM    NEUTSPOLYS 68.60 04/11/2019 12:44 PM    LYMPHOCYTES 22.90 04/11/2019 12:44 PM    MONOCYTES 5.50 04/11/2019 12:44 PM    EOSINOPHILS 2.00 04/11/2019 12:44 PM    BASOPHILS 0.60  04/11/2019 12:44 PM    HYPOCHROMIA 1+ 04/19/2014 01:23 AM     Lab Results   Component Value Date/Time    SODIUM 139 01/29/2020 02:19 PM    POTASSIUM 4.9 01/29/2020 02:19 PM    CHLORIDE 106 01/29/2020 02:19 PM    CO2 26 01/29/2020 02:19 PM    GLUCOSE 112 (H) 01/29/2020 02:19 PM    BUN 46 (H) 01/29/2020 02:19 PM    CREATININE 2.09 (H) 01/29/2020 02:19 PM    CREATININE 1.1 04/27/2006 09:50 AM     Lab Results   Component Value Date    HBA1C 5.9 (A) 02/11/2020      Lab Results   Component Value Date/Time    ALKPHOSPHAT 50 12/14/2019 10:09 AM    ASTSGOT 17 12/14/2019 10:09 AM    ALTSGPT 11 12/14/2019 10:09 AM    TBILIRUBIN 0.9 12/14/2019 10:09 AM      Lab Results   Component Value Date/Time    BNPBTYPENAT 68 04/18/2014 10:20 AM      No results found for: TSH  Lab Results   Component Value Date/Time    PROTHROMBTM 25.4 (H) 04/11/2019 12:44 PM    INR 1.90 02/05/2020      ECHO CONCLUSIONS (12/4/2019):  Compared to the report of the prior study done  on 04/11/17 - no   significant changes noted.  Normal left ventricular size and systolic function.  Left ventricular ejection fraction is visually estimated to be 60%.  Grade I diastolic dysfunction.  Mild concentric left ventricular hypertrophy.  Moderate mitral regurgitation.  Aortic sclerosis without stenosis.  Estimated right ventricular systolic pressure  is 30 mmHg.    ECHO CONCLUSIONS (4/11/2017):  Normal left ventricular size and systolic function. Mild concentric   left ventricular hypertrophy. Left ventricular ejection fraction is   visually estimated to be 55%. Normal regional wall motion. Grade I   diastolic dysfunction.  Mildly dilated left atrium. Left atrial volume index is 37 mL/sq m.  Structurally normal mitral valve. Probably moderate mitral   regurgitation. PISA not well visualized.  Compared to the report of the study done 4/2014- there has been an   improvement in LA size and diastolic function.     EKG (4/10/2018):  I have personally reviewed the EKG this  visit and discussed with the patient.  Sinus rhythm and left bundle branch block        Assessment:     1. PAF (paroxysmal atrial fibrillation) ([facility])     2. High risk medication use     3. Non-rheumatic mitral regurgitation  EC-ECHOCARDIOGRAM COMPLETE W/O CONT   4. Essential hypertension         Medical Decision Making:  Today's Assessment / Status / Plan:     Doing well.  No significant issues with PAF.  Occasional PVCs.  Mitral regurgitation is moderate and remained so from the prior check.  Recommend follow-up echocardiogram to follow mitral digitation.  Tolerating anticoagulation and dronedarone well.  INRs are somewhat low.  This is being addressed.    Follow-up in 6 months, continue Multitak, continue monitoring for high risk medication use, continue Coumadin for goal INR between 2 and 3.

## 2020-03-02 RX ORDER — SEMAGLUTIDE 1.34 MG/ML
1 INJECTION, SOLUTION SUBCUTANEOUS
Qty: 6 PEN | Refills: 3 | Status: SHIPPED | OUTPATIENT
Start: 2020-03-02 | End: 2020-07-10 | Stop reason: SDUPTHER

## 2020-03-04 ENCOUNTER — TELEPHONE (OUTPATIENT)
Dept: MEDICAL GROUP | Facility: LAB | Age: 75
End: 2020-03-04

## 2020-03-04 ENCOUNTER — PATIENT MESSAGE (OUTPATIENT)
Dept: MEDICAL GROUP | Facility: LAB | Age: 75
End: 2020-03-04

## 2020-03-04 DIAGNOSIS — Z79.4 TYPE 2 DIABETES MELLITUS WITH STAGE 4 CHRONIC KIDNEY DISEASE, WITH LONG-TERM CURRENT USE OF INSULIN (HCC): ICD-10-CM

## 2020-03-04 DIAGNOSIS — N18.4 TYPE 2 DIABETES MELLITUS WITH STAGE 4 CHRONIC KIDNEY DISEASE, WITH LONG-TERM CURRENT USE OF INSULIN (HCC): ICD-10-CM

## 2020-03-04 DIAGNOSIS — E11.22 TYPE 2 DIABETES MELLITUS WITH STAGE 4 CHRONIC KIDNEY DISEASE, WITH LONG-TERM CURRENT USE OF INSULIN (HCC): ICD-10-CM

## 2020-03-04 DIAGNOSIS — I48.0 PAF (PAROXYSMAL ATRIAL FIBRILLATION) (HCC): ICD-10-CM

## 2020-03-04 RX ORDER — DRONEDARONE 400 MG/1
400 TABLET, FILM COATED ORAL 2 TIMES DAILY WITH MEALS
Qty: 180 TAB | Refills: 3 | Status: SHIPPED | OUTPATIENT
Start: 2020-03-04 | End: 2020-05-13 | Stop reason: SDUPTHER

## 2020-03-04 RX ORDER — SEMAGLUTIDE 1.34 MG/ML
0.5 INJECTION, SOLUTION SUBCUTANEOUS
Qty: 3 PEN | Refills: 0 | Status: SHIPPED | OUTPATIENT
Start: 2020-03-04 | End: 2020-11-30 | Stop reason: SDUPTHER

## 2020-03-04 RX ORDER — FLUTICASONE PROPIONATE 44 MCG
2 AEROSOL WITH ADAPTER (GRAM) INHALATION 2 TIMES DAILY
Qty: 3 INHALER | Refills: 3 | Status: SHIPPED
Start: 2020-03-04 | End: 2020-10-20

## 2020-03-04 NOTE — TELEPHONE ENCOUNTER
----- Message from Whitley Frederick sent at 3/4/2020  2:29 PM PST -----  Regarding: Prescription Question  Contact: [phone]  Whitley Frederick 1945 [phone]  Miles Ward:  Now I am going to [facility]... I already talked to them ... I am going to fill out a Profile later this afternoon online... I already checked with them and they do have my medications in stock... Multaq 400mg and Flovent 55mcg... I will also need a prescription sent for Ozempic (it says on the box 4 doses of 0.5mg which is 1 injection a week of 0.5mg and 1 pen per 30 days and 3 pens per 90Days ok I just wrote that primarily for myself),.... Sooo I need a prescription for all 3 for 90 Days faxed to [phone]... Thank you and your are very appreciated... < 3 ... that a heart old style... LoL ;) P.S. I getting low on Multaq that is my important one now...

## 2020-03-05 NOTE — TELEPHONE ENCOUNTER
Patient is also requesting flovent sent to her pharmacy / not in her chart/ see message below    Whitley Frederick 1945 [phone]  M[place] Ward:  Now I am going to [facility]... I already talked to them ... I am going to fill out a Profile later this afternoon online... I already checked with them and they do have my medications in stock... Multaq 400mg and Flovent 55mcg... I will also need a prescription sent for Ozempic (it says on the box 4 doses of 0.5mg which is 1 injection a week of 0.5mg and 1 pen per 30 days and 3 pens per 90Days ok I just wrote that primarily for myself),.... Sooo I need a prescription for all 3 for 90 Days faxed to [phone]... Thank you and your are very appreciated... < 3 ... that a heart old style... LoL ;) P.S. I getting low on Multaq that is my important one now...

## 2020-03-11 ENCOUNTER — APPOINTMENT (OUTPATIENT)
Dept: VASCULAR LAB | Facility: MEDICAL CENTER | Age: 75
End: 2020-03-11
Attending: INTERNAL MEDICINE
Payer: MEDICARE

## 2020-03-11 ENCOUNTER — TELEPHONE (OUTPATIENT)
Dept: ENDOCRINOLOGY | Facility: MEDICAL CENTER | Age: 75
End: 2020-03-11

## 2020-03-11 NOTE — TELEPHONE ENCOUNTER
VOICEMAIL  1. Caller Name: Whitley                      Call Back Number: [phone] (home)       2. Message: Patient called wondering where she was in the process of the patient assistance.   Called patient back and told her that she was denied patient assistance because of her insurance ([employer]). She had already called her insurance and they told her she is in the gap. Told her we could get her in to see another provider to get on a less expensive medication. She declined.     3. Patient approves office to leave a detailed voicemail/MyChart message: yes

## 2020-03-17 ENCOUNTER — ANTICOAGULATION VISIT (OUTPATIENT)
Dept: MEDICAL GROUP | Facility: PHYSICIAN GROUP | Age: 75
End: 2020-03-17
Payer: MEDICARE

## 2020-03-17 DIAGNOSIS — I48.0 PAF (PAROXYSMAL ATRIAL FIBRILLATION) (HCC): ICD-10-CM

## 2020-03-17 DIAGNOSIS — Z79.01 CHRONIC ANTICOAGULATION: Primary | ICD-10-CM

## 2020-03-17 LAB — INR PPP: 2.9 (ref 2–3.5)

## 2020-03-17 PROCEDURE — 93793 ANTICOAG MGMT PT WARFARIN: CPT | Performed by: FAMILY MEDICINE

## 2020-03-17 PROCEDURE — 85610 PROTHROMBIN TIME: CPT | Performed by: FAMILY MEDICINE

## 2020-03-17 NOTE — PROGRESS NOTES
Anticoagulation Summary  As of 3/17/2020    INR goal:   2.0-3.0   TTR:   66.1 % (4.4 y)   INR used for dosin.90 (3/17/2020)   Warfarin maintenance plan:   4.5 mg (3 mg x 1.5) every Wed; 3 mg (3 mg x 1) all other days   Weekly warfarin total:   22.5 mg   Weekly max warfarin dose:   21 mg   Plan last modified:   Eh Enamorado, PharmD (3/17/2020)   Next INR check:   2020   Target end date:   Indefinite    Indications    Chronic anticoagulation [Z79.01]  Chronic anticoagulation (Resolved) [Z79.01]  PAF (paroxysmal atrial fibrillation) (HCC) [I48.0]             Anticoagulation Episode Summary     INR check location:       Preferred lab:       Send INR reminders to:       Comments:         Anticoagulation Care Providers     Provider Role Specialty Phone number    Amy Lincoln M.D. Referring Cardiology [phone]    [facility] Responsible  [phone]        Anticoagulation Patient Findings  Patient Findings     Negatives:   Signs/symptoms of thrombosis, Signs/symptoms of bleeding, Laboratory test error suspected, Change in health, Change in alcohol use, Change in activity, Upcoming invasive procedure, Emergency department visit, Upcoming dental procedure, Missed doses, Extra doses, Change in medications, Change in diet/appetite, Hospital admission, Bruising, Other complaints        HPI:   Whitley Frederick seen in clinic today, on anticoagulation therapy with warfarin for stroke prevention due to history of atrial fibrillation.    Patient's previous INR was subtherapeutic at 1.7 on 20, at which time patient was instructed to increase weekly warfarin regimen.  She returns to clinic today to recheck INR to ensure it is therapeutic and thus preventing possible clotting and/or bleeding/bruising complications.    CHADS-VASc = at least 4  (unadjusted ischemic stroke risk/year:  4.8%, which is moderate risk)    Does patient have any changes to current medical/health status since last  appt (Y/N):  NO  Does patient have any signs/symptoms of bleeding and/or thrombosis since the last appt (Y/N):  NO  Does patient have any interval changes to diet or medications since last appt (Y/N):  Has been taking lower weekly warfarin regimen than documented.  Are there any complications or cost restrictions with current therapy (Y/N):  NO     Does patient have [facility] PCP? YES, [place] MOTA (If not, please document discussion that patient must be seen at [facility])       Vitals:  declined today    There were no vitals filed for this visit.     Asssessment:      INR therapeutic at 2.9, therefore decreasing patient's risk of stroke and/or bleeding complications.   Reason(s) for out of range INR today:  n/a      Plan:  Pt is to continue with current warfarin dosing regimen in order to maintain INR in therapeutic range.     Follow up:  Because warfarin is a high risk medication and current CHEST guidelines recommend regular monitoring intervals (few days up to 12 weeks), will have patient return to clinic in 4 weeks to recheck INR (higher risk patient for covid-19).    Eh Enamorado, PharmD, BCACP

## 2020-03-23 DIAGNOSIS — I10 ESSENTIAL HYPERTENSION: ICD-10-CM

## 2020-03-23 DIAGNOSIS — R00.2 PALPITATIONS: ICD-10-CM

## 2020-03-23 DIAGNOSIS — I48.0 PAF (PAROXYSMAL ATRIAL FIBRILLATION) (HCC): ICD-10-CM

## 2020-03-23 RX ORDER — WARFARIN SODIUM 3 MG/1
TABLET ORAL
Qty: 100 TAB | Refills: 3 | Status: SHIPPED | OUTPATIENT
Start: 2020-03-23 | End: 2021-02-22

## 2020-03-23 RX ORDER — LOSARTAN POTASSIUM 50 MG/1
50 TABLET ORAL 2 TIMES DAILY
Qty: 200 TAB | Refills: 2 | Status: SHIPPED | OUTPATIENT
Start: 2020-03-23 | End: 2021-03-02 | Stop reason: SDUPTHER

## 2020-03-23 RX ORDER — METOPROLOL TARTRATE 50 MG/1
50 TABLET, FILM COATED ORAL 2 TIMES DAILY
Qty: 200 TAB | Refills: 3 | Status: SHIPPED | OUTPATIENT
Start: 2020-03-23 | End: 2020-04-20 | Stop reason: SDUPTHER

## 2020-03-23 RX ORDER — ROSUVASTATIN CALCIUM 10 MG/1
10 TABLET, COATED ORAL EVERY EVENING
Qty: 100 TAB | Refills: 3 | Status: SHIPPED | OUTPATIENT
Start: 2020-03-23 | End: 2021-06-09 | Stop reason: SDUPTHER

## 2020-03-23 RX ORDER — ALLOPURINOL 100 MG/1
200 TABLET ORAL 2 TIMES DAILY
Qty: 200 TAB | Refills: 3 | Status: SHIPPED | OUTPATIENT
Start: 2020-03-23 | End: 2021-04-12

## 2020-03-23 NOTE — TELEPHONE ENCOUNTER
----- Message from Whitley Frederick sent at 3/21/2020  1:56 PM PDT -----  Regarding: Prescription Question  Contact: [phone]  Whitley Frederick 1945 [phone]  Hello, I need 3 new prescriptions sent to the [facility]  [phone] via Fax [phone], I called them and this is the number they gave me different than previous. These first two are on the [facility] Medications Tier 6 which 100 Days are free... Rosuvastatin 10mg, LOSARTAN POTASSIUM 50 MG, but these are not on the list Allopurinol 100 Mg Tablet 2x AM&BD, Metoprol Tartrate 50 mg 2x AM&BD,  Warfarin 3mg every time I order through the [facility] I only get 45 and right now I am on 1 in M T T F S S, and 1.5 on Wednesday at bedtime.  These last 3 i dont know if i can get 100 days or 90 days. Please send refills also as I am in the [employer] of Plan [employer].  Thank you...

## 2020-03-24 ENCOUNTER — TELEPHONE (OUTPATIENT)
Dept: MEDICAL GROUP | Facility: LAB | Age: 75
End: 2020-03-24

## 2020-03-30 ENCOUNTER — PATIENT OUTREACH (OUTPATIENT)
Dept: HEALTH INFORMATION MANAGEMENT | Facility: OTHER | Age: 75
End: 2020-03-30

## 2020-03-30 NOTE — PROGRESS NOTES
Patient called to find out where to get an advanced directive. I directed her to the [email or web address] site where we were able to find a copy of it. She will print and send in. She did not want to review her chart but I let her know I am here for any of her healthcare needs.

## 2020-04-14 ENCOUNTER — ANTICOAGULATION VISIT (OUTPATIENT)
Dept: MEDICAL GROUP | Facility: PHYSICIAN GROUP | Age: 75
End: 2020-04-14
Payer: MEDICARE

## 2020-04-14 DIAGNOSIS — I48.0 PAF (PAROXYSMAL ATRIAL FIBRILLATION) (HCC): ICD-10-CM

## 2020-04-14 DIAGNOSIS — Z79.01 CHRONIC ANTICOAGULATION: Primary | ICD-10-CM

## 2020-04-14 LAB — INR PPP: 2.6 (ref 2–3.5)

## 2020-04-14 PROCEDURE — 85610 PROTHROMBIN TIME: CPT | Performed by: FAMILY MEDICINE

## 2020-04-14 PROCEDURE — 93793 ANTICOAG MGMT PT WARFARIN: CPT | Performed by: FAMILY MEDICINE

## 2020-04-14 NOTE — PROGRESS NOTES
Anticoagulation Summary  As of 2020    INR goal:   2.0-3.0   TTR:   66.6 % (4.5 y)   INR used for dosin.60 (2020)   Warfarin maintenance plan:   4.5 mg (3 mg x 1.5) every Wed; 3 mg (3 mg x 1) all other days   Weekly warfarin total:   22.5 mg   Weekly max warfarin dose:   21 mg   Plan last modified:   Eh Enamorado, PharmD (3/17/2020)   Next INR check:   2020   Target end date:   Indefinite    Indications    Chronic anticoagulation [Z79.01]  Chronic anticoagulation (Resolved) [Z79.01]  PAF (paroxysmal atrial fibrillation) (HCC) [I48.0]             Anticoagulation Episode Summary     INR check location:       Preferred lab:       Send INR reminders to:       Comments:         Anticoagulation Care Providers     Provider Role Specialty Phone number    Amy iLncoln M.D. Referring Cardiology [phone]    [facility] Responsible  [phone]        Anticoagulation Patient Findings  Patient Findings     Negatives:   Signs/symptoms of thrombosis, Signs/symptoms of bleeding, Laboratory test error suspected, Change in health, Change in alcohol use, Change in activity, Upcoming invasive procedure, Emergency department visit, Upcoming dental procedure, Missed doses, Extra doses, Change in medications, Change in diet/appetite, Hospital admission, Bruising, Other complaints        HPI:   Whitley Frederick seen in clinic today, on anticoagulation therapy with warfarin for stroke prevention due to history of PAF.    Patient's previous INR was therapeutic at 2.9 on 3-17-20, at which time patient was instructed to continue with current warfarin regimen.  She returns to clinic today to recheck INR to ensure it is therapeutic and thus preventing possible clotting and/or bleeding/bruising complications.    CHADS-VASc = at least 4  (unadjusted ischemic stroke risk/year:  4.8%, which is moderate risk)    Does patient have any changes to current medical/health status since last appt (Y/N):   NO  Does patient have any signs/symptoms of bleeding and/or thrombosis since the last appt (Y/N):  NO  Does patient have any interval changes to diet or medications since last appt (Y/N):  NO  Are there any complications or cost restrictions with current therapy (Y/N):  NO     [facility] (If not, please document discussion that patient must be seen at [facility])       Vitals:  declined today, vehicle visit.    There were no vitals filed for this visit.     Asssessment:      INR remains therapeutic at 2.6, therefore decreasing patient's risk of stroke and/or bleeding complications.   Reason(s) for out of range INR today:  n/a      Plan:  Pt is to continue with current warfarin dosing regimen in order to maintain INR in therapeutic range.     Follow up:  Because warfarin is a high risk medication and current CHEST guidelines recommend regular monitoring intervals (few days up to 12 weeks), will have patient return to clinic in 6 weeks to recheck INR.    hE Enamorado, PharmD, BCACP

## 2020-04-20 DIAGNOSIS — R00.2 PALPITATIONS: ICD-10-CM

## 2020-04-20 RX ORDER — METOPROLOL TARTRATE 50 MG/1
50 TABLET, FILM COATED ORAL 2 TIMES DAILY
Qty: 200 TAB | Refills: 3 | Status: SHIPPED | OUTPATIENT
Start: 2020-04-20 | End: 2021-04-15 | Stop reason: SDUPTHER

## 2020-04-20 NOTE — TELEPHONE ENCOUNTER
Received request via: Pharmacy/ [facility]    Was the patient seen in the last year in this department? Yes  8/19/19  Does the patient have an active prescription (recently filled or refills available) for medication(s) requested? No

## 2020-04-29 ENCOUNTER — TELEPHONE (OUTPATIENT)
Dept: HEALTH INFORMATION MANAGEMENT | Facility: OTHER | Age: 75
End: 2020-04-29

## 2020-04-29 NOTE — TELEPHONE ENCOUNTER
Spoke with patient to schedule AWV.  Patient would like to set up this visit virtually but did not want to set up visit today.  She stated she was still in bed.   Call back number [phone] left for patient to return call at her convenience.

## 2020-05-06 ENCOUNTER — HOSPITAL ENCOUNTER (OUTPATIENT)
Dept: LAB | Facility: MEDICAL CENTER | Age: 75
End: 2020-05-06
Attending: INTERNAL MEDICINE
Payer: MEDICARE

## 2020-05-06 LAB
ALBUMIN SERPL BCP-MCNC: 4 G/DL (ref 3.2–4.9)
BUN SERPL-MCNC: 46 MG/DL (ref 8–22)
CALCIUM SERPL-MCNC: 9.3 MG/DL (ref 8.5–10.5)
CHLORIDE SERPL-SCNC: 103 MMOL/L (ref 96–112)
CO2 SERPL-SCNC: 22 MMOL/L (ref 20–33)
CREAT SERPL-MCNC: 2.35 MG/DL (ref 0.5–1.4)
CREAT UR-MCNC: 174.82 MG/DL
GLUCOSE SERPL-MCNC: 150 MG/DL (ref 65–99)
MAGNESIUM SERPL-MCNC: 1.9 MG/DL (ref 1.5–2.5)
PHOSPHATE SERPL-MCNC: 3.1 MG/DL (ref 2.5–4.5)
POTASSIUM SERPL-SCNC: 4.7 MMOL/L (ref 3.6–5.5)
PROT UR-MCNC: 25 MG/DL (ref 0–15)
PROT/CREAT UR: 143 MG/G (ref 10–107)
SODIUM SERPL-SCNC: 138 MMOL/L (ref 135–145)
URATE SERPL-MCNC: 5.7 MG/DL (ref 1.9–8.2)

## 2020-05-06 PROCEDURE — 80069 RENAL FUNCTION PANEL: CPT

## 2020-05-06 PROCEDURE — 84550 ASSAY OF BLOOD/URIC ACID: CPT

## 2020-05-06 PROCEDURE — 83735 ASSAY OF MAGNESIUM: CPT

## 2020-05-06 PROCEDURE — 36415 COLL VENOUS BLD VENIPUNCTURE: CPT

## 2020-05-13 DIAGNOSIS — I48.0 PAF (PAROXYSMAL ATRIAL FIBRILLATION) (HCC): ICD-10-CM

## 2020-05-13 RX ORDER — DRONEDARONE 400 MG/1
400 TABLET, FILM COATED ORAL 2 TIMES DAILY WITH MEALS
Qty: 60 TAB | Refills: 3 | Status: SHIPPED | OUTPATIENT
Start: 2020-05-13 | End: 2020-05-13 | Stop reason: SDUPTHER

## 2020-05-13 RX ORDER — DRONEDARONE 400 MG/1
400 TABLET, FILM COATED ORAL 2 TIMES DAILY WITH MEALS
Qty: 180 TAB | Refills: 3 | Status: SHIPPED | OUTPATIENT
Start: 2020-05-13 | End: 2021-01-04 | Stop reason: SDUPTHER

## 2020-05-13 NOTE — TELEPHONE ENCOUNTER
----- Message from Naina Burrell, Med Ass't sent at 5/13/2020  8:04 AM PDT -----  Regarding: FW: Non-Urgent Medical Question  Contact: [phone]    ----- Message -----  From: Whitley Frederick  Sent: 5/12/2020  10:13 PM PDT  To: Naina Burrell, Med Ass't  Subject: RE: Non-Urgent Medical Question                  Please call me tomorrow at [phone] so that I can explain what I need it's a difference between paying $47 co-pay and a $400   co-pay so please call me thank you... I can't afford a 90 day prescription I have to have a 30 day prescription can't afford the HIGH co-pay Thank you

## 2020-05-13 NOTE — TELEPHONE ENCOUNTER
Received request via: Pharmacy  90 day request for aan order  Was the patient seen in the last year in this department? Yes  8/19/19  Does the patient have an active prescription (recently filled or refills available) for medication(s) requested? No

## 2020-05-19 DIAGNOSIS — J45.909 UNCOMPLICATED ASTHMA, UNSPECIFIED ASTHMA SEVERITY, UNSPECIFIED WHETHER PERSISTENT: ICD-10-CM

## 2020-05-19 RX ORDER — MONTELUKAST SODIUM 10 MG/1
10 TABLET ORAL
Qty: 100 TAB | Refills: 3 | Status: SHIPPED | OUTPATIENT
Start: 2020-05-19 | End: 2021-05-12

## 2020-05-19 RX ORDER — LEVOTHYROXINE SODIUM 0.05 MG/1
50 TABLET ORAL
Qty: 100 TAB | Refills: 3 | Status: SHIPPED | OUTPATIENT
Start: 2020-05-19 | End: 2021-06-09 | Stop reason: SDUPTHER

## 2020-05-19 RX ORDER — PIOGLITAZONEHYDROCHLORIDE 15 MG/1
15 TABLET ORAL DAILY
Qty: 100 TAB | Refills: 3 | Status: SHIPPED | OUTPATIENT
Start: 2020-05-19 | End: 2021-06-10 | Stop reason: SDUPTHER

## 2020-05-19 NOTE — TELEPHONE ENCOUNTER
----- Message from Whitley Frederick sent at 5/18/2020  7:22 PM PDT -----  Regarding: Prescription Question  Contact: [phone]  Whitley Frederick   1945  [phone]    [place] ...  New prescriptions to [facility] Fax [phone] ... Phone [phone]... Montelukast 10mg - 1 a day - 90 days   Pioglitazone 15mg - 1 a day - 90 days Wei stopped me on this but getting  higher blood sugars so cheaper to take this than increase Ozempic what you think?  Levothyroxin 50mg - 1 a day - 90 days  Thank you ... hope this is it for awhile..

## 2020-06-02 ENCOUNTER — OFFICE VISIT (OUTPATIENT)
Dept: MEDICAL GROUP | Facility: LAB | Age: 75
End: 2020-06-02
Payer: MEDICARE

## 2020-06-02 VITALS
OXYGEN SATURATION: 98 % | RESPIRATION RATE: 16 BRPM | WEIGHT: 265 LBS | BODY MASS INDEX: 41.59 KG/M2 | TEMPERATURE: 98 F | SYSTOLIC BLOOD PRESSURE: 140 MMHG | HEIGHT: 67 IN | HEART RATE: 60 BPM | DIASTOLIC BLOOD PRESSURE: 60 MMHG

## 2020-06-02 DIAGNOSIS — Z23 NEED FOR HEPATITIS B VACCINATION: ICD-10-CM

## 2020-06-02 DIAGNOSIS — I48.0 PAF (PAROXYSMAL ATRIAL FIBRILLATION) (HCC): ICD-10-CM

## 2020-06-02 DIAGNOSIS — Z79.01 CHRONIC ANTICOAGULATION: ICD-10-CM

## 2020-06-02 DIAGNOSIS — M1A.0790 CHRONIC IDIOPATHIC GOUT INVOLVING TOE WITHOUT TOPHUS, UNSPECIFIED LATERALITY: ICD-10-CM

## 2020-06-02 DIAGNOSIS — Z99.81 DEPENDENCE ON NOCTURNAL OXYGEN THERAPY: ICD-10-CM

## 2020-06-02 DIAGNOSIS — E78.5 DYSLIPIDEMIA: ICD-10-CM

## 2020-06-02 DIAGNOSIS — N25.81 SECONDARY HYPERPARATHYROIDISM OF RENAL ORIGIN (HCC): ICD-10-CM

## 2020-06-02 DIAGNOSIS — J96.11 CHRONIC RESPIRATORY FAILURE WITH HYPOXIA (HCC): ICD-10-CM

## 2020-06-02 DIAGNOSIS — I49.3 VPC'S (VENTRICULAR PREMATURE COMPLEXES): ICD-10-CM

## 2020-06-02 DIAGNOSIS — R15.9 INCONTINENCE OF FECES, UNSPECIFIED FECAL INCONTINENCE TYPE: ICD-10-CM

## 2020-06-02 DIAGNOSIS — N18.4 CKD (CHRONIC KIDNEY DISEASE) STAGE 4, GFR 15-29 ML/MIN (HCC): ICD-10-CM

## 2020-06-02 DIAGNOSIS — G89.29 CHRONIC PAIN OF BOTH SHOULDERS: ICD-10-CM

## 2020-06-02 DIAGNOSIS — E03.4 HYPOTHYROIDISM DUE TO ACQUIRED ATROPHY OF THYROID: ICD-10-CM

## 2020-06-02 DIAGNOSIS — M25.512 CHRONIC PAIN OF BOTH SHOULDERS: ICD-10-CM

## 2020-06-02 DIAGNOSIS — G47.33 OSA (OBSTRUCTIVE SLEEP APNEA): ICD-10-CM

## 2020-06-02 DIAGNOSIS — E04.2 MULTIPLE THYROID NODULES: ICD-10-CM

## 2020-06-02 DIAGNOSIS — I10 ESSENTIAL HYPERTENSION: ICD-10-CM

## 2020-06-02 DIAGNOSIS — I65.29 STENOSIS OF CAROTID ARTERY, UNSPECIFIED LATERALITY: ICD-10-CM

## 2020-06-02 DIAGNOSIS — J45.20 MILD INTERMITTENT ASTHMA WITHOUT COMPLICATION: ICD-10-CM

## 2020-06-02 DIAGNOSIS — I34.0 NON-RHEUMATIC MITRAL REGURGITATION: ICD-10-CM

## 2020-06-02 DIAGNOSIS — I44.7 LEFT BUNDLE BRANCH BLOCK: ICD-10-CM

## 2020-06-02 DIAGNOSIS — E11.22 TYPE 2 DIABETES MELLITUS WITH STAGE 4 CHRONIC KIDNEY DISEASE, WITHOUT LONG-TERM CURRENT USE OF INSULIN (HCC): ICD-10-CM

## 2020-06-02 DIAGNOSIS — I70.0 ATHEROSCLEROSIS OF AORTA (HCC): ICD-10-CM

## 2020-06-02 DIAGNOSIS — E66.01 MORBID OBESITY WITH BMI OF 45.0-49.9, ADULT (HCC): ICD-10-CM

## 2020-06-02 DIAGNOSIS — M25.511 CHRONIC PAIN OF BOTH SHOULDERS: ICD-10-CM

## 2020-06-02 DIAGNOSIS — Z00.00 MEDICARE ANNUAL WELLNESS VISIT, SUBSEQUENT: ICD-10-CM

## 2020-06-02 DIAGNOSIS — N18.4 TYPE 2 DIABETES MELLITUS WITH STAGE 4 CHRONIC KIDNEY DISEASE, WITHOUT LONG-TERM CURRENT USE OF INSULIN (HCC): ICD-10-CM

## 2020-06-02 DIAGNOSIS — F41.9 ANXIETY: ICD-10-CM

## 2020-06-02 PROBLEM — Z79.899 HIGH RISK MEDICATION USE: Status: RESOLVED | Noted: 2019-08-20 | Resolved: 2020-06-02

## 2020-06-02 PROCEDURE — G0010 ADMIN HEPATITIS B VACCINE: HCPCS | Performed by: NURSE PRACTITIONER

## 2020-06-02 PROCEDURE — 90746 HEPB VACCINE 3 DOSE ADULT IM: CPT | Performed by: NURSE PRACTITIONER

## 2020-06-02 PROCEDURE — G0439 PPPS, SUBSEQ VISIT: HCPCS | Performed by: NURSE PRACTITIONER

## 2020-06-02 ASSESSMENT — PATIENT HEALTH QUESTIONNAIRE - PHQ9
5. POOR APPETITE OR OVEREATING: 0 - NOT AT ALL
CLINICAL INTERPRETATION OF PHQ2 SCORE: 0

## 2020-06-02 ASSESSMENT — ENCOUNTER SYMPTOMS: GENERAL WELL-BEING: FAIR

## 2020-06-02 ASSESSMENT — ACTIVITIES OF DAILY LIVING (ADL): BATHING_REQUIRES_ASSISTANCE: 0

## 2020-06-02 ASSESSMENT — FIBROSIS 4 INDEX: FIB4 SCORE: 2.83

## 2020-06-02 NOTE — PROGRESS NOTES
Chief Complaint   Patient presents with   • Annual Wellness Visit         HPI:  Whitley is a 75 y.o. here for Medicare Annual Wellness Visit.  Feeling pretty well, denies specific c/o today.  Also followed by cardiology / nephrology.      Patient Active Problem List    Diagnosis Date Noted   • Essential hypertension 05/10/2016     Priority: High   • Atherosclerosis of aorta (CMS-[facility]) 04/29/2015     Priority: High   • Chronic respiratory failure with hypoxia ([facility]) 04/29/2015     Priority: High   • Morbid obesity with BMI of 45.0-49.9, adult ([facility]) 04/29/2015     Priority: High   • Anxiety 01/09/2014     Priority: High   • PAF (paroxysmal atrial fibrillation) ([facility]) 07/12/2013     Priority: High   • Type 2 diabetes mellitus with stage 4 chronic kidney disease, without long-term current use of insulin ([facility])      Priority: High   • Palpitations      Priority: High   • CKD (chronic kidney disease) stage 4, GFR 15-29 ml/min ([facility])      Priority: High   • Mild intermittent asthma without complication 08/24/2016     Priority: Medium   • Chronic anticoagulation 06/10/2016     Priority: Medium   • AVERY (obstructive sleep apnea) 05/10/2016     Priority: Medium   • Chronic idiopathic gout involving toe without tophus 04/30/2015     Priority: Medium   • Left bundle branch block 03/05/2012     Priority: Medium   • Multiple thyroid nodules 10/07/2015     Priority: Low   • Stenosis of carotid artery 05/30/2014     Priority: Low   • Non-rheumatic mitral regurgitation 08/20/2019   • High risk medication use 08/20/2019   • Secondary hyperparathyroidism of renal origin ([facility]) 05/14/2018   • Hypothyroidism due to acquired atrophy of thyroid 04/10/2018   • VPC's (ventricular premature complexes) 02/23/2018   • Dependence on nocturnal oxygen therapy 11/02/2017   • Incontinence of feces 05/18/2017   • Chronic pain of both shoulders 03/23/2017   • Dyslipidemia 12/14/2016       Current Outpatient Medications   Medication Sig Dispense Refill   •  montelukast (SINGULAIR) 10 MG Tab Take 1 Tab by mouth every bedtime. 100 Tab 3   • pioglitazone (ACTOS) 15 MG Tab Take 1 Tab by mouth every day for 100 days. 100 Tab 3   • levothyroxine (SYNTHROID) 50 MCG Tab Take 1 Tab by mouth Every morning on an empty stomach. 100 Tab 3   • dronedarone (MULTAQ) 400 MG Tab Take 1 Tab by mouth 2 times a day, with meals. 180 Tab 3   • metoprolol (LOPRESSOR) 50 MG Tab Take 1 Tab by mouth 2 times a day. 200 Tab 3   • rosuvastatin (CRESTOR) 10 MG Tab Take 1 Tab by mouth every evening. 100 Tab 3   • losartan (COZAAR) 50 MG Tab Take 1 Tab by mouth 2 Times a Day. 200 Tab 2   • allopurinol (ZYLOPRIM) 100 MG Tab Take 2 Tabs by mouth 2 Times a Day. 200 Tab 3   • warfarin (COUMADIN) 3 MG Tab Take 1 tab by mouth daily as directed by the coumadin clinic 100 Tab 3   • Semaglutide,0.25 or 0.5MG/DOS, (OZEMPIC, 0.25 OR 0.5 MG/DOSE,) 2 MG/1.5ML Solution Pen-injector Inject 0.5 mg as instructed every 7 days. 3 PEN 0   • fluticasone (FLOVENT HFA) 44 MCG/ACT Aerosol Inhale 2 Puffs by mouth 2 times a day. 3 Inhaler 3   • Semaglutide, 1 MG/DOSE, (OZEMPIC, 1 MG/DOSE,) 2 MG/1.5ML Solution Pen-injector Inject 1 mg as instructed every 7 days. 6 PEN 3   • beclomethasone HFA (QVAR REDIHALER) 40 MCG/ACT inhaler Inhale 2 Puffs by mouth 2 Times a Day. FREE SAMPLE 3 Each 3   • levalbuterol (XOPENEX HFA) 45 MCG/ACT inhaler Inhale 2 Puffs by mouth every four hours as needed for Shortness of Breath. 1 Inhaler 1   • famotidine (PEPCID) 20 MG Tab Take 1 Tab by mouth 2 times a day. 200 Tab 3   • clonazePAM (KLONOPIN) 0.5 MG Tab TAKE 1 TABLET BY MOUTH ONCE DAILY AS NEEDED FOR 30 DAYS. ANXIETY/HEART PALPITATIONS for 30 days 30 Tab 1   • NON SPECIFIED blue concentrator at night along with my C-PAP./3 liters per hour 1 Each 0   • nystatin (MYCOSTATIN) 557995 UNIT/GM Ointment AAA up to twice daily 240 g 2   • terazosin (HYTRIN) 1 MG Cap Take 1 Cap by mouth every day. 30 Cap 4   • ferrous sulfate 325 (65 Fe) MG tablet Take  325 mg by mouth every day.     • azelastine (ASTELIN) 137 MCG/SPRAY nasal spray [place] 1 Spray in nose 2 times a day as needed.     • Clonazepam 0.5 MG TABLET DISPERSIBLE Take 0.25-0.5 tablet by mouth 1 time daily as needed.     • beclomethasone HFA (QVAR REDIHALER) 40 MCG/ACT inhaler INHALE 2 PUFFS BY MOUTH TWICE DAILY (Patient not taking: Reported on 8/20/2019) 2 Inhaler 0   • fluticasone (FLONASE) 50 MCG/ACT nasal spray USE 1-2  SPRAY(S) IN EACH NOSTRIL ONCE DAILY 30 MINUTES PRIOR TO USE OF CPAP 3 Bottle 3   • Blood Glucose Monitoring Suppl Supplies Misc Test strips order: Test strips for Accucheck meter. Sig: use BID and prn ssx high or low sugar #100 RF x 3 100 Strip 11   • Blood Glucose Monitoring Suppl Supplies Misc Test strips order: Test strips for Chely Contour meter. Sig: use tid  and prn ssx high or low sugar #100 RF x 3 100 Strip 3   • Urine Glucose-Ketones Test Strip 1 Strip by In Vitro route every day. 100 Strip 2   • glucose blood (FREESTYLE LITE) strip 1 Strip by Other route 2 times a day, with meals. 50 Strip 11   • Insulin Pen Needle 32G X 4 MM Misc 1 Applicator by Does not apply route 3 times a day. 100 Each 11   • Blood Glucose Monitoring Suppl W/DEVICE Kit 1 Each by Does not apply route 2 times a day as needed. 1 Kit 0   • Misc. Devices MISC Pen needles for Lantus Solstar. 29g 12mm. 90 Each 3   • Cholecalciferol (VITAMIN D) 2000 UNITS CAPS Take 4,000 Units by mouth every day.     • magnesium oxide (MAGNESIUM OXIDE) 400 (241.3 MG) MG TABS tablet Take 400 mg by mouth every day.       No current facility-administered medications for this visit.         Patient is taking medications as noted in medication list.  Current supplements as per medication list.     Allergies: Amlodipine; Amaryl; Avandia [rosiglitazone maleate]; Cipro xr; Clonidine; Contrast media with iodine [iodine]; Glipizide; Glucophage [metformin hydrochloride]; Hydralazine; Levaquin; and Relafen [nabumetone]    Current social  contact/activities: staying home b/c of coronavirus.  Occasional car rides with daughter    Is patient current with immunizations? No, due for HEPATITIS B and SHINGRIX (Shingles). Patient is interested in receiving HEPATITIS B today.    She  reports that she quit smoking about 37 years ago. Her smoking use included cigarettes. She has a 20.00 pack-year smoking history. She has never used smokeless tobacco. She reports that she does not drink alcohol or use drugs.  Counseling given: Not Answered        DPA/Advanced directive: Patient does not have an Advanced Directive.  A packet and workshop information was given on Advanced Directives.    ROS:    Gait: Uses no assistive device   Ostomy: No   Other tubes: No   Amputations: No   Chronic oxygen use Yes at night with CPAP  Last eye exam 2018   Wears hearing aids: No   : Reports urinary leakage during the last 6 months that has somewhat interfered with their daily activities or sleep.      Screening:        Depression Screening    Little interest or pleasure in doing things?  0 - not at all  Feeling down, depressed, or hopeless? 0 - not at all  Trouble falling or staying asleep, or sleeping too much?  0 - not at all  Feeling tired or having little energy?  0 - not at all  Poor appetite or overeating?  0 - not at all  Feeling bad about yourself - or that you are a failure or have let yourself or your family down? 0 - not at all  Trouble concentrating on things, such as reading the newspaper or watching television?    Moving or speaking so slowly that other people could have noticed.  Or the opposite - being so fidgety or restless that you have been moving around a lot more than usual?     Thoughts that you would be better off dead, or of hurting yourself?     Patient Health Questionnaire Score:        If depressive symptoms identified deferred to follow up visit unless specifically addressed in assessment and plan.    Interpretation of PHQ-9 Total Score   Score  Severity   1-4 No Depression   5-9 Mild Depression   10-14 Moderate Depression   15-19 Moderately Severe Depression   20-27 Severe Depression      Screening for Cognitive Impairment    Three Minute Recall (village, kitchen, baby)  3/3    Draw clock face with all 12 numbers and set the hands to show 10 past 10.       If cognitive concerns identified, deferred for follow up unless specifically addressed in assessment and plan.    Fall Risk Assessment    Has the patient had two or more falls in the last year or any fall with injury in the last year?     If fall risk identified, deferred for follow up unless specifically addressed in assessment and plan.      Safety Assessment    Throw rugs on floor.     Handrails on all stairs.     Good lighting in all hallways.     Difficulty hearing.     Patient counseled about all safety risks that were identified.    Functional Assessment ADLs    Are there any barriers preventing you from cooking for yourself or meeting nutritional needs?   .    Are there any barriers preventing you from driving safely or obtaining transportation?   .    Are there any barriers preventing you from using a telephone or calling for help?   .    Are there any barriers preventing you from shopping?   .    Are there any barriers preventing you from taking care of your own finances?   .    Are there any barriers preventing you from managing your medications?     .    Are there any barriers preventing you from showering, bathing or dressing yourself?   .    Are you currently engaging in any exercise or physical activity?   .     What is your perception of your health?   .    Health Maintenance Summary                IMM HEP B VACCINE Overdue 3/19/1965     COLOGUARD STOOL DNA Overdue 3/19/1996     IMM ZOSTER VACCINES Overdue 3/19/1996     Annual Wellness Visit Overdue 5/22/2020      Done 5/22/2019 SUBSEQUENT ANNUAL WELLNESS VISIT-INCLUDES PPPS ()     Patient has more history with this topic...     DIABETES MONOFILAMENT / LE EXAM Overdue 5/22/2020      Done 5/22/2019 AMB DIABETIC MONOFILAMENT LOWER EXTREMITY EXAM     Patient has more history with this topic...    RETINAL SCREENING Next Due 6/19/2020      Done 6/19/2019 REFERRAL FOR RETINAL SCREENING EXAM     Patient has more history with this topic...    A1C SCREENING Next Due 8/11/2020      Done 2/11/2020 POCT A1C     Patient has more history with this topic...    IMM INFLUENZA Next Due 9/1/2020      Done 10/18/2018 Imm Admin: Influenza Vaccine Adult HD     Patient has more history with this topic...    FASTING LIPID PROFILE Next Due 12/14/2020      Done 12/14/2019 LIPID PROFILE     Patient has more history with this topic...    MAMMOGRAM Next Due 2/26/2021      Done 2/26/2020 MA-SCREENING MAMMO BILAT W/TOMOSYNTHESIS W/CAD     Patient has more history with this topic...    URINE ACR / MICROALBUMIN Next Due 5/6/2021      Done 5/6/2020 PROTEIN/CREAT RATIO URINE     Patient has more history with this topic...    SERUM CREATININE Next Due 5/6/2021      Done 5/6/2020 RENAL FUNCTION PANEL     Patient has more history with this topic...    BONE DENSITY Next Due 1/18/2024      Done 1/18/2019 DS-BONE DENSITY STUDY (DEXA)     Patient has more history with this topic...    IMM DTaP/Tdap/Td Vaccine Next Due 4/11/2029      Done 4/11/2019 Imm Admin: Tdap Vaccine          Patient Care Team:  EDILMA Whelan as PCP - General (Family Medicine)  [facility]  Ania Badillo M.D. as Consulting Physician (Nephrology)  Zoran Gamble M.D. as Consulting Physician (Ophthalmology)  Wei Patterson P.A.-C. as Consulting Physician (Endocrinology)  EDILMA Mosher as Mid Level Provider (Pulmonary Medicine)  [facility] as Respiratory  Domenico Olea M.D. (Gastroenterology)  Tj Servin M.D. (Interventional Cardiology)  Hillary Field as [facility]       Social History     Tobacco Use   • Smoking  "status: Former Smoker     Packs/day: 1.00     Years: 20.00     Pack years: 20.00     Types: Cigarettes     Last attempt to quit: 1983     Years since quittin.4   • Smokeless tobacco: Never Used   Substance Use Topics   • Alcohol use: No     Alcohol/week: 0.0 oz   • Drug use: No     Family History   Problem Relation Age of Onset   • Cancer Maternal Aunt    • Diabetes Maternal Aunt    • Heart Disease Maternal Aunt    • Hypertension Maternal Aunt    • Hyperlipidemia Maternal Aunt    • Cancer Mother         Leukemia   • Hypertension Mother    • Diabetes Mother    • Lung Disease Father    • Cancer Father         Lung   • Lung Disease Brother    • Stroke Brother    • Diabetes Brother    • Heart Disease Brother    • Diabetes Maternal Grandmother    • Hypertension Brother    • Diabetes Brother      She  has a past medical history of Allergy, Anxiety, Arrhythmia, Arthritis, Asthma, Carotid bruit, CATARACT, Chest pain, DIABETES MELLITUS, Esophageal reflux, Goiter, Headache(784.0), Heart murmur, Hyperlipidemia, Hypertension, IBD (inflammatory bowel disease), Migraine, AVERY (obstructive sleep apnea), Overweight(278.02), Palpitations, Paroxysmal atrial fibrillation (HCC) (13), Pneumonia, Positive reaction to tuberculin skin test, Renal disease, and Urinary tract infection, site not specified. She also has no past medical history of Breast cancer (HCC) or Tuberculosis.   Past Surgical History:   Procedure Laterality Date   • CATARACT PHACO WITH IOL Right 10/25/2016    Procedure: CATARACT PHACO WITH IOL;  Surgeon: Zoran Gamble M.D.;  Location: SURGERY SAME DAY [facility];  Service:    • ABDOMINAL HYSTERECTOMY TOTAL     • CHOLECYSTECTOMY     • TONSILLECTOMY     • US-NEEDLE CORE BX-BREAST PANEL           Exam:     /60 (BP Location: Right arm, Patient Position: Sitting, BP Cuff Size: Adult)   Pulse 60   Temp 36.7 °C (98 °F) (Temporal)   Resp 16   Ht 1.689 m (5' 6.5\")   Wt 120.2 kg (265 lb)   SpO2 98%  " Body mass index is 42.13 kg/m².    Hearing excellent.    Dentition good  Alert, oriented in no acute distress.  Eye contact is good, speech goal directed, affect calm  Monofilament testing with a 10 gram force: sensation intact: decreased bilaterally  Visual Inspection: Feet without maceration, ulcers, fissures.  Pedal pulses: intact bilaterally.        Assessment and Plan. The following treatment and monitoring plan is recommended:    1. Medicare annual wellness visit, subsequent    2. Type 2 diabetes mellitus with stage 4 chronic kidney disease, without long-term current use of insulin ([facility])  -No longer followed by endocrinology.  Very stable based on last A1c.  Continue all current medications.  Recheck A1C with Dr. Badillo's labs in 2-3 mo.  Checking BG at home - avg 150 lately.  Stable.  Continue same.  Eye appt coming up.  Discussed the importance of daily foot care.    3. CKD (chronic kidney disease) stage 4, GFR 15-29 ml/min ([facility])  -slightly decreased / worsening.  Told to increase fluids by nephrology to 60 oz per day and she is trying hard to do this.  Recheck labs per nephrology in 2-3 mo. discussed the importance of a low-sodium diet and tight blood pressure control.  She chronically avoids NSAIDs.    4. PAF (paroxysmal atrial fibrillation) ([facility])  -chronic / stable.  multaq is expensive for pt, I encouraged her to contact her cardiologist for more affordable options.  Anticoagulated.  Rate controlled     5. Anxiety  -chronic but stable.     6. Atherosclerosis of aorta (CMS-[facility])  -chronic / on statin.  US aorta UTD.    7. Chronic respiratory failure with hypoxia ([facility])  -chronic but stable.  Currently using Xopenex only, stating breathing has been doing great.  Resorts to a maintenance inhaler such as Qvar if she needs Xopenex more than a few times per week.    8. Morbid obesity with BMI of 45.0-49.9, adult ([facility])  -improving.  10 lb weight loss and she was encouraged to continue with her weight loss efforts  through portion control and daily physical activity.    9. Essential hypertension  -chronic but stable.     10. Left bundle branch block  -chronic. Followed by cardiology     11. Chronic idiopathic gout involving toe without tophus, unspecified laterality  -no recent gout issues.  Discussed the importance of a low purine diet.    12. AVERY (obstructive sleep apnea)  -chronic and compliant with cpap.  Encouraged continued efforts at weight loss.    13. Chronic anticoagulation  -chronic / stable on coumadin as of 4/14/2020.  Due for INR.    14. Mild intermittent asthma without complication  -chronic / stable. Using xopenex prn.  Likes qvar but can't afford this and health care center.      15. Dyslipidemia  -stable.  Lipid panel UTD 12/2019    16. Chronic pain of both shoulders  -chronic / stable.     17. Incontinence of feces, unspecified fecal incontinence type  -chronic x  Years and not worsening. GI consult not productive per patient as she was unable to afford the recommended colonoscopy.  Encouraged consult with gen surg - she has an appt for breast issues (chronic) with Dr. Nagy in August and plans on talking to him.      18. Dependence on nocturnal oxygen therapy  -stable.  Continue same.     19. VPC's (ventricular premature complexes)  -stable.  On beta blocker    20. Hypothyroidism due to acquired atrophy of thyroid  -recommend updated tsh / t4 with next lab work.     21. Secondary hyperparathyroidism of renal origin (HCC)  -labs UTD with nephrology.      22. Non-rheumatic mitral regurgitation  -echo UTD    23. Stenosis of carotid artery, unspecified laterality  -US UTD, on statin .    24. Multiple thyroid nodules  -US UTD and stable with comparisons    25. Need for hepatitis B vaccination  - Hepatitis B Vaccine Adult IM      Last blood work 5/2020 and A1C 2/2020.   Redo labs 3 mo from last for Dr. Badillo.    Services suggested: No services needed at this time  Health Care Screening recommendations as per  orders if indicated.  Referrals offered: PT/OT/Nutrition counseling/Behavioral Health/Smoking cessation as per orders if indicated.    Discussion today about general wellness and lifestyle habits:    · Prevent falls and reduce trip hazards; Cautioned about securing or removing rugs.  · Have a working fire alarm and carbon monoxide detector;   · Engage in regular physical activity and social activities       Follow-up: No follow-ups on file.

## 2020-06-03 NOTE — PROGRESS NOTES
Patient is going to send me in her application for the drug manufacture assistance program. Then I will send her claims with it over to the drug company.

## 2020-06-04 NOTE — PROGRESS NOTES
Patient sent me her application and I am faxing today over to the drug manufacture. I told her I will let her know if I hear from them. She will most likely get a letter in the mail.

## 2020-06-04 NOTE — PROGRESS NOTES
Chief Complaint   Patient presents with   • Annual Wellness Visit         HPI:  Whitley is a 75 y.o. here for Medicare Annual Wellness Visit         Patient Active Problem List    Diagnosis Date Noted   • Essential hypertension 05/10/2016     Priority: High   • Atherosclerosis of aorta (CMS-[facility]) 04/29/2015     Priority: High   • Chronic respiratory failure with hypoxia ([facility]) 04/29/2015     Priority: High   • Morbid obesity with BMI of 45.0-49.9, adult ([facility]) 04/29/2015     Priority: High   • Anxiety 01/09/2014     Priority: High   • PAF (paroxysmal atrial fibrillation) ([facility]) 07/12/2013     Priority: High   • Type 2 diabetes mellitus with stage 4 chronic kidney disease, without long-term current use of insulin ([facility])      Priority: High   • CKD (chronic kidney disease) stage 4, GFR 15-29 ml/min ([facility])      Priority: High   • Mild intermittent asthma without complication 08/24/2016     Priority: Medium   • Chronic anticoagulation 06/10/2016     Priority: Medium   • AVERY (obstructive sleep apnea) 05/10/2016     Priority: Medium   • Chronic idiopathic gout involving toe without tophus 04/30/2015     Priority: Medium   • Left bundle branch block 03/05/2012     Priority: Medium   • Multiple thyroid nodules 10/07/2015     Priority: Low   • Stenosis of carotid artery 05/30/2014     Priority: Low   • Non-rheumatic mitral regurgitation 08/20/2019   • Secondary hyperparathyroidism of renal origin ([facility]) 05/14/2018   • Hypothyroidism due to acquired atrophy of thyroid 04/10/2018   • VPC's (ventricular premature complexes) 02/23/2018   • Dependence on nocturnal oxygen therapy 11/02/2017   • Incontinence of feces 05/18/2017   • Chronic pain of both shoulders 03/23/2017   • Dyslipidemia 12/14/2016       Current Outpatient Medications   Medication Sig Dispense Refill   • montelukast (SINGULAIR) 10 MG Tab Take 1 Tab by mouth every bedtime. 100 Tab 3   • pioglitazone (ACTOS) 15 MG Tab Take 1 Tab by mouth every day for 100 days. 100 Tab 3      • levothyroxine (SYNTHROID) 50 MCG Tab Take 1 Tab by mouth Every morning on an empty stomach. 100 Tab 3   • dronedarone (MULTAQ) 400 MG Tab Take 1 Tab by mouth 2 times a day, with meals. 180 Tab 3   • metoprolol (LOPRESSOR) 50 MG Tab Take 1 Tab by mouth 2 times a day. 200 Tab 3   • rosuvastatin (CRESTOR) 10 MG Tab Take 1 Tab by mouth every evening. 100 Tab 3   • losartan (COZAAR) 50 MG Tab Take 1 Tab by mouth 2 Times a Day. 200 Tab 2   • allopurinol (ZYLOPRIM) 100 MG Tab Take 2 Tabs by mouth 2 Times a Day. 200 Tab 3   • warfarin (COUMADIN) 3 MG Tab Take 1 tab by mouth daily as directed by the coumadin clinic 100 Tab 3   • Semaglutide,0.25 or 0.5MG/DOS, (OZEMPIC, 0.25 OR 0.5 MG/DOSE,) 2 MG/1.5ML Solution Pen-injector Inject 0.5 mg as instructed every 7 days. 3 PEN 0   • fluticasone (FLOVENT HFA) 44 MCG/ACT Aerosol Inhale 2 Puffs by mouth 2 times a day. 3 Inhaler 3   • Semaglutide, 1 MG/DOSE, (OZEMPIC, 1 MG/DOSE,) 2 MG/1.5ML Solution Pen-injector Inject 1 mg as instructed every 7 days. 6 PEN 3   • beclomethasone HFA (QVAR REDIHALER) 40 MCG/ACT inhaler Inhale 2 Puffs by mouth 2 Times a Day. FREE SAMPLE 3 Each 3   • levalbuterol (XOPENEX HFA) 45 MCG/ACT inhaler Inhale 2 Puffs by mouth every four hours as needed for Shortness of Breath. 1 Inhaler 1   • famotidine (PEPCID) 20 MG Tab Take 1 Tab by mouth 2 times a day. 200 Tab 3   • clonazePAM (KLONOPIN) 0.5 MG Tab TAKE 1 TABLET BY MOUTH ONCE DAILY AS NEEDED FOR 30 DAYS. ANXIETY/HEART PALPITATIONS for 30 days 30 Tab 1   • NON SPECIFIED blue concentrator at night along with my C-PAP./3 liters per hour 1 Each 0   • nystatin (MYCOSTATIN) 669028 UNIT/GM Ointment AAA up to twice daily 240 g 2   • terazosin (HYTRIN) 1 MG Cap Take 1 Cap by mouth every day. 30 Cap 4   • ferrous sulfate 325 (65 Fe) MG tablet Take 325 mg by mouth every day.     • azelastine (ASTELIN) 137 MCG/SPRAY nasal spray Ellis 1 Spray in nose 2 times a day as needed.     • Clonazepam 0.5 MG TABLET DISPERSIBLE  Take 0.25-0.5 tablet by mouth 1 time daily as needed.     • beclomethasone HFA (QVAR REDIHALER) 40 MCG/ACT inhaler INHALE 2 PUFFS BY MOUTH TWICE DAILY (Patient not taking: Reported on 8/20/2019) 2 Inhaler 0   • fluticasone (FLONASE) 50 MCG/ACT nasal spray USE 1-2  SPRAY(S) IN EACH NOSTRIL ONCE DAILY 30 MINUTES PRIOR TO USE OF CPAP 3 Bottle 3   • Blood Glucose Monitoring Suppl Supplies Misc Test strips order: Test strips for Accucheck meter. Sig: use BID and prn ssx high or low sugar #100 RF x 3 100 Strip 11   • Blood Glucose Monitoring Suppl Supplies Misc Test strips order: Test strips for Chely Contour meter. Sig: use tid  and prn ssx high or low sugar #100 RF x 3 100 Strip 3   • Urine Glucose-Ketones Test Strip 1 Strip by In Vitro route every day. 100 Strip 2   • glucose blood (FREESTYLE LITE) strip 1 Strip by Other route 2 times a day, with meals. 50 Strip 11   • Insulin Pen Needle 32G X 4 MM Misc 1 Applicator by Does not apply route 3 times a day. 100 Each 11   • Blood Glucose Monitoring Suppl W/DEVICE Kit 1 Each by Does not apply route 2 times a day as needed. 1 Kit 0   • Misc. Devices MISC Pen needles for Lantus Solstar. 29g 12mm. 90 Each 3   • Cholecalciferol (VITAMIN D) 2000 UNITS CAPS Take 4,000 Units by mouth every day.     • magnesium oxide (MAGNESIUM OXIDE) 400 (241.3 MG) MG TABS tablet Take 400 mg by mouth every day.       No current facility-administered medications for this visit.         Patient is taking medications as noted in medication list.  Current supplements as per medication list.     Allergies: Amlodipine; Amaryl; Avandia [rosiglitazone maleate]; Cipro xr; Clonidine; Contrast media with iodine [iodine]; Glipizide; Glucophage [metformin hydrochloride]; Hydralazine; Levaquin; and Relafen [nabumetone]    Current social contact/activities:     Is patient current with immunizations? Yes.    She  reports that she quit smoking about 37 years ago. Her smoking use included cigarettes. She has a  20.00 pack-year smoking history. She has never used smokeless tobacco. She reports that she does not drink alcohol or use drugs.  Counseling given: Not Answered        DPA/Advanced directive: Patient does not have an Advanced Directive.  A packet and workshop information was given on Advanced Directives.    ROS:    Gait: Uses no assistive device   Ostomy: No   Other tubes: No   Amputations: No   Chronic oxygen use Yes with CPAP at night  Last eye exam 2018   Wears hearing aids: No   : Reports urinary leakage during the last 6 months that has somewhat interfered with their daily activities or sleep.      Screening:        Depression Screening    Little interest or pleasure in doing things?  0 - not at all  Feeling down, depressed, or hopeless? 0 - not at all  Patient Health Questionnaire Score: 0    If depressive symptoms identified deferred to follow up visit unless specifically addressed in assessment and plan.    Interpretation of PHQ-9 Total Score   Score Severity   1-4 No Depression   5-9 Mild Depression   10-14 Moderate Depression   15-19 Moderately Severe Depression   20-27 Severe Depression    Screening for Cognitive Impairment    Three Minute Recall (village, kitchen, baby)  3/3    Daron clock face with all 12 numbers and set the hands to show 10 past 10.  Yes    If cognitive concerns identified, deferred for follow up unless specifically addressed in assessment and plan.    Fall Risk Assessment    Has the patient had two or more falls in the last year or any fall with injury in the last year?  No  If fall risk identified, deferred for follow up unless specifically addressed in assessment and plan.    Safety Assessment    Throw rugs on floor.  No  Handrails on all stairs.  Yes  Good lighting in all hallways.  Yes  Difficulty hearing.  No  Patient counseled about all safety risks that were identified.    Functional Assessment ADLs    Are there any barriers preventing you from cooking for yourself or meeting  nutritional needs?  No.    Are there any barriers preventing you from driving safely or obtaining transportation?  No.    Are there any barriers preventing you from using a telephone or calling for help?  No.    Are there any barriers preventing you from shopping?  No.    Are there any barriers preventing you from taking care of your own finances?  No.    Are there any barriers preventing you from managing your medications?  No.    Are there any barriers preventing you from showering, bathing or dressing yourself?  No.    Are you currently engaging in any exercise or physical activity?  Yes.  Walks dog  What is your perception of your health?  Fair.    Health Maintenance Summary                IMM HEP B VACCINE Overdue 3/19/1964      Done 6/2/2020 Imm Admin: Hepatitis B Vaccine (Adol/Adult)    RETINAL SCREENING Next Due 6/19/2020      Done 6/19/2019 REFERRAL FOR RETINAL SCREENING EXAM     Patient has more history with this topic...    COLOGUARD STOOL DNA Postponed 7/14/2020 Originally 3/19/1996. Patient Refused    IMM ZOSTER VACCINES Postponed 11/2/2020 Originally 3/19/1996. System: vaccine not available, other system reasons    A1C SCREENING Next Due 8/11/2020      Done 2/11/2020 POCT A1C     Patient has more history with this topic...    IMM INFLUENZA Next Due 9/1/2020      Done 10/18/2018 Imm Admin: Influenza Vaccine Adult HD     Patient has more history with this topic...    FASTING LIPID PROFILE Next Due 12/14/2020      Done 12/14/2019 LIPID PROFILE     Patient has more history with this topic...    MAMMOGRAM Next Due 2/26/2021      Done 2/26/2020 MA-SCREENING MAMMO BILAT W/TOMOSYNTHESIS W/CAD     Patient has more history with this topic...    URINE ACR / MICROALBUMIN Next Due 5/6/2021      Done 5/6/2020 PROTEIN/CREAT RATIO URINE     Patient has more history with this topic...    SERUM CREATININE Next Due 5/6/2021      Done 5/6/2020 RENAL FUNCTION PANEL     Patient has more history with this topic...     DIABETES MONOFILAMENT / LE EXAM Next Due 2021      Done 2020 [employer]: WORKFLOW - DIABETES - DIABETIC FOOT EXAM PERFORMED     Patient has more history with this topic...    Annual Wellness Visit Next Due 6/3/2021      Done 2020 SUBSEQUENT ANNUAL WELLNESS VISIT-INCLUDES PPPS ()     Patient has more history with this topic...    BONE DENSITY Next Due 2024      Done 2019 DS-BONE DENSITY STUDY (DEXA)     Patient has more history with this topic...    IMM DTaP/Tdap/Td Vaccine Next Due 2029      Done 2019 Imm Admin: Tdap Vaccine          Patient Care Team:  EDILMA Whelan as PCP - General (Family Medicine)  [facility]  Ania Badillo M.D. as Consulting Physician (Nephrology)  Zoran Gamble M.D. as Consulting Physician (Ophthalmology)  Wei Patterson P.A.-C. as Consulting Physician (Endocrinology)  EDILMA Mosher as Mid Level Provider (Pulmonary Medicine)  [facility] as Respiratory  Domenico Olea M.D. (Gastroenterology)  Tj Servin M.D. (Interventional Cardiology)  Hillary Field as [facility]     Social History     Tobacco Use   • Smoking status: Former Smoker     Packs/day: 1.00     Years: 20.00     Pack years: 20.00     Types: Cigarettes     Last attempt to quit: 1983     Years since quittin.4   • Smokeless tobacco: Never Used   Substance Use Topics   • Alcohol use: No     Alcohol/week: 0.0 oz   • Drug use: No     Family History   Problem Relation Age of Onset   • Cancer Maternal Aunt    • Diabetes Maternal Aunt    • Heart Disease Maternal Aunt    • Hypertension Maternal Aunt    • Hyperlipidemia Maternal Aunt    • Cancer Mother         Leukemia   • Hypertension Mother    • Diabetes Mother    • Lung Disease Father    • Cancer Father         Lung   • Lung Disease Brother    • Stroke Brother    • Diabetes Brother    • Heart Disease Brother    • Diabetes Maternal Grandmother    •  "Hypertension Brother    • Diabetes Brother      She  has a past medical history of Allergy, Anxiety, Arrhythmia, Arthritis, Asthma, Carotid bruit, CATARACT, Chest pain, DIABETES MELLITUS, Esophageal reflux, Goiter, Headache(784.0), Heart murmur, Hyperlipidemia, Hypertension, IBD (inflammatory bowel disease), Migraine, AVERY (obstructive sleep apnea), Overweight(278.02), Palpitations, Paroxysmal atrial fibrillation (HCC) (7/12/13), Pneumonia, Positive reaction to tuberculin skin test, Renal disease, and Urinary tract infection, site not specified. She also has no past medical history of Breast cancer (HCC) or Tuberculosis.   Past Surgical History:   Procedure Laterality Date   • CATARACT PHACO WITH IOL Right 10/25/2016    Procedure: CATARACT PHACO WITH IOL;  Surgeon: Zoran Gamble M.D.;  Location: SURGERY SAME DAY [facility];  Service:    • ABDOMINAL HYSTERECTOMY TOTAL     • CHOLECYSTECTOMY     • TONSILLECTOMY     • US-NEEDLE CORE BX-BREAST PANEL             Exam:     /60 (BP Location: Right arm, Patient Position: Sitting, BP Cuff Size: Adult)   Pulse 60   Temp 36.7 °C (98 °F) (Temporal)   Resp 16   Ht 1.689 m (5' 6.5\")   Wt 120.2 kg (265 lb)   SpO2 98%  Body mass index is 42.13 kg/m².    Hearing good.    Dentition good  Alert, oriented in no acute distress.  Eye contact is good, speech goal directed, affect calm      Assessment and Plan. The following treatment and monitoring plan is recommended:    1. Medicare annual wellness visit, subsequent  Subsequent Annual Wellness Visit - Includes PPPS ()   2. Type 2 diabetes mellitus with stage 4 chronic kidney disease, without long-term current use of insulin (HCC)  HEMOGLOBIN A1C   3. CKD (chronic kidney disease) stage 4, GFR 15-29 ml/min (HCC)     4. PAF (paroxysmal atrial fibrillation) (HCC)     5. Anxiety     6. Atherosclerosis of aorta (CMS-[facility])     7. Chronic respiratory failure with hypoxia ([facility])     8. Morbid obesity with BMI of 45.0-49.9, " adult (HCC)  Patient identified as having weight management issue.  Appropriate orders and counseling given.   9. Essential hypertension     10. Left bundle branch block     11. Chronic idiopathic gout involving toe without tophus, unspecified laterality     12. AVERY (obstructive sleep apnea)     13. Chronic anticoagulation     14. Mild intermittent asthma without complication     15. Dyslipidemia     16. Chronic pain of both shoulders     17. Incontinence of feces, unspecified fecal incontinence type     18. Dependence on nocturnal oxygen therapy     19. VPC's (ventricular premature complexes)     20. Hypothyroidism due to acquired atrophy of thyroid  TSH    FREE THYROXINE   21. Secondary hyperparathyroidism of renal origin (HCC)     22. Non-rheumatic mitral regurgitation     23. Stenosis of carotid artery, unspecified laterality     24. Multiple thyroid nodules     25. Need for hepatitis B vaccination  Hepatitis B Vaccine Adult IM         Services suggested: No services needed at this time  Health Care Screening recommendations as per orders if indicated.  Referrals offered: PT/OT/Nutrition counseling/Behavioral Health/Smoking cessation as per orders if indicated.    Discussion today about general wellness and lifestyle habits:    · Prevent falls and reduce trip hazards; Cautioned about securing or removing rugs.  · Have a working fire alarm and carbon monoxide detector;   · Engage in regular physical activity and social activities       Follow-up: Return in about 3 months (around 9/2/2020).

## 2020-06-10 ENCOUNTER — PATIENT MESSAGE (OUTPATIENT)
Dept: MEDICAL GROUP | Facility: LAB | Age: 75
End: 2020-06-10

## 2020-06-10 ENCOUNTER — PATIENT OUTREACH (OUTPATIENT)
Dept: HEALTH INFORMATION MANAGEMENT | Facility: OTHER | Age: 75
End: 2020-06-10

## 2020-06-10 DIAGNOSIS — J45.909 UNCOMPLICATED ASTHMA, UNSPECIFIED ASTHMA SEVERITY, UNSPECIFIED WHETHER PERSISTENT: ICD-10-CM

## 2020-06-10 RX ORDER — LEVALBUTEROL TARTRATE 45 UG/1
2 AEROSOL, METERED ORAL EVERY 4 HOURS PRN
Qty: 3 INHALER | Refills: 3 | Status: SHIPPED | OUTPATIENT
Start: 2020-06-10 | End: 2022-08-31

## 2020-06-10 NOTE — TELEPHONE ENCOUNTER
----- Message from Whitley Frederick sent at 6/10/2020  1:24 PM PDT -----  Regarding: Prescription Question  Contact: [phone]  Whitley Frederick  1945  [phone]    Miles Arias:  I need a prescription sent to [facility] Fax [phone] for the Generic form for Xopenex  45mcg Inhaler - 3 Canisters - Levalbuterol tartrate HFA ... could you please put a note to call patient before filling... Thank you

## 2020-06-10 NOTE — PROGRESS NOTES
I am working with Whitley to get her some patient assistance for her medications. I already faxed her application to [employer] 6/4/20 for her Multaq. I included her drug spend with her application. We are waiting to hear from them. She called to let me know that she called [employer] and requested a co-pay exception. I told her that it will be denied by [employer] but that she can appeal. I warned her that [facility] typically denies due to her being in the gap and that they have no way of reducing a gap co-pay. I told her that she should submit an application to [facility] to see if they are able to help her with other daily living expanses so she can free up some of her money for her medication costs. Patient used to be on [employer] and now that assistance is no longer available. There are also no alternatives to her medications.

## 2020-06-15 DIAGNOSIS — Z79.01 CHRONIC ANTICOAGULATION: ICD-10-CM

## 2020-06-16 ENCOUNTER — ANTICOAGULATION VISIT (OUTPATIENT)
Dept: MEDICAL GROUP | Facility: PHYSICIAN GROUP | Age: 75
End: 2020-06-16
Payer: MEDICARE

## 2020-06-16 DIAGNOSIS — I48.0 PAF (PAROXYSMAL ATRIAL FIBRILLATION) (HCC): ICD-10-CM

## 2020-06-16 DIAGNOSIS — Z79.01 CHRONIC ANTICOAGULATION: Primary | ICD-10-CM

## 2020-06-16 LAB — INR PPP: 3.4 (ref 2–3.5)

## 2020-06-16 PROCEDURE — 99211 OFF/OP EST MAY X REQ PHY/QHP: CPT | Performed by: FAMILY MEDICINE

## 2020-06-16 PROCEDURE — 85610 PROTHROMBIN TIME: CPT | Performed by: FAMILY MEDICINE

## 2020-06-16 NOTE — PROGRESS NOTES
Anticoagulation Summary  As of 6/16/2020    INR goal:   2.0-3.0   TTR:   66.0 % (4.7 y)   INR used for dosing:   3.40! (6/16/2020)   Warfarin maintenance plan:   4.5 mg (3 mg x 1.5) every Wed; 3 mg (3 mg x 1) all other days   Weekly warfarin total:   22.5 mg   Weekly max warfarin dose:   21 mg   Plan last modified:   Eh Enamorado, PharmD (3/17/2020)   Next INR check:   7/7/2020   Target end date:   Indefinite    Indications    Chronic anticoagulation [Z79.01]  Chronic anticoagulation (Resolved) [Z79.01]  PAF (paroxysmal atrial fibrillation) (HCC) [I48.0]             Anticoagulation Episode Summary     INR check location:       Preferred lab:       Send INR reminders to:       Comments:         Anticoagulation Care Providers     Provider Role Specialty Phone number    Amy Lincoln M.D. Referring Cardiology [phone]    [facility] Responsible  [phone]        Anticoagulation Patient Findings  Patient Findings     Negatives:   Signs/symptoms of thrombosis, Signs/symptoms of bleeding, Laboratory test error suspected, Change in health, Change in alcohol use, Change in activity, Upcoming invasive procedure, Emergency department visit, Upcoming dental procedure, Missed doses, Extra doses, Change in medications, Change in diet/appetite, Hospital admission, Bruising, Other complaints         HPI:   Whitley Frederick seen in clinic today, on anticoagulation therapy with warfarin for stroke prevention due to history of PAF.    Patient's previous INR was therapeutic at 2.6 on 4-14-20, at which time patient was instructed to continue with current warfarin regimen.  She returns to clinic today to recheck INR to ensure it is therapeutic and thus preventing possible clotting and/or bleeding/bruising complications.    CHADS-VASc = at least 4  (unadjusted ischemic stroke risk/year:  4.8%, which is moderate risk)    Does patient have any changes to current medical/health status since last appt (Y/N):   NO  Does patient have any signs/symptoms of bleeding and/or thrombosis since the last appt (Y/N):  NO  Does patient have any interval changes to diet or medications since last appt (Y/N):  NO  Are there any complications or cost restrictions with current therapy (Y/N):  NO     Does patient have [facility] PCP? YES, [place] MOTA (If not, please document discussion that patient must be seen at [facility])       Vitals:  declined today, vehicle visit.    There were no vitals filed for this visit.     Asssessment:      INR supratherapeutic at 3.4, therefore increasing patient's bleeding risk.   Reason(s) for out of range INR today:  Etiology unknown.      Plan:  Instructed patient to decrease today's dose to 1.5mg, tomorrow to 3mg, then resume current warfarin regimen in order to bring INR to therapeutic range.     Follow up:  Because warfarin is a high risk medication and current CHEST guidelines recommend regular monitoring intervals (few days up to 12 weeks), will have patient return to clinic in 3 weeks to recheck INR.    Eh Enamorado, PharmD, BCACP

## 2020-06-19 ENCOUNTER — TELEPHONE (OUTPATIENT)
Dept: CARDIOLOGY | Facility: MEDICAL CENTER | Age: 75
End: 2020-06-19

## 2020-06-19 NOTE — TELEPHONE ENCOUNTER
Celina Hobson calling to advise the assistance program has approved her Multaq.    Pt left a msg about this on Thursday (yesterday) and has not had a call back.      Please call Whitley /

## 2020-06-22 ENCOUNTER — TELEPHONE (OUTPATIENT)
Dept: CARDIOLOGY | Facility: MEDICAL CENTER | Age: 75
End: 2020-06-22

## 2020-06-22 NOTE — TELEPHONE ENCOUNTER
Patient informed that her Multaq is ready for  at:  [facility] - [place], [place] - [address].

## 2020-06-22 NOTE — TELEPHONE ENCOUNTER
Celina Pederson, Med Ass't  You; Chante Polanco 5 minutes ago (8:33 AM)       Patient's Multaq is approved, we hare waiting for a package from [employer].   I informed patient that once we receive her medication it will get dropped off at the [facility] pharmacy for her to pick it up.      Noted.

## 2020-06-22 NOTE — TELEPHONE ENCOUNTER
Whitley called and let me know that she called the drug company and was told her sample medication has already been delivered to your office on 6/18/2020. The tracking # is [id number]. Can you please check your office to see if the sample is there?

## 2020-07-07 ENCOUNTER — ANTICOAGULATION VISIT (OUTPATIENT)
Dept: MEDICAL GROUP | Facility: PHYSICIAN GROUP | Age: 75
End: 2020-07-07
Payer: MEDICARE

## 2020-07-07 DIAGNOSIS — Z79.01 CHRONIC ANTICOAGULATION: Primary | ICD-10-CM

## 2020-07-07 DIAGNOSIS — I48.0 PAF (PAROXYSMAL ATRIAL FIBRILLATION) (HCC): ICD-10-CM

## 2020-07-07 LAB — INR PPP: 2.9 (ref 2–3.5)

## 2020-07-07 PROCEDURE — 93793 ANTICOAG MGMT PT WARFARIN: CPT | Performed by: FAMILY MEDICINE

## 2020-07-07 PROCEDURE — 85610 PROTHROMBIN TIME: CPT | Performed by: FAMILY MEDICINE

## 2020-07-07 NOTE — PROGRESS NOTES
Anticoagulation Summary  As of 2020    INR goal:   2.0-3.0   TTR:   65.5 % (4.7 y)   INR used for dosin.90 (2020)   Warfarin maintenance plan:   4.5 mg (3 mg x 1.5) every Wed; 3 mg (3 mg x 1) all other days   Weekly warfarin total:   22.5 mg   Weekly max warfarin dose:   21 mg   Plan last modified:   Eh Enamorado, PharmD (3/17/2020)   Next INR check:   2020   Target end date:   Indefinite    Indications    Chronic anticoagulation [Z79.01]  Chronic anticoagulation (Resolved) [Z79.01]  PAF (paroxysmal atrial fibrillation) (HCC) [I48.0]             Anticoagulation Episode Summary     INR check location:       Preferred lab:       Send INR reminders to:       Comments:         Anticoagulation Care Providers     Provider Role Specialty Phone number    Amy Lincoln M.D. Referring Cardiology [phone]    [facility] Responsible  [phone]        Anticoagulation Patient Findings  Patient Findings     Negatives:   Signs/symptoms of thrombosis, Signs/symptoms of bleeding, Laboratory test error suspected, Change in health, Change in alcohol use, Change in activity, Upcoming invasive procedure, Emergency department visit, Upcoming dental procedure, Missed doses, Extra doses, Change in medications, Change in diet/appetite, Hospital admission, Bruising, Other complaints         HPI:   Whitley Frederick seen in clinic today, on anticoagulation therapy with warfarin for stroke prevention due to history of atrial fibrillation.    Patient's previous INR was supratherapeutic at 3.4 on 20, at which time patient was instructed to decrease two doses, then resume current warfarin regimen.  She returns to clinic today to recheck INR to ensure it is therapeutic and thus preventing possible clotting and/or bleeding/bruising complications.    CHADS-VASc = at least 2  (unadjusted ischemic stroke risk/year:  2.2%, which is moderate risk)    Does patient have any changes to current  medical/health status since last appt (Y/N):  NO  Does patient have any signs/symptoms of bleeding and/or thrombosis since the last appt (Y/N):  NO  Does patient have any interval changes to diet or medications since last appt (Y/N):  NO  Are there any complications or cost restrictions with current therapy (Y/N):  NO     Does patient have [facility] PCP? YES, [place] MOTA (If not, please document discussion that patient must be seen at [facility])       Vitals:  declined today, vehicle visit.    There were no vitals filed for this visit.     Asssessment:      INR therapeutic at 2.9, therefore decreasing patient's stroke and/or bleeding risk.   Reason(s) for out of range INR today:  n/a      Plan:  Pt is to continue with current warfarin dosing regimen in order to maintain INR in therapeutic range.     Follow up:  Because warfarin is a high risk medication and current CHEST guidelines recommend regular monitoring intervals (few days up to 12 weeks), will have patient return to clinic in 4 weeks to recheck INR.    Eh Enamorado, PharmD, BCACP

## 2020-07-10 NOTE — TELEPHONE ENCOUNTER
Received request via: Patient    Was the patient seen in the last year in this department? Yes    Does the patient have an active prescription (recently filled or refills available) for medication(s) requested? No

## 2020-07-13 RX ORDER — SEMAGLUTIDE 1.34 MG/ML
1 INJECTION, SOLUTION SUBCUTANEOUS
Qty: 2 PEN | Refills: 0 | Status: SHIPPED | OUTPATIENT
Start: 2020-07-13 | End: 2020-10-20

## 2020-07-23 DIAGNOSIS — K21.9 GASTROESOPHAGEAL REFLUX DISEASE, ESOPHAGITIS PRESENCE NOT SPECIFIED: ICD-10-CM

## 2020-07-23 RX ORDER — OMEPRAZOLE 20 MG/1
40 CAPSULE, DELAYED RELEASE ORAL DAILY
Qty: 180 CAP | Refills: 3 | Status: SHIPPED | OUTPATIENT
Start: 2020-07-23 | End: 2020-07-29 | Stop reason: SDUPTHER

## 2020-07-23 NOTE — TELEPHONE ENCOUNTER
Received request via: Patient    Was the patient seen in the last year in this department? Yes  LOV 6/2/2020  Does the patient have an active prescription (recently filled or refills available) for medication(s) requested? No       Whitley Frederick 1945 [phone]   Hi Juana... could I get a new prescription For omeprazole DR 20mg sent to [facility] [phone] Ph [phone]... I no longer take Pepcid... Pepcid wasn’t working so I returned to Omrprazole around the middle of April  ... oh I’ve been taking 1 at bedtime and that works n I’ve been watching the acid too ...thank you

## 2020-07-28 ENCOUNTER — ANTICOAGULATION VISIT (OUTPATIENT)
Dept: MEDICAL GROUP | Facility: PHYSICIAN GROUP | Age: 75
End: 2020-07-28
Payer: MEDICARE

## 2020-07-28 DIAGNOSIS — I48.0 PAF (PAROXYSMAL ATRIAL FIBRILLATION) (HCC): ICD-10-CM

## 2020-07-28 DIAGNOSIS — Z79.01 CHRONIC ANTICOAGULATION: Primary | ICD-10-CM

## 2020-07-28 LAB — INR PPP: 2.6 (ref 2–3.5)

## 2020-07-28 PROCEDURE — 93793 ANTICOAG MGMT PT WARFARIN: CPT | Performed by: FAMILY MEDICINE

## 2020-07-28 PROCEDURE — 85610 PROTHROMBIN TIME: CPT | Performed by: FAMILY MEDICINE

## 2020-07-28 NOTE — PROGRESS NOTES
Anticoagulation Summary  As of 2020    INR goal:   2.0-3.0   TTR:   65.9 % (4.8 y)   INR used for dosin.60 (2020)   Warfarin maintenance plan:   4.5 mg (3 mg x 1.5) every Wed; 3 mg (3 mg x 1) all other days   Weekly warfarin total:   22.5 mg   Weekly max warfarin dose:   21 mg   Plan last modified:   Eh Enamorado, PharmD (3/17/2020)   Next INR check:   2020   Target end date:   Indefinite    Indications    Chronic anticoagulation [Z79.01]  Chronic anticoagulation (Resolved) [Z79.01]  PAF (paroxysmal atrial fibrillation) (HCC) [I48.0]             Anticoagulation Episode Summary     INR check location:       Preferred lab:       Send INR reminders to:       Comments:         Anticoagulation Care Providers     Provider Role Specialty Phone number    Amy Lincoln M.D. Referring Cardiology [phone]    [facility] Responsible  [phone]        Anticoagulation Patient Findings  Patient Findings     Negatives:   Signs/symptoms of thrombosis, Signs/symptoms of bleeding, Laboratory test error suspected, Change in health, Change in alcohol use, Change in activity, Upcoming invasive procedure, Emergency department visit, Upcoming dental procedure, Missed doses, Extra doses, Change in medications, Change in diet/appetite, Hospital admission, Bruising, Other complaints              HPI:   Whitley Hinton seen in clinic today, on anticoagulation therapy with warfarin for stroke prevention due to history of PAF    Patient's previous INR was  therapeutic at 2.9 on 20, at which time patient was instructed to continue current warfarin regimen.  She returns to clinic today to recheck INR to ensure it is therapeutic and thus preventing possible clotting and/or bleeding/bruising complications.    CHADS-VASc = 2  (unadjusted ischemic stroke risk/year:  2.8%, which is Low risk)    Does patient have any changes to current medical/health status since last appt (Y/N):  no  Does  patient have any signs/symptoms of bleeding and/or thrombosis since the last appt (Y/N):  no  Does patient have any interval changes to diet or medications since last appt (Y/N):  no  Are there any complications or cost restrictions with current therapy (Y/N):  no     Does patient have [facility] PCP? Yes, Juana Berger (If not, please document discussion that patient must be seen at [facility])       Vitals:  pt declined    There were no vitals filed for this visit.     Asssessment:      INR  therapeutic at 2.9, therefore decreased risk of stroke and bleeding event   Reason(s) for out of range INR today:  na      Plan:  Pt is to continue with current warfarin dosing regimen.       Follow up:  Because warfarin is a high risk medication and current CHEST guidelines recommend regular monitoring intervals (few days up to 12 weeks), will have patient return to clinic in  5 weeks to recheck INR.    Venesas Morales PharmD candidate  Eh Enamorado, PharmD, BCACP

## 2020-07-29 DIAGNOSIS — K21.9 GASTROESOPHAGEAL REFLUX DISEASE, ESOPHAGITIS PRESENCE NOT SPECIFIED: ICD-10-CM

## 2020-07-29 RX ORDER — OMEPRAZOLE 20 MG/1
40 CAPSULE, DELAYED RELEASE ORAL DAILY
Qty: 180 CAP | Refills: 0 | Status: SHIPPED | OUTPATIENT
Start: 2020-07-29 | End: 2021-01-22 | Stop reason: SDUPTHER

## 2020-07-29 NOTE — TELEPHONE ENCOUNTER
Received request via: Patient    Was the patient seen in the last year in this department? Yes LOV 6/2/2020    Does the patient have an active prescription (recently filled or refills available) for medication(s) requested? No- was sent to the incorrect pharmacy.

## 2020-08-03 ENCOUNTER — HOSPITAL ENCOUNTER (OUTPATIENT)
Facility: MEDICAL CENTER | Age: 75
End: 2020-08-03
Payer: MEDICARE

## 2020-08-03 PROCEDURE — 82274 ASSAY TEST FOR BLOOD FECAL: CPT

## 2020-08-04 ENCOUNTER — HOSPITAL ENCOUNTER (OUTPATIENT)
Dept: CARDIOLOGY | Facility: MEDICAL CENTER | Age: 75
End: 2020-08-04
Attending: INTERNAL MEDICINE
Payer: MEDICARE

## 2020-08-04 DIAGNOSIS — I34.0 NON-RHEUMATIC MITRAL REGURGITATION: ICD-10-CM

## 2020-08-04 PROCEDURE — 93306 TTE W/DOPPLER COMPLETE: CPT

## 2020-08-06 LAB — HEMOCCULT STL QL IA: NEGATIVE

## 2020-08-07 LAB
LV EJECT FRACT  99904: 60
LV EJECT FRACT MOD 2C 99903: 75.59
LV EJECT FRACT MOD 4C 99902: 54.38
LV EJECT FRACT MOD BP 99901: 65.63

## 2020-08-07 PROCEDURE — 93306 TTE W/DOPPLER COMPLETE: CPT | Mod: 26 | Performed by: INTERNAL MEDICINE

## 2020-08-13 ENCOUNTER — HOSPITAL ENCOUNTER (OUTPATIENT)
Dept: LAB | Facility: MEDICAL CENTER | Age: 75
End: 2020-08-13
Attending: INTERNAL MEDICINE
Payer: MEDICARE

## 2020-08-13 LAB
25(OH)D3 SERPL-MCNC: 46 NG/ML (ref 30–100)
ALBUMIN SERPL BCP-MCNC: 3.9 G/DL (ref 3.2–4.9)
BUN SERPL-MCNC: 38 MG/DL (ref 8–22)
CALCIUM SERPL-MCNC: 9.4 MG/DL (ref 8.5–10.5)
CHLORIDE SERPL-SCNC: 106 MMOL/L (ref 96–112)
CO2 SERPL-SCNC: 21 MMOL/L (ref 20–33)
CREAT SERPL-MCNC: 2 MG/DL (ref 0.5–1.4)
GLUCOSE SERPL-MCNC: 144 MG/DL (ref 65–99)
PHOSPHATE SERPL-MCNC: 2.9 MG/DL (ref 2.5–4.5)
POTASSIUM SERPL-SCNC: 4.5 MMOL/L (ref 3.6–5.5)
PTH-INTACT SERPL-MCNC: 56.2 PG/ML (ref 14–72)
SODIUM SERPL-SCNC: 140 MMOL/L (ref 135–145)

## 2020-08-13 PROCEDURE — 83970 ASSAY OF PARATHORMONE: CPT

## 2020-08-13 PROCEDURE — 36415 COLL VENOUS BLD VENIPUNCTURE: CPT

## 2020-08-13 PROCEDURE — 85018 HEMOGLOBIN: CPT

## 2020-08-13 PROCEDURE — 80069 RENAL FUNCTION PANEL: CPT

## 2020-08-13 PROCEDURE — 82306 VITAMIN D 25 HYDROXY: CPT

## 2020-08-13 PROCEDURE — 85014 HEMATOCRIT: CPT

## 2020-08-14 ENCOUNTER — NURSE TRIAGE (OUTPATIENT)
Dept: HEALTH INFORMATION MANAGEMENT | Facility: OTHER | Age: 75
End: 2020-08-14

## 2020-08-14 ENCOUNTER — TELEMEDICINE (OUTPATIENT)
Dept: MEDICAL GROUP | Facility: LAB | Age: 75
End: 2020-08-14
Payer: MEDICARE

## 2020-08-14 ENCOUNTER — TELEPHONE (OUTPATIENT)
Dept: MEDICAL GROUP | Facility: LAB | Age: 75
End: 2020-08-14

## 2020-08-14 VITALS
OXYGEN SATURATION: 98 % | HEART RATE: 54 BPM | WEIGHT: 265 LBS | SYSTOLIC BLOOD PRESSURE: 177 MMHG | BODY MASS INDEX: 42.59 KG/M2 | HEIGHT: 66 IN | DIASTOLIC BLOOD PRESSURE: 100 MMHG

## 2020-08-14 DIAGNOSIS — R05.9 COUGH: ICD-10-CM

## 2020-08-14 DIAGNOSIS — J96.11 CHRONIC RESPIRATORY FAILURE WITH HYPOXIA (HCC): ICD-10-CM

## 2020-08-14 DIAGNOSIS — I10 ESSENTIAL HYPERTENSION: ICD-10-CM

## 2020-08-14 LAB
HCT VFR BLD AUTO: 37.7 % (ref 37–47)
HGB BLD-MCNC: 12 G/DL (ref 12–16)

## 2020-08-14 PROCEDURE — 99213 OFFICE O/P EST LOW 20 MIN: CPT | Mod: 95,CR | Performed by: PHYSICIAN ASSISTANT

## 2020-08-14 ASSESSMENT — FIBROSIS 4 INDEX: FIB4 SCORE: 2.83

## 2020-08-14 NOTE — PROGRESS NOTES
Telemedicine Visit: Established Patient     This evaluation was conducted via Zoom, using secure and encrypted videoconferencing technology.  The patient was physical located at Home in [place], [place] and the physician was located in [facility] .  The patient was presented by self, at home.  The patient's identity was confirmed and verbal consent for the telemedicine encounter was obtained.      Subjective:   CC: Intermittent shortness of breath, elevated BP today.   Whitley Frederick is a 75 y.o. female presenting for evaluation and management of: shortness of breath and elevated BP.     HPI:  Pt presents today c/o shortness of breath worsening in the past 3 days. She has a history of asthma, uses CPAP and 3L of O2 at night time.   She states that she was feeling anxious the past few days and tends to get shortness of breath when this happens.   Currently shortness of breath has resolved, though she does admit to having slight cough and 1 episode of wheezing yesterday.     She is currently using Xopanex.     She denies chest pain, though does have intermittent chest tightness.   She recently took BP and was 177/100 now at 1:03pm. Confirms that she did take BP medication about 1 hour ago, including Lasix as she noticed some increased swelling around her ankles as well as some weight gain.     Reports that she typically has about 2-4lbs weight gain/loss from day to day.     ROS   Denies any recent fevers or chills. No nausea or vomiting. No chest pains.   +Intermittent shortness of breath, also has intermittent cough and 1 episode of wheeze yesterday.     Allergies   Allergen Reactions   • Amlodipine Itching   • Amaryl      GI Problems   • Avandia [Rosiglitazone Maleate]      GI problems   • Cipro Xr      Possibly causes Chills.   • Clonidine      Rapid heart rate, swelling    • Contrast Media With Iodine [Iodine] Hives   • Glipizide      GI Problems   • Glucophage [Metformin Hydrochloride]      dizzy   •  Hydralazine      Rapid heart rate, chest tightness   • Levaquin      Possible allergy - chills with cipro but sick   • Relafen [Nabumetone]      Itching; avoids all nsaids       Current medicines (including changes today)  Current Outpatient Medications   Medication Sig Dispense Refill   • omeprazole (PRILOSEC) 20 MG delayed-release capsule Take 2 Caps by mouth every day. 180 Cap 0   • Semaglutide, 1 MG/DOSE, (OZEMPIC, 1 MG/DOSE,) 2 MG/1.5ML Solution Pen-injector Inject 1 mg as instructed every 7 days. 2 PEN 0   • levalbuterol (XOPENEX HFA) 45 MCG/ACT inhaler Inhale 2 Puffs by mouth every four hours as needed for Shortness of Breath. 3 Inhaler 3   • montelukast (SINGULAIR) 10 MG Tab Take 1 Tab by mouth every bedtime. 100 Tab 3   • pioglitazone (ACTOS) 15 MG Tab Take 1 Tab by mouth every day for 100 days. 100 Tab 3   • levothyroxine (SYNTHROID) 50 MCG Tab Take 1 Tab by mouth Every morning on an empty stomach. 100 Tab 3   • dronedarone (MULTAQ) 400 MG Tab Take 1 Tab by mouth 2 times a day, with meals. 180 Tab 3   • metoprolol (LOPRESSOR) 50 MG Tab Take 1 Tab by mouth 2 times a day. 200 Tab 3   • rosuvastatin (CRESTOR) 10 MG Tab Take 1 Tab by mouth every evening. 100 Tab 3   • losartan (COZAAR) 50 MG Tab Take 1 Tab by mouth 2 Times a Day. 200 Tab 2   • allopurinol (ZYLOPRIM) 100 MG Tab Take 2 Tabs by mouth 2 Times a Day. 200 Tab 3   • warfarin (COUMADIN) 3 MG Tab Take 1 tab by mouth daily as directed by the coumadin clinic 100 Tab 3   • Semaglutide,0.25 or 0.5MG/DOS, (OZEMPIC, 0.25 OR 0.5 MG/DOSE,) 2 MG/1.5ML Solution Pen-injector Inject 0.5 mg as instructed every 7 days. 3 PEN 0   • fluticasone (FLOVENT HFA) 44 MCG/ACT Aerosol Inhale 2 Puffs by mouth 2 times a day. 3 Inhaler 3   • beclomethasone HFA (QVAR REDIHALER) 40 MCG/ACT inhaler Inhale 2 Puffs by mouth 2 Times a Day. FREE SAMPLE 3 Each 3   • famotidine (PEPCID) 20 MG Tab Take 1 Tab by mouth 2 times a day. 200 Tab 3   • clonazePAM (KLONOPIN) 0.5 MG Tab TAKE 1  TABLET BY MOUTH ONCE DAILY AS NEEDED FOR 30 DAYS. ANXIETY/HEART PALPITATIONS for 30 days 30 Tab 1   • NON SPECIFIED blue concentrator at night along with my C-PAP./3 liters per hour 1 Each 0   • nystatin (MYCOSTATIN) 257038 UNIT/GM Ointment AAA up to twice daily 240 g 2   • terazosin (HYTRIN) 1 MG Cap Take 1 Cap by mouth every day. 30 Cap 4   • ferrous sulfate 325 (65 Fe) MG tablet Take 325 mg by mouth every day.     • azelastine (ASTELIN) 137 MCG/SPRAY nasal spray Hart[place] 1 Spray in nose 2 times a day as needed.     • Clonazepam 0.5 MG TABLET DISPERSIBLE Take 0.25-0.5 tablet by mouth 1 time daily as needed.     • beclomethasone HFA (QVAR REDIHALER) 40 MCG/ACT inhaler INHALE 2 PUFFS BY MOUTH TWICE DAILY (Patient not taking: Reported on 8/20/2019) 2 Inhaler 0   • fluticasone (FLONASE) 50 MCG/ACT nasal spray USE 1-2  SPRAY(S) IN EACH NOSTRIL ONCE DAILY 30 MINUTES PRIOR TO USE OF CPAP 3 Bottle 3   • Blood Glucose Monitoring Suppl Supplies Misc Test strips order: Test strips for Accucheck meter. Sig: use BID and prn ssx high or low sugar #100 RF x 3 100 Strip 11   • Blood Glucose Monitoring Suppl Supplies Misc Test strips order: Test strips for Chely Contour meter. Sig: use tid  and prn ssx high or low sugar #100 RF x 3 100 Strip 3   • Urine Glucose-Ketones Test Strip 1 Strip by In Vitro route every day. 100 Strip 2   • glucose blood (FREESTYLE LITE) strip 1 Strip by Other route 2 times a day, with meals. 50 Strip 11   • Insulin Pen Needle 32G X 4 MM Misc 1 Applicator by Does not apply route 3 times a day. 100 Each 11   • Blood Glucose Monitoring Suppl W/DEVICE Kit 1 Each by Does not apply route 2 times a day as needed. 1 Kit 0   • Misc. Devices MISC Pen needles for Lantus Solstar. 29g 12mm. 90 Each 3   • Cholecalciferol (VITAMIN D) 2000 UNITS CAPS Take 4,000 Units by mouth every day.     • magnesium oxide (MAGNESIUM OXIDE) 400 (241.3 MG) MG TABS tablet Take 400 mg by mouth every day.       No current  facility-administered medications for this visit.        Patient Active Problem List    Diagnosis Date Noted   • Essential hypertension 05/10/2016     Priority: High   • Atherosclerosis of aorta ([facility]) 04/29/2015     Priority: High   • Chronic respiratory failure with hypoxia ([facility]) 04/29/2015     Priority: High   • Morbid obesity with BMI of 45.0-49.9, adult ([facility]) 04/29/2015     Priority: High   • Anxiety 01/09/2014     Priority: High   • PAF (paroxysmal atrial fibrillation) ([facility]) 07/12/2013     Priority: High   • Type 2 diabetes mellitus with stage 4 chronic kidney disease, without long-term current use of insulin ([facility])      Priority: High   • CKD (chronic kidney disease) stage 4, GFR 15-29 ml/min ([facility])      Priority: High   • Mild intermittent asthma without complication 08/24/2016     Priority: Medium   • Chronic anticoagulation 06/10/2016     Priority: Medium   • AVERY (obstructive sleep apnea) 05/10/2016     Priority: Medium   • Chronic idiopathic gout involving toe without tophus 04/30/2015     Priority: Medium   • Left bundle branch block 03/05/2012     Priority: Medium   • Multiple thyroid nodules 10/07/2015     Priority: Low   • Stenosis of carotid artery 05/30/2014     Priority: Low   • Non-rheumatic mitral regurgitation 08/20/2019   • Secondary hyperparathyroidism of renal origin ([facility]) 05/14/2018   • Hypothyroidism due to acquired atrophy of thyroid 04/10/2018   • VPC's (ventricular premature complexes) 02/23/2018   • Dependence on nocturnal oxygen therapy 11/02/2017   • Incontinence of feces 05/18/2017   • Chronic pain of both shoulders 03/23/2017   • Dyslipidemia 12/14/2016       Family History   Problem Relation Age of Onset   • Cancer Maternal Aunt    • Diabetes Maternal Aunt    • Heart Disease Maternal Aunt    • Hypertension Maternal Aunt    • Hyperlipidemia Maternal Aunt    • Cancer Mother         Leukemia   • Hypertension Mother    • Diabetes Mother    • Lung Disease Father    • Cancer Father       "   Lung   • Lung Disease Brother    • Stroke Brother    • Diabetes Brother    • Heart Disease Brother    • Diabetes Maternal Grandmother    • Hypertension Brother    • Diabetes Brother        She  has a past medical history of Allergy, Anxiety, Arrhythmia, Arthritis, Asthma, Carotid bruit, CATARACT, Chest pain, DIABETES MELLITUS, Esophageal reflux, Goiter, Headache(784.0), Heart murmur, Hyperlipidemia, Hypertension, IBD (inflammatory bowel disease), Migraine, AVERY (obstructive sleep apnea), Overweight(278.02), Palpitations, Paroxysmal atrial fibrillation (HCC) (7/12/13), Pneumonia, Positive reaction to tuberculin skin test, Renal disease, and Urinary tract infection, site not specified. She also has no past medical history of Breast cancer (HCC) or Tuberculosis.  She  has a past surgical history that includes tonsillectomy; cholecystectomy; abdominal hysterectomy total; us-needle core bx-breast panel; and cataract phaco with iol (Right, 10/25/2016).       Objective:   BP (!) 177/100   Pulse (!) 54   Ht 1.689 m (5' 6.5\")   Wt 120.2 kg (265 lb)   SpO2 98%   BMI 42.14 kg/m²     Physical Exam:  Constitutional: Alert, no distress, well-groomed.  Skin: No rashes in visible areas.  Eye: Round. Conjunctiva clear, lids normal. No icterus.   ENMT: Lips pink without lesions, good dentition, moist mucous membranes. Phonation normal.  Neck: No masses, no thyromegaly. Moves freely without pain.  CV: Pulse as reported by patient  Respiratory: Unlabored respiratory effort, no cough or audible wheeze  Psych: Alert and oriented x3, normal affect and mood.       Assessment and Plan:   The following treatment plan was discussed:     1. Essential hypertension  - Given uncontrolled BP despite using BP medication in addition to cough and shortness of breath, I recommend that she go to the Urgent care today.   She denies chest pain or chest tightness currently. Does feel anxious, though given extensive PMHx, it would be best to be seen " by a provider in person.     2. Chronic respiratory failure with hypoxia (HCC)  - New problem to me, with new episode of sob and elevated BP  Strongly recommend that she go to the Urgent Care today. Can go to ED if symptoms acutely worsen given extensive PMHx.   She is reluctant but agreeable to going to urgent care today.    3. Cough  -Needs in person evaluation at the ED given history of asthma, chronic respiratory failure with Hypoxia.         Follow-up: Return if symptoms worsen or fail to improve.         Laila Jeffery P.A.-C.  Supervising MD: Dr. Paulina Delarosa MD  08/14/20

## 2020-08-14 NOTE — TELEPHONE ENCOUNTER
1. Caller Name: Whitley Frederick                 Call Back Number: [phone]  [facility] PCP or Specialty Provider: Yes       Juana Beltran  2.  In the last two weeks, has the patient had any new or worsening symptoms (not explained by alternative diagnosis)? Yes. Has SOB, pulse ox meter & 96% now & pulse is 63, has asthma (diagnosed 2 to 3 years ago), has rescue inhaler, when having tightness use xopanex, only med using now, uses cpap & 3 liters O2 @ night.  In past 3 days when wake up has asthma type feeling in chest, in area of collar bone joins sternum has feeling of tighness or asthmatic feeling.  Enterdynia morning took I/4 tablet of clonopin (of .5mg tablet) & 2 puffs xopanex & was fine rest of day. Called SCP agent to see if she could get Quvr inhaler.  This maintenance med has helped her in past.  Not on maintenance inhaler now. Just occurs in morning after she gets off her cpap.          3.  Does patient have any comoribidities? DM and ESRD, HTN, RBBB, mitral value regurg, etc.,       4.  Has the patient traveled in the last 14 days OR had any known contact with someone who is suspected or confirmed to have COVID-19?  No.    5. Disposition: Deferred to [facility] Provider   Spoke to PCP & same day appt scheduled.      Note routed to [facility] Provider: FYI only.

## 2020-08-14 NOTE — TELEPHONE ENCOUNTER
Spoke with patient to see if she did in fact need to go to .   Breathing is non labored over the phone.   No chest pain, palpitations.   Shortness of breath has improved.     Does report that she is more anxious recently as her daughter was traveling   Daughter is home now.     No fevers.     Will keep appointment for today at 1pm.

## 2020-08-14 NOTE — TELEPHONE ENCOUNTER
. Caller Name:                 Call Back Number:   [facility] PCP or Specialty Provider:         2.  In the last two weeks, has the patient had any new or worsening symptoms (not explained by alternative diagnosis)?  3.  Does patient have any comoribidities?   4.  Has the patient traveled in the last 14 days OR had any known contact with someone who is suspected or confirmed to have COVID-19?

## 2020-08-17 ENCOUNTER — APPOINTMENT (OUTPATIENT)
Dept: PULMONOLOGY | Facility: HOSPICE | Age: 75
End: 2020-08-17
Payer: MEDICARE

## 2020-08-18 NOTE — TELEPHONE ENCOUNTER
Will you ask Whitley if she just needs a maintenance inhaler?  We can check with the [facility] clinic to see if they have any in stock -often this is the only thing she requires and then she does better.

## 2020-08-19 ENCOUNTER — OFFICE VISIT (OUTPATIENT)
Dept: MEDICAL GROUP | Facility: LAB | Age: 75
End: 2020-08-19
Payer: MEDICARE

## 2020-08-19 VITALS
SYSTOLIC BLOOD PRESSURE: 148 MMHG | HEART RATE: 54 BPM | OXYGEN SATURATION: 99 % | BODY MASS INDEX: 41.91 KG/M2 | WEIGHT: 267 LBS | RESPIRATION RATE: 16 BRPM | HEIGHT: 67 IN | TEMPERATURE: 97 F | DIASTOLIC BLOOD PRESSURE: 60 MMHG

## 2020-08-19 DIAGNOSIS — N18.4 CKD (CHRONIC KIDNEY DISEASE) STAGE 4, GFR 15-29 ML/MIN (HCC): ICD-10-CM

## 2020-08-19 DIAGNOSIS — J45.20 MILD INTERMITTENT ASTHMA WITHOUT COMPLICATION: ICD-10-CM

## 2020-08-19 DIAGNOSIS — F41.9 ANXIETY: ICD-10-CM

## 2020-08-19 DIAGNOSIS — N18.4 TYPE 2 DIABETES MELLITUS WITH STAGE 4 CHRONIC KIDNEY DISEASE, WITHOUT LONG-TERM CURRENT USE OF INSULIN (HCC): ICD-10-CM

## 2020-08-19 DIAGNOSIS — E11.22 TYPE 2 DIABETES MELLITUS WITH STAGE 4 CHRONIC KIDNEY DISEASE, WITHOUT LONG-TERM CURRENT USE OF INSULIN (HCC): ICD-10-CM

## 2020-08-19 PROCEDURE — 99214 OFFICE O/P EST MOD 30 MIN: CPT | Performed by: NURSE PRACTITIONER

## 2020-08-19 RX ORDER — MOMETASONE FUROATE 100 UG/1
2 AEROSOL RESPIRATORY (INHALATION) DAILY
Qty: 1 EACH | Refills: 0 | Status: SHIPPED | OUTPATIENT
Start: 2020-08-19 | End: 2022-04-12

## 2020-08-19 ASSESSMENT — FIBROSIS 4 INDEX: FIB4 SCORE: 2.83

## 2020-08-19 NOTE — PROGRESS NOTES
"CC  f/u    HPI  Whitley is a 74 yo est female here to follow-up on shortness of breath that she was experiencing last week, now resolved.  Last week she woke up with chest tightness / wheezing - last mon - wed - took Klonopin and xopenex which helped.  Figured out that she was worried about her daughter traveling in [place] as her symptoms quickly improved after taking his Klonopin.  Now feeling well.  Tells me that she certainly suffers from anxiety related to COVID, her children's health and asthma.  She does not have anxiety on a daily basis.  Denies depression.  Does not want to go on a daily medication for anxiety or depression.    Asthma:  Chronic issue.  Out of maintenence inhaler b/c of cost.  Does well with qvar.  Uses Xopenex as needed.  Tells me that her asthma symptoms are much less frequent/fairly rare when she has a maintenance inhaler to use daily.  Denies any recent fevers or purulent mucus production.    Type 2 diabetes: Chronic issue.  Not monitoring blood sugars regularly.  Last A1c was 5.9.  Continues on weekly Ozempic and Actos.  No longer followed by endocrinology.  Denies any hypoglycemic episodes.  Trying hard to follow diabetic diet.  Fairly physically inactive.      Past medical, surgical, family, and social history is reviewed and updated in Epic chart by me today.   Medications and allergies reviewed and updated in Epic chart by me today.     ROS:   As documented in history of present illness above    Exam:  /60   Pulse (!) 54   Temp 36.1 °C (97 °F)   Resp 16   Ht 1.689 m (5' 6.5\")   Wt 121.1 kg (267 lb)   SpO2 99%   Constitutional: Overweight female, alert, no distress, plus 3 vital signs  Skin:  Warm, dry, no rashes invisible areas  Eye: Equal, round and reactive, conjunctiva clear  ENMT: Lips without lesions, good dentition, oropharynx clear    Neck: Trachea midline.  Respiratory: Unlabored respiration, lungs clear to auscultation, no wheezes, no rhonchi  Cardiovascular: Normal " rate and rhythm.  Psych: Alert, pleasant, well-groomed, normal affect    Assessment / Plan / Medical Decision makin. CKD (chronic kidney disease) stage 4, GFR 15-29 ml/min ([facility])  -Recently left her nephrologist office today.  Was told that labs are stable.  Closely monitoring blood pressure and staying on a low-salt diet.  She will recheck labs in 3 months and then follow-up with nephrology.    2. Type 2 diabetes mellitus with stage 4 chronic kidney disease, without long-term current use of insulin ([facility])  -Controlled based on last A1c.  Add an A1c to next lab work which she will be doing for nephrology in early November.  Continue Ozempic and Actos.  Reminded her of signs and symptoms of hypoglycemia.  Discussed the importance of a diabetic diet and physical activity.  Encouraged good foot care.  Encouraged her to follow-up with ophthalmology for retinal screening.    3. Anxiety  -This is an intermittent issue.  We discussed SSRI versus SNRI therapy which she declines.  She prefers to use Klonopin on as-needed basis.  Denies depression.    4.  Asthma:  Agree, she should be on a maintenance inhaler.  Has plenty of refills of Xopenex and this is affordable for her right now.  Checked the [facility] for any maintenance inhalers and they did have Asmanex 100 mcg inhaler which I sent to the pharmacy for her.  I encouraged her to notify me when she runs out of this and we can check to see any ICS inhalers they have on hand.  Recommend flu shot in mid September.  She is also due for a follow-up with pulmonology.  She is compliant with her CPAP machine.  Encouraged her to lose weight and exercise.

## 2020-08-26 ENCOUNTER — OFFICE VISIT (OUTPATIENT)
Dept: CARDIOLOGY | Facility: MEDICAL CENTER | Age: 75
End: 2020-08-26
Payer: MEDICARE

## 2020-08-26 VITALS
HEIGHT: 67 IN | DIASTOLIC BLOOD PRESSURE: 74 MMHG | BODY MASS INDEX: 42.88 KG/M2 | WEIGHT: 273.2 LBS | SYSTOLIC BLOOD PRESSURE: 146 MMHG | HEART RATE: 82 BPM | OXYGEN SATURATION: 96 %

## 2020-08-26 DIAGNOSIS — I10 ESSENTIAL HYPERTENSION: ICD-10-CM

## 2020-08-26 DIAGNOSIS — I49.3 VPC'S (VENTRICULAR PREMATURE COMPLEXES): ICD-10-CM

## 2020-08-26 DIAGNOSIS — Z79.01 CHRONIC ANTICOAGULATION: ICD-10-CM

## 2020-08-26 DIAGNOSIS — N18.4 CKD (CHRONIC KIDNEY DISEASE) STAGE 4, GFR 15-29 ML/MIN (HCC): ICD-10-CM

## 2020-08-26 DIAGNOSIS — I44.7 LEFT BUNDLE BRANCH BLOCK: ICD-10-CM

## 2020-08-26 DIAGNOSIS — I48.0 PAF (PAROXYSMAL ATRIAL FIBRILLATION) (HCC): ICD-10-CM

## 2020-08-26 PROCEDURE — 99215 OFFICE O/P EST HI 40 MIN: CPT | Performed by: INTERNAL MEDICINE

## 2020-08-26 RX ORDER — MAGNESIUM OXIDE 400 MG/1
TABLET ORAL
COMMUNITY
Start: 2020-08-19 | End: 2021-09-09 | Stop reason: SDUPTHER

## 2020-08-26 ASSESSMENT — FIBROSIS 4 INDEX: FIB4 SCORE: 2.83

## 2020-08-26 NOTE — PROGRESS NOTES
Chief Complaint   Patient presents with   • Follow-Up     6 Month Follow Up Paroxysmal Atrial Fibrillation       Subjective:   Whitley Frederick is a 75 y.o. female who presents today for for follow-up of paroxysmal atrial fibrillation and PVCs anticoagulation high risk medication use.  She also has a history of moderate mitral regurgitation that she was unaware of from an echocardiogram in 2017.  She is a former patient of Dr. Sorto who wishes to switch her care to me after recommendation from a mutual patient.  She is a history of paroxysmal atrial for ablation is currently not bothering her managed with dronedarone and warfarin.  She has no bleeding issues.  She has not been closely monitored for her Multitak over this time.  She also has a history of PVCs which are rare but symptomatic for her well documented on ZIO Patch and has seen Dr. eckert with regard to this.  She has lost weight recently but does not exert herself to a high degree although she does walk her dog.  She has no exertional symptoms.  She has a history of coronary angiography in the past which showed mild nonobstructive disease.  She has hypertension type 2 diabetes mellitus as well.  She also has chronic kidney disease.    In the interim she feels well tolerates her anticoagulation without difficulty and continues on her current cardiac medications including dronedarone without difficulty.  Renal function is stable.    Past Medical History:   Diagnosis Date   • Allergy    • Anxiety    • Arrhythmia    • Arthritis    • Asthma    • Carotid bruit     Left   • CATARACT    • Chest pain    • DIABETES MELLITUS    • Esophageal reflux    • Goiter    • Headache(784.0)    • Heart murmur    • Hyperlipidemia    • Hypertension    • IBD (inflammatory bowel disease)    • Migraine     Silent migraine   • AVERY (obstructive sleep apnea)    • Overweight(278.02)    • Palpitations    • Paroxysmal atrial fibrillation (HCC) 7/12/13    woke with palps, AFib per ekg in  am.   • Pneumonia     2015   • Positive reaction to tuberculin skin test    • Renal disease     Mild diabetic renal disease with mild proteinuria.Moderate Stage 4 Dr. Badillo   • Urinary tract infection, site not specified      Past Surgical History:   Procedure Laterality Date   • CATARACT PHACO WITH IOL Right 10/25/2016    Procedure: CATARACT PHACO WITH IOL;  Surgeon: Zoran Gamble M.D.;  Location: SURGERY SAME DAY [facility];  Service:    • ABDOMINAL HYSTERECTOMY TOTAL     • CHOLECYSTECTOMY     • TONSILLECTOMY     • US-NEEDLE CORE BX-BREAST PANEL       Family History   Problem Relation Age of Onset   • Cancer Maternal Aunt    • Diabetes Maternal Aunt    • Heart Disease Maternal Aunt    • Hypertension Maternal Aunt    • Hyperlipidemia Maternal Aunt    • Cancer Mother         Leukemia   • Hypertension Mother    • Diabetes Mother    • Lung Disease Father    • Cancer Father         Lung   • Lung Disease Brother    • Stroke Brother    • Diabetes Brother    • Heart Disease Brother    • Diabetes Maternal Grandmother    • Hypertension Brother    • Diabetes Brother      Social History     Socioeconomic History   • Marital status:      Spouse name: Not on file   • Number of children: Not on file   • Years of education: Not on file   • Highest education level: Not on file   Occupational History   • Not on file   Social Needs   • Financial resource strain: Not on file   • Food insecurity     Worry: Not on file     Inability: Not on file   • Transportation needs     Medical: Not on file     Non-medical: Not on file   Tobacco Use   • Smoking status: Former Smoker     Packs/day: 1.00     Years: 20.00     Pack years: 20.00     Types: Cigarettes     Quit date: 1983     Years since quittin.6   • Smokeless tobacco: Never Used   Substance and Sexual Activity   • Alcohol use: No     Alcohol/week: 0.0 oz   • Drug use: No   • Sexual activity: Never     Partners: Male   Lifestyle   • Physical activity     Days  per week: Not on file     Minutes per session: Not on file   • Stress: Not on file   Relationships   • Social connections     Talks on phone: Not on file     Gets together: Not on file     Attends Denominational service: Not on file     Active member of club or organization: Not on file     Attends meetings of clubs or organizations: Not on file     Relationship status: Not on file   • Intimate partner violence     Fear of current or ex partner: Not on file     Emotionally abused: Not on file     Physically abused: Not on file     Forced sexual activity: Not on file   Other Topics Concern   • Not on file   Social History Narrative   • Not on file     Allergies   Allergen Reactions   • Amlodipine Itching   • Amaryl      GI Problems   • Avandia [Rosiglitazone Maleate]      GI problems   • Cipro Xr      Possibly causes Chills.   • Clonidine      Rapid heart rate, swelling    • Contrast Media With Iodine [Iodine] Hives   • Glipizide      GI Problems   • Glucophage [Metformin Hydrochloride]      dizzy   • Hydralazine      Rapid heart rate, chest tightness   • Levaquin      Possible allergy - chills with cipro but sick   • Relafen [Nabumetone]      Itching; avoids all nsaids     Outpatient Encounter Medications as of 8/26/2020   Medication Sig Dispense Refill   • Mometasone Furoate (ASMANEX HFA) 100 MCG/ACT Aerosol Inhale 2 Puffs by mouth every day. 1 Each 0   • omeprazole (PRILOSEC) 20 MG delayed-release capsule Take 2 Caps by mouth every day. 180 Cap 0   • Semaglutide, 1 MG/DOSE, (OZEMPIC, 1 MG/DOSE,) 2 MG/1.5ML Solution Pen-injector Inject 1 mg as instructed every 7 days. 2 PEN 0   • levalbuterol (XOPENEX HFA) 45 MCG/ACT inhaler Inhale 2 Puffs by mouth every four hours as needed for Shortness of Breath. 3 Inhaler 3   • montelukast (SINGULAIR) 10 MG Tab Take 1 Tab by mouth every bedtime. 100 Tab 3   • pioglitazone (ACTOS) 15 MG Tab Take 1 Tab by mouth every day for 100 days. 100 Tab 3   • levothyroxine (SYNTHROID) 50 MCG Tab  Take 1 Tab by mouth Every morning on an empty stomach. 100 Tab 3   • dronedarone (MULTAQ) 400 MG Tab Take 1 Tab by mouth 2 times a day, with meals. 180 Tab 3   • metoprolol (LOPRESSOR) 50 MG Tab Take 1 Tab by mouth 2 times a day. 200 Tab 3   • rosuvastatin (CRESTOR) 10 MG Tab Take 1 Tab by mouth every evening. 100 Tab 3   • losartan (COZAAR) 50 MG Tab Take 1 Tab by mouth 2 Times a Day. 200 Tab 2   • allopurinol (ZYLOPRIM) 100 MG Tab Take 2 Tabs by mouth 2 Times a Day. 200 Tab 3   • warfarin (COUMADIN) 3 MG Tab Take 1 tab by mouth daily as directed by the coumadin clinic 100 Tab 3   • Semaglutide,0.25 or 0.5MG/DOS, (OZEMPIC, 0.25 OR 0.5 MG/DOSE,) 2 MG/1.5ML Solution Pen-injector Inject 0.5 mg as instructed every 7 days. 3 PEN 0   • fluticasone (FLOVENT HFA) 44 MCG/ACT Aerosol Inhale 2 Puffs by mouth 2 times a day. 3 Inhaler 3   • famotidine (PEPCID) 20 MG Tab Take 1 Tab by mouth 2 times a day. 200 Tab 3   • clonazePAM (KLONOPIN) 0.5 MG Tab TAKE 1 TABLET BY MOUTH ONCE DAILY AS NEEDED FOR 30 DAYS. ANXIETY/HEART PALPITATIONS for 30 days 30 Tab 1   • NON SPECIFIED blue concentrator at night along with my C-PAP./3 liters per hour 1 Each 0   • nystatin (MYCOSTATIN) 441111 UNIT/GM Ointment AAA up to twice daily 240 g 2   • terazosin (HYTRIN) 1 MG Cap Take 1 Cap by mouth every day. 30 Cap 4   • ferrous sulfate 325 (65 Fe) MG tablet Take 325 mg by mouth every day.     • azelastine (ASTELIN) 137 MCG/SPRAY nasal spray [place] 1 Spray in nose 2 times a day as needed.     • Clonazepam 0.5 MG TABLET DISPERSIBLE Take 0.25-0.5 tablet by mouth 1 time daily as needed.     • fluticasone (FLONASE) 50 MCG/ACT nasal spray USE 1-2  SPRAY(S) IN EACH NOSTRIL ONCE DAILY 30 MINUTES PRIOR TO USE OF CPAP 3 Bottle 3   • Blood Glucose Monitoring Suppl Supplies Misc Test strips order: Test strips for Accucheck meter. Sig: use BID and prn ssx high or low sugar #100 RF x 3 100 Strip 11   • Blood Glucose Monitoring Suppl Supplies Misc Test strips order:  "Test strips for Chely Contour meter. Sig: use tid  and prn ssx high or low sugar #100 RF x 3 100 Strip 3   • Urine Glucose-Ketones Test Strip 1 Strip by In Vitro route every day. 100 Strip 2   • glucose blood (FREESTYLE LITE) strip 1 Strip by Other route 2 times a day, with meals. 50 Strip 11   • Insulin Pen Needle 32G X 4 MM Misc 1 Applicator by Does not apply route 3 times a day. 100 Each 11   • Blood Glucose Monitoring Suppl W/DEVICE Kit 1 Each by Does not apply route 2 times a day as needed. 1 Kit 0   • Misc. Devices MISC Pen needles for Lantus Solstar. 29g 12mm. 90 Each 3   • Cholecalciferol (VITAMIN D) 2000 UNITS CAPS Take 4,000 Units by mouth every day.     • magnesium oxide (MAGNESIUM OXIDE) 400 (241.3 MG) MG TABS tablet Take 400 mg by mouth every day.     • magnesium oxide (MAG-OX) 400 MG Tab tablet        No facility-administered encounter medications on file as of 8/26/2020.      Review of Systems   All other systems reviewed and are negative.       Objective:   /74 (BP Location: Left arm, Patient Position: Sitting, BP Cuff Size: Adult)   Pulse 82   Ht 1.702 m (5' 7\")   Wt 123.9 kg (273 lb 3.2 oz)   SpO2 96%   BMI 42.79 kg/m²     Physical Exam   Constitutional: She is oriented to person, place, and time. She appears well-developed and well-nourished. No distress.   Obese   HENT:   Head: Normocephalic and atraumatic.   Right Ear: External ear normal.   Left Ear: External ear normal.   Mouth/Throat: No oropharyngeal exudate.   Eyes: Pupils are equal, round, and reactive to light. Conjunctivae and EOM are normal. Right eye exhibits no discharge. Left eye exhibits no discharge. No scleral icterus.   Neck: Normal range of motion. Neck supple. No JVD present. No tracheal deviation present. No thyromegaly present.   Cardiovascular: Normal rate, regular rhythm, S1 normal, S2 normal and intact distal pulses. PMI is not displaced. Exam reveals no gallop, no S3, no S4 and no friction rub.   No murmur " heard.  Pulses:       Carotid pulses are 2+ on the right side and 2+ on the left side.       Radial pulses are 2+ on the right side and 2+ on the left side.        Popliteal pulses are 2+ on the right side and 2+ on the left side.        Dorsalis pedis pulses are 2+ on the right side and 2+ on the left side.        Posterior tibial pulses are 2+ on the right side and 2+ on the left side.   Pulmonary/Chest: Effort normal and breath sounds normal. No respiratory distress. She has no wheezes. She has no rales. She exhibits no tenderness.   Abdominal: Soft. Bowel sounds are normal. She exhibits no distension. There is no abdominal tenderness.   Musculoskeletal: Normal range of motion.         General: Edema ( Trace) present. No tenderness.   Neurological: She is alert and oriented to person, place, and time. No cranial nerve deficit (Cranial nerves II through XII grossly intact).   Skin: Skin is warm and dry. No rash noted. She is not diaphoretic. No erythema.   Psychiatric: She has a normal mood and affect. Her behavior is normal. Judgment and thought content normal.   Vitals reviewed.  No changes in physical exam since prior 8/20/2019    LABS:  Lab Results   Component Value Date/Time    CHOLSTRLTOT 164 12/14/2019 10:06 AM    LDL 88 12/14/2019 10:06 AM    HDL 46 12/14/2019 10:06 AM    TRIGLYCERIDE 148 12/14/2019 10:06 AM       Lab Results   Component Value Date/Time    WBC 8.1 04/11/2019 12:44 PM    RBC 4.19 (L) 04/11/2019 12:44 PM    HEMOGLOBIN 12.0 08/13/2020 01:07 PM    HEMATOCRIT 37.7 08/13/2020 01:07 PM    MCV 92.1 04/11/2019 12:44 PM    NEUTSPOLYS 68.60 04/11/2019 12:44 PM    LYMPHOCYTES 22.90 04/11/2019 12:44 PM    MONOCYTES 5.50 04/11/2019 12:44 PM    EOSINOPHILS 2.00 04/11/2019 12:44 PM    BASOPHILS 0.60 04/11/2019 12:44 PM    HYPOCHROMIA 1+ 04/19/2014 01:23 AM     Lab Results   Component Value Date/Time    SODIUM 140 08/13/2020 01:07 PM    POTASSIUM 4.5 08/13/2020 01:07 PM    CHLORIDE 106 08/13/2020 01:07 PM     CO2 21 08/13/2020 01:07 PM    GLUCOSE 144 (H) 08/13/2020 01:07 PM    BUN 38 (H) 08/13/2020 01:07 PM    CREATININE 2.00 (H) 08/13/2020 01:07 PM    CREATININE 1.1 04/27/2006 09:50 AM     Lab Results   Component Value Date    HBA1C 5.9 (A) 02/11/2020      Lab Results   Component Value Date/Time    ALKPHOSPHAT 50 12/14/2019 10:09 AM    ASTSGOT 17 12/14/2019 10:09 AM    ALTSGPT 11 12/14/2019 10:09 AM    TBILIRUBIN 0.9 12/14/2019 10:09 AM      Lab Results   Component Value Date/Time    BNPBTYPENAT 68 04/18/2014 10:20 AM      No results found for: TSH  Lab Results   Component Value Date/Time    PROTHROMBTM 25.4 (H) 04/11/2019 12:44 PM    INR 2.60 07/28/2020 01:18 PM      ECHO CONCLUSIONS (12/4/2019):  Compared to the report of the prior study done  on 04/11/17 - no   significant changes noted.  Normal left ventricular size and systolic function.  Left ventricular ejection fraction is visually estimated to be 60%.  Grade I diastolic dysfunction.  Mild concentric left ventricular hypertrophy.  Moderate mitral regurgitation.  Aortic sclerosis without stenosis.  Estimated right ventricular systolic pressure  is 30 mmHg.    ECHO CONCLUSIONS (4/11/2017):  Normal left ventricular size and systolic function. Mild concentric   left ventricular hypertrophy. Left ventricular ejection fraction is   visually estimated to be 55%. Normal regional wall motion. Grade I   diastolic dysfunction.  Mildly dilated left atrium. Left atrial volume index is 37 mL/sq m.  Structurally normal mitral valve. Probably moderate mitral   regurgitation. PISA not well visualized.  Compared to the report of the study done 4/2014- there has been an   improvement in LA size and diastolic function.     EKG (4/10/2018):  I have personally reviewed the EKG this visit and discussed with the patient.  Sinus rhythm and left bundle branch block        Assessment:     1. PAF (paroxysmal atrial fibrillation) (HCC)     2. CKD (chronic kidney disease) stage 4, GFR  15-29 ml/min (HCC)     3. Essential hypertension     4. Left bundle branch block     5. Chronic anticoagulation     6. VPC's (ventricular premature complexes)         Medical Decision Making:  Today's Assessment / Status / Plan:     Doing well no issues with PAF.  Occasional palpitations from PVCs.  Her Adderall is appropriate.  Tolerating anticoagulation well.  Exercising.  Edema stable.    Follow-up 6 months

## 2020-08-28 ENCOUNTER — PATIENT MESSAGE (OUTPATIENT)
Dept: MEDICAL GROUP | Facility: LAB | Age: 75
End: 2020-08-28

## 2020-08-28 DIAGNOSIS — R15.9 INCONTINENCE OF FECES, UNSPECIFIED FECAL INCONTINENCE TYPE: ICD-10-CM

## 2020-08-31 DIAGNOSIS — R09.81 SINUS CONGESTION: ICD-10-CM

## 2020-08-31 RX ORDER — FLUTICASONE PROPIONATE 50 MCG
SPRAY, SUSPENSION (ML) NASAL
Qty: 15.8 G | Refills: 3 | Status: SHIPPED | OUTPATIENT
Start: 2020-08-31 | End: 2020-09-03 | Stop reason: SDUPTHER

## 2020-09-01 ENCOUNTER — ANTICOAGULATION VISIT (OUTPATIENT)
Dept: MEDICAL GROUP | Facility: PHYSICIAN GROUP | Age: 75
End: 2020-09-01
Payer: MEDICARE

## 2020-09-01 DIAGNOSIS — I48.0 PAF (PAROXYSMAL ATRIAL FIBRILLATION) (HCC): ICD-10-CM

## 2020-09-01 DIAGNOSIS — Z79.01 CHRONIC ANTICOAGULATION: Primary | ICD-10-CM

## 2020-09-01 LAB — INR PPP: 2.8 (ref 2–3.5)

## 2020-09-01 PROCEDURE — 93793 ANTICOAG MGMT PT WARFARIN: CPT | Performed by: INTERNAL MEDICINE

## 2020-09-01 PROCEDURE — 85610 PROTHROMBIN TIME: CPT | Performed by: INTERNAL MEDICINE

## 2020-09-01 NOTE — PROGRESS NOTES
Anticoagulation Summary  As of 2020    INR goal:  2.0-3.0   TTR:  66.6 % (4.9 y)   INR used for dosin.80 (2020)   Warfarin maintenance plan:  4.5 mg (3 mg x 1.5) every Wed; 3 mg (3 mg x 1) all other days   Weekly warfarin total:  22.5 mg   Weekly max warfarin dose:  21 mg   Plan last modified:  Eh Enamorado, PharmD (3/17/2020)   Next INR check:  10/13/2020   Target end date:  Indefinite    Indications    Chronic anticoagulation [Z79.01]  Chronic anticoagulation (Resolved) [Z79.01]  PAF (paroxysmal atrial fibrillation) (HCC) [I48.0]             Anticoagulation Episode Summary     INR check location:      Preferred lab:      Send INR reminders to:      Comments:        Anticoagulation Care Providers     Provider Role Specialty Phone number    Amy Lincoln M.D. Referring Cardiology [phone]    [facility] Responsible  [phone]        Anticoagulation Patient Findings  Patient Findings     Negatives:  Signs/symptoms of thrombosis, Signs/symptoms of bleeding, Laboratory test error suspected, Change in health, Change in alcohol use, Change in activity, Upcoming invasive procedure, Emergency department visit, Upcoming dental procedure, Missed doses, Extra doses, Change in medications, Change in diet/appetite, Hospital admission, Bruising, Other complaints        HPI:   Whitley Frederick seen in clinic today, on anticoagulation therapy with warfarin for stroke prevention due to history of PAF.    Patient's previous INR was therapeutic at 2.6 on 20, at which time patient was instructed to continue with current warfarin regimen.  She returns to clinic today to recheck INR to ensure it is therapeutic and thus preventing possible clotting and/or bleeding/bruising complications.    CHADS-VASc = at least 2  (unadjusted ischemic stroke risk/year:  2.2%, which is moderate risk)    Does patient have any changes to current medical/health status since last appt (Y/N):  NO  Does patient  have any signs/symptoms of bleeding and/or thrombosis since the last appt (Y/N):  NO  Does patient have any interval changes to diet or medications since last appt (Y/N):  NO  Are there any complications or cost restrictions with current therapy (Y/N):  NO     Does patient have [facility] PCP? Yes, [place] MOTA (If not, please document discussion that patient must be seen at [facility])       Vitals:  declined by patient at today's visit.    There were no vitals filed for this visit.     Asssessment:      INR remains therapeutic at 2.6, therefore decreasing patient's stroke and/or bleeding risk.   Reason(s) for out of range INR today:  n/a      Plan:  Pt is to continue with current warfarin dosing regimen in order to maintain INR in therapeutic range.     Follow up:  Because warfarin is a high risk medication and current CHEST guidelines recommend regular monitoring intervals (few days up to 12 weeks), will have patient return to clinic in 6 weeks to recheck INR.    Eh Enamorado, PharmD, BCACP

## 2020-09-03 DIAGNOSIS — R09.81 SINUS CONGESTION: ICD-10-CM

## 2020-09-03 RX ORDER — FLUTICASONE PROPIONATE 50 MCG
SPRAY, SUSPENSION (ML) NASAL
Qty: 48 G | Refills: 3 | Status: SHIPPED | OUTPATIENT
Start: 2020-09-03 | End: 2021-09-09 | Stop reason: SDUPTHER

## 2020-09-08 ENCOUNTER — PATIENT MESSAGE (OUTPATIENT)
Dept: MEDICAL GROUP | Facility: LAB | Age: 75
End: 2020-09-08

## 2020-09-08 RX ORDER — FLUTICASONE PROPIONATE 50 MCG
2 SPRAY, SUSPENSION (ML) NASAL DAILY
Qty: 47.4 ML | Refills: 3 | Status: SHIPPED | OUTPATIENT
Start: 2020-09-08 | End: 2020-10-20

## 2020-09-08 NOTE — TELEPHONE ENCOUNTER
----- Message from Whitley Frederick sent at 9/8/2020  3:17 PM PDT -----  Regarding: Prescription Question  Contact: [phone]  Whitley Frederick  1945  [phone]    Hi... [facility] ph  [phone] ... they are asking for a new prescription for diabetic test strips, and lancets ... the strips brand is [employer] ... thank you

## 2020-09-10 NOTE — TELEPHONE ENCOUNTER
----- Message from Whitley Frederick sent at 9/10/2020  3:16 PM PDT -----  Regarding: RE: Prescription Question  Contact: [phone]  Kasandra it’s one that [facility] gave me and the name of the strips I sent in the last email... thank you     Kina: could you ask [place] what Disinfecting soap I can get over the counter for yeast in my groin and labia ... I gave Nystatin 100,000 USP topical .... right now I just using Dial antibacterial... thank you

## 2020-09-16 ENCOUNTER — TELEPHONE (OUTPATIENT)
Dept: CARDIOLOGY | Facility: MEDICAL CENTER | Age: 75
End: 2020-09-16

## 2020-09-16 NOTE — TELEPHONE ENCOUNTER
Spoke with patient about patient assistance medication Multaq. Let her know it is ready for pickup at the [facility].

## 2020-10-20 ENCOUNTER — ANTICOAGULATION VISIT (OUTPATIENT)
Dept: MEDICAL GROUP | Facility: PHYSICIAN GROUP | Age: 75
End: 2020-10-20
Payer: MEDICARE

## 2020-10-20 DIAGNOSIS — Z79.01 CHRONIC ANTICOAGULATION: Primary | ICD-10-CM

## 2020-10-20 DIAGNOSIS — I48.0 PAF (PAROXYSMAL ATRIAL FIBRILLATION) (HCC): ICD-10-CM

## 2020-10-20 LAB — INR PPP: 2.3 (ref 2–3.5)

## 2020-10-20 PROCEDURE — 93793 ANTICOAG MGMT PT WARFARIN: CPT | Performed by: FAMILY MEDICINE

## 2020-10-20 PROCEDURE — 85610 PROTHROMBIN TIME: CPT | Performed by: FAMILY MEDICINE

## 2020-10-20 NOTE — PROGRESS NOTES
Anticoagulation Summary  As of 10/20/2020    INR goal:  2.0-3.0   TTR:  67.5 % (5 y)   INR used for dosin.30 (10/20/2020)   Warfarin maintenance plan:  4.5 mg (3 mg x 1.5) every Wed; 3 mg (3 mg x 1) all other days   Weekly warfarin total:  22.5 mg   Weekly max warfarin dose:  21 mg   Plan last modified:  Eh Enamorado, PharmD (3/17/2020)   Next INR check:  12/15/2020   Target end date:  Indefinite    Indications    Chronic anticoagulation [Z79.01]  Chronic anticoagulation (Resolved) [Z79.01]  PAF (paroxysmal atrial fibrillation) (HCC) [I48.0]             Anticoagulation Episode Summary     INR check location:      Preferred lab:      Send INR reminders to:      Comments:        Anticoagulation Care Providers     Provider Role Specialty Phone number    Amy Lincoln M.D. Referring Cardiology [phone]    [facility] Responsible  [phone]        Anticoagulation Patient Findings  Patient Findings     Negatives:  Signs/symptoms of thrombosis, Signs/symptoms of bleeding, Laboratory test error suspected, Change in health, Change in alcohol use, Change in activity, Upcoming invasive procedure, Emergency department visit, Upcoming dental procedure, Missed doses, Extra doses, Change in medications, Change in diet/appetite, Hospital admission, Bruising, Other complaints        HPI:   Whitley Frederick seen in clinic today, on anticoagulation therapy with warfarin for stroke prevention due to history of PAF.    Patient's previous INR was therapeutic at 2.8 on 20, at which time patient was instructed to continue with current warfarin regimen.  She returns to clinic today to recheck INR to ensure it is therapeutic and thus preventing possible clotting and/or bleeding/bruising complications.    CHADS-VASc = at least 2  (unadjusted ischemic stroke risk/year:  2.2%, which is moderate risk)    Does patient have any changes to current medical/health status since last appt (Y/N):  NO  Does  patient have any signs/symptoms of bleeding and/or thrombosis since the last appt (Y/N):  NO  Does patient have any interval changes to diet or medications since last appt (Y/N):  NO  Are there any complications or cost restrictions with current therapy (Y/N):  NO     Does patient have [facility] PCP? Yes, [place] MOTA (If not, please document discussion that patient must be seen at [facility])       Vitals:  declined today by patient.    There were no vitals filed for this visit.     Medication reconciliation completed today.    Asssessment:      INR remains therapeutic at 2.3, therefore decreasing patient's stroke and/or bleeding risk.   Reason(s) for out of range INR today:  n/a      Plan:  Pt is to continue with current warfarin dosing regimen in order to maintain INR in therapeutic range.     Follow up:  Because warfarin is a high risk medication and current CHEST guidelines recommend regular monitoring intervals (few days up to 12 weeks), will have patient return to clinic in 8 weeks to recheck INR.    Eh Enamorado, PharmD, BCACP

## 2020-10-22 ENCOUNTER — OFFICE VISIT (OUTPATIENT)
Dept: MEDICAL GROUP | Facility: LAB | Age: 75
End: 2020-10-22
Payer: MEDICARE

## 2020-10-22 ENCOUNTER — TELEPHONE (OUTPATIENT)
Dept: MEDICAL GROUP | Facility: LAB | Age: 75
End: 2020-10-22

## 2020-10-22 VITALS
HEART RATE: 60 BPM | HEIGHT: 67 IN | DIASTOLIC BLOOD PRESSURE: 70 MMHG | WEIGHT: 266 LBS | OXYGEN SATURATION: 97 % | SYSTOLIC BLOOD PRESSURE: 126 MMHG | TEMPERATURE: 97.8 F | BODY MASS INDEX: 41.75 KG/M2 | RESPIRATION RATE: 16 BRPM

## 2020-10-22 DIAGNOSIS — Z23 NEED FOR HEPATITIS B VACCINATION: ICD-10-CM

## 2020-10-22 DIAGNOSIS — J32.9 RECURRENT SINUSITIS: ICD-10-CM

## 2020-10-22 DIAGNOSIS — Z23 NEED FOR INFLUENZA VACCINATION: ICD-10-CM

## 2020-10-22 DIAGNOSIS — M54.42 ACUTE LEFT-SIDED LOW BACK PAIN WITH LEFT-SIDED SCIATICA: ICD-10-CM

## 2020-10-22 PROCEDURE — G0008 ADMIN INFLUENZA VIRUS VAC: HCPCS | Performed by: NURSE PRACTITIONER

## 2020-10-22 PROCEDURE — 90746 HEPB VACCINE 3 DOSE ADULT IM: CPT | Performed by: NURSE PRACTITIONER

## 2020-10-22 PROCEDURE — G0010 ADMIN HEPATITIS B VACCINE: HCPCS | Performed by: NURSE PRACTITIONER

## 2020-10-22 PROCEDURE — 99214 OFFICE O/P EST MOD 30 MIN: CPT | Mod: 25 | Performed by: NURSE PRACTITIONER

## 2020-10-22 PROCEDURE — 90662 IIV NO PRSV INCREASED AG IM: CPT | Performed by: NURSE PRACTITIONER

## 2020-10-22 RX ORDER — GABAPENTIN 100 MG/1
100-300 CAPSULE ORAL 3 TIMES DAILY
Qty: 60 CAP | Refills: 0 | Status: SHIPPED | OUTPATIENT
Start: 2020-10-22 | End: 2020-11-02 | Stop reason: SDUPTHER

## 2020-10-22 RX ORDER — OXYCODONE HYDROCHLORIDE AND ACETAMINOPHEN 5; 325 MG/1; MG/1
1 TABLET ORAL EVERY 8 HOURS PRN
Qty: 10 TAB | Refills: 0 | Status: SHIPPED | OUTPATIENT
Start: 2020-10-22 | End: 2020-10-26 | Stop reason: SDUPTHER

## 2020-10-22 RX ORDER — METHYLPREDNISOLONE 4 MG/1
TABLET ORAL
Qty: 21 TAB | Refills: 0 | Status: SHIPPED | OUTPATIENT
Start: 2020-10-22 | End: 2020-10-23 | Stop reason: SDUPTHER

## 2020-10-22 ASSESSMENT — FIBROSIS 4 INDEX: FIB4 SCORE: 2.83

## 2020-10-22 NOTE — PROGRESS NOTES
"Chief Complaint   Patient presents with   • Back Pain     X 4 days        HPI  Whitley is a 75-year-old established female here with her daughter Lubna.  She is here with c/o new onset left sided low back pain x the past week - moved to left hip and down left leg yesterday.  Took oxycodone today b/c of lack of relief by tylenol over the past few days.  No fall / injuries.  No new activities.  Standing in kitchen a lot lately.  Denies loss of bowels or bladder.  No other associated symptoms.  Has had this once or twice in the past, several years ago but does not have daily low back or leg pain.  Last spinal imaging over 10 yrs ago.      Past medical, surgical, family, and social history is reviewed and updated in Epic chart by me today.   Medications and allergies reviewed and updated in Epic chart by me today.     ROS:   As documented in history of present illness above    Exam:  /70   Pulse 60   Temp 36.6 °C (97.8 °F)   Resp 16   Ht 1.689 m (5' 6.5\")   Wt 120.7 kg (266 lb)   SpO2 97%   Constitutional: Alert, no distress, plus 3 vital signs  Skin:  Warm, dry, no rashes invisible areas  Eye: Equal, round and reactive, conjunctiva clear  ENMT: Lips without lesions.  Neck: Trachea midline,  Respiratory: Unlabored respiration  Cardiovascular: Normal rate   Musculoskeletal: She does have tenderness with deep palpation to her left buttocks and this causes radiating pain down her leg just beyond her popliteal area.  She also has left-sided lumbar paraspinal muscular tenderness.  She has a positive seated straight leg raise on the left side.  She does have a slight limp, favoring her left leg.  Bilateral patellar reflexes are intact.  She has full sensation with soft touch to bilateral lower extremities.  Psych: Alert, pleasant, well-groomed, normal affect    Assessment / Plan / Medical Decision makin. Acute left-sided low back pain with left-sided sciatica  oxyCODONE-acetaminophen (PERCOCET) 5-325 MG " Tab    gabapentin (NEURONTIN) 100 MG Cap     Discussed likely etiology.  Prefers to refrain from imaging, physical therapy and physiatry at this time because of finances.  She did request a refill of oxycodone, she had one left at home that was helpful for her pain today and she tolerates this well.  She was also prescribed gabapentin which we have discussed multiple times in the past would also help with her chronic diabetic foot neuropathy.  Discussed potential side effects of gabapentin as well as how to take it and side effects.  Reviewed her  profile which shows that her last controlled substance was clonazepam from me last January.  She does not take clonazepam daily.  We discussed the black box warning between benzodiazepines and opiates.  She was only given #10 oxycodone.  If she needs a refill, will do a urine drug screen and controlled substance agreement.  She was also started on a Medrol Dosepak.  Discussed how to take steroids as well as potential side effects.  She will contact me in 1 week regarding how she is doing, at which point she will be referred to physical therapy and physiatry as well as imaging obtained.  We discussed alarming symptoms such as inability to walk or loss of bowel/bladder, at which point she should go to the emergency department.

## 2020-10-22 NOTE — TELEPHONE ENCOUNTER
1. Caller Name: Whitley Frederick                  Call Back Number: [phone] (home)     How would the patient prefer to be contacted with a response:     Pt has sciatic pain from hip to ankle. I did offer her the 1 pm opening. She would like to speak to Kina. She asked if she had to wait until then. Pt was not scheduled at this time

## 2020-10-23 ENCOUNTER — TELEPHONE (OUTPATIENT)
Dept: MEDICAL GROUP | Facility: LAB | Age: 75
End: 2020-10-23

## 2020-10-23 ENCOUNTER — PATIENT OUTREACH (OUTPATIENT)
Dept: HEALTH INFORMATION MANAGEMENT | Facility: OTHER | Age: 75
End: 2020-10-23

## 2020-10-23 DIAGNOSIS — J32.9 RECURRENT SINUSITIS: ICD-10-CM

## 2020-10-23 RX ORDER — METHYLPREDNISOLONE 4 MG/1
TABLET ORAL
Qty: 21 TAB | Refills: 0 | Status: SHIPPED
Start: 2020-10-23 | End: 2020-11-03

## 2020-10-23 NOTE — PROGRESS NOTES
Pharmacy received call from patient requesting return call from me. Outbound call to Whitley. Patient reports having been diagnosed with sciatica. She states the percocet is not helping very much and was recently prescribed gabapentin. Answered patient questions regarding gabapentin. Reviewed indication, dosing, and potential side effects. Patient agreeable to take the gabapentin for the neuropathic pain and sciatica. Denies further medication questions/ concerns at this time.     Laila Corarl, PharmD

## 2020-10-23 NOTE — TELEPHONE ENCOUNTER
Patient called and left a VM stating that the medication you prescribed her yesterday 10/22/2020 does not work and she she is still experiencing back pain. Patient stated she was diagnosed with sciatica. Would you be able to prescribe an alternative for the patient or something stronger?

## 2020-10-23 NOTE — TELEPHONE ENCOUNTER
----- Message from Whitley Frederick sent at 10/23/2020  9:30 AM PDT -----  Regarding: RE: Prescription Question  Contact: [phone]  Whitley Frederick [birth year]  bethany Reid … Yes if you could please please help me I’ve got sciatica I’m in horrible pain even with the Percocet I took 7 o’clock last night 3 o’clock this morning I get up to go to the bathroom and I can hardly walk… If you could please help me unscrew the mess I would really really appreciate it… For some reason two of my three prescriptions were sent to [facility] on [address] and one was sent to mail order [facility]… This morning I called both places [facility] and [facility] after getting a email from Juana saying that it was sent again And both pharmacies told me they did not receive it so it needs to be sent so I can get started on the steroids please… Thank you

## 2020-10-26 ENCOUNTER — PATIENT MESSAGE (OUTPATIENT)
Dept: MEDICAL GROUP | Facility: LAB | Age: 75
End: 2020-10-26

## 2020-10-26 DIAGNOSIS — M54.42 ACUTE LEFT-SIDED LOW BACK PAIN WITH LEFT-SIDED SCIATICA: ICD-10-CM

## 2020-10-26 RX ORDER — OXYCODONE HYDROCHLORIDE AND ACETAMINOPHEN 5; 325 MG/1; MG/1
1 TABLET ORAL EVERY 8 HOURS PRN
Qty: 10 TAB | Refills: 0 | Status: SHIPPED | OUTPATIENT
Start: 2020-10-26 | End: 2020-11-03 | Stop reason: SDUPTHER

## 2020-10-26 RX ORDER — OXYCODONE HYDROCHLORIDE AND ACETAMINOPHEN 5; 325 MG/1; MG/1
1 TABLET ORAL EVERY 8 HOURS PRN
Qty: 10 TAB | Refills: 0 | Status: SHIPPED | OUTPATIENT
Start: 2020-10-26 | End: 2020-10-26 | Stop reason: SDUPTHER

## 2020-10-26 NOTE — TELEPHONE ENCOUNTER
----- Message from Whitley Frederick sent at 10/26/2020  2:16 PM PDT -----  Regarding: RE: Prescription Question  Contact: [phone]  Yes I got the steroids from [facility] ... did Juana get my emails (2) from yesterday about a new Percocet prescription... thx

## 2020-10-26 NOTE — TELEPHONE ENCOUNTER
----- Message from Whitley Frederick sent at 10/25/2020  7:25 PM PDT -----  Regarding: Prescription Question  Contact: [phone]  Whitley Frederick 1945  Hi Juana Castanon  asking for another prescription for Percocet started Percocet Thursday evening with 1 n so on every 8 hour until now since last night and this morning I started to take a 1/2 Percocet  plus 2- 500mg rapid release Tylenol ... to make the pills last I needed a whole one this evening n 1 Tylenol so I took it and I’m didn’t have a good day ... now I have one ... prescription said take one tablet by mouth every eight hours as needed for up to five days does that mean 15 tablets and I got 10 I’ve taken 1 every 8 hour anyway could I please have another prescription thank you   As to the Gabapentin I was wondering if I am to take it everyday for maintenance for Neuropathy or as needed. Thx

## 2020-10-28 DIAGNOSIS — M54.40 ACUTE BILATERAL LOW BACK PAIN WITH SCIATICA, SCIATICA LATERALITY UNSPECIFIED: ICD-10-CM

## 2020-10-29 ENCOUNTER — HOSPITAL ENCOUNTER (OUTPATIENT)
Dept: RADIOLOGY | Facility: MEDICAL CENTER | Age: 75
End: 2020-10-29
Attending: NURSE PRACTITIONER
Payer: MEDICARE

## 2020-10-29 DIAGNOSIS — M54.40 ACUTE BILATERAL LOW BACK PAIN WITH SCIATICA, SCIATICA LATERALITY UNSPECIFIED: ICD-10-CM

## 2020-10-29 PROCEDURE — 72100 X-RAY EXAM L-S SPINE 2/3 VWS: CPT

## 2020-10-30 ENCOUNTER — PATIENT MESSAGE (OUTPATIENT)
Dept: MEDICAL GROUP | Facility: LAB | Age: 75
End: 2020-10-30

## 2020-10-30 DIAGNOSIS — M51.36 DDD (DEGENERATIVE DISC DISEASE), LUMBAR: ICD-10-CM

## 2020-11-02 DIAGNOSIS — M54.42 ACUTE LEFT-SIDED LOW BACK PAIN WITH LEFT-SIDED SCIATICA: ICD-10-CM

## 2020-11-02 PROBLEM — M47.816 OSTEOARTHRITIS OF LUMBAR SPINE: Status: ACTIVE | Noted: 2020-11-02

## 2020-11-02 PROBLEM — M51.36 DDD (DEGENERATIVE DISC DISEASE), LUMBAR: Status: ACTIVE | Noted: 2020-11-02

## 2020-11-02 PROBLEM — M51.369 DDD (DEGENERATIVE DISC DISEASE), LUMBAR: Status: ACTIVE | Noted: 2020-11-02

## 2020-11-02 RX ORDER — GABAPENTIN 100 MG/1
100-300 CAPSULE ORAL 3 TIMES DAILY
Qty: 60 CAP | Refills: 3 | Status: SHIPPED | OUTPATIENT
Start: 2020-11-02 | End: 2020-11-03 | Stop reason: SDUPTHER

## 2020-11-02 NOTE — TELEPHONE ENCOUNTER
----- Message from Whitley Fredeirck sent at 10/31/2020  8:57 AM PDT -----  Regarding: Prescription Question  Contact: [phone]  Hi Juana   This morning I am up to 600mg gabapentin at 5:30am trying to stay away from the Percocet(rummy) the pain level is around 11 or 12 at 4:30 this morning … as long as I’m sitting I’m fine standing my leg weight makes it hurt ...   I’ve continued to use Marijuana related topicals.... mixing Clifford Ledezma Oil ROS THC 46% CBD 50% and Nordic goddess cooling creamTHC 250g CBD 264mg… Which helps for a while... I wasn’t so afraid Of mixing my medication with THC I will probably be doing edibles   Anyway my point here is I’m running out of Gabapentin it needs to be refilled at [facility] so I can get it faster sorry I’m rambling I have 14 capsules which is enough for two more doses one tonight and in the Morning I will call exchange

## 2020-11-03 DIAGNOSIS — M54.42 ACUTE LEFT-SIDED LOW BACK PAIN WITH LEFT-SIDED SCIATICA: ICD-10-CM

## 2020-11-03 RX ORDER — GABAPENTIN 100 MG/1
100-300 CAPSULE ORAL 3 TIMES DAILY
Qty: 180 CAP | Refills: 3 | Status: SHIPPED | OUTPATIENT
Start: 2020-11-03 | End: 2020-11-05 | Stop reason: SDUPTHER

## 2020-11-03 RX ORDER — OXYCODONE HYDROCHLORIDE AND ACETAMINOPHEN 5; 325 MG/1; MG/1
1 TABLET ORAL EVERY 8 HOURS PRN
Qty: 10 TAB | Refills: 0 | Status: SHIPPED | OUTPATIENT
Start: 2020-11-03 | End: 2020-11-09 | Stop reason: SDUPTHER

## 2020-11-03 NOTE — TELEPHONE ENCOUNTER
----- Message from EDILMA Whelan sent at 11/2/2020 12:38 PM PST -----  Regarding: FW: Prescription Question  Contact: [phone]  Walker ordered and rx placed in your slot.  Ok to sent rx refill for mail order if you have not already. Thank you!  ----- Message -----  From: Soila Jacinto, Yobany Ass't  Sent: 11/2/2020   9:23 AM PST  To: EDILMA Whelan  Subject: FW: Prescription Question                          ----- Message -----  From: Whitley Frederick  Sent: 10/30/2020  10:31 PM PST  To: Caleb Herman Ma  Subject: Prescription Question                            Miles Arias   Could I have another refill for Gabapentin depending on the amount ..  30 day to [facility] 90 day to [facility] in [facility]... thank you ...     Could I also have a prescription for a Drive walker to help me in the mornings... Please  Drive,  [id number], Purple, Medical Foldable Lightweight Rollator (not a walker anymore it’s a Rollator LoL ;) )with Light, Reflectors...    For a walker it’s Grand ...    [facility]  [phone]    Thank you

## 2020-11-05 ENCOUNTER — TELEPHONE (OUTPATIENT)
Dept: VASCULAR LAB | Facility: MEDICAL CENTER | Age: 75
End: 2020-11-05

## 2020-11-05 DIAGNOSIS — M54.42 ACUTE LEFT-SIDED LOW BACK PAIN WITH LEFT-SIDED SCIATICA: ICD-10-CM

## 2020-11-05 RX ORDER — GABAPENTIN 100 MG/1
100-300 CAPSULE ORAL 3 TIMES DAILY
Qty: 270 CAP | Refills: 3 | Status: SHIPPED | OUTPATIENT
Start: 2020-11-05 | End: 2020-11-25 | Stop reason: SDUPTHER

## 2020-11-05 NOTE — TELEPHONE ENCOUNTER
S/w pt. She denies epistaxis lasting longer than 20 min. She has been experiencing little blood clots when she blows her nose. Also, her arm bruise grew from the size of a dime to a silver dollar. Pt denies any s/s of GIB.    Informed her that the steroid pack that she finished last week may have increased her INR. She states she is suffering from sciatica right now so she cannot move. She would like to continue monitoring s/s for now because it is difficult for her to get out of the house. Pt to contact us if her s/s worsen or seek emergency medical attention for unusual s/s of bleeding.    Avani Connors, PharmD

## 2020-11-05 NOTE — TELEPHONE ENCOUNTER
----- Message from Rachana Jett sent at 2020 11:44 AM PST -----  Regardin/05 Patient Call - Medication Questions  Hi,    Pt called today and stated that she has a spot on her arm that's gotten larger. In addition, her nose is not a full on bloody nose but bloody more than usually. Currently, she's taking Percocet and Gabapentin but hasn't taken the Gabapentin lately. She was on a steroid pack but finished it a week ago.     She wants to know if she should cut back on her warfarin.     Please call pt     Thank you,     Rachana

## 2020-11-06 ENCOUNTER — PATIENT MESSAGE (OUTPATIENT)
Dept: MEDICAL GROUP | Facility: LAB | Age: 75
End: 2020-11-06

## 2020-11-06 ENCOUNTER — PATIENT OUTREACH (OUTPATIENT)
Dept: HEALTH INFORMATION MANAGEMENT | Facility: OTHER | Age: 75
End: 2020-11-06

## 2020-11-06 DIAGNOSIS — Z79.01 CHRONIC ANTICOAGULATION: ICD-10-CM

## 2020-11-06 NOTE — PROGRESS NOTES
Received message from [facility] that patient requested return phone call. Outbound call to Whitley. Patient reports that percocet is not alleviating her sciatic pain and she has tried gabapentin with minimal relief. Patient reports drowsiness with gabapentin and so has not taken regularly. She is using ice packs with some relief. Patient states she has a message out to her PCP and is visiting a chiropractor today. Patient states she is unable to drive herself to appointments and needs to get INR level checked. Discussed reaching out to her [facility] patient assistant as they may be able to assist with transportation. Patient states her PCP sent a referral to a physiatrist but she does not have the number to contact them. Per referral in Epic, number is [phone]. Patient plans to call today to have that scheduled. Denies further medication questions at this time.     Laila Corral, PharmD

## 2020-11-09 DIAGNOSIS — M54.42 ACUTE LEFT-SIDED LOW BACK PAIN WITH LEFT-SIDED SCIATICA: ICD-10-CM

## 2020-11-09 DIAGNOSIS — M54.42 CHRONIC BILATERAL LOW BACK PAIN WITH BILATERAL SCIATICA: ICD-10-CM

## 2020-11-09 DIAGNOSIS — M54.41 CHRONIC BILATERAL LOW BACK PAIN WITH BILATERAL SCIATICA: ICD-10-CM

## 2020-11-09 DIAGNOSIS — G89.29 CHRONIC BILATERAL LOW BACK PAIN WITH BILATERAL SCIATICA: ICD-10-CM

## 2020-11-09 RX ORDER — OXYCODONE HYDROCHLORIDE AND ACETAMINOPHEN 5; 325 MG/1; MG/1
1 TABLET ORAL 2 TIMES DAILY PRN
Qty: 28 TAB | Refills: 0 | Status: SHIPPED | OUTPATIENT
Start: 2020-11-09 | End: 2020-11-23

## 2020-11-09 NOTE — TELEPHONE ENCOUNTER
Received request via: Patient    Was the patient seen in the last year in this department? Yes  10/22/20  Does the patient have an active prescription (recently filled or refills available) for medication(s) requested? No

## 2020-11-09 NOTE — TELEPHONE ENCOUNTER
----- Message from Whitley Frederick sent at 11/7/2020 10:23 AM PST -----  Regarding: Prescription Question  Contact: [phone]  Hi ... I’m taking 2 Percocet a day morning n bedtime... could I please have another refill ...    I’m going to chiropractor Tuesday n Friday.., calling physiatrist Monday for appointment   Thank you

## 2020-11-10 ENCOUNTER — APPOINTMENT (OUTPATIENT)
Dept: RADIOLOGY | Facility: MEDICAL CENTER | Age: 75
End: 2020-11-10
Attending: NURSE PRACTITIONER
Payer: MEDICARE

## 2020-11-10 DIAGNOSIS — M54.41 CHRONIC BILATERAL LOW BACK PAIN WITH BILATERAL SCIATICA: ICD-10-CM

## 2020-11-10 DIAGNOSIS — M54.42 CHRONIC BILATERAL LOW BACK PAIN WITH BILATERAL SCIATICA: ICD-10-CM

## 2020-11-10 DIAGNOSIS — F40.240 CLAUSTROPHOBIA: ICD-10-CM

## 2020-11-10 DIAGNOSIS — G89.29 CHRONIC BILATERAL LOW BACK PAIN WITH BILATERAL SCIATICA: ICD-10-CM

## 2020-11-10 PROCEDURE — 72148 MRI LUMBAR SPINE W/O DYE: CPT

## 2020-11-10 RX ORDER — DIAZEPAM 5 MG/1
TABLET ORAL
Qty: 2 TAB | Refills: 0 | Status: SHIPPED | OUTPATIENT
Start: 2020-11-10 | End: 2020-11-10

## 2020-11-11 ENCOUNTER — ANTICOAGULATION VISIT (OUTPATIENT)
Dept: VASCULAR LAB | Facility: MEDICAL CENTER | Age: 75
End: 2020-11-11
Attending: INTERNAL MEDICINE
Payer: MEDICARE

## 2020-11-11 ENCOUNTER — PATIENT MESSAGE (OUTPATIENT)
Dept: MEDICAL GROUP | Facility: LAB | Age: 75
End: 2020-11-11

## 2020-11-11 DIAGNOSIS — I48.0 PAF (PAROXYSMAL ATRIAL FIBRILLATION) (HCC): ICD-10-CM

## 2020-11-11 DIAGNOSIS — Z79.01 CHRONIC ANTICOAGULATION: ICD-10-CM

## 2020-11-11 LAB
INR BLD: 3.2 (ref 0.9–1.2)
INR PPP: 3.2 (ref 2–3.5)

## 2020-11-11 PROCEDURE — 85610 PROTHROMBIN TIME: CPT

## 2020-11-11 PROCEDURE — 99212 OFFICE O/P EST SF 10 MIN: CPT | Performed by: NURSE PRACTITIONER

## 2020-11-11 NOTE — PROGRESS NOTES
Anticoagulation Summary  As of 11/11/2020    INR goal:  2.0-3.0   TTR:  67.6 % (5.1 y)   INR used for dosing:  3.20 (11/11/2020)   Warfarin maintenance plan:  4.5 mg (3 mg x 1.5) every Wed; 3 mg (3 mg x 1) all other days   Weekly warfarin total:  22.5 mg   Weekly max warfarin dose:  21 mg   Plan last modified:  Eh Enamorado, PharmD (3/17/2020)   Next INR check:  11/18/2020   Target end date:  Indefinite    Indications    Chronic anticoagulation [Z79.01]  Chronic anticoagulation (Resolved) [Z79.01]  PAF (paroxysmal atrial fibrillation) (HCC) [I48.0]             Anticoagulation Episode Summary     INR check location:      Preferred lab:      Send INR reminders to:      Comments:        Anticoagulation Care Providers     Provider Role Specialty Phone number    Amy Lincoln M.D. Referring Cardiology [phone]    [facility] Responsible  [phone]        Anticoagulation Patient Findings      HPI:  Whitley Frederick seen in clinic today for follow up on anticoagulation therapy in the presence of AF.   She has noticed more bruising to her extremities lately. Also had a couple of nose bleeds that started when she was cleaning out her nose with a tissue. Nose bleeds last a few minutes.  Having lots of sciatica pain lately. Was on a medrol dose karin last week.   Poor appetite.   No other symptoms of bleeding or thrombosis reported.    A/P:   INR supratherapeutic.   Will decrease tonight then continue current regimen as she has been stable on this dose for some time. She will continue to monitor for bleeding and/or excessive bruising Asked that she call us for any medication changes in the future. She verbalizes understanding.   BP declined.    Follow up appointment in 1 week(s).    Next Appointment: Wednesday, Nov 18, 2020 at 1:45 pm.    Jaylene BURGER

## 2020-11-12 DIAGNOSIS — M51.36 DDD (DEGENERATIVE DISC DISEASE), LUMBAR: ICD-10-CM

## 2020-11-12 DIAGNOSIS — M54.50 SEVERE LOW BACK PAIN: ICD-10-CM

## 2020-11-12 DIAGNOSIS — M48.061 SPINAL STENOSIS OF LUMBAR REGION, UNSPECIFIED WHETHER NEUROGENIC CLAUDICATION PRESENT: ICD-10-CM

## 2020-11-13 NOTE — PROGRESS NOTES
Member wanted to know , if she has OTC benefits for this yr. I avd that those benefits, will be effective until 1/1/21.  Also, mentioned that she bought a walker out of pocket , and would like to know if she can be reimbursed?. However, she threw the receipt./  I adv that she will need to sent the received or documentation of how much she paid and also medical notes. Member stated that she will request the notes fromher PCP and requested a reimburse form./ Mailed

## 2020-11-18 ENCOUNTER — APPOINTMENT (OUTPATIENT)
Dept: VASCULAR LAB | Facility: MEDICAL CENTER | Age: 75
End: 2020-11-18
Attending: INTERNAL MEDICINE
Payer: MEDICARE

## 2020-11-20 ENCOUNTER — PATIENT MESSAGE (OUTPATIENT)
Dept: MEDICAL GROUP | Facility: LAB | Age: 75
End: 2020-11-20

## 2020-11-20 DIAGNOSIS — M54.42 ACUTE LEFT-SIDED LOW BACK PAIN WITH LEFT-SIDED SCIATICA: ICD-10-CM

## 2020-11-21 ENCOUNTER — PATIENT MESSAGE (OUTPATIENT)
Dept: MEDICAL GROUP | Facility: LAB | Age: 75
End: 2020-11-21

## 2020-11-21 DIAGNOSIS — I10 ESSENTIAL HYPERTENSION: ICD-10-CM

## 2020-11-23 ENCOUNTER — ANTICOAGULATION VISIT (OUTPATIENT)
Dept: VASCULAR LAB | Facility: MEDICAL CENTER | Age: 75
End: 2020-11-23
Attending: INTERNAL MEDICINE
Payer: MEDICARE

## 2020-11-23 DIAGNOSIS — I48.0 PAF (PAROXYSMAL ATRIAL FIBRILLATION) (HCC): ICD-10-CM

## 2020-11-23 DIAGNOSIS — Z79.01 CHRONIC ANTICOAGULATION: ICD-10-CM

## 2020-11-23 LAB
INR BLD: 3.1 (ref 0.9–1.2)
INR PPP: 3.1 (ref 2–3.5)

## 2020-11-23 PROCEDURE — 85610 PROTHROMBIN TIME: CPT

## 2020-11-23 PROCEDURE — 99212 OFFICE O/P EST SF 10 MIN: CPT | Performed by: NURSE PRACTITIONER

## 2020-11-23 NOTE — PROGRESS NOTES
Anticoagulation Summary  As of 11/23/2020    INR goal:  2.0-3.0   TTR:  67.1 % (5.1 y)   INR used for dosing:  3.10 (11/23/2020)   Warfarin maintenance plan:  3 mg (3 mg x 1) every day   Weekly warfarin total:  21 mg   Weekly max warfarin dose:  21 mg   Plan last modified:  EDILMA Tellez (11/23/2020)   Next INR check:  12/15/2020   Target end date:  Indefinite    Indications    Chronic anticoagulation [Z79.01]  Chronic anticoagulation (Resolved) [Z79.01]  PAF (paroxysmal atrial fibrillation) (HCC) [I48.0]             Anticoagulation Episode Summary     INR check location:      Preferred lab:      Send INR reminders to:      Comments:        Anticoagulation Care Providers     Provider Role Specialty Phone number    [facility] Responsible  [phone]        Anticoagulation Patient Findings      HPI:  Whitley Frederick seen in clinic today for follow up on anticoagulation therapy in the presence of AF.   Denies any changes to current medical/health status since last appointment.   Denies any medication or diet changes.   No current symptoms of bleeding or thrombosis reported.    A/P:   INR slightly supratherapeutic.   Will decrease regimen as INR was high last visit.     Follow up appointment in 3 week(s) per pt's request.    Next Appointment: Tuesday, Dec 15, 2020 at 1:15 pm at [facility].    Jaylene BURGER

## 2020-11-25 ENCOUNTER — HOSPITAL ENCOUNTER (OUTPATIENT)
Dept: LAB | Facility: MEDICAL CENTER | Age: 75
End: 2020-11-25
Attending: INTERNAL MEDICINE
Payer: MEDICARE

## 2020-11-25 ENCOUNTER — HOSPITAL ENCOUNTER (OUTPATIENT)
Dept: LAB | Facility: MEDICAL CENTER | Age: 75
End: 2020-11-25
Attending: NURSE PRACTITIONER
Payer: MEDICARE

## 2020-11-25 DIAGNOSIS — Z79.01 CHRONIC ANTICOAGULATION: ICD-10-CM

## 2020-11-25 DIAGNOSIS — N18.4 TYPE 2 DIABETES MELLITUS WITH STAGE 4 CHRONIC KIDNEY DISEASE, WITHOUT LONG-TERM CURRENT USE OF INSULIN (HCC): ICD-10-CM

## 2020-11-25 DIAGNOSIS — E11.22 TYPE 2 DIABETES MELLITUS WITH STAGE 4 CHRONIC KIDNEY DISEASE, WITHOUT LONG-TERM CURRENT USE OF INSULIN (HCC): ICD-10-CM

## 2020-11-25 DIAGNOSIS — I10 ESSENTIAL HYPERTENSION: ICD-10-CM

## 2020-11-25 DIAGNOSIS — E03.4 HYPOTHYROIDISM DUE TO ACQUIRED ATROPHY OF THYROID: ICD-10-CM

## 2020-11-25 LAB
ALBUMIN SERPL BCP-MCNC: 3.9 G/DL (ref 3.2–4.9)
ALBUMIN SERPL BCP-MCNC: 3.9 G/DL (ref 3.2–4.9)
ALP SERPL-CCNC: 62 U/L (ref 30–99)
ALT SERPL-CCNC: 18 U/L (ref 2–50)
AST SERPL-CCNC: 21 U/L (ref 12–45)
BILIRUB CONJ SERPL-MCNC: <0.2 MG/DL (ref 0.1–0.5)
BILIRUB INDIRECT SERPL-MCNC: NORMAL MG/DL (ref 0–1)
BILIRUB SERPL-MCNC: 0.9 MG/DL (ref 0.1–1.5)
BUN SERPL-MCNC: 47 MG/DL (ref 8–22)
CALCIUM SERPL-MCNC: 9.6 MG/DL (ref 8.5–10.5)
CHLORIDE SERPL-SCNC: 108 MMOL/L (ref 96–112)
CO2 SERPL-SCNC: 25 MMOL/L (ref 20–33)
CREAT SERPL-MCNC: 1.96 MG/DL (ref 0.5–1.4)
CREAT UR-MCNC: 85.02 MG/DL
EST. AVERAGE GLUCOSE BLD GHB EST-MCNC: 128 MG/DL
GLUCOSE SERPL-MCNC: 127 MG/DL (ref 65–99)
HBA1C MFR BLD: 6.1 % (ref 0–5.6)
INR PPP: 2.24 (ref 0.87–1.13)
PHOSPHATE SERPL-MCNC: 3.9 MG/DL (ref 2.5–4.5)
POTASSIUM SERPL-SCNC: 4.7 MMOL/L (ref 3.6–5.5)
PROT SERPL-MCNC: 6.6 G/DL (ref 6–8.2)
PROT UR-MCNC: 42 MG/DL (ref 0–15)
PROT/CREAT UR: 494 MG/G (ref 10–107)
PROTHROMBIN TIME: 25.5 SEC (ref 12–14.6)
SODIUM SERPL-SCNC: 141 MMOL/L (ref 135–145)
T4 FREE SERPL-MCNC: 1.15 NG/DL (ref 0.93–1.7)
TSH SERPL DL<=0.005 MIU/L-ACNC: 1.57 UIU/ML (ref 0.38–5.33)

## 2020-11-25 PROCEDURE — 83036 HEMOGLOBIN GLYCOSYLATED A1C: CPT

## 2020-11-25 PROCEDURE — 84439 ASSAY OF FREE THYROXINE: CPT

## 2020-11-25 PROCEDURE — 85610 PROTHROMBIN TIME: CPT

## 2020-11-25 PROCEDURE — 80076 HEPATIC FUNCTION PANEL: CPT

## 2020-11-25 PROCEDURE — 84443 ASSAY THYROID STIM HORMONE: CPT

## 2020-11-25 PROCEDURE — 80069 RENAL FUNCTION PANEL: CPT

## 2020-11-25 PROCEDURE — 36415 COLL VENOUS BLD VENIPUNCTURE: CPT

## 2020-11-25 RX ORDER — GABAPENTIN 100 MG/1
100-300 CAPSULE ORAL 3 TIMES DAILY
Qty: 90 CAP | Refills: 0 | Status: SHIPPED
Start: 2020-11-25 | End: 2021-11-11

## 2020-11-30 DIAGNOSIS — N18.4 TYPE 2 DIABETES MELLITUS WITH STAGE 4 CHRONIC KIDNEY DISEASE, WITH LONG-TERM CURRENT USE OF INSULIN (HCC): ICD-10-CM

## 2020-11-30 DIAGNOSIS — E11.22 TYPE 2 DIABETES MELLITUS WITH STAGE 4 CHRONIC KIDNEY DISEASE, WITH LONG-TERM CURRENT USE OF INSULIN (HCC): ICD-10-CM

## 2020-11-30 DIAGNOSIS — Z79.4 TYPE 2 DIABETES MELLITUS WITH STAGE 4 CHRONIC KIDNEY DISEASE, WITH LONG-TERM CURRENT USE OF INSULIN (HCC): ICD-10-CM

## 2020-11-30 PROCEDURE — RXMED WILLOW AMBULATORY MEDICATION CHARGE: Performed by: NURSE PRACTITIONER

## 2020-11-30 RX ORDER — SEMAGLUTIDE 1.34 MG/ML
0.5 INJECTION, SOLUTION SUBCUTANEOUS
Qty: 1 EACH | Refills: 1 | Status: SHIPPED | OUTPATIENT
Start: 2020-11-30 | End: 2020-12-17 | Stop reason: SDUPTHER

## 2020-11-30 NOTE — TELEPHONE ENCOUNTER
Miles Arias,  Can you please send a refill for the oxempic to the [facility] as a sample?    Thank you,  Hillary

## 2020-12-01 ENCOUNTER — PHARMACY VISIT (OUTPATIENT)
Dept: PHARMACY | Facility: MEDICAL CENTER | Age: 75
End: 2020-12-01
Payer: COMMERCIAL

## 2020-12-08 ENCOUNTER — TELEPHONE (OUTPATIENT)
Dept: MEDICAL GROUP | Facility: LAB | Age: 75
End: 2020-12-08

## 2020-12-08 DIAGNOSIS — M54.42 ACUTE LEFT-SIDED LOW BACK PAIN WITH LEFT-SIDED SCIATICA: ICD-10-CM

## 2020-12-08 DIAGNOSIS — M54.50 SEVERE LOW BACK PAIN: ICD-10-CM

## 2020-12-08 DIAGNOSIS — M48.061 SPINAL STENOSIS OF LUMBAR REGION, UNSPECIFIED WHETHER NEUROGENIC CLAUDICATION PRESENT: ICD-10-CM

## 2020-12-08 DIAGNOSIS — M51.36 DDD (DEGENERATIVE DISC DISEASE), LUMBAR: ICD-10-CM

## 2020-12-15 ENCOUNTER — ANTICOAGULATION VISIT (OUTPATIENT)
Dept: MEDICAL GROUP | Facility: PHYSICIAN GROUP | Age: 75
End: 2020-12-15
Payer: MEDICARE

## 2020-12-15 DIAGNOSIS — Z79.01 CHRONIC ANTICOAGULATION: Primary | ICD-10-CM

## 2020-12-15 DIAGNOSIS — I48.0 PAF (PAROXYSMAL ATRIAL FIBRILLATION) (HCC): ICD-10-CM

## 2020-12-15 LAB — INR PPP: 2.3 (ref 2–3.5)

## 2020-12-15 PROCEDURE — 85610 PROTHROMBIN TIME: CPT | Performed by: FAMILY MEDICINE

## 2020-12-15 PROCEDURE — 93793 ANTICOAG MGMT PT WARFARIN: CPT | Performed by: FAMILY MEDICINE

## 2020-12-15 NOTE — PROGRESS NOTES
Anticoagulation Summary  As of 12/15/2020    INR goal:  2.0-3.0   TTR:  67.5 % (5.2 y)   INR used for dosin.30 (12/15/2020)   Warfarin maintenance plan:  3 mg (3 mg x 1) every day   Weekly warfarin total:  21 mg   Weekly max warfarin dose:  21 mg   Plan last modified:  EDILMA Tellez (2020)   Next INR check:  2021   Target end date:  Indefinite    Indications    Chronic anticoagulation [Z79.01]  Chronic anticoagulation (Resolved) [Z79.01]  PAF (paroxysmal atrial fibrillation) (HCC) [I48.0]             Anticoagulation Episode Summary     INR check location:      Preferred lab:      Send INR reminders to:      Comments:        Anticoagulation Care Providers     Provider Role Specialty Phone number    [facility] Responsible  [phone]        Anticoagulation Patient Findings  Patient Findings     Negatives:  Signs/symptoms of thrombosis, Signs/symptoms of bleeding, Laboratory test error suspected, Change in health, Change in alcohol use, Change in activity, Upcoming invasive procedure, Emergency department visit, Upcoming dental procedure, Missed doses, Extra doses, Change in medications, Change in diet/appetite, Hospital admission, Bruising, Other complaints         HPI:   Whitley Frederick seen in clinic today, on anticoagulation therapy with warfarin for stroke prophylaxis due to history of atrial fibrillation.    Patient's previous INR was therapeutic at 2.24 on 20 (labs ordered by PCP), at which time patient was instructed to continue with current warfarin regimen.  She returns to clinic today to recheck INR to ensure it is therapeutic and thus preventing possible clotting and/or bleeding/bruising complications.    CHADS-VASc = at least 2  (unadjusted ischemic stroke risk/year:  2.2%, which is moderate risk)    Does patient have any changes to current medical/health status since last appt (Y/N):  NO  Does patient have any signs/symptoms of bleeding and/or thrombosis  since the last appt (Y/N):  NO  Does patient have any interval changes to diet or medications since last appt (Y/N):  NO  Are there any complications or cost restrictions with current therapy (Y/N):  NO     Does patient have [facility] PCP? Yes, Juana MOTA (If not, please document discussion that patient must be seen at [facility])       Vitals:  declined by patient due to covid transmission concerns.    There were no vitals filed for this visit.     Medication reconciliation completed today.    Asssessment:      INR remains therapeutic at 2.3, therefore decreasing patient's stroke and/or bleeding risk.   Reason(s) for out of range INR today:  n/a      Plan:  Pt is to continue with current warfarin dosing regimen in order to maintain INR in therapeutic range.     Follow up:  Because warfarin is a high risk medication and current CHEST guidelines recommend regular monitoring intervals (few days up to 12 weeks), will have patient return to clinic in 5 weeks to recheck INR.    Eh Enamorado, PharmD, BCACP

## 2020-12-17 DIAGNOSIS — Z79.4 TYPE 2 DIABETES MELLITUS WITH STAGE 4 CHRONIC KIDNEY DISEASE, WITH LONG-TERM CURRENT USE OF INSULIN (HCC): ICD-10-CM

## 2020-12-17 DIAGNOSIS — E11.22 TYPE 2 DIABETES MELLITUS WITH STAGE 4 CHRONIC KIDNEY DISEASE, WITH LONG-TERM CURRENT USE OF INSULIN (HCC): ICD-10-CM

## 2020-12-17 DIAGNOSIS — N18.4 TYPE 2 DIABETES MELLITUS WITH STAGE 4 CHRONIC KIDNEY DISEASE, WITH LONG-TERM CURRENT USE OF INSULIN (HCC): ICD-10-CM

## 2020-12-17 RX ORDER — SEMAGLUTIDE 1.34 MG/ML
0.5 INJECTION, SOLUTION SUBCUTANEOUS
Qty: 1.5 ML | Refills: 1 | Status: SHIPPED | OUTPATIENT
Start: 2020-12-17 | End: 2021-02-04 | Stop reason: SDUPTHER

## 2020-12-31 ENCOUNTER — PATIENT OUTREACH (OUTPATIENT)
Dept: HEALTH INFORMATION MANAGEMENT | Facility: OTHER | Age: 75
End: 2020-12-31

## 2020-12-31 NOTE — PROGRESS NOTES
"Received referral from [facility] regarding patient medication questions. Outbound call to Whitley. Patient states she is unsure if she took her weekly injection of Ozempic on Tuesday. Discussed dosing of missed dose: \"Missed dose: Missed dose should be administered as soon as possible within 5 days; resume usual schedule thereafter. If >5 days have elapsed, skip the missed dose and resume administration at the next scheduled weekly dose.\" Patient states her fasting BG was 113 this morning and may have taken the injection. She plans to wait until Tuesday 1/5 to administer next injection.     Patient also inquired if [facility] pharmacy has Multaq in stock and if she has active prescription on file. Per pharmacy, no active refill available for Whitley but they do have medication in stock. Encouraged Whitley to request refill from PCP.     Laila Corral, PharmD    "

## 2021-01-02 ENCOUNTER — PATIENT MESSAGE (OUTPATIENT)
Dept: CARDIOLOGY | Facility: MEDICAL CENTER | Age: 76
End: 2021-01-02

## 2021-01-02 DIAGNOSIS — I48.0 PAF (PAROXYSMAL ATRIAL FIBRILLATION) (HCC): ICD-10-CM

## 2021-01-04 RX ORDER — DRONEDARONE 400 MG/1
400 TABLET, FILM COATED ORAL 2 TIMES DAILY WITH MEALS
Qty: 200 TAB | Refills: 0 | Status: SHIPPED | OUTPATIENT
Start: 2021-01-04 | End: 2021-03-30

## 2021-01-08 ENCOUNTER — APPOINTMENT (OUTPATIENT)
Dept: PHYSICAL MEDICINE AND REHAB | Facility: MEDICAL CENTER | Age: 76
End: 2021-01-08
Payer: MEDICARE

## 2021-01-14 DIAGNOSIS — F41.9 ANXIETY: ICD-10-CM

## 2021-01-14 DIAGNOSIS — G47.00 INSOMNIA: ICD-10-CM

## 2021-01-15 DIAGNOSIS — Z23 NEED FOR VACCINATION: ICD-10-CM

## 2021-01-15 RX ORDER — CLONAZEPAM 0.5 MG/1
TABLET ORAL
Qty: 30 TAB | Refills: 1 | Status: SHIPPED | OUTPATIENT
Start: 2021-01-15 | End: 2021-01-18 | Stop reason: SDUPTHER

## 2021-01-15 NOTE — TELEPHONE ENCOUNTER
----- Message from Whitley Frederick sent at 1/14/2021  3:56 PM PST -----  Regarding: Prescription Question  Hi... need refill on clonazepam .05mg Afsaneh Soto [phone]... thank you

## 2021-01-15 NOTE — TELEPHONE ENCOUNTER
Received request via:Patient  Was the patient seen in the last year in this department? Yes  10/22/20  Does the patient have an active prescription (recently filled or refills available) for medication(s) requested? No

## 2021-01-18 DIAGNOSIS — F41.9 ANXIETY: ICD-10-CM

## 2021-01-18 DIAGNOSIS — G47.00 INSOMNIA: ICD-10-CM

## 2021-01-18 RX ORDER — CLONAZEPAM 0.5 MG/1
TABLET ORAL
Qty: 30 TAB | Refills: 1 | Status: SHIPPED | OUTPATIENT
Start: 2021-01-18 | End: 2021-11-29 | Stop reason: SDUPTHER

## 2021-01-18 NOTE — TELEPHONE ENCOUNTER
----- Message from Whitley Frederick sent at 1/17/2021  1:24 PM PST -----  Regarding: Prescription Question  Hi again ... the clonazepam refill request (see below) was sent to [facility] in [facility]... please resend to Enoc Soto thank you     EDILMA Whelan  01/14/2021 03:56 PM  Prescription Question  Hi... need refill on clonazepam .05mg Afsaneh Soto [phone]... thank you

## 2021-01-19 ENCOUNTER — ANTICOAGULATION VISIT (OUTPATIENT)
Dept: MEDICAL GROUP | Facility: PHYSICIAN GROUP | Age: 76
End: 2021-01-19
Payer: MEDICARE

## 2021-01-19 DIAGNOSIS — Z79.01 CHRONIC ANTICOAGULATION: Primary | ICD-10-CM

## 2021-01-19 DIAGNOSIS — I48.0 PAF (PAROXYSMAL ATRIAL FIBRILLATION) (HCC): ICD-10-CM

## 2021-01-19 LAB — INR PPP: 2.2 (ref 2–3.5)

## 2021-01-19 PROCEDURE — 99999 PR NO CHARGE: CPT | Performed by: FAMILY MEDICINE

## 2021-01-19 PROCEDURE — 85610 PROTHROMBIN TIME: CPT | Performed by: NURSE PRACTITIONER

## 2021-01-19 PROCEDURE — 93793 ANTICOAG MGMT PT WARFARIN: CPT | Performed by: FAMILY MEDICINE

## 2021-01-22 DIAGNOSIS — K21.9 GASTROESOPHAGEAL REFLUX DISEASE: ICD-10-CM

## 2021-01-22 RX ORDER — OMEPRAZOLE 20 MG/1
40 CAPSULE, DELAYED RELEASE ORAL DAILY
Qty: 180 CAP | Refills: 0 | Status: SHIPPED | OUTPATIENT
Start: 2021-01-22 | End: 2021-08-02 | Stop reason: SDUPTHER

## 2021-01-22 NOTE — TELEPHONE ENCOUNTER
Received request via: Pharmacy    Was the patient seen in the last year in this department? Yes  10/22/2020  Does the patient have an active prescription (recently filled or refills available) for medication(s) requested? No    ----- Message from Whitley Frederick sent at 1/21/2021 10:29 PM PST -----  Regarding: Prescription Question  Hi ... needing a 90 day refill on my Omeprazol 20mg 2 times a day ... [facility] [phone] ... last refill by a Dr Nigel Carballo at your [facility] address ... but Juana is my primary...thank you

## 2021-01-26 ENCOUNTER — TELEPHONE (OUTPATIENT)
Dept: MEDICAL GROUP | Facility: LAB | Age: 76
End: 2021-01-26

## 2021-01-26 NOTE — TELEPHONE ENCOUNTER
----- Message from Whitley Frederick sent at 1/25/2021 10:12 PM PST -----  Regarding: Prescription Question  2nd request 1st sent on Friday 1/22 ... must of gotten misplaced with the format change over... Anyway ;)    Resending::::  Hi ... needing a 90 day refill on my Omeprazol 20mg 2 times a day ... [facility] [phone] ... last refill by a Dr Nigel Carballo at your [facility] address ... but Juana is my primary...thank you

## 2021-01-29 ENCOUNTER — TELEPHONE (OUTPATIENT)
Dept: MEDICAL GROUP | Facility: LAB | Age: 76
End: 2021-01-29

## 2021-01-29 DIAGNOSIS — H93.19 TINNITUS, UNSPECIFIED LATERALITY: ICD-10-CM

## 2021-01-29 NOTE — TELEPHONE ENCOUNTER
----- Message from Whitley Frederick sent at 1/29/2021  8:48 AM PST -----  Regarding: Non-Urgent Medical Question  Hi ... could I get an authorization for an ENT ... the ring that I’ve had for years is getting annoyingly louder ... I think some of it is my C-PAP is set to high it’s been on 10 since I started in 2013 or 14 ... and my left ear seems to be plugged...and left ear ringing seems to be louder...  thank you :)

## 2021-02-02 ENCOUNTER — TELEPHONE (OUTPATIENT)
Dept: MEDICAL GROUP | Facility: LAB | Age: 76
End: 2021-02-02

## 2021-02-03 ENCOUNTER — PATIENT MESSAGE (OUTPATIENT)
Dept: MEDICAL GROUP | Facility: LAB | Age: 76
End: 2021-02-03

## 2021-02-03 DIAGNOSIS — Z79.4 TYPE 2 DIABETES MELLITUS WITH STAGE 4 CHRONIC KIDNEY DISEASE, WITH LONG-TERM CURRENT USE OF INSULIN (HCC): ICD-10-CM

## 2021-02-03 DIAGNOSIS — N18.4 TYPE 2 DIABETES MELLITUS WITH STAGE 4 CHRONIC KIDNEY DISEASE, WITH LONG-TERM CURRENT USE OF INSULIN (HCC): ICD-10-CM

## 2021-02-03 DIAGNOSIS — E11.22 TYPE 2 DIABETES MELLITUS WITH STAGE 4 CHRONIC KIDNEY DISEASE, WITH LONG-TERM CURRENT USE OF INSULIN (HCC): ICD-10-CM

## 2021-02-03 DIAGNOSIS — G47.33 OSA (OBSTRUCTIVE SLEEP APNEA): ICD-10-CM

## 2021-02-04 PROCEDURE — RXMED WILLOW AMBULATORY MEDICATION CHARGE: Performed by: NURSE PRACTITIONER

## 2021-02-04 RX ORDER — SEMAGLUTIDE 1.34 MG/ML
0.5 INJECTION, SOLUTION SUBCUTANEOUS
Qty: 1.5 ML | Refills: 1 | Status: SHIPPED | OUTPATIENT
Start: 2021-02-04 | End: 2021-03-31 | Stop reason: SDUPTHER

## 2021-02-08 ENCOUNTER — TELEPHONE (OUTPATIENT)
Dept: MEDICAL GROUP | Facility: LAB | Age: 76
End: 2021-02-08

## 2021-02-08 NOTE — TELEPHONE ENCOUNTER
"· DME paperwork received from [facility] requiring provider signature.     · All appropriate fields completed by Medical Assistant: N/A CMA printed and distributed to MA    · Paperwork placed in \"MA to Provider\" folder/basket. Awaiting provider completion/signature.  "

## 2021-02-09 ENCOUNTER — OFFICE VISIT (OUTPATIENT)
Dept: MEDICAL GROUP | Facility: LAB | Age: 76
End: 2021-02-09
Payer: MEDICARE

## 2021-02-09 VITALS
SYSTOLIC BLOOD PRESSURE: 128 MMHG | TEMPERATURE: 97.6 F | HEART RATE: 56 BPM | WEIGHT: 269 LBS | OXYGEN SATURATION: 98 % | RESPIRATION RATE: 16 BRPM | BODY MASS INDEX: 42.22 KG/M2 | DIASTOLIC BLOOD PRESSURE: 70 MMHG | HEIGHT: 67 IN

## 2021-02-09 DIAGNOSIS — M79.605 LEFT LEG PAIN: ICD-10-CM

## 2021-02-09 DIAGNOSIS — Z12.11 SPECIAL SCREENING FOR MALIGNANT NEOPLASM OF COLON: ICD-10-CM

## 2021-02-09 DIAGNOSIS — E11.22 TYPE 2 DIABETES MELLITUS WITH STAGE 4 CHRONIC KIDNEY DISEASE, WITHOUT LONG-TERM CURRENT USE OF INSULIN (HCC): ICD-10-CM

## 2021-02-09 DIAGNOSIS — N18.4 TYPE 2 DIABETES MELLITUS WITH STAGE 4 CHRONIC KIDNEY DISEASE, WITHOUT LONG-TERM CURRENT USE OF INSULIN (HCC): ICD-10-CM

## 2021-02-09 DIAGNOSIS — H93.8X2 EAR FULLNESS, LEFT: ICD-10-CM

## 2021-02-09 PROCEDURE — 99214 OFFICE O/P EST MOD 30 MIN: CPT | Performed by: NURSE PRACTITIONER

## 2021-02-09 RX ORDER — LORATADINE 10 MG/1
10 TABLET ORAL DAILY
Qty: 30 TAB | Refills: 2 | Status: SHIPPED | OUTPATIENT
Start: 2021-02-09 | End: 2021-03-12 | Stop reason: SDUPTHER

## 2021-02-09 ASSESSMENT — FIBROSIS 4 INDEX: FIB4 SCORE: 2.73

## 2021-02-09 NOTE — PROGRESS NOTES
"Chief Complaint   Patient presents with   • Ear Fullness       TRI Arreaga is a 75-year-old established female here with a couple of issues:  #1- left ear fullness:  Present x a few months, comes and goes without other associated symptoms such as fevers, chills or cough.  + clear mucus from nose.  + chronic allergies, per pt - taking singular and flonase.  Denies ear pain or sinus pressure.  #2- DM II:  Chronic issue.  Injecting ozempic weekly and taking actos 15 mg.  Last a1c was 6.1, 2 mo ago.  She is wondering if she should increase her Actos if she has to come off of Ozempic because of insurance coverage or unavailability from the healthcare center pharmacy.  Blood sugars have been great at home, typically below 130.  #3- chronic left leg pain: improved.  Down to 1 gabapentin at night 100 mg.  Decided to hold off on physiatry appt b/c of improved left leg pain.  Likes to see her chiropractor.       Past medical, surgical, family, and social history is reviewed and updated in Epic chart by me today.   Medications and allergies reviewed and updated in Epic chart by me today.     ROS:   As documented in history of present illness above    Exam:  /70 (BP Location: Left arm, Patient Position: Sitting, BP Cuff Size: Large adult)   Pulse (!) 56   Temp 36.4 °C (97.6 °F)   Resp 16   Ht 1.702 m (5' 7\")   Wt 122 kg (269 lb)   SpO2 98%   Constitutional: Alert, no distress, plus 3 vital signs  Skin:  Warm, dry, no rashes invisible areas  Eye: Equal, round and reactive, conjunctiva clear  ENMT: Lips without lesions, good dentition, oropharynx without erythema or exudate.  Tympanic membranes are translucent bilaterally.  She is not impacted with wax to either ear canal.  No sinus tenderness.  Neck: Trachea midline, no masses, no thyromegaly.  No enlarged cervical lymphadenopathy.  Respiratory: Unlabored respiration, lungs clear to auscultation.  Cardiovascular: Normal rate  Psych: Alert, pleasant, well-groomed, " normal affect    Assessment / Plan / Medical Decision makin. Type 2 diabetes mellitus with stage 4 chronic kidney disease, without long-term current use of insulin ([facility])  -A1c was doing well in late November.  She will continue on Ozempic and 15 mg Actos for as long as possible/affordability is present/samples of Ozempic are available from the health care center.  If she is unable to obtain Ozempic, she will notify me in regards to her blood sugars on Actos 15 mg alone, at which point we can determine if we need to increase Actos.  Jardiance has been unaffordable for her.  She is intolerant to Metformin.    2. Ear fullness, left  -Benign exam.  Discussed the possibility of eustachian tube dysfunction.  Added on loratadine for 2 weeks.  Continue Singulair and Flonase.  Follow-up if not improving.    3. Special screening for malignant neoplasm of colon  - ANDIE (FIT DNA)    4. Left leg pain  -Improving.  Postponing her physiatry appointment.  Encouraged her to continue gabapentin at night.      We also briefly discussed her anxiety today which she states is flaring up lately.  She is having occasional heart palpitations but these are alleviated by one fourth of a clonazepam.  We discussed that her heart palpitations are most likely related to her anxiety at this point.  Fortunately she does see her cardiologist on Thursday.  She is not having chest pain, dizziness or trouble breathing.

## 2021-02-11 ENCOUNTER — OFFICE VISIT (OUTPATIENT)
Dept: CARDIOLOGY | Facility: MEDICAL CENTER | Age: 76
End: 2021-02-11
Payer: MEDICARE

## 2021-02-11 VITALS
OXYGEN SATURATION: 97 % | HEART RATE: 56 BPM | DIASTOLIC BLOOD PRESSURE: 60 MMHG | HEIGHT: 66 IN | BODY MASS INDEX: 42.59 KG/M2 | WEIGHT: 265 LBS | SYSTOLIC BLOOD PRESSURE: 148 MMHG | RESPIRATION RATE: 16 BRPM

## 2021-02-11 DIAGNOSIS — N18.4 TYPE 2 DIABETES MELLITUS WITH STAGE 4 CHRONIC KIDNEY DISEASE, WITHOUT LONG-TERM CURRENT USE OF INSULIN (HCC): ICD-10-CM

## 2021-02-11 DIAGNOSIS — I49.3 VPC'S (VENTRICULAR PREMATURE COMPLEXES): ICD-10-CM

## 2021-02-11 DIAGNOSIS — N18.4 CKD (CHRONIC KIDNEY DISEASE) STAGE 4, GFR 15-29 ML/MIN (HCC): ICD-10-CM

## 2021-02-11 DIAGNOSIS — E11.22 TYPE 2 DIABETES MELLITUS WITH STAGE 4 CHRONIC KIDNEY DISEASE, WITHOUT LONG-TERM CURRENT USE OF INSULIN (HCC): ICD-10-CM

## 2021-02-11 DIAGNOSIS — I48.0 PAF (PAROXYSMAL ATRIAL FIBRILLATION) (HCC): ICD-10-CM

## 2021-02-11 DIAGNOSIS — I10 ESSENTIAL HYPERTENSION: ICD-10-CM

## 2021-02-11 DIAGNOSIS — E78.5 DYSLIPIDEMIA: ICD-10-CM

## 2021-02-11 DIAGNOSIS — I44.7 LEFT BUNDLE BRANCH BLOCK: ICD-10-CM

## 2021-02-11 DIAGNOSIS — Z79.01 CHRONIC ANTICOAGULATION: ICD-10-CM

## 2021-02-11 PROCEDURE — 99214 OFFICE O/P EST MOD 30 MIN: CPT | Performed by: INTERNAL MEDICINE

## 2021-02-11 RX ORDER — TERAZOSIN 1 MG/1
1 CAPSULE ORAL DAILY
Qty: 30 CAPSULE | Refills: 11 | Status: SHIPPED | OUTPATIENT
Start: 2021-02-11 | End: 2021-03-29 | Stop reason: SDUPTHER

## 2021-02-11 ASSESSMENT — FIBROSIS 4 INDEX: FIB4 SCORE: 2.73

## 2021-02-11 NOTE — PROGRESS NOTES
No chief complaint on file.      Subjective:   Whitley Frederick is a 75 y.o. female who presents today for for follow-up of paroxysmal atrial fibrillation and PVCs anticoagulation high risk medication use and mitral regurgitation.  She has a history of paroxysmal atrial for ablation is currently not bothering her managed with dronedarone and warfarin.  She has no bleeding issues.  She also has a history of PVCs which are rare but symptomatic for her well documented on ZIO Patch and has seen Dr. eckert with regard to this.  She has lost weight recently but does not exert herself to a high degree although she does walk her dog.  She has no exertional symptoms.  She has a history of coronary angiography in the past which showed mild nonobstructive disease.  She has hypertension type 2 diabetes mellitus as well.  She also has chronic kidney disease.    Since last visit tolerating her anticoagulation well but notices her blood pressure is somewhat less well controlled.    Past Medical History:   Diagnosis Date   • Allergy    • Anxiety    • Arrhythmia    • Arthritis    • Asthma    • Carotid bruit     Left   • CATARACT    • Chest pain    • DIABETES MELLITUS    • Esophageal reflux    • Goiter    • Headache(784.0)    • Heart murmur    • Hyperlipidemia    • Hypertension    • IBD (inflammatory bowel disease)    • Migraine     Silent migraine   • AVERY (obstructive sleep apnea)    • Overweight(278.02)    • Palpitations    • Paroxysmal atrial fibrillation (HCC) 7/12/13    woke with palps, AFib per ekg in am.   • Pneumonia     Nov. 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 Badillo   • Urinary tract infection, site not specified      Past Surgical History:   Procedure Laterality Date   • CATARACT PHACO WITH IOL Right 10/25/2016    Procedure: CATARACT PHACO WITH IOL;  Surgeon: Zoran Gamble M.D.;  Location: [facility];  Service:    • ABDOMINAL HYSTERECTOMY TOTAL     • CHOLECYSTECTOMY     • TONSILLECTOMY     • US-NEEDLE CORE BX-BREAST PANEL       Family History   Problem Relation Age of Onset   • Cancer Maternal Aunt    • Diabetes Maternal Aunt    • Heart Disease Maternal Aunt    • Hypertension Maternal Aunt    • Hyperlipidemia Maternal Aunt    • Cancer Mother         Leukemia   • Hypertension Mother    • Diabetes Mother    • Lung Disease Father    • Cancer Father         Lung   • Lung Disease Brother    • Stroke Brother    • Diabetes Brother    • Heart Disease Brother    • Diabetes Maternal Grandmother    • Hypertension Brother    • Diabetes Brother      Social History     Socioeconomic History   • Marital status:      Spouse name: Not on file   • Number of children: Not on file   • Years of education: Not on file   • Highest education level: Not on file   Occupational History   • Not on file   Tobacco Use   • Smoking status: Former Smoker     Packs/day: 1.00     Years: 20.00     Pack years: 20.00     Types: Cigarettes     Quit date: 1983     Years since quittin.1   • Smokeless tobacco: Never Used   Substance and Sexual Activity   • Alcohol use: No     Alcohol/week: 0.0 oz   • Drug use: No   • Sexual activity: Never     Partners: Male   Other Topics Concern   • Not on file   Social History Narrative   • Not on file     Social Determinants of Health     Financial Resource Strain:    • Difficulty of Paying Living Expenses:    Food Insecurity:    • Worried About Running Out of Food in the Last Year:    • Ran Out of Food in the Last Year:    Transportation Needs:    • Lack of Transportation (Medical):    • Lack of Transportation (Non-Medical):    Physical Activity:    • Days of Exercise per Week:    • Minutes of Exercise per Session:    Stress:    • Feeling of Stress :    Social Connections:    • Frequency of Communication with Friends and Family:    • Frequency of Social Gatherings with Friends and  Family:    • Attends Rastafarian Services:    • Active Member of Clubs or Organizations:    • Attends Club or Organization Meetings:    • Marital Status:    Intimate Partner Violence:    • Fear of Current or Ex-Partner:    • Emotionally Abused:    • Physically Abused:    • Sexually Abused:      Allergies   Allergen Reactions   • Amlodipine Itching   • Levoxyl [Levothyroxine Sodium] Unspecified     Flushing, very hot   • Amaryl      GI Problems   • Avandia [Rosiglitazone Maleate]      GI problems   • Cipro Xr      Possibly causes Chills.   • Clonidine      Rapid heart rate, swelling    • Contrast Media With Iodine [Iodine] Hives   • Glipizide      GI Problems   • Glucophage [Metformin Hydrochloride]      dizzy   • Hydralazine      Rapid heart rate, chest tightness   • Levaquin      Possible allergy - chills with cipro but sick   • Relafen [Nabumetone]      Itching; avoids all nsaids     Outpatient Encounter Medications as of 2/11/2021   Medication Sig Dispense Refill   • terazosin (HYTRIN) 1 MG Cap Take 1 capsule by mouth every day. 30 capsule 11   • loratadine (CLARITIN) 10 MG Tab Take 1 Tab by mouth every day. 30 Tab 2   • Semaglutide,0.25 or 0.5MG/DOS, (OZEMPIC, 0.25 OR 0.5 MG/DOSE,) 2 MG/1.5ML Solution Pen-injector Inject 0.5 mg under the skin every 7 days. 1.5 mL 1   • NON SPECIFIED C-Pap supplies  -humidifier tank and a spare   - fax [phone]...    Dx:  Sleep apnea 1 Each 1   • omeprazole (PRILOSEC) 20 MG delayed-release capsule Take 2 Caps by mouth every day. 180 Cap 0   • clonazePAM (KLONOPIN) 0.5 MG Tab TAKE 1 TABLET BY MOUTH ONCE DAILY AS NEEDED FOR 30 DAYS. ANXIETY/HEART PALPITATIONS for 30 days 30 Tab 1   • dronedarone (MULTAQ) 400 MG Tab Take 1 Tab by mouth 2 times a day, with meals. 200 Tab 0   • gabapentin (NEURONTIN) 100 MG Cap Take 1-3 Caps by mouth 3 times a day. As needed for nerve pain 90 Cap 0   • NON SPECIFIED Drive walker,  [id number], Purple, Medical Foldable Lightweight Rollator with Light  Reflectors 1 Each 0   • NON SPECIFIED Test strips and lancets for the strips brand is [employer] 100 Each 3   • fluticasone (FLONASE) 50 MCG/ACT nasal spray USE 1-2  SPRAY(S) IN EACH NOSTRIL ONCE DAILY 30 MINUTES PRIOR TO USE OF CPAP 48 g 3   • magnesium oxide (MAG-OX) 400 MG Tab tablet      • levalbuterol (XOPENEX HFA) 45 MCG/ACT inhaler Inhale 2 Puffs by mouth every four hours as needed for Shortness of Breath. 3 Inhaler 3   • montelukast (SINGULAIR) 10 MG Tab Take 1 Tab by mouth every bedtime. 100 Tab 3   • levothyroxine (SYNTHROID) 50 MCG Tab Take 1 Tab by mouth Every morning on an empty stomach. 100 Tab 3   • metoprolol (LOPRESSOR) 50 MG Tab Take 1 Tab by mouth 2 times a day. 200 Tab 3   • rosuvastatin (CRESTOR) 10 MG Tab Take 1 Tab by mouth every evening. 100 Tab 3   • losartan (COZAAR) 50 MG Tab Take 1 Tab by mouth 2 Times a Day. 200 Tab 2   • allopurinol (ZYLOPRIM) 100 MG Tab Take 2 Tabs by mouth 2 Times a Day. 200 Tab 3   • warfarin (COUMADIN) 3 MG Tab Take 1 tab by mouth daily as directed by the coumadin clinic 100 Tab 3   • NON SPECIFIED blue concentrator at night along with my C-PAP./3 liters per hour 1 Each 0   • ferrous sulfate 325 (65 Fe) MG tablet Take 325 mg by mouth every day.     • Blood Glucose Monitoring Suppl Supplies Misc Test strips order: Test strips for Accucheck meter. Sig: use BID and prn ssx high or low sugar #100 RF x 3 100 Strip 11   • Urine Glucose-Ketones Test Strip 1 Strip by In Vitro route every day. 100 Strip 2   • glucose blood (FREESTYLE LITE) strip 1 Strip by Other route 2 times a day, with meals. 50 Strip 11   • Insulin Pen Needle 32G X 4 MM Misc 1 Applicator by Does not apply route 3 times a day. 100 Each 11   • Misc. Devices MISC Pen needles for Lantus Solstar. 29g 12mm. 90 Each 3   • Cholecalciferol (VITAMIN D) 2000 UNITS CAPS Take 4,000 Units by mouth every day.     • Mometasone Furoate (ASMANEX HFA) 100 MCG/ACT Aerosol Inhale 2 Puffs by mouth every day. 1 Each 0   •  "famotidine (PEPCID) 20 MG Tab Take 1 Tab by mouth 2 times a day. (Patient not taking: Reported on 2/11/2021) 200 Tab 3   • [DISCONTINUED] terazosin (HYTRIN) 1 MG Cap Take 1 Cap by mouth every day. 30 Cap 4   • Blood Glucose Monitoring Suppl Supplies Misc Test strips order: Test strips for Chely Contour meter. Sig: use tid  and prn ssx high or low sugar #100 RF x 3 (Patient not taking: Reported on 2/11/2021) 100 Strip 3   • Blood Glucose Monitoring Suppl W/DEVICE Kit 1 Each by Does not apply route 2 times a day as needed. (Patient not taking: Reported on 2/11/2021) 1 Kit 0     No facility-administered encounter medications on file as of 2/11/2021.     Review of Systems   All other systems reviewed and are negative.       Objective:   /60 (BP Location: Left arm, Patient Position: Sitting, BP Cuff Size: Adult)   Pulse (!) 56   Resp 16   Ht 1.676 m (5' 6\")   Wt 120 kg (265 lb)   SpO2 97%   BMI 42.77 kg/m²     Physical Exam   Constitutional: She is oriented to person, place, and time. She appears well-developed and well-nourished. No distress.   Obese   HENT:   Head: Normocephalic and atraumatic.   Right Ear: External ear normal.   Left Ear: External ear normal.   Mouth/Throat: No oropharyngeal exudate.   Eyes: Pupils are equal, round, and reactive to light. Conjunctivae and EOM are normal. Right eye exhibits no discharge. Left eye exhibits no discharge. No scleral icterus.   Neck: No JVD present. No tracheal deviation present. No thyromegaly present.   Cardiovascular: Normal rate, regular rhythm, S1 normal, S2 normal and intact distal pulses. PMI is not displaced. Exam reveals no gallop, no S3, no S4 and no friction rub.   No murmur heard.  Pulses:       Carotid pulses are 2+ on the right side and 2+ on the left side.       Radial pulses are 2+ on the right side and 2+ on the left side.        Popliteal pulses are 2+ on the right side and 2+ on the left side.        Dorsalis pedis pulses are 2+ on the right " side and 2+ on the left side.        Posterior tibial pulses are 2+ on the right side and 2+ on the left side.   Pulmonary/Chest: Effort normal and breath sounds normal. No respiratory distress. She has no wheezes. She has no rales. She exhibits no tenderness.   Abdominal: Soft. Bowel sounds are normal. She exhibits no distension. There is no abdominal tenderness.   Musculoskeletal:         General: Edema ( Trace) present. No tenderness. Normal range of motion.      Cervical back: Normal range of motion and neck supple.   Neurological: She is alert and oriented to person, place, and time. No cranial nerve deficit (Cranial nerves II through XII grossly intact).   Skin: Skin is warm and dry. No rash noted. She is not diaphoretic. No erythema.   Psychiatric: She has a normal mood and affect. Her behavior is normal. Judgment and thought content normal.   Vitals reviewed.  No changes in physical exam since prior 8/26/2020    LABS:  Lab Results   Component Value Date/Time    CHOLSTRLTOT 164 12/14/2019 10:06 AM    LDL 88 12/14/2019 10:06 AM    HDL 46 12/14/2019 10:06 AM    TRIGLYCERIDE 148 12/14/2019 10:06 AM       Lab Results   Component Value Date/Time    WBC 8.1 04/11/2019 12:44 PM    RBC 4.19 (L) 04/11/2019 12:44 PM    HEMOGLOBIN 12.0 08/13/2020 01:07 PM    HEMATOCRIT 37.7 08/13/2020 01:07 PM    MCV 92.1 04/11/2019 12:44 PM    NEUTSPOLYS 68.60 04/11/2019 12:44 PM    LYMPHOCYTES 22.90 04/11/2019 12:44 PM    MONOCYTES 5.50 04/11/2019 12:44 PM    EOSINOPHILS 2.00 04/11/2019 12:44 PM    BASOPHILS 0.60 04/11/2019 12:44 PM    HYPOCHROMIA 1+ 04/19/2014 01:23 AM     Lab Results   Component Value Date/Time    SODIUM 141 11/25/2020 01:31 PM    POTASSIUM 4.7 11/25/2020 01:31 PM    CHLORIDE 108 11/25/2020 01:31 PM    CO2 25 11/25/2020 01:31 PM    GLUCOSE 127 (H) 11/25/2020 01:31 PM    BUN 47 (H) 11/25/2020 01:31 PM    CREATININE 1.96 (H) 11/25/2020 01:31 PM    CREATININE 1.1 04/27/2006 09:50 AM     Lab Results   Component Value  Date    HBA1C 6.1 (H) 11/25/2020      Lab Results   Component Value Date/Time    ALKPHOSPHAT 62 11/25/2020 01:33 PM    ASTSGOT 21 11/25/2020 01:33 PM    ALTSGPT 18 11/25/2020 01:33 PM    TBILIRUBIN 0.9 11/25/2020 01:33 PM      Lab Results   Component Value Date/Time    BNPBTYPENAT 68 04/18/2014 10:20 AM      No results found for: TSH  Lab Results   Component Value Date/Time    PROTHROMBTM 25.5 (H) 11/25/2020 01:33 PM    INR 2.20 01/19/2021 01:19 PM      ECHO CONCLUSIONS (8/4/2020):  Normal left ventricular chamber size.  Left ventricular ejection fraction is visually estimated to be 60%.  Mild concentric left ventricular hypertrophy.  Grade II diastolic dysfunction.  Moderate mitral regurgitation.  Mild mitral stenosis. Mean gradient 3.4 mmHg at 57 bpm.  Normal inferior vena cava size and inspiratory collapse.       ECHO CONCLUSIONS (12/4/2019):  Compared to the report of the prior study done  on 04/11/17 - no   significant changes noted.  Normal left ventricular size and systolic function.  Left ventricular ejection fraction is visually estimated to be 60%.  Grade I diastolic dysfunction.  Mild concentric left ventricular hypertrophy.  Moderate mitral regurgitation.  Aortic sclerosis without stenosis.  Estimated right ventricular systolic pressure  is 30 mmHg.    ECHO CONCLUSIONS (4/11/2017):  Normal left ventricular size and systolic function. Mild concentric   left ventricular hypertrophy. Left ventricular ejection fraction is   visually estimated to be 55%. Normal regional wall motion. Grade I   diastolic dysfunction.  Mildly dilated left atrium. Left atrial volume index is 37 mL/sq m.  Structurally normal mitral valve. Probably moderate mitral   regurgitation. PISA not well visualized.  Compared to the report of the study done 4/2014- there has been an   improvement in LA size and diastolic function.     EKG (4/10/2018):  I have personally reviewed the EKG this visit and discussed with the patient.  Sinus  rhythm and left bundle branch block        Assessment:     1. Essential hypertension  terazosin (HYTRIN) 1 MG Cap   2. PAF (paroxysmal atrial fibrillation) ([facility])     3. CKD (chronic kidney disease) stage 4, GFR 15-29 ml/min ([facility])     4. Type 2 diabetes mellitus with stage 4 chronic kidney disease, without long-term current use of insulin ([facility])     5. Left bundle branch block     6. Chronic anticoagulation     7. Dyslipidemia     8. VPC's (ventricular premature complexes)         Medical Decision Making:  Today's Assessment / Status / Plan:     Doing well.  No issues with her PAF.  Tolerating dronedarone.  Tolerating anticoagulation well.  Continue these medications.  Blood pressure is suboptimal.  Discussed options.  She was previously on Hytrin and would like to try this again I think that is reasonable.  Hytrin 1 mg nightly.  She can follow-up with her PCP for further titration of her hypertensive therapy in the meantime.  She is to follow-up with cardiology in 6 months.  Repeat an echocardiogram 1 year from prior.    Follow-up 6 months with cardiology

## 2021-02-12 ENCOUNTER — PHARMACY VISIT (OUTPATIENT)
Dept: PHARMACY | Facility: MEDICAL CENTER | Age: 76
End: 2021-02-12
Payer: COMMERCIAL

## 2021-02-22 DIAGNOSIS — I48.0 PAF (PAROXYSMAL ATRIAL FIBRILLATION) (HCC): ICD-10-CM

## 2021-02-22 RX ORDER — WARFARIN SODIUM 3 MG/1
TABLET ORAL
Qty: 90 TABLET | Refills: 2 | Status: SHIPPED | OUTPATIENT
Start: 2021-02-22 | End: 2021-08-02 | Stop reason: SDUPTHER

## 2021-02-22 NOTE — TELEPHONE ENCOUNTER
Received request via: Pharmacy    Was the patient seen in the last year in this department? Yes  2/29/2021  Does the patient have an active prescription (recently filled or refills available) for medication(s) requested? No

## 2021-03-02 DIAGNOSIS — I10 ESSENTIAL HYPERTENSION: ICD-10-CM

## 2021-03-02 RX ORDER — LOSARTAN POTASSIUM 50 MG/1
50 TABLET ORAL 2 TIMES DAILY
Qty: 200 TABLET | Refills: 2 | Status: SHIPPED | OUTPATIENT
Start: 2021-03-02 | End: 2021-12-26 | Stop reason: SDUPTHER

## 2021-03-02 NOTE — TELEPHONE ENCOUNTER
Received request via: Patient    Was the patient seen in the last year in this department? Yes  2/9/21  Does the patient have an active prescription (recently filled or refills available) for medication(s) requested? No

## 2021-03-02 NOTE — TELEPHONE ENCOUNTER
----- Message from Whitley Frederick sent at 3/1/2021 11:04 PM PST -----  Regarding: Prescription Question  Hi ... I need a new prescription called into [facility] mail order in [facility] [phone] for Losartan Potassium 50mg twice a day for 90 days thank you ...

## 2021-03-09 ENCOUNTER — ANTICOAGULATION VISIT (OUTPATIENT)
Dept: MEDICAL GROUP | Facility: PHYSICIAN GROUP | Age: 76
End: 2021-03-09
Payer: MEDICARE

## 2021-03-09 DIAGNOSIS — I48.0 PAF (PAROXYSMAL ATRIAL FIBRILLATION) (HCC): ICD-10-CM

## 2021-03-09 DIAGNOSIS — Z79.01 CHRONIC ANTICOAGULATION: Primary | ICD-10-CM

## 2021-03-09 LAB — INR PPP: 2.1 (ref 2–3.5)

## 2021-03-09 PROCEDURE — 93793 ANTICOAG MGMT PT WARFARIN: CPT | Performed by: FAMILY MEDICINE

## 2021-03-09 PROCEDURE — 85610 PROTHROMBIN TIME: CPT | Performed by: FAMILY MEDICINE

## 2021-03-09 PROCEDURE — 99999 PR NO CHARGE: CPT | Performed by: FAMILY MEDICINE

## 2021-03-09 NOTE — PROGRESS NOTES
Anticoagulation Summary  As of 3/9/2021    INR goal:  2.0-3.0   TTR:  68.9 % (5.4 y)   INR used for dosin.10 (3/9/2021)   Warfarin maintenance plan:  3 mg (3 mg x 1) every day   Weekly warfarin total:  21 mg   Weekly max warfarin dose:  21 mg   Plan last modified:  EDILMA Tellez (2020)   Next INR check:  2021   Target end date:  Indefinite    Indications    Chronic anticoagulation [Z79.01]  Chronic anticoagulation (Resolved) [Z79.01]  PAF (paroxysmal atrial fibrillation) (HCC) [I48.0]             Anticoagulation Episode Summary     INR check location:      Preferred lab:      Send INR reminders to:      Comments:        Anticoagulation Care Providers     Provider Role Specialty Phone number    [facility] Responsible  [phone]        Anticoagulation Patient Findings  Patient Findings     Negatives:  Signs/symptoms of thrombosis, Signs/symptoms of bleeding, Laboratory test error suspected, Change in health, Change in alcohol use, Change in activity, Upcoming invasive procedure, Emergency department visit, Upcoming dental procedure, Missed doses, Extra doses, Change in medications, Change in diet/appetite, Hospital admission, Bruising, Other complaints        HPI:   Whitley Frederick seen in clinic today, on anticoagulation therapy with warfarin for stroke prophylaxis due to history of PAF.    Patient's previous INR was therapeutic at 2.2 on 21, at which time patient was instructed to continue with current warfarin regimen.  She returns to clinic today to recheck INR to ensure it is therapeutic and thus preventing possible clotting and/or bleeding/bruising complications.    CHADS-VASc = at least 4  (unadjusted ischemic stroke risk/year:  4.8%, which is moderate risk)    Does patient have any changes to current medical/health status since last appt (Y/N):  NO  Does patient have any signs/symptoms of bleeding and/or thrombosis since the last appt (Y/N):  NO  Does patient  have any interval changes to diet or medications since last appt (Y/N):  NO  Are there any complications or cost restrictions with current therapy (Y/N):  NO     Does patient have [facility] PCP? Yes, Ju[place] MOTA (If not, please document discussion that patient must be seen at [facility])       Vitals:  Declined by patient today due to Covid-19 transmission concerns.    There were no vitals filed for this visit.     Asssessment:      INR remains therapeutic at 2.1, therefore decreasing patient's stroke and/or bleeding risk.   Reason(s) for out of range INR today:  n/a      Pt NOT on antiplatelet therapy.    Medication reconciliation completed today.    Plan:  Pt is to continue with current warfarin dosing regimen.     Follow up:  Because warfarin is a high risk medication and current CHEST guidelines recommend regular monitoring intervals (few days up to 12 weeks), will have patient return to clinic in 9 weeks to recheck INR.    Eh Enamorado, PharmD, BCACP

## 2021-03-12 RX ORDER — LORATADINE 10 MG/1
10 TABLET ORAL DAILY
Qty: 100 TABLET | Refills: 2 | Status: SHIPPED | OUTPATIENT
Start: 2021-03-12 | End: 2021-12-14 | Stop reason: SDUPTHER

## 2021-03-12 NOTE — TELEPHONE ENCOUNTER
----- Message from Whitley Frederick sent at 3/11/2021 10:45 PM PST -----  Regarding: Prescription Question  Miles Arias... could you please call in a new prescription for 90/100 day on loratadine 10mg 1 a day to [facility] [phone]... thank you :)

## 2021-03-29 DIAGNOSIS — I10 ESSENTIAL HYPERTENSION: ICD-10-CM

## 2021-03-29 RX ORDER — TERAZOSIN 1 MG/1
1 CAPSULE ORAL DAILY
Qty: 100 CAPSULE | Refills: 2 | Status: SHIPPED | OUTPATIENT
Start: 2021-03-29 | End: 2021-05-26

## 2021-03-29 NOTE — TELEPHONE ENCOUNTER
Prescription Question    Whitley Frederick Troy L, M.D. 3 days ago     Hi ... need a new prescription for Terazosin 1mg ... 1 capsule at night  ...sent to [facility] in [facility]... [phone]... thank you ... please send me a notification  when you have received this message...

## 2021-03-31 DIAGNOSIS — E11.22 TYPE 2 DIABETES MELLITUS WITH STAGE 4 CHRONIC KIDNEY DISEASE, WITH LONG-TERM CURRENT USE OF INSULIN (HCC): ICD-10-CM

## 2021-03-31 DIAGNOSIS — N18.4 TYPE 2 DIABETES MELLITUS WITH STAGE 4 CHRONIC KIDNEY DISEASE, WITH LONG-TERM CURRENT USE OF INSULIN (HCC): ICD-10-CM

## 2021-03-31 DIAGNOSIS — Z79.4 TYPE 2 DIABETES MELLITUS WITH STAGE 4 CHRONIC KIDNEY DISEASE, WITH LONG-TERM CURRENT USE OF INSULIN (HCC): ICD-10-CM

## 2021-03-31 RX ORDER — SEMAGLUTIDE 1.34 MG/ML
0.5 INJECTION, SOLUTION SUBCUTANEOUS
Qty: 1.5 ML | Refills: 1 | Status: SHIPPED | OUTPATIENT
Start: 2021-03-31 | End: 2021-05-17 | Stop reason: SDUPTHER

## 2021-04-01 PROCEDURE — RXMED WILLOW AMBULATORY MEDICATION CHARGE: Performed by: NURSE PRACTITIONER

## 2021-04-07 ENCOUNTER — HOSPITAL ENCOUNTER (OUTPATIENT)
Dept: RADIOLOGY | Facility: MEDICAL CENTER | Age: 76
End: 2021-04-07
Attending: NURSE PRACTITIONER
Payer: MEDICARE

## 2021-04-07 ENCOUNTER — HOSPITAL ENCOUNTER (OUTPATIENT)
Dept: LAB | Facility: MEDICAL CENTER | Age: 76
End: 2021-04-07
Attending: INTERNAL MEDICINE
Payer: MEDICARE

## 2021-04-07 ENCOUNTER — PHARMACY VISIT (OUTPATIENT)
Dept: PHARMACY | Facility: MEDICAL CENTER | Age: 76
End: 2021-04-07
Payer: COMMERCIAL

## 2021-04-07 DIAGNOSIS — Z12.31 VISIT FOR SCREENING MAMMOGRAM: ICD-10-CM

## 2021-04-07 LAB
ALBUMIN SERPL BCP-MCNC: 4.1 G/DL (ref 3.2–4.9)
BUN SERPL-MCNC: 53 MG/DL (ref 8–22)
CALCIUM SERPL-MCNC: 9.4 MG/DL (ref 8.5–10.5)
CHLORIDE SERPL-SCNC: 108 MMOL/L (ref 96–112)
CO2 SERPL-SCNC: 22 MMOL/L (ref 20–33)
CREAT SERPL-MCNC: 2.32 MG/DL (ref 0.5–1.4)
CREAT UR-MCNC: 161.81 MG/DL
GLUCOSE SERPL-MCNC: 116 MG/DL (ref 65–99)
HCT VFR BLD AUTO: 37.4 % (ref 37–47)
HGB BLD-MCNC: 12.3 G/DL (ref 12–16)
MAGNESIUM SERPL-MCNC: 2 MG/DL (ref 1.5–2.5)
PHOSPHATE SERPL-MCNC: 4.2 MG/DL (ref 2.5–4.5)
POTASSIUM SERPL-SCNC: 4.9 MMOL/L (ref 3.6–5.5)
PROT UR-MCNC: 22 MG/DL (ref 0–15)
PROT/CREAT UR: 136 MG/G (ref 10–107)
SODIUM SERPL-SCNC: 140 MMOL/L (ref 135–145)
URATE SERPL-MCNC: 5.4 MG/DL (ref 1.9–8.2)

## 2021-04-07 PROCEDURE — 77063 BREAST TOMOSYNTHESIS BI: CPT

## 2021-04-07 PROCEDURE — 85018 HEMOGLOBIN: CPT

## 2021-04-07 PROCEDURE — 36415 COLL VENOUS BLD VENIPUNCTURE: CPT

## 2021-04-07 PROCEDURE — 84550 ASSAY OF BLOOD/URIC ACID: CPT

## 2021-04-07 PROCEDURE — 80069 RENAL FUNCTION PANEL: CPT

## 2021-04-07 PROCEDURE — 85014 HEMATOCRIT: CPT

## 2021-04-07 PROCEDURE — 83735 ASSAY OF MAGNESIUM: CPT

## 2021-04-07 PROCEDURE — 82570 ASSAY OF URINE CREATININE: CPT

## 2021-04-07 PROCEDURE — 84156 ASSAY OF PROTEIN URINE: CPT

## 2021-04-12 RX ORDER — ALLOPURINOL 100 MG/1
TABLET ORAL
Qty: 360 TABLET | Refills: 2 | Status: SHIPPED | OUTPATIENT
Start: 2021-04-12 | End: 2021-10-05 | Stop reason: SDUPTHER

## 2021-04-12 NOTE — TELEPHONE ENCOUNTER
Received request via: Pharmacy    Was the patient seen in the last year in this department? Yes  3/11/21  Does the patient have an active prescription (recently filled or refills available) for medication(s) requested? No

## 2021-04-15 ENCOUNTER — TELEPHONE (OUTPATIENT)
Dept: CARDIOLOGY | Facility: MEDICAL CENTER | Age: 76
End: 2021-04-15

## 2021-04-15 DIAGNOSIS — R00.2 PALPITATIONS: ICD-10-CM

## 2021-04-15 RX ORDER — METOPROLOL TARTRATE 50 MG/1
50 TABLET, FILM COATED ORAL 2 TIMES DAILY
Qty: 200 TABLET | Refills: 2 | Status: SHIPPED | OUTPATIENT
Start: 2021-04-15 | End: 2021-07-12 | Stop reason: SDUPTHER

## 2021-04-15 NOTE — TELEPHONE ENCOUNTER
[facility] pharmacy is calling for a refill of the patients Metoprolol. They will be re faxing the request again today. Please send to pharmacy on file.    Thank you,    Lydia GUTIÉRREZ

## 2021-05-11 ENCOUNTER — ANTICOAGULATION VISIT (OUTPATIENT)
Dept: MEDICAL GROUP | Facility: PHYSICIAN GROUP | Age: 76
End: 2021-05-11
Payer: MEDICARE

## 2021-05-11 DIAGNOSIS — Z79.01 CHRONIC ANTICOAGULATION: Primary | ICD-10-CM

## 2021-05-11 DIAGNOSIS — I48.0 PAF (PAROXYSMAL ATRIAL FIBRILLATION) (HCC): ICD-10-CM

## 2021-05-11 LAB — INR PPP: 2.5 (ref 2–3.5)

## 2021-05-11 PROCEDURE — 99999 PR NO CHARGE: CPT | Performed by: FAMILY MEDICINE

## 2021-05-11 PROCEDURE — 93793 ANTICOAG MGMT PT WARFARIN: CPT | Performed by: FAMILY MEDICINE

## 2021-05-11 PROCEDURE — 85610 PROTHROMBIN TIME: CPT | Performed by: FAMILY MEDICINE

## 2021-05-12 DIAGNOSIS — J45.909 UNCOMPLICATED ASTHMA, UNSPECIFIED ASTHMA SEVERITY, UNSPECIFIED WHETHER PERSISTENT: ICD-10-CM

## 2021-05-12 RX ORDER — MONTELUKAST SODIUM 10 MG/1
TABLET ORAL
Qty: 90 TABLET | Refills: 0 | Status: SHIPPED | OUTPATIENT
Start: 2021-05-12 | End: 2021-08-02 | Stop reason: SDUPTHER

## 2021-05-12 NOTE — TELEPHONE ENCOUNTER
Received request via: Pharmacy    Was the patient seen in the last year in this department? Yes  2/9/2021  Does the patient have an active prescription (recently filled or refills available) for medication(s) requested? No

## 2021-05-17 ENCOUNTER — PATIENT MESSAGE (OUTPATIENT)
Dept: MEDICAL GROUP | Facility: LAB | Age: 76
End: 2021-05-17

## 2021-05-17 DIAGNOSIS — N18.4 TYPE 2 DIABETES MELLITUS WITH STAGE 4 CHRONIC KIDNEY DISEASE, WITH LONG-TERM CURRENT USE OF INSULIN (HCC): ICD-10-CM

## 2021-05-17 DIAGNOSIS — Z79.4 TYPE 2 DIABETES MELLITUS WITH STAGE 4 CHRONIC KIDNEY DISEASE, WITH LONG-TERM CURRENT USE OF INSULIN (HCC): ICD-10-CM

## 2021-05-17 DIAGNOSIS — E11.22 TYPE 2 DIABETES MELLITUS WITH STAGE 4 CHRONIC KIDNEY DISEASE, WITH LONG-TERM CURRENT USE OF INSULIN (HCC): ICD-10-CM

## 2021-05-17 NOTE — TELEPHONE ENCOUNTER
----- Message from Whitley Frederick sent at 5/17/2021  3:14 PM PDT -----  Regarding: Prescription Question  Contact: [phone]  Hi could you please call/send a prescription to the [facility] on [address] for a free sample of Ozempic I just spoke them today and they do have them. Thank you Whitley Frederick 1945

## 2021-05-18 PROCEDURE — RXMED WILLOW AMBULATORY MEDICATION CHARGE: Performed by: NURSE PRACTITIONER

## 2021-05-18 RX ORDER — SEMAGLUTIDE 1.34 MG/ML
0.5 INJECTION, SOLUTION SUBCUTANEOUS
Qty: 1.5 ML | Refills: 1 | Status: SHIPPED | OUTPATIENT
Start: 2021-05-18 | End: 2021-07-01 | Stop reason: SDUPTHER

## 2021-05-26 ENCOUNTER — OFFICE VISIT (OUTPATIENT)
Dept: MEDICAL GROUP | Facility: LAB | Age: 76
End: 2021-05-26
Payer: MEDICARE

## 2021-05-26 VITALS
HEIGHT: 67 IN | HEART RATE: 60 BPM | WEIGHT: 269 LBS | BODY MASS INDEX: 42.22 KG/M2 | OXYGEN SATURATION: 95 % | TEMPERATURE: 97.5 F | RESPIRATION RATE: 16 BRPM | DIASTOLIC BLOOD PRESSURE: 54 MMHG | SYSTOLIC BLOOD PRESSURE: 154 MMHG

## 2021-05-26 DIAGNOSIS — J45.20 MILD INTERMITTENT ASTHMA WITHOUT COMPLICATION: ICD-10-CM

## 2021-05-26 DIAGNOSIS — E11.22 TYPE 2 DIABETES MELLITUS WITH STAGE 4 CHRONIC KIDNEY DISEASE, WITHOUT LONG-TERM CURRENT USE OF INSULIN (HCC): ICD-10-CM

## 2021-05-26 DIAGNOSIS — Z23 NEED FOR HEPATITIS B VACCINATION: ICD-10-CM

## 2021-05-26 DIAGNOSIS — I10 ESSENTIAL HYPERTENSION: ICD-10-CM

## 2021-05-26 DIAGNOSIS — N18.4 CKD (CHRONIC KIDNEY DISEASE) STAGE 4, GFR 15-29 ML/MIN (HCC): ICD-10-CM

## 2021-05-26 DIAGNOSIS — N18.4 TYPE 2 DIABETES MELLITUS WITH STAGE 4 CHRONIC KIDNEY DISEASE, WITHOUT LONG-TERM CURRENT USE OF INSULIN (HCC): ICD-10-CM

## 2021-05-26 LAB
HBA1C MFR BLD: 6.1 % (ref 0–5.6)
INT CON NEG: NEGATIVE
INT CON POS: POSITIVE

## 2021-05-26 PROCEDURE — 90746 HEPB VACCINE 3 DOSE ADULT IM: CPT | Performed by: NURSE PRACTITIONER

## 2021-05-26 PROCEDURE — 99214 OFFICE O/P EST MOD 30 MIN: CPT | Mod: 25 | Performed by: NURSE PRACTITIONER

## 2021-05-26 PROCEDURE — 83036 HEMOGLOBIN GLYCOSYLATED A1C: CPT | Performed by: NURSE PRACTITIONER

## 2021-05-26 PROCEDURE — G0010 ADMIN HEPATITIS B VACCINE: HCPCS | Performed by: NURSE PRACTITIONER

## 2021-05-26 RX ORDER — TERAZOSIN 1 MG/1
1 CAPSULE ORAL DAILY
Qty: 100 CAPSULE | Refills: 2
Start: 2021-03-31 | End: 2022-02-18 | Stop reason: SDUPTHER

## 2021-05-26 ASSESSMENT — PATIENT HEALTH QUESTIONNAIRE - PHQ9: CLINICAL INTERPRETATION OF PHQ2 SCORE: 0

## 2021-05-26 NOTE — PROGRESS NOTES
Annual Health Assessment Questions:     1.  Are you currently engaging in any exercise or physical activity? Yes    2.  How would you describe your mood or emotional well-being today? good    3.  Have you had any falls in the last year? No    4.  Have you noticed any problems with your balance or had difficulty walking? Yes    5.  In the last six months have you experienced any leakage of urine? Yes    6. DPA/Advanced Directive: does not have

## 2021-05-26 NOTE — PROGRESS NOTES
"Chief Complaint   Patient presents with   • Diabetes Follow-up   • Immunizations     Hep B 3rd       HPI  Whitley is a 76-year-old established female here to follow-up on chronic issues, in particular diabetes.  Last seen in February by her cardiologist due to her history of paroxysmal atrial fibrillation, managed with dronedarone and warfarin.  Her cardiologist did add on Hytrin because of her hypertension, 1 mg nightly but she quit this b/c BP was dropping 112/80's.   Home BP range ove the past 12 d - 115/71 - 143/81 with HR 55-60.   Takes furosemide 80 mg qod.      Type 2 diabetes: Chronic issue.  Has associated stage IV kidney disease and is followed by Dr. Badillo, nephrology.  Last blood work was April 7 which showed a decrease in her GFR to 20, BUN/creatinine 2.32/53 from 1.96/47 6 months prior.  Will f/u nephrology / labs next months  Fortunately her A1c has been doing well for years, typically around 6-6.7.  She continues injecting Ozempic 0.5 mg once weekly.  Home BG range low 100's.  Denies hypoglycemia.  Feet chronically burn but gabapentin helps.      Asthma:  Chronic issue.  Off inhaled steroids.  Only needs albuterol every few months.      Past medical, surgical, family, and social history is reviewed and updated in Epic chart by me today.   Medications and allergies reviewed and updated in Epic chart by me today.     ROS:   As documented in history of present illness above    Exam:  /54 (BP Location: Right arm, Patient Position: Sitting, BP Cuff Size: Large adult)   Pulse 60   Temp 36.4 °C (97.5 °F)   Resp 16   Ht 1.702 m (5' 7\")   Wt 122 kg (269 lb)   SpO2 95%     Constitutional: Alert, no distress, plus 3 vital signs  Skin:  Warm, dry, no rashes invisible areas  Eye: Equal, round and reactive, conjunctiva clear  ENMT: Lips without lesions, good dentition, oropharynx clear    Neck: Trachea midline, no masses, no thyromegaly  Respiratory: Unlabored respiration, lungs clear to auscultation, " no wheezes, no rhonchi  Cardiovascular: Normal rate and rhythm, no murmur, no edema  Abdomen: Soft, nontender, no masses or hepatosplenomegaly  Psych: Alert, pleasant, well-groomed, normal affect    Assessment / Plan / Medical Decision makin. Type 2 diabetes mellitus with stage 4 chronic kidney disease, without long-term current use of insulin ([facility])  -excellent control - 6.1.  Continue diabetic diet.  Encouraged more efforts at physical activity, even if this is simply around her house as she does not feel steady on her feet.  We discussed ways in which she can regain proprioception and her balance.  She does have bands at home that I encouraged her to use to her lower extremities safely.  Recheck A1c in 6 months.  - POCT Hemoglobin A1C    2. CKD (chronic kidney disease) stage 4, GFR 15-29 ml/min ([facility])  -labs  and f/u nephrology early .   -increase fluids.   -avoiding nsaids.    -eating low salt diet.      3. Essential hypertension  -add back in terazosin every other day on days in which she does not take lasix in hopes of keeping her blood pressure consistently at 120/80 or below to preserve her kidney function long-term and we discussed this.  Encouraged her to continue efforts at weight loss and healthy eating.  - terazosin (HYTRIN) 1 MG Cap; Take 1 capsule by mouth every 48 hours. On days you do not take lasix.  Dispense: 30 capsule; Refill: 4    4. Mild intermittent asthma without complication  -stable with intermittent use of xopenex.     5. Need for hepatitis B vaccination  - Hepatitis B Vaccine Adult IM

## 2021-06-04 ENCOUNTER — HOSPITAL ENCOUNTER (OUTPATIENT)
Dept: LAB | Facility: MEDICAL CENTER | Age: 76
End: 2021-06-04
Attending: INTERNAL MEDICINE
Payer: MEDICARE

## 2021-06-04 ENCOUNTER — PHARMACY VISIT (OUTPATIENT)
Dept: PHARMACY | Facility: MEDICAL CENTER | Age: 76
End: 2021-06-04
Payer: COMMERCIAL

## 2021-06-04 LAB
ALBUMIN SERPL BCP-MCNC: 3.7 G/DL (ref 3.2–4.9)
BUN SERPL-MCNC: 54 MG/DL (ref 8–22)
CALCIUM SERPL-MCNC: 9 MG/DL (ref 8.5–10.5)
CHLORIDE SERPL-SCNC: 111 MMOL/L (ref 96–112)
CO2 SERPL-SCNC: 20 MMOL/L (ref 20–33)
CREAT SERPL-MCNC: 2.12 MG/DL (ref 0.5–1.4)
GLUCOSE SERPL-MCNC: 138 MG/DL (ref 65–99)
PHOSPHATE SERPL-MCNC: 3.7 MG/DL (ref 2.5–4.5)
POTASSIUM SERPL-SCNC: 4.8 MMOL/L (ref 3.6–5.5)
PTH-INTACT SERPL-MCNC: 80.7 PG/ML (ref 14–72)
SODIUM SERPL-SCNC: 142 MMOL/L (ref 135–145)

## 2021-06-04 PROCEDURE — 83970 ASSAY OF PARATHORMONE: CPT

## 2021-06-04 PROCEDURE — 82306 VITAMIN D 25 HYDROXY: CPT

## 2021-06-04 PROCEDURE — 80069 RENAL FUNCTION PANEL: CPT

## 2021-06-04 PROCEDURE — 36415 COLL VENOUS BLD VENIPUNCTURE: CPT

## 2021-06-07 LAB — 25(OH)D3 SERPL-MCNC: 34 NG/ML (ref 30–80)

## 2021-06-08 ENCOUNTER — TELEPHONE (OUTPATIENT)
Dept: CARDIOLOGY | Facility: MEDICAL CENTER | Age: 76
End: 2021-06-08

## 2021-06-08 PROCEDURE — RXMED WILLOW AMBULATORY MEDICATION CHARGE: Performed by: FAMILY MEDICINE

## 2021-06-08 PROCEDURE — RXMED WILLOW AMBULATORY MEDICATION CHARGE: Performed by: NURSE PRACTITIONER

## 2021-06-08 NOTE — TELEPHONE ENCOUNTER
[facility]. [facility] received pg 4 of Pt. Assistance form for Multaq (faxed by nurse) on 5-27-21. [facility] has NOT received pg. 2 and 3 (which pt. Completes and signs).   Called pt. To advise that she needs to fax her portion of form to [facility] to complete application.

## 2021-06-08 NOTE — TELEPHONE ENCOUNTER
ALEXANDRIA Garduno with [employer] regarding the application patient sent over is missing 2 pages from the patient. They also need the frequency for the medication added to the document. Please contact patient regarding missing pages as they are unable to reach out to patient. Please fax correction and additional 2 pages to [phone]. Please call [phone] with any questions.    Thank you,    Lydia GUTIÉRREZ

## 2021-06-09 PROCEDURE — RXMED WILLOW AMBULATORY MEDICATION CHARGE: Performed by: NURSE PRACTITIONER

## 2021-06-09 RX ORDER — ROSUVASTATIN CALCIUM 10 MG/1
10 TABLET, COATED ORAL EVERY EVENING
Qty: 100 TABLET | Refills: 3 | Status: SHIPPED | OUTPATIENT
Start: 2021-06-09 | End: 2021-06-10 | Stop reason: SDUPTHER

## 2021-06-09 RX ORDER — LEVOTHYROXINE SODIUM 0.05 MG/1
50 TABLET ORAL
Qty: 100 TABLET | Refills: 3 | Status: SHIPPED | OUTPATIENT
Start: 2021-06-09 | End: 2021-06-10 | Stop reason: SDUPTHER

## 2021-06-09 NOTE — TELEPHONE ENCOUNTER
Received request via: Pharmacy    Was the patient seen in the last year in this department? Yes  5/26/21  Does the patient have an active prescription (recently filled or refills available) for medication(s) requested? No

## 2021-06-10 ENCOUNTER — PHARMACY VISIT (OUTPATIENT)
Dept: PHARMACY | Facility: MEDICAL CENTER | Age: 76
End: 2021-06-10
Payer: MEDICARE

## 2021-06-10 DIAGNOSIS — Z79.01 CHRONIC ANTICOAGULATION: ICD-10-CM

## 2021-06-10 PROCEDURE — RXMED WILLOW AMBULATORY MEDICATION CHARGE: Performed by: NURSE PRACTITIONER

## 2021-06-10 RX ORDER — ROSUVASTATIN CALCIUM 10 MG/1
10 TABLET, COATED ORAL EVERY EVENING
Qty: 100 TABLET | Refills: 3 | Status: SHIPPED | OUTPATIENT
Start: 2021-06-10 | End: 2022-07-09 | Stop reason: SDUPTHER

## 2021-06-10 RX ORDER — LEVOTHYROXINE SODIUM 0.05 MG/1
50 TABLET ORAL
Qty: 100 TABLET | Refills: 3 | Status: SHIPPED | OUTPATIENT
Start: 2021-06-10 | End: 2022-06-11 | Stop reason: SDUPTHER

## 2021-06-10 RX ORDER — PIOGLITAZONEHYDROCHLORIDE 15 MG/1
15 TABLET ORAL DAILY
Qty: 100 TABLET | Refills: 3 | Status: SHIPPED | OUTPATIENT
Start: 2021-06-10 | End: 2022-07-07 | Stop reason: SDUPTHER

## 2021-06-10 NOTE — TELEPHONE ENCOUNTER
----- Message from Whitley Frederick sent at 6/9/2021 10:15 PM PDT -----  Regarding: Prescription Question  Contact: [phone]  Miles Cm… There’s been a change to my pharmacy… I no longer use [facility] mail order in [place]… And I’m no longer using [facility] in [facility] on [address] … I had my  take them out of my chart… I’m Now transferring everything from [facility] to [facility] on [address]… Because they are now doing mail order And I can also get 30 day if I need it… So here’s the thing… Right now I need new prescriptions for  Actos, Rosuvastatin, and Levothyroxin… Again please send new Prescription to [facility] on [address] only From here on out… Thank you

## 2021-07-01 DIAGNOSIS — E11.22 TYPE 2 DIABETES MELLITUS WITH STAGE 4 CHRONIC KIDNEY DISEASE, WITH LONG-TERM CURRENT USE OF INSULIN (HCC): ICD-10-CM

## 2021-07-01 DIAGNOSIS — N18.4 TYPE 2 DIABETES MELLITUS WITH STAGE 4 CHRONIC KIDNEY DISEASE, WITH LONG-TERM CURRENT USE OF INSULIN (HCC): ICD-10-CM

## 2021-07-01 DIAGNOSIS — Z79.4 TYPE 2 DIABETES MELLITUS WITH STAGE 4 CHRONIC KIDNEY DISEASE, WITH LONG-TERM CURRENT USE OF INSULIN (HCC): ICD-10-CM

## 2021-07-01 PROCEDURE — RXMED WILLOW AMBULATORY MEDICATION CHARGE: Performed by: NURSE PRACTITIONER

## 2021-07-01 RX ORDER — SEMAGLUTIDE 1.34 MG/ML
0.5 INJECTION, SOLUTION SUBCUTANEOUS
Qty: 1.5 ML | Refills: 1 | Status: SHIPPED | OUTPATIENT
Start: 2021-07-01 | End: 2021-08-18 | Stop reason: SDUPTHER

## 2021-07-01 NOTE — TELEPHONE ENCOUNTER
----- Message from Whitley Frederick sent at 6/30/2021 10:18 PM PDT -----  Regarding: Prescription Question  Contact: [phone]  Ozempic… Haven’t checked with [facility] on [address] whether they have a free sample of Ozempic  but I do need one thank you … once a week injection of .5mg

## 2021-07-12 ENCOUNTER — PATIENT MESSAGE (OUTPATIENT)
Dept: MEDICAL GROUP | Facility: LAB | Age: 76
End: 2021-07-12

## 2021-07-12 DIAGNOSIS — N18.4 CKD (CHRONIC KIDNEY DISEASE) STAGE 4, GFR 15-29 ML/MIN (HCC): ICD-10-CM

## 2021-07-12 DIAGNOSIS — R00.2 PALPITATIONS: ICD-10-CM

## 2021-07-12 PROCEDURE — RXMED WILLOW AMBULATORY MEDICATION CHARGE: Performed by: NURSE PRACTITIONER

## 2021-07-12 RX ORDER — METOPROLOL TARTRATE 50 MG/1
50 TABLET, FILM COATED ORAL 2 TIMES DAILY
Qty: 200 TABLET | Refills: 3 | Status: SHIPPED | OUTPATIENT
Start: 2021-07-12 | End: 2022-07-24 | Stop reason: SDUPTHER

## 2021-07-12 RX ORDER — FUROSEMIDE 80 MG
80 TABLET ORAL
Qty: 45 TABLET | Refills: 3 | Status: SHIPPED | OUTPATIENT
Start: 2021-07-12 | End: 2022-12-28 | Stop reason: SDUPTHER

## 2021-07-12 NOTE — TELEPHONE ENCOUNTER
----- Message from Whitley Frederick sent at 7/12/2021  2:05 PM PDT -----  Regarding: Prescription Question  Contact: [phone]  Miles Ward  This one's for me ;) ... I need a couple prescriptions called into the [facility] on [address]... again I have changed from [facility] mail order to [facility] because they have started a mail order service...    Metoprolol Tartrate 50mg 180 Tablets for 90 days  Furosemide 80mg  45 Tablets for 90 days    Thank You for your time and help!

## 2021-07-13 ENCOUNTER — PHARMACY VISIT (OUTPATIENT)
Dept: PHARMACY | Facility: MEDICAL CENTER | Age: 76
End: 2021-07-13
Payer: COMMERCIAL

## 2021-07-27 ENCOUNTER — ANTICOAGULATION VISIT (OUTPATIENT)
Dept: MEDICAL GROUP | Facility: PHYSICIAN GROUP | Age: 76
End: 2021-07-27
Payer: MEDICARE

## 2021-07-27 DIAGNOSIS — Z79.01 CHRONIC ANTICOAGULATION: Primary | ICD-10-CM

## 2021-07-27 DIAGNOSIS — I48.0 PAF (PAROXYSMAL ATRIAL FIBRILLATION) (HCC): ICD-10-CM

## 2021-07-27 LAB — INR PPP: 2.9 (ref 2–3.5)

## 2021-07-27 PROCEDURE — 93793 ANTICOAG MGMT PT WARFARIN: CPT | Performed by: FAMILY MEDICINE

## 2021-07-27 PROCEDURE — 85610 PROTHROMBIN TIME: CPT | Performed by: FAMILY MEDICINE

## 2021-07-27 PROCEDURE — 99999 PR NO CHARGE: CPT | Performed by: FAMILY MEDICINE

## 2021-07-27 NOTE — PROGRESS NOTES
Anticoagulation Summary  As of 2021    INR goal:  2.0-3.0   TTR:  71.0 % (5.8 y)   INR used for dosin.90 (2021)   Warfarin maintenance plan:  3 mg (3 mg x 1) every day   Weekly warfarin total:  21 mg   Weekly max warfarin dose:  21 mg   Plan last modified:  EDILMA Tellez (2020)   Next INR check:  10/19/2021   Target end date:  Indefinite    Indications    Chronic anticoagulation [Z79.01]  Chronic anticoagulation (Resolved) [Z79.01]  PAF (paroxysmal atrial fibrillation) (HCC) [I48.0]             Anticoagulation Episode Summary     INR check location:      Preferred lab:      Send INR reminders to:      Comments:        Anticoagulation Care Providers     Provider Role Specialty Phone number    [facility] Responsible  [phone]        Anticoagulation Patient Findings  Patient Findings     Negatives:  Signs/symptoms of thrombosis, Signs/symptoms of bleeding, Laboratory test error suspected, Change in health, Change in alcohol use, Change in activity, Upcoming invasive procedure, Emergency department visit, Upcoming dental procedure, Missed doses, Extra doses, Change in medications, Change in diet/appetite, Hospital admission, Bruising, Other complaints         HPI:   Whitley Frederick seen in clinic today, on anticoagulation therapy with warfarin for stroke prophylaxis due to history of atrial fibrillation.    Patient's previous INR was therapeutic at 2.5 on 21, at which time patient was instructed to continue with current warfarin regimen.  She returns to clinic today to recheck INR to ensure it is therapeutic and thus preventing possible clotting and/or bleeding/bruising complications.    CHADS-VASc = at least 4  (unadjusted ischemic stroke risk/year:  4.8%, which is moderate risk)    Does patient have any changes to current medical/health status since last appt (Y/N):  NO  Does patient have any signs/symptoms of bleeding and/or thrombosis since the last appt  (Y/N):  NO  Does patient have any interval changes to diet or medications since last appt (Y/N):  NO  Are there any complications or cost restrictions with current therapy (Y/N):  NO     Does patient have [facility] PCP? Yes, [place] MOTA (If not, please document discussion that patient must be seen at [facility])       Vitals:  declined at today's visit.    There were no vitals filed for this visit.     Asssessment:      INR remains therapeutic at 2.9, therefore decreasing patient's stroke and/or bleeding risk.   Reason(s) for out of range INR today:  n/a      Pt NOT on antiplatelet therapy.    Medication reconciliation completed today.    Plan:  Pt is to continue with current warfarin dosing regimen.     Follow up:  Because warfarin is a high risk medication and current CHEST guidelines recommend regular monitoring intervals (few days up to 12 weeks), will have patient return to clinic in 12 weeks to recheck INR.    Eh Enamorado, PharmD, BCACP

## 2021-07-28 ENCOUNTER — TELEPHONE (OUTPATIENT)
Dept: MEDICAL GROUP | Facility: LAB | Age: 76
End: 2021-07-28

## 2021-07-28 NOTE — TELEPHONE ENCOUNTER
I left msg to see if pt had cologuard done, ordered 2/9/21? Also to call me back to scheduled a AWV and other care gaps

## 2021-07-30 ENCOUNTER — TELEPHONE (OUTPATIENT)
Dept: CARDIOLOGY | Facility: MEDICAL CENTER | Age: 76
End: 2021-07-30

## 2021-07-30 NOTE — TELEPHONE ENCOUNTER
TW    Patient called and stated she will be receiving the Multaq again from [employer] [facility]. She states it should be there on Wednesday. Please let her know when it arrives.      Thank you,    Lydia GUTIÉRREZ

## 2021-08-02 ENCOUNTER — PATIENT MESSAGE (OUTPATIENT)
Dept: MEDICAL GROUP | Facility: LAB | Age: 76
End: 2021-08-02

## 2021-08-02 DIAGNOSIS — J45.909 UNCOMPLICATED ASTHMA, UNSPECIFIED ASTHMA SEVERITY, UNSPECIFIED WHETHER PERSISTENT: ICD-10-CM

## 2021-08-02 DIAGNOSIS — I48.0 PAF (PAROXYSMAL ATRIAL FIBRILLATION) (HCC): ICD-10-CM

## 2021-08-02 DIAGNOSIS — K21.9 GASTROESOPHAGEAL REFLUX DISEASE: ICD-10-CM

## 2021-08-02 PROCEDURE — RXMED WILLOW AMBULATORY MEDICATION CHARGE: Performed by: NURSE PRACTITIONER

## 2021-08-02 RX ORDER — MONTELUKAST SODIUM 10 MG/1
10 TABLET ORAL
Qty: 90 TABLET | Refills: 0 | Status: SHIPPED | OUTPATIENT
Start: 2021-08-02 | End: 2021-11-11 | Stop reason: SDUPTHER

## 2021-08-02 RX ORDER — WARFARIN SODIUM 3 MG/1
TABLET ORAL
Qty: 90 TABLET | Refills: 2 | Status: SHIPPED | OUTPATIENT
Start: 2021-08-02 | End: 2022-03-11

## 2021-08-02 RX ORDER — OMEPRAZOLE 20 MG/1
40 CAPSULE, DELAYED RELEASE ORAL DAILY
Qty: 180 CAPSULE | Refills: 0 | Status: SHIPPED | OUTPATIENT
Start: 2021-08-02 | End: 2022-01-12 | Stop reason: SDUPTHER

## 2021-08-02 NOTE — TELEPHONE ENCOUNTER
From: Whitley Frederick  To: Nurse Practitioner Ju[place] Berger  Sent: 8/2/2021 1:54 PM PDT  Subject: Prescription Question    Hi need a new 90 day prescription sent to [facility] .... Montelukast, Warfrin, Omeprazole Thank You....

## 2021-08-03 ENCOUNTER — PHARMACY VISIT (OUTPATIENT)
Dept: PHARMACY | Facility: MEDICAL CENTER | Age: 76
End: 2021-08-03
Payer: MEDICARE

## 2021-08-03 ENCOUNTER — TELEPHONE (OUTPATIENT)
Dept: MEDICAL GROUP | Facility: LAB | Age: 76
End: 2021-08-03

## 2021-08-03 NOTE — TELEPHONE ENCOUNTER
APPOINTMENT SCHEDULING  Patient overdue for appointment. Called patient to schedule appointment.  Phone Number Called:[phone]    Call outcome: spoke w/pt and she just had a great granddaughter born and will call to schedule AWV when she can

## 2021-08-06 ENCOUNTER — DOCUMENTATION (OUTPATIENT)
Dept: VASCULAR LAB | Facility: MEDICAL CENTER | Age: 76
End: 2021-08-06

## 2021-08-06 NOTE — TELEPHONE ENCOUNTER
Patient is calling to find out where her Multaq was sent. Please call her to advise.    Thank you,    Lydia GUTIÉRREZ

## 2021-08-06 NOTE — PROGRESS NOTES
[facility]    Pt's daughter picked up Multaq 400 mg #180 from our clinic.  The medication was somehow sent to her physicain's office.  Pt advised to contact the assistance program to have them ship the medication directly to her home for the next refill.    Juan Bonds, AraceliD

## 2021-08-07 NOTE — TELEPHONE ENCOUNTER
Per Brigitte Horn, I gave the samples to Dayo (pharmacist) who was in our office on Thursday.      To Alfonso to let patient know on Monday.  I thought he was going to send these to the [facility] pharmacy, so she may want to call there.  He said that he would take care of it.

## 2021-08-07 NOTE — TELEPHONE ENCOUNTER
It appears she is getting it right from the .  I am assuming she must be on some kind of assistance program for it to be shipped to the physician's office.  You can hold onto the medication and have the pt  the medication directly from your office.       To make it easier in the future, I would suggest contacting the company that sent you the Multaq and let them know they should ship it to the address of the patient instead.  It would be nice if this wouldn't have to happen again for you guys.     Let me know if you have more questions!   Juan     Message text    You routed conversation to Brigitte Horn 2 days ago   You  Juan Bonds, PharmD 3 days ago     Miles Martinez   [facility] sent a 90 day supply of Multaq 400 to us (att Gareth).  Patient says that she normally gets these from the [facility] and someone from our office would take them over there (she is gone now).  How do you want me to proceed?     Thank you!   Shaniqua

## 2021-08-09 NOTE — TELEPHONE ENCOUNTER
Contacted the [facility] ([place] and was told that they do not have the Multaq rx and have no information about its whereabouts.     Reaching out to Dayo Lara pharmD to attempt to locate the multaq.

## 2021-08-17 ENCOUNTER — HOSPITAL ENCOUNTER (OUTPATIENT)
Dept: LAB | Facility: MEDICAL CENTER | Age: 76
End: 2021-08-17
Attending: INTERNAL MEDICINE
Payer: MEDICARE

## 2021-08-17 LAB
ALBUMIN SERPL BCP-MCNC: 3.9 G/DL (ref 3.2–4.9)
BUN SERPL-MCNC: 45 MG/DL (ref 8–22)
CALCIUM SERPL-MCNC: 9.4 MG/DL (ref 8.5–10.5)
CHLORIDE SERPL-SCNC: 109 MMOL/L (ref 96–112)
CO2 SERPL-SCNC: 21 MMOL/L (ref 20–33)
CREAT SERPL-MCNC: 2.02 MG/DL (ref 0.5–1.4)
CREAT UR-MCNC: 118.11 MG/DL
GLUCOSE SERPL-MCNC: 122 MG/DL (ref 65–99)
MAGNESIUM SERPL-MCNC: 1.8 MG/DL (ref 1.5–2.5)
PHOSPHATE SERPL-MCNC: 3.4 MG/DL (ref 2.5–4.5)
POTASSIUM SERPL-SCNC: 5.1 MMOL/L (ref 3.6–5.5)
PROT UR-MCNC: 26 MG/DL (ref 0–15)
PROT/CREAT UR: 220 MG/G (ref 10–107)
SODIUM SERPL-SCNC: 142 MMOL/L (ref 135–145)

## 2021-08-17 PROCEDURE — 80069 RENAL FUNCTION PANEL: CPT

## 2021-08-17 PROCEDURE — 36415 COLL VENOUS BLD VENIPUNCTURE: CPT

## 2021-08-17 PROCEDURE — 84156 ASSAY OF PROTEIN URINE: CPT

## 2021-08-17 PROCEDURE — 83735 ASSAY OF MAGNESIUM: CPT

## 2021-08-17 PROCEDURE — 82570 ASSAY OF URINE CREATININE: CPT

## 2021-08-18 DIAGNOSIS — N18.4 TYPE 2 DIABETES MELLITUS WITH STAGE 4 CHRONIC KIDNEY DISEASE, WITH LONG-TERM CURRENT USE OF INSULIN (HCC): ICD-10-CM

## 2021-08-18 DIAGNOSIS — E11.22 TYPE 2 DIABETES MELLITUS WITH STAGE 4 CHRONIC KIDNEY DISEASE, WITH LONG-TERM CURRENT USE OF INSULIN (HCC): ICD-10-CM

## 2021-08-18 DIAGNOSIS — Z79.4 TYPE 2 DIABETES MELLITUS WITH STAGE 4 CHRONIC KIDNEY DISEASE, WITH LONG-TERM CURRENT USE OF INSULIN (HCC): ICD-10-CM

## 2021-08-18 RX ORDER — SEMAGLUTIDE 1.34 MG/ML
0.5 INJECTION, SOLUTION SUBCUTANEOUS
Qty: 1.5 ML | Refills: 1 | Status: SHIPPED | OUTPATIENT
Start: 2021-08-18 | End: 2021-11-11

## 2021-08-18 NOTE — TELEPHONE ENCOUNTER
----- Message from Whitley Frederick sent at 8/18/2021  1:50 PM PDT -----  Regarding: Fax sent to you office 8/12/2021  yes I printed that one and its not a full page ... I know your busy but could you copy both those pages and send them to my by snail mail...     Whitley Frederick  [address] [place] [address], [phone]    Thank you

## 2021-08-18 NOTE — LETTER
August 18, 2021        Whitley Frederick  [facility] [address]        Dear Whitley:    Paperwork attached    If you have any questions or concerns, please don't hesitate to call.        Sincerely,        TREVOR Whelan.    Electronically Signed

## 2021-08-25 ENCOUNTER — TELEPHONE (OUTPATIENT)
Dept: MEDICAL GROUP | Facility: LAB | Age: 76
End: 2021-08-25

## 2021-08-25 NOTE — TELEPHONE ENCOUNTER
ANNUAL WELLNESS VISIT PRE-VISIT PLANNING    1.  Reviewed notes from the last office visit: Yes    2.  If any orders were ordered or intended to be done prior to visit (i.e. 6 mos follow-up), do we have results/consult notes or has patient scheduled?        •  Labs - Labs ordered, completed on 5/26/21 and results are in chart.  Note: If patient appointment is for lab review and patient did not complete labs, check with provider if OK to reschedule patient until labs completed.       •  Imaging - Imaging was not ordered at last office visit.       •  Referrals - No referrals were ordered at last office visit.    3.  Immunizations were updated in Epic using Reconcile Outside Information activity? Yes  4.  Patient is due for the following Health Maintenance Topics:   Health Maintenance Due   Topic Date Due   • COVID-19 Vaccine (1) Never done   • IMM ZOSTER VACCINES (1 of 2) Never done   • RETINAL SCREENING  10/29/2020   • FASTING LIPID PROFILE  12/14/2020   • DIABETES MONOFILAMENT / LE EXAM  06/02/2021   • Annual Wellness Visit  06/03/2021   • COLORECTAL CANCER SCREENING  08/03/2021       5.  Reviewed/Updated the following with patient:       •   Preferred Pharmacy? Yes       •   Preferred Lab? Yes       •   Preferred Communication? Yes       •   Allergies? Yes       •   Medications? YES. Was Abstract Encounter opened and chart updated? YES    6.  Care Team Updated:       •   DME Company (gait device, O2, CPAP, etc.): YES       •   Other Specialists (eye doctor, derm, GYN, cardiology, endo, etc): YES    7.  Patient was advised: “This is a free wellness visit. The provider will screen for medical conditions to help you stay healthy. If you have other concerns to address you may be asked to discuss these at a separate visit or there may be an additional fee.”     8.  AHA ([employer]) form printed for Provider? No, patient does not have any open alerts

## 2021-09-02 ENCOUNTER — TELEPHONE (OUTPATIENT)
Dept: MEDICAL GROUP | Facility: LAB | Age: 76
End: 2021-09-02

## 2021-09-02 PROCEDURE — RXMED WILLOW AMBULATORY MEDICATION CHARGE: Performed by: NURSE PRACTITIONER

## 2021-09-02 NOTE — TELEPHONE ENCOUNTER
----- Message from Whitley Frederick sent at 9/2/2021  9:24 AM PDT -----  Regarding: Fax sent to you office 8/12/2021  RE: [employer] Patient Assistance application    Unbelievable… sorry… now they need 120 day Prescription written on that application instead of 90 days… I think it’s on page 5 of the application… And both page 4 and 5 Need to be faxed back to them ASAP because I am almost out of my Ozempic  I might have one more dose left for Tuesday… Also you used my copy That I sent back via my chart with the big ugly [place] around it… And they said they could barely read [place] state license number on that copy… last Friday They informed me that they were faxing back their, karen Nordisk copy ,Which the state license number would’ve been more legible if used … and I was informed this morning after speaking to them they were going to refax pages 4 and 5 this morning  …  your immediate attention would be appreciated thank you…

## 2021-09-02 NOTE — LETTER
September 2, 2021        Whitley Frederick  [address] [facility]  [address]  [place] [address]        Allergies   Allergen Reactions   • Amlodipine Itching   • Levoxyl [Levothyroxine Sodium] Unspecified     Flushing, very hot   • Amaryl      GI Problems   • Avandia [Rosiglitazone Maleate]      GI problems   • Cipro Xr      Possibly causes Chills.   • Clonidine      Rapid heart rate, swelling    • Contrast Media With Iodine [Iodine] Hives   • Glipizide      GI Problems   • Glucophage [Metformin Hydrochloride]      dizzy   • Hydralazine      Rapid heart rate, chest tightness   • Levaquin      Possible allergy - chills with cipro but sick   • Relafen [Nabumetone]      Itching; avoids all nsaids       If you have any questions or concerns, please don't hesitate to call.        Sincerely,        ANU WhelanPNICKOLAS.    Electronically Signed

## 2021-09-03 ENCOUNTER — PHARMACY VISIT (OUTPATIENT)
Dept: PHARMACY | Facility: MEDICAL CENTER | Age: 76
End: 2021-09-03
Payer: COMMERCIAL

## 2021-09-09 ENCOUNTER — PHARMACY VISIT (OUTPATIENT)
Dept: PHARMACY | Facility: MEDICAL CENTER | Age: 76
End: 2021-09-09
Payer: MEDICARE

## 2021-09-09 DIAGNOSIS — R09.81 SINUS CONGESTION: ICD-10-CM

## 2021-09-09 PROCEDURE — RXMED WILLOW AMBULATORY MEDICATION CHARGE: Performed by: NURSE PRACTITIONER

## 2021-09-09 PROCEDURE — RXMED WILLOW AMBULATORY MEDICATION CHARGE: Performed by: FAMILY MEDICINE

## 2021-09-09 RX ORDER — MAGNESIUM OXIDE 400 MG/1
400 TABLET ORAL DAILY
Qty: 90 TABLET | Refills: 0 | Status: SHIPPED | OUTPATIENT
Start: 2021-09-09 | End: 2021-11-27 | Stop reason: SDUPTHER

## 2021-09-09 RX ORDER — FLUTICASONE PROPIONATE 50 MCG
SPRAY, SUSPENSION (ML) NASAL
Qty: 48 G | Refills: 3 | Status: SHIPPED | OUTPATIENT
Start: 2021-09-09 | End: 2022-11-20 | Stop reason: SDUPTHER

## 2021-09-09 NOTE — TELEPHONE ENCOUNTER
----- Message from Whitley Frederick sent at 9/8/2021 10:12 PM PDT -----  Regarding: New Prescriptions to [facility]     To [facility] …     Magnesium Oxide 400mg - 1 per day - 90Days  Fluticasone 50mcg  two sprays each nostril twice a day… 90Day  Thank you …

## 2021-09-10 ENCOUNTER — TELEPHONE (OUTPATIENT)
Dept: MEDICAL GROUP | Facility: LAB | Age: 76
End: 2021-09-10

## 2021-09-10 NOTE — TELEPHONE ENCOUNTER
"Colorectal Care Gap Outreach    1. Confirmed patient is between the ages of 50-75: YES     2. Confirmed that patient IS overdue or due soon for colorectal cancer screening: YES     3. Were orders placed within the last 12 months to complete screening: YES     Phone Number Called: [phone]  Call outcome: reminded patient of existing order for Cologuard.   I spoke w/pt and she said she did test and put in freezer and forgot about it. She needs new order for Cologuard and would like to have the \"small\" one not \"big one\" ordered.    _____________________________________________________________________    Colon Cancer Screening Guidelines:     Important: If patient has any history of colon polyps or family history of colorectal cancer, FIT and Cologuard are NOT appropriate options. A colonoscopy is the recommended test for this set of patients.    • Colonoscopy  o Always recommend colonoscopy first.   o A colonoscopy is recommended over the other tests because it provides direct visualization of the colon and if there are small polyps these can also be removed with one procedure.  o If negative and no family history, could be cleared for 10 years.     • Cologuard/FIT  o Cologuard is completed once every 3 years.  o FIT is completed annually.  o If positive, Cologuard and FIT will require a diagnostic colonoscopy. Screening colonoscopies are classically covered by insurances, however, diagnostic colonoscopies may result in a bill.     "

## 2021-09-13 NOTE — TELEPHONE ENCOUNTER
I let Whitley know what Kina had replied:    Patient just needs to call cologuard and let them know that she needs a new kit (since there is a valid order).   She agreed

## 2021-09-15 ENCOUNTER — TELEMEDICINE (OUTPATIENT)
Dept: MEDICAL GROUP | Facility: LAB | Age: 76
End: 2021-09-15
Payer: MEDICARE

## 2021-09-15 ENCOUNTER — PHARMACY VISIT (OUTPATIENT)
Dept: PHARMACY | Facility: MEDICAL CENTER | Age: 76
End: 2021-09-15
Payer: MEDICARE

## 2021-09-15 VITALS
DIASTOLIC BLOOD PRESSURE: 72 MMHG | HEART RATE: 58 BPM | WEIGHT: 273 LBS | OXYGEN SATURATION: 98 % | BODY MASS INDEX: 43.87 KG/M2 | SYSTOLIC BLOOD PRESSURE: 126 MMHG | HEIGHT: 66 IN

## 2021-09-15 DIAGNOSIS — E11.22 TYPE 2 DIABETES MELLITUS WITH STAGE 4 CHRONIC KIDNEY DISEASE, WITHOUT LONG-TERM CURRENT USE OF INSULIN (HCC): ICD-10-CM

## 2021-09-15 DIAGNOSIS — G89.29 CHRONIC NECK PAIN: ICD-10-CM

## 2021-09-15 DIAGNOSIS — M54.2 CHRONIC NECK PAIN: ICD-10-CM

## 2021-09-15 DIAGNOSIS — N18.4 TYPE 2 DIABETES MELLITUS WITH STAGE 4 CHRONIC KIDNEY DISEASE, WITHOUT LONG-TERM CURRENT USE OF INSULIN (HCC): ICD-10-CM

## 2021-09-15 DIAGNOSIS — F41.9 ANXIETY: ICD-10-CM

## 2021-09-15 PROCEDURE — 99214 OFFICE O/P EST MOD 30 MIN: CPT | Mod: 95 | Performed by: NURSE PRACTITIONER

## 2021-09-15 PROCEDURE — RXMED WILLOW AMBULATORY MEDICATION CHARGE: Performed by: NURSE PRACTITIONER

## 2021-09-15 RX ORDER — SERTRALINE HYDROCHLORIDE 25 MG/1
25 TABLET, FILM COATED ORAL DAILY
Qty: 30 TABLET | Refills: 2 | Status: SHIPPED | OUTPATIENT
Start: 2021-09-15 | End: 2021-10-13 | Stop reason: SDUPTHER

## 2021-09-15 NOTE — ASSESSMENT & PLAN NOTE
Chronic issue.  Neck hurts on a daily basis.   Using tylenol for pain most days of the week.  Neck pain is stiff / aches.  Pain radiates down to both shoulders.  Denies injury.

## 2021-09-15 NOTE — ASSESSMENT & PLAN NOTE
Chronic issue.  BG around 150 on average in the morning fasting.  Not checking BG daily.  Injecting ozempic 0.5 mg every week and actos 15 mg daily.  Physical activity:  Walking halls in residence.  Has CKD and neuropathy.

## 2021-09-15 NOTE — ASSESSMENT & PLAN NOTE
"Chronic issue.  Worsening with daughter leaving for [place] soon.  Getting rush of anxiety with any socialization.  Also having car trouble and home all the time.  Doesn't feel \"things are normal.\"  Scare to get covid vaccine.   Previous med trials: unsure, last med was 25 yrs ago.  She is on gabapentin 100 mg at night for sleep which helps.    "

## 2021-09-15 NOTE — PROGRESS NOTES
"Virtual Visit: Established Patient   This visit was conducted via Zoom using secure and encrypted videoconferencing technology.   The patient was in a private location in the state of [place].    The patient's identity was confirmed and verbal consent was obtained for this virtual visit.    Subjective:   CC:   Whitley Frederick is a 76 y.o. female presenting for evaluation and management of:    HPI:  Whitley is a 75 yo est female following up on chronic issues:     1-Type 2 diabetes mellitus with stage 4 chronic kidney disease, without long-term current use of insulin (HCC)  Chronic issue.  BG around 150 on average in the morning fasting.  Not checking BG daily.  Injecting ozempic 0.5 mg every week and actos 15 mg daily.  Physical activity:  Walking halls in residence.  Has CKD and neuropathy.     2-Anxiety  Chronic issue.  Worsening with daughter leaving for [place] soon.  Getting rush of anxiety with any socialization.  Also having car trouble and home all the time.  Doesn't feel \"things are normal.\"  Scare to get covid vaccine.   Previous med trials: unsure, last med was 25 yrs ago.  She is on gabapentin 100 mg at night for sleep which helps.  Takes clonazepam rarely but it works when she takes it.      3-Chronic neck pain  Chronic issue.  Neck hurts on a daily basis.   Using tylenol for pain most days of the week.  Neck pain is stiff / aches.  Pain radiates down to both shoulders.  Denies injury.     ROS   Denies SI or HI.     Current medicines (including changes today)  Current Outpatient Medications   Medication Sig Dispense Refill   • fluticasone (FLONASE) 50 MCG/ACT nasal spray USE 1-2  SPRAY(S) IN EACH NOSTRIL ONCE DAILY 30 MINUTES PRIOR TO USE OF CPAP 48 g 3   • magnesium oxide (MAG-OX) 400 MG Tab tablet Take 1 Tablet by mouth every day. 90 Tablet 0   • Semaglutide,0.25 or 0.5MG/DOS, (OZEMPIC, 0.25 OR 0.5 MG/DOSE,) 2 MG/1.5ML Solution Pen-injector Inject 0.5 mg under the skin every 7 days. 1.5 mL 1   • " montelukast (SINGULAIR) 10 MG Tab Take 1 tablet by mouth at bedtime. 90 tablet 0   • omeprazole (PRILOSEC) 20 MG delayed-release capsule Take 2 Capsules by mouth every day. 180 capsule 0   • warfarin (COUMADIN) 3 MG Tab TAKE ONE TABLET BY MOUTH EVERY DAY AS DIRECTED BY COUMADIN CLINIC 90 tablet 2   • metoprolol tartrate (LOPRESSOR) 50 MG Tab Take 1 tablet by mouth 2 times a day. 200 tablet 3   • furosemide (LASIX) 80 MG Tab Take 1 tablet by mouth every 48 hours for 90 days. 45 tablet 3   • pioglitazone (ACTOS) 15 MG Tab Take 1 tablet by mouth every day for 100 days. 100 tablet 3   • rosuvastatin (CRESTOR) 10 MG Tab Take 1 tablet by mouth every evening. Stop simvastatin. 100 tablet 3   • levothyroxine (SYNTHROID) 50 MCG Tab Take 1 tablet by mouth every morning on an empty stomach. 100 tablet 3   • terazosin (HYTRIN) 1 MG Cap Take 1 capsule by mouth every day. 100 capsule 2   • allopurinol (ZYLOPRIM) 100 MG Tab TAKE TWO TABLETS BY MOUTH TWICE DAILY 360 tablet 2   • MULTAQ 400 MG Tab TAKE ONE TABLET BY MOUTH TWICE A DAY WITH MEALS 180 tablet 1   • loratadine (CLARITIN) 10 MG Tab Take 1 tablet by mouth every day. 100 tablet 2   • losartan (COZAAR) 50 MG Tab Take 1 tablet by mouth 2 Times a Day. 200 tablet 2   • NON SPECIFIED C-Pap supplies  -humidifier tank and a spare   - fax [phone]...    Dx:  Sleep apnea 1 Each 1   • clonazePAM (KLONOPIN) 0.5 MG Tab TAKE 1 TABLET BY MOUTH ONCE DAILY AS NEEDED FOR 30 DAYS. ANXIETY/HEART PALPITATIONS for 30 days 30 Tab 1   • gabapentin (NEURONTIN) 100 MG Cap Take 1-3 Caps by mouth 3 times a day. As needed for nerve pain 90 Cap 0   • NON SPECIFIED Drive walker,  [id number], Purple, Medical Foldable Lightweight Rollator with Light Reflectors 1 Each 0   • NON SPECIFIED Test strips and lancets for the strips brand is MAD Incubator 100 Each 3   • Mometasone Furoate (ASMANEX HFA) 100 MCG/ACT Aerosol Inhale 2 Puffs by mouth every day. 1 Each 0   • levalbuterol (XOPENEX HFA) 45 MCG/ACT inhaler  Inhale 2 Puffs by mouth every four hours as needed for Shortness of Breath. 3 Inhaler 3   • famotidine (PEPCID) 20 MG Tab Take 1 Tab by mouth 2 times a day. (Patient not taking: Reported on 2/11/2021) 200 Tab 3   • NON SPECIFIED blue concentrator at night along with my C-PAP./3 liters per hour 1 Each 0   • ferrous sulfate 325 (65 Fe) MG tablet Take 325 mg by mouth every day.     • Blood Glucose Monitoring Suppl Supplies Misc Test strips order: Test strips for Accucheck meter. Sig: use BID and prn ssx high or low sugar #100 RF x 3 100 Strip 11   • Blood Glucose Monitoring Suppl Supplies Misc Test strips order: Test strips for Chely Contour meter. Sig: use tid  and prn ssx high or low sugar #100 RF x 3 (Patient not taking: Reported on 2/11/2021) 100 Strip 3   • Urine Glucose-Ketones Test Strip 1 Strip by In Vitro route every day. 100 Strip 2   • glucose blood (FREESTYLE LITE) strip 1 Strip by Other route 2 times a day, with meals. 50 Strip 11   • Insulin Pen Needle 32G X 4 MM Misc 1 Applicator by Does not apply route 3 times a day. 100 Each 11   • Blood Glucose Monitoring Suppl W/DEVICE Kit 1 Each by Does not apply route 2 times a day as needed. (Patient not taking: Reported on 2/11/2021) 1 Kit 0   • Misc. Devices MISC Pen needles for Lantus Solstar. 29g 12mm. (Patient not taking: Reported on 8/25/2021) 90 Each 3   • Cholecalciferol (VITAMIN D) 2000 UNITS CAPS Take 4,000 Units by mouth every day.       No current facility-administered medications for this visit.       Patient Active Problem List    Diagnosis Date Noted   • DDD (degenerative disc disease), lumbar 11/02/2020   • Osteoarthritis of lumbar spine 11/02/2020   • Non-rheumatic mitral regurgitation 08/20/2019   • Secondary hyperparathyroidism of renal origin (HCC) 05/14/2018   • Hypothyroidism due to acquired atrophy of thyroid 04/10/2018   • VPC's (ventricular premature complexes) 02/23/2018   • Dependence on nocturnal oxygen therapy 11/02/2017   •  "Incontinence of feces 05/18/2017   • Chronic pain of both shoulders 03/23/2017   • Dyslipidemia 12/14/2016   • Mild intermittent asthma without complication 08/24/2016   • Chronic anticoagulation 06/10/2016   • Essential hypertension 05/10/2016   • AVERY (obstructive sleep apnea) 05/10/2016   • Multiple thyroid nodules 10/07/2015   • Chronic idiopathic gout involving toe without tophus 04/30/2015   • Atherosclerosis of aorta (CMS-HCC) 04/29/2015   • Chronic respiratory failure with hypoxia ([facility]) - nighttime oxygen only 04/29/2015   • Morbid obesity with BMI of 40.0-44.9, adult ([facility]) 04/29/2015   • Stenosis of carotid artery 05/30/2014   • Anxiety 01/09/2014   • PAF (paroxysmal atrial fibrillation) ([facility]) 07/12/2013   • Left bundle branch block 03/05/2012   • Type 2 diabetes mellitus with stage 4 chronic kidney disease, without long-term current use of insulin ([facility])    • CKD (chronic kidney disease) stage 4, GFR 15-29 ml/min ([facility])         Objective:   /72   Pulse (!) 58   Ht 1.676 m (5' 6\")   Wt 124 kg (273 lb)   SpO2 98%   BMI 44.06 kg/m²     Physical Exam:  Gen: NAD  Resp: nonlabored.  Able to speak in full sentences  Psy: pleasant / cooperative.   Neuro:  Alert and oriented x 3      Assessment and Plan:   The following treatment plan was discussed:   1. Type 2 diabetes mellitus with stage 4 chronic kidney disease, without long-term current use of insulin ([facility])  -stable. Recommend a1c in office in one month.  Encouraged her to continue efforts at a diabetic diet and increasing her physical activity.  Continue current medications.    2. Anxiety  -initiated sertraline low dose and will increase gradually over the next few months as tolerated.  Discussed length of onset to efficacy, side effects and need for f/u.  Discussed the importance of exercise and healthy eating.  - sertraline (ZOLOFT) 25 MG tablet; Take 1 Tablet by mouth every day. For anxiety  Dispense: 30 Tablet; Refill: 2    3. Chronic neck " pain  -encouraged initiation of PT for neck and if not helping, consideration of physiatry and MRI neck.    - REFERRAL TO PHYSICAL THERAPY

## 2021-09-25 PROCEDURE — RXMED WILLOW AMBULATORY MEDICATION CHARGE: Performed by: NURSE PRACTITIONER

## 2021-09-27 ENCOUNTER — PHARMACY VISIT (OUTPATIENT)
Dept: PHARMACY | Facility: MEDICAL CENTER | Age: 76
End: 2021-09-27
Payer: MEDICARE

## 2021-10-05 ENCOUNTER — PATIENT MESSAGE (OUTPATIENT)
Dept: MEDICAL GROUP | Facility: LAB | Age: 76
End: 2021-10-05

## 2021-10-05 PROCEDURE — RXMED WILLOW AMBULATORY MEDICATION CHARGE: Performed by: NURSE PRACTITIONER

## 2021-10-05 RX ORDER — ALLOPURINOL 100 MG/1
TABLET ORAL
Qty: 360 TABLET | Refills: 2 | Status: SHIPPED | OUTPATIENT
Start: 2021-10-05 | End: 2022-10-08 | Stop reason: SDUPTHER

## 2021-10-05 NOTE — PATIENT COMMUNICATION
Received request via: Patient    Was the patient seen in the last year in this department? Yes  9/15/2021  Does the patient have an active prescription (recently filled or refills available) for medication(s) requested? No

## 2021-10-05 NOTE — TELEPHONE ENCOUNTER
----- Message from Whitley Frederick sent at 10/5/2021 12:49 PM PDT -----  Regarding: Medication   Hi … please send a new prescription for Allopurinol 100mg 100 days to the [facility] on [address] …  thank you …

## 2021-10-06 ENCOUNTER — PHARMACY VISIT (OUTPATIENT)
Dept: PHARMACY | Facility: MEDICAL CENTER | Age: 76
End: 2021-10-06
Payer: MEDICARE

## 2021-10-06 ENCOUNTER — TELEPHONE (OUTPATIENT)
Dept: MEDICAL GROUP | Facility: LAB | Age: 76
End: 2021-10-06

## 2021-10-06 NOTE — TELEPHONE ENCOUNTER
ESTABLISHED PATIENT PRE-VISIT PLANNING     Patient was NOT contacted to complete PVP.     Note: Patient will not be contacted if there is no indication to call.     1.  Reviewed notes from the last few office visits within the medical group: Yes    2.  If any orders were placed at last visit or intended to be done for this visit (i.e. 6 mos follow-up), do we have Results/Consult Notes?         •  Labs - Labs were not ordered at last office visit.  Note: If patient appointment is for lab review and patient did not complete labs, check with provider if OK to reschedule patient until labs completed.       •  Imaging - Imaging ordered, NOT completed. Patient advised to complete prior to next appointment.       •  Referrals - Referral ordered, patient has NOT been seen. pt scheduled     3. Is this appointment scheduled as a Hospital Follow-Up? No    4.  Immunizations were updated in Epic using Reconcile Outside Information activity? Yes    5.  Patient is due for the following Health Maintenance Topics:   Health Maintenance Due   Topic Date Due   • COVID-19 Vaccine (1) Never done   • IMM ZOSTER VACCINES (1 of 2) Never done   • RETINAL SCREENING  10/29/2020   • FASTING LIPID PROFILE  12/14/2020   • DIABETES MONOFILAMENT / LE EXAM  06/02/2021   • Annual Wellness Visit  06/03/2021   • COLORECTAL CANCER SCREENING  08/03/2021   • IMM INFLUENZA (1) 09/01/2021         6.  AHA (Pulse8) form printed for Provider? No, patient does not have any open alerts

## 2021-10-12 ENCOUNTER — PATIENT MESSAGE (OUTPATIENT)
Dept: CARDIOLOGY | Facility: MEDICAL CENTER | Age: 76
End: 2021-10-12

## 2021-10-12 ENCOUNTER — PHARMACY VISIT (OUTPATIENT)
Dept: PHARMACY | Facility: MEDICAL CENTER | Age: 76
End: 2021-10-12
Payer: COMMERCIAL

## 2021-10-12 DIAGNOSIS — Z79.4 TYPE 2 DIABETES MELLITUS WITH STAGE 4 CHRONIC KIDNEY DISEASE, WITH LONG-TERM CURRENT USE OF INSULIN (HCC): ICD-10-CM

## 2021-10-12 DIAGNOSIS — N18.4 TYPE 2 DIABETES MELLITUS WITH STAGE 4 CHRONIC KIDNEY DISEASE, WITH LONG-TERM CURRENT USE OF INSULIN (HCC): ICD-10-CM

## 2021-10-12 DIAGNOSIS — E11.22 TYPE 2 DIABETES MELLITUS WITH STAGE 4 CHRONIC KIDNEY DISEASE, WITH LONG-TERM CURRENT USE OF INSULIN (HCC): ICD-10-CM

## 2021-10-12 PROCEDURE — RXMED WILLOW AMBULATORY MEDICATION CHARGE: Performed by: NURSE PRACTITIONER

## 2021-10-12 RX ORDER — SEMAGLUTIDE 1.34 MG/ML
0.5 INJECTION, SOLUTION SUBCUTANEOUS
Qty: 1.5 ML | Refills: 0 | Status: SHIPPED | OUTPATIENT
Start: 2021-10-12 | End: 2022-04-07 | Stop reason: SDUPTHER

## 2021-10-12 NOTE — TELEPHONE ENCOUNTER
----- Message from Whitley Frederick sent at 10/11/2021  9:34 PM PDT -----  Regarding: Ozempic from [facility]   Since you haven’t received the Ozempic  from [facility]… I was hoping we could avoid calling in another Ozempic pen to [facility] [place]… hope they have one I’m do for a shot Tuesday night … SAVANNAH … what a hassle… thank you …

## 2021-10-13 ENCOUNTER — TELEMEDICINE (OUTPATIENT)
Dept: MEDICAL GROUP | Facility: LAB | Age: 76
End: 2021-10-13
Payer: MEDICARE

## 2021-10-13 VITALS
BODY MASS INDEX: 43.39 KG/M2 | HEART RATE: 54 BPM | WEIGHT: 268.8 LBS | DIASTOLIC BLOOD PRESSURE: 76 MMHG | OXYGEN SATURATION: 97 % | SYSTOLIC BLOOD PRESSURE: 128 MMHG

## 2021-10-13 DIAGNOSIS — E11.22 TYPE 2 DIABETES MELLITUS WITH STAGE 4 CHRONIC KIDNEY DISEASE, WITHOUT LONG-TERM CURRENT USE OF INSULIN (HCC): ICD-10-CM

## 2021-10-13 DIAGNOSIS — N18.4 TYPE 2 DIABETES MELLITUS WITH STAGE 4 CHRONIC KIDNEY DISEASE, WITHOUT LONG-TERM CURRENT USE OF INSULIN (HCC): ICD-10-CM

## 2021-10-13 DIAGNOSIS — F41.9 ANXIETY: ICD-10-CM

## 2021-10-13 PROCEDURE — RXMED WILLOW AMBULATORY MEDICATION CHARGE: Performed by: NURSE PRACTITIONER

## 2021-10-13 PROCEDURE — 99214 OFFICE O/P EST MOD 30 MIN: CPT | Mod: 95 | Performed by: NURSE PRACTITIONER

## 2021-10-13 NOTE — PROGRESS NOTES
"Virtual Visit: Established Patient   This visit was conducted via Zoom using secure and encrypted videoconferencing technology.   The patient was in a private location in the state of [place].    The patient's identity was confirmed and verbal consent was obtained for this virtual visit.    Subjective:   CC:   Anxiety f/u    HPI:    Whitley Frederick is a 76 y.o. female presenting for evaluation and management of:    Anxiety:  Chronic issue.  Started on sertraline 25 mg daily after our telemedicine visit a few weeks ago. Crying episodes have resolved but anxiety is persistent.  Less sad.  Stopped gabapentin b/c she didn't feel well with this medication - resolved with stopping gabapentin.    Having difficulty falling asleep but once asleep, does fine, per pt.  Feels \"relaxed and normal in the morning.\"      DM:  Chronic issue.  Home BG - 120's.  Weight 268.8 lbs, down a few lbs from last month.  Eating once daily.  Neighbor gives her a lot sweets which is tough.  Denies dysuria.  Limited activity.      ROS   Neg except for above.  Denies constipation.      Current medicines (including changes today)  Current Outpatient Medications   Medication Sig Dispense Refill   • Semaglutide,0.25 or 0.5MG/DOS, (OZEMPIC, 0.25 OR 0.5 MG/DOSE,) 2 MG/1.5ML Solution Pen-injector Inject 0.5 mg under the skin every 7 days. 1.5 mL 0   • allopurinol (ZYLOPRIM) 100 MG Tab TAKE TWO TABLETS BY MOUTH TWICE DAILY 360 Tablet 2   • sertraline (ZOLOFT) 25 MG tablet Take 1 Tablet by mouth every day. For anxiety 30 Tablet 2   • fluticasone (FLONASE) 50 MCG/ACT nasal spray USE 1-2  SPRAY(S) IN EACH NOSTRIL ONCE DAILY 30 MINUTES PRIOR TO USE OF CPAP 48 g 3   • magnesium oxide (MAG-OX) 400 MG Tab tablet Take 1 Tablet by mouth every day. 90 Tablet 0   • Semaglutide,0.25 or 0.5MG/DOS, (OZEMPIC, 0.25 OR 0.5 MG/DOSE,) 2 MG/1.5ML Solution Pen-injector Inject 0.5 mg under the skin every 7 days. 1.5 mL 1   • montelukast (SINGULAIR) 10 MG Tab Take 1 tablet " by mouth at bedtime. 90 tablet 0   • omeprazole (PRILOSEC) 20 MG delayed-release capsule Take 2 Capsules by mouth every day. 180 capsule 0   • warfarin (COUMADIN) 3 MG Tab TAKE ONE TABLET BY MOUTH EVERY DAY AS DIRECTED BY COUMADIN CLINIC 90 tablet 2   • metoprolol tartrate (LOPRESSOR) 50 MG Tab Take 1 tablet by mouth 2 times a day. 200 tablet 3   • pioglitazone (ACTOS) 15 MG Tab Take 1 tablet by mouth every day for 100 days. 100 tablet 3   • rosuvastatin (CRESTOR) 10 MG Tab Take 1 tablet by mouth every evening. Stop simvastatin. 100 tablet 3   • levothyroxine (SYNTHROID) 50 MCG Tab Take 1 tablet by mouth every morning on an empty stomach. 100 tablet 3   • terazosin (HYTRIN) 1 MG Cap Take 1 capsule by mouth every day. 100 capsule 2   • MULTAQ 400 MG Tab TAKE ONE TABLET BY MOUTH TWICE A DAY WITH MEALS 180 tablet 1   • loratadine (CLARITIN) 10 MG Tab Take 1 tablet by mouth every day. 100 tablet 2   • losartan (COZAAR) 50 MG Tab Take 1 tablet by mouth 2 Times a Day. 200 tablet 2   • NON SPECIFIED C-Pap supplies  -humidifier tank and a spare   - fax [phone]...    Dx:  Sleep apnea 1 Each 1   • clonazePAM (KLONOPIN) 0.5 MG Tab TAKE 1 TABLET BY MOUTH ONCE DAILY AS NEEDED FOR 30 DAYS. ANXIETY/HEART PALPITATIONS for 30 days 30 Tab 1   • gabapentin (NEURONTIN) 100 MG Cap Take 1-3 Caps by mouth 3 times a day. As needed for nerve pain 90 Cap 0   • NON SPECIFIED Drive walker,  [id number], Purple, Medical Foldable Lightweight Rollator with Light Reflectors 1 Each 0   • NON SPECIFIED Test strips and lancets for the strips brand is [address] 100 Each 3   • Mometasone Furoate (ASMANEX HFA) 100 MCG/ACT Aerosol Inhale 2 Puffs by mouth every day. 1 Each 0   • levalbuterol (XOPENEX HFA) 45 MCG/ACT inhaler Inhale 2 Puffs by mouth every four hours as needed for Shortness of Breath. 3 Inhaler 3   • famotidine (PEPCID) 20 MG Tab Take 1 Tab by mouth 2 times a day. (Patient not taking: Reported on 2/11/2021) 200 Tab 3   • NON SPECIFIED blue  concentrator at night along with my C-PAP./3 liters per hour 1 Each 0   • ferrous sulfate 325 (65 Fe) MG tablet Take 325 mg by mouth every day.     • Blood Glucose Monitoring Suppl Supplies Misc Test strips order: Test strips for Accucheck meter. Sig: use BID and prn ssx high or low sugar #100 RF x 3 100 Strip 11   • Blood Glucose Monitoring Suppl Supplies Misc Test strips order: Test strips for Chely Contour meter. Sig: use tid  and prn ssx high or low sugar #100 RF x 3 (Patient not taking: Reported on 2/11/2021) 100 Strip 3   • Urine Glucose-Ketones Test Strip 1 Strip by In Vitro route every day. 100 Strip 2   • glucose blood (FREESTYLE LITE) strip 1 Strip by Other route 2 times a day, with meals. 50 Strip 11   • Insulin Pen Needle 32G X 4 MM Misc 1 Applicator by Does not apply route 3 times a day. 100 Each 11   • Blood Glucose Monitoring Suppl W/DEVICE Kit 1 Each by Does not apply route 2 times a day as needed. (Patient not taking: Reported on 2/11/2021) 1 Kit 0   • Misc. Devices MISC Pen needles for Lantus Solstar. 29g 12mm. (Patient not taking: Reported on 8/25/2021) 90 Each 3   • Cholecalciferol (VITAMIN D) 2000 UNITS CAPS Take 4,000 Units by mouth every day.       No current facility-administered medications for this visit.       Patient Active Problem List    Diagnosis Date Noted   • Chronic neck pain 09/15/2021   • DDD (degenerative disc disease), lumbar 11/02/2020   • Osteoarthritis of lumbar spine 11/02/2020   • Non-rheumatic mitral regurgitation 08/20/2019   • Secondary hyperparathyroidism of renal origin (HCC) 05/14/2018   • Hypothyroidism due to acquired atrophy of thyroid 04/10/2018   • VPC's (ventricular premature complexes) 02/23/2018   • Dependence on nocturnal oxygen therapy 11/02/2017   • Incontinence of feces 05/18/2017   • Chronic pain of both shoulders 03/23/2017   • Dyslipidemia 12/14/2016   • Mild intermittent asthma without complication 08/24/2016   • Chronic anticoagulation 06/10/2016   •  Essential hypertension 05/10/2016   • AVERY (obstructive sleep apnea) 05/10/2016   • Multiple thyroid nodules 10/07/2015   • Chronic idiopathic gout involving toe without tophus 04/30/2015   • Atherosclerosis of aorta (CMS-[facility]) 04/29/2015   • Chronic respiratory failure with hypoxia ([facility]) - nighttime oxygen only 04/29/2015   • Morbid obesity with BMI of 40.0-44.9, adult ([facility]) 04/29/2015   • Stenosis of carotid artery 05/30/2014   • Anxiety 01/09/2014   • PAF (paroxysmal atrial fibrillation) ([facility]) 07/12/2013   • Left bundle branch block 03/05/2012   • Type 2 diabetes mellitus with stage 4 chronic kidney disease, without long-term current use of insulin ([facility])    • CKD (chronic kidney disease) stage 4, GFR 15-29 ml/min ([facility])         Objective:     Physical Exam:  Gen: NAD  Resp: nonlabored.  Able to speak in full sentences  Psy: pleasant / cooperative.   Neuro:  Alert and oriented x 3      Assessment and Plan:   The following treatment plan was discussed:   1. Anxiety  -not controlled but depression improved.  Increase to 50 mg x one month in hopes of improved control of anxiety.  Discussed that we may move up to 100 mg in a month.  F/u here one month.  She is nervous about flying to [place] in March and we discussed diazepam for the plane, which she has responded to well in the past.  - sertraline (ZOLOFT) 50 MG Tab; Take 1 Tablet by mouth every day. For anxiety  Dispense: 30 Tablet; Refill: 4    2. Type 2 diabetes mellitus with stage 4 chronic kidney disease, without long-term current use of insulin ([facility])  -BG stable at home.  Continue same.  Recheck a1c 1 mo here in office.

## 2021-10-14 ENCOUNTER — PHARMACY VISIT (OUTPATIENT)
Dept: PHARMACY | Facility: MEDICAL CENTER | Age: 76
End: 2021-10-14
Payer: MEDICARE

## 2021-10-19 ENCOUNTER — ANTICOAGULATION VISIT (OUTPATIENT)
Dept: MEDICAL GROUP | Facility: PHYSICIAN GROUP | Age: 76
End: 2021-10-19
Payer: MEDICARE

## 2021-10-19 DIAGNOSIS — I48.0 PAF (PAROXYSMAL ATRIAL FIBRILLATION) (HCC): ICD-10-CM

## 2021-10-19 DIAGNOSIS — Z79.01 CHRONIC ANTICOAGULATION: Primary | ICD-10-CM

## 2021-10-19 LAB — INR PPP: 2.8 (ref 2–3.5)

## 2021-10-19 PROCEDURE — 99999 PR NO CHARGE: CPT | Performed by: FAMILY MEDICINE

## 2021-10-19 PROCEDURE — 85610 PROTHROMBIN TIME: CPT | Performed by: FAMILY MEDICINE

## 2021-10-19 PROCEDURE — 93793 ANTICOAG MGMT PT WARFARIN: CPT | Performed by: FAMILY MEDICINE

## 2021-10-19 NOTE — PROGRESS NOTES
Anticoagulation Summary  As of 10/19/2021    INR goal:  2.0-3.0   TTR:  72.1 % (6 y)   INR used for dosin.80 (10/19/2021)   Warfarin maintenance plan:  3 mg (3 mg x 1) every day   Weekly warfarin total:  21 mg   Weekly max warfarin dose:  21 mg   Plan last modified:  EDILMA Tellez (2020)   Next INR check:  2022   Target end date:  Indefinite    Indications    Chronic anticoagulation [Z79.01]  Chronic anticoagulation (Resolved) [Z79.01]  PAF (paroxysmal atrial fibrillation) (HCC) [I48.0]             Anticoagulation Episode Summary     INR check location:      Preferred lab:      Send INR reminders to:      Comments:        Anticoagulation Care Providers     Provider Role Specialty Phone number    [facility] Responsible  [phone]        Anticoagulation Patient Findings  Patient Findings     Positives:  Change in medications (started on sertraline)    Negatives:  Signs/symptoms of thrombosis, Signs/symptoms of bleeding, Laboratory test error suspected, Change in health, Change in alcohol use, Change in activity, Upcoming invasive procedure, Emergency department visit, Upcoming dental procedure, Missed doses, Extra doses, Change in diet/appetite, Hospital admission, Bruising, Other complaints        HPI:   Whitley Frederick seen in clinic today, on anticoagulation therapy with warfarin for stroke prophylaxis due to history of PAF    Patient's previous INR was therapeutic at 2.9 on 21, at which time patient was instructed to continue with current warfarin regimen.  She returns to clinic today to recheck INR to ensure it is therapeutic and thus preventing possible clotting and/or bleeding/bruising complications.    CHADS-VASc = at least 4  (unadjusted ischemic stroke risk/year:  4.8%, which is moderate risk)    Does patient have any changes to current medical/health status since last appt (Y/N):  NO  Does patient have any signs/symptoms of bleeding and/or thrombosis since  the last appt (Y/N):  NO  Does patient have any interval changes to diet or medications since last appt (Y/N):  Started on sertraline several weeks ago.  Are there any complications or cost restrictions with current therapy (Y/N):  NO     Does patient have [facility] PCP? Yes, [facility] (If not, please document discussion that patient must be seen at [facility])       Vitals:  declined at today's visit.    There were no vitals filed for this visit.     Asssessment:      INR remains therapeutic at 2.8, therefore decreasing patient's stroke and/or bleeding risk.   Reason(s) for out of range INR today:  n/a      Pt is not on antiplatelet therapy    Medication reconciliation completed today.    Plan:  Pt is to continue with current warfarin dosing regimen.     Follow up:  Because warfarin is a high risk medication and current CHEST guidelines recommend regular monitoring intervals (few days up to 12 weeks), will have patient return to clinic in 12 weeks to recheck INR.    Eh Enamorado, PharmD, BCACP

## 2021-10-20 ENCOUNTER — APPOINTMENT (OUTPATIENT)
Dept: PHYSICAL THERAPY | Facility: MEDICAL CENTER | Age: 76
End: 2021-10-20
Attending: NURSE PRACTITIONER
Payer: MEDICARE

## 2021-10-20 NOTE — PATIENT COMMUNICATION
Form reviewed and signed by TW. Sent to pt's e-mail. Received confirmation notification through [employer] screen.

## 2021-10-21 ENCOUNTER — PATIENT MESSAGE (OUTPATIENT)
Dept: CARDIOLOGY | Facility: MEDICAL CENTER | Age: 76
End: 2021-10-21

## 2021-10-21 NOTE — PATIENT COMMUNICATION
Faxed provider portion of [employer] form STAT to [phone], received receipt for confirmation.

## 2021-10-27 ENCOUNTER — PHYSICAL THERAPY (OUTPATIENT)
Dept: PHYSICAL THERAPY | Facility: MEDICAL CENTER | Age: 76
End: 2021-10-27
Attending: NURSE PRACTITIONER
Payer: MEDICARE

## 2021-10-27 DIAGNOSIS — M54.2 CHRONIC NECK PAIN: ICD-10-CM

## 2021-10-27 DIAGNOSIS — G89.29 CHRONIC NECK PAIN: ICD-10-CM

## 2021-10-27 PROCEDURE — 97161 PT EVAL LOW COMPLEX 20 MIN: CPT

## 2021-10-27 PROCEDURE — 97112 NEUROMUSCULAR REEDUCATION: CPT

## 2021-10-27 PROCEDURE — 97110 THERAPEUTIC EXERCISES: CPT

## 2021-10-27 PROCEDURE — 97014 ELECTRIC STIMULATION THERAPY: CPT

## 2021-10-27 PROCEDURE — RXMED WILLOW AMBULATORY MEDICATION CHARGE: Performed by: NURSE PRACTITIONER

## 2021-10-27 SDOH — ECONOMIC STABILITY: GENERAL: QUALITY OF LIFE: FAIR

## 2021-10-27 ASSESSMENT — ENCOUNTER SYMPTOMS
QUALITY: ACHING
PAIN TIMING: WHEN ACTIVE
MIGRAINE HEADACHES: 1
ALLEVIATING FACTORS: REST
EXACERBATED BY: PROLONGED STANDING
PAIN SCALE: 0
PAIN SCALE AT LOWEST: 0
PAIN SCALE AT HIGHEST: 5

## 2021-10-27 NOTE — OP THERAPY EVALUATION
"  Outpatient Physical Therapy  INITIAL EVALUATION    [facility]  [address]  [place] [address]  Phone:  [phone]  Fax:  [phone]    Date of Evaluation: 10/27/2021    Patient: Whitley Frederick  YOB: 1945  MRN: [id number]     Referring Provider: EDILMA Whelan  [address] [facility] [address]  [place] [address]   Referring Diagnosis Chronic neck pain [M54.2, G89.29]     Time Calculation  Start time: 1300  Stop time: 1359 Time Calculation (min): 59 minutes         Chief Complaint: Neck Problem    Visit Diagnoses     ICD-10-CM   1. Chronic neck pain  M54.2    G89.29       Date of onset of impairment: 10/27/2020    Subjective:   History of Present Illness:     Date of onset:  10/27/2020    Mechanism of injury:  Per the patient, has had chronic neck issues for a long time but over the last year and with COVID spent a lot of time indoors/at home and has increased pain in her neck that goes across B shoulders when she stands for any length of time and has to look down (dishes, cooking, cleaning). Has a history of sciatica but the neck just feels tired all the time.      Per note on 9/15/2021: \"Chronic issue.  Neck hurts on a daily basis.   Using tylenol for pain most days of the week.  Neck pain is stiff / aches.  Pain radiates down to both shoulders.  Denies injury. \"    PMH: anxiety, DBM2, stage 4 chronic kidney disease, silent migraines    Social History: taking dog for walks 2x a day    Work: used to work as a -desk work/seated, worked for 25 years.       Quality of life:  Fair  Prior level of function:  Independent  Headaches:  migraine headaches  Ear problems: none  Sleep disturbance:  Not disrupted  Pain:     Current pain ratin    At best pain ratin    At worst pain ratin    Location:  Neck into B shoudlers    Quality:  Aching (heavy)    Pain timing:  When active    Relieving " factors:  Rest    Aggravating factors:  Prolonged standing (looking down)    Progression:  Stable  Social Support:     Lives in:  Apartment    Lives with:  Alone  Hand dominance:  Right  Diagnostic Tests:     None    Treatments:     Previous treatment:  Chiropractic  Patient Goals:     Patient goals for therapy:  Decreased pain and independence with ADLs/IADLs    Other patient goals:  Be able to do more at home      Past Medical History:   Diagnosis Date   • Allergy    • Anxiety    • Arrhythmia    • Arthritis    • Asthma    • Carotid bruit     Left   • CATARACT    • Chest pain    • DIABETES MELLITUS    • Esophageal reflux    • Goiter    • Headache(784.0)    • Heart murmur    • Hyperlipidemia    • Hypertension    • IBD (inflammatory bowel disease)    • Migraine     Silent migraine   • AVERY (obstructive sleep apnea)    • Overweight(278.02)    • Palpitations    • Paroxysmal atrial fibrillation (HCC) 13    woke with palps, AFib per ekg in am.   • Pneumonia     2015   • Positive reaction to tuberculin skin test    • Renal disease     Mild diabetic renal disease with mild proteinuria.Moderate Stage 4 Dr. Badillo   • Urinary tract infection, site not specified      Past Surgical History:   Procedure Laterality Date   • CATARACT PHACO WITH IOL Right 10/25/2016    Procedure: CATARACT PHACO WITH IOL;  Surgeon: Zoran Gamble M.D.;  Location: SURGERY SAME DAY [facility];  Service:    • ABDOMINAL HYSTERECTOMY TOTAL     • CHOLECYSTECTOMY     • TONSILLECTOMY     • US-NEEDLE CORE BX-BREAST PANEL       Social History     Tobacco Use   • Smoking status: Former Smoker     Packs/day: 1.00     Years: 20.00     Pack years: 20.00     Types: Cigarettes     Quit date: 1983     Years since quittin.8   • Smokeless tobacco: Never Used   Substance Use Topics   • Alcohol use: No     Alcohol/week: 0.0 oz     Family and Occupational History     Socioeconomic History   • Marital status:      Spouse name: Not on file    • Number of children: Not on file   • Years of education: Not on file   • Highest education level: Not on file   Occupational History   • Not on file       Objective     Observations   Central spine     Positive for cervical hyperlordosis, forward head/neck, rounded shoulders and thoracic kyphosis.    Postural Observations  Seated posture: fair  Standing posture: fair  Correction of posture: makes symptoms better        Shoulder Screen    Shoulder active range of motion within functional limits.  Shoulder strength within functional limits with the following exceptions: Poor B shoulder flexion strength, R greater than L, all others WFL  Shoulder joint mobility within functional limits.  Shoulder joint mobility within functional limits with the following exceptions: Decreased on the R GH    Neurological Testing     Reflexes   Left   Biceps (C5/C6): normal (2+)  Brachioradialis (C6): normal (2+)  Triceps (C7): normal (2+)    Right   Biceps (C5/C6): normal (2+)  Brachioradialis (C6): normal (2+)  Triceps (C7): normal (2+)    Myotome testing   Cervical (left)   All left cervical myotomes within normal limits    Cervical (right)   All right cervical myotomes within normal limits    Dermatome testing   Cervical (left)   All left cervical dermatomes intact    Cervical (right)   All right cervical dermatomes intact    Palpation   Left   Hypertonic in the thoracic paraspinals and upper trapezius.   Tenderness of the cervical paraspinals, thoracic paraspinals and upper trapezius.     Right   Hypertonic in the thoracic paraspinals and upper trapezius.   Tenderness of the cervical paraspinals, thoracic paraspinals and upper trapezius.     Active Range of Motion     Cervical spine: All cervical active range of motion within functional limits    Upper Cervical   All upper cervical active range of motion within functional limits    Tests   Cervical spine     Left Spine   Negative alar ligament integrity, Chan's test and  Sharp-Liborio test.     Right spine   Negative alar ligament integrity, Chan's test and Sharp-Liborio test.     Left Shoulder   Negative Hortensia.     Right Shoulder   Negative Hortensia.         Therapeutic Exercises (CPT 16087):     2. HEP review    3. Posture education and its role in pain      Therapeutic Exercise Summary: HEP: Patient provided handout (created in HEP2go), to be uploaded, exercises include:   Cervical retractions, thoracic stretch, UT stretch, thoracic open book rotation      Therapeutic Treatments and Modalities:     1. E Stim Unattended (CPT 37140), IFC with MHP to B UT    Time-based treatments/modalities:    Physical Therapy Timed Treatment Charges  Neuromusc re-ed, balance, coor, post minutes (CPT 62112): 8 minutes  Therapeutic exercise minutes (CPT 37579): 16 minutes      Assessment, Response and Plan:   Impairments: activity intolerance, hypersensitivity, impaired physical strength, lacks appropriate home exercise program and pain with function    Assessment details:  The patient is a 76 year old female who presents to PT evaluation with complaints of ongoing chronic neck pain that will go into B UTs. She denies injury or incident but that the pain seems to have intensified while being home during COVID precautions. She endorses high anxiety and that she has a hard time leaving the house due to stage 4 Kidney disease and not wanting to get the vaccine. Evaluation is remarkable for WFL ROM in all directions, poor postural strength, increased cervical lordosis and thoracic kyphosis, poor core strength, poor tolerance to activity and poor seated posture. Evaluation is consistent with muscle fatigue/weakness.   Barriers to therapy:  Comorbidities and psychosocial  Prognosis: fair    Goals:   Short Term Goals:   1. The patient will demonstrate HEP independently  2. The patient will be able to stand while looking down for at least 10 minutes in order to do dishes  Short term goal time span:  2-4 weeks       Long Term Goals:    1. The patient will be able to maintain upright seated posture for at least 15 minutes without verbal cues  2. The patient will be able to carry an object weighing at least 10lbs in order to  and carry dog.     Long term goal time span:  4-6 weeks    Plan:   Therapy options:  Physical therapy treatment to continue  Planned therapy interventions:  E Stim Unattended (CPT 39426), Manual Therapy (CPT 73579), Neuromuscular Re-education (CPT 19859), Therapeutic Exercise (CPT 57649) and Therapeutic Activities (CPT 91013)  Frequency:  1x week  Duration in weeks:  6  Duration in visits:  6  Discussed with:  Patient  Plan details:  The patient has high anxiety and has a hard time leaving the house and requests appointments no more than 1x per week.       Functional Assessment Used        Referring provider co-signature:  I have reviewed this plan of care and my co-signature certifies the need for services.    Certification Period: 10/27/2021 to  12/09/21    Physician Signature: ________________________________ Date: ______________

## 2021-10-27 NOTE — PATIENT COMMUNICATION
Addendum 2pm    Teresa PAR notified this RN that pt is in lobby to  medication. Provided Teresa with medication to give to pt.

## 2021-10-28 ENCOUNTER — PHARMACY VISIT (OUTPATIENT)
Dept: PHARMACY | Facility: MEDICAL CENTER | Age: 76
End: 2021-10-28
Payer: MEDICARE

## 2021-11-03 ENCOUNTER — PHYSICAL THERAPY (OUTPATIENT)
Dept: PHYSICAL THERAPY | Facility: MEDICAL CENTER | Age: 76
End: 2021-11-03
Attending: NURSE PRACTITIONER
Payer: MEDICARE

## 2021-11-03 ENCOUNTER — TELEPHONE (OUTPATIENT)
Dept: MEDICAL GROUP | Facility: LAB | Age: 76
End: 2021-11-03

## 2021-11-03 DIAGNOSIS — G89.29 CHRONIC NECK PAIN: ICD-10-CM

## 2021-11-03 DIAGNOSIS — M54.2 CHRONIC NECK PAIN: ICD-10-CM

## 2021-11-03 PROCEDURE — 97110 THERAPEUTIC EXERCISES: CPT

## 2021-11-03 PROCEDURE — 97014 ELECTRIC STIMULATION THERAPY: CPT

## 2021-11-03 PROCEDURE — 97112 NEUROMUSCULAR REEDUCATION: CPT

## 2021-11-03 NOTE — TELEPHONE ENCOUNTER
ESTABLISHED PATIENT PRE-VISIT PLANNING     Patient was NOT contacted to complete PVP.     Note: Patient will not be contacted if there is no indication to call.     1.  Reviewed notes from the last few office visits within the medical group: Yes    2.  If any orders were placed at last visit or intended to be done for this visit (i.e. 6 mos follow-up), do we have Results/Consult Notes?         •  Labs - Labs were not ordered at last office visit.  Note: If patient appointment is for lab review and patient did not complete labs, check with provider if OK to reschedule patient until labs completed.       •  Imaging - Imaging was not ordered at last office visit.       •  Referrals - Referral ordered, patient was seen and consult notes are in chart. Care Teams updated  NO.    3. Is this appointment scheduled as a Hospital Follow-Up? No    4.  Immunizations were updated in Epic using Reconcile Outside Information activity? Yes    5.  Patient is due for the following Health Maintenance Topics:   Health Maintenance Due   Topic Date Due   • COVID-19 Vaccine (1) Never done   • IMM ZOSTER VACCINES (1 of 2) Never done   • RETINAL SCREENING  10/29/2020   • FASTING LIPID PROFILE  12/14/2020   • DIABETES MONOFILAMENT / LE EXAM  06/02/2021   • Annual Wellness Visit  06/03/2021   • COLORECTAL CANCER SCREENING  08/03/2021   • IMM INFLUENZA (1) 09/01/2021   • A1C SCREENING  11/26/2021         6.  AHA (Pulse8) form printed for Provider? No, patient does not have any open alerts

## 2021-11-03 NOTE — OP THERAPY DAILY TREATMENT
Outpatient Physical Therapy  DAILY TREATMENT     [facility]  [address]  [place] [address]  Phone:  [phone]  Fax:  [phone]    Date: 11/03/2021    Patient: Whitley Frederick  YOB: 1945  MRN: [id number]     Time Calculation    Start time: 1300  Stop time: 1358 Time Calculation (min): 58 minutes         Chief Complaint: Neck Problem and Back Problem    Visit #: 2    SUBJECTIVE:  The patient reports that she is doing okay, she hasn't been as diligent with the exercises as she should be and feels that she has found muscles she hasn't used in a long time after doing them and she had some increased soreness.    OBJECTIVE:  Current objective measures: Current objective measures: poor postural strength.   Poor seated posture; forward head/shoudler, increased thoracic kyphosis  Dowagers hump  Fair R shoulder ROM, WFL L shoulder ROM  WFL cervical ROM  Poor exercise tolerance          Therapeutic Exercises (CPT 39852):     1. NuStep, 6 mins, level 1    2. Scap retractions, 2x10 with 3 second hold    3. Cervical retractions, 2x10 with 3 second    4. Flashers , 2x10 with white TB  , minimal movement of R UE into ER      Therapeutic Exercise Summary: HEP: Patient provided handout (created in HEP2go), to be uploaded, exercises include:   Cervical retractions, thoracic stretch, UT stretch, thoracic open book rotation      Therapeutic Treatments and Modalities:     1. E Stim Unattended (CPT 05214), IFC with MHP to B UT    Time-based treatments/modalities:    Physical Therapy Timed Treatment Charges  Neuromusc re-ed, balance, coor, post minutes (CPT 37428): 13 minutes  Therapeutic exercise minutes (CPT 34680): 30 minutes        ASSESSMENT:   Response to treatment: Response to treatment: the patient tolerated treatment well today. She has a lot of self-reported anxiety and struggles with even leaving the house. She has a good effort with  exercises but is limited a bit by her R shoulder. She tolerates the exercises fairly and needs increased rest time as she fatigues easily and endorses that she is prone to soreness. Will continue to strengthen as tolerated to improve tolerance to functional activities.    PLAN/RECOMMENDATIONS:   Plan for treatment: therapy treatment to continue next visit.  Planned interventions for next visit: continue with current treatment.

## 2021-11-10 ENCOUNTER — APPOINTMENT (OUTPATIENT)
Dept: PHYSICAL THERAPY | Facility: MEDICAL CENTER | Age: 76
End: 2021-11-10
Attending: NURSE PRACTITIONER
Payer: MEDICARE

## 2021-11-10 PROCEDURE — RXMED WILLOW AMBULATORY MEDICATION CHARGE: Performed by: NURSE PRACTITIONER

## 2021-11-11 ENCOUNTER — OFFICE VISIT (OUTPATIENT)
Dept: MEDICAL GROUP | Facility: LAB | Age: 76
End: 2021-11-11
Payer: MEDICARE

## 2021-11-11 ENCOUNTER — PHARMACY VISIT (OUTPATIENT)
Dept: PHARMACY | Facility: MEDICAL CENTER | Age: 76
End: 2021-11-11
Payer: MEDICARE

## 2021-11-11 ENCOUNTER — APPOINTMENT (OUTPATIENT)
Dept: PHYSICAL THERAPY | Facility: MEDICAL CENTER | Age: 76
End: 2021-11-11
Attending: NURSE PRACTITIONER
Payer: MEDICARE

## 2021-11-11 VITALS
SYSTOLIC BLOOD PRESSURE: 136 MMHG | OXYGEN SATURATION: 96 % | HEIGHT: 66 IN | HEART RATE: 58 BPM | RESPIRATION RATE: 16 BRPM | TEMPERATURE: 97.2 F | DIASTOLIC BLOOD PRESSURE: 60 MMHG | WEIGHT: 266 LBS | BODY MASS INDEX: 42.75 KG/M2

## 2021-11-11 DIAGNOSIS — N18.4 TYPE 2 DIABETES MELLITUS WITH STAGE 4 CHRONIC KIDNEY DISEASE, WITHOUT LONG-TERM CURRENT USE OF INSULIN (HCC): ICD-10-CM

## 2021-11-11 DIAGNOSIS — E11.42 PERIPHERAL SENSORY NEUROPATHY DUE TO TYPE 2 DIABETES MELLITUS (HCC): ICD-10-CM

## 2021-11-11 DIAGNOSIS — E13.319 RETINOPATHY DUE TO SECONDARY DIABETES MELLITUS (HCC): ICD-10-CM

## 2021-11-11 DIAGNOSIS — Z23 NEED FOR VACCINATION: ICD-10-CM

## 2021-11-11 DIAGNOSIS — E11.22 TYPE 2 DIABETES MELLITUS WITH STAGE 4 CHRONIC KIDNEY DISEASE, WITHOUT LONG-TERM CURRENT USE OF INSULIN (HCC): ICD-10-CM

## 2021-11-11 DIAGNOSIS — F41.9 ANXIETY: ICD-10-CM

## 2021-11-11 DIAGNOSIS — J45.909 UNCOMPLICATED ASTHMA, UNSPECIFIED ASTHMA SEVERITY, UNSPECIFIED WHETHER PERSISTENT: ICD-10-CM

## 2021-11-11 LAB
HBA1C MFR BLD: 6.1 % (ref 0–5.6)
INT CON NEG: NEGATIVE
INT CON POS: POSITIVE

## 2021-11-11 PROCEDURE — RXMED WILLOW AMBULATORY MEDICATION CHARGE: Performed by: NURSE PRACTITIONER

## 2021-11-11 PROCEDURE — G0008 ADMIN INFLUENZA VIRUS VAC: HCPCS | Performed by: NURSE PRACTITIONER

## 2021-11-11 PROCEDURE — 83036 HEMOGLOBIN GLYCOSYLATED A1C: CPT | Performed by: NURSE PRACTITIONER

## 2021-11-11 PROCEDURE — 90662 IIV NO PRSV INCREASED AG IM: CPT | Performed by: NURSE PRACTITIONER

## 2021-11-11 PROCEDURE — 99214 OFFICE O/P EST MOD 30 MIN: CPT | Mod: 25 | Performed by: NURSE PRACTITIONER

## 2021-11-11 RX ORDER — GABAPENTIN 100 MG/1
100-300 CAPSULE ORAL 3 TIMES DAILY
Qty: 90 CAPSULE | Refills: 0
Start: 2021-11-11 | End: 2021-11-11

## 2021-11-11 RX ORDER — MONTELUKAST SODIUM 10 MG/1
10 TABLET ORAL
Qty: 90 TABLET | Refills: 3 | Status: SHIPPED | OUTPATIENT
Start: 2021-11-11 | End: 2022-11-22 | Stop reason: SDUPTHER

## 2021-11-11 NOTE — PROGRESS NOTES
"CC  f/u    HPI  Whitley is a 76-year-old established female here to follow-up on type 2 diabetes.  Fortunately last A1c was excellent at 6.1, 2021.  Currently treated with 0.5 mg Ozempic once weekly and 15 mg of Actos daily.  Has lost 7 lbs since 2021.    Saw eye doc in Oct.  She does have neuropathy and nephropathy.  Tells me that when her diabetes is well controlled, neuropathy in her feet is not very bad and she has been able to come off of gabapentin.    Anxiety: Chronic issue.  Was started on sertraline back in September because of worsening anxiety related to her daughter moving to [place].  She is interested in taking 1.5 of her 50 mg sertraline.  Still suffers from anxiety throughout the day importance to her daughter and her own health.  Feels less sad.  Sleeps pretty well but goes to bed late and sleeps in late.  Appetite is good.    Past medical, surgical, family, and social history is reviewed and updated in Epic chart by me today.   Medications and allergies reviewed and updated in Epic chart by me today.     ROS:   As documented in history of present illness above    Exam:  /60 (BP Location: Left arm, Patient Position: Sitting, BP Cuff Size: Large adult)   Pulse (!) 58   Temp 36.2 °C (97.2 °F)   Resp 16   Ht 1.676 m (5' 6\")   Wt 121 kg (266 lb)   SpO2 96%     Gen. overweight female who certainly appears to have lost some weight and is in no distress.  Skin appropriate for sex and age   Neck trachea is midline  Lungs unlabored breathing  Heart regular rate  Psych appropriate, calm, interactive, well-groomed  Monofilament testing with a 10 gram force: sensation intact: decreased bilaterally  Visual Inspection: Feet without maceration, ulcers, fissures.  Pedal pulses: present bilaterally.        Assessment / Plan / Medical Decision makin. Type 2 diabetes mellitus with stage 4 chronic kidney disease, without long-term current use of insulin ([facility])  POCT  A1C   2. 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 Peripheral sensory neuropathy due to type 2 diabetes mellitus (HCC)     4. Need for vaccination  INFLUENZA VACCINE, HIGH DOSE (65+ ONLY)   5. Anxiety  sertraline (ZOLOFT) 50 MG Tab   6. Uncomplicated asthma, unspecified asthma severity, unspecified whether persistent  montelukast (SINGULAIR) 10 MG Tab     a1c excellent at 6.1% - continue same.    Scheduled for labs with nephrology next week and then f/u there.   a1c again 6 months.   Influenza vaccine given today.  Again counseled her regarding the importance of getting vaccinated against Covid.  Increase duration of walking dog when possible.    Encouraged a strict diabetic diet.  Discussed the importance of really diligent foot care in relationship to her neuropathy.  Fortunately able to stay off of gabapentin as her foot pain is not bothering her.  Discussed that she can restart gabapentin anytime if needed.  Follow-up 6 months.

## 2021-11-12 ENCOUNTER — PHARMACY VISIT (OUTPATIENT)
Dept: PHARMACY | Facility: MEDICAL CENTER | Age: 76
End: 2021-11-12
Payer: MEDICARE

## 2021-11-17 ENCOUNTER — APPOINTMENT (OUTPATIENT)
Dept: PHYSICAL THERAPY | Facility: MEDICAL CENTER | Age: 76
End: 2021-11-17
Attending: NURSE PRACTITIONER
Payer: MEDICARE

## 2021-11-18 ENCOUNTER — APPOINTMENT (OUTPATIENT)
Dept: PHYSICAL THERAPY | Facility: MEDICAL CENTER | Age: 76
End: 2021-11-18
Attending: NURSE PRACTITIONER
Payer: MEDICARE

## 2021-11-18 ENCOUNTER — TELEPHONE (OUTPATIENT)
Dept: MEDICAL GROUP | Facility: LAB | Age: 76
End: 2021-11-18

## 2021-11-22 ENCOUNTER — TELEPHONE (OUTPATIENT)
Dept: MEDICAL GROUP | Facility: LAB | Age: 76
End: 2021-11-22

## 2021-11-22 NOTE — TELEPHONE ENCOUNTER
ESTABLISHED PATIENT PRE-VISIT PLANNING     Patient was NOT contacted to complete PVP.     Note: Patient will not be contacted if there is no indication to call.     1.  Reviewed notes from the last few office visits within the medical group: Yes    2.  If any orders were placed at last visit or intended to be done for this visit (i.e. 6 mos follow-up), do we have Results/Consult Notes?         •  Labs - Labs were not ordered at last office visit.  Note: If patient appointment is for lab review and patient did not complete labs, check with provider if OK to reschedule patient until labs completed.       •  Imaging - Imaging ordered, NOT completed. Patient advised to complete prior to next appointment.       •  Referrals - No referrals were ordered at last office visit.    3. Is this appointment scheduled as a Hospital Follow-Up? No    4.  Immunizations were updated in Epic using Reconcile Outside Information activity? Yes    5.  Patient is due for the following Health Maintenance Topics:   Health Maintenance Due   Topic Date Due   • COVID-19 Vaccine (1) Never done   • IMM ZOSTER VACCINES (1 of 2) Never done   • FASTING LIPID PROFILE  12/14/2020   • Annual Wellness Visit  06/03/2021   • COLORECTAL CANCER SCREENING  08/03/2021   • RETINAL SCREENING  10/21/2021       6.  AHA (Pulse8) form printed for Provider? No, patient does not have any open alerts

## 2021-11-24 ENCOUNTER — TELEPHONE (OUTPATIENT)
Dept: MEDICAL GROUP | Facility: LAB | Age: 76
End: 2021-11-24

## 2021-11-24 NOTE — TELEPHONE ENCOUNTER
I spoke with Whitley and reminded her of cologuard order from last February. She said she will be seeing GI doctor in February and will check to see if they would like her to have cologuard test or colonoscopy and proceed with whichever they suggest.

## 2021-11-28 PROCEDURE — RXMED WILLOW AMBULATORY MEDICATION CHARGE: Performed by: NURSE PRACTITIONER

## 2021-11-29 DIAGNOSIS — F41.9 ANXIETY: ICD-10-CM

## 2021-11-29 DIAGNOSIS — G47.00 INSOMNIA: ICD-10-CM

## 2021-11-29 PROCEDURE — RXMED WILLOW AMBULATORY MEDICATION CHARGE: Performed by: PHYSICIAN ASSISTANT

## 2021-11-29 RX ORDER — MAGNESIUM OXIDE 400 MG/1
400 TABLET ORAL DAILY
Qty: 90 TABLET | Refills: 0 | Status: SHIPPED | OUTPATIENT
Start: 2021-11-29 | End: 2022-02-17 | Stop reason: SDUPTHER

## 2021-11-29 NOTE — TELEPHONE ENCOUNTER
----- Message from Whitley Frederick sent at 11/27/2021 10:28 PM PST -----  Regarding: KARLO Aquino … need a new prescription of clonazepam 0.05 sent to [facility] on [address] … thank you …

## 2021-11-30 ENCOUNTER — OFFICE VISIT (OUTPATIENT)
Dept: MEDICAL GROUP | Facility: LAB | Age: 76
End: 2021-11-30
Payer: MEDICARE

## 2021-11-30 ENCOUNTER — HOSPITAL ENCOUNTER (OUTPATIENT)
Dept: LAB | Facility: MEDICAL CENTER | Age: 76
End: 2021-11-30
Attending: INTERNAL MEDICINE
Payer: MEDICARE

## 2021-11-30 ENCOUNTER — PHARMACY VISIT (OUTPATIENT)
Dept: PHARMACY | Facility: MEDICAL CENTER | Age: 76
End: 2021-11-30
Payer: MEDICARE

## 2021-11-30 VITALS
RESPIRATION RATE: 16 BRPM | TEMPERATURE: 97.1 F | WEIGHT: 266 LBS | DIASTOLIC BLOOD PRESSURE: 60 MMHG | HEART RATE: 64 BPM | HEIGHT: 66 IN | OXYGEN SATURATION: 96 % | BODY MASS INDEX: 42.75 KG/M2 | SYSTOLIC BLOOD PRESSURE: 134 MMHG

## 2021-11-30 DIAGNOSIS — H93.13 TINNITUS OF BOTH EARS: ICD-10-CM

## 2021-11-30 DIAGNOSIS — I65.23 BILATERAL CAROTID ARTERY STENOSIS: ICD-10-CM

## 2021-11-30 LAB
ALBUMIN SERPL BCP-MCNC: 4.3 G/DL (ref 3.2–4.9)
BUN SERPL-MCNC: 64 MG/DL (ref 8–22)
CALCIUM SERPL-MCNC: 9.4 MG/DL (ref 8.5–10.5)
CHLORIDE SERPL-SCNC: 107 MMOL/L (ref 96–112)
CO2 SERPL-SCNC: 22 MMOL/L (ref 20–33)
CREAT SERPL-MCNC: 2.2 MG/DL (ref 0.5–1.4)
GLUCOSE SERPL-MCNC: 127 MG/DL (ref 65–99)
HCT VFR BLD AUTO: 37 % (ref 37–47)
HGB BLD-MCNC: 12.1 G/DL (ref 12–16)
PHOSPHATE SERPL-MCNC: 3.4 MG/DL (ref 2.5–4.5)
POTASSIUM SERPL-SCNC: 4.7 MMOL/L (ref 3.6–5.5)
SODIUM SERPL-SCNC: 140 MMOL/L (ref 135–145)
URATE SERPL-MCNC: 5.3 MG/DL (ref 1.9–8.2)

## 2021-11-30 PROCEDURE — RXMED WILLOW AMBULATORY MEDICATION CHARGE: Performed by: NURSE PRACTITIONER

## 2021-11-30 PROCEDURE — 99213 OFFICE O/P EST LOW 20 MIN: CPT | Performed by: NURSE PRACTITIONER

## 2021-11-30 PROCEDURE — 80069 RENAL FUNCTION PANEL: CPT

## 2021-11-30 PROCEDURE — 85018 HEMOGLOBIN: CPT

## 2021-11-30 PROCEDURE — 36415 COLL VENOUS BLD VENIPUNCTURE: CPT

## 2021-11-30 PROCEDURE — 84550 ASSAY OF BLOOD/URIC ACID: CPT

## 2021-11-30 PROCEDURE — 85014 HEMATOCRIT: CPT

## 2021-11-30 RX ORDER — CLONAZEPAM 0.5 MG/1
TABLET ORAL
Qty: 30 TABLET | Refills: 1 | Status: SHIPPED | OUTPATIENT
Start: 2021-11-30 | End: 2022-11-14 | Stop reason: SDUPTHER

## 2021-11-30 NOTE — PROGRESS NOTES
"Chief Complaint   Patient presents with   • Ringing in Ear       HPI   Whitley is a 75 yo est female here with c/o muffled sounds to right ear x one week.  Dizzy x 2 hours one day last week.  Denies heart palpitations.    Chronic tinnitus left ear x many years.  Sertraline worsened tinnitus and made it start in right ear.  Denies ear pain.  She did stop sertraline and feels much better off of sertraline.  Has not had hearing testing in at least 10 years.  Plans on seeing Dr. Hamilton and her audiologist.  Known carotid artery stenosis, less than 50% on last ultrasound.  Also has chronic medical conditions of type 2 diabetes, chronic kidney disease, atrial fibrillation, periodic benzodiazepine use, hypothyroidism.    Past medical, surgical, family, and social history is reviewed and updated in Epic chart by me today.   Medications and allergies reviewed and updated in Epic chart by me today.     ROS:   As documented in history of present illness above    Exam:  /60 (BP Location: Left arm, Patient Position: Sitting, BP Cuff Size: Large adult)   Pulse 64   Temp 36.2 °C (97.1 °F)   Resp 16   Ht 1.676 m (5' 6\")   Wt 121 kg (266 lb)   SpO2 96%   Constitutional: Alert, no distress, plus 3 vital signs  Skin:  Warm, dry, no rashes invisible areas  Eye: Equal, round and reactive, conjunctiva clear  ENMT: Lips without lesions.  I did remove a small amount of wax from bilateral ear canals and after removal, tympanic membrane was translucent bilaterally.  Respiratory: Unlabored respiration, lungs clear to auscultation, no wheezes, no rhonchi  Cardiovascular: Normal rate and rhythm.  Psych: Alert, pleasant, well-groomed, normal affect    Assessment / Plan / Medical Decision makin. Bilateral carotid artery stenosis  -Recommend updated carotid artery ultrasound prior to seeing her cardiologist in January.  Continue statin therapy.  - US-CAROTID DOPPLER BILAT; Future    2. Tinnitus of both ears  -Encouraged her to " follow-up with her ENT and audiologist.  Discussed multiple different causes of tinnitus including medication side effects and multiple of her chronic medical conditions including but not limited to type 2 diabetes, chronic kidney disease, carotid artery stenosis and benzodiazepine use.  She is also on an ARB.  Removing wax did not take away muffled sensation.

## 2021-12-14 ENCOUNTER — PHARMACY VISIT (OUTPATIENT)
Dept: PHARMACY | Facility: MEDICAL CENTER | Age: 76
End: 2021-12-14
Payer: MEDICARE

## 2021-12-14 PROCEDURE — RXMED WILLOW AMBULATORY MEDICATION CHARGE: Performed by: NURSE PRACTITIONER

## 2021-12-14 RX ORDER — LORATADINE 10 MG/1
10 TABLET ORAL DAILY
Qty: 100 TABLET | Refills: 2 | Status: SHIPPED | OUTPATIENT
Start: 2021-12-14 | End: 2022-10-13 | Stop reason: SDUPTHER

## 2021-12-14 NOTE — TELEPHONE ENCOUNTER
Received request via: Pharmacy    Was the patient seen in the last year in this department? Yes  LOV: 11/30/2021  Does the patient have an active prescription (recently filled or refills available) for medication(s) requested? No

## 2021-12-26 ENCOUNTER — PATIENT MESSAGE (OUTPATIENT)
Dept: MEDICAL GROUP | Facility: LAB | Age: 76
End: 2021-12-26

## 2021-12-26 DIAGNOSIS — I10 ESSENTIAL HYPERTENSION: ICD-10-CM

## 2021-12-27 ENCOUNTER — PHARMACY VISIT (OUTPATIENT)
Dept: PHARMACY | Facility: MEDICAL CENTER | Age: 76
End: 2021-12-27
Payer: MEDICARE

## 2021-12-27 PROCEDURE — RXMED WILLOW AMBULATORY MEDICATION CHARGE: Performed by: FAMILY MEDICINE

## 2021-12-27 RX ORDER — LOSARTAN POTASSIUM 50 MG/1
50 TABLET ORAL 2 TIMES DAILY
Qty: 200 TABLET | Refills: 2 | Status: SHIPPED | OUTPATIENT
Start: 2021-12-27 | End: 2022-10-26 | Stop reason: SDUPTHER

## 2021-12-27 RX ORDER — LOSARTAN POTASSIUM 50 MG/1
50 TABLET ORAL 2 TIMES DAILY
Qty: 200 TABLET | Refills: 2 | Status: SHIPPED | OUTPATIENT
Start: 2021-12-27 | End: 2021-12-27 | Stop reason: SDUPTHER

## 2022-01-04 PROCEDURE — RXMED WILLOW AMBULATORY MEDICATION CHARGE: Performed by: NURSE PRACTITIONER

## 2022-01-05 ENCOUNTER — PHARMACY VISIT (OUTPATIENT)
Dept: PHARMACY | Facility: MEDICAL CENTER | Age: 77
End: 2022-01-05
Payer: MEDICARE

## 2022-01-12 DIAGNOSIS — K21.9 GASTROESOPHAGEAL REFLUX DISEASE: ICD-10-CM

## 2022-01-13 PROCEDURE — RXMED WILLOW AMBULATORY MEDICATION CHARGE: Performed by: NURSE PRACTITIONER

## 2022-01-13 RX ORDER — OMEPRAZOLE 20 MG/1
40 CAPSULE, DELAYED RELEASE ORAL DAILY
Qty: 180 CAPSULE | Refills: 0 | Status: SHIPPED | OUTPATIENT
Start: 2022-01-13 | End: 2022-07-22 | Stop reason: SDUPTHER

## 2022-01-13 NOTE — TELEPHONE ENCOUNTER
Received request via: Pharmacy    Was the patient seen in the last year in this department? Yes  11/30/21  Does the patient have an active prescription (recently filled or refills available) for medication(s) requested? No

## 2022-01-14 ENCOUNTER — PHARMACY VISIT (OUTPATIENT)
Dept: PHARMACY | Facility: MEDICAL CENTER | Age: 77
End: 2022-01-14
Payer: MEDICARE

## 2022-01-20 ENCOUNTER — APPOINTMENT (OUTPATIENT)
Dept: MEDICAL GROUP | Facility: MEDICAL CENTER | Age: 77
End: 2022-01-20
Payer: MEDICARE

## 2022-01-20 ENCOUNTER — PATIENT MESSAGE (OUTPATIENT)
Dept: MEDICAL GROUP | Facility: LAB | Age: 77
End: 2022-01-20

## 2022-01-20 ENCOUNTER — HOSPITAL ENCOUNTER (OUTPATIENT)
Dept: LAB | Facility: MEDICAL CENTER | Age: 77
End: 2022-01-20
Attending: INTERNAL MEDICINE
Payer: MEDICARE

## 2022-01-20 DIAGNOSIS — Z79.01 CHRONIC ANTICOAGULATION: ICD-10-CM

## 2022-01-20 PROCEDURE — 80048 BASIC METABOLIC PNL TOTAL CA: CPT

## 2022-01-20 PROCEDURE — 36415 COLL VENOUS BLD VENIPUNCTURE: CPT

## 2022-01-21 LAB
ANION GAP SERPL CALC-SCNC: 9 MMOL/L (ref 7–16)
BUN SERPL-MCNC: 62 MG/DL (ref 8–22)
CALCIUM SERPL-MCNC: 9 MG/DL (ref 8.5–10.5)
CHLORIDE SERPL-SCNC: 111 MMOL/L (ref 96–112)
CO2 SERPL-SCNC: 20 MMOL/L (ref 20–33)
CREAT SERPL-MCNC: 2.29 MG/DL (ref 0.5–1.4)
FASTING STATUS PATIENT QL REPORTED: NORMAL
GLUCOSE SERPL-MCNC: 152 MG/DL (ref 65–99)
POTASSIUM SERPL-SCNC: 5.1 MMOL/L (ref 3.6–5.5)
SODIUM SERPL-SCNC: 140 MMOL/L (ref 135–145)

## 2022-01-25 ENCOUNTER — TELEPHONE (OUTPATIENT)
Dept: CARDIOLOGY | Facility: MEDICAL CENTER | Age: 77
End: 2022-01-25

## 2022-01-25 NOTE — TELEPHONE ENCOUNTER
Received pt's Multaq RX through mail addressed to TW. Attempted to call pt, no answer and no option for VM. [address] message sent.

## 2022-01-29 PROCEDURE — RXMED WILLOW AMBULATORY MEDICATION CHARGE: Performed by: INTERNAL MEDICINE

## 2022-01-31 ENCOUNTER — PHARMACY VISIT (OUTPATIENT)
Dept: PHARMACY | Facility: MEDICAL CENTER | Age: 77
End: 2022-01-31
Payer: MEDICARE

## 2022-02-02 PROCEDURE — RXMED WILLOW AMBULATORY MEDICATION CHARGE: Performed by: NURSE PRACTITIONER

## 2022-02-03 ENCOUNTER — PHARMACY VISIT (OUTPATIENT)
Dept: PHARMACY | Facility: MEDICAL CENTER | Age: 77
End: 2022-02-03
Payer: MEDICARE

## 2022-02-04 ENCOUNTER — DOCUMENTATION (OUTPATIENT)
Dept: VASCULAR LAB | Facility: MEDICAL CENTER | Age: 77
End: 2022-02-04

## 2022-02-04 NOTE — PROGRESS NOTES
[facility]      Pt is over due for PT/INR for warfarin monitoring. Left message for patient to have INR checked ASAP or call the clinic back to reschedule missed appt.      Clinic phone number left for any questions or concerns.    [facility]   [phone]      Nellie CharlesD

## 2022-02-05 PROCEDURE — RXMED WILLOW AMBULATORY MEDICATION CHARGE: Performed by: NURSE PRACTITIONER

## 2022-02-10 ENCOUNTER — PHARMACY VISIT (OUTPATIENT)
Dept: PHARMACY | Facility: MEDICAL CENTER | Age: 77
End: 2022-02-10
Payer: MEDICARE

## 2022-02-15 NOTE — PROGRESS NOTES
(Inserted Image. Unable to display)     April 17, 2020      SHEFALI WILSON  [address] [place], [place] [id number]          Dear [place],      Thank you for selecting [facility] (previously [place]) for your healthcare needs.      Our records indicate you are due for the following services:     Depo Provera injection due between 04/16 to 04/30/2020.      To schedule an appointment or if you have further questions, please contact your primary clinic:   [facility]       [phone]   [facility]       [phone]              [facility]     [phone]      Powered by [employer]    Sincerely,    MEEK JamisonNP   Anticoagulation Summary  As of 2021    INR goal:  2.0-3.0   TTR:  68.1 % (5.2 y)   INR used for dosin.20 (2021)   Warfarin maintenance plan:  3 mg (3 mg x 1) every day   Weekly warfarin total:  21 mg   Weekly max warfarin dose:  21 mg   Plan last modified:  EDILMA Tellez (2020)   Next INR check:  3/9/2021   Target end date:  Indefinite    Indications    Chronic anticoagulation [Z79.01]  Chronic anticoagulation (Resolved) [Z79.01]  PAF (paroxysmal atrial fibrillation) (HCC) [I48.0]             Anticoagulation Episode Summary     INR check location:      Preferred lab:      Send INR reminders to:      Comments:        Anticoagulation Care Providers     Provider Role Specialty Phone number    [facility] Responsible  [phone]        Anticoagulation Patient Findings  Patient Findings     Negatives:  Signs/symptoms of thrombosis, Signs/symptoms of bleeding, Laboratory test error suspected, Change in health, Change in alcohol use, Change in activity, Upcoming invasive procedure, Emergency department visit, Upcoming dental procedure, Missed doses, Extra doses, Change in medications, Change in diet/appetite, Hospital admission, Bruising, Other complaints        HPI:   Whitley Frederick seen in clinic today, on anticoagulation therapy with warfarin for stroke prophylaxis due to history of atrial fibrillation.    Patient's previous INR was therapeutic at 2.0 on 12-15-20, at which time patient was instructed to continue with current warfarin regimen.  She returns to clinic today to recheck INR to ensure it is therapeutic and thus preventing possible clotting and/or bleeding/bruising complications.    CHADS-VASc = at least 2  (unadjusted ischemic stroke risk/year:  2.2%, which is moderate risk)    Does patient have any changes to current medical/health status since last appt (Y/N):  NO  Does patient have any signs/symptoms of bleeding and/or thrombosis since the last appt (Y/N):   NO  Does patient have any interval changes to diet or medications since last appt (Y/N):  NO  Are there any complications or cost restrictions with current therapy (Y/N):  NO     Does patient have [facility] PCP? Yes, [place] MOTA (If not, please document discussion that patient must be seen at [facility])       Vitals:  Declined by patient today due to Covid-19 transmission concerns.    There were no vitals filed for this visit.     Asssessment:      INR remains therapeutic at 2.2, therefore decreasing patient's stroke and/or bleeding risk.   Reason(s) for out of range INR today:  n/a      Pt NOT on antiplatelet therapy.    Medication reconciliation completed today.    Plan:  Pt is to continue with current warfarin dosing regimen.     Follow up:  Because warfarin is a high risk medication and current CHEST guidelines recommend regular monitoring intervals (few days up to 12 weeks), will have patient return to clinic in 7 weeks to recheck INR.    Eh Enamorado, PharmD, BCACP

## 2022-02-17 ENCOUNTER — PATIENT MESSAGE (OUTPATIENT)
Dept: CARDIOLOGY | Facility: MEDICAL CENTER | Age: 77
End: 2022-02-17
Payer: MEDICARE

## 2022-02-17 DIAGNOSIS — I10 ESSENTIAL HYPERTENSION: ICD-10-CM

## 2022-02-17 PROCEDURE — RXMED WILLOW AMBULATORY MEDICATION CHARGE: Performed by: NURSE PRACTITIONER

## 2022-02-17 RX ORDER — FLASH GLUCOSE SENSOR
KIT MISCELLANEOUS
Qty: 2 EACH | Refills: 11 | Status: SHIPPED | OUTPATIENT
Start: 2022-02-17 | End: 2022-12-30 | Stop reason: SDUPTHER

## 2022-02-18 ENCOUNTER — PHARMACY VISIT (OUTPATIENT)
Dept: PHARMACY | Facility: MEDICAL CENTER | Age: 77
End: 2022-02-18
Payer: MEDICARE

## 2022-02-18 ENCOUNTER — PATIENT MESSAGE (OUTPATIENT)
Dept: MEDICAL GROUP | Facility: LAB | Age: 77
End: 2022-02-18
Payer: MEDICARE

## 2022-02-18 DIAGNOSIS — N64.4 BREAST PAIN, LEFT: ICD-10-CM

## 2022-02-18 PROCEDURE — RXMED WILLOW AMBULATORY MEDICATION CHARGE: Performed by: NURSE PRACTITIONER

## 2022-02-18 RX ORDER — TERAZOSIN 1 MG/1
1 CAPSULE ORAL DAILY
Qty: 90 CAPSULE | Refills: 0 | Status: SHIPPED | OUTPATIENT
Start: 2022-02-18 | End: 2022-06-06 | Stop reason: SDUPTHER

## 2022-02-18 RX ORDER — MAGNESIUM OXIDE 400 MG/1
400 TABLET ORAL DAILY
Qty: 90 TABLET | Refills: 0 | Status: SHIPPED | OUTPATIENT
Start: 2022-02-18 | End: 2022-06-06 | Stop reason: SDUPTHER

## 2022-02-18 NOTE — TELEPHONE ENCOUNTER
Received request via: Pharmacy  11/30/2021lov  Was the patient seen in the last year in this department? Yes    Does the patient have an active prescription (recently filled or refills available) for medication(s) requested? No

## 2022-02-21 PROCEDURE — RXMED WILLOW AMBULATORY MEDICATION CHARGE: Performed by: INTERNAL MEDICINE

## 2022-02-28 PROCEDURE — RXMED WILLOW AMBULATORY MEDICATION CHARGE: Performed by: NURSE PRACTITIONER

## 2022-03-01 ENCOUNTER — HOSPITAL ENCOUNTER (OUTPATIENT)
Dept: LAB | Facility: MEDICAL CENTER | Age: 77
End: 2022-03-01
Attending: NURSE PRACTITIONER
Payer: MEDICARE

## 2022-03-01 ENCOUNTER — PHARMACY VISIT (OUTPATIENT)
Dept: PHARMACY | Facility: MEDICAL CENTER | Age: 77
End: 2022-03-01
Payer: MEDICARE

## 2022-03-01 DIAGNOSIS — Z79.01 CHRONIC ANTICOAGULATION: ICD-10-CM

## 2022-03-01 LAB
INR PPP: 2.47 (ref 0.87–1.13)
PROTHROMBIN TIME: 26 SEC (ref 12–14.6)

## 2022-03-01 PROCEDURE — 85610 PROTHROMBIN TIME: CPT

## 2022-03-01 PROCEDURE — 36415 COLL VENOUS BLD VENIPUNCTURE: CPT

## 2022-03-02 ENCOUNTER — ANTICOAGULATION MONITORING (OUTPATIENT)
Dept: VASCULAR LAB | Facility: MEDICAL CENTER | Age: 77
End: 2022-03-02
Payer: MEDICARE

## 2022-03-02 DIAGNOSIS — Z79.01 CHRONIC ANTICOAGULATION: ICD-10-CM

## 2022-03-02 DIAGNOSIS — I48.0 PAF (PAROXYSMAL ATRIAL FIBRILLATION) (HCC): ICD-10-CM

## 2022-03-03 ENCOUNTER — HOSPITAL ENCOUNTER (OUTPATIENT)
Dept: RADIOLOGY | Facility: MEDICAL CENTER | Age: 77
End: 2022-03-03
Attending: NURSE PRACTITIONER
Payer: MEDICARE

## 2022-03-03 DIAGNOSIS — I65.23 BILATERAL CAROTID ARTERY STENOSIS: ICD-10-CM

## 2022-03-03 PROCEDURE — 93880 EXTRACRANIAL BILAT STUDY: CPT

## 2022-03-03 NOTE — PROGRESS NOTES
Anticoagulation Summary  As of 3/2/2022    INR goal:  2.0-3.0   TTR:  73.7 % (6.4 y)   INR used for dosin.47 (3/1/2022)   Warfarin maintenance plan:  3 mg (3 mg x 1) every day   Weekly warfarin total:  21 mg   Weekly max warfarin dose:  21 mg   Plan last modified:  EDILMA Tellez (2020)   Next INR check:  2022   Target end date:  Indefinite    Indications    Chronic anticoagulation [Z79.01]  Chronic anticoagulation (Resolved) [Z79.01]  PAF (paroxysmal atrial fibrillation) (HCC) [I48.0]             Anticoagulation Episode Summary     INR check location:      Preferred lab:      Send INR reminders to:      Comments:        Anticoagulation Care Providers     Provider Role Specialty Phone number    [facility] Responsible  [phone]          Refer to Anticoagulation Patient Findings for HPI  Patient Findings     Negatives:  Signs/symptoms of thrombosis, Signs/symptoms of bleeding, Laboratory test error suspected, Change in health, Change in alcohol use, Change in activity, Upcoming invasive procedure, Emergency department visit, Upcoming dental procedure, Missed doses, Extra doses, Change in medications, Change in diet/appetite, Hospital admission, Bruising, Other complaints          Spoke with patient to report a therapeutic INR.      Pt instructed to continue with current warfarin dosing regimen, confirms dosing.   Will follow up in 12 week(s).     Dayo Dean, PharmD, BCACP

## 2022-03-07 ENCOUNTER — TELEPHONE (OUTPATIENT)
Dept: PHYSICAL THERAPY | Facility: MEDICAL CENTER | Age: 77
End: 2022-03-07
Payer: MEDICARE

## 2022-03-07 NOTE — OP THERAPY DISCHARGE SUMMARY
Outpatient Physical Therapy  DISCHARGE SUMMARY NOTE      [facility] Outpatient Physical Therapy  [address]  [place] [address]  Phone:  [phone]  Fax:  [phone]    Date of Visit: 03/07/2022    Patient: Whitley Frederick  YOB: 1945  MRN: [id number]     Referring Provider: No referring provider defined for this encounter.   Referring Diagnosis No admission diagnoses are documented for this encounter.         Functional Assessment Used        Your patient is being discharged from Physical Therapy with the following comments:   · Patient has failed to schedule or reschedule follow-up visits    Comments:  The patient was seen for 2 visits from 10/27/2021 to 11/3/2021 and unfortunately cancelled scheduled appointments but failed to schedule follow ups. Per policy, the patient will be discharged from PT.     Limitations Remaining:      Recommendations:  Pt has not been seen in >30 days. Pt has failed to schedule additional follow ups. Per policy, pt will need to be seen by PCP or acquire another referral prior to initiating skilled physical therapy. Pt is being discharged at this time.        Liat Austin, PT, DPT    Date: 3/7/2022

## 2022-03-08 ENCOUNTER — HOSPITAL ENCOUNTER (OUTPATIENT)
Dept: RADIOLOGY | Facility: MEDICAL CENTER | Age: 77
End: 2022-03-08
Attending: NURSE PRACTITIONER
Payer: MEDICARE

## 2022-03-08 ENCOUNTER — HOSPITAL ENCOUNTER (OUTPATIENT)
Dept: LAB | Facility: MEDICAL CENTER | Age: 77
End: 2022-03-08
Attending: INTERNAL MEDICINE
Payer: MEDICARE

## 2022-03-08 DIAGNOSIS — N64.4 BREAST PAIN: ICD-10-CM

## 2022-03-08 LAB
25(OH)D3 SERPL-MCNC: 49 NG/ML (ref 30–100)
ALBUMIN SERPL BCP-MCNC: 4.2 G/DL (ref 3.2–4.9)
BUN SERPL-MCNC: 58 MG/DL (ref 8–22)
CALCIUM SERPL-MCNC: 9.2 MG/DL (ref 8.5–10.5)
CHLORIDE SERPL-SCNC: 109 MMOL/L (ref 96–112)
CO2 SERPL-SCNC: 22 MMOL/L (ref 20–33)
CREAT SERPL-MCNC: 2.02 MG/DL (ref 0.5–1.4)
CREAT UR-MCNC: 91.31 MG/DL
GLUCOSE SERPL-MCNC: 147 MG/DL (ref 65–99)
MAGNESIUM SERPL-MCNC: 1.9 MG/DL (ref 1.5–2.5)
PHOSPHATE SERPL-MCNC: 3.4 MG/DL (ref 2.5–4.5)
POTASSIUM SERPL-SCNC: 4.2 MMOL/L (ref 3.6–5.5)
PROT UR-MCNC: 34 MG/DL (ref 0–15)
PROT/CREAT UR: 372 MG/G (ref 10–107)
PTH-INTACT SERPL-MCNC: 77.3 PG/ML (ref 14–72)
SODIUM SERPL-SCNC: 143 MMOL/L (ref 135–145)

## 2022-03-08 PROCEDURE — 83735 ASSAY OF MAGNESIUM: CPT

## 2022-03-08 PROCEDURE — 80069 RENAL FUNCTION PANEL: CPT

## 2022-03-08 PROCEDURE — 84156 ASSAY OF PROTEIN URINE: CPT

## 2022-03-08 PROCEDURE — 82570 ASSAY OF URINE CREATININE: CPT

## 2022-03-08 PROCEDURE — 82306 VITAMIN D 25 HYDROXY: CPT

## 2022-03-08 PROCEDURE — 36415 COLL VENOUS BLD VENIPUNCTURE: CPT

## 2022-03-08 PROCEDURE — G0279 TOMOSYNTHESIS, MAMMO: HCPCS

## 2022-03-08 PROCEDURE — 83970 ASSAY OF PARATHORMONE: CPT

## 2022-03-11 ENCOUNTER — OFFICE VISIT (OUTPATIENT)
Dept: CARDIOLOGY | Facility: MEDICAL CENTER | Age: 77
End: 2022-03-11
Payer: MEDICARE

## 2022-03-11 VITALS
SYSTOLIC BLOOD PRESSURE: 130 MMHG | DIASTOLIC BLOOD PRESSURE: 60 MMHG | HEART RATE: 60 BPM | BODY MASS INDEX: 43.23 KG/M2 | RESPIRATION RATE: 16 BRPM | HEIGHT: 66 IN | WEIGHT: 269 LBS | OXYGEN SATURATION: 100 %

## 2022-03-11 DIAGNOSIS — Z79.01 CHRONIC ANTICOAGULATION: ICD-10-CM

## 2022-03-11 DIAGNOSIS — I44.7 LEFT BUNDLE BRANCH BLOCK: ICD-10-CM

## 2022-03-11 DIAGNOSIS — E11.22 TYPE 2 DIABETES MELLITUS WITH STAGE 4 CHRONIC KIDNEY DISEASE, WITHOUT LONG-TERM CURRENT USE OF INSULIN (HCC): ICD-10-CM

## 2022-03-11 DIAGNOSIS — N18.4 TYPE 2 DIABETES MELLITUS WITH STAGE 4 CHRONIC KIDNEY DISEASE, WITHOUT LONG-TERM CURRENT USE OF INSULIN (HCC): ICD-10-CM

## 2022-03-11 DIAGNOSIS — I48.0 PAF (PAROXYSMAL ATRIAL FIBRILLATION) (HCC): ICD-10-CM

## 2022-03-11 DIAGNOSIS — I10 ESSENTIAL HYPERTENSION: ICD-10-CM

## 2022-03-11 DIAGNOSIS — N18.4 CKD (CHRONIC KIDNEY DISEASE) STAGE 4, GFR 15-29 ML/MIN (HCC): ICD-10-CM

## 2022-03-11 PROCEDURE — 99215 OFFICE O/P EST HI 40 MIN: CPT | Performed by: INTERNAL MEDICINE

## 2022-03-11 PROCEDURE — RXMED WILLOW AMBULATORY MEDICATION CHARGE: Performed by: INTERNAL MEDICINE

## 2022-03-11 NOTE — PROGRESS NOTES
Chief Complaint   Patient presents with   • HTN (Controlled)     Follow up         Subjective:   Whitley Frederick is a 76 y.o. female who presents today for for follow-up of paroxysmal atrial fibrillation and PVCs anticoagulation high risk medication use and mitral regurgitation.  She has a history of paroxysmal atrial for ablation is currently not bothering her managed with dronedarone and warfarin.  She has no bleeding issues.  She also has a history of PVCs which are rare but symptomatic for her well documented on ZIO Patch and has seen Dr. eckert with regard to this.  She has lost weight recently but does not exert herself to a high degree although she does walk her dog.  She has no exertional symptoms.  She has a history of coronary angiography in the past which showed mild nonobstructive disease.  She has hypertension type 2 diabetes mellitus as well.  She also has chronic kidney disease.    Doing well since last visit.  Home blood pressures are reasonable.  She would like to switch to Xarelto from warfarin for convenience.  She has stable renal dysfunction.  Tolerating her Multaq well.    Past Medical History:   Diagnosis Date   • Allergy    • Anxiety    • Arrhythmia    • Arthritis    • Asthma    • Carotid bruit     Left   • CATARACT    • Chest pain    • DIABETES MELLITUS    • Esophageal reflux    • Goiter    • Headache(784.0)    • Heart murmur    • Hyperlipidemia    • Hypertension    • IBD (inflammatory bowel disease)    • Migraine     Silent migraine   • AVERY (obstructive sleep apnea)    • Overweight(278.02)    • Palpitations    • Paroxysmal atrial fibrillation (HCC) 7/12/13    woke with palps, AFib per ekg in am.   • Pneumonia     Nov. 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 Badillo   • Urinary tract infection, site not specified      Past Surgical History:   Procedure Laterality Date   • CATARACT PHACO  WITH IOL Right 10/25/2016    Procedure: CATARACT PHACO WITH IOL;  Surgeon: Zoran Gamble M.D.;  Location: SURGERY SAME DAY [facility];  Service:    • ABDOMINAL HYSTERECTOMY TOTAL     • CHOLECYSTECTOMY     • TONSILLECTOMY     • US-NEEDLE CORE BX-BREAST PANEL       Family History   Problem Relation Age of Onset   • Cancer Maternal Aunt    • Diabetes Maternal Aunt    • Heart Disease Maternal Aunt    • Hypertension Maternal Aunt    • Hyperlipidemia Maternal Aunt    • Cancer Mother         Leukemia   • Hypertension Mother    • Diabetes Mother    • Lung Disease Father    • Cancer Father         Lung   • Lung Disease Brother    • Stroke Brother    • Diabetes Brother    • Heart Disease Brother    • Diabetes Maternal Grandmother    • Hypertension Brother    • Diabetes Brother      Social History     Socioeconomic History   • Marital status:      Spouse name: Not on file   • Number of children: Not on file   • Years of education: Not on file   • Highest education level: Not on file   Occupational History   • Not on file   Tobacco Use   • Smoking status: Former Smoker     Packs/day: 1.00     Years: 20.00     Pack years: 20.00     Types: Cigarettes     Quit date: 1983     Years since quittin.2   • Smokeless tobacco: Never Used   Vaping Use   • Vaping Use: Never used   Substance and Sexual Activity   • Alcohol use: No     Alcohol/week: 0.0 oz   • Drug use: No   • Sexual activity: Never     Partners: Male   Other Topics Concern   • Not on file   Social History Narrative   • Not on file     Social Determinants of Health     Financial Resource Strain: Not on file   Food Insecurity: Not on file   Transportation Needs: Not on file   Physical Activity: Not on file   Stress: Not on file   Social Connections: Not on file   Intimate Partner Violence: Not on file   Housing Stability: Not on file     Allergies   Allergen Reactions   • Amlodipine Itching   • Levoxyl [Levothyroxine Sodium] Unspecified     Flushing,  very hot   • Amaryl      GI Problems   • Avandia [Rosiglitazone Maleate]      GI problems   • Cipro Xr      Possibly causes Chills.   • Clonidine      Rapid heart rate, swelling    • Contrast Media With Iodine [Iodine] Hives   • Glipizide      GI Problems   • Glucophage [Metformin Hydrochloride]      dizzy   • Hydralazine      Rapid heart rate, chest tightness   • Levaquin      Possible allergy - chills with cipro but sick   • Relafen [Nabumetone]      Itching; avoids all nsaids     Outpatient Encounter Medications as of 3/11/2022   Medication Sig Dispense Refill   • rivaroxaban (XARELTO) 15 MG Tab tablet Take 1 Tablet by mouth with dinner. 92 Tablet 3   • magnesium oxide (MAG-OX) 400 MG Tab tablet Take 1 Tablet by mouth every day. 90 Tablet 0   • terazosin (HYTRIN) 1 MG Cap Take 1 capsule by mouth every day. 90 Capsule 0   • omeprazole (PRILOSEC) 20 MG delayed-release capsule Take 2 Capsules by mouth every day. 180 Capsule 0   • losartan (COZAAR) 50 MG Tab Take 1 tablet by mouth 2 Times a Day. 200 Tablet 2   • loratadine (CLARITIN) 10 MG Tab Take 1 tablet by mouth every day. 100 Tablet 2   • clonazePAM (KLONOPIN) 0.5 MG Tab TAKE 1 TABLET BY MOUTH ONCE DAILY AS NEEDED FOR 30 DAYS. ANXIETY/HEART PALPITATIONS for 30 days 30 Tablet 1   • montelukast (SINGULAIR) 10 MG Tab Take 1 Tablet by mouth at bedtime. 90 Tablet 3   • Semaglutide,0.25 or 0.5MG/DOS, (OZEMPIC, 0.25 OR 0.5 MG/DOSE,) 2 MG/1.5ML Solution Pen-injector Inject 0.5 mg under the skin every 7 days. 1.5 mL 0   • allopurinol (ZYLOPRIM) 100 MG Tab TAKE TWO TABLETS BY MOUTH TWICE DAILY 360 Tablet 2   • fluticasone (FLONASE) 50 MCG/ACT nasal spray USE 1-2  SPRAY(S) IN EACH NOSTRIL ONCE DAILY 30 MINUTES PRIOR TO USE OF CPAP 48 g 3   • metoprolol tartrate (LOPRESSOR) 50 MG Tab Take 1 tablet by mouth 2 times a day. 200 tablet 3   • furosemide (LASIX) 80 MG Tab Take 1 tablet by mouth every 48 hours for 90 days. (Patient taking differently: Take 40 mg by mouth every  day.) 45 tablet 3   • pioglitazone (ACTOS) 15 MG Tab Take 1 tablet by mouth every day for 100 days. 100 tablet 3   • rosuvastatin (CRESTOR) 10 MG Tab Take 1 tablet by mouth every evening. Stop simvastatin. 100 tablet 3   • levothyroxine (SYNTHROID) 50 MCG Tab Take 1 tablet by mouth every morning on an empty stomach. 100 tablet 3   • MULTAQ 400 MG Tab TAKE ONE TABLET BY MOUTH TWICE A DAY WITH MEALS 180 tablet 1   • levalbuterol (XOPENEX HFA) 45 MCG/ACT inhaler Inhale 2 Puffs by mouth every four hours as needed for Shortness of Breath. 3 Inhaler 3   • ferrous sulfate 325 (65 Fe) MG tablet Take 325 mg by mouth every day.     • Blood Glucose Monitoring Suppl Supplies Misc Test strips order: Test strips for Chely Contour meter. Sig: use tid  and prn ssx high or low sugar #100 RF x 3 (Patient taking differently: Test strips order: Test strips for Chely Contour meter. Sig: use tid  and prn ssx high or low sugar #100 RF x 3) 100 Strip 3   • Blood Glucose Monitoring Suppl W/DEVICE Kit 1 Each by Does not apply route 2 times a day as needed. 1 Kit 0   • Cholecalciferol (VITAMIN D) 2000 UNITS CAPS Take 4,000 Units by mouth every day.     • Continuous Blood Gluc Sensor (FREESTYLE YANG 14 DAY SENSOR) Misc Apply every 14 days 2 Each 11   • Continuous Blood Gluc  (FREESTYLE YANG 2 READER) Device Use to monitor blood sugar daily 1 Each 11   • [DISCONTINUED] warfarin (COUMADIN) 3 MG Tab TAKE ONE TABLET BY MOUTH EVERY DAY AS DIRECTED BY COUMADIN CLINIC 90 tablet 2   • NON SPECIFIED C-Pap supplies  -humidifier tank and a spare   - fax [phone]...    Dx:  Sleep apnea 1 Each 1   • NON SPECIFIED Drive walker,  [id number], Purple, Medical Foldable Lightweight Rollator with Light Reflectors 1 Each 0   • NON SPECIFIED Test strips and lancets for the strips brand is [employer] 100 Each 3   • Mometasone Furoate (ASMANEX HFA) 100 MCG/ACT Aerosol Inhale 2 Puffs by mouth every day. (Patient not taking: Reported on 3/11/2022) 1 Each 0   •  "NON SPECIFIED blue concentrator at night along with my C-PAP./3 liters per hour 1 Each 0   • Blood Glucose Monitoring Suppl Supplies Misc Test strips order: Test strips for Accucheck meter. Sig: use BID and prn ssx high or low sugar #100 RF x 3 100 Strip 11   • Urine Glucose-Ketones Test Strip 1 Strip by In Vitro route every day. 100 Strip 2   • glucose blood (FREESTYLE LITE) strip 1 Strip by Other route 2 times a day, with meals. 50 Strip 11   • Insulin Pen Needle 32G X 4 MM Misc 1 Applicator by Does not apply route 3 times a day. 100 Each 11   • Misc. Devices MISC Pen needles for Lantus Solstar. 29g 12mm. (Patient not taking: Reported on 8/25/2021) 90 Each 3     No facility-administered encounter medications on file as of 3/11/2022.     Review of Systems   All other systems reviewed and are negative.       Objective:   /60 (BP Location: Left arm, Patient Position: Sitting)   Pulse 60   Resp 16   Ht 1.676 m (5' 6\")   Wt 122 kg (269 lb)   SpO2 100%   BMI 43.42 kg/m²     Physical Exam  Vitals reviewed.   Constitutional:       General: She is not in acute distress.     Appearance: She is well-developed. She is not diaphoretic.      Comments: Obese   HENT:      Head: Normocephalic and atraumatic.      Right Ear: External ear normal.      Left Ear: External ear normal.      Mouth/Throat:      Pharynx: No oropharyngeal exudate.   Eyes:      General: No scleral icterus.        Right eye: No discharge.         Left eye: No discharge.      Conjunctiva/sclera: Conjunctivae normal.      Pupils: Pupils are equal, round, and reactive to light.   Neck:      Thyroid: No thyromegaly.      Vascular: No JVD.      Trachea: No tracheal deviation.   Cardiovascular:      Rate and Rhythm: Normal rate and regular rhythm.      Chest Wall: PMI is not displaced.      Pulses:           Carotid pulses are 2+ on the right side and 2+ on the left side.       Radial pulses are 2+ on the right side and 2+ on the left side.        " Popliteal pulses are 2+ on the right side and 2+ on the left side.        Dorsalis pedis pulses are 2+ on the right side and 2+ on the left side.        Posterior tibial pulses are 2+ on the right side and 2+ on the left side.      Heart sounds: S1 normal and S2 normal. No murmur heard.    No friction rub. No gallop. No S3 or S4 sounds.   Pulmonary:      Effort: Pulmonary effort is normal. No respiratory distress.      Breath sounds: Normal breath sounds. No wheezing or rales.   Chest:      Chest wall: No tenderness.   Abdominal:      General: Bowel sounds are normal. There is no distension.      Palpations: Abdomen is soft.      Tenderness: There is no abdominal tenderness.   Musculoskeletal:         General: No tenderness. Normal range of motion.      Cervical back: Normal range of motion and neck supple.   Skin:     General: Skin is warm and dry.      Findings: No erythema or rash.   Neurological:      Mental Status: She is alert and oriented to person, place, and time.      Cranial Nerves: No cranial nerve deficit (Cranial nerves II through XII grossly intact).   Psychiatric:         Behavior: Behavior normal.         Thought Content: Thought content normal.         Judgment: Judgment normal.     No changes in physical exam since prior 8/26/2020    LABS:  Lab Results   Component Value Date/Time    CHOLSTRLTOT 164 12/14/2019 10:06 AM    LDL 88 12/14/2019 10:06 AM    HDL 46 12/14/2019 10:06 AM    TRIGLYCERIDE 148 12/14/2019 10:06 AM       Lab Results   Component Value Date/Time    WBC 8.1 04/11/2019 12:44 PM    RBC 4.19 (L) 04/11/2019 12:44 PM    HEMOGLOBIN 12.1 11/30/2021 01:29 PM    HEMATOCRIT 37.0 11/30/2021 01:29 PM    MCV 92.1 04/11/2019 12:44 PM    NEUTSPOLYS 68.60 04/11/2019 12:44 PM    LYMPHOCYTES 22.90 04/11/2019 12:44 PM    MONOCYTES 5.50 04/11/2019 12:44 PM    EOSINOPHILS 2.00 04/11/2019 12:44 PM    BASOPHILS 0.60 04/11/2019 12:44 PM    HYPOCHROMIA 1+ 04/19/2014 01:23 AM     Lab Results   Component  Value Date/Time    SODIUM 143 03/08/2022 08:29 AM    POTASSIUM 4.2 03/08/2022 08:29 AM    CHLORIDE 109 03/08/2022 08:29 AM    CO2 22 03/08/2022 08:29 AM    GLUCOSE 147 (H) 03/08/2022 08:29 AM    BUN 58 (H) 03/08/2022 08:29 AM    CREATININE 2.02 (H) 03/08/2022 08:29 AM    CREATININE 1.1 04/27/2006 09:50 AM     Lab Results   Component Value Date    HBA1C 6.1 (A) 11/11/2021      Lab Results   Component Value Date/Time    ALKPHOSPHAT 62 11/25/2020 01:33 PM    ASTSGOT 21 11/25/2020 01:33 PM    ALTSGPT 18 11/25/2020 01:33 PM    TBILIRUBIN 0.9 11/25/2020 01:33 PM      Lab Results   Component Value Date/Time    BNPBTYPENAT 68 04/18/2014 10:20 AM      No results found for: TSH  Lab Results   Component Value Date/Time    PROTHROMBTM 26.0 (H) 03/01/2022 03:33 PM    INR 2.47 (H) 03/01/2022 03:33 PM      ECHO CONCLUSIONS (8/4/2020):  Normal left ventricular chamber size.  Left ventricular ejection fraction is visually estimated to be 60%.  Mild concentric left ventricular hypertrophy.  Grade II diastolic dysfunction.  Moderate mitral regurgitation.  Mild mitral stenosis. Mean gradient 3.4 mmHg at 57 bpm.  Normal inferior vena cava size and inspiratory collapse.       ECHO CONCLUSIONS (12/4/2019):  Compared to the report of the prior study done  on 04/11/17 - no   significant changes noted.  Normal left ventricular size and systolic function.  Left ventricular ejection fraction is visually estimated to be 60%.  Grade I diastolic dysfunction.  Mild concentric left ventricular hypertrophy.  Moderate mitral regurgitation.  Aortic sclerosis without stenosis.  Estimated right ventricular systolic pressure  is 30 mmHg.    ECHO CONCLUSIONS (4/11/2017):  Normal left ventricular size and systolic function. Mild concentric   left ventricular hypertrophy. Left ventricular ejection fraction is   visually estimated to be 55%. Normal regional wall motion. Grade I   diastolic dysfunction.  Mildly dilated left atrium. Left atrial volume index is  37 mL/sq m.  Structurally normal mitral valve. Probably moderate mitral   regurgitation. PISA not well visualized.  Compared to the report of the study done 4/2014- there has been an   improvement in LA size and diastolic function.     EKG (4/10/2018):  I have personally reviewed the EKG this visit and discussed with the patient.  Sinus rhythm and left bundle branch block        Assessment:     1. PAF (paroxysmal atrial fibrillation) ([facility])  rivaroxaban (XARELTO) 15 MG Tab tablet   2. CKD (chronic kidney disease) stage 4, GFR 15-29 ml/min ([facility])  rivaroxaban (XARELTO) 15 MG Tab tablet   3. Type 2 diabetes mellitus with stage 4 chronic kidney disease, without long-term current use of insulin ([facility])     4. Left bundle branch block     5. Chronic anticoagulation  rivaroxaban (XARELTO) 15 MG Tab tablet   6. Essential hypertension         Medical Decision Making:  Today's Assessment / Status / Plan:     Doing well.  No issues with her PAF.  Tolerating dronedarone.  Tolerating anticoagulation well.  Continue these medications.  Blood pressures being followed by her primary provider.  Agree with transition to direct oral anticoagulant from BKA therapy.  Eliquis or Xarelto.  Xarelto will be renally dosed at 50 mg daily based on her renal dysfunction which has been stable.  Recommend follow-up in 6 months continue other cardiac medications and increase physical activity continue high risk medication, Multaq

## 2022-03-14 ENCOUNTER — PHARMACY VISIT (OUTPATIENT)
Dept: PHARMACY | Facility: MEDICAL CENTER | Age: 77
End: 2022-03-14
Payer: MEDICARE

## 2022-03-14 PROCEDURE — RXMED WILLOW AMBULATORY MEDICATION CHARGE: Performed by: NURSE PRACTITIONER

## 2022-03-18 ENCOUNTER — TELEPHONE (OUTPATIENT)
Dept: VASCULAR LAB | Facility: MEDICAL CENTER | Age: 77
End: 2022-03-18
Payer: MEDICARE

## 2022-03-18 NOTE — TELEPHONE ENCOUNTER
Patient called today and informed the [facility] clinic that she has been off of her warfarin for 3 days now as she wishes to make the transition to Xarelto. She was calling to ask how much longer she should continue to hold her warfarin and what to do next. I spoke with Brigitte Horn who advised she gets in for a Monday appointment so we can address the switch. Patient agreed to this plan.

## 2022-03-20 PROCEDURE — RXMED WILLOW AMBULATORY MEDICATION CHARGE: Performed by: NURSE PRACTITIONER

## 2022-03-21 ENCOUNTER — PHARMACY VISIT (OUTPATIENT)
Dept: PHARMACY | Facility: MEDICAL CENTER | Age: 77
End: 2022-03-21
Payer: MEDICARE

## 2022-03-21 ENCOUNTER — APPOINTMENT (OUTPATIENT)
Dept: VASCULAR LAB | Facility: MEDICAL CENTER | Age: 77
End: 2022-03-21
Attending: INTERNAL MEDICINE
Payer: MEDICARE

## 2022-03-21 DIAGNOSIS — N18.4 CKD (CHRONIC KIDNEY DISEASE) STAGE 4, GFR 15-29 ML/MIN (HCC): ICD-10-CM

## 2022-03-21 DIAGNOSIS — Z79.01 CHRONIC ANTICOAGULATION: ICD-10-CM

## 2022-03-21 DIAGNOSIS — I48.0 PAF (PAROXYSMAL ATRIAL FIBRILLATION) (HCC): ICD-10-CM

## 2022-03-21 LAB
INR BLD: 1.3 (ref 0.9–1.2)
INR PPP: 1.3 (ref 2–3.5)

## 2022-03-21 PROCEDURE — 99212 OFFICE O/P EST SF 10 MIN: CPT

## 2022-03-21 PROCEDURE — 85610 PROTHROMBIN TIME: CPT

## 2022-03-21 NOTE — PROGRESS NOTES
Target end date:Indefinite     Indication: AFIB     Drug: Xarelto 15 mg daily (renally dosed)     CHADsVASC at least 5 (HTN, Age, DM, gender)    Health Status Since Last Assessment   Patient denies any new relevant medical problems, ED visits or hospitalizations   Patient denies any embolic events (stroke/tia/systemic embolism)    Adherence with DOAC Therapy   Pt has been holding warfarin for several days and will be starting Xarelto.     Bleeding Risk Assessment     Denies Epistaxis   Pt denies any excessive or unusual bleeding/hematomas.  Pt denies any GI bleeds or hematemesis.  Pt denies any concerning daily headache or sub dural hematoma symptoms.     Pt denies any hematuria    Latest Hemoglobin 12.1   ETOH overuse Denies     Creatinine Clearance/Renal Function     Latest ClCr 50 mL/min using actual body weight. ~27 using ideal    Hepatic function   Latest LFTs not available.    Pt denies any history of liver dysfunction      Drug Interactions   Platelets: overdue   ASA/other antiplatelets denies   NSAID none   Other drug interactions Dronedarone w/Xarelto   Discussed this interaction in detail.   Suggested warfarin would be preferred, however Given pt preference, she is aware of the increase risk of bleeding. Pt will watch for s/s of bleeding and report any that appear. Pt aware we would prefer warfarin, but given her difficulties with diet on warfarin she prefers to transition to Eliquis despite the possible increased risks with her DDI.    X Verified no anticonvulsant or azole therapy, education provided for future use.     Examination   Blood Pressure Declines today.    Symptomatic hypotension not known.    Significant gait impairment/imbalance/high risk for falls? No obvious risks.     Final Assessment and Recommendations:   Patient appears stable from the anticoagulation standpoint.     Benefits of continued DOAC therapy outweigh risks for this patient   Recommend pt continue with current DOAC therapy  (xarelto 15 mg daily for renal dosing and DDI)   DOAC is  Affordable, if this changes, pt aware to contact our clinic.      Other Actions: cmp/ cbc ordered by her nephrologist, will utilize the same labs for next visit.     Follow up:   Will follow up with patient 3-4  months.      Juan Bonds, AraceliD

## 2022-03-24 ENCOUNTER — ANTICOAGULATION MONITORING (OUTPATIENT)
Dept: VASCULAR LAB | Facility: MEDICAL CENTER | Age: 77
End: 2022-03-24
Payer: MEDICARE

## 2022-03-24 DIAGNOSIS — I48.0 PAF (PAROXYSMAL ATRIAL FIBRILLATION) (HCC): ICD-10-CM

## 2022-03-24 DIAGNOSIS — Z79.01 CHRONIC ANTICOAGULATION: ICD-10-CM

## 2022-04-04 ENCOUNTER — PHARMACY VISIT (OUTPATIENT)
Dept: PHARMACY | Facility: MEDICAL CENTER | Age: 77
End: 2022-04-04
Payer: MEDICARE

## 2022-04-04 PROCEDURE — RXMED WILLOW AMBULATORY MEDICATION CHARGE: Performed by: FAMILY MEDICINE

## 2022-04-04 PROCEDURE — RXMED WILLOW AMBULATORY MEDICATION CHARGE: Performed by: NURSE PRACTITIONER

## 2022-04-05 ENCOUNTER — PHARMACY VISIT (OUTPATIENT)
Dept: PHARMACY | Facility: MEDICAL CENTER | Age: 77
End: 2022-04-05
Payer: MEDICARE

## 2022-04-06 ENCOUNTER — PATIENT MESSAGE (OUTPATIENT)
Dept: CARDIOLOGY | Facility: MEDICAL CENTER | Age: 77
End: 2022-04-06
Payer: MEDICARE

## 2022-04-06 PROBLEM — F33.42 RECURRENT MAJOR DEPRESSIVE DISORDER, IN FULL REMISSION (HCC): Status: ACTIVE | Noted: 2022-04-06

## 2022-04-06 PROBLEM — F13.20 SEDATIVE, HYPNOTIC, OR ANXIOLYTIC DEPENDENCE (HCC): Status: ACTIVE | Noted: 2022-04-06

## 2022-04-06 PROBLEM — D68.69 SECONDARY HYPERCOAGULABLE STATE (HCC): Status: ACTIVE | Noted: 2022-04-06

## 2022-04-07 ENCOUNTER — PHARMACY VISIT (OUTPATIENT)
Dept: PHARMACY | Facility: MEDICAL CENTER | Age: 77
End: 2022-04-07
Payer: MEDICARE

## 2022-04-07 DIAGNOSIS — Z79.4 TYPE 2 DIABETES MELLITUS WITH STAGE 4 CHRONIC KIDNEY DISEASE, WITH LONG-TERM CURRENT USE OF INSULIN (HCC): ICD-10-CM

## 2022-04-07 DIAGNOSIS — E11.22 TYPE 2 DIABETES MELLITUS WITH STAGE 4 CHRONIC KIDNEY DISEASE, WITH LONG-TERM CURRENT USE OF INSULIN (HCC): ICD-10-CM

## 2022-04-07 DIAGNOSIS — N18.4 TYPE 2 DIABETES MELLITUS WITH STAGE 4 CHRONIC KIDNEY DISEASE, WITH LONG-TERM CURRENT USE OF INSULIN (HCC): ICD-10-CM

## 2022-04-07 PROCEDURE — RXMED WILLOW AMBULATORY MEDICATION CHARGE: Performed by: FAMILY MEDICINE

## 2022-04-07 PROCEDURE — RXMED WILLOW AMBULATORY MEDICATION CHARGE: Performed by: NURSE PRACTITIONER

## 2022-04-07 RX ORDER — SEMAGLUTIDE 1.34 MG/ML
0.5 INJECTION, SOLUTION SUBCUTANEOUS
Qty: 1.5 ML | Refills: 0 | Status: SHIPPED | OUTPATIENT
Start: 2022-04-07 | End: 2022-04-12 | Stop reason: SDUPTHER

## 2022-04-07 NOTE — TELEPHONE ENCOUNTER
Received request via: Patient    Was the patient seen in the last year in this department? Yes  11/30/21  Does the patient have an active prescription (recently filled or refills available) for medication(s) requested? No

## 2022-04-07 NOTE — TELEPHONE ENCOUNTER
----- Message from Whitley Frederick sent at 4/6/2022  8:48 PM PDT -----  Regarding: Ozempic from    [facility]… I’ve just started to use my last pen of Ozempic 1mg a week  … I’m not able to get the refill form off the internet… they usually fax it to you … I think… thank you for all your help you are appreciated… :)

## 2022-04-08 ENCOUNTER — PHARMACY VISIT (OUTPATIENT)
Dept: PHARMACY | Facility: MEDICAL CENTER | Age: 77
End: 2022-04-08
Payer: MEDICARE

## 2022-04-12 ENCOUNTER — OFFICE VISIT (OUTPATIENT)
Dept: SLEEP MEDICINE | Facility: MEDICAL CENTER | Age: 77
End: 2022-04-12
Payer: MEDICARE

## 2022-04-12 VITALS
HEART RATE: 59 BPM | RESPIRATION RATE: 16 BRPM | WEIGHT: 271 LBS | HEIGHT: 67 IN | BODY MASS INDEX: 42.53 KG/M2 | DIASTOLIC BLOOD PRESSURE: 60 MMHG | SYSTOLIC BLOOD PRESSURE: 130 MMHG | OXYGEN SATURATION: 98 %

## 2022-04-12 DIAGNOSIS — I48.0 PAF (PAROXYSMAL ATRIAL FIBRILLATION) (HCC): ICD-10-CM

## 2022-04-12 DIAGNOSIS — Z87.891 FORMER SMOKER: ICD-10-CM

## 2022-04-12 DIAGNOSIS — G47.33 OSA (OBSTRUCTIVE SLEEP APNEA): ICD-10-CM

## 2022-04-12 DIAGNOSIS — J45.20 MILD INTERMITTENT ASTHMA WITHOUT COMPLICATION: ICD-10-CM

## 2022-04-12 DIAGNOSIS — I10 ESSENTIAL HYPERTENSION: ICD-10-CM

## 2022-04-12 PROCEDURE — 99214 OFFICE O/P EST MOD 30 MIN: CPT | Performed by: NURSE PRACTITIONER

## 2022-04-12 RX ORDER — LEVALBUTEROL TARTRATE 45 UG/1
2 AEROSOL, METERED ORAL EVERY 6 HOURS PRN
Qty: 15 G | Refills: 11 | Status: SHIPPED | OUTPATIENT
Start: 2022-04-12

## 2022-04-12 NOTE — PROGRESS NOTES
Chief Complaint   Patient presents with   • Apnea     last seen 4/17/19       HPI:  Whitley Frederick is a 77 y.o. year old female here today for follow-up on asthma and AVERY.  She is accompanied by her daughter    Last office visit 4/17/2019 with TAO Abbott    History of mild asthma.  Former smoker, quit 1983 with 20-pack-year history.  No recent imaging to review.  Spirometry 4/17/2019 FVC 2.88 L or 88%, FEV1 2.2 L or 93%, FEV1/FVC ratio 79.  No significant bronchodilator response.  PFT 3/14/2018 indicates FVC 2.93 L or 88%, FEV1 2.37 L or 94%, FEV1/FVC ratio 81, TLC 6.17 L 112%, DLCO 130% predicted.  Previously tried Qvar but cost was an issue.  PCP recently started patient on Asmanex and Flovent but did not tolerate it felt it worsened her symptoms.  She remains on Xopenex HFA to her history of A. fib and Singulair nightly.  She feels her asthma is stable and denies any exacerbations in the last year.  No significant cough, phlegm, chest pain, chest tightness or wheezing.  She notes shortness of breath with increased activity.    Prior PSG noted AHI 29.3/h with a REM index of 90 and O2 ronnie 74%.  Patient was titrated to CPAP 8 cm with 5 L oxygen bleed in.  Currently using CPAP 10 cm with 3 L oxygen bleed in.  Compliance report 3/14/2022 through 4/12/2022 indicates 100% compliance, average nightly 7 hours 29 minutes, no significant mask leak with a reduced AHI of 0.6/h.  Reviewed with patient.  She feels overall she sleeps well on therapy.  She denies morning headaches.  She naps for 20 minutes 1 hour daily.  She goes to bed at 11 PM to 1 AM and falls asleep quickly.  She generally wakes up at 3 AM and may use the restroom.  If unable go back to sleep she will use clonazepam prescribed by other provider.  She generally wakes by 7:30 AM and then may resume sleep until 11:30 AM.  We reviewed sleep hygiene.      ROS: As per HPI and otherwise negative if not stated.    Past Medical History:    Diagnosis Date   • Allergy    • Anxiety    • Arrhythmia    • Arthritis    • Asthma    • Carotid bruit     Left   • CATARACT    • Chest pain    • DIABETES MELLITUS    • Esophageal reflux    • Goiter    • Headache(784.0)    • Heart murmur    • Hyperlipidemia    • Hypertension    • IBD (inflammatory bowel disease)    • Migraine     Silent migraine   • AVERY (obstructive sleep apnea)    • Overweight(278.02)    • Palpitations    • Paroxysmal atrial fibrillation (HCC) 7/12/13    woke with palps, AFib per ekg in am.   • Pneumonia     Nov. 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 Badillo   • Urinary tract infection, site not specified        Past Surgical History:   Procedure Laterality Date   • CATARACT PHACO WITH IOL Right 10/25/2016    Procedure: CATARACT PHACO WITH IOL;  Surgeon: Zoran Gamble M.D.;  Location: SURGERY SAME DAY [facility];  Service:    • ABDOMINAL HYSTERECTOMY TOTAL     • CHOLECYSTECTOMY     • TONSILLECTOMY     • US-NEEDLE CORE BX-BREAST PANEL         Family History   Problem Relation Age of Onset   • Cancer Maternal Aunt    • Diabetes Maternal Aunt    • Heart Disease Maternal Aunt    • Hypertension Maternal Aunt    • Hyperlipidemia Maternal Aunt    • Cancer Mother         Leukemia   • Hypertension Mother    • Diabetes Mother    • Lung Disease Father    • Cancer Father         Lung   • Lung Disease Brother    • Stroke Brother    • Diabetes Brother    • Heart Disease Brother    • Diabetes Maternal Grandmother    • Hypertension Brother    • Diabetes Brother        Social History     Socioeconomic History   • Marital status:      Spouse name: Not on file   • Number of children: Not on file   • Years of education: Not on file   • Highest education level: Not on file   Occupational History   • Not on file   Tobacco Use   • Smoking status: Former Smoker     Packs/day: 1.00     Years: 20.00      "Pack years: 20.00     Types: Cigarettes     Quit date: 1983     Years since quittin.3   • Smokeless tobacco: Never Used   Vaping Use   • Vaping Use: Never used   Substance and Sexual Activity   • Alcohol use: No     Alcohol/week: 0.0 oz   • Drug use: No   • Sexual activity: Never     Partners: Male   Other Topics Concern   • Not on file   Social History Narrative   • Not on file     Social Determinants of Health     Financial Resource Strain: Not on file   Food Insecurity: Not on file   Transportation Needs: Not on file   Physical Activity: Not on file   Stress: Not on file   Social Connections: Not on file   Intimate Partner Violence: Not on file   Housing Stability: Not on file       Allergies as of 2022 - Reviewed 2022   Allergen Reaction Noted   • Amlodipine Itching 2016   • Levoxyl [levothyroxine sodium] Unspecified 2021   • Amaryl  2011   • Avandia [rosiglitazone maleate]  2011   • Cipro xr  2011   • Clonidine  2017   • Contrast media with iodine [iodine] Hives 2016   • Glipizide  2011   • Glucophage [metformin hydrochloride]  2011   • Hydralazine  2016   • Levaquin  2011   • Relafen [nabumetone]  2011        Vitals:  /60 (BP Location: Left arm, Patient Position: Sitting, BP Cuff Size: Adult)   Pulse (!) 59   Resp 16   Ht 1.689 m (5' 6.5\")   Wt 123 kg (271 lb)   SpO2 98%     Current medications as of today   Current Outpatient Medications   Medication Sig Dispense Refill   • Semaglutide,0.25 or 0.5MG/DOS, 2 MG/1.5ML Solution Pen-injector Inject 0.5 mg under the skin every 7 days. 21 mL 3   • levalbuterol (XOPENEX HFA) 45 MCG/ACT inhaler Inhale 2 Puffs every 6 hours as needed for Shortness of Breath. 15 g 11   • rivaroxaban (XARELTO) 15 MG Tab tablet Take 1 Tablet by mouth with dinner. 90 Tablet 3   • magnesium oxide (MAG-OX) 400 MG Tab tablet Take 1 Tablet by mouth every day. 90 Tablet 0   • terazosin " (HYTRIN) 1 MG Cap Take 1 capsule by mouth every day. 90 Capsule 0   • Continuous Blood Gluc Sensor (FREESTYLE YANG 14 DAY SENSOR) Misc Apply every 14 days 2 Each 11   • Continuous Blood Gluc  (FREESTYLE YANG 2 READER) Device Use to monitor blood sugar daily 1 Each 11   • omeprazole (PRILOSEC) 20 MG delayed-release capsule Take 2 Capsules by mouth every day. 180 Capsule 0   • losartan (COZAAR) 50 MG Tab Take 1 tablet by mouth 2 Times a Day. 200 Tablet 2   • loratadine (CLARITIN) 10 MG Tab Take 1 tablet by mouth every day. 100 Tablet 2   • clonazePAM (KLONOPIN) 0.5 MG Tab TAKE 1 TABLET BY MOUTH ONCE DAILY AS NEEDED FOR 30 DAYS. ANXIETY/HEART PALPITATIONS for 30 days 30 Tablet 1   • montelukast (SINGULAIR) 10 MG Tab Take 1 Tablet by mouth at bedtime. 90 Tablet 3   • allopurinol (ZYLOPRIM) 100 MG Tab TAKE TWO TABLETS BY MOUTH TWICE DAILY 360 Tablet 2   • fluticasone (FLONASE) 50 MCG/ACT nasal spray USE 1-2  SPRAY(S) IN EACH NOSTRIL ONCE DAILY 30 MINUTES PRIOR TO USE OF CPAP 48 g 3   • metoprolol tartrate (LOPRESSOR) 50 MG Tab Take 1 tablet by mouth 2 times a day. 200 tablet 3   • furosemide (LASIX) 80 MG Tab Take 1 tablet by mouth every 48 hours for 90 days. (Patient taking differently: Take 40 mg by mouth every day.) 45 tablet 3   • pioglitazone (ACTOS) 15 MG Tab Take 1 tablet by mouth every day for 100 days. 100 tablet 3   • rosuvastatin (CRESTOR) 10 MG Tab Take 1 tablet by mouth every evening. Stop simvastatin. 100 tablet 3   • levothyroxine (SYNTHROID) 50 MCG Tab Take 1 tablet by mouth every morning on an empty stomach. 100 tablet 3   • MULTAQ 400 MG Tab TAKE ONE TABLET BY MOUTH TWICE A DAY WITH MEALS 180 tablet 1   • NON SPECIFIED C-Pap supplies  -humidifier tank and a spare   - fax [phone]...    Dx:  Sleep apnea 1 Each 1   • NON SPECIFIED Drive walker,  [id number], Purple, Medical Foldable Lightweight Rollator with Light Reflectors 1 Each 0   • NON SPECIFIED Test strips and lancets for the strips brand  is Kesson 100 Each 3   • levalbuterol (XOPENEX HFA) 45 MCG/ACT inhaler Inhale 2 Puffs by mouth every four hours as needed for Shortness of Breath. 3 Inhaler 3   • NON SPECIFIED blue concentrator at night along with my C-PAP./3 liters per hour 1 Each 0   • ferrous sulfate 325 (65 Fe) MG tablet Take 325 mg by mouth every day.     • Blood Glucose Monitoring Suppl Supplies Misc Test strips order: Test strips for Accucheck meter. Sig: use BID and prn ssx high or low sugar #100 RF x 3 100 Strip 11   • Blood Glucose Monitoring Suppl Supplies Misc Test strips order: Test strips for Chely Contour meter. Sig: use tid  and prn ssx high or low sugar #100 RF x 3 (Patient taking differently: Test strips order: Test strips for Chely Contour meter. Sig: use tid  and prn ssx high or low sugar #100 RF x 3) 100 Strip 3   • Urine Glucose-Ketones Test Strip 1 Strip by In Vitro route every day. 100 Strip 2   • glucose blood (FREESTYLE LITE) strip 1 Strip by Other route 2 times a day, with meals. 50 Strip 11   • Insulin Pen Needle 32G X 4 MM Misc 1 Applicator by Does not apply route 3 times a day. 100 Each 11   • Blood Glucose Monitoring Suppl W/DEVICE Kit 1 Each by Does not apply route 2 times a day as needed. 1 Kit 0   • Misc. Devices MISC Pen needles for Lantus Solstar. 29g 12mm. 90 Each 3   • Cholecalciferol (VITAMIN D) 2000 UNITS CAPS Take 4,000 Units by mouth every day.       No current facility-administered medications for this visit.         Physical Exam:   Gen:           Alert and oriented, No apparent distress. Mood and affect appropriate, normal interaction with examiner.  Eyes:          PERRL, EOM intact, sclere white, conjunctive moist.  Ears:          Not examined.   Hearing:     Grossly intact.  Nose:          Normal, no lesions or deformities.  Dentition:    Mask.  Oropharynx:   Mask.  Mallampati Classification: mask  Neck:        Supple, trachea midline, no masses.  Respiratory Effort: No intercostal retractions or use  of accessory muscles.   Lung Auscultation:      Clear to auscultation bilaterally; no rales, rhonchi or wheezing.  CV:            Regular rate and rhythm. No murmurs, rubs or gallops.  Abd:           Not examined. .  Lymphadenopathy: Not examined.  Gait and Station: Normal.  Digits and Nails: No clubbing, cyanosis, petechiae, or nodes.   Cranial Nerves: II-XII grossly intact.  Skin:        No rashes, lesions or ulcers noted.               Ext:           No cyanosis or edema.      Assessment:  1. Mild intermittent asthma without complication  PULMONARY FUNCTION TESTS -Test requested: Complete Pulmonary Function Test; Include MIPS/MEPS? No   2. AVERY (obstructive sleep apnea)  DME CPAP    DME Mask and Supplies   3. Essential hypertension     4. Former smoker  PULMONARY FUNCTION TESTS -Test requested: Complete Pulmonary Function Test; Include MIPS/MEPS? No   5. BMI 40.0-44.9, adult (HCC)  Height And Weight       Immunizations:    Flu:1111@1  Pneumovax 23:2016  Prevnar 13:2016  COVID-19: recommend    Plan:  1.  Asthma is well controlled but patient needs refills on her prescriptions.  Rx for Xopenex HFA sent to pharmacy.  She may need a prior Auth will notify us if so.  Continue Singulair nightly.  Continue loratadine daily.  PFT at next office visit  2.  Continue CPAP 10 cm with 3 L oxygen bleed in.  She will continue to benefit from therapy.  Her device is greater than 5 years old.  We will place orders for a new device as well.  DME mask/supplies  DME CPAP  She will contact office when she receives new device so we can schedule her follow-up appropriately for compliance check  3.  Discussed sleep and respiratory hygiene  4.  Follow primary care for the health concerns including management hypertension  5.  Encourage weight loss through exercise and dietary changes  6.  Follow-up in 6 months with compliance report/PFT results, sooner if needed.    Please note that this dictation was created using voice recognition  software. I have made every reasonable attempt to correct obvious errors, but it is possible there are errors of grammar and possibly content that I did not discover before finalizing the note.

## 2022-04-12 NOTE — PATIENT INSTRUCTIONS
Call DME : [facility] Phone. [phone] Fax. [phone]  In the next week to check on status of orders for new CPAP    If you get a new CPAP, please send Share[employer] message or call office to let us know

## 2022-04-12 NOTE — PATIENT COMMUNICATION
Physician portion [facility] patient assistance form completed. Given to TW for review and signature.

## 2022-04-15 ENCOUNTER — PATIENT MESSAGE (OUTPATIENT)
Dept: MEDICAL GROUP | Facility: LAB | Age: 77
End: 2022-04-15
Payer: MEDICARE

## 2022-04-15 RX ORDER — BLOOD-GLUCOSE METER
1 KIT MISCELLANEOUS 2 TIMES DAILY WITH MEALS
Qty: 50 STRIP | Refills: 11 | Status: SHIPPED | OUTPATIENT
Start: 2022-04-15 | End: 2023-04-06

## 2022-04-16 PROCEDURE — RXMED WILLOW AMBULATORY MEDICATION CHARGE: Performed by: NURSE PRACTITIONER

## 2022-04-18 ENCOUNTER — PHARMACY VISIT (OUTPATIENT)
Dept: PHARMACY | Facility: MEDICAL CENTER | Age: 77
End: 2022-04-18
Payer: MEDICARE

## 2022-04-18 PROCEDURE — RXMED WILLOW AMBULATORY MEDICATION CHARGE: Performed by: NURSE PRACTITIONER

## 2022-04-19 PROCEDURE — RXMED WILLOW AMBULATORY MEDICATION CHARGE: Performed by: NURSE PRACTITIONER

## 2022-04-20 ENCOUNTER — PHARMACY VISIT (OUTPATIENT)
Dept: PHARMACY | Facility: MEDICAL CENTER | Age: 77
End: 2022-04-20
Payer: MEDICARE

## 2022-04-22 ENCOUNTER — TELEPHONE (OUTPATIENT)
Dept: MEDICAL GROUP | Facility: LAB | Age: 77
End: 2022-04-22
Payer: MEDICARE

## 2022-04-22 PROCEDURE — RXMED WILLOW AMBULATORY MEDICATION CHARGE: Performed by: NURSE PRACTITIONER

## 2022-04-22 NOTE — TELEPHONE ENCOUNTER
ESTABLISHED PATIENT PRE-VISIT PLANNING     Patient was NOT contacted to complete PVP.     Note: Patient will not be contacted if there is no indication to call.     1.  Reviewed notes from the last few office visits within the medical group: Yes    2.  If any orders were placed at last visit or intended to be done for this visit (i.e. 6 mos follow-up), do we have Results/Consult Notes?         •  Labs - Labs ordered, completed on 3/1/22 and results are in chart.  Note: If patient appointment is for lab review and patient did not complete labs, check with provider if OK to reschedule patient until labs completed.       •  Imaging - Imaging ordered, completed and results are in chart.       •  Referrals - No referrals were ordered at last office visit.    3. Is this appointment scheduled as a Hospital Follow-Up? No    4.  Immunizations were updated in Epic using Reconcile Outside Information activity? Yes    5.  Patient is due for the following Health Maintenance Topics:   Health Maintenance Due   Topic Date Due   • COVID-19 Vaccine (1) Never done   • IMM ZOSTER VACCINES (1 of 2) Never done   • FASTING LIPID PROFILE  12/14/2020   • COLORECTAL CANCER SCREENING  08/03/2021   • A1C SCREENING  05/11/2022         6.  AHA (Pulse8) form printed for Provider? Email sent to [facility] requesting form

## 2022-04-25 ENCOUNTER — PHARMACY VISIT (OUTPATIENT)
Dept: PHARMACY | Facility: MEDICAL CENTER | Age: 77
End: 2022-04-25
Payer: MEDICARE

## 2022-04-25 ENCOUNTER — TELEPHONE (OUTPATIENT)
Dept: SLEEP MEDICINE | Facility: MEDICAL CENTER | Age: 77
End: 2022-04-25

## 2022-04-25 NOTE — TELEPHONE ENCOUNTER
MEDICATION PRIOR AUTHORIZATION NEEDED:    1. Name of Medication: Levalbuterol HFA 45 mcg    2. Requested By (Name of Pharmacy): [facility]     3. Is insurance on file current? yes    4. What is the name & phone number of the 3rd party payor? [facility] [phone]

## 2022-04-28 ENCOUNTER — PHARMACY VISIT (OUTPATIENT)
Dept: PHARMACY | Facility: MEDICAL CENTER | Age: 77
End: 2022-04-28
Payer: MEDICARE

## 2022-04-28 PROCEDURE — RXMED WILLOW AMBULATORY MEDICATION CHARGE: Performed by: FAMILY MEDICINE

## 2022-05-10 ENCOUNTER — PHARMACY VISIT (OUTPATIENT)
Dept: PHARMACY | Facility: MEDICAL CENTER | Age: 77
End: 2022-05-10
Payer: MEDICARE

## 2022-05-10 PROCEDURE — RXMED WILLOW AMBULATORY MEDICATION CHARGE: Performed by: NURSE PRACTITIONER

## 2022-05-11 PROCEDURE — RXMED WILLOW AMBULATORY MEDICATION CHARGE: Performed by: NURSE PRACTITIONER

## 2022-05-12 ENCOUNTER — PHARMACY VISIT (OUTPATIENT)
Dept: PHARMACY | Facility: MEDICAL CENTER | Age: 77
End: 2022-05-12
Payer: MEDICARE

## 2022-05-15 PROCEDURE — RXMED WILLOW AMBULATORY MEDICATION CHARGE: Performed by: NURSE PRACTITIONER

## 2022-05-17 ENCOUNTER — PHARMACY VISIT (OUTPATIENT)
Dept: PHARMACY | Facility: MEDICAL CENTER | Age: 77
End: 2022-05-17
Payer: MEDICARE

## 2022-05-18 ENCOUNTER — PATIENT MESSAGE (OUTPATIENT)
Dept: MEDICAL GROUP | Facility: LAB | Age: 77
End: 2022-05-18
Payer: MEDICARE

## 2022-05-18 DIAGNOSIS — B37.2 CANDIDAL INTERTRIGO: ICD-10-CM

## 2022-05-18 PROCEDURE — RXMED WILLOW AMBULATORY MEDICATION CHARGE: Performed by: NURSE PRACTITIONER

## 2022-05-18 NOTE — PATIENT COMMUNICATION
Received request via: Pharmacy    Was the patient seen in the last year in this department? Yes  11/30/2021  Does the patient have an active prescription (recently filled or refills available) for medication(s) requested? No

## 2022-05-19 ENCOUNTER — PHARMACY VISIT (OUTPATIENT)
Dept: PHARMACY | Facility: MEDICAL CENTER | Age: 77
End: 2022-05-19
Payer: MEDICARE

## 2022-05-19 PROCEDURE — RXMED WILLOW AMBULATORY MEDICATION CHARGE: Performed by: NURSE PRACTITIONER

## 2022-05-19 RX ORDER — NYSTATIN 100000 U/G
OINTMENT TOPICAL
Qty: 240 G | Refills: 2 | Status: ON HOLD | OUTPATIENT
Start: 2022-05-19 | End: 2024-03-25

## 2022-06-06 DIAGNOSIS — I10 ESSENTIAL HYPERTENSION: ICD-10-CM

## 2022-06-07 PROCEDURE — RXMED WILLOW AMBULATORY MEDICATION CHARGE: Performed by: NURSE PRACTITIONER

## 2022-06-07 RX ORDER — MAGNESIUM OXIDE 400 MG/1
400 TABLET ORAL DAILY
Qty: 90 TABLET | Refills: 0 | Status: SHIPPED | OUTPATIENT
Start: 2022-06-07 | End: 2022-08-29 | Stop reason: SDUPTHER

## 2022-06-08 ENCOUNTER — TELEPHONE (OUTPATIENT)
Dept: MEDICAL GROUP | Facility: LAB | Age: 77
End: 2022-06-08
Payer: MEDICARE

## 2022-06-08 PROCEDURE — RXMED WILLOW AMBULATORY MEDICATION CHARGE: Performed by: INTERNAL MEDICINE

## 2022-06-08 RX ORDER — TERAZOSIN 1 MG/1
1 CAPSULE ORAL DAILY
Qty: 100 CAPSULE | Refills: 3 | Status: SHIPPED | OUTPATIENT
Start: 2022-06-08 | End: 2023-06-15

## 2022-06-09 ENCOUNTER — PHARMACY VISIT (OUTPATIENT)
Dept: PHARMACY | Facility: MEDICAL CENTER | Age: 77
End: 2022-06-09
Payer: MEDICARE

## 2022-06-13 RX ORDER — LEVOTHYROXINE SODIUM 0.05 MG/1
50 TABLET ORAL
Qty: 100 TABLET | Refills: 3 | Status: SHIPPED | OUTPATIENT
Start: 2022-06-13 | End: 2023-07-03 | Stop reason: SDUPTHER

## 2022-06-20 PROCEDURE — RXMED WILLOW AMBULATORY MEDICATION CHARGE: Performed by: NURSE PRACTITIONER

## 2022-06-21 ENCOUNTER — PHARMACY VISIT (OUTPATIENT)
Dept: PHARMACY | Facility: MEDICAL CENTER | Age: 77
End: 2022-06-21
Payer: MEDICARE

## 2022-06-21 PROCEDURE — RXMED WILLOW AMBULATORY MEDICATION CHARGE: Performed by: NURSE PRACTITIONER

## 2022-06-22 ENCOUNTER — PHARMACY VISIT (OUTPATIENT)
Dept: PHARMACY | Facility: MEDICAL CENTER | Age: 77
End: 2022-06-22
Payer: MEDICARE

## 2022-06-27 ENCOUNTER — HOSPITAL ENCOUNTER (EMERGENCY)
Facility: MEDICAL CENTER | Age: 77
End: 2022-06-27
Attending: EMERGENCY MEDICINE
Payer: MEDICARE

## 2022-06-27 ENCOUNTER — APPOINTMENT (OUTPATIENT)
Dept: RADIOLOGY | Facility: MEDICAL CENTER | Age: 77
End: 2022-06-27
Attending: EMERGENCY MEDICINE
Payer: MEDICARE

## 2022-06-27 VITALS
RESPIRATION RATE: 18 BRPM | SYSTOLIC BLOOD PRESSURE: 139 MMHG | TEMPERATURE: 98.3 F | BODY MASS INDEX: 42.06 KG/M2 | HEART RATE: 82 BPM | DIASTOLIC BLOOD PRESSURE: 73 MMHG | WEIGHT: 261.69 LBS | OXYGEN SATURATION: 96 % | HEIGHT: 66 IN

## 2022-06-27 DIAGNOSIS — N18.4 STAGE 4 CHRONIC KIDNEY DISEASE (HCC): ICD-10-CM

## 2022-06-27 DIAGNOSIS — U07.1 COVID-19: ICD-10-CM

## 2022-06-27 DIAGNOSIS — J02.9 SORE THROAT: ICD-10-CM

## 2022-06-27 DIAGNOSIS — R11.2 NON-INTRACTABLE VOMITING WITH NAUSEA, UNSPECIFIED VOMITING TYPE: ICD-10-CM

## 2022-06-27 LAB
ALBUMIN SERPL BCP-MCNC: 3.9 G/DL (ref 3.2–4.9)
ALBUMIN/GLOB SERPL: 1.2 G/DL
ALP SERPL-CCNC: 79 U/L (ref 30–99)
ALT SERPL-CCNC: 19 U/L (ref 2–50)
ANION GAP SERPL CALC-SCNC: 16 MMOL/L (ref 7–16)
APPEARANCE UR: CLEAR
AST SERPL-CCNC: 34 U/L (ref 12–45)
BASOPHILS # BLD AUTO: 0.6 % (ref 0–1.8)
BASOPHILS # BLD: 0.03 K/UL (ref 0–0.12)
BILIRUB SERPL-MCNC: 0.9 MG/DL (ref 0.1–1.5)
BILIRUB UR QL STRIP.AUTO: NEGATIVE
BUN SERPL-MCNC: 46 MG/DL (ref 8–22)
CALCIUM SERPL-MCNC: 9.2 MG/DL (ref 8.4–10.2)
CHLORIDE SERPL-SCNC: 104 MMOL/L (ref 96–112)
CO2 SERPL-SCNC: 17 MMOL/L (ref 20–33)
COLOR UR: YELLOW
CREAT SERPL-MCNC: 2.37 MG/DL (ref 0.5–1.4)
EKG IMPRESSION: NORMAL
EOSINOPHIL # BLD AUTO: 0 K/UL (ref 0–0.51)
EOSINOPHIL NFR BLD: 0 % (ref 0–6.9)
EPI CELLS #/AREA URNS HPF: NORMAL /HPF
ERYTHROCYTE [DISTWIDTH] IN BLOOD BY AUTOMATED COUNT: 51.3 FL (ref 35.9–50)
GFR SERPLBLD CREATININE-BSD FMLA CKD-EPI: 21 ML/MIN/1.73 M 2
GLOBULIN SER CALC-MCNC: 3.2 G/DL (ref 1.9–3.5)
GLUCOSE SERPL-MCNC: 194 MG/DL (ref 65–99)
GLUCOSE UR STRIP.AUTO-MCNC: NEGATIVE MG/DL
HCT VFR BLD AUTO: 36.8 % (ref 37–47)
HGB BLD-MCNC: 12.2 G/DL (ref 12–16)
IMM GRANULOCYTES # BLD AUTO: 0.01 K/UL (ref 0–0.11)
IMM GRANULOCYTES NFR BLD AUTO: 0.2 % (ref 0–0.9)
KETONES UR STRIP.AUTO-MCNC: NEGATIVE MG/DL
LACTATE BLD-SCNC: 2 MMOL/L (ref 0.5–2)
LEUKOCYTE ESTERASE UR QL STRIP.AUTO: ABNORMAL
LYMPHOCYTES # BLD AUTO: 0.46 K/UL (ref 1–4.8)
LYMPHOCYTES NFR BLD: 8.9 % (ref 22–41)
MCH RBC QN AUTO: 31.7 PG (ref 27–33)
MCHC RBC AUTO-ENTMCNC: 33.2 G/DL (ref 33.6–35)
MCV RBC AUTO: 95.6 FL (ref 81.4–97.8)
MICRO URNS: ABNORMAL
MONOCYTES # BLD AUTO: 0.46 K/UL (ref 0–0.85)
MONOCYTES NFR BLD AUTO: 8.9 % (ref 0–13.4)
NEUTROPHILS # BLD AUTO: 4.19 K/UL (ref 2–7.15)
NEUTROPHILS NFR BLD: 81.4 % (ref 44–72)
NITRITE UR QL STRIP.AUTO: NEGATIVE
NRBC # BLD AUTO: 0 K/UL
NRBC BLD-RTO: 0 /100 WBC
PH UR STRIP.AUTO: 5 [PH] (ref 5–8)
PLATELET # BLD AUTO: 69 K/UL (ref 164–446)
PMV BLD AUTO: 11.4 FL (ref 9–12.9)
POTASSIUM SERPL-SCNC: 4.7 MMOL/L (ref 3.6–5.5)
PROT SERPL-MCNC: 7.1 G/DL (ref 6–8.2)
PROT UR QL STRIP: 100 MG/DL
RBC # BLD AUTO: 3.85 M/UL (ref 4.2–5.4)
RBC # URNS HPF: NORMAL /HPF
RBC UR QL AUTO: ABNORMAL
SODIUM SERPL-SCNC: 137 MMOL/L (ref 135–145)
SP GR UR STRIP.AUTO: 1.02
UNIDENT CRYS URNS QL MICRO: NORMAL /HPF
WBC # BLD AUTO: 5.2 K/UL (ref 4.8–10.8)
WBC #/AREA URNS HPF: NORMAL /HPF

## 2022-06-27 PROCEDURE — M0222 HCHG BEBTELOVIMAB ADMINISTRATION: HCPCS

## 2022-06-27 PROCEDURE — 85025 COMPLETE CBC W/AUTO DIFF WBC: CPT

## 2022-06-27 PROCEDURE — 700111 HCHG RX REV CODE 636 W/ 250 OVERRIDE (IP): Performed by: EMERGENCY MEDICINE

## 2022-06-27 PROCEDURE — 700102 HCHG RX REV CODE 250 W/ 637 OVERRIDE(OP): Performed by: EMERGENCY MEDICINE

## 2022-06-27 PROCEDURE — 80053 COMPREHEN METABOLIC PANEL: CPT

## 2022-06-27 PROCEDURE — 81001 URINALYSIS AUTO W/SCOPE: CPT

## 2022-06-27 PROCEDURE — 36415 COLL VENOUS BLD VENIPUNCTURE: CPT

## 2022-06-27 PROCEDURE — 83605 ASSAY OF LACTIC ACID: CPT

## 2022-06-27 PROCEDURE — 71045 X-RAY EXAM CHEST 1 VIEW: CPT

## 2022-06-27 PROCEDURE — 99285 EMERGENCY DEPT VISIT HI MDM: CPT

## 2022-06-27 PROCEDURE — 87086 URINE CULTURE/COLONY COUNT: CPT

## 2022-06-27 PROCEDURE — A9270 NON-COVERED ITEM OR SERVICE: HCPCS | Performed by: EMERGENCY MEDICINE

## 2022-06-27 PROCEDURE — 93005 ELECTROCARDIOGRAM TRACING: CPT | Performed by: EMERGENCY MEDICINE

## 2022-06-27 PROCEDURE — 96374 THER/PROPH/DIAG INJ IV PUSH: CPT

## 2022-06-27 PROCEDURE — 87040 BLOOD CULTURE FOR BACTERIA: CPT | Mod: 91

## 2022-06-27 RX ORDER — BEBTELOVIMAB 87.5 MG/ML
175 INJECTION, SOLUTION INTRAVENOUS ONCE
Status: COMPLETED | OUTPATIENT
Start: 2022-06-27 | End: 2022-06-27

## 2022-06-27 RX ORDER — ONDANSETRON 4 MG/1
4 TABLET, ORALLY DISINTEGRATING ORAL EVERY 8 HOURS PRN
Qty: 10 TABLET | Refills: 0 | Status: SHIPPED | OUTPATIENT
Start: 2022-06-27 | End: 2022-12-06

## 2022-06-27 RX ORDER — ACETAMINOPHEN 325 MG/1
650 TABLET ORAL ONCE
Status: COMPLETED | OUTPATIENT
Start: 2022-06-27 | End: 2022-06-27

## 2022-06-27 RX ORDER — ONDANSETRON 2 MG/ML
4 INJECTION INTRAMUSCULAR; INTRAVENOUS ONCE
Status: COMPLETED | OUTPATIENT
Start: 2022-06-27 | End: 2022-06-27

## 2022-06-27 RX ADMIN — ACETAMINOPHEN 650 MG: 325 TABLET ORAL at 17:25

## 2022-06-27 RX ADMIN — ONDANSETRON 4 MG: 2 INJECTION INTRAMUSCULAR; INTRAVENOUS at 17:26

## 2022-06-27 RX ADMIN — BEBTELOVIMAB 175 MG: 87.5 INJECTION, SOLUTION INTRAVENOUS at 17:26

## 2022-06-27 NOTE — ED PROVIDER NOTES
ED Provider Note    Scribed for Eh Das M.D. by Sheri Hernandez. 6/27/2022, 4:00 PM.    Primary care provider: EDILMA Whelan  Means of arrival: Walk in  History obtained from: patient  History limited by: none    CHIEF COMPLAINT  Chief Complaint   Patient presents with   • Sore Throat   • Coronavirus Screening     Pt states she was dx with covid on Saturday. C/o of sore throat and N/v       HPI  Whitley Laureen Frederick is a 77 y.o. female who presents to the Emergency Department for sore throat onset 3 days ago. Patient tested positive for COVID 2 days ago and here symptoms have been worsening. She has associated voice loss, ear pain, vomiting, cough, and congestion. Denies fevers or shortness of breath. Denies being vaccinated against COVID. Patient has a past medical history of diabetes, A fib, hypertension, and kidney disease.    REVIEW OF SYSTEMS  Pertinent positives include sore throat, ear pain, voice loss, vomiting, cough, and congestion. Pertinent negatives include fevers or shortness of breath. All other systems negative.    PAST MEDICAL HISTORY   has a past medical history of Allergy, Anxiety, Arrhythmia, Arthritis, Asthma, Carotid bruit, CATARACT, Chest pain, DIABETES MELLITUS, Esophageal reflux, Goiter, Headache(784.0), Heart murmur, Hyperlipidemia, Hypertension, IBD (inflammatory bowel disease), Migraine, AVERY (obstructive sleep apnea), Overweight(278.02), Palpitations, Paroxysmal atrial fibrillation (HCC) (7/12/13), Pneumonia, Positive reaction to tuberculin skin test, Renal disease, and Urinary tract infection, site not specified.    SURGICAL HISTORY   has a past surgical history that includes tonsillectomy; cholecystectomy; abdominal hysterectomy total; us-needle core bx-breast panel; and cataract phaco with iol (Right, 10/25/2016).    SOCIAL HISTORY  Social History     Tobacco Use   • Smoking status: Former Smoker     Packs/day: 1.00     Years: 20.00     Pack years: 20.00      Types: Cigarettes     Quit date: 1983     Years since quittin.5   • Smokeless tobacco: Never Used   Vaping Use   • Vaping Use: Never used   Substance Use Topics   • Alcohol use: No     Alcohol/week: 0.0 oz   • Drug use: No      Social History     Substance and Sexual Activity   Drug Use No       FAMILY HISTORY  Family History   Problem Relation Age of Onset   • Cancer Maternal Aunt    • Diabetes Maternal Aunt    • Heart Disease Maternal Aunt    • Hypertension Maternal Aunt    • Hyperlipidemia Maternal Aunt    • Cancer Mother         Leukemia   • Hypertension Mother    • Diabetes Mother    • Lung Disease Father    • Cancer Father         Lung   • Lung Disease Brother    • Stroke Brother    • Diabetes Brother    • Heart Disease Brother    • Diabetes Maternal Grandmother    • Hypertension Brother    • Diabetes Brother        CURRENT MEDICATIONS  Home Medications     Reviewed by Avel Deleon R.N. (Registered Nurse) on 22 at 1503  Med List Status: Not Addressed   Medication Last Dose Status   allopurinol (ZYLOPRIM) 100 MG Tab  Active   Blood Glucose Monitoring Suppl Supplies Misc  Active   Blood Glucose Monitoring Suppl Supplies Misc  Active   Blood Glucose Monitoring Suppl W/DEVICE Kit  Active   Cholecalciferol (VITAMIN D) 2000 UNITS CAPS  Active   clonazePAM (KLONOPIN) 0.5 MG Tab  Active   Continuous Blood Gluc  (FREESTYLE YANG 2 READER) Device  Active   Continuous Blood Gluc Sensor (FREESTYLE YANG 14 DAY SENSOR) Misc  Active   ferrous sulfate 325 (65 Fe) MG tablet  Active   fluticasone (FLONASE) 50 MCG/ACT nasal spray  Active   furosemide (LASIX) 80 MG Tab  Active   glucose blood (FREESTYLE LITE) strip  Active   Insulin Pen Needle 32G X 4 MM Misc  Active   levalbuterol (XOPENEX HFA) 45 MCG/ACT inhaler  Active   levalbuterol (XOPENEX HFA) 45 MCG/ACT inhaler  Active   levothyroxine (SYNTHROID) 50 MCG Tab  Active   loratadine (CLARITIN) 10 MG Tab  Active   losartan (COZAAR) 50 MG Tab   "Active   magnesium oxide (MAG-OX) 400 MG Tab tablet  Active   metoprolol tartrate (LOPRESSOR) 50 MG Tab  Active   Misc. Devices MISC  Active   montelukast (SINGULAIR) 10 MG Tab  Active   MULTAQ 400 MG Tab  Active   NON SPECIFIED  Active   NON SPECIFIED  Active   NON SPECIFIED  Active   NON SPECIFIED  Active   nystatin (MYCOSTATIN) 154988 UNIT/GM Ointment  Active   omeprazole (PRILOSEC) 20 MG delayed-release capsule  Active   pioglitazone (ACTOS) 15 MG Tab  Active   rivaroxaban (XARELTO) 15 MG Tab tablet  Active   rosuvastatin (CRESTOR) 10 MG Tab  Active   terazosin (HYTRIN) 1 MG Cap  Active   Urine Glucose-Ketones Test Strip  Active                ALLERGIES  Allergies   Allergen Reactions   • Amlodipine Itching   • Levoxyl [Levothyroxine Sodium] Unspecified     Flushing, very hot   • Amaryl      GI Problems   • Avandia [Rosiglitazone Maleate]      GI problems   • Cipro Xr      Possibly causes Chills.   • Clonidine      Rapid heart rate, swelling    • Contrast Media With Iodine [Iodine] Hives   • Glipizide      GI Problems   • Glucophage [Metformin Hydrochloride]      dizzy   • Hydralazine      Rapid heart rate, chest tightness   • Levaquin      Possible allergy - chills with cipro but sick   • Relafen [Nabumetone]      Itching; avoids all nsaids       PHYSICAL EXAM  VITAL SIGNS: BP (!) 150/75   Pulse (!) 105   Temp 37.9 °C (100.3 °F) (Temporal)   Resp 18   Ht 1.676 m (5' 6\")   Wt 119 kg (261 lb 11 oz)   SpO2 94%   BMI 42.24 kg/m²     Constitutional: Well developed, Well nourished, mild distress.   HENT: Normocephalic, Atraumatic, TMs clear bilaterally, mask in place.  Eyes: Conjunctiva normal, No discharge.   Neck: Supple, No stridor  Cardiovascular: Normal heart rate, Normal rhythm, No murmurs, equal pulses.   Pulmonary: Hoarse voice, Normal breath sounds, No respiratory distress, No wheezing, No rales, No rhonchi.  Chest: No chest wall tenderness or deformity.   Abdomen:Soft, No tenderness, No masses, no " rebound, no guarding.   Back: No CVA tenderness.   Musculoskeletal: No major deformities noted, No tenderness.   Skin: Warm, Dry, No erythema, No rash.   Neurologic: Alert & oriented x 3, Normal motor function,  No focal deficits noted.   Psychiatric: Affect normal, Judgment normal, Mood normal.     LABS  Results for orders placed or performed during the hospital encounter of 06/27/22   CBC with Differential   Result Value Ref Range    WBC 5.2 4.8 - 10.8 K/uL    RBC 3.85 (L) 4.20 - 5.40 M/uL    Hemoglobin 12.2 12.0 - 16.0 g/dL    Hematocrit 36.8 (L) 37.0 - 47.0 %    MCV 95.6 81.4 - 97.8 fL    MCH 31.7 27.0 - 33.0 pg    MCHC 33.2 (L) 33.6 - 35.0 g/dL    RDW 51.3 (H) 35.9 - 50.0 fL    Platelet Count 69 (L) 164 - 446 K/uL    MPV 11.4 9.0 - 12.9 fL    Neutrophils-Polys 81.40 (H) 44.00 - 72.00 %    Lymphocytes 8.90 (L) 22.00 - 41.00 %    Monocytes 8.90 0.00 - 13.40 %    Eosinophils 0.00 0.00 - 6.90 %    Basophils 0.60 0.00 - 1.80 %    Immature Granulocytes 0.20 0.00 - 0.90 %    Nucleated RBC 0.00 /100 WBC    Neutrophils (Absolute) 4.19 2.00 - 7.15 K/uL    Lymphs (Absolute) 0.46 (L) 1.00 - 4.80 K/uL    Monos (Absolute) 0.46 0.00 - 0.85 K/uL    Eos (Absolute) 0.00 0.00 - 0.51 K/uL    Baso (Absolute) 0.03 0.00 - 0.12 K/uL    Immature Granulocytes (abs) 0.01 0.00 - 0.11 K/uL    NRBC (Absolute) 0.00 K/uL   Comp Metabolic Panel   Result Value Ref Range    Sodium 137 135 - 145 mmol/L    Potassium 4.7 3.6 - 5.5 mmol/L    Chloride 104 96 - 112 mmol/L    Co2 17 (L) 20 - 33 mmol/L    Anion Gap 16.0 7.0 - 16.0    Glucose 194 (H) 65 - 99 mg/dL    Bun 46 (H) 8 - 22 mg/dL    Creatinine 2.37 (H) 0.50 - 1.40 mg/dL    Calcium 9.2 8.4 - 10.2 mg/dL    AST(SGOT) 34 12 - 45 U/L    ALT(SGPT) 19 2 - 50 U/L    Alkaline Phosphatase 79 30 - 99 U/L    Total Bilirubin 0.9 0.1 - 1.5 mg/dL    Albumin 3.9 3.2 - 4.9 g/dL    Total Protein 7.1 6.0 - 8.2 g/dL    Globulin 3.2 1.9 - 3.5 g/dL    A-G Ratio 1.2 g/dL   URINALYSIS    Specimen: Urine   Result  Value Ref Range    Color Yellow     Character Clear     Specific Gravity 1.020 <1.035    Ph 5.0 5.0 - 8.0    Glucose Negative Negative mg/dL    Ketones Negative Negative mg/dL    Protein 100 (A) Negative mg/dL    Bilirubin Negative Negative    Nitrite Negative Negative    Leukocyte Esterase Small (A) Negative    Occult Blood Moderate (A) Negative    Micro Urine Req Microscopic    LACTIC ACID   Result Value Ref Range    Lactic Acid 2.0 0.5 - 2.0 mmol/L   ESTIMATED GFR   Result Value Ref Range    GFR (CKD-EPI) 21 (A) >60 mL/min/1.73 m 2   URINE MICROSCOPIC (W/UA)   Result Value Ref Range    WBC 0-2 /hpf    RBC Rare /hpf    Epithelial Cells Few Few /hpf    Urine Crystals Rare Amorphous /hpf   EKG   Result Value Ref Range    Report       [facility] Emergency Dept.    Test Date:  2022  Pt Name:    CATHERINE BROWER                Department: [facility]  MRN:        [id number]                      Room:       formerly [facility]  Gender:     Female                       Technician: CHIQUITA  :        1945                   Requested By:ER TRIAGE PROTOCOL  Order #:    [id number]                    Reading MD: TREVON BARNETT MD    Measurements  Intervals                                Axis  Rate:       108                          P:          -37  IL:         135                          QRS:        -38  QRSD:       148                          T:          82  QT:         387  QTc:        519    Interpretive Statements  Sinus tachycardia, Rate of 108, Leftward axis  Left bundle branch block  Compared to ECG 04/10/2018 12:46:50  Sinus bradycardia no longer present  Electronically Signed On 2022 23:32:44 PDT by TREVON BARNETT MD       *Note: Due to a large number of results and/or encounters for the requested time period, some results have not been displayed. A complete set of results can be found in Results Review.     All labs reviewed by me.    EKG  12 Lead EKG interpreted by me as  above    RADIOLOGY  DX-CHEST-PORTABLE (1 VIEW)   Final Result      No radiographic evidence of acute cardiopulmonary process.        The radiologist's interpretation of all radiological studies have been reviewed by me.    COURSE & MEDICAL DECISION MAKING  Pertinent Labs & Imaging studies reviewed. (See chart for details)    4:00 PM - Patient seen and examined at bedside. I informed the patient she is high risk for severe disease secondary to COVID. Recommended treatment with monoclonal antibodies. Because of her kidney disease she will not tolerate oral antiviral treatment. Ordered chest x-ray, lactic acid, UA, blood cultures, CBC w/ diff, CMP, and EKG to evaluate her symptoms. She will be treated with Bebtelovimab, tylenol 650 mg, and Zofran 4mg.     Monoclonal antibody infusion EUA progress note    This patient is considered a candidate for monoclonal antibody infusion to treat/prevent high risk SARS-CoV-2 virus infection.      Criteria for use (treatment):  -Mild to moderate illness for less than 10 days and is high risk for progressing to severe COVID-19 and/or hospitalization.    -Not hospitalized.   -Does not require oxygen therapy due to COVID-19.    ->40 kg and 12 years old or greater    Criteria for use (post-exposure prophylaxis):  -High risk for progressing to severe COVID-19 and/or hospitalization  -Exposed (close contact per CDC) to COVID-19 positive individual OR high risk of exposure to COVID-19 because of positive individual in institutional setting   -Not fully vaccinated or not expected to mount an adequate immune response due to immunocompromising condition or immunosuppressive medication  ->40 kg and 12 years old or greater    Date of positive COVID-19 test (treatment) or known exposure (post-exposure prophylaxis):  6/25/2022    Vaccinated for COVID-19 (yes/no): no     Symptoms of COVID-19 (if being used for treatment): sore throat, cough, vomiting.    High risk criteria:   -Age of 65 or  greater  -Body mass index of 25 kg/m2or greater  -12-17 years of age and have  -BMI greater than or equal to 85% of their age and gender based CDC growth chart  -Chronic kidney disease  -Diabetes  -Pregnancy  -Immunosuppressive disease  -Receiving immunosuppressive treatment  -Chronic lung disease  -65 years of age or greater  -Cardiovascular disease  -Hypertension  -Medical related technological dependence  -Sickle cell disease  -Congenital or acquired heart disease  -Neurodevelopmental disorder    This patient has been given the EUA patient fact sheet and consents to receiving this medication.    6:32 PM - Patient is feeling well after treatment with monoclonal antibodies. Her vitals are stable. Prescribed Zofran. Discussed return precautions. Patient will be discharged at this time. She verbalizes agreement with discharge and plan of care.     Medical Decision Making: Patient presents with home test positive for COVID-19 and symptomatic for the last 3 days.  Given her significant comorbidities she is a candidate for monoclonal antibodies.  Discussed risks and benefits of these including the fact that they are still experimental and she is agreed to take them.  Patient not hypoxic does not show any signs of respiratory distress at this point time I think she can be discharged home I will give her Zofran to help with the vomiting.    The patient will return for new or worsening symptoms and is stable at the time of discharge.    DISPOSITION:  Patient will be discharged home in stable condition.    FOLLOW UP:  Juana Berger, A.P.N.  [id number] [address] [id number] [phone]    Schedule an appointment as soon as possible for a visit in 1 week        OUTPATIENT MEDICATIONS:  Discharge Medication List as of 6/27/2022  6:49 PM      START taking these medications    Details   ondansetron (ZOFRAN ODT) 4 MG TABLET DISPERSIBLE Take 1 Tablet by mouth every 8 hours as needed for Nausea., Disp-10  Tablet, R-0, Print Rx Paper           FINAL IMPRESSION  1. COVID-19    2. Sore throat    3. Stage 4 chronic kidney disease (HCC)    4. Non-intractable vomiting with nausea, unspecified vomiting type          I, Sheri Hernandez ([place]), am scribing for, and in the presence of, Eh Das M.D.    Electronically signed by: Sheri Hernandez ([place]), 6/27/2022    IEh M.D. personally performed the services described in this documentation, as scribed by Sheri Hernandez in my presence, and it is both accurate and complete.    The note accurately reflects work and decisions made by me.  Eh Das M.D.  6/27/2022  11:34 PM

## 2022-06-28 ENCOUNTER — PHARMACY VISIT (OUTPATIENT)
Dept: PHARMACY | Facility: MEDICAL CENTER | Age: 77
End: 2022-06-28
Payer: MEDICARE

## 2022-06-28 PROCEDURE — RXMED WILLOW AMBULATORY MEDICATION CHARGE: Performed by: EMERGENCY MEDICINE

## 2022-06-28 RX ORDER — ONDANSETRON 4 MG/1
4 TABLET, ORALLY DISINTEGRATING ORAL EVERY 8 HOURS PRN
Qty: 10 TABLET | Refills: 0 | OUTPATIENT
Start: 2022-06-28 | End: 2022-12-06

## 2022-06-28 NOTE — DISCHARGE INSTRUCTIONS
Blood cell You will have to check my chart in 24 - 36 hours. If you test positive you will need to remain in home quarantine until all three of the following are true:  You are 5 days from symptom onset,   your symptoms are improving   you have been fever free for at least  24 hours without taking any Tylenol or ibuprofen.  4.   you will have to wear a mask when around anyone else for 10 days from the onset of symptoms.  If you develop significant shortness of breath, meaning that it is difficult for you to walk even short distances without having to stop and catch your breath, or you become severely dizzy and this is persistent then please return to the emergency department.    I recommend you get a home pulse oximeter.  Return if your oxygenation is less than 90.  To

## 2022-06-29 ENCOUNTER — HOSPITAL ENCOUNTER (EMERGENCY)
Facility: MEDICAL CENTER | Age: 77
End: 2022-06-29
Attending: EMERGENCY MEDICINE
Payer: MEDICARE

## 2022-06-29 VITALS
SYSTOLIC BLOOD PRESSURE: 128 MMHG | TEMPERATURE: 97.3 F | OXYGEN SATURATION: 97 % | DIASTOLIC BLOOD PRESSURE: 63 MMHG | HEART RATE: 70 BPM | BODY MASS INDEX: 41.3 KG/M2 | RESPIRATION RATE: 16 BRPM | HEIGHT: 66 IN | WEIGHT: 257 LBS

## 2022-06-29 DIAGNOSIS — U07.1 COVID-19: ICD-10-CM

## 2022-06-29 PROCEDURE — 99284 EMERGENCY DEPT VISIT MOD MDM: CPT

## 2022-06-29 ASSESSMENT — FIBROSIS 4 INDEX: FIB4 SCORE: 8.59

## 2022-06-29 NOTE — ED PROVIDER NOTES
ED Provider Note    CHIEF COMPLAINT  Chief Complaint   Patient presents with   • Weakness     Covid positive and here last week and received antibodies. Blood pressure in 90s last night.       HPI  Whitley Frederick is a 77 y.o. female who presents with generalized weakness and hypotension.  The patient states that she was having some sweats and weakness last night.  She checked her blood pressure and is in the 90 systolic range.  She states her pulse at that time was in the low 70s.  She presents this morning as she does not feel well.  She was diagnosed with COVID-19 on Saturday and did receive monoclonal antibody treatment on 27 June here in the emergency department.  She has not had any increased work of breathing.  She does have generalized malaise and weakness.  She also has a poor appetite.  She also has a sore throat.  She is unvaccinated.    REVIEW OF SYSTEMS  See HPI for further details. All other systems are negative.     PAST MEDICAL HISTORY  Past Medical History:   Diagnosis Date   • Paroxysmal atrial fibrillation (HCC) 7/12/13    woke with palps, AFib per ekg in am.   • Allergy    • Anxiety    • Arrhythmia    • Arthritis    • Asthma    • Carotid bruit     Left   • CATARACT    • Chest pain    • DIABETES MELLITUS    • Esophageal reflux    • Goiter    • Headache(784.0)    • Heart murmur    • Hyperlipidemia    • Hypertension    • IBD (inflammatory bowel disease)    • Migraine     Silent migraine   • AVERY (obstructive sleep apnea)    • Overweight(278.02)    • Palpitations    • Pneumonia     Nov. 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 Badillo   • Urinary tract infection, site not specified        FAMILY HISTORY  [unfilled]    SOCIAL HISTORY  Social History     Socioeconomic History   • Marital status:    Tobacco Use   • Smoking status: Former Smoker     Packs/day: 1.00     Years: 20.00     Pack  "years: 20.00     Types: Cigarettes     Quit date: 1983     Years since quittin.5   • Smokeless tobacco: Never Used   Vaping Use   • Vaping Use: Never used   Substance and Sexual Activity   • Alcohol use: No     Alcohol/week: 0.0 oz   • Drug use: No   • Sexual activity: Never     Partners: Male       SURGICAL HISTORY  Past Surgical History:   Procedure Laterality Date   • CATARACT PHACO WITH IOL Right 10/25/2016    Procedure: CATARACT PHACO WITH IOL;  Surgeon: Zoran Gamble M.D.;  Location: SURGERY SAME DAY [facility];  Service:    • ABDOMINAL HYSTERECTOMY TOTAL     • CHOLECYSTECTOMY     • TONSILLECTOMY     • US-NEEDLE CORE BX-BREAST PANEL         CURRENT MEDICATIONS  Home Medications    **Home medications have not yet been reviewed for this encounter**         ALLERGIES  Allergies   Allergen Reactions   • Amlodipine Itching   • Levoxyl [Levothyroxine Sodium] Unspecified     Flushing, very hot   • Amaryl      GI Problems   • Avandia [Rosiglitazone Maleate]      GI problems   • Cipro Xr      Possibly causes Chills.   • Clonidine      Rapid heart rate, swelling    • Contrast Media With Iodine [Iodine] Hives   • Glipizide      GI Problems   • Glucophage [Metformin Hydrochloride]      dizzy   • Hydralazine      Rapid heart rate, chest tightness   • Levaquin      Possible allergy - chills with cipro but sick   • Relafen [Nabumetone]      Itching; avoids all nsaids       PHYSICAL EXAM  VITAL SIGNS: BP (!) 138/98   Pulse 82   Temp 36.3 °C (97.3 °F) (Temporal)   Resp 18   Ht 1.676 m (5' 6\")   Wt 117 kg (257 lb)   SpO2 97%   BMI 41.48 kg/m²       Constitutional: Well developed, Well nourished, No acute distress, Non-toxic appearance.   HENT: Normocephalic, Atraumatic, Bilateral external ears normal, Oropharynx moist, No oral exudates, Nose normal.   Eyes: PERRLA, EOMI, Conjunctiva normal, No discharge.   Neck: Normal range of motion, No tenderness, Supple, No stridor.   Lymphatic: No lymphadenopathy " noted.   Cardiovascular: Normal heart rate, Normal rhythm, No murmurs, No rubs, No gallops.   Thorax & Lungs: Normal breath sounds, No respiratory distress, No wheezing, No chest tenderness.   Abdomen: Bowel sounds normal, Soft, No tenderness, No masses, No pulsatile masses.   Skin: Warm, Dry, No erythema, No rash.   Back: No tenderness, No CVA tenderness.    Extremities: Intact distal pulses, No edema, No tenderness, No cyanosis, No clubbing.    Neurologic: Alert & oriented x 3, Normal motor function, Normal sensory function, No focal deficits noted.   Psychiatric: Affect normal, Judgment normal, Mood normal.       COURSE & MEDICAL DECISION MAKING  Pertinent Labs & Imaging studies reviewed. (See chart for details)  This a 77-year-old female who presents the emergency department with signs and symptoms consistent with COVID-19 infection.  Her vital signs are stable at this time.  She is in no respiratory distress and her lungs are clear and she is not hypoxic.  Therefore we will continue supportive management.  The patient will drink lots of fluids and take Tylenol as needed for pain control.  She will return for increased work of breathing or increased weakness.    FINAL IMPRESSION  1.  COVID-19    Disposition  The patient will be discharged in stable condition         Electronically signed by: Ziggy Ring M.D., 6/29/2022 8:09 AM

## 2022-06-30 LAB
BACTERIA UR CULT: NORMAL
SIGNIFICANT IND 70042: NORMAL
SITE SITE: NORMAL
SOURCE SOURCE: NORMAL

## 2022-07-02 LAB
BACTERIA BLD CULT: NORMAL
BACTERIA BLD CULT: NORMAL
SIGNIFICANT IND 70042: NORMAL
SIGNIFICANT IND 70042: NORMAL
SITE SITE: NORMAL
SITE SITE: NORMAL
SOURCE SOURCE: NORMAL
SOURCE SOURCE: NORMAL

## 2022-07-06 ENCOUNTER — PATIENT MESSAGE (OUTPATIENT)
Dept: MEDICAL GROUP | Facility: LAB | Age: 77
End: 2022-07-06
Payer: MEDICARE

## 2022-07-06 ENCOUNTER — TELEPHONE (OUTPATIENT)
Dept: MEDICAL GROUP | Facility: LAB | Age: 77
End: 2022-07-06
Payer: MEDICARE

## 2022-07-06 NOTE — TELEPHONE ENCOUNTER
ESTABLISHED PATIENT PRE-VISIT PLANNING     Patient was NOT contacted to complete PVP.     Note: Patient will not be contacted if there is no indication to call.     1.  Reviewed notes from the last few office visits within the medical group: Yes    2.  If any orders were placed at last visit or intended to be done for this visit (i.e. 6 mos follow-up), do we have Results/Consult Notes?         •  Labs - Labs were not ordered at last office visit.  Note: If patient appointment is for lab review and patient did not complete labs, check with provider if OK to reschedule patient until labs completed.       •  Imaging - Imaging ordered, completed and results are in chart.       •  Referrals - No referrals were ordered at last office visit.    3. Is this appointment scheduled as a Hospital Follow-Up? Yes, visit was at [facility].     4.  Immunizations were updated in Epic using Reconcile Outside Information activity? Yes    5.  Patient is due for the following Health Maintenance Topics:   Health Maintenance Due   Topic Date Due   • COVID-19 Vaccine (1) Never done   • IMM ZOSTER VACCINES (1 of 2) Never done   • FASTING LIPID PROFILE  12/14/2020   • COLORECTAL CANCER SCREENING  08/03/2021   • A1C SCREENING  05/11/2022         6.  AHA (Pulse8) form printed for Provider? Email sent to [facility] requesting form

## 2022-07-07 ENCOUNTER — PATIENT MESSAGE (OUTPATIENT)
Dept: CARDIOLOGY | Facility: MEDICAL CENTER | Age: 77
End: 2022-07-07
Payer: MEDICARE

## 2022-07-07 ENCOUNTER — PHARMACY VISIT (OUTPATIENT)
Dept: PHARMACY | Facility: MEDICAL CENTER | Age: 77
End: 2022-07-07
Payer: MEDICARE

## 2022-07-07 PROCEDURE — RXMED WILLOW AMBULATORY MEDICATION CHARGE: Performed by: NURSE PRACTITIONER

## 2022-07-07 PROCEDURE — RXMED WILLOW AMBULATORY MEDICATION CHARGE: Performed by: FAMILY MEDICINE

## 2022-07-07 RX ORDER — PROMETHAZINE HYDROCHLORIDE 12.5 MG/1
12.5-25 TABLET ORAL EVERY 6 HOURS PRN
Qty: 30 TABLET | Refills: 0 | Status: SHIPPED | OUTPATIENT
Start: 2022-07-07 | End: 2022-12-06

## 2022-07-07 RX ORDER — PIOGLITAZONEHYDROCHLORIDE 15 MG/1
15 TABLET ORAL DAILY
Qty: 100 TABLET | Refills: 3 | Status: SHIPPED | OUTPATIENT
Start: 2022-07-07 | End: 2023-04-13

## 2022-07-11 ENCOUNTER — PHARMACY VISIT (OUTPATIENT)
Dept: PHARMACY | Facility: MEDICAL CENTER | Age: 77
End: 2022-07-11
Payer: MEDICARE

## 2022-07-11 PROCEDURE — RXMED WILLOW AMBULATORY MEDICATION CHARGE: Performed by: NURSE PRACTITIONER

## 2022-07-11 RX ORDER — ROSUVASTATIN CALCIUM 10 MG/1
10 TABLET, COATED ORAL EVERY EVENING
Qty: 100 TABLET | Refills: 3 | Status: SHIPPED | OUTPATIENT
Start: 2022-07-11 | End: 2023-08-20 | Stop reason: SDUPTHER

## 2022-07-13 ENCOUNTER — PHARMACY VISIT (OUTPATIENT)
Dept: PHARMACY | Facility: MEDICAL CENTER | Age: 77
End: 2022-07-13
Payer: MEDICARE

## 2022-07-13 NOTE — PATIENT COMMUNICATION
[employer] pt assistance form completed, reviewed and signed by VR. Faxed to:    [employer]  [phone] P  [phone] F    Received receipt for confirmation. Also sent to pt's e-mail on file. [employer] message sent to pt.

## 2022-07-15 ENCOUNTER — PATIENT MESSAGE (OUTPATIENT)
Dept: CARDIOLOGY | Facility: MEDICAL CENTER | Age: 77
End: 2022-07-15
Payer: MEDICARE

## 2022-07-17 PROCEDURE — RXMED WILLOW AMBULATORY MEDICATION CHARGE: Performed by: NURSE PRACTITIONER

## 2022-07-19 PROCEDURE — RXMED WILLOW AMBULATORY MEDICATION CHARGE: Performed by: NURSE PRACTITIONER

## 2022-07-20 ENCOUNTER — PHARMACY VISIT (OUTPATIENT)
Dept: PHARMACY | Facility: MEDICAL CENTER | Age: 77
End: 2022-07-20
Payer: MEDICARE

## 2022-07-22 DIAGNOSIS — K21.9 GASTROESOPHAGEAL REFLUX DISEASE: ICD-10-CM

## 2022-07-24 DIAGNOSIS — R00.2 PALPITATIONS: ICD-10-CM

## 2022-07-25 PROCEDURE — RXMED WILLOW AMBULATORY MEDICATION CHARGE: Performed by: NURSE PRACTITIONER

## 2022-07-25 RX ORDER — METOPROLOL TARTRATE 50 MG/1
50 TABLET, FILM COATED ORAL 2 TIMES DAILY
Qty: 200 TABLET | Refills: 3 | Status: SHIPPED | OUTPATIENT
Start: 2022-07-25 | End: 2023-08-20 | Stop reason: SDUPTHER

## 2022-07-25 RX ORDER — OMEPRAZOLE 20 MG/1
40 CAPSULE, DELAYED RELEASE ORAL DAILY
Qty: 180 CAPSULE | Refills: 0 | Status: SHIPPED | OUTPATIENT
Start: 2022-07-25 | End: 2022-11-22 | Stop reason: SDUPTHER

## 2022-07-26 ENCOUNTER — PHARMACY VISIT (OUTPATIENT)
Dept: PHARMACY | Facility: MEDICAL CENTER | Age: 77
End: 2022-07-26
Payer: MEDICARE

## 2022-08-24 ENCOUNTER — TELEPHONE (OUTPATIENT)
Dept: MEDICAL GROUP | Facility: LAB | Age: 77
End: 2022-08-24
Payer: MEDICARE

## 2022-08-24 NOTE — TELEPHONE ENCOUNTER
Left message for patient to call back regarding pre-visit planning. Please transfer call to [phone].  Pt called me back and lvm to cb.  I lvm to cb.

## 2022-08-24 NOTE — TELEPHONE ENCOUNTER
ANNUAL WELLNESS VISIT PRE-VISIT PLANNING    1.  Reviewed notes from the last office visit: Yes    2.  If any orders were ordered or intended to be done prior to visit (i.e. 6 mos follow-up), do we have results/consult notes or has patient scheduled?          Labs - Labs ordered, completed on 3/1/2022 and results are in chart.  Note: If patient appointment is for lab review and patient did not complete labs, check with provider if OK to reschedule patient until labs completed.         Imaging - Imaging ordered, completed and results are in chart.         Referrals - No referrals were ordered at last office visit.    3.  Immunizations were updated in Epic using Reconcile Outside Information activity? Yes      4.  Patient is due for the following Health Maintenance Topics:   Health Maintenance Due   Topic Date Due    COVID-19 Vaccine (1) Never done    IMM ZOSTER VACCINES (1 of 2) Never done    FASTING LIPID PROFILE  12/14/2020    COLORECTAL CANCER SCREENING  08/03/2021    A1C SCREENING  05/11/2022     5.  Reviewed/Updated the following with patient:          Preferred Pharmacy? Yes            Preferred Lab? Yes           Preferred Communication? Yes           Allergies? Yes             Medications? Yes, abstract   6.  Care Team Updated:          DME Company (gait device, O2, CPAP, etc.): yes           Other Specialists (eye doctor, derm, GYN, cardiology, endo, etc): Yes     7.  Patient was advised: “This is a free wellness visit. The provider will screen for medical conditions to help you stay healthy. If you have other concerns to address you may be asked to discuss these at a separate visit or there may be an additional fee.”     8.  AHA (Puls8) form printed for Provider? N/A

## 2022-08-25 RX ORDER — ACETAMINOPHEN 500 MG
500-1000 TABLET ORAL EVERY 6 HOURS PRN
COMMUNITY
End: 2023-03-10

## 2022-08-27 PROCEDURE — RXMED WILLOW AMBULATORY MEDICATION CHARGE: Performed by: NURSE PRACTITIONER

## 2022-08-29 ENCOUNTER — TELEPHONE (OUTPATIENT)
Dept: HEALTH INFORMATION MANAGEMENT | Facility: OTHER | Age: 77
End: 2022-08-29

## 2022-08-30 ENCOUNTER — PHARMACY VISIT (OUTPATIENT)
Dept: PHARMACY | Facility: MEDICAL CENTER | Age: 77
End: 2022-08-30
Payer: MEDICARE

## 2022-08-30 PROCEDURE — RXMED WILLOW AMBULATORY MEDICATION CHARGE: Performed by: NURSE PRACTITIONER

## 2022-08-30 RX ORDER — POLYETHYLENE GLYCOL 3350, SODIUM SULFATE ANHYDROUS, SODIUM BICARBONATE, SODIUM CHLORIDE, POTASSIUM CHLORIDE 236; 22.74; 6.74; 5.86; 2.97 G/4L; G/4L; G/4L; G/4L; G/4L
POWDER, FOR SOLUTION ORAL
Qty: 4000 ML | Refills: 0 | Status: SHIPPED | OUTPATIENT
Start: 2022-08-30 | End: 2023-06-15

## 2022-08-30 RX ORDER — MAGNESIUM OXIDE 400 MG/1
400 TABLET ORAL DAILY
Qty: 90 TABLET | Refills: 0 | Status: SHIPPED | OUTPATIENT
Start: 2022-08-30 | End: 2022-12-20 | Stop reason: SDUPTHER

## 2022-08-30 NOTE — TELEPHONE ENCOUNTER
Received request via: Pharmacy    Was the patient seen in the last year in this department? Yes  LOV 11/30/2021  Does the patient have an active prescription (recently filled or refills available) for medication(s) requested? No

## 2022-08-31 ENCOUNTER — PHARMACY VISIT (OUTPATIENT)
Dept: PHARMACY | Facility: MEDICAL CENTER | Age: 77
End: 2022-08-31
Payer: MEDICARE

## 2022-08-31 ENCOUNTER — OFFICE VISIT (OUTPATIENT)
Dept: MEDICAL GROUP | Facility: LAB | Age: 77
End: 2022-08-31
Payer: MEDICARE

## 2022-08-31 VITALS
TEMPERATURE: 97.2 F | HEART RATE: 60 BPM | WEIGHT: 260 LBS | HEIGHT: 66 IN | BODY MASS INDEX: 41.78 KG/M2 | DIASTOLIC BLOOD PRESSURE: 50 MMHG | OXYGEN SATURATION: 97 % | SYSTOLIC BLOOD PRESSURE: 120 MMHG | RESPIRATION RATE: 16 BRPM

## 2022-08-31 DIAGNOSIS — G89.29 CHRONIC NECK PAIN: ICD-10-CM

## 2022-08-31 DIAGNOSIS — F33.42 RECURRENT MAJOR DEPRESSIVE DISORDER, IN FULL REMISSION (HCC): ICD-10-CM

## 2022-08-31 DIAGNOSIS — E04.2 MULTIPLE THYROID NODULES: ICD-10-CM

## 2022-08-31 DIAGNOSIS — I48.0 PAF (PAROXYSMAL ATRIAL FIBRILLATION) (HCC): ICD-10-CM

## 2022-08-31 DIAGNOSIS — J45.20 MILD INTERMITTENT ASTHMA WITHOUT COMPLICATION: ICD-10-CM

## 2022-08-31 DIAGNOSIS — I34.0 NON-RHEUMATIC MITRAL REGURGITATION: ICD-10-CM

## 2022-08-31 DIAGNOSIS — E66.01 MORBID OBESITY WITH BMI OF 40.0-44.9, ADULT (HCC): ICD-10-CM

## 2022-08-31 DIAGNOSIS — M25.512 CHRONIC PAIN OF BOTH SHOULDERS: ICD-10-CM

## 2022-08-31 DIAGNOSIS — I44.7 LEFT BUNDLE BRANCH BLOCK: ICD-10-CM

## 2022-08-31 DIAGNOSIS — E13.319 RETINOPATHY DUE TO SECONDARY DIABETES MELLITUS (HCC): ICD-10-CM

## 2022-08-31 DIAGNOSIS — I49.3 VPC'S (VENTRICULAR PREMATURE COMPLEXES): ICD-10-CM

## 2022-08-31 DIAGNOSIS — E03.4 HYPOTHYROIDISM DUE TO ACQUIRED ATROPHY OF THYROID: ICD-10-CM

## 2022-08-31 DIAGNOSIS — E11.42 PERIPHERAL SENSORY NEUROPATHY DUE TO TYPE 2 DIABETES MELLITUS (HCC): ICD-10-CM

## 2022-08-31 DIAGNOSIS — M51.36 DDD (DEGENERATIVE DISC DISEASE), LUMBAR: ICD-10-CM

## 2022-08-31 DIAGNOSIS — G47.33 OSA (OBSTRUCTIVE SLEEP APNEA): ICD-10-CM

## 2022-08-31 DIAGNOSIS — I65.29 STENOSIS OF CAROTID ARTERY, UNSPECIFIED LATERALITY: ICD-10-CM

## 2022-08-31 DIAGNOSIS — M25.511 CHRONIC PAIN OF BOTH SHOULDERS: ICD-10-CM

## 2022-08-31 DIAGNOSIS — Z00.00 MEDICARE ANNUAL WELLNESS VISIT, SUBSEQUENT: ICD-10-CM

## 2022-08-31 DIAGNOSIS — N18.4 TYPE 2 DIABETES MELLITUS WITH STAGE 4 CHRONIC KIDNEY DISEASE, WITHOUT LONG-TERM CURRENT USE OF INSULIN (HCC): ICD-10-CM

## 2022-08-31 DIAGNOSIS — I70.0 ATHEROSCLEROSIS OF AORTA (HCC): ICD-10-CM

## 2022-08-31 DIAGNOSIS — F41.9 ANXIETY: ICD-10-CM

## 2022-08-31 DIAGNOSIS — Z99.81 DEPENDENCE ON NOCTURNAL OXYGEN THERAPY: ICD-10-CM

## 2022-08-31 DIAGNOSIS — M1A.0790 CHRONIC IDIOPATHIC GOUT INVOLVING TOE WITHOUT TOPHUS, UNSPECIFIED LATERALITY: ICD-10-CM

## 2022-08-31 DIAGNOSIS — J96.11 CHRONIC RESPIRATORY FAILURE WITH HYPOXIA (HCC): ICD-10-CM

## 2022-08-31 DIAGNOSIS — F13.20 SEDATIVE, HYPNOTIC, OR ANXIOLYTIC DEPENDENCE (HCC): ICD-10-CM

## 2022-08-31 DIAGNOSIS — G89.29 CHRONIC PAIN OF BOTH SHOULDERS: ICD-10-CM

## 2022-08-31 DIAGNOSIS — N18.4 CKD (CHRONIC KIDNEY DISEASE) STAGE 4, GFR 15-29 ML/MIN (HCC): ICD-10-CM

## 2022-08-31 DIAGNOSIS — E78.5 DYSLIPIDEMIA: ICD-10-CM

## 2022-08-31 DIAGNOSIS — N25.81 SECONDARY HYPERPARATHYROIDISM OF RENAL ORIGIN (HCC): ICD-10-CM

## 2022-08-31 DIAGNOSIS — R15.9 INCONTINENCE OF FECES, UNSPECIFIED FECAL INCONTINENCE TYPE: ICD-10-CM

## 2022-08-31 DIAGNOSIS — I10 ESSENTIAL HYPERTENSION: ICD-10-CM

## 2022-08-31 DIAGNOSIS — M47.16 OSTEOARTHRITIS OF LUMBAR SPINE WITH MYELOPATHY: ICD-10-CM

## 2022-08-31 DIAGNOSIS — D68.69 SECONDARY HYPERCOAGULABLE STATE (HCC): ICD-10-CM

## 2022-08-31 DIAGNOSIS — M54.2 CHRONIC NECK PAIN: ICD-10-CM

## 2022-08-31 DIAGNOSIS — E11.22 TYPE 2 DIABETES MELLITUS WITH STAGE 4 CHRONIC KIDNEY DISEASE, WITHOUT LONG-TERM CURRENT USE OF INSULIN (HCC): ICD-10-CM

## 2022-08-31 DIAGNOSIS — Z79.01 CHRONIC ANTICOAGULATION: ICD-10-CM

## 2022-08-31 PROCEDURE — G0439 PPPS, SUBSEQ VISIT: HCPCS | Performed by: NURSE PRACTITIONER

## 2022-08-31 ASSESSMENT — FIBROSIS 4 INDEX: FIB4 SCORE: 8.59

## 2022-08-31 ASSESSMENT — PATIENT HEALTH QUESTIONNAIRE - PHQ9: CLINICAL INTERPRETATION OF PHQ2 SCORE: 0

## 2022-08-31 ASSESSMENT — ENCOUNTER SYMPTOMS: GENERAL WELL-BEING: FAIR

## 2022-08-31 ASSESSMENT — ACTIVITIES OF DAILY LIVING (ADL): BATHING_REQUIRES_ASSISTANCE: 0

## 2022-08-31 NOTE — PROGRESS NOTES
CC  AWV    HPI:  Whitley is a 77 y.o. here for Medicare Annual Wellness Visit.      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 ([facility]) 11/11/2021    Chronic neck pain 09/15/2021    DDD (degenerative disc disease), lumbar 11/02/2020    Osteoarthritis of lumbar spine 11/02/2020    Non-rheumatic mitral regurgitation 08/20/2019    Secondary hyperparathyroidism of renal origin ([facility]) 05/14/2018    Hypothyroidism due to acquired atrophy of thyroid 04/10/2018    VPC's (ventricular premature complexes) 02/23/2018    Dependence on nocturnal oxygen therapy 11/02/2017    Incontinence of feces 05/18/2017    Chronic pain of both shoulders 03/23/2017    Dyslipidemia 12/14/2016    Mild intermittent asthma without complication 08/24/2016    Chronic anticoagulation 06/10/2016    Essential hypertension 05/10/2016    AVERY (obstructive sleep apnea) 05/10/2016    Multiple thyroid nodules 10/07/2015    Chronic idiopathic gout involving toe without tophus 04/30/2015    Atherosclerosis of aorta (CMS-[facility]) 04/29/2015    Chronic respiratory failure with hypoxia ([facility]) - nighttime oxygen only 04/29/2015    Morbid obesity with BMI of 40.0-44.9, adult ([facility]) 04/29/2015    Stenosis of carotid artery 05/30/2014    Anxiety 01/09/2014    PAF (paroxysmal atrial fibrillation) ([facility]) 07/12/2013    Left bundle branch block 03/05/2012    Type 2 diabetes mellitus with stage 4 chronic kidney disease, without long-term current use of insulin ([facility])     CKD (chronic kidney disease) stage 4, GFR 15-29 ml/min ([facility])        Current Outpatient Medications   Medication Sig Dispense Refill    magnesium  oxide (MAG-OX) 400 MG Tab tablet Take 1 Tablet by mouth every day. 90 Tablet 0    PEG 3350-KCl-NaBcb-NaCl-NaSulf (PEG-3350/ELECTROLYTES) 236 g Recon Soln Follow [facility] instruction handout: 4000 mL 0    Semaglutide (OZEMPIC, 0.25 OR 0.5 MG/DOSE, SC) Inject  under the skin.      acetaminophen (TYLENOL) 500 MG Tab Take 500-1,000 mg by mouth every 6 hours as needed.      omeprazole (PRILOSEC) 20 MG delayed-release capsule Take 2 Capsules by mouth every day. 180 Capsule 0    metoprolol tartrate (LOPRESSOR) 50 MG Tab Take 1 tablet by mouth 2 times a day. 200 Tablet 3    rosuvastatin (CRESTOR) 10 MG Tab Take 1 tablet by mouth every evening. Stop simvastatin. 100 Tablet 3    promethazine (PHENERGAN) 12.5 MG tablet Take 1-2 Tablets by mouth every 6 hours as needed for Nausea/Vomiting. 30 Tablet 0    pioglitazone (ACTOS) 15 MG Tab Take 1 tablet by mouth every day for 100 days. 100 Tablet 3    ondansetron (ZOFRAN ODT) 4 MG TABLET DISPERSIBLE Take 1 Tablet by mouth every 8 hours as needed for nausea. 10 Tablet 0    ondansetron (ZOFRAN ODT) 4 MG TABLET DISPERSIBLE Take 1 Tablet by mouth every 8 hours as needed for Nausea. (Patient not taking: Reported on 8/25/2022) 10 Tablet 0    levothyroxine (SYNTHROID) 50 MCG Tab Take 1 tablet by mouth every morning on an empty stomach. 100 Tablet 3    terazosin (HYTRIN) 1 MG Cap Take 1 capsule by mouth every day. 100 Capsule 3    nystatin (MYCOSTATIN) 943516 UNIT/GM Ointment APPLY TO AFFECTED AREA up to twice daily 240 g 2    glucose blood (FREESTYLE LITE) strip Use test strip to test 2 times a day with meals. 50 Strip 11    levalbuterol (XOPENEX HFA) 45 MCG/ACT inhaler Inhale 2 Puffs every 6 hours as needed for Shortness of Breath. 15 g 11    rivaroxaban (XARELTO) 15 MG Tab tablet Take 1 Tablet by mouth with dinner. 90 Tablet 3    Continuous Blood Gluc Sensor (FREESTYLE YANG 14 DAY SENSOR) Misc Apply every 14 days 2 Each 11    Continuous Blood Gluc  (FREESTYLE YANG 2  READER) Device Use to monitor blood sugar daily 1 Each 11    losartan (COZAAR) 50 MG Tab Take 1 tablet by mouth 2 Times a Day. 200 Tablet 2    loratadine (CLARITIN) 10 MG Tab Take 1 tablet by mouth every day. 100 Tablet 2    clonazePAM (KLONOPIN) 0.5 MG Tab TAKE 1 TABLET BY MOUTH ONCE DAILY AS NEEDED FOR 30 DAYS. ANXIETY/HEART PALPITATIONS for 30 days 30 Tablet 1    montelukast (SINGULAIR) 10 MG Tab Take 1 Tablet by mouth at bedtime. 90 Tablet 3    allopurinol (ZYLOPRIM) 100 MG Tab TAKE TWO TABLETS BY MOUTH TWICE DAILY 360 Tablet 2    fluticasone (FLONASE) 50 MCG/ACT nasal spray USE 1-2  SPRAY(S) IN EACH NOSTRIL ONCE DAILY 30 MINUTES PRIOR TO USE OF CPAP 48 g 3    furosemide (LASIX) 80 MG Tab Take 1 tablet by mouth every 48 hours for 90 days. (Patient taking differently: Take 40 mg by mouth every day.) 45 tablet 3    MULTAQ 400 MG Tab TAKE ONE TABLET BY MOUTH TWICE A DAY WITH MEALS 180 tablet 1    NON SPECIFIED C-Pap supplies  -humidifier tank and a spare   - fax [phone]...    Dx:  Sleep apnea 1 Each 1    NON SPECIFIED Drive walker,  V33499, Purple, Medical Foldable Lightweight Rollator with Light Reflectors 1 Each 0    NON SPECIFIED Test strips and lancets for the strips brand is [employer] 100 Each 3    levalbuterol (XOPENEX HFA) 45 MCG/ACT inhaler Inhale 2 Puffs by mouth every four hours as needed for Shortness of Breath. 3 Inhaler 3    NON SPECIFIED blue concentrator at night along with my C-PAP./3 liters per hour 1 Each 0    ferrous sulfate 325 (65 Fe) MG tablet Take 325 mg by mouth every day.      Blood Glucose Monitoring Suppl Supplies Misc Test strips order: Test strips for Accucheck meter. Sig: use BID and prn ssx high or low sugar #100 RF x 3 100 Strip 11    Blood Glucose Monitoring Suppl Supplies Misc Test strips order: Test strips for Chely Contour meter. Sig: use tid  and prn ssx high or low sugar #100 RF x 3 (Patient taking differently: Test strips order: Test strips for Chely Contour meter. Sig:  use tid  and prn ssx high or low sugar #100 RF x 3) 100 Strip 3    Urine Glucose-Ketones Test Strip 1 Strip by In Vitro route every day. 100 Strip 2    Insulin Pen Needle 32G X 4 MM Misc 1 Applicator by Does not apply route 3 times a day. 100 Each 11    Blood Glucose Monitoring Suppl W/DEVICE Kit 1 Each by Does not apply route 2 times a day as needed. 1 Kit 0    Misc. Devices MISC Pen needles for Lantus Solstar. 29g 12mm. 90 Each 3    Cholecalciferol (VITAMIN D) 2000 UNITS CAPS Take 4,000 Units by mouth every day.       No current facility-administered medications for this visit.        Patient is taking medications as noted in medication list.  Current supplements as per medication list.     Allergies: Amlodipine, Levoxyl [levothyroxine sodium], Amaryl, Avandia [rosiglitazone maleate], Cipro xr, Clonidine, Contrast media with iodine [iodine], Glipizide, Glucophage [metformin hydrochloride], Hydralazine, Levaquin, and Relafen [nabumetone]    Current social contact/activities: bingo/ friends/ family    Is patient current with immunizations? Yes.    She  reports that she quit smoking about 39 years ago. Her smoking use included cigarettes. She has a 20.00 pack-year smoking history. She has never used smokeless tobacco. She reports that she does not drink alcohol and does not use drugs.  Counseling given: Not Answered        DPA/Advanced directive: Patient has Advanced Directive, but it is not on file. Instructed to bring in a copy to scan into their chart.    ROS:    Gait: Uses no assistive device   Ostomy: No   Other tubes: No   Amputations: No   Chronic oxygen use No   Last eye exam will be 10/7/21  Wears hearing aids: No   : Reports urinary leakage during the last 6 months that has not interfered at all with their daily activities or sleep.      Screening:      Depression Screening  Little interest or pleasure in doing things?  0 - not at all  Feeling down, depressed, or hopeless? 0 - not at all  Patient Health  Questionnaire Score: 0    If depressive symptoms identified deferred to follow up visit unless specifically addressed in assessment and plan.    Interpretation of PHQ-9 Total Score   Score Severity   1-4 No Depression   5-9 Mild Depression   10-14 Moderate Depression   15-19 Moderately Severe Depression   20-27 Severe Depression    Screening for Cognitive Impairment  Three Minute Recall (daughter, heaven, mountain)  3/3    Daron clock face with all 12 numbers and set the hands to show 10 past 11.  Yes    If cognitive concerns identified, deferred for follow up unless specifically addressed in assessment and plan.    Fall Risk Assessment  Has the patient had two or more falls in the last year or any fall with injury in the last year?  No  If fall risk identified, deferred for follow up unless specifically addressed in assessment and plan.    Safety Assessment  Throw rugs on floor.  No  Handrails on all stairs.  Yes  Good lighting in all hallways.  Yes  Difficulty hearing.  Yes  Patient counseled about all safety risks that were identified.    Functional Assessment ADLs  Are there any barriers preventing you from cooking for yourself or meeting nutritional needs?  No.    Are there any barriers preventing you from driving safely or obtaining transportation?  No.    Are there any barriers preventing you from using a telephone or calling for help?  No.    Are there any barriers preventing you from shopping?  No.    Are there any barriers preventing you from taking care of your own finances?  No.    Are there any barriers preventing you from managing your medications?  No.    Are there any barriers preventing you from showering, bathing or dressing yourself?  No.    Are you currently engaging in any exercise or physical activity?   .     What is your perception of your health?  Fair.    Health Maintenance Summary            Overdue - COVID-19 Vaccine (1) Overdue - never done      No completion history exists for this  topic.              Overdue - IMM ZOSTER VACCINES (1 of 2) Overdue - never done      No completion history exists for this topic.              Overdue - FASTING LIPID PROFILE (Yearly) Overdue since 12/14/2020 12/14/2019  Lipid Profile     02/23/2019  Lipid Profile     10/04/2017  LIPID PROFILE     01/27/2017  LIPID PROFILE     10/07/2015  LIPID PROFILE     Only the first 5 history entries have been loaded, but more history exists.            Overdue - A1C SCREENING (Every 6 Months) Overdue since 5/11/2022 11/11/2021  POCT  A1C     05/26/2021  POCT Hemoglobin A1C     11/25/2020  HEMOGLOBIN A1C     02/11/2020  POCT Hemoglobin A1C     08/19/2019  POCT  A1C     Only the first 5 history entries have been loaded, but more history exists.            IMM INFLUENZA (1) Due soon on 9/1/2022 11/11/2021  Imm Admin: Influenza Vaccine Adult HD     10/22/2020  Imm Admin: Influenza Vaccine Adult HD     10/18/2018  Imm Admin: Influenza Vaccine Adult HD     10/10/2017  Imm Admin: Influenza Vaccine Adult HD             RETINAL SCREENING (Yearly) Next due on 10/7/2022      10/07/2021  Done     08/27/2021  REFERRAL FOR RETINAL SCREENING EXAM     08/27/2021  Done     07/07/2021  Referral for Retinal Screening Exam     10/15/2020  REFERRAL FOR RETINAL SCREENING EXAM     Only the first 5 history entries have been loaded, but more history exists.            DIABETES MONOFILAMENT / LE EXAM (Yearly) Next due on 11/11/2022 11/11/2021  Smart[employer]: WORKFLOW - DIABETES - DIABETIC FOOT EXAM PERFORMED     06/02/2020  Smart[employer]: WORKFLOW - DIABETES - DIABETIC FOOT EXAM PERFORMED     05/22/2019  Diabetic Monofilament Lower Extremity Exam     11/02/2017  Diabetic Monofilament Lower Extremity Exam     10/07/2015  Done             URINE ACR / MICROALBUMIN (Yearly) Next due on 3/8/2023      03/08/2022  PROTEIN/CREAT RATIO URINE     08/17/2021  PROTEIN/CREAT RATIO URINE     04/07/2021  PROTEIN/CREAT RATIO URINE     11/25/2020   PROTEIN/CREAT RATIO URINE     05/06/2020  PROTEIN/CREAT RATIO URINE     Only the first 5 history entries have been loaded, but more history exists.            MAMMOGRAM (Yearly) Next due on 3/8/2023      03/08/2022  MA-DIAGNOSTIC MAMMO BILAT W/TOMOSYNTHESIS W/CAD     04/07/2021  MA-SCREENING MAMMO BILAT W/TOMOSYNTHESIS W/CAD     02/26/2020  MA-SCREENING MAMMO BILAT W/TOMOSYNTHESIS W/CAD     01/18/2019  MA-SCREENING MAMMO BILAT W/TOMOSYNTHESIS W/CAD     09/08/2017  MA-MAMMO SCREENING BILAT W/CYNDY W/CAD     Only the first 5 history entries have been loaded, but more history exists.            Annual Wellness Visit (Every 366 Days) Next due on 4/7/2023 04/06/2022  Level of Service: [facility] ANNUAL WELLNESS VISIT-INCLUDES PPPS SUBSEQUE*     06/02/2020  Subsequent Annual Wellness Visit - Includes PPPS ()     06/02/2020  Visit Dx: Medicare annual wellness visit, subsequent     05/22/2019  Subsequent Annual Wellness Visit - Includes PPPS ()     05/22/2019  Visit Dx: Medicare annual wellness visit, subsequent     Only the first 5 history entries have been loaded, but more history exists.            SERUM CREATININE (Yearly) Next due on 6/27/2023 06/27/2022  Comp Metabolic Panel     03/08/2022  Renal Function Panel     01/20/2022  Basic Metabolic Panel     11/30/2021  Renal Function Panel     08/17/2021  Renal Function Panel     Only the first 5 history entries have been loaded, but more history exists.            COLORECTAL CANCER SCREENING (COLON CANCER SCREENING ANNUAL FIT - Every 3 Years) Next due on 8/3/2023      08/03/2020  OCCULT BLOOD FECES IMMUNOASSAY     03/01/2018  OCCULT BLOOD FECES IMMUNOASSAY             BONE DENSITY (Every 5 Years) Next due on 1/18/2024 01/18/2019  DS-BONE DENSITY STUDY (DEXA)     01/31/2013  DS-BONE DENSITY STUDY (DEXA)             IMM DTaP/Tdap/Td Vaccine (2 - Td or Tdap) Next due on 4/11/2029 04/11/2019  Imm Admin: Tdap Vaccine             HEPATITIS C SCREENING   Completed      2017  HEP C VIRUS ANTIBODY             IMM PNEUMOCOCCAL VACCINE: 65+ Years (Series Information) Completed      2017  Imm Admin: Pneumococcal polysaccharide vaccine (PPSV-23)     2016  Imm Admin: Pneumococcal Conjugate Vaccine (Prevnar/PCV-13)             IMM HEP B VACCINE (Series Information) Completed      2021  Imm Admin: Hepatitis B Vaccine (Adol/Adult)     10/22/2020  Imm Admin: Hepatitis B Vaccine (Adol/Adult)     2020  Imm Admin: Hepatitis B Vaccine (Adol/Adult)             IMM MENINGOCOCCAL ACWY VACCINE (Series Information) Aged Out      No completion history exists for this topic.              Discontinued - PAP SMEAR  Discontinued      No completion history exists for this topic.                    Patient Care Team:  EDILMA Whelan as PCP - General (Family Medicine)  Ania Badillo M.D. as Consulting Physician (Nephrology)  Zoran Gamble M.D. as Consulting Physician (Ophthalmology)  [facility] as Respiratory Therapist  Tj Servin M.D. (Interventional Cardiology)  Mely Yanez C.N.A. as [facility]     Social History     Tobacco Use    Smoking status: Former     Packs/day: 1.00     Years: 20.00     Pack years: 20.00     Types: Cigarettes     Quit date: 1983     Years since quittin.6    Smokeless tobacco: Never   Vaping Use    Vaping Use: Never used   Substance Use Topics    Alcohol use: No     Alcohol/week: 0.0 oz    Drug use: No     Family History   Problem Relation Age of Onset    Cancer Maternal Aunt     Diabetes Maternal Aunt     Heart Disease Maternal Aunt     Hypertension Maternal Aunt     Hyperlipidemia Maternal Aunt     Cancer Mother         Leukemia    Hypertension Mother     Diabetes Mother     Lung Disease Father     Cancer Father         Lung    Lung Disease Brother     Stroke Brother     Diabetes Brother     Heart Disease Brother     Diabetes Maternal Grandmother     Hypertension  "Brother     Diabetes Brother      She  has a past medical history of Allergy, Anxiety, Arrhythmia, Arthritis, Asthma, Carotid bruit, CATARACT, Chest pain, DIABETES MELLITUS, Esophageal reflux, Goiter, Headache(784.0), Heart murmur, Hyperlipidemia, Hypertension, IBD (inflammatory bowel disease), Migraine, AVERY (obstructive sleep apnea), Overweight(278.02), Palpitations, Paroxysmal atrial fibrillation (HCC) (7/12/13), Pneumonia, Positive reaction to tuberculin skin test, Renal disease, and Urinary tract infection, site not specified.    She has no past medical history of Breast cancer (HCC) or Tuberculosis.   Past Surgical History:   Procedure Laterality Date    CATARACT PHACO WITH IOL Right 10/25/2016    Procedure: CATARACT PHACO WITH IOL;  Surgeon: Zoran Gamble M.D.;  Location: SURGERY SAME DAY [facility];  Service:     ABDOMINAL HYSTERECTOMY TOTAL      CHOLECYSTECTOMY      TONSILLECTOMY      US-NEEDLE CORE BX-BREAST PANEL             Exam:     /50 (BP Location: Left arm, Patient Position: Sitting, BP Cuff Size: Large adult)   Pulse 60   Temp 36.2 °C (97.2 °F)   Resp 16   Ht 1.676 m (5' 6\")   Wt 118 kg (260 lb)   SpO2 97%  Body mass index is 41.97 kg/m².    Hearing good.    Dentition fair  Alert, oriented in no acute distress.  Eye contact is good, speech goal directed, affect calm  CV Regular rate / rhythm today.  +murmur 3/6.      Assessment and Plan. The following treatment and monitoring plan is recommended:    1. Medicare annual wellness visit, subsequent    2. Mild intermittent asthma without complication  -stable.     3. AVERY (obstructive sleep apnea)  -off new cpap b/c it feels like it is pushing air in and needs to call preferred home care.  Stress importance of need to use cpap.      4. Anxiety  -stable.     5. Atherosclerosis of aorta (CMS-[facility])  -stable, on statin therapy     6. CKD (chronic kidney disease) stage 4, GFR 15-29 ml/min ([facility])  -UTD with nephrology.     7. " "Dyslipidemia  -recommend updated lab work.    - Lipid Profile; Future  - Comp Metabolic Panel; Future    8. Essential hypertension  -stable.  Continue same.   - Comp Metabolic Panel; Future    9. Hypothyroidism due to acquired atrophy of thyroid  -recommend updated lab work.    - TSH; Future  - FREE THYROXINE; Future    10. Type 2 diabetes mellitus with stage 4 chronic kidney disease, without long-term current use of insulin ([facility])  -due for a1c  - HEMOGLOBIN A1C; Future  - MICROALBUMIN CREAT RATIO URINE; Future    11. Left bundle branch block  -stable.     12. PAF (paroxysmal atrial fibrillation) ([facility])  -stable.  Chronically anticoagulated and rate controlled.     13. Chronic respiratory failure with hypoxia ([facility]) - nighttime oxygen only  -stable.     14. Morbid obesity with BMI of 40.0-44.9, adult ([facility])  -chronic and persistent but lost weight during covid.      15. Chronic idiopathic gout involving toe without tophus, unspecified laterality  -no recent flare ups    16. Stenosis of carotid artery, unspecified laterality  -on statin therapy.  Not dizzy. US UTD    17. Multiple thyroid nodules  -last US 2019 and stable.     18. Chronic anticoagulation  -on xarelto now.     19. Chronic pain of both shoulders  -stable.     20. Incontinence of feces, unspecified fecal incontinence type  -trying benefiber / lomotil and awaiting colonoscopy.  Considering pelvic floor w/u after colonoscopy.      21. Dependence on nocturnal oxygen therapy  -continues on 2 L oxygen at night.     22. VPC's (ventricular premature complexes)  -stable.      23. Secondary hyperparathyroidism of renal origin ([facility])  -UTD  with nephrology. Labs to do in a week for Dr. Badillo.     24. Non-rheumatic mitral regurgitation  -has f/u 9/15/2022.  Last echo 2020.    25. DDD (degenerative disc disease), lumbar  -chronic issue and \"not so good.\"      26. Osteoarthritis of lumbar spine with myelopathy  -as above    27. Chronic neck pain  -as above.  Tylenol is " helping.  Prefers to refrain from physiatry / MRI / neurosurgery    28. Retinopathy due to secondary diabetes mellitus (HCC)  -has appt with her eye doc in a month.     29. 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      30. Recurrent major depressive disorder, in full remission (HCC)  -stable.     31. Secondary hypercoagulable state (HCC)  -on xarelto - compliant with this.     32. Sedative, hypnotic, or anxiolytic dependence ([facility])  -rare use of clonazepam - last fill 5/2022.  Discussed fall precautions / addictive properties.        Services suggested: No services needed at this time  Health Care Screening recommendations as per orders if indicated.  Referrals offered: PT/OT/Nutrition counseling/Behavioral Health/Smoking cessation as per orders if indicated.    Discussion today about general wellness and lifestyle habits:    Prevent falls and reduce trip hazards; Cautioned about securing or removing rugs.  Have a working fire alarm and carbon monoxide detector;   Engage in regular physical activity and social activities       F/u 6 months.

## 2022-09-01 ENCOUNTER — TELEPHONE (OUTPATIENT)
Dept: CARDIOLOGY | Facility: MEDICAL CENTER | Age: 77
End: 2022-09-01
Payer: MEDICARE

## 2022-09-01 NOTE — TELEPHONE ENCOUNTER
TW    Caller: [facility] Patient Assistance    Topic/issue: Carol called and wanted to speak to the nurse about this patients incomplete assistance form. She stated the last form from March of this year is outdated and was sent in incomplete.    Callback Number: [phone]

## 2022-09-01 NOTE — TELEPHONE ENCOUNTER
Marj with at [employer]. She advised form that was completed and sent on 05/24/22 time has lapsed and new form will need to be completed and sent. Confirmed correct and updated from from website, revised on June 2022. Form completed and given to TW for review and signature.    [employer] Patient [employer]  [phone] P  [phone] F  [phone] F

## 2022-09-08 ENCOUNTER — PATIENT MESSAGE (OUTPATIENT)
Dept: CARDIOLOGY | Facility: MEDICAL CENTER | Age: 77
End: 2022-09-08
Payer: MEDICARE

## 2022-09-08 ENCOUNTER — HOSPITAL ENCOUNTER (OUTPATIENT)
Dept: LAB | Facility: MEDICAL CENTER | Age: 77
End: 2022-09-08
Attending: INTERNAL MEDICINE
Payer: MEDICARE

## 2022-09-08 ENCOUNTER — HOSPITAL ENCOUNTER (OUTPATIENT)
Dept: LAB | Facility: MEDICAL CENTER | Age: 77
End: 2022-09-08
Attending: NURSE PRACTITIONER
Payer: MEDICARE

## 2022-09-08 DIAGNOSIS — I10 ESSENTIAL HYPERTENSION: ICD-10-CM

## 2022-09-08 DIAGNOSIS — I48.0 PAF (PAROXYSMAL ATRIAL FIBRILLATION) (HCC): ICD-10-CM

## 2022-09-08 DIAGNOSIS — E03.4 HYPOTHYROIDISM DUE TO ACQUIRED ATROPHY OF THYROID: ICD-10-CM

## 2022-09-08 DIAGNOSIS — R00.2 PALPITATIONS: ICD-10-CM

## 2022-09-08 DIAGNOSIS — E11.22 TYPE 2 DIABETES MELLITUS WITH STAGE 4 CHRONIC KIDNEY DISEASE, WITHOUT LONG-TERM CURRENT USE OF INSULIN (HCC): ICD-10-CM

## 2022-09-08 DIAGNOSIS — N18.4 TYPE 2 DIABETES MELLITUS WITH STAGE 4 CHRONIC KIDNEY DISEASE, WITHOUT LONG-TERM CURRENT USE OF INSULIN (HCC): ICD-10-CM

## 2022-09-08 DIAGNOSIS — E78.5 DYSLIPIDEMIA: ICD-10-CM

## 2022-09-08 LAB
ALBUMIN SERPL BCP-MCNC: 4.2 G/DL (ref 3.2–4.9)
ALBUMIN SERPL BCP-MCNC: 4.3 G/DL (ref 3.2–4.9)
ALBUMIN/GLOB SERPL: 1.8 G/DL
ALP SERPL-CCNC: 61 U/L (ref 30–99)
ALT SERPL-CCNC: 11 U/L (ref 2–50)
ANION GAP SERPL CALC-SCNC: 10 MMOL/L (ref 7–16)
AST SERPL-CCNC: 17 U/L (ref 12–45)
BILIRUB SERPL-MCNC: 0.7 MG/DL (ref 0.1–1.5)
BUN SERPL-MCNC: 68 MG/DL (ref 8–22)
BUN SERPL-MCNC: 68 MG/DL (ref 8–22)
CALCIUM SERPL-MCNC: 9.2 MG/DL (ref 8.5–10.5)
CALCIUM SERPL-MCNC: 9.2 MG/DL (ref 8.5–10.5)
CHLORIDE SERPL-SCNC: 108 MMOL/L (ref 96–112)
CHLORIDE SERPL-SCNC: 110 MMOL/L (ref 96–112)
CHOLEST SERPL-MCNC: 157 MG/DL (ref 100–199)
CO2 SERPL-SCNC: 20 MMOL/L (ref 20–33)
CO2 SERPL-SCNC: 20 MMOL/L (ref 20–33)
CREAT SERPL-MCNC: 2.32 MG/DL (ref 0.5–1.4)
CREAT SERPL-MCNC: 2.35 MG/DL (ref 0.5–1.4)
CREAT UR-MCNC: 141.08 MG/DL
EST. AVERAGE GLUCOSE BLD GHB EST-MCNC: 123 MG/DL
FASTING STATUS PATIENT QL REPORTED: NORMAL
GFR SERPLBLD CREATININE-BSD FMLA CKD-EPI: 21 ML/MIN/1.73 M 2
GFR SERPLBLD CREATININE-BSD FMLA CKD-EPI: 21 ML/MIN/1.73 M 2
GLOBULIN SER CALC-MCNC: 2.4 G/DL (ref 1.9–3.5)
GLUCOSE SERPL-MCNC: 129 MG/DL (ref 65–99)
GLUCOSE SERPL-MCNC: 132 MG/DL (ref 65–99)
HBA1C MFR BLD: 5.9 % (ref 4–5.6)
HCT VFR BLD AUTO: 34.5 % (ref 37–47)
HDLC SERPL-MCNC: 40 MG/DL
HGB BLD-MCNC: 11.4 G/DL (ref 12–16)
LDLC SERPL CALC-MCNC: 69 MG/DL
MICROALBUMIN UR-MCNC: 5.5 MG/DL
MICROALBUMIN/CREAT UR: 39 MG/G (ref 0–30)
PHOSPHATE SERPL-MCNC: 3.8 MG/DL (ref 2.5–4.5)
POTASSIUM SERPL-SCNC: 4.8 MMOL/L (ref 3.6–5.5)
POTASSIUM SERPL-SCNC: 4.8 MMOL/L (ref 3.6–5.5)
PROT SERPL-MCNC: 6.6 G/DL (ref 6–8.2)
SODIUM SERPL-SCNC: 139 MMOL/L (ref 135–145)
SODIUM SERPL-SCNC: 140 MMOL/L (ref 135–145)
T4 FREE SERPL-MCNC: 1.11 NG/DL (ref 0.93–1.7)
TRIGL SERPL-MCNC: 238 MG/DL (ref 0–149)
TSH SERPL DL<=0.005 MIU/L-ACNC: 2.1 UIU/ML (ref 0.38–5.33)
URATE SERPL-MCNC: 4.8 MG/DL (ref 1.9–8.2)

## 2022-09-08 PROCEDURE — 80069 RENAL FUNCTION PANEL: CPT

## 2022-09-08 PROCEDURE — 84439 ASSAY OF FREE THYROXINE: CPT

## 2022-09-08 PROCEDURE — 83036 HEMOGLOBIN GLYCOSYLATED A1C: CPT

## 2022-09-08 PROCEDURE — 85014 HEMATOCRIT: CPT

## 2022-09-08 PROCEDURE — 82570 ASSAY OF URINE CREATININE: CPT

## 2022-09-08 PROCEDURE — RXMED WILLOW AMBULATORY MEDICATION CHARGE: Performed by: NURSE PRACTITIONER

## 2022-09-08 PROCEDURE — 80053 COMPREHEN METABOLIC PANEL: CPT

## 2022-09-08 PROCEDURE — RXMED WILLOW AMBULATORY MEDICATION CHARGE: Performed by: INTERNAL MEDICINE

## 2022-09-08 PROCEDURE — 80061 LIPID PANEL: CPT

## 2022-09-08 PROCEDURE — 36415 COLL VENOUS BLD VENIPUNCTURE: CPT

## 2022-09-08 PROCEDURE — 84550 ASSAY OF BLOOD/URIC ACID: CPT

## 2022-09-08 PROCEDURE — 82043 UR ALBUMIN QUANTITATIVE: CPT

## 2022-09-08 PROCEDURE — 85018 HEMOGLOBIN: CPT

## 2022-09-08 PROCEDURE — 84443 ASSAY THYROID STIM HORMONE: CPT

## 2022-09-12 ENCOUNTER — PHARMACY VISIT (OUTPATIENT)
Dept: PHARMACY | Facility: MEDICAL CENTER | Age: 77
End: 2022-09-12
Payer: MEDICARE

## 2022-09-28 NOTE — PATIENT COMMUNICATION
Tj Servin M.D.  You 2 days ago     Okay order echo complete under my name thanks      Echo order placed, [email or web address] response sent to pt.

## 2022-10-04 ENCOUNTER — PATIENT MESSAGE (OUTPATIENT)
Dept: CARDIOLOGY | Facility: MEDICAL CENTER | Age: 77
End: 2022-10-04
Payer: MEDICARE

## 2022-10-10 PROCEDURE — RXMED WILLOW AMBULATORY MEDICATION CHARGE: Performed by: NURSE PRACTITIONER

## 2022-10-10 RX ORDER — ALLOPURINOL 100 MG/1
TABLET ORAL
Qty: 360 TABLET | Refills: 2 | Status: SHIPPED | OUTPATIENT
Start: 2022-10-10

## 2022-10-12 ENCOUNTER — PHARMACY VISIT (OUTPATIENT)
Dept: PHARMACY | Facility: MEDICAL CENTER | Age: 77
End: 2022-10-12
Payer: MEDICARE

## 2022-10-13 PROCEDURE — RXMED WILLOW AMBULATORY MEDICATION CHARGE: Performed by: NURSE PRACTITIONER

## 2022-10-14 NOTE — TELEPHONE ENCOUNTER
Received request via: Pharmacy    Was the patient seen in the last year in this department? Yes  8/31/2022  Does the patient have an active prescription (recently filled or refills available) for medication(s) requested? No

## 2022-10-16 ENCOUNTER — PATIENT MESSAGE (OUTPATIENT)
Dept: CARDIOLOGY | Facility: MEDICAL CENTER | Age: 77
End: 2022-10-16
Payer: MEDICARE

## 2022-10-16 RX ORDER — LORATADINE 10 MG/1
10 TABLET ORAL DAILY
Qty: 100 TABLET | Refills: 2 | Status: SHIPPED | OUTPATIENT
Start: 2022-10-16 | End: 2023-08-29 | Stop reason: SDUPTHER

## 2022-10-17 ENCOUNTER — PHARMACY VISIT (OUTPATIENT)
Dept: PHARMACY | Facility: MEDICAL CENTER | Age: 77
End: 2022-10-17
Payer: MEDICARE

## 2022-10-17 PROCEDURE — RXMED WILLOW AMBULATORY MEDICATION CHARGE: Performed by: NURSE PRACTITIONER

## 2022-10-19 ENCOUNTER — TELEPHONE (OUTPATIENT)
Dept: MEDICAL GROUP | Facility: LAB | Age: 77
End: 2022-10-19
Payer: MEDICARE

## 2022-10-25 ENCOUNTER — PATIENT OUTREACH (OUTPATIENT)
Dept: HEALTH INFORMATION MANAGEMENT | Facility: OTHER | Age: 77
End: 2022-10-25
Payer: MEDICARE

## 2022-10-25 DIAGNOSIS — E11.22 TYPE 2 DIABETES MELLITUS WITH STAGE 4 CHRONIC KIDNEY DISEASE, WITHOUT LONG-TERM CURRENT USE OF INSULIN (HCC): ICD-10-CM

## 2022-10-25 DIAGNOSIS — N18.4 TYPE 2 DIABETES MELLITUS WITH STAGE 4 CHRONIC KIDNEY DISEASE, WITHOUT LONG-TERM CURRENT USE OF INSULIN (HCC): ICD-10-CM

## 2022-10-26 DIAGNOSIS — I10 ESSENTIAL HYPERTENSION: ICD-10-CM

## 2022-10-26 PROCEDURE — RXMED WILLOW AMBULATORY MEDICATION CHARGE: Performed by: NURSE PRACTITIONER

## 2022-10-27 ENCOUNTER — PATIENT MESSAGE (OUTPATIENT)
Dept: CARDIOLOGY | Facility: MEDICAL CENTER | Age: 77
End: 2022-10-27
Payer: MEDICARE

## 2022-10-27 PROCEDURE — RXMED WILLOW AMBULATORY MEDICATION CHARGE: Performed by: NURSE PRACTITIONER

## 2022-10-27 RX ORDER — LOSARTAN POTASSIUM 50 MG/1
50 TABLET ORAL 2 TIMES DAILY
Qty: 200 TABLET | Refills: 2 | Status: SHIPPED | OUTPATIENT
Start: 2022-10-27 | End: 2023-08-20 | Stop reason: SDUPTHER

## 2022-10-27 NOTE — PATIENT COMMUNICATION
Re-faxed [employer] assistance form from 10/11/22 STAT to [facility]. Received receipt for confirmation.

## 2022-11-01 ENCOUNTER — PHARMACY VISIT (OUTPATIENT)
Dept: PHARMACY | Facility: MEDICAL CENTER | Age: 77
End: 2022-11-01
Payer: MEDICARE

## 2022-11-02 ENCOUNTER — PATIENT MESSAGE (OUTPATIENT)
Dept: CARDIOLOGY | Facility: MEDICAL CENTER | Age: 77
End: 2022-11-02
Payer: MEDICARE

## 2022-11-02 DIAGNOSIS — I48.0 PAF (PAROXYSMAL ATRIAL FIBRILLATION) (HCC): ICD-10-CM

## 2022-11-02 PROCEDURE — RXMED WILLOW AMBULATORY MEDICATION CHARGE: Performed by: INTERNAL MEDICINE

## 2022-11-02 RX ORDER — DRONEDARONE 400 MG/1
400 TABLET, FILM COATED ORAL 2 TIMES DAILY WITH MEALS
Qty: 60 TABLET | Refills: 0 | Status: SHIPPED | OUTPATIENT
Start: 2022-11-02 | End: 2023-05-03 | Stop reason: SDUPTHER

## 2022-11-02 NOTE — PATIENT COMMUNICATION
Called [facility], spoke with Kristine, they are needing an RX refill form reviewed. If no changes to RX, no signature required from provider. Provided fax number to nurses station. Will await response.  
RX refill form received, completed and faxed back STAT to [employer] [phone], received receipt for confirmation. Sent via e-mail to pt.  
Positive therapeutic relationships

## 2022-11-03 ENCOUNTER — PHARMACY VISIT (OUTPATIENT)
Dept: PHARMACY | Facility: MEDICAL CENTER | Age: 77
End: 2022-11-03
Payer: MEDICARE

## 2022-11-03 ENCOUNTER — NON-PROVIDER VISIT (OUTPATIENT)
Dept: MEDICAL GROUP | Facility: LAB | Age: 77
End: 2022-11-03
Payer: MEDICARE

## 2022-11-03 DIAGNOSIS — Z23 NEED FOR VACCINATION: ICD-10-CM

## 2022-11-03 PROCEDURE — 90662 IIV NO PRSV INCREASED AG IM: CPT | Performed by: FAMILY MEDICINE

## 2022-11-03 PROCEDURE — G0008 ADMIN INFLUENZA VIRUS VAC: HCPCS | Performed by: FAMILY MEDICINE

## 2022-11-03 NOTE — PROGRESS NOTES
"Whitley Frederick is a 77 y.o. female here for a non-provider visit for:   FLU    Reason for immunization: Annual Flu Vaccine  Immunization records indicate need for vaccine: Yes, confirmed with Epic  Minimum interval has been met for this vaccine: Yes  ABN completed: Yes    VIS Dated   was given to patient: Yes  All IAC Questionnaire questions were answered \"No.\"    Patient tolerated injection and no adverse effects were observed or reported: Yes    Pt scheduled for next dose in series: No  "

## 2022-11-09 ENCOUNTER — HOSPITAL ENCOUNTER (OUTPATIENT)
Dept: LAB | Facility: MEDICAL CENTER | Age: 77
End: 2022-11-09
Attending: INTERNAL MEDICINE
Payer: MEDICARE

## 2022-11-09 LAB
25(OH)D3 SERPL-MCNC: 48 NG/ML (ref 30–100)
ALBUMIN SERPL BCP-MCNC: 3.9 G/DL (ref 3.2–4.9)
BUN SERPL-MCNC: 63 MG/DL (ref 8–22)
CALCIUM SERPL-MCNC: 9.2 MG/DL (ref 8.5–10.5)
CHLORIDE SERPL-SCNC: 110 MMOL/L (ref 96–112)
CO2 SERPL-SCNC: 19 MMOL/L (ref 20–33)
CREAT SERPL-MCNC: 2.17 MG/DL (ref 0.5–1.4)
CREAT UR-MCNC: 147.36 MG/DL
FERRITIN SERPL-MCNC: 243 NG/ML (ref 10–291)
GFR SERPLBLD CREATININE-BSD FMLA CKD-EPI: 23 ML/MIN/1.73 M 2
GLUCOSE SERPL-MCNC: 142 MG/DL (ref 65–99)
HCT VFR BLD AUTO: 32.5 % (ref 37–47)
HGB BLD-MCNC: 10.4 G/DL (ref 12–16)
IRON SATN MFR SERPL: 32 % (ref 15–55)
IRON SERPL-MCNC: 93 UG/DL (ref 40–170)
PHOSPHATE SERPL-MCNC: 4.2 MG/DL (ref 2.5–4.5)
POTASSIUM SERPL-SCNC: 5 MMOL/L (ref 3.6–5.5)
PROT UR-MCNC: 22 MG/DL (ref 0–15)
PROT/CREAT UR: 149 MG/G (ref 10–107)
PTH-INTACT SERPL-MCNC: 65.8 PG/ML (ref 14–72)
SODIUM SERPL-SCNC: 140 MMOL/L (ref 135–145)
TIBC SERPL-MCNC: 295 UG/DL (ref 250–450)
UIBC SERPL-MCNC: 202 UG/DL (ref 110–370)

## 2022-11-09 PROCEDURE — 83970 ASSAY OF PARATHORMONE: CPT

## 2022-11-09 PROCEDURE — 82728 ASSAY OF FERRITIN: CPT

## 2022-11-09 PROCEDURE — 85014 HEMATOCRIT: CPT

## 2022-11-09 PROCEDURE — 36415 COLL VENOUS BLD VENIPUNCTURE: CPT

## 2022-11-09 PROCEDURE — 83550 IRON BINDING TEST: CPT

## 2022-11-09 PROCEDURE — 80069 RENAL FUNCTION PANEL: CPT

## 2022-11-09 PROCEDURE — 85018 HEMOGLOBIN: CPT

## 2022-11-09 PROCEDURE — 82306 VITAMIN D 25 HYDROXY: CPT

## 2022-11-09 PROCEDURE — 83540 ASSAY OF IRON: CPT

## 2022-11-09 PROCEDURE — 82570 ASSAY OF URINE CREATININE: CPT

## 2022-11-09 PROCEDURE — 84156 ASSAY OF PROTEIN URINE: CPT

## 2022-11-10 PROCEDURE — RXMED WILLOW AMBULATORY MEDICATION CHARGE: Performed by: NURSE PRACTITIONER

## 2022-11-11 ENCOUNTER — PATIENT MESSAGE (OUTPATIENT)
Dept: CARDIOLOGY | Facility: MEDICAL CENTER | Age: 77
End: 2022-11-11
Payer: MEDICARE

## 2022-11-11 ENCOUNTER — PHARMACY VISIT (OUTPATIENT)
Dept: PHARMACY | Facility: MEDICAL CENTER | Age: 77
End: 2022-11-11
Payer: MEDICARE

## 2022-11-14 DIAGNOSIS — F41.9 ANXIETY: ICD-10-CM

## 2022-11-14 DIAGNOSIS — G47.00 INSOMNIA: ICD-10-CM

## 2022-11-15 PROCEDURE — RXMED WILLOW AMBULATORY MEDICATION CHARGE: Performed by: FAMILY MEDICINE

## 2022-11-15 RX ORDER — CLONAZEPAM 0.5 MG/1
TABLET ORAL
Qty: 30 TABLET | Refills: 1 | Status: SHIPPED | OUTPATIENT
Start: 2022-11-15 | End: 2023-06-14 | Stop reason: SDUPTHER

## 2022-11-15 NOTE — TELEPHONE ENCOUNTER
Received request via: Pharmacy    Was the patient seen in the last year in this department? Yes  LOV 08/31/2022  Does the patient have an active prescription (recently filled or refills available) for medication(s) requested? No    Does the patient have custodial Plus and need 100 day supply (blood pressure, diabetes and cholesterol meds only)? Medication is not for cholesterol, blood pressure or diabetes

## 2022-11-17 ENCOUNTER — PHARMACY VISIT (OUTPATIENT)
Dept: PHARMACY | Facility: MEDICAL CENTER | Age: 77
End: 2022-11-17
Payer: MEDICARE

## 2022-11-18 NOTE — PATIENT COMMUNICATION
Patient application for [employer] paper work received via fax from pt. Completed, reviewed and signed by TW. Faxed to:    [employer]  [phone]    Received receipt for confirmation. Sent to pt's e-mail on file. [employer] response sent.

## 2022-11-20 DIAGNOSIS — R09.81 SINUS CONGESTION: ICD-10-CM

## 2022-11-20 PROCEDURE — RXMED WILLOW AMBULATORY MEDICATION CHARGE: Performed by: INTERNAL MEDICINE

## 2022-11-20 PROCEDURE — RXMED WILLOW AMBULATORY MEDICATION CHARGE: Performed by: NURSE PRACTITIONER

## 2022-11-21 PROCEDURE — RXMED WILLOW AMBULATORY MEDICATION CHARGE: Performed by: NURSE PRACTITIONER

## 2022-11-21 RX ORDER — FLUTICASONE PROPIONATE 50 MCG
SPRAY, SUSPENSION (ML) NASAL
Qty: 48 G | Refills: 3 | Status: SHIPPED | OUTPATIENT
Start: 2022-11-21 | End: 2023-12-11 | Stop reason: SDUPTHER

## 2022-11-21 NOTE — TELEPHONE ENCOUNTER
Received request via: Pharmacy  8/31/22  Was the patient seen in the last year in this department? Yes    Does the patient have an active prescription (recently filled or refills available) for medication(s) requested? No    Does the patient have prison Plus and need 100 day supply (blood pressure, diabetes and cholesterol meds only)?

## 2022-11-22 DIAGNOSIS — K21.9 GASTROESOPHAGEAL REFLUX DISEASE: ICD-10-CM

## 2022-11-22 DIAGNOSIS — J45.909 UNCOMPLICATED ASTHMA, UNSPECIFIED ASTHMA SEVERITY, UNSPECIFIED WHETHER PERSISTENT: ICD-10-CM

## 2022-11-23 PROCEDURE — RXMED WILLOW AMBULATORY MEDICATION CHARGE: Performed by: INTERNAL MEDICINE

## 2022-11-23 RX ORDER — MONTELUKAST SODIUM 10 MG/1
10 TABLET ORAL
Qty: 90 TABLET | Refills: 3 | Status: SHIPPED | OUTPATIENT
Start: 2022-11-23 | End: 2023-11-11 | Stop reason: SDUPTHER

## 2022-11-23 RX ORDER — OMEPRAZOLE 20 MG/1
40 CAPSULE, DELAYED RELEASE ORAL DAILY
Qty: 180 CAPSULE | Refills: 0 | Status: SHIPPED | OUTPATIENT
Start: 2022-11-23 | End: 2023-03-01 | Stop reason: SDUPTHER

## 2022-11-23 NOTE — TELEPHONE ENCOUNTER
Received request via: Pharmacy    Was the patient seen in the last year in this department? Yes    Does the patient have an active prescription (recently filled or refills available) for medication(s) requested? No    Does the patient have MCC Plus and need 100 day supply (blood pressure, diabetes and cholesterol meds only)? Medication is not for cholesterol, blood pressure or diabetes

## 2022-11-26 PROCEDURE — RXMED WILLOW AMBULATORY MEDICATION CHARGE: Performed by: NURSE PRACTITIONER

## 2022-11-27 ENCOUNTER — PHARMACY VISIT (OUTPATIENT)
Dept: PHARMACY | Facility: MEDICAL CENTER | Age: 77
End: 2022-11-27
Payer: MEDICARE

## 2022-12-04 PROCEDURE — RXMED WILLOW AMBULATORY MEDICATION CHARGE: Performed by: NURSE PRACTITIONER

## 2022-12-06 ENCOUNTER — PATIENT OUTREACH (OUTPATIENT)
Dept: HEALTH INFORMATION MANAGEMENT | Facility: OTHER | Age: 77
End: 2022-12-06
Payer: MEDICARE

## 2022-12-06 DIAGNOSIS — N18.4 TYPE 2 DIABETES MELLITUS WITH STAGE 4 CHRONIC KIDNEY DISEASE, WITHOUT LONG-TERM CURRENT USE OF INSULIN (HCC): ICD-10-CM

## 2022-12-06 DIAGNOSIS — J45.20 MILD INTERMITTENT ASTHMA WITHOUT COMPLICATION: Chronic | ICD-10-CM

## 2022-12-06 DIAGNOSIS — I10 ESSENTIAL HYPERTENSION: ICD-10-CM

## 2022-12-06 DIAGNOSIS — E11.22 TYPE 2 DIABETES MELLITUS WITH STAGE 4 CHRONIC KIDNEY DISEASE, WITHOUT LONG-TERM CURRENT USE OF INSULIN (HCC): ICD-10-CM

## 2022-12-06 PROCEDURE — 99490 CHRNC CARE MGMT STAFF 1ST 20: CPT | Performed by: NURSE PRACTITIONER

## 2022-12-06 PROCEDURE — 99439 CHRNC CARE MGMT STAF EA ADDL: CPT | Performed by: NURSE PRACTITIONER

## 2022-12-06 SDOH — SOCIAL STABILITY: SOCIAL NETWORK: HOW OFTEN DO YOU ATTENT MEETINGS OF THE CLUB OR ORGANIZATION YOU BELONG TO?: 1 TO 4 TIMES PER YEAR

## 2022-12-06 SDOH — ECONOMIC STABILITY: INCOME INSECURITY: HOW HARD IS IT FOR YOU TO PAY FOR THE VERY BASICS LIKE FOOD, HOUSING, MEDICAL CARE, AND HEATING?: NOT HARD AT ALL

## 2022-12-06 SDOH — SOCIAL STABILITY: SOCIAL NETWORK: ARE YOU MARRIED, WIDOWED, DIVORCED, SEPARATED, NEVER MARRIED, OR LIVING WITH A PARTNER?: DIVORCED

## 2022-12-06 SDOH — HEALTH STABILITY: MENTAL HEALTH: HOW OFTEN DO YOU HAVE A DRINK CONTAINING ALCOHOL?: NEVER

## 2022-12-06 SDOH — ECONOMIC STABILITY: HOUSING INSECURITY
IN THE LAST 12 MONTHS, WAS THERE A TIME WHEN YOU DID NOT HAVE A STEADY PLACE TO SLEEP OR SLEPT IN A SHELTER (INCLUDING NOW)?: NO

## 2022-12-06 SDOH — ECONOMIC STABILITY: FOOD INSECURITY: WITHIN THE PAST 12 MONTHS, YOU WORRIED THAT YOUR FOOD WOULD RUN OUT BEFORE YOU GOT MONEY TO BUY MORE.: NEVER TRUE

## 2022-12-06 SDOH — ECONOMIC STABILITY: INCOME INSECURITY: IN THE LAST 12 MONTHS, WAS THERE A TIME WHEN YOU WERE NOT ABLE TO PAY THE MORTGAGE OR RENT ON TIME?: NO

## 2022-12-06 SDOH — SOCIAL STABILITY: SOCIAL NETWORK
DO YOU BELONG TO ANY CLUBS OR ORGANIZATIONS SUCH AS CHURCH GROUPS UNIONS, FRATERNAL OR ATHLETIC GROUPS, OR SCHOOL GROUPS?: NO

## 2022-12-06 SDOH — ECONOMIC STABILITY: TRANSPORTATION INSECURITY
IN THE PAST 12 MONTHS, HAS LACK OF TRANSPORTATION KEPT YOU FROM MEETINGS, WORK, OR FROM GETTING THINGS NEEDED FOR DAILY LIVING?: NO

## 2022-12-06 SDOH — HEALTH STABILITY: MENTAL HEALTH
STRESS IS WHEN SOMEONE FEELS TENSE, NERVOUS, ANXIOUS, OR CAN'T SLEEP AT NIGHT BECAUSE THEIR MIND IS TROUBLED. HOW STRESSED ARE YOU?: TO SOME EXTENT

## 2022-12-06 SDOH — ECONOMIC STABILITY: FOOD INSECURITY: WITHIN THE PAST 12 MONTHS, THE FOOD YOU BOUGHT JUST DIDN'T LAST AND YOU DIDN'T HAVE MONEY TO GET MORE.: NEVER TRUE

## 2022-12-06 SDOH — SOCIAL STABILITY: SOCIAL NETWORK: IN A TYPICAL WEEK, HOW MANY TIMES DO YOU TALK ON THE PHONE WITH FAMILY, FRIENDS, OR NEIGHBORS?: TWICE A WEEK

## 2022-12-06 SDOH — SOCIAL STABILITY: SOCIAL NETWORK: HOW OFTEN DO YOU ATTEND CHURCH OR RELIGIOUS SERVICES?: NEVER

## 2022-12-06 SDOH — ECONOMIC STABILITY: TRANSPORTATION INSECURITY
IN THE PAST 12 MONTHS, HAS THE LACK OF TRANSPORTATION KEPT YOU FROM MEDICAL APPOINTMENTS OR FROM GETTING MEDICATIONS?: NO

## 2022-12-06 SDOH — SOCIAL STABILITY: SOCIAL NETWORK: HOW OFTEN DO YOU GET TOGETHER WITH FRIENDS OR RELATIVES?: TWICE A WEEK

## 2022-12-06 ASSESSMENT — PATIENT HEALTH QUESTIONNAIRE - PHQ9
CLINICAL INTERPRETATION OF PHQ2 SCORE: 1
SUM OF ALL RESPONSES TO PHQ QUESTIONS 1-9: 3
5. POOR APPETITE OR OVEREATING: 0 - NOT AT ALL

## 2022-12-06 NOTE — PROGRESS NOTES
INITIAL CARE MANAGEMENT CARE PLAN/ASSESSMENT     Pt identified by CHW for enrollment. Has a risk score of 5 and is with [facility]. Called Pt, explained the Personal Care Management program and CCM services. Pt verbalized interest and consent for enrollment. Verbalized name and date of birth. Whitley is a 78yo female. She lives in an apartment at a [facility]. Her great-granddaughter stays with her on and off during the week. She has housekeeping services. Her daughter and granddaughter just moved out of the area, this week. She reports sadness and anxiety about this change in her life. There are activities held at her [facility] apartment complex, she states she only attends activities occasionally. She texts a friend 1-2 times and week and calls about once a month. She does not drive. She uses [facility] [email or web address] for medical appointments and [facility] for reduced prices on Lyft rides for other transportation needs. She has a Fall risk score of 10. Pt denies any falls in the last year. Uses a walker to take her dog out for a walk twice a day. Pt has a dx of DM2, last A1c was 5.9 on 9/8/22. Pt states she does not check her blood sugar on a regular basis. Pt has glucose monitoring equipment available at home. States she struggles attaching the FreeStyle monitor. She reports taking her medications daily and as scheduled. She states that previously she met with a pharmacist and reviewed all of her medications and side effects. She stated an interest in sitting with a pharmacist again. She does not regularly check her blood pressure. She does have a BP cuff at home. She does not have an advanced directive and expressed an interest in completing the paperwork.     Medication Self-Management Goals:     Reviewed medications listed below with patient.      Current Outpatient Medications:     montelukast (SINGULAIR) 10 MG Tab, Take 1 Tablet by mouth at bedtime., Disp: 90 Tablet,  Rfl: 3    omeprazole (PRILOSEC) 20 MG delayed-release capsule, Take 2 Capsules by mouth every day., Disp: 180 Capsule, Rfl: 0    fluticasone (FLONASE) 50 MCG/ACT nasal spray, USE 1-2  SPRAY(S) IN EACH NOSTRIL ONCE DAILY 30 MINUTES PRIOR TO USE OF CPAP, Disp: 48 g, Rfl: 3    terazosin (HYTRIN) 2 MG Cap, Take 1 capsule at bedtime Orally Once a day 90 days, Disp: 90 Capsule, Rfl: 1    clonazePAM (KLONOPIN) 0.5 MG Tab, TAKE 1 TABLET BY MOUTH ONCE DAILY AS NEEDED FOR 30 DAYS. ANXIETY/HEART PALPITATIONS for 30 days, Disp: 30 Tablet, Rfl: 1    dronedarone (MULTAQ) 400 MG Tab, Take 1 Tablet by mouth 2 times a day with meals., Disp: 60 Tablet, Rfl: 0    losartan (COZAAR) 50 MG Tab, Take 1 tablet by mouth 2 Times a Day., Disp: 200 Tablet, Rfl: 2    loratadine (CLARITIN) 10 MG Tab, Take 1 tablet by mouth every day., Disp: 100 Tablet, Rfl: 2    allopurinol (ZYLOPRIM) 100 MG Tab, TAKE TWO TABLETS BY MOUTH TWICE DAILY, Disp: 360 Tablet, Rfl: 2    magnesium oxide (MAG-OX) 400 MG Tab tablet, Take 1 Tablet by mouth every day., Disp: 90 Tablet, Rfl: 0    Semaglutide (OZEMPIC, 0.25 OR 0.5 MG/DOSE, SC), Inject  under the skin., Disp: , Rfl:     acetaminophen (TYLENOL) 500 MG Tab, Take 500-1,000 mg by mouth every 6 hours as needed., Disp: , Rfl:     metoprolol tartrate (LOPRESSOR) 50 MG Tab, Take 1 tablet by mouth 2 times a day., Disp: 200 Tablet, Rfl: 3    rosuvastatin (CRESTOR) 10 MG Tab, Take 1 tablet by mouth every evening. Stop simvastatin., Disp: 100 Tablet, Rfl: 3    pioglitazone (ACTOS) 15 MG Tab, Take 1 tablet by mouth every day for 100 days., Disp: 100 Tablet, Rfl: 3    levothyroxine (SYNTHROID) 50 MCG Tab, Take 1 tablet by mouth every morning on an empty stomach., Disp: 100 Tablet, Rfl: 3    terazosin (HYTRIN) 1 MG Cap, Take 1 capsule by mouth every day., Disp: 100 Capsule, Rfl: 3    nystatin (MYCOSTATIN) 176113 UNIT/GM Ointment, APPLY TO AFFECTED AREA up to twice daily, Disp: 240 g, Rfl: 2    glucose blood (FREESTYLE LITE)  strip, Use test strip to test 2 times a day with meals., Disp: 50 Strip, Rfl: 11    levalbuterol (XOPENEX HFA) 45 MCG/ACT inhaler, Inhale 2 Puffs every 6 hours as needed for Shortness of Breath., Disp: 15 g, Rfl: 11    rivaroxaban (XARELTO) 15 MG Tab tablet, Take 1 Tablet by mouth with dinner., Disp: 90 Tablet, Rfl: 3    Continuous Blood Gluc Sensor (FREESTYLE YANG 14 DAY SENSOR) Misc, Apply every 14 days, Disp: 2 Each, Rfl: 11    Continuous Blood Gluc  (FREESTYLE YANG 2 READER) Device, Use to monitor blood sugar daily, Disp: 1 Each, Rfl: 11    NON SPECIFIED, C-Pap supplies -humidifier tank and a spare  - fax [phone]...  Dx:  Sleep apnea, Disp: 1 Each, Rfl: 1    NON SPECIFIED, blue concentrator at night along with my C-PAP./3 liters per hour, Disp: 1 Each, Rfl: 0    ferrous sulfate 325 (65 Fe) MG tablet, Take 325 mg by mouth every day., Disp: , Rfl:     Blood Glucose Monitoring Suppl Supplies Misc, Test strips order: Test strips for Accucheck meter. Sig: use BID and prn ssx high or low sugar #100 RF x 3, Disp: 100 Strip, Rfl: 11    Blood Glucose Monitoring Suppl Supplies Misc, Test strips order: Test strips for Chely Contour meter. Sig: use tid  and prn ssx high or low sugar #100 RF x 3 (Patient taking differently: Test strips order: Test strips for Chely Contour meter. Sig: use tid  and prn ssx high or low sugar #100 RF x 3), Disp: 100 Strip, Rfl: 3    Insulin Pen Needle 32G X 4 MM Misc, 1 Applicator by Does not apply route 3 times a day., Disp: 100 Each, Rfl: 11    Blood Glucose Monitoring Suppl W/DEVICE Kit, 1 Each by Does not apply route 2 times a day as needed., Disp: 1 Kit, Rfl: 0    Cholecalciferol (VITAMIN D) 2000 UNITS CAPS, Take 4,000 Units by mouth every day., Disp: , Rfl:     PEG 3350-KCl-NaBcb-NaCl-NaSulf (PEG-3350/ELECTROLYTES) 236 g Recon Soln, Follow [facility] Consultants instruction handout:, Disp: 4000 mL, Rfl: 0    NON SPECIFIED, Test strips and lancets for the strips brand is [employer],  Disp: 100 Each, Rfl: 3    Urine Glucose-Ketones Test Strip, 1 Strip by In Vitro route every day., Disp: 100 Strip, Rfl: 2    Misc. Devices MISC, Pen needles for Lancyrus Mccarthystar. 29g 12mm., Disp: 90 Each, Rfl: 3      Goal: Continue taking medications as prescribed        Physical/Functional/Environmental Status:     Activities of Daily Living:  Bathing: independent   Dressing: independent  Grooming: independent  Mouth Care: independent  Toileting: independent  Climbing a Flight of Stairs: independent    Independent Activities of Daily Living:  Shopping: independent  Cooking: independent  Managing Medications: independent  Using the phone and looking up numbers: independent  Driving or using public transportation: independent  Managing Finances: independent        12/6/2022    10:49 AM   STEADI Fall Risk   STEADI Risk for Falling Score 10   One or more falls in the last year No   Advised to use a cane or walker to get around safely Yes       uses a walker to take her dog for a walk   Feels unsteady when walking Yes   Steadies self on furniture while walking at home Yes   Worried about falling Yes   Needs to push with hands when rising from a chair Yes   Has trouble stepping up onto a curb / using stairs Yes   Often has to rush to the toilet Yes   Has lost some feeling in feet Yes   Takes medicine that makes him/her feel lightheaded or more tired than usual No   Takes medicine to sleep or improve mood No   Often feels sad or depressed Yes       Goal: Maintain independence and falls      Financial Status:     No needs at this time     Goal: not applicable      Transportation Status:    Uses [facility]    Goal: N/A    Mental/Behavioral/Psychosocial Status:        4/6/2022     1:00 PM 8/31/2022     4:40 PM 12/6/2022    10:25 AM   Depression Screen (PHQ-2/PHQ-9)   PHQ-2 Total Score 0 0 1   PHQ-9 Total Score   3       Interpretation of PHQ-9 Total Score   Score Severity   1-4 No  "Depression   5-9 Mild Depression   10-14 Moderate Depression   15-19 Moderately Severe Depression   20-27 Severe Depression       Goal:  Monitor for changes in mood. Recent changes in family support system, pt expressed some depression, reports some thoughts in the past of being better off dead but stated \"I love my family, I don't think I would ever hurt myself.\" RN to reach out to  for assessment.       Chronic Care Management Care Plan    Goal:  Establish an Advanced Directive  Barriers: Age, knowledge  Interventions: Education, send packet to prepare    Start Date: 12/6/22  End Date:       Goal:  Use glucometer/Free Style system  Barriers: Age, Knowledge, habits    Interventions: Education, motivation, and resources     Start Date: 12/6/22  End Date:       Goal:  Monitor blood pressure on a regular basis  Barriers: Age, knowledge, habits  Interventions: Check blood pressure regularly    Start Date: 12/6/22  End Date:      Discussion:  Goals discussed, resources provided,    Goals: as outlined above      Next Scheduled patient outreach:  1 month  "

## 2022-12-09 ENCOUNTER — PHARMACY VISIT (OUTPATIENT)
Dept: PHARMACY | Facility: MEDICAL CENTER | Age: 77
End: 2022-12-09
Payer: MEDICARE

## 2022-12-15 ENCOUNTER — PATIENT OUTREACH (OUTPATIENT)
Dept: HEALTH INFORMATION MANAGEMENT | Facility: OTHER | Age: 77
End: 2022-12-15
Payer: MEDICARE

## 2022-12-15 NOTE — PROGRESS NOTES
"  Social Work Assessment  Community Care Management    Synopsis: On 12-15-22 @1530 SW called pt to introduce CCM program and see what resources pt would benefit from. Pt identified she would like help dealing with anxiety and to find out if the advanced directive class could be attended virtually.    Living Situation/Home Environment: Pt is living at the [facility] on [address]. Pt lives on first first floor. Pt is content with her living situation.  Financial Situation/Sources of Income: Pt receives social security benefits of $1,009 and PERS benefits from  spouse of $839. Pt did not identify any financial stress.    Transportation: Pt gave up her car in . Pt indicated she has been at home since 22, without leaving to go anywhere. Pt indicated she is afraid of catching COVID, flu, or RSV. Pt stated she was diagnosed with COVID in . When pt wants to go somewhere, she utilizes [employer] [facility] [employer] to pay for rides at 50% off. Pt has her groceries delivered. For medical appointment needs, pt uses Uber rides with [employer] [facility] [employer]. For prescriptions, pt has them delivered.  Support System: Pt is close with her daughter, who recently moved in  to [facility]. Pt's oldest granddaughter also just moved there as well. Pt has a 24 year old grandson, Marcello, who lives on [facility]. Pt stated she doesn't want to bother him for help but would like him added to pt's emergency contacts. Pt's youngest granddaugter, Hilaria, 19 lives with pt part time. Pt can also depend on her friend and neighbors.  Mental Health/Substance Abuse Hx: Pt expressed ongoing problems with anxiety. Pt stated she has experienced anxiety since her 30's and \"just handled it\" back in the day before she had access to insurance. Pt states she does not want to take antidepressants. Pt expressed sensitivity to some medication. Pt states she prefers to only take 1/2 to 1/4 tablet of prescription for use " as needed. Pt expressed being open to therapy and possibly medication. Pt expressed virtual therapy is preferred. Pt also expressed having issues with her adrenal gland. Pt stated she has had kidney therapy since 2016. Pt expressed challenges in going to the doctor repeately. Pt feels her body is working againster her. Pt expressed she works on one medical issue and then another appears.    Ability to Obtain Basic Resources: Pt expressed confidence to obtain her basic resources, including housing, food, and transportation.  Physical Functioning: Pt is currently using a CPAP and a concentrator. Pt received a new CPAP 8/22 from [facility]. Pt also uses a walker, originally for sciatica and now uses it for balance. Pt states she has a slight balance problem and states she benefits from using her walker when taking her dog out on a walk. Pt expressed confidence in completing all ADL's and IADL's at present.  Patient's Perception of Needs: Pt expressed general satisfaction with her living environment, financial situation, transportation, and ability to obtain basic resources. Pt expressed general satisfaction with physical functioning and use of current medical equipment. Pt expressed desire for help treating anxiety. MARIMAR and pt discussed MARIMAR will identify some therapists who treat anxiety virtually using teletherapy. Pt expressed desire for a virtual class to complete an advance directive. MARIMAR identified that NABIL Stearns mailed pt a copy of an advance directive. The advanced directive workshop [facility] offers is not available virtually. MARIMAR will try to identify any virtual options.    Plan: MARIMAR will send pt information on therapists specializing in anxiety who see pt's virtually using teletherapy. MARIMAR will add link to therapists in pt's MyChart, as well as mail pt therapists meeting pt's treatment and insurance specifications. NABIL Stearns had previously sent pt an advanced directive in the mail. MARIMAR will send pt the [facility]  advance directive class link in pt's MyChart and mail the class information to her. SW will also identify several virtual advance directive classes and put the links to these classes in pt's MyChart as well as mail copies to pt. Pt will review the information. SW will obtain pt's grandson, Marcello's last name and verify if pt wants him to have access to both treatment and billing information prior to making update in Pt's emergency contact. Pt agreed next Thursday 12/22/22 for follow up phone call.

## 2022-12-16 ENCOUNTER — PATIENT OUTREACH (OUTPATIENT)
Dept: HEALTH INFORMATION MANAGEMENT | Facility: OTHER | Age: 77
End: 2022-12-16
Payer: MEDICARE

## 2022-12-16 ENCOUNTER — PATIENT MESSAGE (OUTPATIENT)
Dept: HEALTH INFORMATION MANAGEMENT | Facility: OTHER | Age: 77
End: 2022-12-16

## 2022-12-16 SDOH — ECONOMIC STABILITY: INCOME INSECURITY: IN THE LAST 12 MONTHS, WAS THERE A TIME WHEN YOU WERE NOT ABLE TO PAY THE MORTGAGE OR RENT ON TIME?: NO

## 2022-12-16 SDOH — ECONOMIC STABILITY: HOUSING INSECURITY: IN THE LAST 12 MONTHS, HOW MANY PLACES HAVE YOU LIVED?: 1

## 2022-12-16 NOTE — PROGRESS NOTES
On 12-16-22 MARIMAR gathered information on potential therapists in the [place] area who specialize in anxiety, see patients virtually via teletherapy, and accept Medicare insurance for pt. MARIMAR used [employer] to identify therapists. MARIMAR also gathered information on possible virtual advance directive classes for pt. MARIMAR collected the names and links for three organizations offering this service: [facility], [facility], and [facility]. MARIMAR also collected the link for the free [facility] in person advance directive class. MARIMAR mailed information for therapists and advance directive classes to pt. MARIMAR will verify pt received the information next week when MARIMAR is scheduled to speak with pt.    @[id number] sent pt links to all the resources mentioned above via [employer].

## 2022-12-21 ENCOUNTER — PATIENT OUTREACH (OUTPATIENT)
Dept: HEALTH INFORMATION MANAGEMENT | Facility: OTHER | Age: 77
End: 2022-12-21
Payer: MEDICARE

## 2022-12-21 PROCEDURE — RXMED WILLOW AMBULATORY MEDICATION CHARGE: Performed by: NURSE PRACTITIONER

## 2022-12-21 RX ORDER — MAGNESIUM OXIDE 400 MG/1
400 TABLET ORAL DAILY
Qty: 90 TABLET | Refills: 0 | Status: SHIPPED | OUTPATIENT
Start: 2022-12-21 | End: 2023-03-23 | Stop reason: SDUPTHER

## 2022-12-21 NOTE — TELEPHONE ENCOUNTER
Received request via: Pharmacy    Was the patient seen in the last year in this department? Yes  8/31/22  Does the patient have an active prescription (recently filled or refills available) for medication(s) requested? No    Does the patient have correction Plus and need 100 day supply (blood pressure, diabetes and cholesterol meds only)? Medication is not for cholesterol, blood pressure or diabetes

## 2022-12-22 ENCOUNTER — PATIENT OUTREACH (OUTPATIENT)
Dept: HEALTH INFORMATION MANAGEMENT | Facility: OTHER | Age: 77
End: 2022-12-22
Payer: MEDICARE

## 2022-12-22 ENCOUNTER — PHARMACY VISIT (OUTPATIENT)
Dept: PHARMACY | Facility: MEDICAL CENTER | Age: 77
End: 2022-12-22
Payer: MEDICARE

## 2022-12-23 ENCOUNTER — PATIENT OUTREACH (OUTPATIENT)
Dept: HEALTH INFORMATION MANAGEMENT | Facility: OTHER | Age: 77
End: 2022-12-23
Payer: MEDICARE

## 2022-12-23 NOTE — PROGRESS NOTES
12-23-22  @410 MARIMAR called Whitley to see if she had received the packet with information on [facility] therapists seeing patients virtually for anxiety and the virtual advance directive classes. Whitley did not answer. MARIMAR left message with contact info.

## 2022-12-27 ENCOUNTER — PATIENT OUTREACH (OUTPATIENT)
Dept: HEALTH INFORMATION MANAGEMENT | Facility: OTHER | Age: 77
End: 2022-12-27
Payer: MEDICARE

## 2022-12-27 NOTE — PROGRESS NOTES
12-27-22  @408 MARIMAR received voicemail from Whitley that she received the information mailed by the MARIMAR, had looked through the therapist options and had selected one she would like to work with.

## 2022-12-28 ENCOUNTER — PATIENT OUTREACH (OUTPATIENT)
Dept: HEALTH INFORMATION MANAGEMENT | Facility: OTHER | Age: 77
End: 2022-12-28
Payer: MEDICARE

## 2022-12-28 DIAGNOSIS — N18.4 CKD (CHRONIC KIDNEY DISEASE) STAGE 4, GFR 15-29 ML/MIN (HCC): ICD-10-CM

## 2022-12-28 PROCEDURE — RXMED WILLOW AMBULATORY MEDICATION CHARGE: Performed by: NURSE PRACTITIONER

## 2022-12-28 RX ORDER — FUROSEMIDE 80 MG
80 TABLET ORAL
Qty: 45 TABLET | Refills: 3 | Status: SHIPPED | OUTPATIENT
Start: 2022-12-28 | End: 2023-02-13 | Stop reason: SDUPTHER

## 2022-12-28 NOTE — TELEPHONE ENCOUNTER
Received request via: Pharmacy    Was the patient seen in the last year in this department? Yes    Does the patient have an active prescription (recently filled or refills available) for medication(s) requested? No    Does the patient have assisted Plus and need 100 day supply (blood pressure, diabetes and cholesterol meds only)? Medication is not for cholesterol, blood pressure or diabetes

## 2022-12-28 NOTE — PROGRESS NOTES
12-28-22  @2074  MARIMAR called Whitley to follow up on if she had made her appointment with the online therapist specializing in anxiety that she had chosen. Whitley state she is still thinking about making this appointment with having just had Jerri. Whitley specified she does have a therapist picked out and plans to contact her shortly. Regarding the advance directive classes (online and in person) she is still thinking about them. Whitley referenced she has been putting off taking care of her advance directive for a long time. She acknowledged she is a little bit of a procrastinator in this regard. Whitley requested MARIMAR call back in two weeks to nudge her into taking action on setting up a therapeutic appointment and continuing to think about taking an advance directive class. At present, Whitley has other things she is trying to do. Whitley stated she is trying to get her medications straightened out for 2023, complete her taxes, and set up several medical appointments. Whitley referenced she is almost 78 years old. She has been to a couple of ENTs to deal with sinus issues and is still looking for solutions. She plans to get in touch with her primacy care physician soon as her ear is plugged, she feel stuffed, and sometimes can't use her CPAP machine and this affects her sleep. Whitley said she thinks she needs to get an MRI of her sinuses. Whitley also specified she would like to get her cataracts removed off her left eye. Whitley is also concerned with some burning in her oesophagus. 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 Whitley shared with MARIMAR she still hasn't been out since 11/24/22. She continues to be afraid of catching COVID and is happy to be left alone. However, sometimes Whitley admitted to feeling isolated and desiring connection. Whtiley shared she was getting short of breath taking her garbage out just recently. Overall, she has a big list of medical  appointments she feels she needs to set up. Whitley specified she has access to the Lyft program at 50% off and has the phone number for her senior care plus . Whitley specified no additional resources were needed from the  at this time.

## 2022-12-30 ENCOUNTER — PHARMACY VISIT (OUTPATIENT)
Dept: PHARMACY | Facility: MEDICAL CENTER | Age: 77
End: 2022-12-30
Payer: MEDICARE

## 2022-12-30 NOTE — TELEPHONE ENCOUNTER
Pharmacy comment: Pt. was expecting Freestyle Precision, but we have not received. Please send. Thanks

## 2023-01-01 RX ORDER — BLOOD SUGAR DIAGNOSTIC
STRIP MISCELLANEOUS
Qty: 100 STRIP | Refills: 11 | Status: ON HOLD | OUTPATIENT
Start: 2023-01-01 | End: 2024-03-25

## 2023-01-03 ENCOUNTER — PHARMACY VISIT (OUTPATIENT)
Dept: PHARMACY | Facility: MEDICAL CENTER | Age: 78
End: 2023-01-03
Payer: MEDICARE

## 2023-01-03 PROCEDURE — RXMED WILLOW AMBULATORY MEDICATION CHARGE: Performed by: NURSE PRACTITIONER

## 2023-01-04 ENCOUNTER — PATIENT OUTREACH (OUTPATIENT)
Dept: HEALTH INFORMATION MANAGEMENT | Facility: OTHER | Age: 78
End: 2023-01-04
Payer: MEDICARE

## 2023-01-04 NOTE — PROGRESS NOTES
1-3-23  @1655 MARIMAR read [employer] message from Whitley asking if any of the online therapists specializing in anxiety sent by MARIMAR accept her Senior Care Plus Insurance.    1-4-23  @1430 MARIMAR consulted with MARIMAR colleague Bettina to ask if she knew of any local therapists who accept Senior Care Plus insurance. Bettina stated it was her understanding [facility] accepts Senior Care Plus Insurance. MARIMAR called [facility] to verify.  @1435 MARIMAR called [facility] to verify they see patients online.  @ 1440 MARIMAR messaged Whitley via MobileHelp to let her know that one of the therapist groups sent, [facility], does accept her insurance. MARIMAR provided the contact information for this therapist group. MARIMAR specified the other therapists sent had indicated they accept Medicare generally, but had not specified they accept Senior Care Plus specifically. For this reason, MARIMAR indicated if Whitley was interested in one of them specifically, she would need to call to verify they accept SCP. MARIMAR also shared [facility] sees patients for online visits and does accept SCP insurance. However MARIMAR shared their new patient appointments generally book two months out, which means Whitley would have to wait before being seen. MARIMAR asked if there was anything else she could help with.

## 2023-01-05 ENCOUNTER — PATIENT OUTREACH (OUTPATIENT)
Dept: HEALTH INFORMATION MANAGEMENT | Facility: OTHER | Age: 78
End: 2023-01-05
Payer: MEDICARE

## 2023-01-05 ENCOUNTER — TELEPHONE (OUTPATIENT)
Dept: MEDICAL GROUP | Facility: LAB | Age: 78
End: 2023-01-05
Payer: MEDICARE

## 2023-01-05 DIAGNOSIS — M54.50 LOW BACK PAIN, UNSPECIFIED BACK PAIN LATERALITY, UNSPECIFIED CHRONICITY, UNSPECIFIED WHETHER SCIATICA PRESENT: ICD-10-CM

## 2023-01-05 NOTE — PROGRESS NOTES
1-5-23  @1318 MARIMAR read [employer] message from Whitley requesting to know how much of a co-pay she had for seeing a SCP therapist with her insurance plan.  @1320 MARIMAR called the SCP  to gather the co-pay information.  @1340 MARIMAR send Whitley a message on [employer]power with the information she has a $45 co-pay to see a SCP therapist.

## 2023-01-06 ENCOUNTER — PATIENT OUTREACH (OUTPATIENT)
Dept: HEALTH INFORMATION MANAGEMENT | Facility: OTHER | Age: 78
End: 2023-01-06
Payer: MEDICARE

## 2023-01-06 DIAGNOSIS — N18.4 TYPE 2 DIABETES MELLITUS WITH STAGE 4 CHRONIC KIDNEY DISEASE, WITHOUT LONG-TERM CURRENT USE OF INSULIN (HCC): ICD-10-CM

## 2023-01-06 DIAGNOSIS — E11.22 TYPE 2 DIABETES MELLITUS WITH STAGE 4 CHRONIC KIDNEY DISEASE, WITHOUT LONG-TERM CURRENT USE OF INSULIN (HCC): ICD-10-CM

## 2023-01-06 DIAGNOSIS — I10 ESSENTIAL HYPERTENSION: ICD-10-CM

## 2023-01-06 DIAGNOSIS — J45.20 MILD INTERMITTENT ASTHMA WITHOUT COMPLICATION: ICD-10-CM

## 2023-01-06 PROCEDURE — 99490 CHRNC CARE MGMT STAFF 1ST 20: CPT | Performed by: NURSE PRACTITIONER

## 2023-01-06 NOTE — PROGRESS NOTES
1-6-23  @4723 MARIMAR sent Whitley a message on Swoodoo with two therapeutic agencies and their contact info who indicated they accept SCP insurance and see patients virtually and have an opening for a new patient in the next few weeks.

## 2023-01-07 NOTE — PROGRESS NOTES
Assessment  Spoke with Whitley for monthly outreach follow up. Whitley has not been checking her blood sugar. She is does not like using her Freestyle monitor. She states she just got test strips for her other monitor. Encouraged Whitley to use which ever monitor she is most comfortable with. Her last A1c was 5.9 on 9/8/22. She reports symptoms of hypoglycemia on occasion but does not check her blood sugar. Whitley states she us unsure what her last blood pressure reading was, she thinks around 166. She started that she just started taking an increased dose of Terazosin ordered by Nephrology. Encouraged Whitlye to take her blood pressure and keep a log. She voices feeling discouraged by overall health and fatigue as well as managing her health. She has been following with MARIMAR as well to establish a therapist. She has been given a list of providers. Encourage her to call and establish care. She hasn't been able to walk with her dog due to winter weather conditions. Discussed fall prevention/precautions. Encouraged Whitley to call with any questions or concerns     Education  Discussed the differences in glucose monitors   Discussed checking and monitoring blood sugars  Discussed monitoring blood pressure & medications   Discussed follow up appointments   Discussed benefits of finding a therapist    Care Plan  Start monitoring blood sugar  Start monitoring blood pressure   Continue to work with MARIMAR to establish with a therapist     Progress:  Progressing     Next outreach:  1 month

## 2023-01-09 ENCOUNTER — PATIENT MESSAGE (OUTPATIENT)
Dept: CARDIOLOGY | Facility: MEDICAL CENTER | Age: 78
End: 2023-01-09
Payer: MEDICARE

## 2023-01-10 ENCOUNTER — PATIENT MESSAGE (OUTPATIENT)
Dept: CARDIOLOGY | Facility: MEDICAL CENTER | Age: 78
End: 2023-01-10
Payer: MEDICARE

## 2023-01-10 ENCOUNTER — TELEPHONE (OUTPATIENT)
Dept: HEALTH INFORMATION MANAGEMENT | Facility: OTHER | Age: 78
End: 2023-01-10
Payer: MEDICARE

## 2023-01-10 ENCOUNTER — PATIENT OUTREACH (OUTPATIENT)
Dept: HEALTH INFORMATION MANAGEMENT | Facility: OTHER | Age: 78
End: 2023-01-10
Payer: MEDICARE

## 2023-01-10 NOTE — PATIENT COMMUNICATION
Patient assistance forms filled out and signed. Faxed to listed [facility] number with confirmation received.   [employer] message sent to patient, awaiting patient response and will follow up as needed.

## 2023-01-10 NOTE — TELEPHONE ENCOUNTER
Received a message form SW that Whitley had some questions about her medications and blood pressure. Called Whitley. She is concerned about her anemia, blood pressure, medications, and worried about progression of chronic kidney disease. Discussed with Whitley the importance of taking her medication and monitoring her blood pressure. Reviewed there order for Dr. Badillo to increase the dose of Terazon, Whitley states she did not implement this change until 1/5. She has taken her blood pressures the last few days as follows.  1/10 162/78, P 63  1/9 116/78, P 66  1/6 147/66, P 58  1/5 166/84, P 59  Whitley reports she is scheduled for a nephrology appointment on 1/18 with labs to be done prior.   Discussed with Whitley to take her BP log with her to the appointment. Reviewed questions to ask at her appointment and scheduled a Care Coordinator RN phone call for the 19th to review the appointment and possibly modify healthcare goals/plan of care moving forward.

## 2023-01-10 NOTE — PROGRESS NOTES
1-10-23  @7045 MARIMAR received message from Whitlye.   @1311 MARIMAR called Whitley back. Whitley expressed several health concerns to MARIMAR. Whitley expressed being anemic from kidney failure. She shared she had recently asked if she should take more iron. Her doctor had said no. Whitley shared she is now wondering if she should select a new kidney doctor, perhaps a a younger doctor who is up to date on the latest techniques, etc.. Whitley expressed that she wants to continue to avoid any need for dialysis. Whitley shared she is also experiencing high blood pressure. Whitley explained she has a call into her cardiologist to see if she should take some additional medication. Whitley also expressed she is getting the runaround with filling out the paperwork for accessing her free medications in 2023. She has been working with her doctor's office to get all the signatures needed to complete her medication paperwork and turn it in. MARIMAR suggested Whitley call ADELAIDA Josue and review all of her medical questions regarding doctor selection, medications, and correspondence, explaining that these questions were medically related and ADELAIDA Josue was better equipped to help than the MARIMAR. MARIMAR gave Whitley contact info for ADELAIDA Josue. MARIMAR also spoke with Whitley about the behavioral health options MARIMAR had sent to Whitley via [employer] of therapists who accept [facility] insurance, see patients virtually, and also referenced availability for a new patient appointment in the next few weeks. Whitley expressed some concern over the $45 behavioral health visit co-pay using her [facility] insurance. MARIMAR encouraged Whitley to reach out to [facility] to discuss options for different plans and co-pay options. Whitley requested MARIMAR call her back to nudge her into making a behavioral health therapeutic appointment next week.  @1324 MARIMAR messaged via Teams ADELAIDA Treviño and requested she reach out to Whitley to answer her medical questions and concerns.  @1330 ADELAIDA Treviño called MARIMAR.

## 2023-01-12 ENCOUNTER — PATIENT OUTREACH (OUTPATIENT)
Dept: HEALTH INFORMATION MANAGEMENT | Facility: OTHER | Age: 78
End: 2023-01-12
Payer: MEDICARE

## 2023-01-12 ENCOUNTER — HOSPITAL ENCOUNTER (OUTPATIENT)
Dept: LAB | Facility: MEDICAL CENTER | Age: 78
End: 2023-01-12
Attending: INTERNAL MEDICINE
Payer: MEDICARE

## 2023-01-12 LAB
ALBUMIN SERPL BCP-MCNC: 4.1 G/DL (ref 3.2–4.9)
BUN SERPL-MCNC: 76 MG/DL (ref 8–22)
CALCIUM ALBUM COR SERPL-MCNC: 9 MG/DL (ref 8.5–10.5)
CALCIUM SERPL-MCNC: 9.1 MG/DL (ref 8.5–10.5)
CHLORIDE SERPL-SCNC: 110 MMOL/L (ref 96–112)
CO2 SERPL-SCNC: 22 MMOL/L (ref 20–33)
CREAT SERPL-MCNC: 2.46 MG/DL (ref 0.5–1.4)
GFR SERPLBLD CREATININE-BSD FMLA CKD-EPI: 20 ML/MIN/1.73 M 2
GLUCOSE SERPL-MCNC: 130 MG/DL (ref 65–99)
PHOSPHATE SERPL-MCNC: 4.2 MG/DL (ref 2.5–4.5)
POTASSIUM SERPL-SCNC: 4.9 MMOL/L (ref 3.6–5.5)
SODIUM SERPL-SCNC: 141 MMOL/L (ref 135–145)

## 2023-01-12 PROCEDURE — 36415 COLL VENOUS BLD VENIPUNCTURE: CPT

## 2023-01-12 PROCEDURE — 80069 RENAL FUNCTION PANEL: CPT

## 2023-01-12 NOTE — PROGRESS NOTES
1-12-23  @1036 MRAIMAR read [employer] message from Whitley inquiring if the two therapeutic resources sent by MARIMAR see patients via phone / Zoom.  @6134 MARIMAR sent Whitley a message in [employer] answering that yes, it is the understanding of the SW that both therapeutic resources do offer appointments via phone and / or virtually (Zoom, etc.).

## 2023-01-13 ENCOUNTER — PATIENT OUTREACH (OUTPATIENT)
Dept: HEALTH INFORMATION MANAGEMENT | Facility: OTHER | Age: 78
End: 2023-01-13
Payer: MEDICARE

## 2023-01-13 NOTE — PROGRESS NOTES
1-13-23  @2649 MARIMAR called Whitley to see if her MyChart question had been answered regarding virtual therapeutic visits. MARIMAR asked Whitley if she had selected a therapist she would like to make an appointment with. Whitley indicated she had not yet selected a therapist. Whitley agreed to looking into the options and to begin thinking about setting up and appointment. MARIMAR also inquired as to if Whitley had been able to answer all her medical questions with ADELAIDA Treviño. Whitley told MARIMAR she was also going to be making her follow up medical appointments.

## 2023-01-14 PROCEDURE — RXMED WILLOW AMBULATORY MEDICATION CHARGE: Performed by: NURSE PRACTITIONER

## 2023-01-16 ENCOUNTER — PATIENT OUTREACH (OUTPATIENT)
Dept: HEALTH INFORMATION MANAGEMENT | Facility: OTHER | Age: 78
End: 2023-01-16
Payer: MEDICARE

## 2023-01-16 PROCEDURE — RXMED WILLOW AMBULATORY MEDICATION CHARGE: Performed by: NURSE PRACTITIONER

## 2023-01-16 NOTE — PROGRESS NOTES
1-16-23  @071 MARIMAR read MyChart note from Whitley stating that she had decided to begin her first round of COVID vaccination and that this was a big step forward for her.  @659 MARIMAR wrote to Whitley on her MyChart a message of Congratulations on taking this step forward.

## 2023-01-17 ENCOUNTER — PATIENT OUTREACH (OUTPATIENT)
Dept: HEALTH INFORMATION MANAGEMENT | Facility: OTHER | Age: 78
End: 2023-01-17
Payer: MEDICARE

## 2023-01-17 NOTE — PROGRESS NOTES
1-17-23  @3645 MARIMAR read [employer] message from Whitley.  @2163 MARIMAR responded to GeoVario's message via [employer].

## 2023-01-19 ENCOUNTER — TELEPHONE (OUTPATIENT)
Dept: HEALTH INFORMATION MANAGEMENT | Facility: OTHER | Age: 78
End: 2023-01-19
Payer: MEDICARE

## 2023-01-19 NOTE — TELEPHONE ENCOUNTER
Spoke with Whitley in follow up of nephrology appointment. She states her appointment went well. She states she is feeling better after talking with Dr. Badillo. Per Whitley, there were no changes. She is to continue her medication and keep her blood pressure down. Will follow again up at next monthly outreach.

## 2023-01-25 ENCOUNTER — TELEPHONE (OUTPATIENT)
Dept: HEALTH INFORMATION MANAGEMENT | Facility: OTHER | Age: 78
End: 2023-01-25
Payer: MEDICARE

## 2023-01-25 ENCOUNTER — PHARMACY VISIT (OUTPATIENT)
Dept: PHARMACY | Facility: MEDICAL CENTER | Age: 78
End: 2023-01-25
Payer: MEDICARE

## 2023-01-25 ENCOUNTER — PATIENT MESSAGE (OUTPATIENT)
Dept: HEALTH INFORMATION MANAGEMENT | Facility: OTHER | Age: 78
End: 2023-01-25

## 2023-01-25 DIAGNOSIS — N18.4 TYPE 2 DIABETES MELLITUS WITH STAGE 4 CHRONIC KIDNEY DISEASE, WITHOUT LONG-TERM CURRENT USE OF INSULIN (HCC): ICD-10-CM

## 2023-01-25 DIAGNOSIS — E11.22 TYPE 2 DIABETES MELLITUS WITH STAGE 4 CHRONIC KIDNEY DISEASE, WITHOUT LONG-TERM CURRENT USE OF INSULIN (HCC): ICD-10-CM

## 2023-01-25 NOTE — TELEPHONE ENCOUNTER
Whitley called, she has asked for help with her Ozempic application for payment coverage. Whitley states she will send her application via [employer]. Will continue to follow up.

## 2023-01-28 PROCEDURE — RXMED WILLOW AMBULATORY MEDICATION CHARGE: Performed by: NURSE PRACTITIONER

## 2023-01-30 PROBLEM — Z59.89 ON ECONOMIC ASSISTANCE PROGRAM: Status: ACTIVE | Noted: 2023-01-30

## 2023-02-01 ENCOUNTER — TELEPHONE (OUTPATIENT)
Dept: CARDIOLOGY | Facility: MEDICAL CENTER | Age: 78
End: 2023-02-01
Payer: MEDICARE

## 2023-02-01 ENCOUNTER — PATIENT OUTREACH (OUTPATIENT)
Dept: HEALTH INFORMATION MANAGEMENT | Facility: OTHER | Age: 78
End: 2023-02-01
Payer: MEDICARE

## 2023-02-01 ENCOUNTER — PATIENT OUTREACH (OUTPATIENT)
Dept: HEALTH INFORMATION MANAGEMENT | Facility: OTHER | Age: 78
End: 2023-02-01

## 2023-02-01 ENCOUNTER — PATIENT MESSAGE (OUTPATIENT)
Dept: HEALTH INFORMATION MANAGEMENT | Facility: OTHER | Age: 78
End: 2023-02-01

## 2023-02-01 DIAGNOSIS — N18.4 TYPE 2 DIABETES MELLITUS WITH STAGE 4 CHRONIC KIDNEY DISEASE, WITHOUT LONG-TERM CURRENT USE OF INSULIN (HCC): ICD-10-CM

## 2023-02-01 DIAGNOSIS — E11.22 TYPE 2 DIABETES MELLITUS WITH STAGE 4 CHRONIC KIDNEY DISEASE, WITHOUT LONG-TERM CURRENT USE OF INSULIN (HCC): ICD-10-CM

## 2023-02-01 DIAGNOSIS — I10 ESSENTIAL HYPERTENSION: ICD-10-CM

## 2023-02-01 DIAGNOSIS — J45.20 MILD INTERMITTENT ASTHMA WITHOUT COMPLICATION: ICD-10-CM

## 2023-02-01 PROCEDURE — 99999 PR NO CHARGE: CPT | Performed by: NURSE PRACTITIONER

## 2023-02-01 NOTE — PROGRESS NOTES
@9331 Whitley shared with MARIMAR that she had identified a therapist and was working on filling out the forms needed by the therapist to begin treatment. Whitley explained that once she completes the forms and returns them then they will schedule her first session. Whitley shared with MARIMAR that she plans to complete the forms by this time next week. Whitley shared the SNAP qualifications have been raised so she is also filling out a form for SNAP. Whtiley also expressed gratitude for the clinical pharmacist with [facility], named Richard, who has been serving as a pharmacy navigator in helping her get her needed medications free / discounted. Whitley shared with MARIMAR that things are going well.

## 2023-02-01 NOTE — PROGRESS NOTES
Assessment  Spoke with Whitley for monthly outreach follow up. Whitley stated she is doing well. She is working with her Pharmacology consolation for mediation coverage. She states this is going well. She has been wearing her FreeStyle continuous monitor. She states it is working well for her. Her average blood sugar since 1/26 through 2/1 has been 122. She continues to monitor her blood pressure. She also is continuing to follow with SW and get established with a therapist. Whitley stated she would like to Call RN Coordinator after her MD Follow appointment and review recommendations. Encouraged Whitley to do so. Encouraged her to call with any concerns or question.     Education  Discussed pharmacology consultation and medications   Discussed monitoring blood pressure     Care Plan  Continue diabetes education  Continue to monitor blood sugars  Continue to monitor blood pressure   Continue working with SW and establishing with a therapist     Progress:  Progressing     Next outreach:  1 month

## 2023-02-01 NOTE — TELEPHONE ENCOUNTER
Phone Number Called: [phone]    Call outcome: Spoke to patient regarding message below.    Message: Called to inform patient her Multaq was delivered here yesterday. Patient states she has no transportation and would like it delivered to the [facility] pharmacy. Advised patient a family member is also okay to  the medication. Patient states her daughter or grandaughter will  the medication for her as she only has enough medication for today and tomorrow.

## 2023-02-07 ENCOUNTER — TELEPHONE (OUTPATIENT)
Dept: HEALTH INFORMATION MANAGEMENT | Facility: OTHER | Age: 78
End: 2023-02-07
Payer: MEDICARE

## 2023-02-07 ENCOUNTER — PATIENT MESSAGE (OUTPATIENT)
Dept: HEALTH INFORMATION MANAGEMENT | Facility: OTHER | Age: 78
End: 2023-02-07

## 2023-02-07 NOTE — TELEPHONE ENCOUNTER
Whitley called on 2/6 asking for information on free home COVID tests. Resources sent to Whitley via [employer].

## 2023-02-11 NOTE — PROGRESS NOTES
"Received incoming email from Whitley stating BP \"118/63 pulse 61 ... I feel fine.\" Will continue to monitor on RHM.     Laila Corral, PharmD    " accepted declines

## 2023-02-13 ENCOUNTER — PHARMACY VISIT (OUTPATIENT)
Dept: PHARMACY | Facility: MEDICAL CENTER | Age: 78
End: 2023-02-13
Payer: MEDICARE

## 2023-02-13 DIAGNOSIS — N18.4 CKD (CHRONIC KIDNEY DISEASE) STAGE 4, GFR 15-29 ML/MIN (HCC): ICD-10-CM

## 2023-02-13 PROCEDURE — RXMED WILLOW AMBULATORY MEDICATION CHARGE: Performed by: NURSE PRACTITIONER

## 2023-02-13 RX ORDER — FUROSEMIDE 80 MG
80 TABLET ORAL
Qty: 45 TABLET | Refills: 3 | Status: SHIPPED | OUTPATIENT
Start: 2023-02-13 | End: 2023-02-27

## 2023-02-17 ENCOUNTER — PHARMACY VISIT (OUTPATIENT)
Dept: PHARMACY | Facility: MEDICAL CENTER | Age: 78
End: 2023-02-17
Payer: MEDICARE

## 2023-02-21 ENCOUNTER — PATIENT OUTREACH (OUTPATIENT)
Dept: HEALTH INFORMATION MANAGEMENT | Facility: OTHER | Age: 78
End: 2023-02-21
Payer: MEDICARE

## 2023-02-21 ENCOUNTER — TELEPHONE (OUTPATIENT)
Dept: CARDIOLOGY | Facility: MEDICAL CENTER | Age: 78
End: 2023-02-21
Payer: MEDICARE

## 2023-02-21 DIAGNOSIS — I48.0 PAF (PAROXYSMAL ATRIAL FIBRILLATION) (HCC): ICD-10-CM

## 2023-02-21 PROCEDURE — RXMED WILLOW AMBULATORY MEDICATION CHARGE: Performed by: INTERNAL MEDICINE

## 2023-02-21 NOTE — PROGRESS NOTES
2-21-23  @6800 MARIMAR called Whitley and MARIMAR asked if there were any additional resources SW could help Whitley with at this time. Whitley shared with SW that no, not at this time. Whitley explained to SW that she has Liat, her RN coordinator and Richard, her patient advocate to help her with her medications so she felt she had [facility] people in her corner. MARIMAR explained to Whitley that since her needs were being covered, MARIMAR was closing out the CCM referral.   MARIMAR closed the CCM referral.

## 2023-02-22 ENCOUNTER — PATIENT MESSAGE (OUTPATIENT)
Dept: CARDIOLOGY | Facility: MEDICAL CENTER | Age: 78
End: 2023-02-22
Payer: MEDICARE

## 2023-02-22 PROCEDURE — RXMED WILLOW AMBULATORY MEDICATION CHARGE: Performed by: INTERNAL MEDICINE

## 2023-02-22 NOTE — TELEPHONE ENCOUNTER
Called [facility]. Samples of Xarelto available for patient. RX sent for 30 days of samples. Refilled per RX protocol. Spoke to Beth with [facility] and patient is no longer eligible thru [employer] for patient assistance for this medication.   MyChart to patient to advise. Patient due for FV as well.

## 2023-02-22 NOTE — TELEPHONE ENCOUNTER
TW    Caller: Bob with [facility]     Medication Name and Dosage:  rivaroxaban (XARELTO) 15 MG Tab tablet     Medication amount left: 1 Week worth    Preferred Pharmacy: N/A    Other questions (Topic): Bob is calling to see if our office can provide any samples of this medication to the patient. Please advise.     Callback Number (Will only call for issues): [phone] (home)       Thank you,   Janet GIL

## 2023-02-24 ENCOUNTER — PHARMACY VISIT (OUTPATIENT)
Dept: PHARMACY | Facility: MEDICAL CENTER | Age: 78
End: 2023-02-24
Payer: MEDICARE

## 2023-02-27 PROCEDURE — RXMED WILLOW AMBULATORY MEDICATION CHARGE: Performed by: NURSE PRACTITIONER

## 2023-02-27 RX ORDER — FLASH GLUCOSE SENSOR
KIT MISCELLANEOUS
Qty: 2 EACH | Refills: 11 | Status: SHIPPED | OUTPATIENT
Start: 2023-02-27 | End: 2023-09-05 | Stop reason: SDUPTHER

## 2023-02-27 NOTE — TELEPHONE ENCOUNTER
Received request via: Patient    Was the patient seen in the last year in this department? Yes    Does the patient have an active prescription (recently filled or refills available) for medication(s) requested? No    Does the patient have residential Plus and need 100 day supply (blood pressure, diabetes and cholesterol meds only)? Yes, quantity updated to 100 days

## 2023-02-27 NOTE — TELEPHONE ENCOUNTER
Received request via: Pharmacy  8/31/22  Was the patient seen in the last year in this department? Yes    Does the patient have an active prescription (recently filled or refills available) for medication(s) requested? No    Does the patient have [facility] Plus and need 100 day supply (blood pressure, diabetes and cholesterol meds only)? Medication is not for cholesterol, blood pressure or diabetes

## 2023-03-01 DIAGNOSIS — K21.9 GASTROESOPHAGEAL REFLUX DISEASE: ICD-10-CM

## 2023-03-02 ENCOUNTER — APPOINTMENT (OUTPATIENT)
Dept: MEDICAL GROUP | Facility: MEDICAL CENTER | Age: 78
End: 2023-03-02
Payer: MEDICARE

## 2023-03-02 ENCOUNTER — PATIENT OUTREACH (OUTPATIENT)
Dept: HEALTH INFORMATION MANAGEMENT | Facility: OTHER | Age: 78
End: 2023-03-02

## 2023-03-02 DIAGNOSIS — N18.4 TYPE 2 DIABETES MELLITUS WITH STAGE 4 CHRONIC KIDNEY DISEASE, WITHOUT LONG-TERM CURRENT USE OF INSULIN (HCC): ICD-10-CM

## 2023-03-02 DIAGNOSIS — J45.20 MILD INTERMITTENT ASTHMA WITHOUT COMPLICATION: ICD-10-CM

## 2023-03-02 DIAGNOSIS — I10 ESSENTIAL HYPERTENSION: ICD-10-CM

## 2023-03-02 DIAGNOSIS — E11.22 TYPE 2 DIABETES MELLITUS WITH STAGE 4 CHRONIC KIDNEY DISEASE, WITHOUT LONG-TERM CURRENT USE OF INSULIN (HCC): ICD-10-CM

## 2023-03-02 PROCEDURE — 99490 CHRNC CARE MGMT STAFF 1ST 20: CPT | Performed by: NURSE PRACTITIONER

## 2023-03-02 PROCEDURE — RXMED WILLOW AMBULATORY MEDICATION CHARGE: Performed by: NURSE PRACTITIONER

## 2023-03-02 RX ORDER — OMEPRAZOLE 20 MG/1
40 CAPSULE, DELAYED RELEASE ORAL DAILY
Qty: 180 CAPSULE | Refills: 1 | Status: SHIPPED | OUTPATIENT
Start: 2023-03-02 | End: 2024-03-13 | Stop reason: SDUPTHER

## 2023-03-02 NOTE — PROGRESS NOTES
"Assessment  Spoke with Whitlye for monthly outreach follow up. Whitley states that she is \"doing okay.\" Discussed the use of her Freestyle continuous glucose monitor. She reports her average blood sugar is 116. She states that using the monitor is going very well. She has been out to the movies with friends. She states that overall she is feeling good. Discussed her follow up appointments. Whitley states that she is going to try acupuncture for her chronic pain. She denied any questions or concerns at this time. States she would call if her needed anything.     Education  Discussed diabetes education  Discussed scheduled acupuncture appointments for chronic pain   Discussed Shingles vaccine     Care Plan  Continue diabetes education  Continue to monitor blood sugars  Continue to monitor blood pressure   Continue working to get established with a therapist     Progress:  Progressing     Next outreach:  1 month     Chart reviewed for Quarterly outreach, patient continues to qualify and benefit from PCM Program.   "

## 2023-03-02 NOTE — TELEPHONE ENCOUNTER
Received request via: Pharmacy    Was the patient seen in the last year in this department? Yes  8/31/22  Does the patient have an active prescription (recently filled or refills available) for medication(s) requested? No    Does the patient have [facility]PC Plus and need 100 day supply (blood pressure, diabetes and cholesterol meds only)? Medication is not for cholesterol, blood pressure or diabetes

## 2023-03-03 ENCOUNTER — TELEPHONE (OUTPATIENT)
Dept: VASCULAR LAB | Facility: MEDICAL CENTER | Age: 78
End: 2023-03-03
Payer: MEDICARE

## 2023-03-03 ENCOUNTER — APPOINTMENT (OUTPATIENT)
Dept: CARDIOLOGY | Facility: MEDICAL CENTER | Age: 78
End: 2023-03-03
Attending: INTERNAL MEDICINE
Payer: MEDICARE

## 2023-03-03 NOTE — TELEPHONE ENCOUNTER
[facility]    Received pharmacotherapy referral for DM from TAO Colvin on 1/30/23    Called pt to schedule appt to establish care - no answer. LVM.    Insurance: [facility]  PCP: [facility]  Locations to be seen: [address]    [facility] at [phone], fax [phone]    Dayo Dean, AraceliD, BCACP

## 2023-03-06 ENCOUNTER — ANCILLARY PROCEDURE (OUTPATIENT)
Dept: CARDIOLOGY | Facility: MEDICAL CENTER | Age: 78
End: 2023-03-06
Attending: INTERNAL MEDICINE
Payer: MEDICARE

## 2023-03-06 DIAGNOSIS — I48.0 PAF (PAROXYSMAL ATRIAL FIBRILLATION) (HCC): ICD-10-CM

## 2023-03-06 DIAGNOSIS — R00.2 PALPITATIONS: ICD-10-CM

## 2023-03-06 DIAGNOSIS — I10 ESSENTIAL HYPERTENSION: ICD-10-CM

## 2023-03-06 PROCEDURE — 93306 TTE W/DOPPLER COMPLETE: CPT

## 2023-03-06 PROCEDURE — RXMED WILLOW AMBULATORY MEDICATION CHARGE: Performed by: INTERNAL MEDICINE

## 2023-03-07 LAB
LV EJECT FRACT  99904: 55
LV EJECT FRACT MOD 2C 99903: 54.4
LV EJECT FRACT MOD 4C 99902: 58.08
LV EJECT FRACT MOD BP 99901: 56.72

## 2023-03-07 PROCEDURE — 93306 TTE W/DOPPLER COMPLETE: CPT | Mod: 26 | Performed by: INTERNAL MEDICINE

## 2023-03-09 ENCOUNTER — TELEPHONE (OUTPATIENT)
Dept: HEALTH INFORMATION MANAGEMENT | Facility: OTHER | Age: 78
End: 2023-03-09
Payer: MEDICARE

## 2023-03-09 ENCOUNTER — TELEPHONE (OUTPATIENT)
Dept: MEDICAL GROUP | Facility: LAB | Age: 78
End: 2023-03-09
Payer: MEDICARE

## 2023-03-09 NOTE — TELEPHONE ENCOUNTER
Whitley called and reported that her sciatica with bothering her and asked for her old percocet prescription to be refilled. Explained to Whitley she had to be seen by a provider, that pain medication is not on her current medication list and cannot be given over the phone. Explained I could assist her schedule with the next available appointment at [facility] or she could report to urgent care. She states she would take care of it.

## 2023-03-09 NOTE — TELEPHONE ENCOUNTER
1. Caller Name: Whitley Frederick                          Call Back Number: [phone] (home)         How would the patient prefer to be contacted with a response:     PT LVM stating she is having sciatica pain. Would like medication like last time. Pt's last office visit was 8/31/22   Please advise, Appt?

## 2023-03-09 NOTE — TELEPHONE ENCOUNTER
Yes, please have her schedule for next week.  Unsure which medication she is asking for, if she can recall specifically which one, I can prescribe before the weekend and have her follow-up next week.

## 2023-03-10 ENCOUNTER — PHARMACY VISIT (OUTPATIENT)
Dept: PHARMACY | Facility: MEDICAL CENTER | Age: 78
End: 2023-03-10
Payer: MEDICARE

## 2023-03-10 ENCOUNTER — TELEPHONE (OUTPATIENT)
Dept: VASCULAR LAB | Facility: MEDICAL CENTER | Age: 78
End: 2023-03-10

## 2023-03-10 ENCOUNTER — TELEMEDICINE (OUTPATIENT)
Dept: MEDICAL GROUP | Facility: LAB | Age: 78
End: 2023-03-10
Payer: MEDICARE

## 2023-03-10 ENCOUNTER — PATIENT MESSAGE (OUTPATIENT)
Dept: CARDIOLOGY | Facility: MEDICAL CENTER | Age: 78
End: 2023-03-10

## 2023-03-10 ENCOUNTER — TELEPHONE (OUTPATIENT)
Dept: HEALTH INFORMATION MANAGEMENT | Facility: OTHER | Age: 78
End: 2023-03-10

## 2023-03-10 VITALS
SYSTOLIC BLOOD PRESSURE: 147 MMHG | HEART RATE: 65 BPM | HEIGHT: 66 IN | DIASTOLIC BLOOD PRESSURE: 64 MMHG | BODY MASS INDEX: 41.91 KG/M2 | WEIGHT: 260.8 LBS

## 2023-03-10 DIAGNOSIS — M54.32 LEFT SIDED SCIATICA: ICD-10-CM

## 2023-03-10 PROCEDURE — RXOTC WILLOW AMBULATORY OTC CHARGE: Performed by: PHARMACIST

## 2023-03-10 PROCEDURE — 99213 OFFICE O/P EST LOW 20 MIN: CPT | Mod: 95 | Performed by: PHYSICIAN ASSISTANT

## 2023-03-10 PROCEDURE — RXMED WILLOW AMBULATORY MEDICATION CHARGE: Performed by: PHYSICIAN ASSISTANT

## 2023-03-10 RX ORDER — GABAPENTIN 100 MG/1
100 CAPSULE ORAL 3 TIMES DAILY
Qty: 90 CAPSULE | Refills: 0 | Status: SHIPPED | OUTPATIENT
Start: 2023-03-10 | End: 2023-04-06

## 2023-03-10 RX ORDER — METHYLPREDNISOLONE 4 MG/1
TABLET ORAL
Qty: 21 TABLET | Refills: 0 | Status: SHIPPED | OUTPATIENT
Start: 2023-03-10 | End: 2023-04-06

## 2023-03-10 ASSESSMENT — FIBROSIS 4 INDEX: FIB4 SCORE: 5.65

## 2023-03-10 NOTE — PROGRESS NOTES
Virtual Visit: Established Patient   This visit was conducted via Zoom using secure and encrypted videoconferencing technology.   The patient was in their home in the state of [place].    The patient's identity was confirmed and verbal consent was obtained for this virtual visit.    Subjective:   CC:   Chief Complaint   Patient presents with    Nerve Pain     Left leg      Whitley Laureen Frederick is a 77 y.o. female presenting for evaluation and management of:    Sciatica Symptoms  Pt reports remote hx of sciatica episode in 2020. She reports that this episode started Monday.  Denies recent fall or injury. She reports L side buttock/hip pain extending down the posterior length of the leg.  Denies saddle paresthesia or changes in bowel/bladder habits  Currently using walker.      ROS   All ROS negative except for pertinent positives listed above.       Current medicines (including changes today)  Current Outpatient Medications   Medication Sig Dispense Refill    methylPREDNISolone (MEDROL DOSEPAK) 4 MG Tablet Therapy Pack As directed on the packaging label. 21 Tablet 0    gabapentin (NEURONTIN) 100 MG Cap Take 1 capsule by mouth 3 times a day for 30 days. 90 Capsule 0    omeprazole (PRILOSEC) 20 MG delayed-release capsule Take 2 capsules by mouth every day. 180 Capsule 1    Continuous Blood Gluc Sensor (FREESTYLE YAGN 14 DAY SENSOR) Misc Apply every 14 days 2 Each 11    rivaroxaban (XARELTO) 15 MG Tab tablet Take 1 Tablet by mouth with dinner. 30 Tablet 0    furosemide (LASIX) 80 MG Tab Take 1 tablet by mouth daily 100 Tablet 1    NON SPECIFIED FREESTYLE PRECISION MACY TEST STRIPS FOR FREESTYLE YANG 2 50 Each 3    glucose blood (FREESTYLE PRECISION MACY TEST) strip Use strips to test blood sugar as directed 100 Strip 11    magnesium oxide (MAG-OX) 400 MG Tab tablet Take 1 Tablet by mouth every day. 90 Tablet 0    montelukast (SINGULAIR) 10 MG Tab Take 1 Tablet by mouth at bedtime. 90 Tablet 3    fluticasone (FLONASE) 50  MCG/ACT nasal spray USE 1-2  SPRAY(S) IN EACH NOSTRIL ONCE DAILY 30 MINUTES PRIOR TO USE OF CPAP 48 g 3    terazosin (HYTRIN) 2 MG Cap Take 1 capsule at bedtime Orally Once a day 90 days 90 Capsule 1    clonazePAM (KLONOPIN) 0.5 MG Tab TAKE 1 TABLET BY MOUTH ONCE DAILY AS NEEDED FOR 30 DAYS. ANXIETY/HEART PALPITATIONS for 30 days 30 Tablet 1    dronedarone (MULTAQ) 400 MG Tab Take 1 Tablet by mouth 2 times a day with meals. 60 Tablet 0    losartan (COZAAR) 50 MG Tab Take 1 tablet by mouth 2 Times a Day. 200 Tablet 2    loratadine (CLARITIN) 10 MG Tab Take 1 tablet by mouth every day. 100 Tablet 2    allopurinol (ZYLOPRIM) 100 MG Tab TAKE TWO TABLETS BY MOUTH TWICE DAILY 360 Tablet 2    PEG 3350-KCl-NaBcb-NaCl-NaSulf (PEG-3350/ELECTROLYTES) 236 g Recon Soln Follow GI Consultants instruction handout: 4000 mL 0    Semaglutide (OZEMPIC, 0.25 OR 0.5 MG/DOSE, SC) Inject  under the skin.      metoprolol tartrate (LOPRESSOR) 50 MG Tab Take 1 tablet by mouth 2 times a day. 200 Tablet 3    rosuvastatin (CRESTOR) 10 MG Tab Take 1 tablet by mouth every evening. Stop simvastatin. 100 Tablet 3    pioglitazone (ACTOS) 15 MG Tab Take 1 tablet by mouth every day for 100 days. 100 Tablet 3    levothyroxine (SYNTHROID) 50 MCG Tab Take 1 tablet by mouth every morning on an empty stomach. 100 Tablet 3    terazosin (HYTRIN) 1 MG Cap Take 1 capsule by mouth every day. 100 Capsule 3    nystatin (MYCOSTATIN) 605856 UNIT/GM Ointment APPLY TO AFFECTED AREA up to twice daily 240 g 2    glucose blood (FREESTYLE LITE) strip Use test strip to test 2 times a day with meals. 50 Strip 11    levalbuterol (XOPENEX HFA) 45 MCG/ACT inhaler Inhale 2 Puffs every 6 hours as needed for Shortness of Breath. 15 g 11    rivaroxaban (XARELTO) 15 MG Tab tablet Take 1 Tablet by mouth with dinner. 90 Tablet 3    Continuous Blood Gluc  (FREESTYLE YANG 2 READER) Device Use to monitor blood sugar daily 1 Each 11    NON SPECIFIED C-Pap supplies  -humidifier  kandi and a spare   - fax [phone]...    Dx:  Sleep apnea 1 Each 1    NON SPECIFIED blue concentrator at night along with my C-PAP./3 liters per hour 1 Each 0    ferrous sulfate 325 (65 Fe) MG tablet Take 325 mg by mouth every day.      Blood Glucose Monitoring Suppl Supplies Misc Test strips order: Test strips for Accucheck meter. Sig: use BID and prn ssx high or low sugar #100 RF x 3 100 Strip 11    Blood Glucose Monitoring Suppl Supplies Misc Test strips order: Test strips for Chely Contour meter. Sig: use tid  and prn ssx high or low sugar #100 RF x 3 (Patient taking differently: Test strips order: Test strips for Chely Contour meter. Sig: use tid  and prn ssx high or low sugar #100 RF x 3) 100 Strip 3    Urine Glucose-Ketones Test Strip 1 Strip by In Vitro route every day. 100 Strip 2    Insulin Pen Needle 32G X 4 MM Misc 1 Applicator by Does not apply route 3 times a day. 100 Each 11    Blood Glucose Monitoring Suppl W/DEVICE Kit 1 Each by Does not apply route 2 times a day as needed. 1 Kit 0    Misc. Devices MISC Pen needles for Lantus Solstar. 29g 12mm. 90 Each 3    Cholecalciferol (VITAMIN D) 2000 UNITS CAPS Take 4,000 Units by mouth every day.       No current facility-administered medications for this visit.       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 (HCC) 11/11/2021    Chronic neck pain 09/15/2021    DDD (degenerative disc disease), lumbar 11/02/2020    Osteoarthritis of lumbar spine 11/02/2020    Non-rheumatic mitral regurgitation 08/20/2019    Secondary hyperparathyroidism of renal origin ([facility]) 05/14/2018    Hypothyroidism  "due to acquired atrophy of thyroid 04/10/2018    VPC's (ventricular premature complexes) 02/23/2018    Dependence on nocturnal oxygen therapy 11/02/2017    Incontinence of feces 05/18/2017    Chronic pain of both shoulders 03/23/2017    Dyslipidemia 12/14/2016    Mild intermittent asthma without complication 08/24/2016    Chronic anticoagulation 06/10/2016    Essential hypertension 05/10/2016    AVERY (obstructive sleep apnea) 05/10/2016    Multiple thyroid nodules 10/07/2015    Chronic idiopathic gout involving toe without tophus 04/30/2015    Atherosclerosis of aorta (CMS-[facility]) 04/29/2015    Chronic respiratory failure with hypoxia ([facility]) - nighttime oxygen only 04/29/2015    Morbid obesity with BMI of 40.0-44.9, adult ([facility]) 04/29/2015    Stenosis of carotid artery 05/30/2014    Anxiety 01/09/2014    PAF (paroxysmal atrial fibrillation) ([facility]) 07/12/2013    Left bundle branch block 03/05/2012    Type 2 diabetes mellitus with stage 4 chronic kidney disease, without long-term current use of insulin ([facility])     CKD (chronic kidney disease) stage 4, GFR 15-29 ml/min ([facility])         Objective:   BP (!) 147/64 Comment: BP taken 3/5/23  Pulse 65   Ht 1.676 m (5' 6\")   Wt 118 kg (260 lb 12.8 oz)   BMI 42.09 kg/m²     Physical Exam:  Constitutional: Alert, no distress, well-groomed.  Skin: No rashes in visible areas.  Eye: Round. Conjunctiva clear, lids normal. No icterus.   ENMT: Lips pink without lesions, good dentition, moist mucous membranes. Phonation normal.  Neck: No masses, no thyromegaly. Moves freely without pain.  Respiratory: Unlabored respiratory effort, no cough or audible wheeze  Psych: Alert and oriented x3, normal affect and mood.     Assessment and Plan:   The following treatment plan was discussed:     1. Left sided sciatica  Acute on chronic condition  Discussed physical therapy in addition to physiatry referral.  He states that she is unable to afford physical therapy at this time.  She is agreeable to " evaluation by physiatry after acute symptoms of sciatica have improved/resolved.  Discussed red flags and indications for close follow-up  - methylPREDNISolone (MEDROL DOSEPAK) 4 MG Tablet Therapy Pack; As directed on the packaging label.  Dispense: 21 Tablet; Refill: 0  - Referral to Pain Clinic  - gabapentin (NEURONTIN) 100 MG Cap; Take 1 capsule by mouth 3 times a day for 30 days.  Dispense: 90 Capsule; Refill: 0      Follow-up: Return if symptoms worsen or fail to improve.

## 2023-03-10 NOTE — TELEPHONE ENCOUNTER
[facility]     Received pharmacotherapy referral for DM from TAO Colvin on 1/30/23     Pt prefers to contact our clinic when she would like to establish care w/ us - not currently ready at this time.    Will await pt contact.     Insurance: [facility]  PCP: [facility]  Locations to be seen: [facility] at [phone], fax [phone]     Dayo Dean, PharmD, BCACP    CC: TAO Colvin

## 2023-03-11 ENCOUNTER — PATIENT MESSAGE (OUTPATIENT)
Dept: MEDICAL GROUP | Facility: LAB | Age: 78
End: 2023-03-11
Payer: MEDICARE

## 2023-03-11 DIAGNOSIS — M54.32 LEFT SIDED SCIATICA: ICD-10-CM

## 2023-03-13 ENCOUNTER — TELEPHONE (OUTPATIENT)
Dept: CARDIOLOGY | Facility: MEDICAL CENTER | Age: 78
End: 2023-03-13
Payer: MEDICARE

## 2023-03-13 RX ORDER — GABAPENTIN 100 MG/1
100-300 CAPSULE ORAL 3 TIMES DAILY
Qty: 270 CAPSULE | Refills: 1 | Status: SHIPPED | OUTPATIENT
Start: 2023-03-13 | End: 2023-06-15

## 2023-03-13 NOTE — TELEPHONE ENCOUNTER
Noted below:  Received: 3 days ago  ANISH Carballo; MISAEL Lopezay to switch from Xarelto to Eliquis 5 mg twice daily if more affordable     Spoke to Bob. She stated she spoke to patient at length and she has decided to stay on Xarelto for now through mail order which is more affordable for her. She will let us know if patient changes her mind in the summer if patient reaches her out of pocket. Bob also stated patient can reach out to her at anytime for assistance with her patient assistance programs.     TW: HARMEET- patient decided to stay on Xarelto. Thank you!

## 2023-03-24 PROCEDURE — RXMED WILLOW AMBULATORY MEDICATION CHARGE: Performed by: NURSE PRACTITIONER

## 2023-03-24 RX ORDER — MAGNESIUM OXIDE 400 MG/1
400 TABLET ORAL DAILY
Qty: 90 TABLET | Refills: 0 | Status: SHIPPED | OUTPATIENT
Start: 2023-03-24 | End: 2023-07-03 | Stop reason: SDUPTHER

## 2023-03-24 NOTE — TELEPHONE ENCOUNTER
Received request via: Pharmacy    Was the patient seen in the last year in this department? Yes  3/10/23  Does the patient have an active prescription (recently filled or refills available) for medication(s) requested? No    Does the patient have [facility] Plus and need 100 day supply (blood pressure, diabetes and cholesterol meds only)? Medication is not for cholesterol, blood pressure or diabetes

## 2023-03-25 PROCEDURE — RXMED WILLOW AMBULATORY MEDICATION CHARGE: Performed by: NURSE PRACTITIONER

## 2023-03-25 PROCEDURE — RXMED WILLOW AMBULATORY MEDICATION CHARGE: Performed by: INTERNAL MEDICINE

## 2023-03-27 PROCEDURE — RXMED WILLOW AMBULATORY MEDICATION CHARGE: Performed by: NURSE PRACTITIONER

## 2023-03-28 ENCOUNTER — PHARMACY VISIT (OUTPATIENT)
Dept: PHARMACY | Facility: MEDICAL CENTER | Age: 78
End: 2023-03-28
Payer: MEDICARE

## 2023-04-03 ENCOUNTER — PATIENT OUTREACH (OUTPATIENT)
Dept: HEALTH INFORMATION MANAGEMENT | Facility: OTHER | Age: 78
End: 2023-04-03

## 2023-04-03 ENCOUNTER — PATIENT OUTREACH (OUTPATIENT)
Dept: HEALTH INFORMATION MANAGEMENT | Facility: OTHER | Age: 78
End: 2023-04-03
Payer: MEDICARE

## 2023-04-03 DIAGNOSIS — E11.22 TYPE 2 DIABETES MELLITUS WITH STAGE 4 CHRONIC KIDNEY DISEASE, WITHOUT LONG-TERM CURRENT USE OF INSULIN (HCC): ICD-10-CM

## 2023-04-03 DIAGNOSIS — J45.20 MILD INTERMITTENT ASTHMA WITHOUT COMPLICATION: ICD-10-CM

## 2023-04-03 DIAGNOSIS — I10 ESSENTIAL HYPERTENSION: ICD-10-CM

## 2023-04-03 DIAGNOSIS — N18.4 TYPE 2 DIABETES MELLITUS WITH STAGE 4 CHRONIC KIDNEY DISEASE, WITHOUT LONG-TERM CURRENT USE OF INSULIN (HCC): ICD-10-CM

## 2023-04-03 PROCEDURE — 99439 CHRNC CARE MGMT STAF EA ADDL: CPT | Performed by: NURSE PRACTITIONER

## 2023-04-03 PROCEDURE — 99490 CHRNC CARE MGMT STAFF 1ST 20: CPT | Performed by: NURSE PRACTITIONER

## 2023-04-03 NOTE — PROGRESS NOTES
4-3-23  @1524 MARIMAR received message from ADELAIDA Treviño that Whitley was in need to MARIMAR help as she had been unsuccessful in obtaining her psychiatry appointment.   @1531 MARIMAR called Whitley to see how MARIMAR could be of service. Whitley and MARIMAR discussed Whitley's desire to see a psychiatrist and her lack of response from the one she had reached out to previously.   @1549 MARIMAR received additional message from ADELAIDA Treviño.  @1602 Whitley called MARIMAR back. MARIMAR gave Whitley the number for [facility], [phone]   @[id number] Whitley called MARIMAR back and told her she was having trouble getting through on that phone number. MARIMAR provided alternate number of [phone] to make an appointment.

## 2023-04-03 NOTE — PROGRESS NOTES
"Assessment  Spoke with Whitley for monthly outreach follow up. Whitley states she has not been feeling well. She has been having difficulties with diarrhea and fecal incontinence. She reports a hx of IBS since her 30's and thought this was just her usual/occasional symptoms. However, it has been going on for three days. Discussed her diet. She was going to try a BRAT diet as well as peppermint/lilia tea. She states that she was also very tired and fatigued. Assisted Whitley to make a virtual appointment with PCP. She was worried to go into the office. Reviewed medications. Whitley continues to check her blood sugars and monitor her blood pressure. She has continues back & leg pain. She is scheduled for a physiatry referral on 4/26. Whitley stated that she is so tired, she stated, there is no chloe in life anymore, all I do is maintain my health.\" She also stated \"I would never do anything bad to myself.\" She stated she was having a bad day. Discussed her concerns and worries. She states she has not heard back from the physiatrist she was trying to get established with, referred to  for more assistance. Encouraged Whitley to call with any questions or concerns.     Education  Discussed BRAT Diet and hygiene with diarrhea   Discussed follow appointments  Discussed Physiatry referral  Discussed follow up with     Care Plan  Continue Diabetic education  Continue to monitor blood pressure    Progress:  Progressing     Next outreach:  1 month   "

## 2023-04-04 ENCOUNTER — TELEPHONE (OUTPATIENT)
Dept: MEDICAL GROUP | Facility: LAB | Age: 78
End: 2023-04-04
Payer: MEDICARE

## 2023-04-04 NOTE — TELEPHONE ENCOUNTER
ESTABLISHED PATIENT PRE-VISIT PLANNING     Patient was NOT contacted to complete PVP.     Note: Patient will not be contacted if there is no indication to call.     1.  Reviewed notes from the last few office visits within the medical group: Yes    2.  If any orders were placed at last visit or intended to be done for this visit (i.e. 6 mos follow-up), do we have Results/Consult Notes?           Labs - Labs ordered, completed on 9/8/22 and results are in chart.  Note: If patient appointment is for lab review and patient did not complete labs, check with provider if OK to reschedule patient until labs completed.         Imaging - Imaging was not ordered at last office visit.         Referrals - Referral ordered, patient has NOT been seen. Scheduled     3. Is this appointment scheduled as a Hospital Follow-Up? No    4.  Immunizations were updated in Epic using Reconcile Outside Information activity? Yes    5.  Patient is due for the following Health Maintenance Topics:   Health Maintenance Due   Topic Date Due    IMM ZOSTER VACCINES (1 of 2) Never done    DIABETES MONOFILAMENT / LE EXAM  11/11/2022    A1C SCREENING  03/08/2023     6.  AHA (Pulse[employer]) form printed for Provider? N/A

## 2023-04-05 ENCOUNTER — PATIENT OUTREACH (OUTPATIENT)
Dept: HEALTH INFORMATION MANAGEMENT | Facility: OTHER | Age: 78
End: 2023-04-05
Payer: MEDICARE

## 2023-04-05 DIAGNOSIS — N18.4 TYPE 2 DIABETES MELLITUS WITH STAGE 4 CHRONIC KIDNEY DISEASE, WITHOUT LONG-TERM CURRENT USE OF INSULIN (HCC): ICD-10-CM

## 2023-04-05 DIAGNOSIS — E11.22 TYPE 2 DIABETES MELLITUS WITH STAGE 4 CHRONIC KIDNEY DISEASE, WITHOUT LONG-TERM CURRENT USE OF INSULIN (HCC): ICD-10-CM

## 2023-04-05 NOTE — PROGRESS NOTES
4-5-23  @[id number] MARIMAR received message from Whitley asking MARIMAR to call her back.  @2098 MARIMAR called Whitley. Whitley shared with MARIMAR that Whitley was able to make an appointment with psychiatrist Dr. Mildred Nix for 6/15. Whitley and MARIMAR talked about Whitley's desire to meet with a therapist for talk therapy more than for for psychiatry to address her anxiety, as her previous anti-anxiety medication prescribed by her PCP had not been helpful in addressing her anxiety. Whitley agreed to call [facility] back and set up a new appointment for talk therapy. MARIMAR and Whitley discussed the benefits of Whitley first working with a talk therapist, then possibly working with a psychiatrist to address any potential medication needs. MARIMAR encouraged Whitley to call MARIMAR back with any additional questions.

## 2023-04-06 ENCOUNTER — TELEMEDICINE (OUTPATIENT)
Dept: MEDICAL GROUP | Facility: LAB | Age: 78
End: 2023-04-06
Payer: MEDICARE

## 2023-04-06 VITALS
SYSTOLIC BLOOD PRESSURE: 131 MMHG | DIASTOLIC BLOOD PRESSURE: 62 MMHG | HEIGHT: 66 IN | BODY MASS INDEX: 41.62 KG/M2 | TEMPERATURE: 97.6 F | OXYGEN SATURATION: 97 % | HEART RATE: 62 BPM | WEIGHT: 259 LBS

## 2023-04-06 DIAGNOSIS — R19.7 DIARRHEA, UNSPECIFIED TYPE: ICD-10-CM

## 2023-04-06 DIAGNOSIS — N18.4 TYPE 2 DIABETES MELLITUS WITH STAGE 4 CHRONIC KIDNEY DISEASE, WITHOUT LONG-TERM CURRENT USE OF INSULIN (HCC): ICD-10-CM

## 2023-04-06 DIAGNOSIS — I10 ESSENTIAL HYPERTENSION: ICD-10-CM

## 2023-04-06 DIAGNOSIS — E11.22 TYPE 2 DIABETES MELLITUS WITH STAGE 4 CHRONIC KIDNEY DISEASE, WITHOUT LONG-TERM CURRENT USE OF INSULIN (HCC): ICD-10-CM

## 2023-04-06 PROCEDURE — 99214 OFFICE O/P EST MOD 30 MIN: CPT | Mod: 95 | Performed by: NURSE PRACTITIONER

## 2023-04-06 PROCEDURE — RXMED WILLOW AMBULATORY MEDICATION CHARGE: Performed by: NURSE PRACTITIONER

## 2023-04-06 RX ORDER — DIPHENOXYLATE HYDROCHLORIDE AND ATROPINE SULFATE 2.5; .025 MG/1; MG/1
1 TABLET ORAL 4 TIMES DAILY PRN
Qty: 25 TABLET | Refills: 0 | Status: SHIPPED | OUTPATIENT
Start: 2023-04-06 | End: 2023-04-14

## 2023-04-06 ASSESSMENT — FIBROSIS 4 INDEX: FIB4 SCORE: 5.72

## 2023-04-06 NOTE — PROGRESS NOTES
"Virtual Visit: Established Patient   This visit was conducted via Zoom using secure and encrypted videoconferencing technology.   The patient was in their home in the state of [place].    The patient's identity was confirmed and verbal consent was obtained for this virtual visit.    Subjective:   CC:   F/u    HPI:  Whitley is a 78 y.o. female presenting for evaluation and management of:    Diarrhea: Chronic issue but worsened about a week ago.  Typically diarrhea affects her approximately 1-2 x per day but this worsened a week ago to every few hours and even at night.  \"Not a flu diarrhea but a lot of gas / burst and hard to control.\"  Stool consistently is like soft frozen yogert  -denies watery or blood consistency.  Denies fever or increased anxiety.  Hemorrhoid flaring up.  Has tried benefiber bid, imodium, gas x without improvement.  On brat diet.    Last diarrhea episode was 9 am.    Started eating 4 oz activia per day a week or so ago.  Drinking gatorade and wondering about Pedialyte.   No antibiotics in the past month.   No recent hospitalizations.   Dog has had diarrhea - 7-10 d, fecal sample sent to vet - negative - now resolved.      HTN:  bp around 120/60's.  Reduced terazosin 2 d ago b/c of low BP.  130/62 today.      Type II diabetes : chronic issue.  BG range lately 100-120's.      Patient Active Problem List    Diagnosis Date Noted    On economic assistance program 01/30/2023    Recurrent major depressive disorder, in full remission (HCC) 04/06/2022    Secondary hypercoagulable state (HCC) 04/06/2022    Sedative, hypnotic, or anxiolytic dependence (HCC) 04/06/2022    Retinopathy due to secondary diabetes mellitus (HCC) 11/11/2021    Peripheral sensory neuropathy due to type 2 diabetes mellitus (HCC) 11/11/2021    Chronic neck pain 09/15/2021    DDD (degenerative disc disease), lumbar 11/02/2020    Osteoarthritis of lumbar spine 11/02/2020    Non-rheumatic mitral regurgitation 08/20/2019    Secondary " hyperparathyroidism of renal origin ([facility]) 05/14/2018    Hypothyroidism due to acquired atrophy of thyroid 04/10/2018    VPC's (ventricular premature complexes) 02/23/2018    Dependence on nocturnal oxygen therapy 11/02/2017    Incontinence of feces 05/18/2017    Chronic pain of both shoulders 03/23/2017    Dyslipidemia 12/14/2016    Mild intermittent asthma without complication 08/24/2016    Chronic anticoagulation 06/10/2016    Essential hypertension 05/10/2016    AVERY (obstructive sleep apnea) 05/10/2016    Multiple thyroid nodules 10/07/2015    Chronic idiopathic gout involving toe without tophus 04/30/2015    Atherosclerosis of aorta (CMS-[facility]) 04/29/2015    Chronic respiratory failure with hypoxia ([facility]) - nighttime oxygen only 04/29/2015    Morbid obesity with BMI of 40.0-44.9, adult ([facility]) 04/29/2015    Stenosis of carotid artery 05/30/2014    Anxiety 01/09/2014    PAF (paroxysmal atrial fibrillation) ([facility]) 07/12/2013    Left bundle branch block 03/05/2012    Type 2 diabetes mellitus with stage 4 chronic kidney disease, without long-term current use of insulin ([facility])     CKD (chronic kidney disease) stage 4, GFR 15-29 ml/min ([facility])         Objective:   There were no vitals taken for this visit.    Physical Exam:  Gen: NAD  Resp: nonlabored.  Able to speak in full sentences  Psy: pleasant / cooperative.   Neuro:  Alert and oriented x 3      Assessment and Plan:   The following treatment plan was discussed:   1. Diarrhea, unspecified type  -Uncertain etiology.  Fortunately no recent hospitalizations, antibiotic use or travel.  Diarrhea is not watery but rather a soft consistency therefore avoided ordering stool studies.  Trial of Lomotil up to 4 times a day.  Discussed potential side effects such as dry mouth and constipation.  She is eating mostly white carbohydrates and I encouraged her to switch over to lean protein, avoiding fruits/vegetables until diarrhea improves.  If Lomotil is not helpful over the next 72  hours she will contact me.  If diarrhea worsens, she begins to have abdominal pain, blood pressures consistently below 90/60 or blood sugars consistently below 70 and she begins to have a fever with increased fatigue, encouraged her to go to the emergency department for possible IV fluids and further investigation.  - diphenoxylate-atropine (LOMOTIL) 2.5-0.025 MG Tab; Take 1 Tablet by mouth 4 times a day as needed for Diarrhea for up to 7 days.  Dispense: 25 Tablet; Refill: 0    2. Essential hypertension  -Stable.  She does a great job of monitoring this.    3. Type 2 diabetes mellitus with stage 4 chronic kidney disease, without long-term current use of insulin (HCC)  -Currently stable.  Enjoying the freestyle lleia.  Follow-up 3 months for A1c.

## 2023-04-07 ENCOUNTER — PHARMACY VISIT (OUTPATIENT)
Dept: PHARMACY | Facility: MEDICAL CENTER | Age: 78
End: 2023-04-07
Payer: MEDICARE

## 2023-04-07 PROCEDURE — RXMED WILLOW AMBULATORY MEDICATION CHARGE: Performed by: NURSE PRACTITIONER

## 2023-04-09 PROCEDURE — RXMED WILLOW AMBULATORY MEDICATION CHARGE: Performed by: NURSE PRACTITIONER

## 2023-04-10 ENCOUNTER — HOSPITAL ENCOUNTER (OUTPATIENT)
Dept: LAB | Facility: MEDICAL CENTER | Age: 78
End: 2023-04-10
Attending: INTERNAL MEDICINE
Payer: MEDICARE

## 2023-04-10 LAB
ALBUMIN SERPL BCP-MCNC: 3.9 G/DL (ref 3.2–4.9)
BUN SERPL-MCNC: 84 MG/DL (ref 8–22)
CALCIUM ALBUM COR SERPL-MCNC: 9.5 MG/DL (ref 8.5–10.5)
CALCIUM SERPL-MCNC: 9.4 MG/DL (ref 8.5–10.5)
CHLORIDE SERPL-SCNC: 110 MMOL/L (ref 96–112)
CO2 SERPL-SCNC: 20 MMOL/L (ref 20–33)
CREAT SERPL-MCNC: 2.95 MG/DL (ref 0.5–1.4)
GFR SERPLBLD CREATININE-BSD FMLA CKD-EPI: 16 ML/MIN/1.73 M 2
GLUCOSE SERPL-MCNC: 155 MG/DL (ref 65–99)
MAGNESIUM SERPL-MCNC: 2.1 MG/DL (ref 1.5–2.5)
PHOSPHATE SERPL-MCNC: 3.8 MG/DL (ref 2.5–4.5)
POTASSIUM SERPL-SCNC: 4.2 MMOL/L (ref 3.6–5.5)
SODIUM SERPL-SCNC: 144 MMOL/L (ref 135–145)
URATE SERPL-MCNC: 5.8 MG/DL (ref 1.9–8.2)

## 2023-04-10 PROCEDURE — 85014 HEMATOCRIT: CPT

## 2023-04-10 PROCEDURE — 36415 COLL VENOUS BLD VENIPUNCTURE: CPT

## 2023-04-10 PROCEDURE — 84156 ASSAY OF PROTEIN URINE: CPT

## 2023-04-10 PROCEDURE — RXMED WILLOW AMBULATORY MEDICATION CHARGE: Performed by: FAMILY MEDICINE

## 2023-04-10 PROCEDURE — 83735 ASSAY OF MAGNESIUM: CPT

## 2023-04-10 PROCEDURE — 80069 RENAL FUNCTION PANEL: CPT

## 2023-04-10 PROCEDURE — 82570 ASSAY OF URINE CREATININE: CPT

## 2023-04-10 PROCEDURE — 82043 UR ALBUMIN QUANTITATIVE: CPT

## 2023-04-10 PROCEDURE — 84550 ASSAY OF BLOOD/URIC ACID: CPT

## 2023-04-10 PROCEDURE — 85018 HEMOGLOBIN: CPT

## 2023-04-11 ENCOUNTER — PATIENT MESSAGE (OUTPATIENT)
Dept: CARDIOLOGY | Facility: MEDICAL CENTER | Age: 78
End: 2023-04-11
Payer: MEDICARE

## 2023-04-11 ENCOUNTER — TELEPHONE (OUTPATIENT)
Dept: CARDIOLOGY | Facility: MEDICAL CENTER | Age: 78
End: 2023-04-11
Payer: MEDICARE

## 2023-04-11 DIAGNOSIS — I48.0 PAF (PAROXYSMAL ATRIAL FIBRILLATION) (HCC): ICD-10-CM

## 2023-04-11 LAB
CREAT UR-MCNC: 76.73 MG/DL
CREAT UR-MCNC: 76.78 MG/DL
HCT VFR BLD AUTO: 34.2 % (ref 37–47)
HGB BLD-MCNC: 10.9 G/DL (ref 12–16)
MICROALBUMIN UR-MCNC: 3.7 MG/DL
MICROALBUMIN/CREAT UR: 48 MG/G (ref 0–30)
PROT UR-MCNC: 11 MG/DL (ref 0–15)
PROT/CREAT UR: 143 MG/G (ref 10–107)

## 2023-04-11 NOTE — TELEPHONE ENCOUNTER
MEDICATION SAMPLES : TW    Caller: Whitley Frederick    Topic/issue: XARELTO    Patient is requesting samples of XARELTO to be sent to the [facility] as she awaits approval for the ELIQUIS. Please advise.    Thank you,  Sincere MILLER    Callback Number: [phone] (home)

## 2023-04-12 PROCEDURE — RXMED WILLOW AMBULATORY MEDICATION CHARGE: Performed by: INTERNAL MEDICINE

## 2023-04-12 NOTE — TELEPHONE ENCOUNTER
GINA Rojas reached out, assisting patient with getting her medication, and stated:        Order placed per TW's previous recommendation.

## 2023-04-13 NOTE — TELEPHONE ENCOUNTER
[5:11 PM] LOUISE Omer:  Yanique  So I spoke with Whitley. She is going to decline applying for Eliquis patient assistance. She feels like she won't hit the GAP anytime soon and won't meet the out of pocket req.  She  prefers to just stay on the Xarelto.  [5:11 PM] LOUISE Omer:  May you please send a Xarelto 90day script for mail order      Medication list updated and Xarelto reordered for patient.

## 2023-04-14 ENCOUNTER — PHARMACY VISIT (OUTPATIENT)
Dept: PHARMACY | Facility: MEDICAL CENTER | Age: 78
End: 2023-04-14
Payer: MEDICARE

## 2023-04-14 PROCEDURE — RXOTC WILLOW AMBULATORY OTC CHARGE

## 2023-04-19 ENCOUNTER — OFFICE VISIT (OUTPATIENT)
Dept: CARDIOLOGY | Facility: MEDICAL CENTER | Age: 78
End: 2023-04-19
Payer: MEDICARE

## 2023-04-19 VITALS
HEIGHT: 66 IN | WEIGHT: 262 LBS | SYSTOLIC BLOOD PRESSURE: 130 MMHG | DIASTOLIC BLOOD PRESSURE: 60 MMHG | HEART RATE: 60 BPM | OXYGEN SATURATION: 93 % | BODY MASS INDEX: 42.11 KG/M2 | RESPIRATION RATE: 16 BRPM

## 2023-04-19 DIAGNOSIS — I48.0 PAF (PAROXYSMAL ATRIAL FIBRILLATION) (HCC): ICD-10-CM

## 2023-04-19 DIAGNOSIS — N18.4 TYPE 2 DIABETES MELLITUS WITH STAGE 4 CHRONIC KIDNEY DISEASE, WITHOUT LONG-TERM CURRENT USE OF INSULIN (HCC): ICD-10-CM

## 2023-04-19 DIAGNOSIS — I44.7 LEFT BUNDLE BRANCH BLOCK: ICD-10-CM

## 2023-04-19 DIAGNOSIS — E11.22 TYPE 2 DIABETES MELLITUS WITH STAGE 4 CHRONIC KIDNEY DISEASE, WITHOUT LONG-TERM CURRENT USE OF INSULIN (HCC): ICD-10-CM

## 2023-04-19 DIAGNOSIS — Z79.01 CHRONIC ANTICOAGULATION: ICD-10-CM

## 2023-04-19 DIAGNOSIS — N18.4 CKD (CHRONIC KIDNEY DISEASE) STAGE 4, GFR 15-29 ML/MIN (HCC): ICD-10-CM

## 2023-04-19 DIAGNOSIS — I10 ESSENTIAL HYPERTENSION: ICD-10-CM

## 2023-04-19 PROCEDURE — 99214 OFFICE O/P EST MOD 30 MIN: CPT | Performed by: INTERNAL MEDICINE

## 2023-04-19 ASSESSMENT — FIBROSIS 4 INDEX: FIB4 SCORE: 5.72

## 2023-04-19 NOTE — PROGRESS NOTES
Chief Complaint   Patient presents with    Atrial Fibrillation     F/V Dx: PAF (paroxysmal atrial fibrillation) (HCC)    Hypertension     F/V Dx: Essential hypertension    Carotid Artery Stenosis       Subjective:   Whitley Frederick is a 78 y.o. female who presents today for for follow-up of paroxysmal atrial fibrillation and PVCs anticoagulation high risk medication use and mitral regurgitation.  She has a history of paroxysmal atrial for ablation is currently not bothering her managed with dronedarone and warfarin.  She has no bleeding issues.  She also has a history of PVCs which are rare but symptomatic for her well documented on ZIO Patch and has seen Dr. eckert with regard to this.  She has a history of coronary angiography in the past which showed mild nonobstructive disease.  She has hypertension type 2 diabetes mellitus as well.  She also has chronic kidney disease.    Doing well since last visit.  Home blood pressures are reasonable.  She would like to switch to Xarelto from warfarin for convenience.  She has stable renal dysfunction.  Tolerating her Multaq well.    Past Medical History:   Diagnosis Date    Allergy     Anxiety     Arrhythmia     Arthritis     Asthma     Carotid bruit     Left    CATARACT     Chest pain     DIABETES MELLITUS     Esophageal reflux     Goiter     Headache(784.0)     Heart murmur     Hyperlipidemia     Hypertension     IBD (inflammatory bowel disease)     Migraine     Silent migraine    AVERY (obstructive sleep apnea)     Overweight(278.02)     Palpitations     Paroxysmal atrial fibrillation (HCC) 7/12/13    woke with palps, AFib per ekg in am.    Pneumonia     Nov. 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 Badillo    Urinary tract infection, site not specified      Past Surgical History:   Procedure Laterality Date    CATARACT PHACO WITH IOL Right 10/25/2016    Procedure:  CATARACT PHACO WITH IOL;  Surgeon: Zoran Gamble M.D.;  Location: SURGERY SAME DAY [facility];  Service:     ABDOMINAL HYSTERECTOMY TOTAL      CHOLECYSTECTOMY      TONSILLECTOMY      US-NEEDLE CORE BX-BREAST PANEL       Family History   Problem Relation Age of Onset    Cancer Maternal Aunt     Diabetes Maternal Aunt     Heart Disease Maternal Aunt     Hypertension Maternal Aunt     Hyperlipidemia Maternal Aunt     Cancer Mother         Leukemia    Hypertension Mother     Diabetes Mother     Lung Disease Father     Cancer Father         Lung    Lung Disease Brother     Stroke Brother     Diabetes Brother     Heart Disease Brother     Diabetes Maternal Grandmother     Hypertension Brother     Diabetes Brother      Social History     Socioeconomic History    Marital status:      Spouse name: Not on file    Number of children: Not on file    Years of education: Not on file    Highest education level: Not on file   Occupational History    Not on file   Tobacco Use    Smoking status: Former     Packs/day: 1.00     Years: 20.00     Pack years: 20.00     Types: Cigarettes     Quit date: 1983     Years since quittin.3    Smokeless tobacco: Never   Vaping Use    Vaping Use: Never used   Substance and Sexual Activity    Alcohol use: No     Alcohol/week: 0.0 oz    Drug use: No    Sexual activity: Never     Partners: Male   Other Topics Concern    Not on file   Social History Narrative    Not on file     Social Determinants of Health     Financial Resource Strain: Low Risk     Difficulty of Paying Living Expenses: Not hard at all   Food Insecurity: No Food Insecurity    Worried About Running Out of Food in the Last Year: Never true    Ran Out of Food in the Last Year: Never true   Transportation Needs: No Transportation Needs    Lack of Transportation (Medical): No    Lack of Transportation (Non-Medical): No   Physical Activity: Not on file   Stress: Stress Concern Present    Feeling of Stress : To some  extent   Social Connections: Moderately Isolated    Frequency of Communication with Friends and Family: Twice a week    Frequency of Social Gatherings with Friends and Family: Twice a week    Attends [employer] Services: Never    Active Member of Clubs or Organizations: No    Attends Club or Organization Meetings: 1 to 4 times per year    Marital Status:    Intimate Partner Violence: Not At Risk    Fear of Current or Ex-Partner: No    Emotionally Abused: No    Physically Abused: No    Sexually Abused: No   Housing Stability: Low Risk     Unable to Pay for Housing in the Last Year: No    Number of Places Lived in the Last Year: 1    Unstable Housing in the Last Year: No     Allergies   Allergen Reactions    Amlodipine Itching    Levoxyl [Levothyroxine Sodium] Unspecified     Flushing, very hot    Amaryl      GI Problems    Avandia [Rosiglitazone Maleate]      GI problems    Cipro Xr      Possibly causes Chills.    Clonidine      Rapid heart rate, swelling     Contrast Media With Iodine [Iodine] Hives    Glipizide      GI Problems    Glucophage [Metformin Hydrochloride]      dizzy    Hydralazine      Rapid heart rate, chest tightness    Levaquin      Possible allergy - chills with cipro but sick    Relafen [Nabumetone]      Itching; avoids all nsaids     Outpatient Encounter Medications as of 4/19/2023   Medication Sig Dispense Refill    Alum & Mag Hydroxide-Simeth (ANTACID ADVANCED PO) Take  by mouth.      rivaroxaban (XARELTO) 15 MG Tab tablet Take 1 Tablet by mouth with dinner. 90 Tablet 2    magnesium oxide (MAG-OX) 400 MG Tab tablet Take 1 Tablet by mouth every day. 90 Tablet 0    omeprazole (PRILOSEC) 20 MG delayed-release capsule Take 2 capsules by mouth every day. (Patient taking differently: Take 20 mg by mouth every day.) 180 Capsule 1    Continuous Blood Gluc Sensor (FREESTYLE YANG 14 DAY SENSOR) Misc Apply every 14 days 2 Each 11    furosemide (LASIX) 80 MG Tab Take 1 tablet by mouth daily 100  Tablet 1    NON SPECIFIED FREESTYLE PRECISION MACY TEST STRIPS FOR FREESTYLE YANG 2 50 Each 3    glucose blood (FREESTYLE PRECISION MACY TEST) strip Use strips to test blood sugar as directed 100 Strip 11    montelukast (SINGULAIR) 10 MG Tab Take 1 Tablet by mouth at bedtime. 90 Tablet 3    fluticasone (FLONASE) 50 MCG/ACT nasal spray USE 1-2  SPRAY(S) IN EACH NOSTRIL ONCE DAILY 30 MINUTES PRIOR TO USE OF CPAP 48 g 3    clonazePAM (KLONOPIN) 0.5 MG Tab TAKE 1 TABLET BY MOUTH ONCE DAILY AS NEEDED FOR 30 DAYS. ANXIETY/HEART PALPITATIONS for 30 days 30 Tablet 1    dronedarone (MULTAQ) 400 MG Tab Take 1 Tablet by mouth 2 times a day with meals. 60 Tablet 0    losartan (COZAAR) 50 MG Tab Take 1 tablet by mouth 2 Times a Day. 200 Tablet 2    loratadine (CLARITIN) 10 MG Tab Take 1 tablet by mouth every day. 100 Tablet 2    allopurinol (ZYLOPRIM) 100 MG Tab TAKE TWO TABLETS BY MOUTH TWICE DAILY 360 Tablet 2    Semaglutide (OZEMPIC, 0.25 OR 0.5 MG/DOSE, SC) Inject  under the skin.      metoprolol tartrate (LOPRESSOR) 50 MG Tab Take 1 tablet by mouth 2 times a day. 200 Tablet 3    rosuvastatin (CRESTOR) 10 MG Tab Take 1 tablet by mouth every evening. Stop simvastatin. 100 Tablet 3    levothyroxine (SYNTHROID) 50 MCG Tab Take 1 tablet by mouth every morning on an empty stomach. 100 Tablet 3    nystatin (MYCOSTATIN) 297999 UNIT/GM Ointment APPLY TO AFFECTED AREA up to twice daily 240 g 2    levalbuterol (XOPENEX HFA) 45 MCG/ACT inhaler Inhale 2 Puffs every 6 hours as needed for Shortness of Breath. 15 g 11    Continuous Blood Gluc  (FREESTYLE YANG 2 READER) Device Use to monitor blood sugar daily 1 Each 11    NON SPECIFIED C-Pap supplies  -humidifier tank and a spare   - fax [phone]...    Dx:  Sleep apnea 1 Each 1    NON SPECIFIED blue concentrator at night along with my C-PAP./3 liters per hour 1 Each 0    ferrous sulfate 325 (65 Fe) MG tablet Take 325 mg by mouth every day.      Urine Glucose-Ketones Test Strip 1  Strip by In Vitro route every day. 100 Strip 2    Insulin Pen Needle 32G X 4 MM Misc 1 Applicator by Does not apply route 3 times a day. 100 Each 11    Blood Glucose Monitoring Suppl W/DEVICE Kit 1 Each by Does not apply route 2 times a day as needed. 1 Kit 0    Misc. Devices MISC Pen needles for Lalitha Cooley. 29g 12mm. 90 Each 3    Cholecalciferol (VITAMIN D) 2000 UNITS CAPS Take 4,000 Units by mouth every day.      terazosin (HYTRIN) 2 MG Cap Take 1 capsule at bedtime Orally Once a day 90 days (Patient not taking: Reported on 4/19/2023) 90 Capsule 1    gabapentin (NEURONTIN) 100 MG Cap Take 1-3 Capsules by mouth 3 times a day. (Patient not taking: Reported on 4/19/2023) 270 Capsule 1    [DISCONTINUED] methylPREDNISolone (MEDROL DOSEPAK) 4 MG Tablet Therapy Pack As directed on the packaging label. 21 Tablet 0    [DISCONTINUED] gabapentin (NEURONTIN) 100 MG Cap Take 1 capsule by mouth 3 times a day for 30 days. 90 Capsule 0    [DISCONTINUED] rivaroxaban (XARELTO) 15 MG Tab tablet Take 1 Tablet by mouth with dinner. 30 Tablet 0    [DISCONTINUED] terazosin (HYTRIN) 2 MG Cap Take 1 capsule at bedtime Orally Once a day 90 days 90 Capsule 1    PEG 3350-KCl-NaBcb-NaCl-NaSulf (PEG-3350/ELECTROLYTES) 236 g Recon Soln Follow [facility] instruction handout: (Patient not taking: Reported on 4/19/2023) 4000 mL 0    [DISCONTINUED] pioglitazone (ACTOS) 15 MG Tab Take 1 tablet by mouth every day for 100 days. 100 Tablet 3    terazosin (HYTRIN) 1 MG Cap Take 1 capsule by mouth every day. (Patient not taking: Reported on 4/19/2023) 100 Capsule 3    [DISCONTINUED] glucose blood (FREESTYLE LITE) strip Use test strip to test 2 times a day with meals. 50 Strip 11    [DISCONTINUED] rivaroxaban (XARELTO) 15 MG Tab tablet Take 1 Tablet by mouth with dinner. 90 Tablet 3    [DISCONTINUED] Blood Glucose Monitoring Suppl Supplies Misc Test strips order: Test strips for Accucheck meter. Sig: use BID and prn ssx high or low sugar #100 RF x  "3 100 Strip 11    [DISCONTINUED] Blood Glucose Monitoring Suppl Supplies Misc Test strips order: Test strips for Chely Contour meter. Sig: use tid  and prn ssx high or low sugar #100 RF x 3 (Patient taking differently: Test strips order: Test strips for Chely Contour meter. Sig: use tid  and prn ssx high or low sugar #100 RF x 3) 100 Strip 3     No facility-administered encounter medications on file as of 4/19/2023.     Review of Systems   All other systems reviewed and are negative.     Objective:   /60 (BP Location: Left arm, Patient Position: Sitting, BP Cuff Size: Adult)   Pulse 60   Resp 16   Ht 1.676 m (5' 6\")   Wt 119 kg (262 lb)   SpO2 93%   BMI 42.29 kg/m²     Physical Exam  Vitals reviewed.   Constitutional:       General: She is not in acute distress.     Appearance: She is well-developed. She is not diaphoretic.      Comments: Obese   HENT:      Head: Normocephalic and atraumatic.      Right Ear: External ear normal.      Left Ear: External ear normal.      Mouth/Throat:      Pharynx: No oropharyngeal exudate.   Eyes:      General: No scleral icterus.        Right eye: No discharge.         Left eye: No discharge.      Conjunctiva/sclera: Conjunctivae normal.      Pupils: Pupils are equal, round, and reactive to light.   Neck:      Thyroid: No thyromegaly.      Vascular: No JVD.      Trachea: No tracheal deviation.   Cardiovascular:      Rate and Rhythm: Normal rate and regular rhythm.      Chest Wall: PMI is not displaced.      Pulses:           Carotid pulses are 2+ on the right side and 2+ on the left side.       Radial pulses are 2+ on the right side and 2+ on the left side.        Popliteal pulses are 2+ on the right side and 2+ on the left side.        Dorsalis pedis pulses are 2+ on the right side and 2+ on the left side.        Posterior tibial pulses are 2+ on the right side and 2+ on the left side.      Heart sounds: S1 normal and S2 normal. No murmur heard.    No friction rub. No " gallop. No S3 or S4 sounds.   Pulmonary:      Effort: Pulmonary effort is normal. No respiratory distress.      Breath sounds: Normal breath sounds. No wheezing or rales.   Chest:      Chest wall: No tenderness.   Abdominal:      General: Bowel sounds are normal. There is no distension.      Palpations: Abdomen is soft.      Tenderness: There is no abdominal tenderness.   Musculoskeletal:         General: No tenderness. Normal range of motion.      Cervical back: Normal range of motion and neck supple.   Skin:     General: Skin is warm and dry.      Findings: No erythema or rash.   Neurological:      Mental Status: She is alert and oriented to person, place, and time.      Cranial Nerves: No cranial nerve deficit (Cranial nerves II through XII grossly intact).   Psychiatric:         Behavior: Behavior normal.         Thought Content: Thought content normal.         Judgment: Judgment normal.   No changes in physical exam since prior 8/26/2020    LABS:  Lab Results   Component Value Date/Time    CHOLSTRLTOT 157 09/08/2022 02:00 PM    LDL 69 09/08/2022 02:00 PM    HDL 40 09/08/2022 02:00 PM    TRIGLYCERIDE 238 (H) 09/08/2022 02:00 PM       Lab Results   Component Value Date/Time    WBC 5.2 06/27/2022 03:13 PM    RBC 3.85 (L) 06/27/2022 03:13 PM    HEMOGLOBIN 10.9 (L) 04/10/2023 02:24 PM    HEMATOCRIT 34.2 (L) 04/10/2023 02:24 PM    MCV 95.6 06/27/2022 03:13 PM    NEUTSPOLYS 81.40 (H) 06/27/2022 03:13 PM    LYMPHOCYTES 8.90 (L) 06/27/2022 03:13 PM    MONOCYTES 8.90 06/27/2022 03:13 PM    EOSINOPHILS 0.00 06/27/2022 03:13 PM    BASOPHILS 0.60 06/27/2022 03:13 PM    HYPOCHROMIA 1+ 04/19/2014 01:23 AM     Lab Results   Component Value Date/Time    SODIUM 144 04/10/2023 02:24 PM    POTASSIUM 4.2 04/10/2023 02:24 PM    CHLORIDE 110 04/10/2023 02:24 PM    CO2 20 04/10/2023 02:24 PM    GLUCOSE 155 (H) 04/10/2023 02:24 PM    BUN 84 (H) 04/10/2023 02:24 PM    CREATININE 2.95 (H) 04/10/2023 02:24 PM    CREATININE 1.1 04/27/2006  09:50 AM     Lab Results   Component Value Date    HBA1C 5.9 (H) 09/08/2022      Lab Results   Component Value Date/Time    ALKPHOSPHAT 61 09/08/2022 02:00 PM    ASTSGOT 17 09/08/2022 02:00 PM    ALTSGPT 11 09/08/2022 02:00 PM    TBILIRUBIN 0.7 09/08/2022 02:00 PM      Lab Results   Component Value Date/Time    BNPBTYPENAT 68 04/18/2014 10:20 AM      No results found for: TSH  Lab Results   Component Value Date/Time    PROTHROMBTM 26.0 (H) 03/01/2022 03:33 PM    INR 1.30 (A) 03/21/2022 12:00 AM      ECHO CONCLUSIONS (8/4/2020):  Normal left ventricular chamber size.  Left ventricular ejection fraction is visually estimated to be 60%.  Mild concentric left ventricular hypertrophy.  Grade II diastolic dysfunction.  Moderate mitral regurgitation.  Mild mitral stenosis. Mean gradient 3.4 mmHg at 57 bpm.  Normal inferior vena cava size and inspiratory collapse.       ECHO CONCLUSIONS (12/4/2019):  Compared to the report of the prior study done  on 04/11/17 - no   significant changes noted.  Normal left ventricular size and systolic function.  Left ventricular ejection fraction is visually estimated to be 60%.  Grade I diastolic dysfunction.  Mild concentric left ventricular hypertrophy.  Moderate mitral regurgitation.  Aortic sclerosis without stenosis.  Estimated right ventricular systolic pressure  is 30 mmHg.    ECHO CONCLUSIONS (4/11/2017):  Normal left ventricular size and systolic function. Mild concentric   left ventricular hypertrophy. Left ventricular ejection fraction is   visually estimated to be 55%. Normal regional wall motion. Grade I   diastolic dysfunction.  Mildly dilated left atrium. Left atrial volume index is 37 mL/sq m.  Structurally normal mitral valve. Probably moderate mitral   regurgitation. PISA not well visualized.  Compared to the report of the study done 4/2014- there has been an   improvement in LA size and diastolic function.     EKG (4/10/2018):  I have personally reviewed the EKG this  visit and discussed with the patient.  Sinus rhythm and left bundle branch block        Assessment:     1. PAF (paroxysmal atrial fibrillation) ([facility])  Basic Metabolic Panel      2. Essential hypertension        3. CKD (chronic kidney disease) stage 4, GFR 15-29 ml/min ([facility])        4. Chronic anticoagulation  Basic Metabolic Panel      5. Left bundle branch block        6. Type 2 diabetes mellitus with stage 4 chronic kidney disease, without long-term current use of insulin ([facility])            Medical Decision Making:  Today's Assessment / Status / Plan:     Doing well.  No issues with her PAF.  Tolerating dronedarone.  Tolerating anticoagulation well.  Continue these medications.  Blood pressures are good.  Tolerating Xarelto renally dosed.  Laboratory studies ordered today to monitor his high risk medication use.    Continue current medical therapy and follow-up

## 2023-04-24 RX ORDER — SEMAGLUTIDE 1.34 MG/ML
1 INJECTION, SOLUTION SUBCUTANEOUS
Qty: 3 ML | Refills: 3 | Status: SHIPPED | OUTPATIENT
Start: 2023-04-24 | End: 2023-05-08

## 2023-04-25 ENCOUNTER — TELEPHONE (OUTPATIENT)
Dept: MEDICAL GROUP | Facility: LAB | Age: 78
End: 2023-04-25
Payer: MEDICARE

## 2023-04-25 NOTE — PROGRESS NOTES
"New Patient Note    Interventional Pain and Spine  Physiatry (Physical Medicine and Rehabilitation)     Patient Name: Whitley Frederick  : 1945  Date of Service: 2023  PCP: TREVOR Whelan.  Referring Provider: Elvira Vann P.*    Chief Complaint: No chief complaint on file.      HPI  HISTORY (2023):  Whitley Frederick is a 78 y.o. female who presents today with ***.    This pain began ***. Pain right now is ***/10 on the numeric pain scale. Her pain at its best-worse level during the course of the day is typically ***-***/10, respectively.  Pain worsens with {painaffectedby:51164} and improves with {painaffectedby:65289}. Her pain {paininterferewithadls:34681}. The patient {suredfla::\"otherwise denies radiating pain, new focal weakness, numbness, or bladder/bowel incontinence\",\"otherwise denies new focal weakness, numbness, or bladder/bowel incontinence\"}. {}  L sciatica  Sciatica Symptoms  Pt reports remote hx of sciatica episode in . She reports that this episode started Monday.  Denies recent fall or injury. She reports L side buttock/hip pain extending down the posterior length of the leg.  Denies saddle paresthesia or changes in bowel/bladder habits  Currently using walker.    1. Left sided sciatica  Acute on chronic condition  Discussed physical therapy in addition to physiatry referral.  He states that she is unable to afford physical therapy at this time.  She is agreeable to evaluation by physiatry after acute symptoms of sciatica have improved/resolved.  Discussed red flags and indications for close follow-up  - methylPREDNISolone (MEDROL DOSEPAK) 4 MG Tablet Therapy Pack; As directed on the packaging label.  Dispense: 21 Tablet; Refill: 0  - Referral to Pain Clinic  - gabapentin (NEURONTIN) 100 MG Cap; Take 1 capsule by mouth 3 times a day for 30 days.  Dispense: 90 Capsule; Refill: 0    The patient {cassiehashn:70810} done physical therapy for this " problem, most recently ***.     Patient has tried the following medications with varied success (current meds in bold): ***  Medrol dosepak  Gabapentin    Therapeutic modalities and interventional therapies to date include:  -***    Medical history includes ***.    ***ckphq    Medical records review:  I reviewed the note from the referring provider Elvira Vann P.* including the note dated 3/10/23.    ROS:   Red Flags ROS:   Fever, Chills, Sweats: Denies  Involuntary Weight Loss: Denies  Bladder Incontinence: Denies  Bowel Incontinence: denies  Saddle Anesthesia: Denies    All other systems reviewed and negative.     PMHx:   Past Medical History:   Diagnosis Date    Allergy     Anxiety     Arrhythmia     Arthritis     Asthma     Carotid bruit     Left    CATARACT     Chest pain     DIABETES MELLITUS     Esophageal reflux     Goiter     Headache(784.0)     Heart murmur     Hyperlipidemia     Hypertension     IBD (inflammatory bowel disease)     Migraine     Silent migraine    AVERY (obstructive sleep apnea)     Overweight(278.02)     Palpitations     Paroxysmal atrial fibrillation (HCC) 7/12/13    woke with palps, AFib per ekg in am.    Pneumonia     Nov. 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 Badillo    Urinary tract infection, site not specified        PSHx:   Past Surgical History:   Procedure Laterality Date    CATARACT PHACO WITH IOL Right 10/25/2016    Procedure: CATARACT PHACO WITH IOL;  Surgeon: Zoran Gamble M.D.;  Location: SURGERY SAME DAY [facility];  Service:     ABDOMINAL HYSTERECTOMY TOTAL      CHOLECYSTECTOMY      TONSILLECTOMY      US-NEEDLE CORE BX-BREAST PANEL         Family Hx:   Family History   Problem Relation Age of Onset    Cancer Maternal Aunt     Diabetes Maternal Aunt     Heart Disease Maternal Aunt     Hypertension Maternal Aunt     Hyperlipidemia Maternal Aunt     Cancer  Mother         Leukemia    Hypertension Mother     Diabetes Mother     Lung Disease Father     Cancer Father         Lung    Lung Disease Brother     Stroke Brother     Diabetes Brother     Heart Disease Brother     Diabetes Maternal Grandmother     Hypertension Brother     Diabetes Brother        Social Hx:  Social History     Socioeconomic History    Marital status:      Spouse name: Not on file    Number of children: Not on file    Years of education: Not on file    Highest education level: Not on file   Occupational History    Not on file   Tobacco Use    Smoking status: Former     Packs/day: 1.00     Years: 20.00     Pack years: 20.00     Types: Cigarettes     Quit date: 1983     Years since quittin.3    Smokeless tobacco: Never   Vaping Use    Vaping Use: Never used   Substance and Sexual Activity    Alcohol use: No     Alcohol/week: 0.0 oz    Drug use: No    Sexual activity: Never     Partners: Male   Other Topics Concern    Not on file   Social History Narrative    Not on file     Social Determinants of Health     Financial Resource Strain: Low Risk     Difficulty of Paying Living Expenses: Not hard at all   Food Insecurity: No Food Insecurity    Worried About Running Out of Food in the Last Year: Never true    Ran Out of Food in the Last Year: Never true   Transportation Needs: No Transportation Needs    Lack of Transportation (Medical): No    Lack of Transportation (Non-Medical): No   Physical Activity: Not on file   Stress: Stress Concern Present    Feeling of Stress : To some extent   Social Connections: Moderately Isolated    Frequency of Communication with Friends and Family: Twice a week    Frequency of Social Gatherings with Friends and Family: Twice a week    Attends Restoration Services: Never    Active Member of Clubs or Organizations: No    Attends Club or Organization Meetings: 1 to 4 times per year    Marital Status:    Intimate Partner Violence: Not At Risk    Fear of  Current or Ex-Partner: No    Emotionally Abused: No    Physically Abused: No    Sexually Abused: No   Housing Stability: Low Risk     Unable to Pay for Housing in the Last Year: No    Number of Places Lived in the Last Year: 1    Unstable Housing in the Last Year: No       Allergies:  Allergies   Allergen Reactions    Amlodipine Itching    Levoxyl [Levothyroxine Sodium] Unspecified     Flushing, very hot    Amaryl      GI Problems    Avandia [Rosiglitazone Maleate]      GI problems    Cipro Xr      Possibly causes Chills.    Clonidine      Rapid heart rate, swelling     Contrast Media With Iodine [Iodine] Hives    Glipizide      GI Problems    Glucophage [Metformin Hydrochloride]      dizzy    Hydralazine      Rapid heart rate, chest tightness    Levaquin      Possible allergy - chills with cipro but sick    Relafen [Nabumetone]      Itching; avoids all nsaids       Medications: reviewed on epic.   No outpatient medications have been marked as taking for the 4/26/23 encounter (Appointment) with Lyly Pineda M.D..        Current Outpatient Medications on File Prior to Visit   Medication Sig Dispense Refill    Semaglutide, 1 MG/DOSE, (OZEMPIC, 1 MG/DOSE,) 4 MG/3ML Solution Pen-injector Inject 1 mg under the skin every 7 days. 3 mL 3    Alum & Mag Hydroxide-Simeth (ANTACID ADVANCED PO) Take  by mouth.      rivaroxaban (XARELTO) 15 MG Tab tablet Take 1 Tablet by mouth with dinner. 90 Tablet 2    terazosin (HYTRIN) 2 MG Cap Take 1 capsule at bedtime Orally Once a day 90 days (Patient not taking: Reported on 4/19/2023) 90 Capsule 1    magnesium oxide (MAG-OX) 400 MG Tab tablet Take 1 Tablet by mouth every day. 90 Tablet 0    gabapentin (NEURONTIN) 100 MG Cap Take 1-3 Capsules by mouth 3 times a day. (Patient not taking: Reported on 4/19/2023) 270 Capsule 1    omeprazole (PRILOSEC) 20 MG delayed-release capsule Take 2 capsules by mouth every day. (Patient taking differently: Take 20 mg by mouth every day.) 180 Capsule 1     Continuous Blood Gluc Sensor (FREESTYLE YANG 14 DAY SENSOR) Misc Apply every 14 days 2 Each 11    furosemide (LASIX) 80 MG Tab Take 1 tablet by mouth daily 100 Tablet 1    NON SPECIFIED FREESTYLE PRECISION MACY TEST STRIPS FOR FREESTYLE YANG 2 50 Each 3    glucose blood (FREESTYLE PRECISION MACY TEST) strip Use strips to test blood sugar as directed 100 Strip 11    montelukast (SINGULAIR) 10 MG Tab Take 1 Tablet by mouth at bedtime. 90 Tablet 3    fluticasone (FLONASE) 50 MCG/ACT nasal spray USE 1-2  SPRAY(S) IN EACH NOSTRIL ONCE DAILY 30 MINUTES PRIOR TO USE OF CPAP 48 g 3    clonazePAM (KLONOPIN) 0.5 MG Tab TAKE 1 TABLET BY MOUTH ONCE DAILY AS NEEDED FOR 30 DAYS. ANXIETY/HEART PALPITATIONS for 30 days 30 Tablet 1    dronedarone (MULTAQ) 400 MG Tab Take 1 Tablet by mouth 2 times a day with meals. 60 Tablet 0    losartan (COZAAR) 50 MG Tab Take 1 tablet by mouth 2 Times a Day. 200 Tablet 2    loratadine (CLARITIN) 10 MG Tab Take 1 tablet by mouth every day. 100 Tablet 2    allopurinol (ZYLOPRIM) 100 MG Tab TAKE TWO TABLETS BY MOUTH TWICE DAILY 360 Tablet 2    PEG 3350-KCl-NaBcb-NaCl-NaSulf (PEG-3350/ELECTROLYTES) 236 g Recon Soln Follow GI Consultants instruction handout: (Patient not taking: Reported on 4/19/2023) 4000 mL 0    Semaglutide (OZEMPIC, 0.25 OR 0.5 MG/DOSE, SC) Inject  under the skin.      metoprolol tartrate (LOPRESSOR) 50 MG Tab Take 1 tablet by mouth 2 times a day. 200 Tablet 3    rosuvastatin (CRESTOR) 10 MG Tab Take 1 tablet by mouth every evening. Stop simvastatin. 100 Tablet 3    levothyroxine (SYNTHROID) 50 MCG Tab Take 1 tablet by mouth every morning on an empty stomach. 100 Tablet 3    terazosin (HYTRIN) 1 MG Cap Take 1 capsule by mouth every day. (Patient not taking: Reported on 4/19/2023) 100 Capsule 3    nystatin (MYCOSTATIN) 589509 UNIT/GM Ointment APPLY TO AFFECTED AREA up to twice daily 240 g 2    levalbuterol (XOPENEX HFA) 45 MCG/ACT inhaler Inhale 2 Puffs every 6 hours as needed for  Shortness of Breath. 15 g 11    Continuous Blood Gluc  (FREESTYLE YANG 2 READER) Device Use to monitor blood sugar daily 1 Each 11    NON SPECIFIED C-Pap supplies  -humidifier tank and a spare   - fax [phone]...    Dx:  Sleep apnea 1 Each 1    NON SPECIFIED blue concentrator at night along with my C-PAP./3 liters per hour 1 Each 0    ferrous sulfate 325 (65 Fe) MG tablet Take 325 mg by mouth every day.      Urine Glucose-Ketones Test Strip 1 Strip by In Vitro route every day. 100 Strip 2    Insulin Pen Needle 32G X 4 MM Misc 1 Applicator by Does not apply route 3 times a day. 100 Each 11    Blood Glucose Monitoring Suppl W/DEVICE Kit 1 Each by Does not apply route 2 times a day as needed. 1 Kit 0    Misc. Devices MISC Pen needles for Lantus Solstar. 29g 12mm. 90 Each 3    Cholecalciferol (VITAMIN D) 2000 UNITS CAPS Take 4,000 Units by mouth every day.       No current facility-administered medications on file prior to visit.         EXAMINATION     Physical Exam:   There were no vitals taken for this visit.    Constitutional:   Body Habitus: There is no height or weight on file to calculate BMI.  Cooperation: Fully cooperates with exam  Appearance: Well-groomed, well-nourished.    Eyes: No scleral icterus to suggest severe liver disease, no proptosis to suggest severe hyperthyroidism    ENT -no obvious auditory deficits, no noticeable facial droop     Skin -no rashes or lesions noted     Respiratory-  breathing comfortably on room air, no audible wheezing    Cardiovascular-distal extremities warm and well perfused.  No lower extremity edema is noted.     Gastrointestinal - no obvious abdominal masses, non-distended    Psychiatric- alert and oriented ×3. Normal affect.     Gait - normal gait, no use of ambulatory device, nonantalgic. ***Heel walking and toe walking intact.    Musculoskeletal and Neuro -     Cervical spine   Inspection: No deformities of the skin over the cervical spine. No rashes or  "lesions.    {surom:65376} active range of motion in all directions    Spurling's sign  {suprovocativetests:83353::\"negative bilaterally\"}  Cervical facet loading maneuver  {suprovocativetests:27914::\"negative bilaterally\"}    No*** signs of muscular atrophy in bilateral upper extremities     Tenderness to palpation at {cervicalttp:09489}. No tenderness to palpation elsewhere including {cervicalttp:18870}.      Key points for the international standards for neurological classification of spinal cord injury (ISNCSCI) to light touch.     Dermatome R L   C4 2 2   C5 2 2   C6 2 2   C7 2 2   C8 2 2   T1 2 2   T2 2 2       Motor Exam Upper Extremities   ? Myotome R L   Shoulder abduction C5 5 5   Elbow flexion C5 5 5   Wrist extension C6 5 5   Elbow extension C7 5 5   Finger flexion C8 5 5   Finger abduction T1 5 5     Reflexes  ?  R L   Biceps  2+ 2+   Brachioradialis  2+ 2+     Rush's sign {ck r/l positive:07406}            Thoracic/Lumbar Spine/Sacral Spine/Hips   Inspection: No*** evidence of atrophy in bilateral lower extremities throughout     There is {surom:59510} active range of motion with lumbar extension    Facet loading maneuver {suprovocativetests:33703::\"negative bilaterally\"}    Palpation:   Tenderness to palpation over the {lumbarttp:95541}. No tenderness to palpation elsewhere in the low back/hips including {lumbarttp:09279}.    Lumbar spine /hip provocative exam maneuvers  Straight leg raise {suprovocativetests:68511::\"negative bilaterally\"}  Slump-sit test {suprovocativetests:17103::\"negative bilaterally\"}  FADIR test {suprovocativetests:31061::\"negative bilaterally\"}    SI joint tests  KINGS test {suprovocativetests:49905::\"negative bilaterally\"}  Thigh thrust test {suprovocativetests:31519::\"negative bilaterally\"}  SI joint compression {suprovocativetests:76015::\"negative bilaterally\"}  SI joint distraction {suprovocativetests:49947::\"negative bilaterally\"}  Sacral thrust test " "{suprovocativetests:29853::\"negative bilaterally\"}  Yeomans maneuver {suprovocativetests:39300::\"negative bilaterally\"}  Savi finger sign {suprovocativetests:03863::\"negative bilaterally\"}      Key points for the international standards for neurological classification of spinal cord injury (ISNCSCI) to light touch.   Dermatome R L   L2 2 2   L3 2 2   L4 2 2   L5 2 2   S1 2 2   S2 2 2       Motor Exam Lower Extremities  ? Myotome R L   Hip flexion L2 5 5   Knee extension L3 5 5   Ankle dorsiflexion L4 5 5   Toe extension L5 5 5   Ankle plantarflexion S1 5 5       Reflexes  ?  R L   Patella  2+ 2+   Achilles   2+ 2+     Clonus of the ankle {ck r/l positive:20693}       MEDICAL DECISION MAKING    Medical records review: see under HPI section.     DATA    Labs: ***  Lab Results   Component Value Date/Time    SODIUM 144 04/10/2023 02:24 PM    POTASSIUM 4.2 04/10/2023 02:24 PM    CHLORIDE 110 04/10/2023 02:24 PM    CO2 20 04/10/2023 02:24 PM    ANION 10.0 09/08/2022 02:00 PM    GLUCOSE 155 (H) 04/10/2023 02:24 PM    BUN 84 (H) 04/10/2023 02:24 PM    CREATININE 2.95 (H) 04/10/2023 02:24 PM    CREATININE 1.1 04/27/2006 09:50 AM    CALCIUM 9.4 04/10/2023 02:24 PM    ASTSGOT 17 09/08/2022 02:00 PM    ALTSGPT 11 09/08/2022 02:00 PM    TBILIRUBIN 0.7 09/08/2022 02:00 PM    ALBUMIN 3.9 04/10/2023 02:24 PM    ALBUMIN 3.79 03/23/2017 12:28 PM    TOTPROTEIN 6.6 09/08/2022 02:00 PM    TOTPROTEIN 6.90 03/23/2017 12:28 PM    GLOBULIN 2.4 09/08/2022 02:00 PM    AGRATIO 1.8 09/08/2022 02:00 PM       Lab Results   Component Value Date/Time    PROTHROMBTM 26.0 (H) 03/01/2022 03:33 PM    INR 1.30 (A) 03/21/2022 12:00 AM        Lab Results   Component Value Date/Time    WBC 5.2 06/27/2022 03:13 PM    RBC 3.85 (L) 06/27/2022 03:13 PM    HEMOGLOBIN 10.9 (L) 04/10/2023 02:24 PM    HEMATOCRIT 34.2 (L) 04/10/2023 02:24 PM    MCV 95.6 06/27/2022 03:13 PM    MCH 31.7 06/27/2022 03:13 PM    MCHC 33.2 (L) 06/27/2022 03:13 PM    MPV 11.4 06/27/2022 " 03:13 PM    NEUTSPOLYS 81.40 (H) 06/27/2022 03:13 PM    LYMPHOCYTES 8.90 (L) 06/27/2022 03:13 PM    MONOCYTES 8.90 06/27/2022 03:13 PM    EOSINOPHILS 0.00 06/27/2022 03:13 PM    BASOPHILS 0.60 06/27/2022 03:13 PM    HYPOCHROMIA 1+ 04/19/2014 01:23 AM        Lab Results   Component Value Date/Time    HBA1C 5.9 (H) 09/08/2022 02:00 PM        Imaging:   I personally reviewed following images, these are my reads  ***        IMAGING radiology reads. I reviewed the following radiology reads                      Results for orders placed in visit on 11/10/20    MR-LUMBAR SPINE-W/O    Impression  1.  Mild multilevel lumbar spondylotic change effaces ventral surface of thecal sac.    2.  Small left paracentral disc extrusion at the L4-5 level effacing the left anterior lateral aspect of thecal sac. Further there is disc protruding into the inferior aspect of the left-sided neural foramina at this level abutting the exiting nerve  root.    3.  Mild to moderate central canal stenosis at the L4-5 level with mild central canal stenosis at the L3-4 level secondary to facet arthropathy.        Results for orders placed in visit on 11/10/20    MR-LUMBAR SPINE-W/O    Impression  1.  Mild multilevel lumbar spondylotic change effaces ventral surface of thecal sac.    2.  Small left paracentral disc extrusion at the L4-5 level effacing the left anterior lateral aspect of thecal sac. Further there is disc protruding into the inferior aspect of the left-sided neural foramina at this level abutting the exiting nerve  root.    3.  Mild to moderate central canal stenosis at the L4-5 level with mild central canal stenosis at the L3-4 level secondary to facet arthropathy.                                                           Results for orders placed during the hospital encounter of 04/11/19    DX-KNEE 3 VIEWS RIGHT    Impression  1.  No evidence of acute fracture or dislocation.    2.  Tricompartment degenerative change.    3.   Prepatellar soft tissue swelling.     Results for orders placed during the hospital encounter of 10/29/20    DX-LUMBAR SPINE-2 OR 3 VIEWS    Impression  1.  No acute lumbar compression fracture or subluxation.    2.  Mild multilevel degenerative disc disease.                         Diagnosis  {No diagnosis found. (Refresh or delete this SmartLink)}      ASSESSMENT AND PLAN:  Whitley Frederick ( 1945) is a female with history of *** who presents with *** suggestive of ***.     There are no diagnoses linked to this encounter.      PLAN  Physical Therapy: I ordered physical therapy to focus on strengthening and stretching as well as a home exercise program. ***    Home Exercise Program: I provided the patient with a home exercise program focusing on strengthening and stretching.     Diagnostic workup: {suapimagin}    Medications:   - {sumeds:79900}   -  reviewed, records do not demonstrate increased risk of opioid abuse.    Interventions:   ***     Referrals:   ***    Outside records requested:  The patient signed outside records request form for her outside records including outside images. This includes the records from {surequestrec:05364}    Follow-up: ***    No orders of the defined types were placed in this encounter.      Lyly Pineda MD  Interventional Pain and Spine  Physical Medicine and Rehabilitation  [facility]    CC Elvira Vann P.*     The above note documents my personal evaluation of this patient. In addition, I have reviewed and confirmed with the patient and MA the supportive information documented in today's Patient Health Questionnaire and Office Note.     Please note that this dictation was created using voice recognition software. I have made every reasonable attempt to correct obvious errors, but I expect that there are errors of grammar and possibly content that I did not discover before finalizing the note.

## 2023-04-25 NOTE — TELEPHONE ENCOUNTER
"Patient Asst. program  paperwork received from [employer] requiring provider signature.     All appropriate fields completed by Medical Assistant: N/A CMA printed and distributed to MA    Paperwork placed in \"MA to Provider\" folder/basket. Awaiting provider completion/signature.  "

## 2023-04-26 ENCOUNTER — APPOINTMENT (OUTPATIENT)
Dept: PHYSICAL MEDICINE AND REHAB | Facility: MEDICAL CENTER | Age: 78
End: 2023-04-26
Payer: MEDICARE

## 2023-04-26 ENCOUNTER — OFFICE VISIT (OUTPATIENT)
Dept: PHYSICAL MEDICINE AND REHAB | Facility: MEDICAL CENTER | Age: 78
End: 2023-04-26

## 2023-04-26 DIAGNOSIS — Z91.199 NO-SHOW FOR APPOINTMENT: ICD-10-CM

## 2023-04-26 PROCEDURE — 99999 PR NO CHARGE: CPT | Performed by: STUDENT IN AN ORGANIZED HEALTH CARE EDUCATION/TRAINING PROGRAM

## 2023-04-27 PROCEDURE — RXMED WILLOW AMBULATORY MEDICATION CHARGE: Performed by: NURSE PRACTITIONER

## 2023-05-01 PROCEDURE — RXMED WILLOW AMBULATORY MEDICATION CHARGE: Performed by: NURSE PRACTITIONER

## 2023-05-02 ENCOUNTER — HOSPITAL ENCOUNTER (OUTPATIENT)
Dept: LAB | Facility: MEDICAL CENTER | Age: 78
End: 2023-05-02
Attending: INTERNAL MEDICINE
Payer: MEDICARE

## 2023-05-02 DIAGNOSIS — I48.0 PAF (PAROXYSMAL ATRIAL FIBRILLATION) (HCC): ICD-10-CM

## 2023-05-02 DIAGNOSIS — Z79.01 CHRONIC ANTICOAGULATION: ICD-10-CM

## 2023-05-02 LAB
ALBUMIN SERPL BCP-MCNC: 4.2 G/DL (ref 3.2–4.9)
ANION GAP SERPL CALC-SCNC: 14 MMOL/L (ref 7–16)
BUN SERPL-MCNC: 58 MG/DL (ref 8–22)
BUN SERPL-MCNC: 60 MG/DL (ref 8–22)
CALCIUM ALBUM COR SERPL-MCNC: 9.3 MG/DL (ref 8.5–10.5)
CALCIUM SERPL-MCNC: 9.3 MG/DL (ref 8.5–10.5)
CALCIUM SERPL-MCNC: 9.5 MG/DL (ref 8.5–10.5)
CHLORIDE SERPL-SCNC: 103 MMOL/L (ref 96–112)
CHLORIDE SERPL-SCNC: 106 MMOL/L (ref 96–112)
CO2 SERPL-SCNC: 22 MMOL/L (ref 20–33)
CO2 SERPL-SCNC: 23 MMOL/L (ref 20–33)
CREAT SERPL-MCNC: 2.65 MG/DL (ref 0.5–1.4)
CREAT SERPL-MCNC: 2.67 MG/DL (ref 0.5–1.4)
GFR SERPLBLD CREATININE-BSD FMLA CKD-EPI: 18 ML/MIN/1.73 M 2
GFR SERPLBLD CREATININE-BSD FMLA CKD-EPI: 18 ML/MIN/1.73 M 2
GLUCOSE SERPL-MCNC: 153 MG/DL (ref 65–99)
GLUCOSE SERPL-MCNC: 157 MG/DL (ref 65–99)
PHOSPHATE SERPL-MCNC: 3.9 MG/DL (ref 2.5–4.5)
POTASSIUM SERPL-SCNC: 4.8 MMOL/L (ref 3.6–5.5)
POTASSIUM SERPL-SCNC: 4.9 MMOL/L (ref 3.6–5.5)
SODIUM SERPL-SCNC: 139 MMOL/L (ref 135–145)
SODIUM SERPL-SCNC: 140 MMOL/L (ref 135–145)

## 2023-05-02 PROCEDURE — 80069 RENAL FUNCTION PANEL: CPT

## 2023-05-02 PROCEDURE — 80048 BASIC METABOLIC PNL TOTAL CA: CPT

## 2023-05-02 PROCEDURE — 36415 COLL VENOUS BLD VENIPUNCTURE: CPT

## 2023-05-03 ENCOUNTER — TELEPHONE (OUTPATIENT)
Dept: CARDIOLOGY | Facility: MEDICAL CENTER | Age: 78
End: 2023-05-03

## 2023-05-03 ENCOUNTER — PHARMACY VISIT (OUTPATIENT)
Dept: PHARMACY | Facility: MEDICAL CENTER | Age: 78
End: 2023-05-03
Payer: MEDICARE

## 2023-05-03 ENCOUNTER — TELEPHONE (OUTPATIENT)
Dept: CARDIOLOGY | Facility: MEDICAL CENTER | Age: 78
End: 2023-05-03
Payer: MEDICARE

## 2023-05-03 ENCOUNTER — PATIENT OUTREACH (OUTPATIENT)
Dept: HEALTH INFORMATION MANAGEMENT | Facility: OTHER | Age: 78
End: 2023-05-03
Payer: MEDICARE

## 2023-05-03 DIAGNOSIS — N18.4 TYPE 2 DIABETES MELLITUS WITH STAGE 4 CHRONIC KIDNEY DISEASE, WITHOUT LONG-TERM CURRENT USE OF INSULIN (HCC): ICD-10-CM

## 2023-05-03 DIAGNOSIS — I48.0 PAF (PAROXYSMAL ATRIAL FIBRILLATION) (HCC): ICD-10-CM

## 2023-05-03 DIAGNOSIS — E11.22 TYPE 2 DIABETES MELLITUS WITH STAGE 4 CHRONIC KIDNEY DISEASE, WITHOUT LONG-TERM CURRENT USE OF INSULIN (HCC): ICD-10-CM

## 2023-05-03 DIAGNOSIS — I10 ESSENTIAL HYPERTENSION: ICD-10-CM

## 2023-05-03 DIAGNOSIS — J45.20 MILD INTERMITTENT ASTHMA WITHOUT COMPLICATION: ICD-10-CM

## 2023-05-03 PROCEDURE — 99490 CHRNC CARE MGMT STAFF 1ST 20: CPT | Performed by: NURSE PRACTITIONER

## 2023-05-03 RX ORDER — DRONEDARONE 400 MG/1
400 TABLET, FILM COATED ORAL 2 TIMES DAILY WITH MEALS
Qty: 60 TABLET | Refills: 0 | Status: SHIPPED | OUTPATIENT
Start: 2023-05-03

## 2023-05-03 NOTE — TELEPHONE ENCOUNTER
Notified by Bob with [facility], pt needs paperwork filled out from [employer] for Multaq refill. Retrieved fax from [employer] and paperwork completed. Faxed back to [employer] at [phone], received receipt for confirmation. Notified Bob[facility]    Paperwork scanned through [employer].

## 2023-05-03 NOTE — ED NOTES
1540 ERP to assess  1600 Assessment done  1700 Pharmacist consult with pt re antibody injection  1725 Medicated for nausea and fever  1830 No adverse Rx to meds  1900 No adverse Rx to meds IV D/C intact D/C instn reviewed Rest Plenty of fluids  Immune support vitamins and minerals Self isolate x 5 d tylenol for fever and body aches Zofran for nausea Return for CP SOB or worsening S/S   [Recalcitrant Symptoms] : recalcitrant symptoms  [None] : none [Flexible Endoscope] : examined with the flexible endoscope [FreeTextEntry6] : Procedure: Flexible Nasal Endoscopy with a Look at the Larynx: Risks, benefits, and alternatives of flexible endoscopy were explained to the patient. The patient gave oral consent to proceed. The flexible scope was inserted into the right nasal cavity. Endoscopy of the inferior and middle meatus was performed. wide open maxillary sinus, Ethmoid sinus wide open and clear, clear to nasopharynx, significant edema of postcricoid, arytenoids and interarytenoids, normal cord motion, pooling of the vallecula, lymphoid hyperplasia at base of tongue. Left side: sinuses open and clear, septal perforation posteriorly, no bleeding sites on either side. The patient tolerated the procedure well.

## 2023-05-03 NOTE — TELEPHONE ENCOUNTER
TW    Caller: - Whitley    Medication Name and Dosage:    MULTAQ 400 MG Tab [[id number]]    Medication amount left: 0    Preferred Pharmacy: [facility]    Other questions (Topic): Would like the samples sent by , as she has no vehicle.     She mentioned that [facility] had it faxed over.     (Sammi has worked with Whitley before on this refill)    Callback Number (Will only call for issues): [phone]    Thank you,   Cleo JENKINS

## 2023-05-03 NOTE — PROGRESS NOTES
Assessment  Spoke with Whitley for monthly outreach follow up. Whitley states she is doing better. Her sciatic pain is gone. She had a follow up with nephrology, her medications were changed, and she feels much less tired.  She will follow up again next month. She is working with pharmacotherapy for coverage of her Multaqu as well. She has a behavioral health appointment scheduled for June. Whitley denied any concerns. Encouraged to call with any needs.     Education  Discussed recent labs and last nephrology follow up appointment   Discussed glucose levels  Discussed follow up appointments     Plan of Care and Goals  Continue diabetes education  Continue to monitor blood pressure    Barriers:  Progression of CKD   Habits     Progress:  Progressing    Next outreach:  1 month

## 2023-05-04 ENCOUNTER — PHARMACY VISIT (OUTPATIENT)
Dept: PHARMACY | Facility: MEDICAL CENTER | Age: 78
End: 2023-05-04
Payer: COMMERCIAL

## 2023-05-04 PROCEDURE — RXMED WILLOW AMBULATORY MEDICATION CHARGE: Performed by: INTERNAL MEDICINE

## 2023-05-08 ENCOUNTER — TELEPHONE (OUTPATIENT)
Dept: MEDICAL GROUP | Facility: LAB | Age: 78
End: 2023-05-08
Payer: MEDICARE

## 2023-05-08 NOTE — TELEPHONE ENCOUNTER
ESTABLISHED PATIENT PRE-VISIT PLANNING     Patient was NOT contacted to complete PVP.     Note: Patient will not be contacted if there is no indication to call.     1.  Reviewed notes from the last few office visits within the medical group: Yes    2.  If any orders were placed at last visit or intended to be done for this visit (i.e. 6 mos follow-up), do we have Results/Consult Notes?           Labs - Labs were not ordered at last office visit.  Note: If patient appointment is for lab review and patient did not complete labs, check with provider if OK to reschedule patient until labs completed.         Imaging - Imaging was not ordered at last office visit.         Referrals - Referral ordered, patient was seen and consult notes are in chart. Care Teams updated  YES.    3. Is this appointment scheduled as a Hospital Follow-Up? No    4.  Immunizations were updated in Epic using Reconcile Outside Information activity? Yes    5.  Patient is due for the following Health Maintenance Topics:   Health Maintenance Due   Topic Date Due    IMM ZOSTER VACCINES (1 of 2) Never done    DIABETES MONOFILAMENT / LE EXAM  11/11/2022    A1C SCREENING  03/08/2023    COVID-19 Vaccine (3 - Booster for Pfizer series) 04/10/2023     6.  AHA (Pulse8) form printed for Provider? N/A

## 2023-05-15 ENCOUNTER — TELEPHONE (OUTPATIENT)
Dept: CARDIOLOGY | Facility: MEDICAL CENTER | Age: 78
End: 2023-05-15
Payer: MEDICARE

## 2023-05-15 NOTE — TELEPHONE ENCOUNTER
[facility] in [facility] office. Called to advise patient. Patient states she will send someone to  like she normally does.

## 2023-05-24 ENCOUNTER — APPOINTMENT (OUTPATIENT)
Dept: PHYSICAL MEDICINE AND REHAB | Facility: MEDICAL CENTER | Age: 78
End: 2023-05-24
Payer: MEDICARE

## 2023-05-26 ENCOUNTER — APPOINTMENT (OUTPATIENT)
Dept: PHYSICAL MEDICINE AND REHAB | Facility: MEDICAL CENTER | Age: 78
End: 2023-05-26
Payer: MEDICARE

## 2023-05-31 ENCOUNTER — PHARMACY VISIT (OUTPATIENT)
Dept: PHARMACY | Facility: MEDICAL CENTER | Age: 78
End: 2023-05-31
Payer: MEDICARE

## 2023-05-31 PROCEDURE — RXMED WILLOW AMBULATORY MEDICATION CHARGE: Performed by: INTERNAL MEDICINE

## 2023-05-31 PROCEDURE — RXMED WILLOW AMBULATORY MEDICATION CHARGE: Performed by: NURSE PRACTITIONER

## 2023-06-01 ENCOUNTER — TELEPHONE (OUTPATIENT)
Dept: MEDICAL GROUP | Facility: LAB | Age: 78
End: 2023-06-01
Payer: MEDICARE

## 2023-06-01 DIAGNOSIS — J96.11 CHRONIC RESPIRATORY FAILURE WITH HYPOXIA (HCC): ICD-10-CM

## 2023-06-01 DIAGNOSIS — G47.33 OSA (OBSTRUCTIVE SLEEP APNEA): Chronic | ICD-10-CM

## 2023-06-05 ENCOUNTER — PATIENT OUTREACH (OUTPATIENT)
Dept: HEALTH INFORMATION MANAGEMENT | Facility: OTHER | Age: 78
End: 2023-06-05
Payer: MEDICARE

## 2023-06-05 DIAGNOSIS — N18.4 TYPE 2 DIABETES MELLITUS WITH STAGE 4 CHRONIC KIDNEY DISEASE, WITHOUT LONG-TERM CURRENT USE OF INSULIN (HCC): ICD-10-CM

## 2023-06-05 DIAGNOSIS — J45.20 MILD INTERMITTENT ASTHMA WITHOUT COMPLICATION: ICD-10-CM

## 2023-06-05 DIAGNOSIS — I10 ESSENTIAL HYPERTENSION: ICD-10-CM

## 2023-06-05 DIAGNOSIS — E11.22 TYPE 2 DIABETES MELLITUS WITH STAGE 4 CHRONIC KIDNEY DISEASE, WITHOUT LONG-TERM CURRENT USE OF INSULIN (HCC): ICD-10-CM

## 2023-06-05 PROCEDURE — 99999 PR NO CHARGE: CPT | Performed by: NURSE PRACTITIONER

## 2023-06-07 NOTE — PROGRESS NOTES
Assessment  Spoke with Whitley for monthly outreach follow up. Whitley states that she is doing well. She has been communication with her PCP via [employer] and feels comfortable with this process. Reviewed her next PCP is scheduled for 6/28. She is also due of an A1c. She has her first behavioral health appointment on 6/15. She is looking forward to this appointment. Her daughter came for a 2 week visit. She enjoyed the visit. She reports she needs to get out more for social opportunities but overall reports her spirits are up. She denied any questions or concerns at this time. Encouraged to call with any needs.     Education  Discussed next PCP appointment and due for next A1c    Plan of Care and Goals  Continue Diabetes education  Continue fall prevention  Continue HTN education     Barriers:  Age  Disease progression-DM, CKD, HTN  Habits   Knowledge deficit     Progress:  Progressing    Next outreach:  1 month    Chart reviewed for Quarterly Assessment, patient continues to qualify and benefit from PCM program.

## 2023-06-13 ENCOUNTER — HOSPITAL ENCOUNTER (OUTPATIENT)
Dept: LAB | Facility: MEDICAL CENTER | Age: 78
End: 2023-06-13
Attending: INTERNAL MEDICINE
Payer: MEDICARE

## 2023-06-13 LAB
25(OH)D3 SERPL-MCNC: 63 NG/ML (ref 30–100)
ALBUMIN SERPL BCP-MCNC: 4.1 G/DL (ref 3.2–4.9)
BUN SERPL-MCNC: 83 MG/DL (ref 8–22)
CALCIUM ALBUM COR SERPL-MCNC: 9.2 MG/DL (ref 8.5–10.5)
CALCIUM SERPL-MCNC: 9.3 MG/DL (ref 8.5–10.5)
CHLORIDE SERPL-SCNC: 108 MMOL/L (ref 96–112)
CO2 SERPL-SCNC: 19 MMOL/L (ref 20–33)
CREAT SERPL-MCNC: 2.98 MG/DL (ref 0.5–1.4)
FERRITIN SERPL-MCNC: 73.4 NG/ML (ref 10–291)
GFR SERPLBLD CREATININE-BSD FMLA CKD-EPI: 16 ML/MIN/1.73 M 2
GLUCOSE SERPL-MCNC: 189 MG/DL (ref 65–99)
HCT VFR BLD AUTO: 31.7 % (ref 37–47)
HGB BLD-MCNC: 10.2 G/DL (ref 12–16)
IRON SATN MFR SERPL: 21 % (ref 15–55)
IRON SERPL-MCNC: 72 UG/DL (ref 40–170)
PHOSPHATE SERPL-MCNC: 3.8 MG/DL (ref 2.5–4.5)
POTASSIUM SERPL-SCNC: 4.4 MMOL/L (ref 3.6–5.5)
PTH-INTACT SERPL-MCNC: 85.2 PG/ML (ref 14–72)
SODIUM SERPL-SCNC: 144 MMOL/L (ref 135–145)
TIBC SERPL-MCNC: 339 UG/DL (ref 250–450)
UIBC SERPL-MCNC: 267 UG/DL (ref 110–370)

## 2023-06-13 PROCEDURE — 83540 ASSAY OF IRON: CPT

## 2023-06-13 PROCEDURE — 36415 COLL VENOUS BLD VENIPUNCTURE: CPT

## 2023-06-13 PROCEDURE — 80069 RENAL FUNCTION PANEL: CPT

## 2023-06-13 PROCEDURE — 83550 IRON BINDING TEST: CPT

## 2023-06-13 PROCEDURE — 83970 ASSAY OF PARATHORMONE: CPT

## 2023-06-13 PROCEDURE — 85014 HEMATOCRIT: CPT

## 2023-06-13 PROCEDURE — 82728 ASSAY OF FERRITIN: CPT

## 2023-06-13 PROCEDURE — 85018 HEMOGLOBIN: CPT

## 2023-06-13 PROCEDURE — 82306 VITAMIN D 25 HYDROXY: CPT

## 2023-06-14 DIAGNOSIS — F41.9 ANXIETY: ICD-10-CM

## 2023-06-14 DIAGNOSIS — G47.00 INSOMNIA: ICD-10-CM

## 2023-06-14 NOTE — TELEPHONE ENCOUNTER
Received request via: Pharmacy    Was the patient seen in the last year in this department? Yes    Does the patient have an active prescription (recently filled or refills available) for medication(s) requested? No    Does the patient have detention Plus and need 100 day supply (blood pressure, diabetes and cholesterol meds only)? Medication is not for cholesterol, blood pressure or diabetes   Severe suprapubic abd pain and vag bleeding x2 weeks. Here for increased pain. History fibroids. Denies fever. +nausea.

## 2023-06-15 ENCOUNTER — TELEMEDICINE (OUTPATIENT)
Dept: BEHAVIORAL HEALTH | Facility: CLINIC | Age: 78
End: 2023-06-15
Payer: MEDICARE

## 2023-06-15 DIAGNOSIS — F41.1 GAD (GENERALIZED ANXIETY DISORDER): ICD-10-CM

## 2023-06-15 DIAGNOSIS — F40.243 FEAR OF FLYING: ICD-10-CM

## 2023-06-15 DIAGNOSIS — F41.1 GENERALIZED ANXIETY DISORDER: ICD-10-CM

## 2023-06-15 PROCEDURE — RXMED WILLOW AMBULATORY MEDICATION CHARGE: Performed by: PSYCHIATRY & NEUROLOGY

## 2023-06-15 PROCEDURE — 99204 OFFICE O/P NEW MOD 45 MIN: CPT | Mod: 95 | Performed by: PSYCHIATRY & NEUROLOGY

## 2023-06-15 PROCEDURE — 90833 PSYTX W PT W E/M 30 MIN: CPT | Mod: 95 | Performed by: PSYCHIATRY & NEUROLOGY

## 2023-06-15 RX ORDER — CLONAZEPAM 0.5 MG/1
TABLET ORAL
Qty: 30 TABLET | Refills: 1 | Status: SHIPPED | OUTPATIENT
Start: 2023-06-15 | End: 2024-07-12

## 2023-06-15 ASSESSMENT — ANXIETY QUESTIONNAIRES
4. TROUBLE RELAXING: NOT AT ALL
2. NOT BEING ABLE TO STOP OR CONTROL WORRYING: SEVERAL DAYS
7. FEELING AFRAID AS IF SOMETHING AWFUL MIGHT HAPPEN: SEVERAL DAYS
1. FEELING NERVOUS, ANXIOUS, OR ON EDGE: NEARLY EVERY DAY
6. BECOMING EASILY ANNOYED OR IRRITABLE: SEVERAL DAYS
IF YOU CHECKED OFF ANY PROBLEMS ON THIS QUESTIONNAIRE, HOW DIFFICULT HAVE THESE PROBLEMS MADE IT FOR YOU TO DO YOUR WORK, TAKE CARE OF THINGS AT HOME, OR GET ALONG WITH OTHER PEOPLE: NOT DIFFICULT AT ALL
3. WORRYING TOO MUCH ABOUT DIFFERENT THINGS: SEVERAL DAYS
GAD7 TOTAL SCORE: 7
5. BEING SO RESTLESS THAT IT IS HARD TO SIT STILL: NOT AT ALL

## 2023-06-15 ASSESSMENT — PATIENT HEALTH QUESTIONNAIRE - PHQ9
CLINICAL INTERPRETATION OF PHQ2 SCORE: 1
5. POOR APPETITE OR OVEREATING: 0 - NOT AT ALL
SUM OF ALL RESPONSES TO PHQ QUESTIONS 1-9: 4
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
## 2023-06-16 ENCOUNTER — PHARMACY VISIT (OUTPATIENT)
Dept: PHARMACY | Facility: MEDICAL CENTER | Age: 78
End: 2023-06-16
Payer: MEDICARE

## 2023-06-19 ENCOUNTER — PHARMACY VISIT (OUTPATIENT)
Dept: PHARMACY | Facility: MEDICAL CENTER | Age: 78
End: 2023-06-19
Payer: MEDICARE

## 2023-06-19 PROCEDURE — RXMED WILLOW AMBULATORY MEDICATION CHARGE: Performed by: NURSE PRACTITIONER

## 2023-06-19 PROCEDURE — RXMED WILLOW AMBULATORY MEDICATION CHARGE: Performed by: INTERNAL MEDICINE

## 2023-06-20 ENCOUNTER — TELEPHONE (OUTPATIENT)
Dept: MEDICAL GROUP | Facility: LAB | Age: 78
End: 2023-06-20
Payer: MEDICARE

## 2023-06-20 ENCOUNTER — PHARMACY VISIT (OUTPATIENT)
Dept: PHARMACY | Facility: MEDICAL CENTER | Age: 78
End: 2023-06-20

## 2023-06-20 PROCEDURE — RXOTC WILLOW AMBULATORY OTC CHARGE

## 2023-06-20 NOTE — TELEPHONE ENCOUNTER
ESTABLISHED PATIENT PRE-VISIT PLANNING     Patient was NOT contacted to complete PVP.     Note: Patient will not be contacted if there is no indication to call.     1.  Reviewed notes from the last few office visits within the medical group: Yes    2.  If any orders were placed at last visit or intended to be done for this visit (i.e. 6 mos follow-up), do we have Results/Consult Notes?           Labs - Labs were not ordered at last office visit.  Note: If patient appointment is for lab review and patient did not complete labs, check with provider if OK to reschedule patient until labs completed.         Imaging - Imaging was not ordered at last office visit.         Referrals - No referrals were ordered at last office visit.    3. Is this appointment scheduled as a Hospital Follow-Up? No    4.  Immunizations were updated in Epic using Reconcile Outside Information activity? Yes    5.  Patient is due for the following Health Maintenance Topics:   Health Maintenance Due   Topic Date Due    IMM ZOSTER VACCINES (1 of 2) Never done    DIABETES MONOFILAMENT / LE EXAM  11/11/2022    A1C SCREENING  03/08/2023    COVID-19 Vaccine (3 - Pfizer series) 04/10/2023     6.  AHA (Pulse8) form printed for Provider? N/A

## 2023-06-27 ENCOUNTER — APPOINTMENT (OUTPATIENT)
Dept: VASCULAR LAB | Facility: MEDICAL CENTER | Age: 78
End: 2023-06-27
Attending: INTERNAL MEDICINE
Payer: MEDICARE

## 2023-06-28 ENCOUNTER — APPOINTMENT (OUTPATIENT)
Dept: MEDICAL GROUP | Facility: LAB | Age: 78
End: 2023-06-28
Payer: MEDICARE

## 2023-07-04 PROCEDURE — RXMED WILLOW AMBULATORY MEDICATION CHARGE: Performed by: NURSE PRACTITIONER

## 2023-07-05 ENCOUNTER — PHARMACY VISIT (OUTPATIENT)
Dept: PHARMACY | Facility: MEDICAL CENTER | Age: 78
End: 2023-07-05
Payer: MEDICARE

## 2023-07-05 PROCEDURE — RXMED WILLOW AMBULATORY MEDICATION CHARGE: Performed by: NURSE PRACTITIONER

## 2023-07-05 RX ORDER — LEVOTHYROXINE SODIUM 0.05 MG/1
50 TABLET ORAL
Qty: 100 TABLET | Refills: 3 | Status: SHIPPED | OUTPATIENT
Start: 2023-07-05

## 2023-07-05 RX ORDER — MAGNESIUM OXIDE 400 MG/1
400 TABLET ORAL DAILY
Qty: 90 TABLET | Refills: 0 | Status: SHIPPED | OUTPATIENT
Start: 2023-07-05 | End: 2023-09-21 | Stop reason: SDUPTHER

## 2023-07-05 NOTE — TELEPHONE ENCOUNTER
Received request via: Pharmacy    Was the patient seen in the last year in this department? Yes  LOV 04/06/2023 - Telemedicine  Does the patient have an active prescription (recently filled or refills available) for medication(s) requested? No    Does the patient have half-way Plus and need 100 day supply (blood pressure, diabetes and cholesterol meds only)? Medication is not for cholesterol, blood pressure or diabetes

## 2023-07-06 ENCOUNTER — PHARMACY VISIT (OUTPATIENT)
Dept: PHARMACY | Facility: MEDICAL CENTER | Age: 78
End: 2023-07-06
Payer: MEDICARE

## 2023-07-07 ENCOUNTER — PATIENT OUTREACH (OUTPATIENT)
Dept: HEALTH INFORMATION MANAGEMENT | Facility: OTHER | Age: 78
End: 2023-07-07
Payer: MEDICARE

## 2023-07-07 DIAGNOSIS — N18.4 TYPE 2 DIABETES MELLITUS WITH STAGE 4 CHRONIC KIDNEY DISEASE, WITHOUT LONG-TERM CURRENT USE OF INSULIN (HCC): ICD-10-CM

## 2023-07-07 DIAGNOSIS — J45.20 MILD INTERMITTENT ASTHMA WITHOUT COMPLICATION: ICD-10-CM

## 2023-07-07 DIAGNOSIS — I10 ESSENTIAL HYPERTENSION: ICD-10-CM

## 2023-07-07 DIAGNOSIS — E11.22 TYPE 2 DIABETES MELLITUS WITH STAGE 4 CHRONIC KIDNEY DISEASE, WITHOUT LONG-TERM CURRENT USE OF INSULIN (HCC): ICD-10-CM

## 2023-07-07 PROCEDURE — 99490 CHRNC CARE MGMT STAFF 1ST 20: CPT | Performed by: NURSE PRACTITIONER

## 2023-07-12 PROCEDURE — RXMED WILLOW AMBULATORY MEDICATION CHARGE: Performed by: NURSE PRACTITIONER

## 2023-07-13 ENCOUNTER — PHARMACY VISIT (OUTPATIENT)
Dept: PHARMACY | Facility: MEDICAL CENTER | Age: 78
End: 2023-07-13
Payer: MEDICARE

## 2023-07-14 PROCEDURE — RXMED WILLOW AMBULATORY MEDICATION CHARGE: Performed by: INTERNAL MEDICINE

## 2023-07-14 NOTE — PROGRESS NOTES
Assessment  Whitley returned call for monthly outreach follow up. Whitley states that she is doing well. She states that her blood sugars are okay. She plans to communicate with [place]ana for regarding possible increasing her Ozempic dosage. Discussed next PCP follow up, due for A1c. Dicussed the importance of follow up with nephrology and lab work. Whitley denied any questions or concerns. Encouraged to call with any needs     Education  Discussed CKD stage 4, discussed follow up appointments   Discussed follow up appointments with PCP, due for A1c    Plan of Care and Goals  Continue education on diabetes    Barriers:  Age  Progression of disease process-CKD/DM  Habits  Knowledge of health care    Progress:  Progressing    Next outreach:  1 month

## 2023-07-15 ENCOUNTER — PATIENT MESSAGE (OUTPATIENT)
Dept: MEDICAL GROUP | Facility: LAB | Age: 78
End: 2023-07-15
Payer: MEDICARE

## 2023-07-17 RX ORDER — SEMAGLUTIDE 2.68 MG/ML
2 INJECTION, SOLUTION SUBCUTANEOUS
Qty: 3 ML | Refills: 11 | Status: SHIPPED | OUTPATIENT
Start: 2023-07-17

## 2023-07-18 RX ORDER — SEMAGLUTIDE 2.68 MG/ML
2 INJECTION, SOLUTION SUBCUTANEOUS
Qty: 6 ML | Refills: 3 | Status: SHIPPED | OUTPATIENT
Start: 2023-07-18 | End: 2023-08-02

## 2023-07-18 RX ORDER — SEMAGLUTIDE 2.68 MG/ML
2 INJECTION, SOLUTION SUBCUTANEOUS
Qty: 6 ML | Refills: 3 | Status: SHIPPED | OUTPATIENT
Start: 2023-07-18 | End: 2023-07-18 | Stop reason: SDUPTHER

## 2023-07-19 ENCOUNTER — PHARMACY VISIT (OUTPATIENT)
Dept: PHARMACY | Facility: MEDICAL CENTER | Age: 78
End: 2023-07-19
Payer: MEDICARE

## 2023-07-19 ENCOUNTER — PATIENT MESSAGE (OUTPATIENT)
Dept: CARDIOLOGY | Facility: MEDICAL CENTER | Age: 78
End: 2023-07-19
Payer: MEDICARE

## 2023-07-19 RX ORDER — SODIUM BICARBONATE 650 MG/1
650 TABLET ORAL 2 TIMES DAILY
Qty: 180 TABLET | Refills: 3 | Status: SHIPPED | OUTPATIENT
Start: 2023-07-19 | End: 2023-09-26

## 2023-07-19 RX ORDER — NIFEDIPINE 30 MG/1
30 TABLET, EXTENDED RELEASE ORAL DAILY
Qty: 30 TABLET | Refills: 2 | Status: SHIPPED | OUTPATIENT
Start: 2023-07-19 | End: 2023-08-18 | Stop reason: SDUPTHER

## 2023-07-20 ENCOUNTER — PHARMACY VISIT (OUTPATIENT)
Dept: PHARMACY | Facility: MEDICAL CENTER | Age: 78
End: 2023-07-20
Payer: MEDICARE

## 2023-07-20 ENCOUNTER — PATIENT MESSAGE (OUTPATIENT)
Dept: CARDIOLOGY | Facility: MEDICAL CENTER | Age: 78
End: 2023-07-20
Payer: MEDICARE

## 2023-07-20 PROCEDURE — RXMED WILLOW AMBULATORY MEDICATION CHARGE: Performed by: INTERNAL MEDICINE

## 2023-07-26 ENCOUNTER — PHARMACY VISIT (OUTPATIENT)
Dept: PHARMACY | Facility: MEDICAL CENTER | Age: 78
End: 2023-07-26
Payer: MEDICARE

## 2023-07-26 PROCEDURE — RXMED WILLOW AMBULATORY MEDICATION CHARGE: Performed by: INTERNAL MEDICINE

## 2023-07-26 PROCEDURE — RXOTC WILLOW AMBULATORY OTC CHARGE: Performed by: PHARMACIST

## 2023-07-31 ENCOUNTER — PATIENT MESSAGE (OUTPATIENT)
Dept: CARDIOLOGY | Facility: MEDICAL CENTER | Age: 78
End: 2023-07-31
Payer: MEDICARE

## 2023-08-02 ENCOUNTER — OFFICE VISIT (OUTPATIENT)
Dept: MEDICAL GROUP | Facility: LAB | Age: 78
End: 2023-08-02
Payer: MEDICARE

## 2023-08-02 VITALS
HEIGHT: 66 IN | TEMPERATURE: 96.6 F | OXYGEN SATURATION: 96 % | BODY MASS INDEX: 40.66 KG/M2 | HEART RATE: 80 BPM | WEIGHT: 253 LBS | SYSTOLIC BLOOD PRESSURE: 124 MMHG | RESPIRATION RATE: 16 BRPM | DIASTOLIC BLOOD PRESSURE: 56 MMHG

## 2023-08-02 DIAGNOSIS — F41.9 ANXIETY: ICD-10-CM

## 2023-08-02 DIAGNOSIS — I10 ESSENTIAL HYPERTENSION: ICD-10-CM

## 2023-08-02 DIAGNOSIS — N18.4 TYPE 2 DIABETES MELLITUS WITH STAGE 4 CHRONIC KIDNEY DISEASE, WITHOUT LONG-TERM CURRENT USE OF INSULIN (HCC): ICD-10-CM

## 2023-08-02 DIAGNOSIS — L02.92 RECURRENT BOILS: ICD-10-CM

## 2023-08-02 DIAGNOSIS — E03.4 HYPOTHYROIDISM DUE TO ACQUIRED ATROPHY OF THYROID: ICD-10-CM

## 2023-08-02 DIAGNOSIS — E11.22 TYPE 2 DIABETES MELLITUS WITH STAGE 4 CHRONIC KIDNEY DISEASE, WITHOUT LONG-TERM CURRENT USE OF INSULIN (HCC): ICD-10-CM

## 2023-08-02 LAB
HBA1C MFR BLD: 6.4 % (ref ?–5.8)
POCT INT CON NEG: NEGATIVE
POCT INT CON POS: POSITIVE

## 2023-08-02 PROCEDURE — 3074F SYST BP LT 130 MM HG: CPT | Performed by: NURSE PRACTITIONER

## 2023-08-02 PROCEDURE — 83036 HEMOGLOBIN GLYCOSYLATED A1C: CPT | Performed by: NURSE PRACTITIONER

## 2023-08-02 PROCEDURE — 99214 OFFICE O/P EST MOD 30 MIN: CPT | Performed by: NURSE PRACTITIONER

## 2023-08-02 PROCEDURE — 3078F DIAST BP <80 MM HG: CPT | Performed by: NURSE PRACTITIONER

## 2023-08-02 RX ORDER — MINOCYCLINE HYDROCHLORIDE 100 MG/1
100 CAPSULE ORAL DAILY
Qty: 30 CAPSULE | Refills: 5 | Status: SHIPPED | OUTPATIENT
Start: 2023-08-02 | End: 2023-11-14

## 2023-08-02 ASSESSMENT — FIBROSIS 4 INDEX: FIB4 SCORE: 5.72

## 2023-08-02 NOTE — PROGRESS NOTES
"CC  F/u      HPI:  Whitley is a 78-year-old established female here to follow-up on numerous things.  She is also chronically followed by Dr. Nix, psychiatry; Dr. Badillo, nephrology; Dr. Servin, cardiology    Type 2 diabetes: Chronic issue.  Overdue for A1c, last was about a year ago.  Currently treated with 2 mg Ozempic.  Has lost 10 lbs in the past few months.      Chronic kidney disease: Dialysis is being discussed as GFR is down to 16.  Taking dialysis class tomorrow on zoom and meeting with Dr. Malcolm in the near future.     Hypertension: Chronic issue.  Newly on nifedipine instead of amlodipine because of lower extremity swelling.  Continues also on terazosin 1 mg, metoprolol 50 mg twice daily and losartan 50 mg.  Checking BP on home monitor - 220/97 - 160/90 but comparing to ours in the office - about 70 points off.  She does bring in two bp monitors and one does work compared to ours but she has not been checking BP on the one that is working.     Labial boils: Chronic issue.  Flares up on her frequently and bleeds but rarely expels pus.  Makes it painful to sit.  Right now there are boils present but have not been bleeding lately.  States that she has always been a cystic person.    Exam:  /56 (BP Location: Left arm, Patient Position: Sitting, BP Cuff Size: Large adult)   Pulse 80   Temp 35.9 °C (96.6 °F)   Resp 16   Ht 1.676 m (5' 6\")   Wt 115 kg (253 lb)   SpO2 96%     Monofilament testing with a 10 gram force: sensation intact: intact bilaterally  Visual Inspection: Feet without maceration, ulcers, fissures.  Pedal pulses: intact bilaterally   CV RRR with murmur.   Resp:  CTA bilaterally.   LE:  edema present without weeping or open wounds.    : She has 5 small boils that are firm to her left external labia and 1 to her right internal labia that are nonfluctuant and there is no surrounding erythema.    A/P:  1. Type 2 diabetes mellitus with stage 4 chronic kidney disease, without " long-term current use of insulin (HCC)  -Diabetic control excellent with an A1c of 6.4.  Continue Ozempic 2 mg weekly.  Continue efforts at weight loss, exercise and a diabetic diet.  Continue good foot care.  Encouraged her to see her eye doctor yearly at least.  - POCT  A1C    2. Hypothyroidism due to acquired atrophy of thyroid  -Thyroid labs have been drawn by nephrology and are within normal limits.    3. Essential hypertension  -Stable based on our readings in the office today through an auscultated blood pressure.  Encouraged her to utilize the blood pressure monitor at home that was similar to our auscultated readings in office today which she states that she will.  Taking 80 mg lasix per day.  Doing fine on amlodipine 30 mg at night.    Echo UTD.  Reviewed last notes with cardiology.  Follow-up here every 6 months, sooner with problems or concerns.    4. Anxiety  -Currently stable.    5.  Labial boils:  Recurrent issue and painful.  Start minocycline 100 mg daily indefinitely.  Certainly worsened by the need for a pad, her diabetes, her obesity and kidney failure.  Discussed staying very well-hydrated and frequently changing her pad.

## 2023-08-02 NOTE — LETTER
August 2, 2023         Patient: Whitley Frederick   YOB: 1945   Date of Visit: 8/2/2023         Allergies   Allergen Reactions    Amlodipine Itching    Levoxyl [Levothyroxine Sodium] Unspecified     Flushing, very hot    Amaryl      GI Problems    Avandia [Rosiglitazone Maleate]      GI problems    Cipro Xr      Possibly causes Chills.    Clonidine      Rapid heart rate, swelling     Contrast Media With Iodine [Iodine] Hives    Glipizide      GI Problems    Glucophage [Metformin Hydrochloride]      dizzy    Hydralazine      Rapid heart rate, chest tightness    Levaquin      Possible allergy - chills with cipro but sick    Relafen [Nabumetone]      Itching; avoids all nsaids      Current Outpatient Medications:     minocycline, 100 mg, Oral, DAILY    NIFEdipine SR, 30 mg, Oral, DAILY    sodium bicarbonate, 650 mg, Oral, BID    Ozempic (2 MG/DOSE), 2 mg, Subcutaneous, Q7 DAYS    levothyroxine, 50 mcg, Oral, AM ES    magnesium oxide, 400 mg, Oral, DAILY    clonazePAM, TAKE 1 TABLET BY MOUTH ONCE DAILY AS NEEDED FOR 30 DAYS. ANXIETY/HEART PALPITATIONS for 30 days    sertraline, Take 1/2 tablet by mouth once a day for 3 days, then take 1 tablet once a day for 21 days, then take 2 tablets once a day    Multaq, 400 mg, Oral, BID WITH MEALS    rivaroxaban, 15 mg, Oral, PM MEAL    omeprazole, 40 mg, Oral, DAILY (Patient taking differently: 20 mg, Oral, DAILY)    FreeStyle Angel 14 Day Sensor, Apply every 14 days    furosemide, Take 1 tablet by mouth daily    NON SPECIFIED, FREESTYLE PRECISION CARLOS TEST STRIPS FOR FREESTYLE ANGEL 2    FreeStyle Precision Carlos Test, Use strips to test blood sugar as directed    montelukast, 10 mg, Oral, QHS    fluticasone, USE 1-2  SPRAY(S) IN EACH NOSTRIL ONCE DAILY 30 MINUTES PRIOR TO USE OF CPAP    losartan, 50 mg, Oral, BID    loratadine, 10 mg, Oral, DAILY    allopurinol, TAKE TWO TABLETS BY MOUTH TWICE DAILY    metoprolol tartrate, 50 mg, Oral, BID    rosuvastatin, 10 mg,  Oral, Q EVENING    nystatin, APPLY TO AFFECTED AREA up to twice daily    levalbuterol, 2 Puff, Inhalation, Q6HRS PRN    FreeStyle Angel 2 [place], Use to monitor blood sugar daily    NON SPECIFIED, C-Pap supplies -humidifier tank and a spare  - fax [phone]...  Dx:  Sleep apnea    NON SPECIFIED, blue concentrator at night along with my C-PAP./3 liters per hour    ferrous sulfate, 325 mg, Oral, DAILY    Insulin Pen Needle 32 G x 4 mm, 1 Applicator, Does not apply, TID    Blood Glucose Monitoring Suppl, 1 Each, Does not apply, BID PRN    Misc. Devices, Pen needles for Lantus Solstar. 29g 12mm.    Vitamin D, 4,000 Units, Oral, DAILY         If you have any questions or concerns, please don't hesitate to call.        Sincerely,           TREVOR Whelan.  Electronically Signed

## 2023-08-09 ENCOUNTER — PHARMACY VISIT (OUTPATIENT)
Dept: PHARMACY | Facility: MEDICAL CENTER | Age: 78
End: 2023-08-09
Payer: MEDICARE

## 2023-08-09 PROBLEM — R80.9 MICROALBUMINURIA DUE TO TYPE 2 DIABETES MELLITUS (HCC): Status: ACTIVE | Noted: 2023-08-09

## 2023-08-09 PROBLEM — E66.2 OBESITY HYPOVENTILATION SYNDROME (HCC): Status: ACTIVE | Noted: 2023-08-09

## 2023-08-09 PROBLEM — D69.6 THROMBOCYTOPENIA (HCC): Status: ACTIVE | Noted: 2023-08-09

## 2023-08-09 PROBLEM — J96.11 CHRONIC HYPOXEMIC RESPIRATORY FAILURE (HCC): Status: ACTIVE | Noted: 2023-08-09

## 2023-08-09 PROBLEM — E11.29 MICROALBUMINURIA DUE TO TYPE 2 DIABETES MELLITUS (HCC): Status: ACTIVE | Noted: 2023-08-09

## 2023-08-09 PROBLEM — F32.4 MAJOR DEPRESSIVE DISORDER WITH SINGLE EPISODE, IN PARTIAL REMISSION (HCC): Status: ACTIVE | Noted: 2023-08-09

## 2023-08-09 PROCEDURE — RXMED WILLOW AMBULATORY MEDICATION CHARGE: Performed by: NURSE PRACTITIONER

## 2023-08-11 PROCEDURE — RXMED WILLOW AMBULATORY MEDICATION CHARGE: Performed by: NURSE PRACTITIONER

## 2023-08-14 ENCOUNTER — TELEPHONE (OUTPATIENT)
Dept: MEDICAL GROUP | Facility: LAB | Age: 78
End: 2023-08-14
Payer: MEDICARE

## 2023-08-14 DIAGNOSIS — E11.22 TYPE 2 DIABETES MELLITUS WITH STAGE 4 CHRONIC KIDNEY DISEASE, WITHOUT LONG-TERM CURRENT USE OF INSULIN (HCC): ICD-10-CM

## 2023-08-14 DIAGNOSIS — N18.4 TYPE 2 DIABETES MELLITUS WITH STAGE 4 CHRONIC KIDNEY DISEASE, WITHOUT LONG-TERM CURRENT USE OF INSULIN (HCC): ICD-10-CM

## 2023-08-14 DIAGNOSIS — Z12.11 SCREENING FOR COLON CANCER: ICD-10-CM

## 2023-08-14 DIAGNOSIS — N18.4 CKD (CHRONIC KIDNEY DISEASE) STAGE 4, GFR 15-29 ML/MIN (HCC): ICD-10-CM

## 2023-08-14 NOTE — TELEPHONE ENCOUNTER
Whitley Frederick  P [place] Frances Herman Ma (supporting Juana Berger, A.P.N.) 3 days ago     RG  Hi … I just want a second opinion … I want to go see Dr Fadi Najjar (his presentation was impressive)at [facility] nephrology… but I need an referral… I’m not naïve I know my kidney numbers aren’t good  … but I think I have the right to a second opinion before I see Dr Sharan Malcolm for peritoneal catheter placement. Thank you …. N by the way … I did the online education and no one is getting back to me as what I need to do next …. I’m just going on my own … I was able too when I had my hysterectomy to go to a urologist for a second opinion… and or maybe it wasn’t a urologist Maybe it was another OB/GYN. It seems like I saw Dr. Saini urology, And the female OB/GYN  I can’t remember her name…

## 2023-08-16 ENCOUNTER — OFFICE VISIT (OUTPATIENT)
Dept: NEPHROLOGY | Facility: MEDICAL CENTER | Age: 78
End: 2023-08-16
Payer: MEDICARE

## 2023-08-16 VITALS
DIASTOLIC BLOOD PRESSURE: 72 MMHG | HEART RATE: 82 BPM | WEIGHT: 251 LBS | OXYGEN SATURATION: 99 % | HEIGHT: 66 IN | SYSTOLIC BLOOD PRESSURE: 126 MMHG | TEMPERATURE: 98.5 F | BODY MASS INDEX: 40.34 KG/M2

## 2023-08-16 DIAGNOSIS — N18.4 CKD (CHRONIC KIDNEY DISEASE) STAGE 4, GFR 15-29 ML/MIN (HCC): ICD-10-CM

## 2023-08-16 DIAGNOSIS — E11.22 TYPE 2 DIABETES MELLITUS WITH STAGE 4 CHRONIC KIDNEY DISEASE, WITHOUT LONG-TERM CURRENT USE OF INSULIN (HCC): ICD-10-CM

## 2023-08-16 DIAGNOSIS — I10 PRIMARY HYPERTENSION: ICD-10-CM

## 2023-08-16 DIAGNOSIS — N18.4 TYPE 2 DIABETES MELLITUS WITH STAGE 4 CHRONIC KIDNEY DISEASE, WITHOUT LONG-TERM CURRENT USE OF INSULIN (HCC): ICD-10-CM

## 2023-08-16 DIAGNOSIS — E87.20 METABOLIC ACIDOSIS: ICD-10-CM

## 2023-08-16 PROCEDURE — 3074F SYST BP LT 130 MM HG: CPT | Performed by: INTERNAL MEDICINE

## 2023-08-16 PROCEDURE — 99204 OFFICE O/P NEW MOD 45 MIN: CPT | Performed by: INTERNAL MEDICINE

## 2023-08-16 PROCEDURE — 3078F DIAST BP <80 MM HG: CPT | Performed by: INTERNAL MEDICINE

## 2023-08-16 ASSESSMENT — FIBROSIS 4 INDEX: FIB4 SCORE: 5.72

## 2023-08-16 ASSESSMENT — ENCOUNTER SYMPTOMS
VOMITING: 0
NAUSEA: 0
CHILLS: 0
FEVER: 0
NERVOUS/ANXIOUS: 1
COUGH: 0
HYPERTENSION: 1
SHORTNESS OF BREATH: 0

## 2023-08-16 NOTE — PROGRESS NOTES
"Subjective     Whitley Laureen Frederick is a 78 y.o. female who presents with Hypertension and Chronic Kidney Disease            Patient is a pleasant 78-year-old lady with a past medical history significant for longstanding diabetes, presumed diabetic nephropathy, CKD stage IV, she was under the care of Dr. Badillo.  Patient is concerned about her kidney disease and the potential start of dialysis    Hypertension  This is a chronic problem. The current episode started more than 1 year ago. The problem is unchanged. The problem is controlled. Pertinent negatives include no chest pain, malaise/fatigue, peripheral edema or shortness of breath. Risk factors for coronary artery disease include diabetes mellitus and post-menopausal state. Past treatments include angiotensin blockers, calcium channel blockers and diuretics. The current treatment provides significant improvement. There are no compliance problems.  Hypertensive end-organ damage includes kidney disease. Identifiable causes of hypertension include chronic renal disease.   Chronic Kidney Disease  Pertinent negatives include no chest pain, chills, coughing, fever, nausea or vomiting.       Review of Systems   Constitutional:  Negative for chills, fever and malaise/fatigue.   Respiratory:  Negative for cough and shortness of breath.    Cardiovascular:  Negative for chest pain and leg swelling.   Gastrointestinal:  Negative for nausea and vomiting.   Genitourinary:  Negative for dysuria, frequency and urgency.   Psychiatric/Behavioral:  The patient is nervous/anxious.    All other systems reviewed and are negative.             Objective     /72 (BP Location: Right arm, Patient Position: Sitting, BP Cuff Size: Adult)   Pulse 82   Temp 36.9 °C (98.5 °F) (Temporal)   Ht 1.676 m (5' 6\")   Wt 114 kg (251 lb)   SpO2 99%   BMI 40.51 kg/m²      Physical Exam  Vitals and nursing note reviewed.   Constitutional:       General: She is awake. She is not in acute " distress.     Appearance: She is well-developed. She is not ill-appearing or diaphoretic.   HENT:      Head: Normocephalic and atraumatic.      Right Ear: External ear normal.      Left Ear: External ear normal.      Nose: Nose normal. No rhinorrhea.      Mouth/Throat:      Pharynx: No oropharyngeal exudate or posterior oropharyngeal erythema.   Eyes:      General: No scleral icterus.        Right eye: No discharge.         Left eye: No discharge.      Conjunctiva/sclera: Conjunctivae normal.   Neck:      Vascular: No carotid bruit.   Cardiovascular:      Rate and Rhythm: Normal rate and regular rhythm.      Heart sounds: No murmur heard.  Pulmonary:      Effort: Pulmonary effort is normal. No respiratory distress.      Breath sounds: Normal breath sounds.   Abdominal:      General: Abdomen is flat. There is no distension.      Palpations: Abdomen is soft. There is no mass.   Musculoskeletal:         General: No tenderness.      Cervical back: No rigidity. No muscular tenderness.      Right lower leg: No edema.      Left lower leg: No edema.   Skin:     General: Skin is warm and dry.      Coloration: Skin is not jaundiced.   Neurological:      General: No focal deficit present.      Mental Status: She is alert and oriented to person, place, and time. Mental status is at baseline.   Psychiatric:         Mood and Affect: Mood normal.         Behavior: Behavior normal.         Thought Content: Thought content normal.       Past Medical History:   Diagnosis Date    Allergy     Anxiety     Arrhythmia     Arthritis     Asthma     Carotid bruit     Left    CATARACT     Chest pain     DIABETES MELLITUS     Esophageal reflux     Goiter     Headache(784.0)     Heart murmur     Hyperlipidemia     Hypertension     IBD (inflammatory bowel disease)     Migraine     Silent migraine    AVERY (obstructive sleep apnea)     Overweight(278.02)     Palpitations     Paroxysmal atrial fibrillation (HCC) 7/12/13    woke with palps, AFib per  ekg in am.    Pneumonia     2015    Positive reaction to tuberculin skin test     Renal disease     Mild diabetic renal disease with mild proteinuria.Moderate Stage 4 Dr. Badillo    Urinary tract infection, site not specified      Social History     Socioeconomic History    Marital status:      Spouse name: Not on file    Number of children: Not on file    Years of education: Not on file    Highest education level: Not on file   Occupational History    Not on file   Tobacco Use    Smoking status: Former     Packs/day: 1.00     Years: 20.00     Additional pack years: 0.00     Total pack years: 20.00     Types: Cigarettes     Quit date: 1983     Years since quittin.6    Smokeless tobacco: Never   Vaping Use    Vaping Use: Never used   Substance and Sexual Activity    Alcohol use: No     Alcohol/week: 0.0 oz    Drug use: No    Sexual activity: Never     Partners: Male   Other Topics Concern    Not on file   Social History Narrative    Not on file     Social Determinants of Health     Financial Resource Strain: Low Risk  (2022)    Overall Financial Resource Strain (CARDIA)     Difficulty of Paying Living Expenses: Not hard at all   Food Insecurity: No Food Insecurity (2022)    Hunger Vital Sign     Worried About Running Out of Food in the Last Year: Never true     Ran Out of Food in the Last Year: Never true   Transportation Needs: No Transportation Needs (2022)    PRAPARE - Transportation     Lack of Transportation (Medical): No     Lack of Transportation (Non-Medical): No   Physical Activity: Not on file   Stress: Stress Concern Present (2022)    [employer] [place] [employer] - Occupational Stress Questionnaire     Feeling of Stress : To some extent   Social Connections: Moderately Isolated (2022)    Social Connection and Isolation Panel [NHANES]     Frequency of Communication with Friends and Family: Twice a week     Frequency of Social Gatherings with Friends  and Family: Twice a week     Attends Faith Services: Never     Active Member of Clubs or Organizations: No     Attends Club or Organization Meetings: 1 to 4 times per year     Marital Status:    Intimate Partner Violence: Not At Risk (12/16/2022)    Humiliation, Afraid, Rape, and Kick questionnaire     Fear of Current or Ex-Partner: No     Emotionally Abused: No     Physically Abused: No     Sexually Abused: No   Housing Stability: Low Risk  (12/16/2022)    Housing Stability Vital Sign     Unable to Pay for Housing in the Last Year: No     Number of Places Lived in the Last Year: 1     Unstable Housing in the Last Year: No     Family History   Problem Relation Age of Onset    Cancer Maternal Aunt     Diabetes Maternal Aunt     Heart Disease Maternal Aunt     Hypertension Maternal Aunt     Hyperlipidemia Maternal Aunt     Cancer Mother         Leukemia    Hypertension Mother     Diabetes Mother     Lung Disease Father     Cancer Father         Lung    Lung Disease Brother     Stroke Brother     Diabetes Brother     Heart Disease Brother     Diabetes Maternal Grandmother     Hypertension Brother     Diabetes Brother      Recent Labs     09/08/22  1400 09/08/22  1401 04/10/23  1424 05/02/23  1423 05/02/23  1424 06/13/23  1302   ALBUMIN 4.2   < > 3.9  --  4.2 4.1   HDL 40  --   --   --   --   --    TRIGLYCERIDE 238*  --   --   --   --   --    SODIUM 140   < > 144 139 140 144   POTASSIUM 4.8   < > 4.2 4.9 4.8 4.4   CHLORIDE 110   < > 110 103 106 108   CO2 20   < > 20 22 23 19*   BUN 68*   < > 84* 58* 60* 83*   CREATININE 2.35*   < > 2.95* 2.65* 2.67* 2.98*   PHOSPHORUS  --    < > 3.8  --  3.9 3.8    < > = values in this interval not displayed.                             Assessment & Plan        1. CKD (chronic kidney disease) stage 4, GFR 15-29 ml/min ([facility])  Very slowly progressing  No uremic symptoms  Renal dose of medication  Avoid nephrotoxins  Continue same medication regimen  Patient was advised to call  us if symptoms worsen  Patient has no acute need for dialysis  I recommend to repeat labs  I recommend to undergo dialysis education    2. Primary hypertension  Controlled  Continue same medication regimen  Continue low-sodium diet      3. Type 2 diabetes mellitus with stage 4 chronic kidney disease, without long-term current use of insulin (HCC)  Continue to optimize diabetes control     4. Metabolic acidosis  Continue NaHco3

## 2023-08-17 ENCOUNTER — PHARMACY VISIT (OUTPATIENT)
Dept: PHARMACY | Facility: MEDICAL CENTER | Age: 78
End: 2023-08-17
Payer: COMMERCIAL

## 2023-08-18 RX ORDER — NIFEDIPINE 30 MG/1
30 TABLET, EXTENDED RELEASE ORAL DAILY
Qty: 90 TABLET | Refills: 3 | Status: SHIPPED | OUTPATIENT
Start: 2023-08-18 | End: 2024-08-17

## 2023-08-20 DIAGNOSIS — I10 ESSENTIAL HYPERTENSION: ICD-10-CM

## 2023-08-20 DIAGNOSIS — R00.2 PALPITATIONS: ICD-10-CM

## 2023-08-21 PROCEDURE — RXMED WILLOW AMBULATORY MEDICATION CHARGE: Performed by: INTERNAL MEDICINE

## 2023-08-21 PROCEDURE — RXMED WILLOW AMBULATORY MEDICATION CHARGE: Performed by: NURSE PRACTITIONER

## 2023-08-21 RX ORDER — ROSUVASTATIN CALCIUM 10 MG/1
10 TABLET, COATED ORAL EVERY EVENING
Qty: 100 TABLET | Refills: 3 | Status: SHIPPED | OUTPATIENT
Start: 2023-08-21

## 2023-08-21 RX ORDER — METOPROLOL TARTRATE 50 MG/1
50 TABLET, FILM COATED ORAL 2 TIMES DAILY
Qty: 200 TABLET | Refills: 3 | Status: SHIPPED | OUTPATIENT
Start: 2023-08-21

## 2023-08-21 RX ORDER — LOSARTAN POTASSIUM 50 MG/1
50 TABLET ORAL 2 TIMES DAILY
Qty: 200 TABLET | Refills: 2 | Status: SHIPPED | OUTPATIENT
Start: 2023-08-21

## 2023-08-21 NOTE — TELEPHONE ENCOUNTER
Received request via: Pharmacy    Was the patient seen in the last year in this department? Yes  LOV: 8/2/2023  Does the patient have an active prescription (recently filled or refills available) for medication(s) requested? No    Does the patient have [facility] Plus and need 100 day supply (blood pressure, diabetes and cholesterol meds only)? Medication is not for cholesterol, blood pressure or diabetes

## 2023-08-22 ENCOUNTER — PHARMACY VISIT (OUTPATIENT)
Dept: PHARMACY | Facility: MEDICAL CENTER | Age: 78
End: 2023-08-22
Payer: COMMERCIAL

## 2023-08-24 ENCOUNTER — PATIENT OUTREACH (OUTPATIENT)
Dept: HEALTH INFORMATION MANAGEMENT | Facility: OTHER | Age: 78
End: 2023-08-24
Payer: MEDICARE

## 2023-08-24 DIAGNOSIS — E11.22 TYPE 2 DIABETES MELLITUS WITH STAGE 4 CHRONIC KIDNEY DISEASE, WITHOUT LONG-TERM CURRENT USE OF INSULIN (HCC): ICD-10-CM

## 2023-08-24 DIAGNOSIS — I10 ESSENTIAL HYPERTENSION: ICD-10-CM

## 2023-08-24 DIAGNOSIS — J45.20 MILD INTERMITTENT ASTHMA WITHOUT COMPLICATION: ICD-10-CM

## 2023-08-24 DIAGNOSIS — N18.4 TYPE 2 DIABETES MELLITUS WITH STAGE 4 CHRONIC KIDNEY DISEASE, WITHOUT LONG-TERM CURRENT USE OF INSULIN (HCC): ICD-10-CM

## 2023-08-24 PROCEDURE — 99490 CHRNC CARE MGMT STAFF 1ST 20: CPT | Performed by: NURSE PRACTITIONER

## 2023-08-25 ENCOUNTER — PHARMACY VISIT (OUTPATIENT)
Dept: PHARMACY | Facility: MEDICAL CENTER | Age: 78
End: 2023-08-25
Payer: COMMERCIAL

## 2023-08-25 PROCEDURE — RXMED WILLOW AMBULATORY MEDICATION CHARGE: Performed by: INTERNAL MEDICINE

## 2023-08-28 ENCOUNTER — TELEPHONE (OUTPATIENT)
Dept: MEDICAL GROUP | Facility: LAB | Age: 78
End: 2023-08-28
Payer: MEDICARE

## 2023-08-28 NOTE — TELEPHONE ENCOUNTER
ESTABLISHED PATIENT PRE-VISIT PLANNING     Patient was NOT contacted to complete PVP.     Note: Patient will not be contacted if there is no indication to call.     1.  Reviewed notes from the last few office visits within the medical group: Yes    2.  If any orders were placed at last visit or intended to be done for this visit (i.e. 6 mos follow-up), do we have Results/Consult Notes?           Labs - Labs ordered, NOT completed. Patient advised to complete prior to next appointment.  Note: If patient appointment is for lab review and patient did not complete labs, check with provider if OK to reschedule patient until labs completed.         Imaging - Imaging was not ordered at last office visit.         Referrals - Referral ordered, patient was seen and consult notes are in chart. Care Teams updated  YES.    3. Is this appointment scheduled as a Hospital Follow-Up? No    4.  Immunizations were updated in Epic using Reconcile Outside Information activity? Yes    5.  Patient is due for the following Health Maintenance Topics:   Health Maintenance Due   Topic Date Due    Fasting Lipid Profile  09/08/2023    Diabetes: Retinopathy Screening  09/26/2023     6.  AHA (Pulse8) form printed for Provider? N/A

## 2023-08-29 ENCOUNTER — APPOINTMENT (OUTPATIENT)
Dept: MEDICAL GROUP | Facility: LAB | Age: 78
End: 2023-08-29
Payer: MEDICARE

## 2023-08-29 NOTE — PROGRESS NOTES
Assessment  Spoke with Whitley for monthly outreach follow up. Whitley states she isn't feeling well today. She states her appetite is poor and she has low energy. She discussed at length her last nephrology appointment and the possibility of starting dialysis soon. She reports the nephrologist states her start date depends on how she feels. Discussed keeping a calendar or journal to help track how she is feeling. Her next nephrology appointment is 9/26 with labs prior.  She is signed up for kidney smart classes. Encouraged Whitley not to hesitate reporting to the ER or call 911. Encouraged Whitley to call with any questions or concerns.     Education  Discussed last nephrology visit  Discussed hemo vs peritoneal dialysis     Plan of Care and Goals  Continue diabetes and hypertension eduction    Barriers:  Age  Progression of disease process-CKD 4  Knowledge of healthcare  Habits     Progress:  Progressing    Next outreach:  1 month

## 2023-08-29 NOTE — TELEPHONE ENCOUNTER
Received request via: Pharmacy    Was the patient seen in the last year in this department? Yes  LOV: 8/2/2023  Does the patient have an active prescription (recently filled or refills available) for medication(s) requested? No    Does the patient have alf Plus and need 100 day supply (blood pressure, diabetes and cholesterol meds only)? Medication is not for cholesterol, blood pressure or diabetes

## 2023-08-30 ENCOUNTER — PHARMACY VISIT (OUTPATIENT)
Dept: PHARMACY | Facility: MEDICAL CENTER | Age: 78
End: 2023-08-30
Payer: COMMERCIAL

## 2023-08-30 ENCOUNTER — APPOINTMENT (OUTPATIENT)
Dept: MEDICAL GROUP | Facility: LAB | Age: 78
End: 2023-08-30
Payer: MEDICARE

## 2023-08-30 PROCEDURE — RXMED WILLOW AMBULATORY MEDICATION CHARGE: Performed by: NURSE PRACTITIONER

## 2023-08-30 RX ORDER — LORATADINE 10 MG/1
10 TABLET ORAL DAILY
Qty: 100 TABLET | Refills: 2 | Status: SHIPPED | OUTPATIENT
Start: 2023-08-30 | End: 2024-03-13 | Stop reason: SDUPTHER

## 2023-09-05 DIAGNOSIS — E11.22 TYPE 2 DIABETES MELLITUS WITH STAGE 4 CHRONIC KIDNEY DISEASE, WITHOUT LONG-TERM CURRENT USE OF INSULIN (HCC): ICD-10-CM

## 2023-09-05 DIAGNOSIS — N18.4 TYPE 2 DIABETES MELLITUS WITH STAGE 4 CHRONIC KIDNEY DISEASE, WITHOUT LONG-TERM CURRENT USE OF INSULIN (HCC): ICD-10-CM

## 2023-09-05 RX ORDER — FLASH GLUCOSE SENSOR
KIT MISCELLANEOUS
Qty: 2 EACH | Refills: 11 | Status: ON HOLD | OUTPATIENT
Start: 2023-09-05 | End: 2024-03-25

## 2023-09-10 NOTE — TELEPHONE ENCOUNTER
From: Whitley Frederick  To: EDILMA Mosher  Sent: 11/26/2018 1:25 PM PST  Subject: Prescription Question    Whitley THOMAS Otoniel   1945  [phone]    Fly Calvo... [facility] helps me with my Singulair by paying $100 a Calendar Year ... I am wanting a new 90 day Prescription sent to [facility] on [address] ... [facility] is the choice for [facility]... I appreciate your time n thank you ...   Awake/Alert

## 2023-09-19 ENCOUNTER — PATIENT OUTREACH (OUTPATIENT)
Dept: HEALTH INFORMATION MANAGEMENT | Facility: OTHER | Age: 78
End: 2023-09-19
Payer: MEDICARE

## 2023-09-19 DIAGNOSIS — J45.20 MILD INTERMITTENT ASTHMA WITHOUT COMPLICATION: ICD-10-CM

## 2023-09-19 DIAGNOSIS — I10 ESSENTIAL HYPERTENSION: ICD-10-CM

## 2023-09-19 DIAGNOSIS — N18.4 TYPE 2 DIABETES MELLITUS WITH STAGE 4 CHRONIC KIDNEY DISEASE, WITHOUT LONG-TERM CURRENT USE OF INSULIN (HCC): ICD-10-CM

## 2023-09-19 DIAGNOSIS — E11.22 TYPE 2 DIABETES MELLITUS WITH STAGE 4 CHRONIC KIDNEY DISEASE, WITHOUT LONG-TERM CURRENT USE OF INSULIN (HCC): ICD-10-CM

## 2023-09-19 PROCEDURE — 99999 PR NO CHARGE: CPT | Performed by: NURSE PRACTITIONER

## 2023-09-19 NOTE — PROGRESS NOTES
"Assessment  Spoke with Whitley for monthly outreach follow up. Whitley states she is \"okay.\" She states that lately she is having more better/good days. She's not as tired and she is trying to eat better. She is taking kidney smart classes through [employer]. She has been researching recipes and diet information from the [employer] website. She has also been watching YouTube videos on Kidney education. Whitley has labs this week and a follow up with nephrology next week, 9/26. Discussed peritoneal dialysis vs hemodialysis. Encouraged Whitley to call with any questions or concerns.     Education  Discussed diet  Discussed kidney smart classes     Plan of Care and Goals  Continue Diabetes/hypertension/CKD education    Barriers:  Age  Progression of disease process-CKD 4  Knowledge of healthcare  Habits       Progress:  Progressing     Next outreach:  1 month     Chart reviewed for Quarterly Assessment, patient continues to qualify and benefit from PCM program.                        "

## 2023-09-22 ENCOUNTER — HOSPITAL ENCOUNTER (OUTPATIENT)
Dept: LAB | Facility: MEDICAL CENTER | Age: 78
End: 2023-09-22
Attending: INTERNAL MEDICINE
Payer: MEDICARE

## 2023-09-22 ENCOUNTER — PHARMACY VISIT (OUTPATIENT)
Dept: PHARMACY | Facility: MEDICAL CENTER | Age: 78
End: 2023-09-22
Payer: COMMERCIAL

## 2023-09-22 DIAGNOSIS — I10 PRIMARY HYPERTENSION: ICD-10-CM

## 2023-09-22 DIAGNOSIS — E11.22 TYPE 2 DIABETES MELLITUS WITH STAGE 4 CHRONIC KIDNEY DISEASE, WITHOUT LONG-TERM CURRENT USE OF INSULIN (HCC): ICD-10-CM

## 2023-09-22 DIAGNOSIS — N18.4 CKD (CHRONIC KIDNEY DISEASE) STAGE 4, GFR 15-29 ML/MIN (HCC): ICD-10-CM

## 2023-09-22 DIAGNOSIS — N18.4 TYPE 2 DIABETES MELLITUS WITH STAGE 4 CHRONIC KIDNEY DISEASE, WITHOUT LONG-TERM CURRENT USE OF INSULIN (HCC): ICD-10-CM

## 2023-09-22 LAB
ERYTHROCYTE [DISTWIDTH] IN BLOOD BY AUTOMATED COUNT: 49.1 FL (ref 35.9–50)
HCT VFR BLD AUTO: 38.1 % (ref 37–47)
HGB BLD-MCNC: 12.6 G/DL (ref 12–16)
MCH RBC QN AUTO: 32 PG (ref 27–33)
MCHC RBC AUTO-ENTMCNC: 33.1 G/DL (ref 32.2–35.5)
MCV RBC AUTO: 96.7 FL (ref 81.4–97.8)
PLATELET # BLD AUTO: 124 K/UL (ref 164–446)
PMV BLD AUTO: 11.4 FL (ref 9–12.9)
RBC # BLD AUTO: 3.94 M/UL (ref 4.2–5.4)
WBC # BLD AUTO: 5.7 K/UL (ref 4.8–10.8)

## 2023-09-22 PROCEDURE — RXMED WILLOW AMBULATORY MEDICATION CHARGE: Performed by: NURSE PRACTITIONER

## 2023-09-22 PROCEDURE — 36415 COLL VENOUS BLD VENIPUNCTURE: CPT

## 2023-09-22 PROCEDURE — 82043 UR ALBUMIN QUANTITATIVE: CPT

## 2023-09-22 PROCEDURE — 80048 BASIC METABOLIC PNL TOTAL CA: CPT

## 2023-09-22 PROCEDURE — 82570 ASSAY OF URINE CREATININE: CPT

## 2023-09-22 PROCEDURE — 85027 COMPLETE CBC AUTOMATED: CPT

## 2023-09-22 RX ORDER — MAGNESIUM OXIDE 400 MG/1
400 TABLET ORAL DAILY
Qty: 90 TABLET | Refills: 0 | Status: SHIPPED | OUTPATIENT
Start: 2023-09-22 | End: 2023-12-26 | Stop reason: SDUPTHER

## 2023-09-23 LAB
ANION GAP SERPL CALC-SCNC: 12 MMOL/L (ref 7–16)
BUN SERPL-MCNC: 67 MG/DL (ref 8–22)
CALCIUM SERPL-MCNC: 9.6 MG/DL (ref 8.5–10.5)
CHLORIDE SERPL-SCNC: 100 MMOL/L (ref 96–112)
CO2 SERPL-SCNC: 29 MMOL/L (ref 20–33)
CREAT SERPL-MCNC: 3.33 MG/DL (ref 0.5–1.4)
CREAT UR-MCNC: 89.02 MG/DL
GFR SERPLBLD CREATININE-BSD FMLA CKD-EPI: 14 ML/MIN/1.73 M 2
GLUCOSE SERPL-MCNC: 159 MG/DL (ref 65–99)
MICROALBUMIN UR-MCNC: 4.4 MG/DL
MICROALBUMIN/CREAT UR: 49 MG/G (ref 0–30)
POTASSIUM SERPL-SCNC: 4 MMOL/L (ref 3.6–5.5)
SODIUM SERPL-SCNC: 141 MMOL/L (ref 135–145)

## 2023-09-26 ENCOUNTER — OFFICE VISIT (OUTPATIENT)
Dept: NEPHROLOGY | Facility: MEDICAL CENTER | Age: 78
End: 2023-09-26
Payer: MEDICARE

## 2023-09-26 VITALS
WEIGHT: 248 LBS | SYSTOLIC BLOOD PRESSURE: 130 MMHG | DIASTOLIC BLOOD PRESSURE: 74 MMHG | HEIGHT: 66 IN | TEMPERATURE: 97.4 F | BODY MASS INDEX: 39.86 KG/M2 | HEART RATE: 66 BPM | OXYGEN SATURATION: 98 %

## 2023-09-26 DIAGNOSIS — I10 PRIMARY HYPERTENSION: ICD-10-CM

## 2023-09-26 DIAGNOSIS — N18.4 CKD (CHRONIC KIDNEY DISEASE) STAGE 4, GFR 15-29 ML/MIN (HCC): ICD-10-CM

## 2023-09-26 DIAGNOSIS — E87.20 METABOLIC ACIDOSIS: ICD-10-CM

## 2023-09-26 PROCEDURE — 99214 OFFICE O/P EST MOD 30 MIN: CPT | Performed by: INTERNAL MEDICINE

## 2023-09-26 PROCEDURE — 3075F SYST BP GE 130 - 139MM HG: CPT | Performed by: INTERNAL MEDICINE

## 2023-09-26 PROCEDURE — 3078F DIAST BP <80 MM HG: CPT | Performed by: INTERNAL MEDICINE

## 2023-09-26 ASSESSMENT — FIBROSIS 4 INDEX: FIB4 SCORE: 3.18

## 2023-09-26 ASSESSMENT — ENCOUNTER SYMPTOMS
NAUSEA: 0
VOMITING: 0
COUGH: 0
FEVER: 0
HYPERTENSION: 1
CHILLS: 0
SHORTNESS OF BREATH: 0

## 2023-09-26 NOTE — PROGRESS NOTES
"Subjective     Whitley Frederick is a 78 y.o. female who presents with Hypertension and Chronic Kidney Disease            Hypertension  This is a chronic problem. The current episode started more than 1 year ago. The problem is unchanged. The problem is controlled. Pertinent negatives include no chest pain, malaise/fatigue, peripheral edema or shortness of breath. Risk factors for coronary artery disease include obesity, post-menopausal state and diabetes mellitus. Past treatments include angiotensin blockers and diuretics. The current treatment provides significant improvement. There are no compliance problems.  Hypertensive end-organ damage includes kidney disease. Identifiable causes of hypertension include chronic renal disease.   Chronic Kidney Disease  This is a chronic problem. The current episode started more than 1 year ago. The problem occurs constantly. The problem has been unchanged. Pertinent negatives include no chest pain, chills, coughing, fever, nausea, urinary symptoms or vomiting.       Review of Systems   Constitutional:  Negative for chills, fever and malaise/fatigue.   Respiratory:  Negative for cough and shortness of breath.    Cardiovascular:  Negative for chest pain and leg swelling.   Gastrointestinal:  Negative for nausea and vomiting.   Genitourinary:  Negative for dysuria, frequency and urgency.              Objective     /74 (BP Location: Left arm, Patient Position: Sitting, BP Cuff Size: Large adult)   Pulse 66   Temp 36.3 °C (97.4 °F) (Temporal)   Ht 1.676 m (5' 6\")   Wt 112 kg (248 lb)   SpO2 98%   BMI 40.03 kg/m²      Physical Exam  Vitals and nursing note reviewed.   Constitutional:       General: She is not in acute distress.     Appearance: Normal appearance. She is well-developed. She is not diaphoretic.   HENT:      Head: Normocephalic and atraumatic.      Right Ear: External ear normal.      Left Ear: External ear normal.      Nose: Nose normal.   Eyes:      " General: No scleral icterus.        Right eye: No discharge.         Left eye: No discharge.      Conjunctiva/sclera: Conjunctivae normal.   Cardiovascular:      Rate and Rhythm: Normal rate and regular rhythm.      Heart sounds: No murmur heard.  Pulmonary:      Effort: Pulmonary effort is normal. No respiratory distress.      Breath sounds: Normal breath sounds.   Musculoskeletal:         General: No tenderness.      Right lower leg: No edema.      Left lower leg: No edema.   Skin:     General: Skin is warm and dry.      Findings: No erythema.   Neurological:      General: No focal deficit present.      Mental Status: She is alert and oriented to person, place, and time.      Cranial Nerves: No cranial nerve deficit.   Psychiatric:         Mood and Affect: Mood normal.         Behavior: Behavior normal.       Past Medical History:   Diagnosis Date    Allergy     Anxiety     Arrhythmia     Arthritis     Asthma     Carotid bruit     Left    CATARACT     Chest pain     DIABETES MELLITUS     Esophageal reflux     Goiter     Headache(784.0)     Heart murmur     Hyperlipidemia     Hypertension     IBD (inflammatory bowel disease)     Migraine     Silent migraine    AVERY (obstructive sleep apnea)     Overweight(278.02)     Palpitations     Paroxysmal atrial fibrillation (HCC) 7/12/13    woke with palps, AFib per ekg in am.    Pneumonia     Nov. 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 Badillo    Urinary tract infection, site not specified      Social History     Socioeconomic History    Marital status:      Spouse name: Not on file    Number of children: Not on file    Years of education: Not on file    Highest education level: Not on file   Occupational History    Not on file   Tobacco Use    Smoking status: Former     Current packs/day: 0.00     Average packs/day: 1 pack/day for 20.0 years (20.0 ttl pk-yrs)      Types: Cigarettes     Start date: 1963     Quit date: 1983     Years since quittin.7    Smokeless tobacco: Never   Vaping Use    Vaping Use: Never used   Substance and Sexual Activity    Alcohol use: No     Alcohol/week: 0.0 oz    Drug use: No    Sexual activity: Never     Partners: Male   Other Topics Concern    Not on file   Social History Narrative    Not on file     Social Determinants of Health     Financial Resource Strain: Low Risk  (2022)    Overall Financial Resource Strain (CARDIA)     Difficulty of Paying Living Expenses: Not hard at all   Food Insecurity: No Food Insecurity (2022)    Hunger Vital Sign     Worried About Running Out of Food in the Last Year: Never true     Ran Out of Food in the Last Year: Never true   Transportation Needs: No Transportation Needs (2022)    PRAPARE - Transportation     Lack of Transportation (Medical): No     Lack of Transportation (Non-Medical): No   Physical Activity: Not on file   Stress: Stress Concern Present (2022)    [employer] [place] [employer] - Occupational Stress Questionnaire     Feeling of Stress : To some extent   Social Connections: Moderately Isolated (2022)    Social Connection and Isolation Panel [NHANES]     Frequency of Communication with Friends and Family: Twice a week     Frequency of Social Gatherings with Friends and Family: Twice a week     Attends [employer] Services: Never     Active Member of Clubs or Organizations: No     Attends Club or Organization Meetings: 1 to 4 times per year     Marital Status:    Intimate Partner Violence: Not At Risk (2022)    Humiliation, Afraid, Rape, and Kick questionnaire     Fear of Current or Ex-Partner: No     Emotionally Abused: No     Physically Abused: No     Sexually Abused: No   Housing Stability: Low Risk  (2022)    Housing Stability Vital Sign     Unable to Pay for Housing in the Last Year: No     Number of Places Lived in the Last  Year: 1     Unstable Housing in the Last Year: No     Family History   Problem Relation Age of Onset    Cancer Maternal Aunt     Diabetes Maternal Aunt     Heart Disease Maternal Aunt     Hypertension Maternal Aunt     Hyperlipidemia Maternal Aunt     Cancer Mother         Leukemia    Hypertension Mother     Diabetes Mother     Lung Disease Father     Cancer Father         Lung    Lung Disease Brother     Stroke Brother     Diabetes Brother     Heart Disease Brother     Diabetes Maternal Grandmother     Hypertension Brother     Diabetes Brother      Recent Labs     04/10/23  1424 05/02/23  1423 05/02/23  1424 06/13/23  1302 09/22/23  1457   ALBUMIN 3.9  --  4.2 4.1  --    SODIUM 144   < > 140 144 141   POTASSIUM 4.2   < > 4.8 4.4 4.0   CHLORIDE 110   < > 106 108 100   CO2 20   < > 23 19* 29   BUN 84*   < > 60* 83* 67*   CREATININE 2.95*   < > 2.67* 2.98* 3.33*   PHOSPHORUS 3.8  --  3.9 3.8  --     < > = values in this interval not displayed.                             Assessment & Plan        1. CKD (chronic kidney disease) stage 4, GFR 15-29 ml/min ([facility])  Stable  No uremic symptoms  Renal dose of medication  Avoid nephrotoxins  Continue same medication regimen  Patient was advised to call us if symptoms worsen  Patient underwent dialysis education and she is leaning toward peritoneal dialysis  Recheck labs in 4 weeks    2. Primary hypertension  Controlled  Continue same medication regimen  Continue low-sodium diet  - Nutrition (Dietary) Consult      3. Metabolic acidosis  Improved  Patient was advised to discontinue sodium bicarbonate for now

## 2023-09-27 ENCOUNTER — PHARMACY VISIT (OUTPATIENT)
Dept: PHARMACY | Facility: MEDICAL CENTER | Age: 78
End: 2023-09-27
Payer: COMMERCIAL

## 2023-09-27 DIAGNOSIS — E11.22 TYPE 2 DIABETES MELLITUS WITH STAGE 4 CHRONIC KIDNEY DISEASE, WITHOUT LONG-TERM CURRENT USE OF INSULIN (HCC): ICD-10-CM

## 2023-09-27 DIAGNOSIS — N18.4 TYPE 2 DIABETES MELLITUS WITH STAGE 4 CHRONIC KIDNEY DISEASE, WITHOUT LONG-TERM CURRENT USE OF INSULIN (HCC): ICD-10-CM

## 2023-09-27 DIAGNOSIS — E03.4 HYPOTHYROIDISM DUE TO ACQUIRED ATROPHY OF THYROID: ICD-10-CM

## 2023-09-27 PROCEDURE — RXMED WILLOW AMBULATORY MEDICATION CHARGE: Performed by: NURSE PRACTITIONER

## 2023-09-28 PROCEDURE — RXMED WILLOW AMBULATORY MEDICATION CHARGE: Performed by: NURSE PRACTITIONER

## 2023-10-02 ENCOUNTER — PHARMACY VISIT (OUTPATIENT)
Dept: PHARMACY | Facility: MEDICAL CENTER | Age: 78
End: 2023-10-02
Payer: COMMERCIAL

## 2023-10-02 RX ORDER — FUROSEMIDE 80 MG
TABLET ORAL
Qty: 100 TABLET | Refills: 1 | Status: CANCELLED | OUTPATIENT
Start: 2023-09-28

## 2023-10-09 ENCOUNTER — NON-PROVIDER VISIT (OUTPATIENT)
Dept: VASCULAR LAB | Facility: MEDICAL CENTER | Age: 78
End: 2023-10-09
Attending: INTERNAL MEDICINE
Payer: MEDICARE

## 2023-10-09 DIAGNOSIS — N18.4 TYPE 2 DIABETES MELLITUS WITH STAGE 4 CHRONIC KIDNEY DISEASE, WITHOUT LONG-TERM CURRENT USE OF INSULIN (HCC): ICD-10-CM

## 2023-10-09 DIAGNOSIS — E11.22 TYPE 2 DIABETES MELLITUS WITH STAGE 4 CHRONIC KIDNEY DISEASE, WITHOUT LONG-TERM CURRENT USE OF INSULIN (HCC): ICD-10-CM

## 2023-10-09 PROCEDURE — 99212 OFFICE O/P EST SF 10 MIN: CPT

## 2023-10-09 NOTE — PROGRESS NOTES
Patient Consult Note    Primary care physician: EDILMA Whelan    Reason for consult: Management of Uncontrolled Type 2 Diabetes    HPI:  Whitley Frederick is a 78 y.o. old patient who comes in today for evaluation of above stated problem.    Allergies  Amlodipine, Levoxyl [levothyroxine sodium], Amaryl, Avandia [rosiglitazone maleate], Cipro xr, Clonidine, Contrast media with iodine [iodine], Glipizide, Glucophage [metformin hydrochloride], Hydralazine, Levaquin, and Relafen [nabumetone]    Current Diabetes Medication Regimen  Ozempic 2 mg every 7 days    Previous Diabetes Medications and Reason for Discontinuation  Metformin - dizziness  Glucophage - diarrhea  Amaryl - dizziness  Glipizide - non-effective    Potential Barriers to Care:  Adherence: reports missed doses no  Side effects: none  Affordability: yes  Others:     SMBG  Pt has home glucometer and proper testing technique -   Discussed BG Goals: FBG 80 - 130, 2hPP < 180, A1c < 7%    Pt reports blood sugars:   Before Breakfast: 101 30D, 113 90D  Before Dinner: 137 30D    Hypoglycemia  Hypoglycemia awareness: Yes  Nocturnal hypoglycemia: yes  Hypoglycemia:  Symptomatic Hypoglycemia, on 10/7/23  Pt's treatment of Hypoglycemia  Discussed 15:15 Rule    Lifestyle  Current Exercise - walks her dog daily  Exercise Goal - Increase as tolerated    Dietary -   Breakfast - cup of yogurt  Lunch/dinner - meatloaf, veggies  Snacks - popsicles  Drinks - water, milk, very occasionally soda    Preventative Management  BP regimen (ACEi/ARB): Losartan 50 mg BID  BP <140/90: Yes  Statin: rosuvastatin (Crestor) 20 mg daily  LDL <100: Yes  Lab Results   Component Value Date/Time    CHOLSTRLTOT 157 09/08/2022 02:00 PM    LDL 69 09/08/2022 02:00 PM    HDL 40 09/08/2022 02:00 PM    TRIGLYCERIDE 238 (H) 09/08/2022 02:00 PM       Last Microalbumin/Cr:  Lab Results   Component Value Date/Time    MALBCRT 49 (H) 09/22/2023 02:57 PM    MICROALBUR 4.4 09/22/2023 02:57 PM      Last A1c:  Lab Results   Component Value Date/Time    HBA1C 6.4 (A) 08/02/2023 01:15 PM      Last Retinal Scan: did not address    Monofilament exam: 8/2/23     Updated caregaps    Labs  Lab Results   Component Value Date/Time    SODIUM 141 09/22/2023 02:57 PM    POTASSIUM 4.0 09/22/2023 02:57 PM    CHLORIDE 100 09/22/2023 02:57 PM    CO2 29 09/22/2023 02:57 PM    GLUCOSE 159 (H) 09/22/2023 02:57 PM    BUN 67 (H) 09/22/2023 02:57 PM    CREATININE 3.33 (H) 09/22/2023 02:57 PM    CREATININE 1.1 04/27/2006 09:50 AM     Lab Results   Component Value Date/Time    ALKPHOSPHAT 61 09/08/2022 02:00 PM    ASTSGOT 17 09/08/2022 02:00 PM    ALTSGPT 11 09/08/2022 02:00 PM    TBILIRUBIN 0.7 09/08/2022 02:00 PM    INR 1.30 (A) 03/21/2022 12:00 AM    ALBUMIN 4.1 06/13/2023 01:02 PM    ALBUMIN 3.79 03/23/2017 12:28 PM        Physical Examination:  Vital signs: There were no vitals taken for this visit. There is no height or weight on file to calculate BMI.    Assessment and Plan:    1. DM2  Discussed Goals: FBG 80 - 130, 2hPP < 180, a1c < 7.0%   Last a1c drawn on 8/2/23 was 6.4%, which is at goal  Pt reported recent lows that occurred at night while asleep. It did not wake her up but she saw it on her Angel kassidy. She reports double checking other low blood sugars with fingerstick and the fingerstick is 20-30 points higher. It is not clear if she is truly low or if she is having sensor issues. She plans on replacing her sensor today. In discussing with the patient, it was decided to reduce the Ozempic dose.  Follow-up in 4 weeks due to dose change.    - Medication changes:  Take Ozempic 1 mg every 7 days   Freestyle angel 2 sensors ordered    - Lifestyle changes:  Continue dietary and exercise as currently doing.    Follow Up:  4 weeks    Dr. Taylor, PharmBROCK Coles, PharmD    CC:   EDILMA Whelan M.D.

## 2023-10-10 ENCOUNTER — TELEPHONE (OUTPATIENT)
Dept: HEALTH INFORMATION MANAGEMENT | Facility: OTHER | Age: 78
End: 2023-10-10
Payer: MEDICARE

## 2023-10-10 NOTE — TELEPHONE ENCOUNTER
Pt stated that she already has an appt scheduled to complete an eye exam and will call later on to schedule a flu shot.

## 2023-10-13 ENCOUNTER — TELEPHONE (OUTPATIENT)
Dept: HEALTH INFORMATION MANAGEMENT | Facility: OTHER | Age: 78
End: 2023-10-13

## 2023-10-13 DIAGNOSIS — N18.4 CKD (CHRONIC KIDNEY DISEASE) STAGE 4, GFR 15-29 ML/MIN (HCC): ICD-10-CM

## 2023-10-13 DIAGNOSIS — I10 PRIMARY HYPERTENSION: ICD-10-CM

## 2023-10-13 PROCEDURE — RXMED WILLOW AMBULATORY MEDICATION CHARGE: Performed by: PODIATRIST

## 2023-10-13 NOTE — TELEPHONE ENCOUNTER
1. Caller Name: Whitley Frederick                          Call Back Number: [phone]        How would the patient prefer to be contacted with a response: Phone call do NOT leave a detailed message    Pt called checking status on Nutrition (Dietary) referral that was suppose to be placed on 09/26.      Please adv,    Thank  you

## 2023-10-16 ENCOUNTER — PHARMACY VISIT (OUTPATIENT)
Dept: PHARMACY | Facility: MEDICAL CENTER | Age: 78
End: 2023-10-16
Payer: COMMERCIAL

## 2023-10-20 ENCOUNTER — PATIENT MESSAGE (OUTPATIENT)
Dept: MEDICAL GROUP | Facility: LAB | Age: 78
End: 2023-10-20
Payer: MEDICARE

## 2023-10-20 DIAGNOSIS — G47.30 SLEEP APNEA, UNSPECIFIED TYPE: ICD-10-CM

## 2023-10-23 PROCEDURE — RXMED WILLOW AMBULATORY MEDICATION CHARGE: Performed by: NURSE PRACTITIONER

## 2023-10-24 ENCOUNTER — PHARMACY VISIT (OUTPATIENT)
Dept: PHARMACY | Facility: MEDICAL CENTER | Age: 78
End: 2023-10-24
Payer: COMMERCIAL

## 2023-10-24 DIAGNOSIS — N18.4 CKD (CHRONIC KIDNEY DISEASE) STAGE 4, GFR 15-29 ML/MIN (HCC): ICD-10-CM

## 2023-10-24 DIAGNOSIS — N18.4 TYPE 2 DIABETES MELLITUS WITH STAGE 4 CHRONIC KIDNEY DISEASE, WITHOUT LONG-TERM CURRENT USE OF INSULIN (HCC): ICD-10-CM

## 2023-10-24 DIAGNOSIS — E11.22 TYPE 2 DIABETES MELLITUS WITH STAGE 4 CHRONIC KIDNEY DISEASE, WITHOUT LONG-TERM CURRENT USE OF INSULIN (HCC): ICD-10-CM

## 2023-10-25 ENCOUNTER — PATIENT OUTREACH (OUTPATIENT)
Dept: HEALTH INFORMATION MANAGEMENT | Facility: OTHER | Age: 78
End: 2023-10-25
Payer: MEDICARE

## 2023-10-25 DIAGNOSIS — I10 ESSENTIAL HYPERTENSION: ICD-10-CM

## 2023-10-25 DIAGNOSIS — J45.20 MILD INTERMITTENT ASTHMA WITHOUT COMPLICATION: ICD-10-CM

## 2023-10-25 DIAGNOSIS — N18.4 TYPE 2 DIABETES MELLITUS WITH STAGE 4 CHRONIC KIDNEY DISEASE, WITHOUT LONG-TERM CURRENT USE OF INSULIN (HCC): ICD-10-CM

## 2023-10-25 DIAGNOSIS — E11.22 TYPE 2 DIABETES MELLITUS WITH STAGE 4 CHRONIC KIDNEY DISEASE, WITHOUT LONG-TERM CURRENT USE OF INSULIN (HCC): ICD-10-CM

## 2023-10-25 PROCEDURE — RXMED WILLOW AMBULATORY MEDICATION CHARGE: Performed by: INTERNAL MEDICINE

## 2023-10-25 PROCEDURE — 99490 CHRNC CARE MGMT STAFF 1ST 20: CPT | Performed by: NURSE PRACTITIONER

## 2023-10-25 RX ORDER — FUROSEMIDE 80 MG
80 TABLET ORAL
Qty: 180 TABLET | Refills: 3 | Status: ON HOLD | OUTPATIENT
Start: 2023-10-25 | End: 2024-03-25

## 2023-10-25 NOTE — PROGRESS NOTES
"Assessment  Spoke with Whitley for monthly outreach follow up. She states, \"I'm okay, normal.\" She reports she stopped her Ozempic about a month ago. She was working on her diet and eating better. She report splurging recently and now her blood sugars are increased to the 200 range. Her last A1c was 6.4 on 8/2. Encouraged her to follow up with endocrinology. Discussed her follow up with Nephrology and lab work. She has been trying to follow a more plant based diet. A nutrition consult was ordered by Nephrology. It has been authorized, contact number given. Whitley denies any needs at this time. Encouraged to call with any questions or concerns.     Education  Discussed blood sugars  Discussed Ozempic dosage  Discussed follow up with endocrinology     Plan of Care and Goals  Continue diabetes education  Continue hypertension education    Barriers:  Progression of disease process-CKD IV  Knowledge of healthcare   Habits     Progress:  Progressing    Next outreach:  1 month                            "

## 2023-10-26 ENCOUNTER — PHARMACY VISIT (OUTPATIENT)
Dept: PHARMACY | Facility: MEDICAL CENTER | Age: 78
End: 2023-10-26
Payer: COMMERCIAL

## 2023-10-27 ENCOUNTER — HOSPITAL ENCOUNTER (OUTPATIENT)
Dept: LAB | Facility: MEDICAL CENTER | Age: 78
End: 2023-10-27
Attending: INTERNAL MEDICINE
Payer: MEDICARE

## 2023-10-27 ENCOUNTER — PATIENT MESSAGE (OUTPATIENT)
Dept: CARDIOLOGY | Facility: MEDICAL CENTER | Age: 78
End: 2023-10-27
Payer: MEDICARE

## 2023-10-27 DIAGNOSIS — N18.4 CKD (CHRONIC KIDNEY DISEASE) STAGE 4, GFR 15-29 ML/MIN (HCC): ICD-10-CM

## 2023-10-27 DIAGNOSIS — E87.20 METABOLIC ACIDOSIS: ICD-10-CM

## 2023-10-27 DIAGNOSIS — I10 PRIMARY HYPERTENSION: ICD-10-CM

## 2023-10-27 LAB
ANION GAP SERPL CALC-SCNC: 16 MMOL/L (ref 7–16)
BUN SERPL-MCNC: 64 MG/DL (ref 8–22)
CALCIUM SERPL-MCNC: 9.5 MG/DL (ref 8.4–10.2)
CHLORIDE SERPL-SCNC: 101 MMOL/L (ref 96–112)
CO2 SERPL-SCNC: 22 MMOL/L (ref 20–33)
CREAT SERPL-MCNC: 2.8 MG/DL (ref 0.5–1.4)
CREAT UR-MCNC: 106.78 MG/DL
ERYTHROCYTE [DISTWIDTH] IN BLOOD BY AUTOMATED COUNT: 50.3 FL (ref 35.9–50)
FASTING STATUS PATIENT QL REPORTED: NORMAL
GFR SERPLBLD CREATININE-BSD FMLA CKD-EPI: 17 ML/MIN/1.73 M 2
GLUCOSE SERPL-MCNC: 200 MG/DL (ref 65–99)
HCT VFR BLD AUTO: 37 % (ref 37–47)
HGB BLD-MCNC: 12.2 G/DL (ref 12–16)
MCH RBC QN AUTO: 31.9 PG (ref 27–33)
MCHC RBC AUTO-ENTMCNC: 33 G/DL (ref 32.2–35.5)
MCV RBC AUTO: 96.6 FL (ref 81.4–97.8)
MICROALBUMIN UR-MCNC: 5.5 MG/DL
MICROALBUMIN/CREAT UR: 52 MG/G (ref 0–30)
PHOSPHATE SERPL-MCNC: 3 MG/DL (ref 2.5–4.5)
PLATELET # BLD AUTO: 128 K/UL (ref 164–446)
PMV BLD AUTO: 10.3 FL (ref 9–12.9)
POTASSIUM SERPL-SCNC: 4.1 MMOL/L (ref 3.6–5.5)
RBC # BLD AUTO: 3.83 M/UL (ref 4.2–5.4)
SODIUM SERPL-SCNC: 139 MMOL/L (ref 135–145)
WBC # BLD AUTO: 6.2 K/UL (ref 4.8–10.8)

## 2023-10-27 PROCEDURE — 82570 ASSAY OF URINE CREATININE: CPT

## 2023-10-27 PROCEDURE — 84100 ASSAY OF PHOSPHORUS: CPT

## 2023-10-27 PROCEDURE — 36415 COLL VENOUS BLD VENIPUNCTURE: CPT

## 2023-10-27 PROCEDURE — 85027 COMPLETE CBC AUTOMATED: CPT

## 2023-10-27 PROCEDURE — 80048 BASIC METABOLIC PNL TOTAL CA: CPT

## 2023-10-27 PROCEDURE — 82043 UR ALBUMIN QUANTITATIVE: CPT

## 2023-10-30 ENCOUNTER — TELEPHONE (OUTPATIENT)
Dept: HEALTH INFORMATION MANAGEMENT | Facility: OTHER | Age: 78
End: 2023-10-30

## 2023-10-30 NOTE — TELEPHONE ENCOUNTER
Whitley left a message for a return call. Returned call, Whitley had questions regarding her nutrition referral. She has not been able to get though by phone. Will follow the [facility]. She will follow up with Dr. Araujo at her appointment on 11/1 as well.      Left message with [facility]

## 2023-10-30 NOTE — PATIENT COMMUNICATION
Called regan at [phone]    Pt's multaq will be delivered to us in the next 7 business days.      Phone Number Called: [phone]    Call outcome: Left detailed message for patient. Informed to call back with any additional questions.

## 2023-10-31 PROCEDURE — RXMED WILLOW AMBULATORY MEDICATION CHARGE: Performed by: NURSE PRACTITIONER

## 2023-11-01 ENCOUNTER — PATIENT MESSAGE (OUTPATIENT)
Dept: CARDIOLOGY | Facility: MEDICAL CENTER | Age: 78
End: 2023-11-01

## 2023-11-01 ENCOUNTER — APPOINTMENT (OUTPATIENT)
Dept: NEPHROLOGY | Facility: MEDICAL CENTER | Age: 78
End: 2023-11-01
Payer: MEDICARE

## 2023-11-03 ENCOUNTER — PHARMACY VISIT (OUTPATIENT)
Dept: PHARMACY | Facility: MEDICAL CENTER | Age: 78
End: 2023-11-03
Payer: COMMERCIAL

## 2023-11-03 PROCEDURE — RXOTC WILLOW AMBULATORY OTC CHARGE

## 2023-11-08 ENCOUNTER — APPOINTMENT (OUTPATIENT)
Dept: VASCULAR LAB | Facility: MEDICAL CENTER | Age: 78
End: 2023-11-08
Attending: INTERNAL MEDICINE
Payer: MEDICARE

## 2023-11-11 DIAGNOSIS — J45.909 UNCOMPLICATED ASTHMA, UNSPECIFIED ASTHMA SEVERITY, UNSPECIFIED WHETHER PERSISTENT: ICD-10-CM

## 2023-11-13 ENCOUNTER — TELEPHONE (OUTPATIENT)
Dept: MEDICAL GROUP | Facility: LAB | Age: 78
End: 2023-11-13
Payer: MEDICARE

## 2023-11-13 PROCEDURE — RXMED WILLOW AMBULATORY MEDICATION CHARGE: Performed by: NURSE PRACTITIONER

## 2023-11-13 PROCEDURE — RXMED WILLOW AMBULATORY MEDICATION CHARGE: Performed by: INTERNAL MEDICINE

## 2023-11-13 RX ORDER — MONTELUKAST SODIUM 10 MG/1
10 TABLET ORAL
Qty: 90 TABLET | Refills: 3 | Status: SHIPPED | OUTPATIENT
Start: 2023-11-13

## 2023-11-14 ENCOUNTER — DOCUMENTATION (OUTPATIENT)
Dept: CARDIOLOGY | Facility: MEDICAL CENTER | Age: 78
End: 2023-11-14

## 2023-11-14 ENCOUNTER — PHARMACY VISIT (OUTPATIENT)
Dept: PHARMACY | Facility: MEDICAL CENTER | Age: 78
End: 2023-11-14
Payer: COMMERCIAL

## 2023-11-14 ENCOUNTER — OFFICE VISIT (OUTPATIENT)
Dept: NEPHROLOGY | Facility: MEDICAL CENTER | Age: 78
End: 2023-11-14
Payer: MEDICARE

## 2023-11-14 VITALS
DIASTOLIC BLOOD PRESSURE: 78 MMHG | RESPIRATION RATE: 18 BRPM | OXYGEN SATURATION: 95 % | BODY MASS INDEX: 38.41 KG/M2 | SYSTOLIC BLOOD PRESSURE: 124 MMHG | HEIGHT: 66 IN | WEIGHT: 239 LBS | HEART RATE: 70 BPM | TEMPERATURE: 97.9 F

## 2023-11-14 DIAGNOSIS — N18.4 CKD (CHRONIC KIDNEY DISEASE) STAGE 4, GFR 15-29 ML/MIN (HCC): ICD-10-CM

## 2023-11-14 DIAGNOSIS — I10 PRIMARY HYPERTENSION: ICD-10-CM

## 2023-11-14 DIAGNOSIS — E87.20 METABOLIC ACIDOSIS: ICD-10-CM

## 2023-11-14 PROCEDURE — 99214 OFFICE O/P EST MOD 30 MIN: CPT | Performed by: INTERNAL MEDICINE

## 2023-11-14 PROCEDURE — 3074F SYST BP LT 130 MM HG: CPT | Performed by: INTERNAL MEDICINE

## 2023-11-14 PROCEDURE — 3078F DIAST BP <80 MM HG: CPT | Performed by: INTERNAL MEDICINE

## 2023-11-14 ASSESSMENT — FIBROSIS 4 INDEX: FIB4 SCORE: 3.08

## 2023-11-14 ASSESSMENT — ENCOUNTER SYMPTOMS
COUGH: 0
FEVER: 0
NAUSEA: 0
SHORTNESS OF BREATH: 0
HYPERTENSION: 1
CHILLS: 0
VOMITING: 0

## 2023-11-15 NOTE — TELEPHONE ENCOUNTER
Called [facility] Referral to assist in getting established unable to leave message, mailbox full.

## 2023-11-15 NOTE — PROGRESS NOTES
"Subjective     Whitley Frederick is a 78 y.o. female who presents with Hypertension and Chronic Kidney Disease            Hypertension  This is a chronic problem. The current episode started more than 1 year ago. The problem is unchanged. The problem is controlled. Associated symptoms include malaise/fatigue. Pertinent negatives include no chest pain, peripheral edema or shortness of breath. Risk factors for coronary artery disease include post-menopausal state, diabetes mellitus and obesity. Past treatments include angiotensin blockers and diuretics. The current treatment provides significant improvement. There are no compliance problems.  Hypertensive end-organ damage includes kidney disease. Identifiable causes of hypertension include chronic renal disease.   Chronic Kidney Disease  This is a chronic problem. The current episode started more than 1 year ago. The problem occurs constantly. The problem has been unchanged. Pertinent negatives include no chest pain, chills, coughing, fever, nausea, urinary symptoms or vomiting.       Review of Systems   Constitutional:  Positive for malaise/fatigue. Negative for chills and fever.   Respiratory:  Negative for cough and shortness of breath.    Cardiovascular:  Negative for chest pain and leg swelling.   Gastrointestinal:  Negative for nausea and vomiting.   Genitourinary:  Negative for dysuria, frequency and urgency.              Objective     /78 (BP Location: Left arm, Patient Position: Sitting, BP Cuff Size: Adult)   Pulse 70   Temp 36.6 °C (97.9 °F) (Temporal)   Resp 18   Ht 1.676 m (5' 6\")   Wt 108 kg (239 lb)   SpO2 95%   BMI 38.58 kg/m²      Physical Exam  Vitals and nursing note reviewed.   Constitutional:       General: She is not in acute distress.     Appearance: Normal appearance. She is well-developed. She is not diaphoretic.   HENT:      Head: Normocephalic and atraumatic.      Right Ear: External ear normal.      Left Ear: External ear " normal.      Nose: Nose normal.   Eyes:      General: No scleral icterus.        Right eye: No discharge.         Left eye: No discharge.      Conjunctiva/sclera: Conjunctivae normal.   Cardiovascular:      Rate and Rhythm: Normal rate and regular rhythm.      Heart sounds: No murmur heard.  Pulmonary:      Effort: Pulmonary effort is normal. No respiratory distress.      Breath sounds: Normal breath sounds.   Musculoskeletal:         General: No tenderness.      Right lower leg: No edema.      Left lower leg: No edema.   Skin:     General: Skin is warm and dry.      Findings: No erythema.   Neurological:      General: No focal deficit present.      Mental Status: She is alert and oriented to person, place, and time.      Cranial Nerves: No cranial nerve deficit.   Psychiatric:         Mood and Affect: Mood normal.         Behavior: Behavior normal.       Past Medical History:   Diagnosis Date    Allergy     Anxiety     Arrhythmia     Arthritis     Asthma     Carotid bruit     Left    CATARACT     Chest pain     DIABETES MELLITUS     Esophageal reflux     Goiter     Headache(784.0)     Heart murmur     Hyperlipidemia     Hypertension     IBD (inflammatory bowel disease)     Migraine     Silent migraine    AVERY (obstructive sleep apnea)     Overweight(278.02)     Palpitations     Paroxysmal atrial fibrillation (HCC) 7/12/13    woke with palps, AFib per ekg in am.    Pneumonia     Nov. 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 Badillo    Urinary tract infection, site not specified      Social History     Socioeconomic History    Marital status:      Spouse name: Not on file    Number of children: Not on file    Years of education: Not on file    Highest education level: Not on file   Occupational History    Not on file   Tobacco Use    Smoking status: Former     Current packs/day: 0.00     Average packs/day: 1  pack/day for 20.0 years (20.0 ttl pk-yrs)     Types: Cigarettes     Start date: 1963     Quit date: 1983     Years since quittin.8    Smokeless tobacco: Never   Vaping Use    Vaping Use: Never used   Substance and Sexual Activity    Alcohol use: No     Alcohol/week: 0.0 oz    Drug use: No    Sexual activity: Never     Partners: Male   Other Topics Concern    Not on file   Social History Narrative    Not on file     Social Determinants of Health     Financial Resource Strain: Low Risk  (2022)    Overall Financial Resource Strain (CARDIA)     Difficulty of Paying Living Expenses: Not hard at all   Food Insecurity: No Food Insecurity (2022)    Hunger Vital Sign     Worried About Running Out of Food in the Last Year: Never true     Ran Out of Food in the Last Year: Never true   Transportation Needs: No Transportation Needs (2022)    PRAPARE - Transportation     Lack of Transportation (Medical): No     Lack of Transportation (Non-Medical): No   Physical Activity: Not on file   Stress: Stress Concern Present (2022)    [employer] [place] [employer] - Occupational Stress Questionnaire     Feeling of Stress : To some extent   Social Connections: Moderately Isolated (2022)    Social Connection and Isolation Panel [NHANES]     Frequency of Communication with Friends and Family: Twice a week     Frequency of Social Gatherings with Friends and Family: Twice a week     Attends Restorationism Services: Never     Active Member of Clubs or Organizations: No     Attends Club or Organization Meetings: 1 to 4 times per year     Marital Status:    Intimate Partner Violence: Not At Risk (2022)    Humiliation, Afraid, Rape, and Kick questionnaire     Fear of Current or Ex-Partner: No     Emotionally Abused: No     Physically Abused: No     Sexually Abused: No   Housing Stability: Low Risk  (2022)    Housing Stability Vital Sign     Unable to Pay for Housing in the Last Year:  No     Number of Places Lived in the Last Year: 1     Unstable Housing in the Last Year: No     Family History   Problem Relation Age of Onset    Cancer Maternal Aunt     Diabetes Maternal Aunt     Heart Disease Maternal Aunt     Hypertension Maternal Aunt     Hyperlipidemia Maternal Aunt     Cancer Mother         Leukemia    Hypertension Mother     Diabetes Mother     Lung Disease Father     Cancer Father         Lung    Lung Disease Brother     Stroke Brother     Diabetes Brother     Heart Disease Brother     Diabetes Maternal Grandmother     Hypertension Brother     Diabetes Brother      Recent Labs     04/10/23  1424 05/02/23  1423 05/02/23  1424 06/13/23  1302 09/22/23  1457 10/27/23  1335   ALBUMIN 3.9  --  4.2 4.1  --   --    SODIUM 144   < > 140 144 141 139   POTASSIUM 4.2   < > 4.8 4.4 4.0 4.1   CHLORIDE 110   < > 106 108 100 101   CO2 20   < > 23 19* 29 22   BUN 84*   < > 60* 83* 67* 64*   CREATININE 2.95*   < > 2.67* 2.98* 3.33* 2.80*   PHOSPHORUS 3.8  --  3.9 3.8  --  3.0    < > = values in this interval not displayed.                           Assessment & Plan        1. CKD (chronic kidney disease) stage 4, GFR 15-29 ml/min ([facility])  Stable  No uremic symptoms  Renal dose of medication  Avoid nephrotoxins  Continue same medication regimen  Patient was advised to call us if symptoms worsen  Pt is interested in PD    2. Primary hypertension  Controlled  Continue same medication regimen  Continue low-sodium diet      3. Metabolic acidosis  improved

## 2023-11-20 ENCOUNTER — PATIENT OUTREACH (OUTPATIENT)
Dept: HEALTH INFORMATION MANAGEMENT | Facility: OTHER | Age: 78
End: 2023-11-20
Payer: MEDICARE

## 2023-11-20 DIAGNOSIS — J45.20 MILD INTERMITTENT ASTHMA WITHOUT COMPLICATION: ICD-10-CM

## 2023-11-20 DIAGNOSIS — E11.22 TYPE 2 DIABETES MELLITUS WITH STAGE 4 CHRONIC KIDNEY DISEASE, WITHOUT LONG-TERM CURRENT USE OF INSULIN (HCC): ICD-10-CM

## 2023-11-20 DIAGNOSIS — N18.4 TYPE 2 DIABETES MELLITUS WITH STAGE 4 CHRONIC KIDNEY DISEASE, WITHOUT LONG-TERM CURRENT USE OF INSULIN (HCC): ICD-10-CM

## 2023-11-20 DIAGNOSIS — I10 ESSENTIAL HYPERTENSION: ICD-10-CM

## 2023-11-20 PROCEDURE — 99999 PR NO CHARGE: CPT | Performed by: NURSE PRACTITIONER

## 2023-11-22 NOTE — PROGRESS NOTES
Assessment  Whitley returned call for monthly outreach follow up. She states she is doing well. Discussed her last nephrology appointment. She is following closely with nephrology and getting labs frequently. She has been helping a friend and her dog had a procedure. She denies any needs and asked to defer her monthly calls until January hwen the Holiday season is over. Encouraged Whitley to call if anything changes or she needs anything.     Education  Discussed last Nephrology appointment   Discussed PCM program and call schedule    Plan of Care and Goals  Continue Diabetes and hypertension education      Barriers:  Age  Progression of disease process-CKD 4  Knowledge of healthcare  Habits     Progress:  Progressing     Next outreach:  Asked for follow up in January

## 2023-11-26 PROCEDURE — RXMED WILLOW AMBULATORY MEDICATION CHARGE: Performed by: NURSE PRACTITIONER

## 2023-11-27 ENCOUNTER — PHARMACY VISIT (OUTPATIENT)
Dept: PHARMACY | Facility: MEDICAL CENTER | Age: 78
End: 2023-11-27
Payer: COMMERCIAL

## 2023-11-30 PROCEDURE — RXMED WILLOW AMBULATORY MEDICATION CHARGE: Performed by: NURSE PRACTITIONER

## 2023-12-01 ENCOUNTER — PHARMACY VISIT (OUTPATIENT)
Dept: PHARMACY | Facility: MEDICAL CENTER | Age: 78
End: 2023-12-01
Payer: COMMERCIAL

## 2023-12-01 ENCOUNTER — TELEPHONE (OUTPATIENT)
Dept: MEDICAL GROUP | Facility: LAB | Age: 78
End: 2023-12-01
Payer: MEDICARE

## 2023-12-02 DIAGNOSIS — I48.0 PAF (PAROXYSMAL ATRIAL FIBRILLATION) (HCC): ICD-10-CM

## 2023-12-04 ENCOUNTER — PATIENT MESSAGE (OUTPATIENT)
Dept: CARDIOLOGY | Facility: MEDICAL CENTER | Age: 78
End: 2023-12-04
Payer: MEDICARE

## 2023-12-04 DIAGNOSIS — I48.0 PAF (PAROXYSMAL ATRIAL FIBRILLATION) (HCC): ICD-10-CM

## 2023-12-04 RX ORDER — RIVAROXABAN 15 MG/1
15 TABLET, FILM COATED ORAL
Qty: 90 TABLET | Refills: 2 | Status: CANCELLED | OUTPATIENT
Start: 2023-12-04

## 2023-12-05 PROCEDURE — RXMED WILLOW AMBULATORY MEDICATION CHARGE: Performed by: INTERNAL MEDICINE

## 2023-12-06 ENCOUNTER — PHARMACY VISIT (OUTPATIENT)
Dept: PHARMACY | Facility: MEDICAL CENTER | Age: 78
End: 2023-12-06
Payer: COMMERCIAL

## 2023-12-11 DIAGNOSIS — R09.81 SINUS CONGESTION: ICD-10-CM

## 2023-12-12 PROCEDURE — RXMED WILLOW AMBULATORY MEDICATION CHARGE: Performed by: NURSE PRACTITIONER

## 2023-12-12 RX ORDER — FLUTICASONE PROPIONATE 50 MCG
SPRAY, SUSPENSION (ML) NASAL
Qty: 48 G | Refills: 3 | Status: SHIPPED | OUTPATIENT
Start: 2023-12-12

## 2023-12-12 NOTE — TELEPHONE ENCOUNTER
Received request via: Patient    Was the patient seen in the last year in this department? Yes  8/2/23  Does the patient have an active prescription (recently filled or refills available) for medication(s) requested? No    Does the patient have half-way Plus and need 100 day supply (blood pressure, diabetes and cholesterol meds only)? Medication is not for cholesterol, blood pressure or diabetes

## 2023-12-14 ENCOUNTER — PHARMACY VISIT (OUTPATIENT)
Dept: PHARMACY | Facility: MEDICAL CENTER | Age: 78
End: 2023-12-14
Payer: COMMERCIAL

## 2023-12-22 ENCOUNTER — PATIENT OUTREACH (OUTPATIENT)
Dept: HEALTH INFORMATION MANAGEMENT | Facility: OTHER | Age: 78
End: 2023-12-22
Payer: MEDICARE

## 2023-12-22 DIAGNOSIS — E11.22 TYPE 2 DIABETES MELLITUS WITH STAGE 4 CHRONIC KIDNEY DISEASE, WITHOUT LONG-TERM CURRENT USE OF INSULIN (HCC): ICD-10-CM

## 2023-12-22 DIAGNOSIS — N18.4 TYPE 2 DIABETES MELLITUS WITH STAGE 4 CHRONIC KIDNEY DISEASE, WITHOUT LONG-TERM CURRENT USE OF INSULIN (HCC): ICD-10-CM

## 2023-12-22 DIAGNOSIS — J45.20 MILD INTERMITTENT ASTHMA WITHOUT COMPLICATION: ICD-10-CM

## 2023-12-22 DIAGNOSIS — I10 ESSENTIAL HYPERTENSION: ICD-10-CM

## 2023-12-22 NOTE — PROGRESS NOTES
Monthly outreach deferred until January per Whitley's request. Will follow up at next monthly outreach.     Chart reviewed for Quarterly Assessment, patient continues to qualify and benefit from PCM program.

## 2023-12-27 PROCEDURE — RXMED WILLOW AMBULATORY MEDICATION CHARGE: Performed by: STUDENT IN AN ORGANIZED HEALTH CARE EDUCATION/TRAINING PROGRAM

## 2023-12-27 RX ORDER — MAGNESIUM OXIDE 400 MG/1
400 TABLET ORAL DAILY
Qty: 90 TABLET | Refills: 0 | Status: SHIPPED | OUTPATIENT
Start: 2023-12-27

## 2024-01-04 ENCOUNTER — TELEPHONE (OUTPATIENT)
Dept: HEALTH INFORMATION MANAGEMENT | Facility: OTHER | Age: 79
End: 2024-01-04

## 2024-01-04 ENCOUNTER — TELEPHONE (OUTPATIENT)
Dept: CARDIOLOGY | Facility: MEDICAL CENTER | Age: 79
End: 2024-01-04
Payer: MEDICARE

## 2024-01-04 NOTE — TELEPHONE ENCOUNTER
TW    Caller: Whitley Frederick     Topic/issue: Pt called in regards to her medication dronedarone (MULTAQ) 400 MG pt needs a new application for the medication assistance program. Please advise.    Callback Number: [phone] (home)      Thank you,     Joey NAIDU

## 2024-01-05 ENCOUNTER — PHARMACY VISIT (OUTPATIENT)
Dept: PHARMACY | Facility: MEDICAL CENTER | Age: 79
End: 2024-01-05
Payer: COMMERCIAL

## 2024-01-05 PROCEDURE — RXMED WILLOW AMBULATORY MEDICATION CHARGE: Performed by: NURSE PRACTITIONER

## 2024-01-05 NOTE — TELEPHONE ENCOUNTER
Phone Number Called: [phone]     Call outcome: Left detailed message for patient. Informed to call back with any additional questions.    Message: Called to discuss provider portion of [facility] application for PAP. Provider portion of PAP faxed to [facility]

## 2024-01-08 ENCOUNTER — TELEPHONE (OUTPATIENT)
Dept: PHARMACY | Facility: MEDICAL CENTER | Age: 79
End: 2024-01-08
Payer: MEDICARE

## 2024-01-08 ENCOUNTER — HOSPITAL ENCOUNTER (OUTPATIENT)
Dept: LAB | Facility: MEDICAL CENTER | Age: 79
End: 2024-01-08
Attending: INTERNAL MEDICINE
Payer: MEDICARE

## 2024-01-08 DIAGNOSIS — I10 PRIMARY HYPERTENSION: ICD-10-CM

## 2024-01-08 DIAGNOSIS — N18.4 CKD (CHRONIC KIDNEY DISEASE) STAGE 4, GFR 15-29 ML/MIN (HCC): ICD-10-CM

## 2024-01-08 LAB
ANION GAP SERPL CALC-SCNC: 13 MMOL/L (ref 7–16)
BUN SERPL-MCNC: 70 MG/DL (ref 8–22)
CALCIUM SERPL-MCNC: 9.8 MG/DL (ref 8.5–10.5)
CHLORIDE SERPL-SCNC: 106 MMOL/L (ref 96–112)
CO2 SERPL-SCNC: 20 MMOL/L (ref 20–33)
CREAT SERPL-MCNC: 2.36 MG/DL (ref 0.5–1.4)
ERYTHROCYTE [DISTWIDTH] IN BLOOD BY AUTOMATED COUNT: 53.1 FL (ref 35.9–50)
GFR SERPLBLD CREATININE-BSD FMLA CKD-EPI: 21 ML/MIN/1.73 M 2
GLUCOSE SERPL-MCNC: 193 MG/DL (ref 65–99)
HCT VFR BLD AUTO: 38.7 % (ref 37–47)
HGB BLD-MCNC: 12.7 G/DL (ref 12–16)
MCH RBC QN AUTO: 32.6 PG (ref 27–33)
MCHC RBC AUTO-ENTMCNC: 32.8 G/DL (ref 32.2–35.5)
MCV RBC AUTO: 99.5 FL (ref 81.4–97.8)
PLATELET # BLD AUTO: 120 K/UL (ref 164–446)
PMV BLD AUTO: 11.4 FL (ref 9–12.9)
POTASSIUM SERPL-SCNC: 4.5 MMOL/L (ref 3.6–5.5)
RBC # BLD AUTO: 3.89 M/UL (ref 4.2–5.4)
SODIUM SERPL-SCNC: 139 MMOL/L (ref 135–145)
WBC # BLD AUTO: 6.7 K/UL (ref 4.8–10.8)

## 2024-01-08 PROCEDURE — 82570 ASSAY OF URINE CREATININE: CPT

## 2024-01-08 PROCEDURE — 82043 UR ALBUMIN QUANTITATIVE: CPT

## 2024-01-08 PROCEDURE — 80048 BASIC METABOLIC PNL TOTAL CA: CPT

## 2024-01-08 PROCEDURE — 36415 COLL VENOUS BLD VENIPUNCTURE: CPT

## 2024-01-08 PROCEDURE — 85027 COMPLETE CBC AUTOMATED: CPT

## 2024-01-08 NOTE — TELEPHONE ENCOUNTER
Contact:             Phone number: [phone]     Name of person spoken with and relationship to patient: CATHERINE BROWER  Medication:   Patient’s Adherence:             How patient is doing on medication: well     How many missed doses and reason: 0     Any new medications: no     Any new conditions: no     Any new allergies: no     Any new side effects: no      Any new diagnoses: no      How many doses remaining: ENTRESTO 30DS, SENSOR-0, MAG OX -0     Did patient want to speak with pharmacist: no   Delivery:             Delivery date and method:  01/5     Needs by Date: 01/05     Signature required: no     Any additional details for : NA   Teach Appointment Date: NA   Shipping Address: [address] [place] [address]   [address] [place] [address], [place] [address]   Medication(name, strength and dose): MAG OX 30DS $5., YANG SENSORS  2/28DS    Copay: $32   Payment Method: [facility]   Supplies:   Additional Information:  PT STATED SHE STILL HAS 30DS ON ENTRESTO. WE AGREED TO FILL IN 3 WEEKS

## 2024-01-09 LAB
CREAT UR-MCNC: 55.66 MG/DL
MICROALBUMIN UR-MCNC: 2.5 MG/DL
MICROALBUMIN/CREAT UR: 45 MG/G (ref 0–30)

## 2024-01-15 ENCOUNTER — PATIENT MESSAGE (OUTPATIENT)
Dept: CARDIOLOGY | Facility: MEDICAL CENTER | Age: 79
End: 2024-01-15
Payer: MEDICARE

## 2024-01-16 ENCOUNTER — OFFICE VISIT (OUTPATIENT)
Dept: NEPHROLOGY | Facility: MEDICAL CENTER | Age: 79
End: 2024-01-16
Payer: MEDICARE

## 2024-01-16 VITALS
HEART RATE: 55 BPM | HEIGHT: 66 IN | OXYGEN SATURATION: 97 % | SYSTOLIC BLOOD PRESSURE: 128 MMHG | WEIGHT: 247 LBS | BODY MASS INDEX: 39.7 KG/M2 | DIASTOLIC BLOOD PRESSURE: 76 MMHG | TEMPERATURE: 97.6 F

## 2024-01-16 DIAGNOSIS — N18.4 CKD (CHRONIC KIDNEY DISEASE) STAGE 4, GFR 15-29 ML/MIN (HCC): ICD-10-CM

## 2024-01-16 DIAGNOSIS — N18.4 TYPE 2 DIABETES MELLITUS WITH STAGE 4 CHRONIC KIDNEY DISEASE, WITHOUT LONG-TERM CURRENT USE OF INSULIN (HCC): ICD-10-CM

## 2024-01-16 DIAGNOSIS — E11.22 TYPE 2 DIABETES MELLITUS WITH STAGE 4 CHRONIC KIDNEY DISEASE, WITHOUT LONG-TERM CURRENT USE OF INSULIN (HCC): ICD-10-CM

## 2024-01-16 DIAGNOSIS — I10 PRIMARY HYPERTENSION: ICD-10-CM

## 2024-01-16 PROCEDURE — 99214 OFFICE O/P EST MOD 30 MIN: CPT | Performed by: INTERNAL MEDICINE

## 2024-01-16 PROCEDURE — 3074F SYST BP LT 130 MM HG: CPT | Performed by: INTERNAL MEDICINE

## 2024-01-16 PROCEDURE — 3078F DIAST BP <80 MM HG: CPT | Performed by: INTERNAL MEDICINE

## 2024-01-16 ASSESSMENT — ENCOUNTER SYMPTOMS
SHORTNESS OF BREATH: 0
NAUSEA: 0
VOMITING: 0
FEVER: 0
COUGH: 0
CHILLS: 0
HYPERTENSION: 1

## 2024-01-16 ASSESSMENT — FIBROSIS 4 INDEX: FIB4 SCORE: 3.29

## 2024-01-17 ENCOUNTER — PATIENT OUTREACH (OUTPATIENT)
Dept: HEALTH INFORMATION MANAGEMENT | Facility: OTHER | Age: 79
End: 2024-01-17

## 2024-01-17 ENCOUNTER — NON-PROVIDER VISIT (OUTPATIENT)
Dept: INTERNAL MEDICINE | Facility: OTHER | Age: 79
End: 2024-01-17
Payer: MEDICARE

## 2024-01-17 DIAGNOSIS — I10 ESSENTIAL HYPERTENSION: ICD-10-CM

## 2024-01-17 DIAGNOSIS — J45.20 MILD INTERMITTENT ASTHMA WITHOUT COMPLICATION: ICD-10-CM

## 2024-01-17 DIAGNOSIS — E11.22 TYPE 2 DIABETES MELLITUS WITH STAGE 4 CHRONIC KIDNEY DISEASE, WITHOUT LONG-TERM CURRENT USE OF INSULIN (HCC): ICD-10-CM

## 2024-01-17 DIAGNOSIS — E66.01 CLASS 2 SEVERE OBESITY DUE TO EXCESS CALORIES WITH SERIOUS COMORBIDITY IN ADULT, UNSPECIFIED BMI (HCC): ICD-10-CM

## 2024-01-17 DIAGNOSIS — E11.65 TYPE 2 DIABETES MELLITUS WITH HYPERGLYCEMIA, WITHOUT LONG-TERM CURRENT USE OF INSULIN (HCC): ICD-10-CM

## 2024-01-17 DIAGNOSIS — N18.4 TYPE 2 DIABETES MELLITUS WITH STAGE 4 CHRONIC KIDNEY DISEASE, WITHOUT LONG-TERM CURRENT USE OF INSULIN (HCC): ICD-10-CM

## 2024-01-17 DIAGNOSIS — N18.4 CKD (CHRONIC KIDNEY DISEASE) STAGE 4, GFR 15-29 ML/MIN (HCC): ICD-10-CM

## 2024-01-17 PROCEDURE — 97802 MEDICAL NUTRITION INDIV IN: CPT | Performed by: DIETITIAN, REGISTERED

## 2024-01-17 SDOH — ECONOMIC STABILITY: FOOD INSECURITY: WITHIN THE PAST 12 MONTHS, YOU WORRIED THAT YOUR FOOD WOULD RUN OUT BEFORE YOU GOT MONEY TO BUY MORE.: NEVER TRUE

## 2024-01-17 SDOH — HEALTH STABILITY: PHYSICAL HEALTH: ON AVERAGE, HOW MANY MINUTES DO YOU ENGAGE IN EXERCISE AT THIS LEVEL?: 20 MIN

## 2024-01-17 SDOH — ECONOMIC STABILITY: FOOD INSECURITY: WITHIN THE PAST 12 MONTHS, THE FOOD YOU BOUGHT JUST DIDN'T LAST AND YOU DIDN'T HAVE MONEY TO GET MORE.: NEVER TRUE

## 2024-01-17 SDOH — HEALTH STABILITY: MENTAL HEALTH
STRESS IS WHEN SOMEONE FEELS TENSE, NERVOUS, ANXIOUS, OR CAN'T SLEEP AT NIGHT BECAUSE THEIR MIND IS TROUBLED. HOW STRESSED ARE YOU?: ONLY A LITTLE

## 2024-01-17 SDOH — HEALTH STABILITY: PHYSICAL HEALTH: ON AVERAGE, HOW MANY DAYS PER WEEK DO YOU ENGAGE IN MODERATE TO STRENUOUS EXERCISE (LIKE A BRISK WALK)?: 0 DAYS

## 2024-01-17 SDOH — ECONOMIC STABILITY: INCOME INSECURITY: IN THE LAST 12 MONTHS, WAS THERE A TIME WHEN YOU WERE NOT ABLE TO PAY THE MORTGAGE OR RENT ON TIME?: NO

## 2024-01-17 SDOH — ECONOMIC STABILITY: INCOME INSECURITY: HOW HARD IS IT FOR YOU TO PAY FOR THE VERY BASICS LIKE FOOD, HOUSING, MEDICAL CARE, AND HEATING?: NOT VERY HARD

## 2024-01-17 SDOH — ECONOMIC STABILITY: TRANSPORTATION INSECURITY
IN THE PAST 12 MONTHS, HAS LACK OF RELIABLE TRANSPORTATION KEPT YOU FROM MEDICAL APPOINTMENTS, MEETINGS, WORK OR FROM GETTING THINGS NEEDED FOR DAILY LIVING?: NO

## 2024-01-17 SDOH — ECONOMIC STABILITY: HOUSING INSECURITY: IN THE LAST 12 MONTHS, HOW MANY PLACES HAVE YOU LIVED?: 1

## 2024-01-17 ASSESSMENT — SOCIAL DETERMINANTS OF HEALTH (SDOH)
HOW HARD IS IT FOR YOU TO PAY FOR THE VERY BASICS LIKE FOOD, HOUSING, MEDICAL CARE, AND HEATING?: NOT VERY HARD
HOW OFTEN DO YOU ATTENT MEETINGS OF THE CLUB OR ORGANIZATION YOU BELONG TO?: 1 TO 4 TIMES PER YEAR
HOW OFTEN DO YOU ATTEND CHURCH OR RELIGIOUS SERVICES?: PATIENT DECLINED
HOW OFTEN DO YOU ATTENT MEETINGS OF THE CLUB OR ORGANIZATION YOU BELONG TO?: 1 TO 4 TIMES PER YEAR
HOW OFTEN DO YOU GET TOGETHER WITH FRIENDS OR RELATIVES?: TWICE A WEEK
HOW OFTEN DO YOU GET TOGETHER WITH FRIENDS OR RELATIVES?: TWICE A WEEK
WITHIN THE PAST 12 MONTHS, YOU WORRIED THAT YOUR FOOD WOULD RUN OUT BEFORE YOU GOT THE MONEY TO BUY MORE: NEVER TRUE
HOW OFTEN DO YOU ATTEND CHURCH OR RELIGIOUS SERVICES?: PATIENT DECLINED
HOW OFTEN DO YOU HAVE A DRINK CONTAINING ALCOHOL: NEVER
IN A TYPICAL WEEK, HOW MANY TIMES DO YOU TALK ON THE PHONE WITH FAMILY, FRIENDS, OR NEIGHBORS?: TWICE A WEEK
HOW OFTEN DO YOU HAVE SIX OR MORE DRINKS ON ONE OCCASION: NEVER
HOW MANY DRINKS CONTAINING ALCOHOL DO YOU HAVE ON A TYPICAL DAY WHEN YOU ARE DRINKING: PATIENT DOES NOT DRINK
DO YOU BELONG TO ANY CLUBS OR ORGANIZATIONS SUCH AS CHURCH GROUPS UNIONS, FRATERNAL OR ATHLETIC GROUPS, OR SCHOOL GROUPS?: NO
IN A TYPICAL WEEK, HOW MANY TIMES DO YOU TALK ON THE PHONE WITH FAMILY, FRIENDS, OR NEIGHBORS?: TWICE A WEEK
DO YOU BELONG TO ANY CLUBS OR ORGANIZATIONS SUCH AS CHURCH GROUPS UNIONS, FRATERNAL OR ATHLETIC GROUPS, OR SCHOOL GROUPS?: NO

## 2024-01-17 ASSESSMENT — LIFESTYLE VARIABLES
HOW OFTEN DO YOU HAVE SIX OR MORE DRINKS ON ONE OCCASION: NEVER
HOW MANY STANDARD DRINKS CONTAINING ALCOHOL DO YOU HAVE ON A TYPICAL DAY: PATIENT DOES NOT DRINK
SKIP TO QUESTIONS 9-10: 1
AUDIT-C TOTAL SCORE: 0
HOW OFTEN DO YOU HAVE A DRINK CONTAINING ALCOHOL: NEVER

## 2024-01-17 ASSESSMENT — FIBROSIS 4 INDEX: FIB4 SCORE: 3.29

## 2024-01-17 NOTE — PROGRESS NOTES
"Subjective     Whitley Frederick is a 78 y.o. female who presents with Chronic Kidney Disease and Hypertension            Chronic Kidney Disease  This is a chronic problem. The current episode started more than 1 year ago. The problem occurs constantly. The problem has been gradually improving. Pertinent negatives include no chest pain, chills, coughing, fever, nausea, urinary symptoms or vomiting.   Hypertension  This is a chronic problem. The current episode started more than 1 year ago. The problem is unchanged. The problem is controlled. Pertinent negatives include no chest pain, malaise/fatigue, peripheral edema or shortness of breath. Risk factors for coronary artery disease include diabetes mellitus, post-menopausal state and obesity. Past treatments include angiotensin blockers. The current treatment provides significant improvement. There are no compliance problems.  Hypertensive end-organ damage includes kidney disease. Identifiable causes of hypertension include chronic renal disease.       Review of Systems   Constitutional:  Negative for chills, fever and malaise/fatigue.   Respiratory:  Negative for cough and shortness of breath.    Cardiovascular:  Negative for chest pain and leg swelling.   Gastrointestinal:  Negative for nausea and vomiting.   Genitourinary:  Negative for dysuria, frequency and urgency.              Objective     /76 (BP Location: Right arm, Patient Position: Sitting, BP Cuff Size: Adult)   Pulse (!) 55   Temp 36.4 °C (97.6 °F) (Temporal)   Ht 1.676 m (5' 6\")   Wt 112 kg (247 lb)   SpO2 97%   BMI 39.87 kg/m²      Physical Exam  Vitals and nursing note reviewed.   Constitutional:       General: She is not in acute distress.     Appearance: Normal appearance. She is well-developed. She is not diaphoretic.   HENT:      Head: Normocephalic and atraumatic.      Right Ear: External ear normal.      Left Ear: External ear normal.      Nose: Nose normal.   Eyes:      General: " No scleral icterus.        Right eye: No discharge.         Left eye: No discharge.      Conjunctiva/sclera: Conjunctivae normal.   Cardiovascular:      Rate and Rhythm: Normal rate and regular rhythm.      Heart sounds: No murmur heard.  Pulmonary:      Effort: Pulmonary effort is normal. No respiratory distress.      Breath sounds: Normal breath sounds.   Musculoskeletal:         General: No tenderness.      Right lower leg: No edema.      Left lower leg: No edema.   Skin:     General: Skin is warm and dry.      Findings: No erythema.   Neurological:      General: No focal deficit present.      Mental Status: She is alert and oriented to person, place, and time.      Cranial Nerves: No cranial nerve deficit.   Psychiatric:         Mood and Affect: Mood normal.         Behavior: Behavior normal.       Past Medical History:   Diagnosis Date    Allergy     Anxiety     Arrhythmia     Arthritis     Asthma     Carotid bruit     Left    CATARACT     Chest pain     DIABETES MELLITUS     Esophageal reflux     Goiter     Headache(784.0)     Heart murmur     Hyperlipidemia     Hypertension     IBD (inflammatory bowel disease)     Migraine     Silent migraine    AVERY (obstructive sleep apnea)     Overweight(278.02)     Palpitations     Paroxysmal atrial fibrillation (HCC) 7/12/13    woke with palps, AFib per ekg in am.    Pneumonia     Nov. 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 Badillo    Urinary tract infection, site not specified      Social History     Socioeconomic History    Marital status:      Spouse name: Not on file    Number of children: Not on file    Years of education: Not on file    Highest education level: Not on file   Occupational History    Not on file   Tobacco Use    Smoking status: Former     Current packs/day: 0.00     Average packs/day: 1 pack/day for 20.0 years (20.0 ttl pk-yrs)     Types:  Cigarettes     Start date: 1963     Quit date: 1983     Years since quittin.0    Smokeless tobacco: Never   Vaping Use    Vaping Use: Never used   Substance and Sexual Activity    Alcohol use: No     Alcohol/week: 0.0 oz    Drug use: No    Sexual activity: Never     Partners: Male   Other Topics Concern    Not on file   Social History Narrative    Not on file     Social Determinants of Health     Financial Resource Strain: Low Risk  (2022)    Overall Financial Resource Strain (CARDIA)     Difficulty of Paying Living Expenses: Not hard at all   Food Insecurity: No Food Insecurity (2022)    Hunger Vital Sign     Worried About Running Out of Food in the Last Year: Never true     Ran Out of Food in the Last Year: Never true   Transportation Needs: No Transportation Needs (2022)    PRAPARE - Transportation     Lack of Transportation (Medical): No     Lack of Transportation (Non-Medical): No   Physical Activity: Not on file   Stress: Stress Concern Present (2022)    [employer] - Occupational Stress Questionnaire     Feeling of Stress : To some extent   Social Connections: Moderately Isolated (2022)    Social Connection and Isolation Panel [NHANES]     Frequency of Communication with Friends and Family: Twice a week     Frequency of Social Gatherings with Friends and Family: Twice a week     Attends Hindu Services: Never     Active Member of Clubs or Organizations: No     Attends Club or Organization Meetings: 1 to 4 times per year     Marital Status:    Intimate Partner Violence: Not At Risk (2022)    Humiliation, Afraid, Rape, and Kick questionnaire     Fear of Current or Ex-Partner: No     Emotionally Abused: No     Physically Abused: No     Sexually Abused: No   Housing Stability: Low Risk  (2022)    Housing Stability Vital Sign     Unable to Pay for Housing in the Last Year: No     Number of Places Lived in the Last Year: 1      Unstable Housing in the Last Year: No     Family History   Problem Relation Age of Onset    Cancer Maternal Aunt     Diabetes Maternal Aunt     Heart Disease Maternal Aunt     Hypertension Maternal Aunt     Hyperlipidemia Maternal Aunt     Cancer Mother         Leukemia    Hypertension Mother     Diabetes Mother     Lung Disease Father     Cancer Father         Lung    Lung Disease Brother     Stroke Brother     Diabetes Brother     Heart Disease Brother     Diabetes Maternal Grandmother     Hypertension Brother     Diabetes Brother      Recent Labs     04/10/23  1424 05/02/23  1423 05/02/23  1424 06/13/23  1302 09/22/23  1457 10/27/23  1335 01/08/24  1311   ALBUMIN 3.9  --  4.2 4.1  --   --   --    SODIUM 144   < > 140 144 141 139 139   POTASSIUM 4.2   < > 4.8 4.4 4.0 4.1 4.5   CHLORIDE 110   < > 106 108 100 101 106   CO2 20   < > 23 19* 29 22 20   BUN 84*   < > 60* 83* 67* 64* 70*   CREATININE 2.95*   < > 2.67* 2.98* 3.33* 2.80* 2.36*   PHOSPHORUS 3.8  --  3.9 3.8  --  3.0  --     < > = values in this interval not displayed.                             Assessment & Plan        1. CKD (chronic kidney disease) stage 4, GFR 15-29 ml/min ([facility])  Kidney function slightly better  No uremic symptoms  Renal dose of medication  Avoid nephrotoxins  Continue same medication regimen  Patient was advised to call us if symptoms worsen      2. Primary hypertension  Controlled  Continue same medication regimen  Continue low-sodium diet      3. Type 2 diabetes mellitus with stage 4 chronic kidney disease, without long-term current use of insulin ([facility])  Continue to optimize diabetes control for hemoglobin A1c below 7%

## 2024-01-17 NOTE — PROGRESS NOTES
"Whitley Frederick is a 78 y.o. year old, female initial nutrition evaluation for DM2 and CKD4.    My Health Profile:    PHYSICAL ASSESSMENT  Current Height:  66\"  Ht Readings from Last 1 Encounters:   01/16/24 1.676 m (5' 6\")     Current Weight:  247#  Wt Readings from Last 1 Encounters:   01/16/24 112 kg (247 lb)     BMI:  39    FLUID INTAKE:   limited water  Typical Beverages:   Servings Alcohol/D/WK/MO:     ACTIVITY REVIEW: none  Current Exercise:   Hobbies:     PERTINENT LABS:    Latest Reference Range & Units 05/26/21 13:09 11/11/21 13:26 09/08/22 14:00 08/02/23 13:15   Glycohemoglobin 5.8 % 6.1 ! 6.1 ! 5.9 (H) 6.4 !      Latest Reference Range & Units 01/08/24 13:11   GFR (CKD-EPI) >60 mL/min/1.73 m 2 21 !   !: Data is abnormal  Patient Behaviors (indicate frequency)  Meal/Snack Pattern:  2x per day; inconsistent practices    1100 wake  1 - Activia yogurt  (2-fermented cabbage/Kombucha- 2-3 x/week)  Depending on gut- diarrhea, stomach cramps, gas- occurs 3/7  1500 PM  Pork steak+sweet potatoes w/ brown sugar +asparagus  Unsweetened applesauce    [id number]-jats Chex or Special K w/strawberries with whole milk    Vegetables:limited  Fruit: limited    [place]ubna-daughter likes to juice her fruits/vegetables via [employer]    Dines away from Home:   Locations:    Who lives in home: daughter and mom  Additional Patient Behavior Information: cooks/shops- both    PES: Obesity II, controlled T2DM, CKD4 r/t nutrition knowledge deficit as to what to eat with conflicting information as evidenced by A1c 6.4% and GFR 21    NUTRITION COMMENTS:    Whitley is a 79 yo with T2DM x mid-80's; insulin managed by Marcelle bunn, then discontinued basal and bolus insulin with start of Ozempic.    Angel Sensor  TIR (90 days): 44%    Above target 64%   Below target  3%    Reinforced and answered questions pertaining to DM and CKD4 dietary guidelines for balance, moderation and variety.  Plant-forward eating pattern beneficial with " CKD4 and is something Whitley leans into more as a preference.    Both Lubna and Whitley can support one another with DM as their commonality. Adequate protein intake for less catabolic effect with chronic condition.    RTC x next available    Time Spent: 60 minutes

## 2024-01-18 VITALS — BODY MASS INDEX: 39.7 KG/M2 | WEIGHT: 247 LBS | HEIGHT: 66 IN

## 2024-01-18 NOTE — PATIENT INSTRUCTIONS
I will aim for more plant-based protein  - 87 grams protein per day  - tofu, lentils, edamame, stir frys with plant-based protein    Bring into practice greater balance and add in support to allow adequate protein intake for disease management.

## 2024-01-20 NOTE — PROGRESS NOTES
Three attempts at monthly outreach follow up. No answer, left messages, will follow up at next monthly outreach.     Chart reviewed for Annual Review, patient continues to qualify and benefit from PCM program.

## 2024-01-23 ENCOUNTER — PATIENT MESSAGE (OUTPATIENT)
Dept: CARDIOLOGY | Facility: MEDICAL CENTER | Age: 79
End: 2024-01-23
Payer: MEDICARE

## 2024-01-24 ENCOUNTER — APPOINTMENT (RX ONLY)
Dept: URBAN - METROPOLITAN AREA CLINIC 35 | Facility: CLINIC | Age: 79
Setting detail: DERMATOLOGY
End: 2024-01-24

## 2024-01-24 DIAGNOSIS — L20.9 ATOPIC DERMATITIS, UNSPECIFIED: ICD-10-CM

## 2024-01-24 DIAGNOSIS — L81.4 OTHER MELANIN HYPERPIGMENTATION: ICD-10-CM

## 2024-01-24 DIAGNOSIS — D485 NEOPLASM OF UNCERTAIN BEHAVIOR OF SKIN: ICD-10-CM

## 2024-01-24 DIAGNOSIS — D22 MELANOCYTIC NEVI: ICD-10-CM

## 2024-01-24 DIAGNOSIS — Z71.89 OTHER SPECIFIED COUNSELING: ICD-10-CM

## 2024-01-24 DIAGNOSIS — L82.1 OTHER SEBORRHEIC KERATOSIS: ICD-10-CM

## 2024-01-24 PROBLEM — D22.5 MELANOCYTIC NEVI OF TRUNK: Status: ACTIVE | Noted: 2024-01-24

## 2024-01-24 PROBLEM — D48.5 NEOPLASM OF UNCERTAIN BEHAVIOR OF SKIN: Status: ACTIVE | Noted: 2024-01-24

## 2024-01-24 PROCEDURE — 99203 OFFICE O/P NEW LOW 30 MIN: CPT | Mod: 25

## 2024-01-24 PROCEDURE — ? PRESCRIPTION

## 2024-01-24 PROCEDURE — ? TREATMENT REGIMEN

## 2024-01-24 PROCEDURE — ? BIOPSY BY SHAVE METHOD

## 2024-01-24 PROCEDURE — 11102 TANGNTL BX SKIN SINGLE LES: CPT

## 2024-01-24 PROCEDURE — ? KOH PREP

## 2024-01-24 PROCEDURE — ? COUNSELING

## 2024-01-24 RX ADMIN — TRIAMCINOLONE ACETONIDE 1: 1 CREAM TOPICAL at 00:00

## 2024-01-24 ASSESSMENT — LOCATION DETAILED DESCRIPTION DERM
LOCATION DETAILED: LEFT DISTAL PRETIBIAL REGION
LOCATION DETAILED: LEFT ANKLE
LOCATION DETAILED: LEFT PROXIMAL PRETIBIAL REGION
LOCATION DETAILED: RIGHT PERIAREOLAR BREAST 6-7:00 REGION
LOCATION DETAILED: LEFT SUPERIOR MEDIAL UPPER BACK
LOCATION DETAILED: RIGHT DORSAL FOOT
LOCATION DETAILED: RIGHT DISTAL PRETIBIAL REGION
LOCATION DETAILED: RIGHT SUPERIOR LATERAL UPPER BACK
LOCATION DETAILED: RIGHT SUPERIOR UPPER BACK

## 2024-01-24 ASSESSMENT — LOCATION SIMPLE DESCRIPTION DERM
LOCATION SIMPLE: RIGHT BREAST
LOCATION SIMPLE: RIGHT FOOT
LOCATION SIMPLE: LEFT PRETIBIAL REGION
LOCATION SIMPLE: RIGHT BACK
LOCATION SIMPLE: LEFT ANKLE
LOCATION SIMPLE: LEFT UPPER BACK
LOCATION SIMPLE: RIGHT PRETIBIAL REGION
LOCATION SIMPLE: RIGHT UPPER BACK

## 2024-01-24 ASSESSMENT — LOCATION ZONE DERM
LOCATION ZONE: LEG
LOCATION ZONE: TRUNK
LOCATION ZONE: FEET

## 2024-01-24 NOTE — PROCEDURE: TREATMENT REGIMEN
Discontinue Regimen: Vagisil
Detail Level: Zone
Initiate Treatment: Triamcinolone cream BID for two weeks on/off

## 2024-01-24 NOTE — PROCEDURE: BIOPSY BY SHAVE METHOD

## 2024-01-25 ENCOUNTER — PHARMACY VISIT (OUTPATIENT)
Dept: PHARMACY | Facility: MEDICAL CENTER | Age: 79
End: 2024-01-25
Payer: COMMERCIAL

## 2024-01-25 ENCOUNTER — PATIENT MESSAGE (OUTPATIENT)
Dept: MEDICAL GROUP | Facility: LAB | Age: 79
End: 2024-01-25
Payer: MEDICARE

## 2024-01-25 PROCEDURE — RXMED WILLOW AMBULATORY MEDICATION CHARGE: Performed by: DERMATOLOGY

## 2024-01-25 RX ORDER — TRIAMCINOLONE ACETONIDE 1 MG/G
1 CREAM TOPICAL BID
Qty: 453.6 | Refills: 0 | Status: ERX | COMMUNITY
Start: 2024-01-24

## 2024-01-29 ENCOUNTER — TELEPHONE (OUTPATIENT)
Dept: MEDICAL GROUP | Facility: LAB | Age: 79
End: 2024-01-29
Payer: MEDICARE

## 2024-01-29 DIAGNOSIS — G47.33 OSA (OBSTRUCTIVE SLEEP APNEA): Chronic | ICD-10-CM

## 2024-01-29 DIAGNOSIS — E11.22 TYPE 2 DIABETES MELLITUS WITH STAGE 4 CHRONIC KIDNEY DISEASE, WITHOUT LONG-TERM CURRENT USE OF INSULIN (HCC): ICD-10-CM

## 2024-01-29 DIAGNOSIS — N18.4 TYPE 2 DIABETES MELLITUS WITH STAGE 4 CHRONIC KIDNEY DISEASE, WITHOUT LONG-TERM CURRENT USE OF INSULIN (HCC): ICD-10-CM

## 2024-01-29 DIAGNOSIS — E03.4 HYPOTHYROIDISM DUE TO ACQUIRED ATROPHY OF THYROID: ICD-10-CM

## 2024-01-29 NOTE — TELEPHONE ENCOUNTER
I’m done with preferred homecare so [facility] has referred me to a much better (according to [facility]) [facility] for both my Blue Concentrator 3 liters at night and cpap supplies … right now I have yet another humidifier water tank thats leaking(there’s a hole back story to this is my third defective tank)so this is urgent shannen I can’t use my cpap machine for 3 nights now … no tank … Resvent iDream … the first picture represents the glue spots that have given out … the second one is a post-it placed to show the gap where leak is coming from… the third is the info on the bottom of my cpap machine… and the forth is the Serial Number … if you could carmelita this URGENT for [facility] as I was told to by [facility] I would greatly appreciate it… thank you … <3

## 2024-02-01 PROCEDURE — RXMED WILLOW AMBULATORY MEDICATION CHARGE: Performed by: NURSE PRACTITIONER

## 2024-02-01 PROCEDURE — RXMED WILLOW AMBULATORY MEDICATION CHARGE: Performed by: INTERNAL MEDICINE

## 2024-02-02 ENCOUNTER — TELEPHONE (OUTPATIENT)
Dept: PHARMACY | Facility: MEDICAL CENTER | Age: 79
End: 2024-02-02
Payer: MEDICARE

## 2024-02-02 NOTE — TELEPHONE ENCOUNTER
Contact:             Phone number: [phone]     Name of person spoken with and relationship to patient: CATHERINE BROWER  Medication:   Patient’s Adherence:             How patient is doing on medication: well     How many missed doses and reason: 0     Any new medications: no     Any new conditions: no     Any new allergies: no     Any new side effects: no      Any new diagnoses: no      How many doses remainin XARELTO, 1 SENSOR     Did patient want to speak with pharmacist: no   Delivery:             Delivery date and method: COURIE      Needs by Date:       Signature required: no     Any additional details for : CLIFFORD   Teach Appointment Date: NA   Shipping Address: [address] [place] [address]   [address] [place] [address], [place] [address]   Medication(name, strength and dose): Xarelto 90ds $94, Freestyle Sessors /ds $26.93   Copay: $120.93    Payment Method: [facility] 7585   Supplies:    Additional Information:    PT PREFERS 90DS  FOR XARELTO AS SAVED HER $

## 2024-02-05 ENCOUNTER — TELEPHONE (OUTPATIENT)
Dept: CARDIOLOGY | Facility: MEDICAL CENTER | Age: 79
End: 2024-02-05
Payer: MEDICARE

## 2024-02-05 ENCOUNTER — PHARMACY VISIT (OUTPATIENT)
Dept: PHARMACY | Facility: MEDICAL CENTER | Age: 79
End: 2024-02-05
Payer: COMMERCIAL

## 2024-02-05 PROCEDURE — RXMED WILLOW AMBULATORY MEDICATION CHARGE: Performed by: NURSE PRACTITIONER

## 2024-02-05 PROCEDURE — RXMED WILLOW AMBULATORY MEDICATION CHARGE: Performed by: INTERNAL MEDICINE

## 2024-02-06 NOTE — TELEPHONE ENCOUNTER
Called and talked to pt. She would like to reschedule April 16 before her appt with Dr. Najjar. Rescheduled appt.

## 2024-02-07 ENCOUNTER — APPOINTMENT (RX ONLY)
Dept: URBAN - METROPOLITAN AREA CLINIC 35 | Facility: CLINIC | Age: 79
Setting detail: DERMATOLOGY
End: 2024-02-07

## 2024-02-07 ENCOUNTER — TELEPHONE (OUTPATIENT)
Dept: MEDICAL GROUP | Facility: LAB | Age: 79
End: 2024-02-07
Payer: MEDICARE

## 2024-02-07 DIAGNOSIS — L82.0 INFLAMED SEBORRHEIC KERATOSIS: ICD-10-CM

## 2024-02-07 DIAGNOSIS — L20.9 ATOPIC DERMATITIS, UNSPECIFIED: ICD-10-CM | Status: IMPROVED

## 2024-02-07 DIAGNOSIS — D22 MELANOCYTIC NEVI: ICD-10-CM

## 2024-02-07 DIAGNOSIS — L81.4 OTHER MELANIN HYPERPIGMENTATION: ICD-10-CM

## 2024-02-07 DIAGNOSIS — Z71.89 OTHER SPECIFIED COUNSELING: ICD-10-CM

## 2024-02-07 DIAGNOSIS — L82.1 OTHER SEBORRHEIC KERATOSIS: ICD-10-CM

## 2024-02-07 PROBLEM — D22.5 MELANOCYTIC NEVI OF TRUNK: Status: ACTIVE | Noted: 2024-02-07

## 2024-02-07 PROBLEM — C43.59 MALIGNANT MELANOMA OF OTHER PART OF TRUNK: Status: ACTIVE | Noted: 2024-02-07

## 2024-02-07 PROCEDURE — 17110 DESTRUCTION B9 LES UP TO 14: CPT

## 2024-02-07 PROCEDURE — ? COUNSELING

## 2024-02-07 PROCEDURE — ? LIQUID NITROGEN

## 2024-02-07 PROCEDURE — ? DIAGNOSIS COMMENT

## 2024-02-07 PROCEDURE — ? ADDITIONAL NOTES

## 2024-02-07 PROCEDURE — ? PRESCRIPTION

## 2024-02-07 PROCEDURE — ? TREATMENT REGIMEN

## 2024-02-07 PROCEDURE — 99213 OFFICE O/P EST LOW 20 MIN: CPT | Mod: 25

## 2024-02-07 RX ORDER — PIMECROLIMUS 10 MG/G
THIN LAYER CREAM TOPICAL BID
Qty: 100 | Refills: 1 | Status: ERX | COMMUNITY
Start: 2024-02-07

## 2024-02-07 RX ADMIN — PIMECROLIMUS THIN LAYER: 10 CREAM TOPICAL at 00:00

## 2024-02-07 ASSESSMENT — LOCATION ZONE DERM
LOCATION ZONE: TRUNK
LOCATION ZONE: SCALP
LOCATION ZONE: LEG
LOCATION ZONE: FEET
LOCATION ZONE: FACE

## 2024-02-07 ASSESSMENT — LOCATION DETAILED DESCRIPTION DERM
LOCATION DETAILED: RIGHT DORSAL FOOT
LOCATION DETAILED: LEFT PROXIMAL PRETIBIAL REGION
LOCATION DETAILED: LEFT SUPERIOR FOREHEAD
LOCATION DETAILED: RIGHT DISTAL PRETIBIAL REGION
LOCATION DETAILED: RIGHT LATERAL TEMPLE
LOCATION DETAILED: LEFT SUPERIOR FRONTAL SCALP
LOCATION DETAILED: LEFT DISTAL PRETIBIAL REGION
LOCATION DETAILED: RIGHT PERIAREOLAR BREAST 6-7:00 REGION
LOCATION DETAILED: RIGHT SUPERIOR LATERAL UPPER BACK
LOCATION DETAILED: RIGHT SUPERIOR UPPER BACK
LOCATION DETAILED: LEFT ANKLE
LOCATION DETAILED: RIGHT LATERAL FOREHEAD

## 2024-02-07 ASSESSMENT — BSA ECZEMA: % BODY COVERED IN ECZEMA: 3

## 2024-02-07 ASSESSMENT — LOCATION SIMPLE DESCRIPTION DERM
LOCATION SIMPLE: RIGHT PRETIBIAL REGION
LOCATION SIMPLE: SCALP
LOCATION SIMPLE: LEFT PRETIBIAL REGION
LOCATION SIMPLE: RIGHT UPPER BACK
LOCATION SIMPLE: RIGHT BACK
LOCATION SIMPLE: RIGHT FOOT
LOCATION SIMPLE: LEFT FOREHEAD
LOCATION SIMPLE: RIGHT BREAST
LOCATION SIMPLE: RIGHT TEMPLE
LOCATION SIMPLE: RIGHT FOREHEAD
LOCATION SIMPLE: LEFT ANKLE

## 2024-02-07 ASSESSMENT — ITCH NUMERIC RATING SCALE: HOW SEVERE IS YOUR ITCHING?: 0

## 2024-02-07 NOTE — PROCEDURE: LIQUID NITROGEN
Detail Level: Detailed
Include Z78.9 (Other Specified Conditions Influencing Health Status) As An Associated Diagnosis?: No
Consent: The patient's consent was obtained including but not limited to risks of crusting, scabbing, blistering, scarring, darker or lighter pigmentary change, recurrence, incomplete removal and infection.
Medical Necessity Clause: This procedure was medically necessary because the lesions that were treated were:
Spray Paint Text: The liquid nitrogen was applied to the skin utilizing a spray paint frosting technique.
Medical Necessity Information: It is in your best interest to select a reason for this procedure from the list below. All of these items fulfill various CMS LCD requirements except the new and changing color options.
Post-Care Instructions: I reviewed with the patient in detail post-care instructions. Patient is to wear sunprotection, and avoid picking at any of the treated lesions. Pt may apply Vaseline to crusted or scabbing areas.
Number Of Freeze-Thaw Cycles: 2 freeze-thaw cycles
Show Applicator Variable?: Yes
Duration Of Freeze Thaw-Cycle (Seconds): 10

## 2024-02-07 NOTE — PROCEDURE: MIPS QUALITY
Quality 486: Dermatitis - Improvement In Patient-Reported Itch Severity: Itch severity assessment score is reduced by 2 or more points from the initial (index) assessment score to the follow-up visit score
Quality 397: Melanoma: Reporting: Pathology report includes the pT Category, thickness, ulceration and mitotic rate, peripheral and deep margin status and presence or absence of microsatellitosis for invasive tumors.
Detail Level: Detailed
Quality 130: Documentation Of Current Medications In The Medical Record: Current Medications Documented
Quality 111:Pneumonia Vaccination Status For Older Adults: Patient received any pneumococcal conjugate or polysaccharide vaccine on or after their 60th birthday and before the end of the measurement period
Quality 137: Melanoma: Continuity Of Care - Recall System: Patient information entered into a recall system that includes: target date for the next exam specified AND a process to follow up with patients regarding missed or unscheduled appointments
Quality 138: Melanoma: Coordination Of Care: A treatment plan was communicated to the physicians providing continuing care within one month of diagnosis outlining: diagnosis, tumor thickness and a plan for surgery or alternate care.
Quality 226: Preventive Care And Screening: Tobacco Use: Screening And Cessation Intervention: Patient screened for tobacco use and is an ex/non-smoker

## 2024-02-07 NOTE — PROCEDURE: TREATMENT REGIMEN
Detail Level: Zone
Initiate Treatment: Elidel bid
Continue Regimen: Triamcinolone cream BID for two weeks on/off

## 2024-02-07 NOTE — PROCEDURE: ADDITIONAL NOTES
Render Risk Assessment In Note?: no
Detail Level: Simple
Additional Notes: Scheduled on 2/28/24 with Dr. Peguero.

## 2024-02-08 PROCEDURE — RXMED WILLOW AMBULATORY MEDICATION CHARGE: Performed by: DERMATOLOGY

## 2024-02-08 NOTE — TELEPHONE ENCOUNTER
Per chart review, Whitley has met with the [facility] Dietitian education program.

## 2024-02-09 ENCOUNTER — PHARMACY VISIT (OUTPATIENT)
Dept: PHARMACY | Facility: MEDICAL CENTER | Age: 79
End: 2024-02-09
Payer: COMMERCIAL

## 2024-02-12 ENCOUNTER — RX ONLY (OUTPATIENT)
Age: 79
Setting detail: RX ONLY
End: 2024-02-12

## 2024-02-12 RX ORDER — PIMECROLIMUS 10 MG/G
THIN LAYER CREAM TOPICAL BID
Qty: 60 | Refills: 1 | Status: ERX

## 2024-02-13 ENCOUNTER — TELEPHONE (OUTPATIENT)
Dept: MEDICAL GROUP | Facility: LAB | Age: 79
End: 2024-02-13
Payer: MEDICARE

## 2024-02-13 NOTE — TELEPHONE ENCOUNTER
Faxed  [facility]  [address]  [place] [address]    Call Us  [phone]  or  TF: [phone]    Fax Us  [phone]

## 2024-02-15 ENCOUNTER — TELEPHONE (OUTPATIENT)
Dept: SLEEP MEDICINE | Facility: MEDICAL CENTER | Age: 79
End: 2024-02-15
Payer: MEDICARE

## 2024-02-15 NOTE — TELEPHONE ENCOUNTER
Whitley calling to state that she is in the process of changing dme companies for her cpap. States she would like her most recent sleep study sent to [facility] at fax #[phone].     Whitley would like a copy of the sleep study for her records. Informed that medical records/[facility] handle personal requests. Please review and advise.

## 2024-02-16 ENCOUNTER — PATIENT MESSAGE (OUTPATIENT)
Dept: HEALTH INFORMATION MANAGEMENT | Facility: OTHER | Age: 79
End: 2024-02-16

## 2024-02-19 PROCEDURE — RXMED WILLOW AMBULATORY MEDICATION CHARGE: Performed by: NURSE PRACTITIONER

## 2024-02-22 ENCOUNTER — PATIENT OUTREACH (OUTPATIENT)
Dept: HEALTH INFORMATION MANAGEMENT | Facility: OTHER | Age: 79
End: 2024-02-22
Payer: MEDICARE

## 2024-02-22 ENCOUNTER — PHARMACY VISIT (OUTPATIENT)
Dept: PHARMACY | Facility: MEDICAL CENTER | Age: 79
End: 2024-02-22
Payer: COMMERCIAL

## 2024-02-22 DIAGNOSIS — J45.20 MILD INTERMITTENT ASTHMA WITHOUT COMPLICATION: ICD-10-CM

## 2024-02-22 DIAGNOSIS — I10 ESSENTIAL HYPERTENSION: ICD-10-CM

## 2024-02-22 DIAGNOSIS — N18.4 TYPE 2 DIABETES MELLITUS WITH STAGE 4 CHRONIC KIDNEY DISEASE, WITHOUT LONG-TERM CURRENT USE OF INSULIN (HCC): ICD-10-CM

## 2024-02-22 DIAGNOSIS — E11.22 TYPE 2 DIABETES MELLITUS WITH STAGE 4 CHRONIC KIDNEY DISEASE, WITHOUT LONG-TERM CURRENT USE OF INSULIN (HCC): ICD-10-CM

## 2024-02-22 PROCEDURE — 99490 CHRNC CARE MGMT STAFF 1ST 20: CPT | Performed by: NURSE PRACTITIONER

## 2024-02-23 ENCOUNTER — OFFICE VISIT (OUTPATIENT)
Dept: DERMATOLOGY | Facility: IMAGING CENTER | Age: 79
End: 2024-02-23
Payer: MEDICARE

## 2024-02-23 DIAGNOSIS — L29.9 ITCHING: ICD-10-CM

## 2024-02-23 DIAGNOSIS — R21 RASH: ICD-10-CM

## 2024-02-23 DIAGNOSIS — Z85.820 HX OF MELANOMA OF SKIN: ICD-10-CM

## 2024-02-23 PROCEDURE — 99204 OFFICE O/P NEW MOD 45 MIN: CPT | Mod: 25 | Performed by: DERMATOLOGY

## 2024-02-23 PROCEDURE — 11102 TANGNTL BX SKIN SINGLE LES: CPT | Performed by: DERMATOLOGY

## 2024-02-23 RX ORDER — TRIAMCINOLONE ACETONIDE 1 MG/G
CREAM TOPICAL
Qty: 453.6 G | Refills: 0 | Status: SHIPPED | OUTPATIENT
Start: 2024-02-23

## 2024-02-23 NOTE — PROGRESS NOTES
"CC: Hx \"Eczema\"    Subjective: New Patient here for Hx of \"eczema\"  Has flareup. Was previously managed with fungal scraping- neg and thus encouraged to use topical steroid trx - no notable effects. Worsening. Desires second opinion.      HPI:Hx Eczema  Onset: 09/2023  Symptoms: All over redness,itchy, burning  Aggravating factors: No  Alleviating factors: None  New creams/topicals: No  New medications (up to last 6 months): No  New travel: No  Other exposures: No  Treatments: Triamcinolone, Pimecrolimus Derm  Lotrisone by podiatry (nothing helps)     History of skin cancer: Yes, Details: Melanoma - Left superior medial upper back01/30/2024  - has excision scheduled for late Feb 2024 - [facility]    ROS: no fevers/chills. ++ itch.  No cough  Relevant PMH:many med comorbidities  Social:FS;with daughter  Family hx: father PSO    PE: Gen:WDWN female in NAD. Skin: focal exam: crusted papule on upper back consistent with recent biopsy. Scattered erythematous, eczematous/partially scaly plaques on torso<<lower legs and affecting periungual skin of a few nails      A/P: rash, consider PSO likely. Vs eczema:  -consent for bx, including R/B/A. Cleaned with EtOH, anesthesia with lidocaine 1% + epinephrine, left leg shave bx, AlCl3 for hemostasis  -vaseline/bandage and wound care reviewed    Cont TAC 0.1% cream in interim - counseled in occlusion trx  If found to be PSO, recommend use of high potency steroid for course and possible oral medication. Would need labs/comorbidities review for safety    Itching: reviewed OTC antihistamine use    Melanoma upper back - 0.22mm:   -to have excision    Consider f/u JOSE unless continues care at referral site    I have reviewed medications relevant to my specialty.         "

## 2024-02-26 NOTE — TELEPHONE ENCOUNTER
Pt has not been seen since 2022 will need up dated office visit before we can help switch patient DME companies.

## 2024-02-28 ENCOUNTER — PATIENT MESSAGE (OUTPATIENT)
Dept: CARDIOLOGY | Facility: MEDICAL CENTER | Age: 79
End: 2024-02-28
Payer: MEDICARE

## 2024-02-28 ENCOUNTER — APPOINTMENT (RX ONLY)
Dept: URBAN - METROPOLITAN AREA CLINIC 36 | Facility: CLINIC | Age: 79
Setting detail: DERMATOLOGY
End: 2024-02-28

## 2024-02-28 PROCEDURE — ? DEFER

## 2024-02-28 PROCEDURE — ? COUNSELING

## 2024-02-28 PROCEDURE — ? PRESCRIPTION

## 2024-02-28 RX ORDER — DOXYCYCLINE 100 MG/1
CAPSULE ORAL
Qty: 20 | Refills: 0 | Status: ERX

## 2024-02-28 NOTE — PROCEDURE: DEFER
Detail Level: Detailed
Introduction Text (Please End With A Colon): The following procedure was deferred
Size Of Lesion In Cm (Optional): 1.2
Other Procedure: Surgery with [facility]
Instructions (Optional): Patient states “she has severe anxiety because she has AFIB and is taking Xarelto\" Patients states she \"would like to have surgery without epinephrine if possible\" Dr. Peguero states she is unable to perform surgery without epinephrine as there is a higher risk for bleeding and hematoma especially because the patient is taking blood thinners.  Dr. Peguero went over benefits risks and alternatives with preceding with surgery. Dr. Peguero discussed the possible necessity of skin graft because patient does not enough laxity with skin.  Dr. Peguero also discussed the possibility of patient healing through  second intent but it might not be appropriate for patient as she is on Xarelto and may be at risk for more bleeding  post surgery and impede healing and increase risk of infection.  Patient states \"she is not comfortable with the idea of surgery today and the possibility of needing 2 surgeries if she needed a skin graft.\" After further discussion of benefits risks and alternatives with patient, she still declined surgery today and would like referral to [facility] with Dr. Yane Uribe.
X Size Of Lesion In Cm (Optional): 0.7

## 2024-02-29 ENCOUNTER — PATIENT MESSAGE (OUTPATIENT)
Dept: CARDIOLOGY | Facility: MEDICAL CENTER | Age: 79
End: 2024-02-29
Payer: MEDICARE

## 2024-03-01 RX ORDER — CLOBETASOL PROPIONATE 0.5 MG/G
OINTMENT TOPICAL
Qty: 60 G | Refills: 2 | Status: SHIPPED | OUTPATIENT
Start: 2024-03-01

## 2024-03-01 RX ORDER — FLUOCINONIDE 0.5 MG/G
OINTMENT TOPICAL
Qty: 180 G | Refills: 2 | Status: SHIPPED | OUTPATIENT
Start: 2024-03-01

## 2024-03-01 NOTE — PROGRESS NOTES
Assessment  Spoke with Whitley for monthly outreach follow up. Whitley states there has not been any changes to her labs and kidney function. She discussed at length her current struggles with a new scaly skin rash and a pre-cancerous mole to her back. Reviewed with Whitley a message in [employer] from her dermatologist. She states she will reply to her dermatologist via [employer].  Discussed cardiology and nephrology follow up appointments.  Whitley denies any needs at this time. Encouraged to call with any questions or concerns.     Education  Discussed last kidney labs  Discussed dermatology follow up and plan of care     Plan of Care and Goals  Continue diabetes and hypertension education    Barriers:  Age  Progression of disease-ESRD & DM  Knowledge of healthcare  Habits     Progress:  Progressing     Next outreach:  1 month

## 2024-03-05 ENCOUNTER — PATIENT MESSAGE (OUTPATIENT)
Dept: MEDICAL GROUP | Facility: LAB | Age: 79
End: 2024-03-05

## 2024-03-05 ENCOUNTER — OFFICE VISIT (OUTPATIENT)
Dept: MEDICAL GROUP | Facility: LAB | Age: 79
End: 2024-03-05
Payer: MEDICARE

## 2024-03-05 VITALS
OXYGEN SATURATION: 97 % | WEIGHT: 245 LBS | SYSTOLIC BLOOD PRESSURE: 134 MMHG | HEART RATE: 53 BPM | DIASTOLIC BLOOD PRESSURE: 52 MMHG | RESPIRATION RATE: 16 BRPM | HEIGHT: 66 IN | TEMPERATURE: 96.7 F | BODY MASS INDEX: 39.37 KG/M2

## 2024-03-05 DIAGNOSIS — E11.59 HIGH BLOOD PRESSURE ASSOCIATED WITH DIABETES (HCC): ICD-10-CM

## 2024-03-05 DIAGNOSIS — N18.4 TYPE 2 DIABETES MELLITUS WITH STAGE 4 CHRONIC KIDNEY DISEASE, WITHOUT LONG-TERM CURRENT USE OF INSULIN (HCC): ICD-10-CM

## 2024-03-05 DIAGNOSIS — F41.9 ANXIETY: ICD-10-CM

## 2024-03-05 DIAGNOSIS — E03.4 HYPOTHYROIDISM DUE TO ACQUIRED ATROPHY OF THYROID: ICD-10-CM

## 2024-03-05 DIAGNOSIS — L40.9 PSORIASIS: ICD-10-CM

## 2024-03-05 DIAGNOSIS — E11.42 DIABETIC POLYNEUROPATHY ASSOCIATED WITH TYPE 2 DIABETES MELLITUS (HCC): ICD-10-CM

## 2024-03-05 DIAGNOSIS — M1A.0790 CHRONIC IDIOPATHIC GOUT INVOLVING TOE WITHOUT TOPHUS, UNSPECIFIED LATERALITY: ICD-10-CM

## 2024-03-05 DIAGNOSIS — G47.33 OSA (OBSTRUCTIVE SLEEP APNEA): ICD-10-CM

## 2024-03-05 DIAGNOSIS — I48.0 PAF (PAROXYSMAL ATRIAL FIBRILLATION) (HCC): ICD-10-CM

## 2024-03-05 DIAGNOSIS — E78.5 DYSLIPIDEMIA: ICD-10-CM

## 2024-03-05 DIAGNOSIS — C43.9 MALIGNANT MELANOMA, UNSPECIFIED SITE (HCC): ICD-10-CM

## 2024-03-05 DIAGNOSIS — I15.2 HIGH BLOOD PRESSURE ASSOCIATED WITH DIABETES (HCC): ICD-10-CM

## 2024-03-05 DIAGNOSIS — N18.4 CKD (CHRONIC KIDNEY DISEASE) STAGE 4, GFR 15-29 ML/MIN (HCC): ICD-10-CM

## 2024-03-05 DIAGNOSIS — E11.22 TYPE 2 DIABETES MELLITUS WITH STAGE 4 CHRONIC KIDNEY DISEASE, WITHOUT LONG-TERM CURRENT USE OF INSULIN (HCC): ICD-10-CM

## 2024-03-05 LAB
HBA1C MFR BLD: 7 % (ref ?–5.8)
POCT INT CON NEG: NEGATIVE
POCT INT CON POS: POSITIVE

## 2024-03-05 PROCEDURE — 99215 OFFICE O/P EST HI 40 MIN: CPT | Performed by: NURSE PRACTITIONER

## 2024-03-05 PROCEDURE — 83036 HEMOGLOBIN GLYCOSYLATED A1C: CPT | Performed by: NURSE PRACTITIONER

## 2024-03-05 PROCEDURE — 3075F SYST BP GE 130 - 139MM HG: CPT | Performed by: NURSE PRACTITIONER

## 2024-03-05 PROCEDURE — 3078F DIAST BP <80 MM HG: CPT | Performed by: NURSE PRACTITIONER

## 2024-03-05 ASSESSMENT — FIBROSIS 4 INDEX: FIB4 SCORE: 3.29

## 2024-03-05 ASSESSMENT — PATIENT HEALTH QUESTIONNAIRE - PHQ9: CLINICAL INTERPRETATION OF PHQ2 SCORE: 0

## 2024-03-06 ENCOUNTER — PHARMACY VISIT (OUTPATIENT)
Dept: PHARMACY | Facility: MEDICAL CENTER | Age: 79
End: 2024-03-06
Payer: COMMERCIAL

## 2024-03-06 PROCEDURE — RXMED WILLOW AMBULATORY MEDICATION CHARGE: Performed by: DERMATOLOGY

## 2024-03-06 NOTE — PROGRESS NOTES
Verbal consent was acquired by the patient to use [employer] ambient listening note generation during this visit Yes      Subjective   Whitley Frederick is a 78 y.o. female who presents for f/u on numerous issues.   History of Present Illness  The patient is a 78-year-old female who presents for evaluation of multiple medical concerns.    New onset rash to feet, legs and trunk.  She had a biopsy done by Dr. Karena Harrison, which showed psoriasis. She unfortunately also has melanoma on her back.  She will be seeing Dr. Horne next week to have the melanoma removed and use clobetasol/fluocinonide to the psoriasis for the next 4 weeks.    Type 2 diabetes: Chronic issue.  Overdue for an A1c.  She has difficulty with her bowels and has been overdue for a follow-up for quite some time.  She took Ozempic for a few years but quit for 1 month but has been back on Ozempic now for 2 weeks she restarted Ozempic 0.25 mg 2 weeks ago. She is still using the FreeStyle Angel. Her blood sugar average over the last 90 days is 157.  No episodes of hypoglycemia.  She is overdue for eye exam.  She denies worsening numbness or tingling in her feet.    She has an appointment with nephrology and cardiology next month.  Nephrology told her as long as she is feeling good, they are going to stay away from dialysis. She is working on a plant diet.     Gout: She is on allopurinol and has fortunately not had any issues with gout in quite some time.    She does have hypertension but her blood pressure has been well-controlled with metoprolol, nifedipine and losartan.    Her balance has been off. She does not do a lot of activity. She walks her dog twice a day but a very short distance. She does not feel off balance when she is doing that. She uses a walker from when she had sciatica.      Review of Systems  Negative except for HPI  Objective   /52 (BP Location: Left arm, Patient Position: Sitting, BP Cuff Size: Adult)   Pulse (!) 53    "Temp 35.9 °C (96.7 °F) (Temporal)   Resp 16   Ht 1.676 m (5' 6\")   Wt 111 kg (245 lb)   SpO2 97%   Physical Exam  Vital Signs  Blood pressure is 134/52.  Gen: NAD  Resp: nonlabored.  Able to speak in full sentences  Psy: pleasant / cooperative.   Neuro:  Alert and oriented x 3    Results  Laboratory Studies  Labs were reviewed with the patient.       Assessment & Plan  1. Melanoma.  Will see Dr. Horne next week to have removed.      2. Psoriasis.  Seeing DR. Harrison - using fluocinonide and clobetasol for 4 to 6 weeks and then following up.    3. Diabetes.  A1c is at 7.0% which is stable.  Continue Ozempic at 0.25 for the next 2 weeks and then increase to 0.5 mg for 4 weeks.  May go up to 1 mg within the next 4 to 6 weeks as tolerated.  Encouraged her to avoid white carbohydrates/sugar.  Encouraged her to increase exercise.  She will continue to use freestyle lelia.  She will see me in 3 months.  Encouraged her to make an appoint with her eye doctor.  Discussed good footcare.    4. Balance issues.  She was advised to walk 10 to 15 minutes several times a day.  I encouraged her to utilize her walker at all times.  Discussed the importance of staying physically active.  Discussed weakening muscular strength and worsening balance with inactivity.    5..  CKD:   Followed closely by nephrology.    6.  MDD:  Stable.     7 . A.fib: Chronically anticoagulated.  Asymptomatic.  In normal sinus rhythm today.  Has a follow-up, with cardiology.    Recommend updated lipid panel, A1c, TSH, T4 prior to our next appointment.         Total face to face time 40 minutes of which over 50% of this visit is spent in counseling, education and outlining a plan of treatment and coordination of care for the above conditions. This included but was not limited to discussion of medication options and potential risks related to the medications, referral and specialty care options. All patient questions were answered        Please note that " this dictation was created using voice recognition software. I have made every reasonable attempt to correct obvious errors, but I expect that there are errors of grammar and possibly content that I did not discover before finalizing the note.

## 2024-03-08 PROCEDURE — RXMED WILLOW AMBULATORY MEDICATION CHARGE: Performed by: DERMATOLOGY

## 2024-03-12 ENCOUNTER — APPOINTMENT (OUTPATIENT)
Dept: ADMISSIONS | Facility: MEDICAL CENTER | Age: 79
End: 2024-03-12
Attending: SURGERY
Payer: MEDICARE

## 2024-03-13 ENCOUNTER — PATIENT MESSAGE (OUTPATIENT)
Dept: MEDICAL GROUP | Facility: LAB | Age: 79
End: 2024-03-13
Payer: MEDICARE

## 2024-03-13 DIAGNOSIS — K21.9 GASTROESOPHAGEAL REFLUX DISEASE: ICD-10-CM

## 2024-03-13 PROCEDURE — RXMED WILLOW AMBULATORY MEDICATION CHARGE: Performed by: NURSE PRACTITIONER

## 2024-03-13 RX ORDER — LORATADINE 10 MG/1
10 TABLET ORAL DAILY
Qty: 100 TABLET | Refills: 2 | Status: SHIPPED | OUTPATIENT
Start: 2024-03-13 | End: 2024-03-27 | Stop reason: SDUPTHER

## 2024-03-13 RX ORDER — OMEPRAZOLE 20 MG/1
40 CAPSULE, DELAYED RELEASE ORAL DAILY
Qty: 180 CAPSULE | Refills: 1 | Status: SHIPPED | OUTPATIENT
Start: 2024-03-13

## 2024-03-14 ENCOUNTER — PHARMACY VISIT (OUTPATIENT)
Dept: PHARMACY | Facility: MEDICAL CENTER | Age: 79
End: 2024-03-14
Payer: COMMERCIAL

## 2024-03-18 ENCOUNTER — PATIENT OUTREACH (OUTPATIENT)
Dept: HEALTH INFORMATION MANAGEMENT | Facility: OTHER | Age: 79
End: 2024-03-18
Payer: MEDICARE

## 2024-03-18 DIAGNOSIS — E11.22 TYPE 2 DIABETES MELLITUS WITH STAGE 4 CHRONIC KIDNEY DISEASE, WITHOUT LONG-TERM CURRENT USE OF INSULIN (HCC): ICD-10-CM

## 2024-03-18 DIAGNOSIS — N18.4 TYPE 2 DIABETES MELLITUS WITH STAGE 4 CHRONIC KIDNEY DISEASE, WITHOUT LONG-TERM CURRENT USE OF INSULIN (HCC): ICD-10-CM

## 2024-03-18 DIAGNOSIS — J45.20 MILD INTERMITTENT ASTHMA WITHOUT COMPLICATION: ICD-10-CM

## 2024-03-18 DIAGNOSIS — I10 ESSENTIAL HYPERTENSION: ICD-10-CM

## 2024-03-20 ENCOUNTER — TELEPHONE (OUTPATIENT)
Dept: SLEEP MEDICINE | Facility: MEDICAL CENTER | Age: 79
End: 2024-03-20
Payer: MEDICARE

## 2024-03-20 ENCOUNTER — PRE-ADMISSION TESTING (OUTPATIENT)
Dept: ADMISSIONS | Facility: MEDICAL CENTER | Age: 79
End: 2024-03-20
Attending: SURGERY
Payer: MEDICARE

## 2024-03-20 RX ORDER — SENNOSIDES 8.6 MG
1300 CAPSULE ORAL 2 TIMES DAILY
COMMUNITY

## 2024-03-20 NOTE — TELEPHONE ENCOUNTER
Pt LVM stating that she is trying to find her SS from 2013 that she completed. Pt stated that she has been having a hard time find these records and is needing them.  Pt requested records to be sent via [employer] or email.     [employer] message was sent to pt with SS records attach.

## 2024-03-21 ENCOUNTER — TELEPHONE (OUTPATIENT)
Dept: PHARMACY | Facility: MEDICAL CENTER | Age: 79
End: 2024-03-21
Payer: MEDICARE

## 2024-03-21 PROCEDURE — RXMED WILLOW AMBULATORY MEDICATION CHARGE: Performed by: NURSE PRACTITIONER

## 2024-03-21 NOTE — TELEPHONE ENCOUNTER
Contact:   [id number]   Whitley Frederick      Phone number: [phone]    Name of person spoken with and relationship to patient: Whitley, self    Patient’s Adherence:            How patient is doing on medication:  well    How many missed doses and reason: 0    Any new medications: no    Any new conditions: no    Any new allergies: no    Any new side effects: no     Any new diagnoses: no     How many doses remainin (due today)    Did patient want to speak with pharmacist: no  Delivery:            Delivery date and method:  3/22     Needs by Date:  4/3    Signature required:  no    Any additional details for :  na  Teach Appointment Date:  na  Shipping Address:  [address] [place] [address], , [place] [address]  Medication(name, strength and dose):  FreeStyle Angel 14 Day Sensor apply every 14 days  Copay: $26.93  Payment Method: ccof  Supplies:  na  Additional info:  MARIMAR Whitley. She stated doing well on the medication. She wasn't sure if she should hold off on applying sensor due to her upcoming surgery on Monday 3/25. I offered to have [facility] contact her. She declined. She stated she will call the NP and ask her. No further questions/concerns at this time

## 2024-03-22 ENCOUNTER — PRE-ADMISSION TESTING (OUTPATIENT)
Dept: ADMISSIONS | Facility: MEDICAL CENTER | Age: 79
End: 2024-03-22
Attending: SURGERY
Payer: MEDICARE

## 2024-03-22 ENCOUNTER — PHARMACY VISIT (OUTPATIENT)
Dept: PHARMACY | Facility: MEDICAL CENTER | Age: 79
End: 2024-03-22
Payer: COMMERCIAL

## 2024-03-22 DIAGNOSIS — Z01.812 PRE-OPERATIVE LABORATORY EXAMINATION: ICD-10-CM

## 2024-03-22 DIAGNOSIS — Z01.810 PRE-OPERATIVE CARDIOVASCULAR EXAMINATION: ICD-10-CM

## 2024-03-22 LAB
ANION GAP SERPL CALC-SCNC: 14 MMOL/L (ref 7–16)
BUN SERPL-MCNC: 73 MG/DL (ref 8–22)
CALCIUM SERPL-MCNC: 9.2 MG/DL (ref 8.5–10.5)
CHLORIDE SERPL-SCNC: 109 MMOL/L (ref 96–112)
CO2 SERPL-SCNC: 16 MMOL/L (ref 20–33)
CREAT SERPL-MCNC: 2.01 MG/DL (ref 0.5–1.4)
EKG IMPRESSION: NORMAL
ERYTHROCYTE [DISTWIDTH] IN BLOOD BY AUTOMATED COUNT: 51 FL (ref 35.9–50)
GFR SERPLBLD CREATININE-BSD FMLA CKD-EPI: 25 ML/MIN/1.73 M 2
GLUCOSE SERPL-MCNC: 180 MG/DL (ref 65–99)
HCT VFR BLD AUTO: 33 % (ref 37–47)
HGB BLD-MCNC: 11.1 G/DL (ref 12–16)
MCH RBC QN AUTO: 32.6 PG (ref 27–33)
MCHC RBC AUTO-ENTMCNC: 33.6 G/DL (ref 32.2–35.5)
MCV RBC AUTO: 97.1 FL (ref 81.4–97.8)
PLATELET # BLD AUTO: 108 K/UL (ref 164–446)
PMV BLD AUTO: 10.7 FL (ref 9–12.9)
POTASSIUM SERPL-SCNC: 5.1 MMOL/L (ref 3.6–5.5)
RBC # BLD AUTO: 3.4 M/UL (ref 4.2–5.4)
SODIUM SERPL-SCNC: 139 MMOL/L (ref 135–145)
WBC # BLD AUTO: 6.8 K/UL (ref 4.8–10.8)

## 2024-03-22 PROCEDURE — 93010 ELECTROCARDIOGRAM REPORT: CPT | Performed by: INTERNAL MEDICINE

## 2024-03-22 PROCEDURE — 93005 ELECTROCARDIOGRAM TRACING: CPT

## 2024-03-22 PROCEDURE — 36415 COLL VENOUS BLD VENIPUNCTURE: CPT

## 2024-03-22 PROCEDURE — 80048 BASIC METABOLIC PNL TOTAL CA: CPT

## 2024-03-22 PROCEDURE — 85027 COMPLETE CBC AUTOMATED: CPT

## 2024-03-25 ENCOUNTER — ANESTHESIA EVENT (OUTPATIENT)
Dept: SURGERY | Facility: MEDICAL CENTER | Age: 79
End: 2024-03-25
Payer: MEDICARE

## 2024-03-25 ENCOUNTER — HOSPITAL ENCOUNTER (OUTPATIENT)
Facility: MEDICAL CENTER | Age: 79
End: 2024-03-25
Attending: SURGERY | Admitting: SURGERY
Payer: MEDICARE

## 2024-03-25 ENCOUNTER — ANESTHESIA (OUTPATIENT)
Dept: SURGERY | Facility: MEDICAL CENTER | Age: 79
End: 2024-03-25
Payer: MEDICARE

## 2024-03-25 ENCOUNTER — PHARMACY VISIT (OUTPATIENT)
Dept: PHARMACY | Facility: MEDICAL CENTER | Age: 79
End: 2024-03-25
Payer: COMMERCIAL

## 2024-03-25 VITALS
WEIGHT: 249.56 LBS | BODY MASS INDEX: 40.11 KG/M2 | OXYGEN SATURATION: 93 % | TEMPERATURE: 97 F | RESPIRATION RATE: 14 BRPM | HEIGHT: 66 IN | DIASTOLIC BLOOD PRESSURE: 75 MMHG | HEART RATE: 75 BPM | SYSTOLIC BLOOD PRESSURE: 176 MMHG

## 2024-03-25 DIAGNOSIS — C43.59 MALIGNANT MELANOMA OF TORSO EXCLUDING BREAST (HCC): ICD-10-CM

## 2024-03-25 LAB
ANION GAP SERPL CALC-SCNC: 14 MMOL/L (ref 7–16)
BUN SERPL-MCNC: 71 MG/DL (ref 8–22)
CALCIUM SERPL-MCNC: 9.4 MG/DL (ref 8.5–10.5)
CHLORIDE SERPL-SCNC: 110 MMOL/L (ref 96–112)
CO2 SERPL-SCNC: 16 MMOL/L (ref 20–33)
CREAT SERPL-MCNC: 2.13 MG/DL (ref 0.5–1.4)
GFR SERPLBLD CREATININE-BSD FMLA CKD-EPI: 23 ML/MIN/1.73 M 2
GLUCOSE BLD STRIP.AUTO-MCNC: 164 MG/DL (ref 65–99)
GLUCOSE BLD STRIP.AUTO-MCNC: 188 MG/DL (ref 65–99)
GLUCOSE SERPL-MCNC: 192 MG/DL (ref 65–99)
PATHOLOGY CONSULT NOTE: NORMAL
POTASSIUM SERPL-SCNC: 5.1 MMOL/L (ref 3.6–5.5)
SODIUM SERPL-SCNC: 140 MMOL/L (ref 135–145)

## 2024-03-25 PROCEDURE — 160027 HCHG SURGERY MINUTES - 1ST 30 MINS LEVEL 2: Performed by: SURGERY

## 2024-03-25 PROCEDURE — 700111 HCHG RX REV CODE 636 W/ 250 OVERRIDE (IP): Performed by: ANESTHESIOLOGY

## 2024-03-25 PROCEDURE — 700102 HCHG RX REV CODE 250 W/ 637 OVERRIDE(OP): Performed by: ANESTHESIOLOGY

## 2024-03-25 PROCEDURE — 36415 COLL VENOUS BLD VENIPUNCTURE: CPT

## 2024-03-25 PROCEDURE — 700111 HCHG RX REV CODE 636 W/ 250 OVERRIDE (IP)

## 2024-03-25 PROCEDURE — 160038 HCHG SURGERY MINUTES - EA ADDL 1 MIN LEVEL 2: Performed by: SURGERY

## 2024-03-25 PROCEDURE — 80048 BASIC METABOLIC PNL TOTAL CA: CPT

## 2024-03-25 PROCEDURE — 88305 TISSUE EXAM BY PATHOLOGIST: CPT

## 2024-03-25 PROCEDURE — 160036 HCHG PACU - EA ADDL 30 MINS PHASE I: Performed by: SURGERY

## 2024-03-25 PROCEDURE — 160046 HCHG PACU - 1ST 60 MINS PHASE II: Performed by: SURGERY

## 2024-03-25 PROCEDURE — 160009 HCHG ANES TIME/MIN: Performed by: SURGERY

## 2024-03-25 PROCEDURE — RXMED WILLOW AMBULATORY MEDICATION CHARGE: Performed by: SURGERY

## 2024-03-25 PROCEDURE — 82962 GLUCOSE BLOOD TEST: CPT

## 2024-03-25 PROCEDURE — 160025 RECOVERY II MINUTES (STATS): Performed by: SURGERY

## 2024-03-25 PROCEDURE — 700105 HCHG RX REV CODE 258: Performed by: SURGERY

## 2024-03-25 PROCEDURE — 160035 HCHG PACU - 1ST 60 MINS PHASE I: Performed by: SURGERY

## 2024-03-25 PROCEDURE — 700111 HCHG RX REV CODE 636 W/ 250 OVERRIDE (IP): Mod: JZ | Performed by: ANESTHESIOLOGY

## 2024-03-25 PROCEDURE — 700111 HCHG RX REV CODE 636 W/ 250 OVERRIDE (IP): Performed by: SURGERY

## 2024-03-25 PROCEDURE — 160002 HCHG RECOVERY MINUTES (STAT): Performed by: SURGERY

## 2024-03-25 PROCEDURE — 700105 HCHG RX REV CODE 258: Performed by: ANESTHESIOLOGY

## 2024-03-25 PROCEDURE — 160048 HCHG OR STATISTICAL LEVEL 1-5: Performed by: SURGERY

## 2024-03-25 PROCEDURE — A9270 NON-COVERED ITEM OR SERVICE: HCPCS | Performed by: ANESTHESIOLOGY

## 2024-03-25 RX ORDER — LABETALOL HYDROCHLORIDE 5 MG/ML
5 INJECTION, SOLUTION INTRAVENOUS PRN
Status: DISCONTINUED | OUTPATIENT
Start: 2024-03-25 | End: 2024-03-25 | Stop reason: HOSPADM

## 2024-03-25 RX ORDER — DEXAMETHASONE SODIUM PHOSPHATE 4 MG/ML
INJECTION, SOLUTION INTRA-ARTICULAR; INTRALESIONAL; INTRAMUSCULAR; INTRAVENOUS; SOFT TISSUE PRN
Status: DISCONTINUED | OUTPATIENT
Start: 2024-03-25 | End: 2024-03-25 | Stop reason: SURG

## 2024-03-25 RX ORDER — HYDROMORPHONE HYDROCHLORIDE 1 MG/ML
0.2 INJECTION, SOLUTION INTRAMUSCULAR; INTRAVENOUS; SUBCUTANEOUS
Status: DISCONTINUED | OUTPATIENT
Start: 2024-03-25 | End: 2024-03-25 | Stop reason: HOSPADM

## 2024-03-25 RX ORDER — HALOPERIDOL 5 MG/ML
1 INJECTION INTRAMUSCULAR
Status: DISCONTINUED | OUTPATIENT
Start: 2024-03-25 | End: 2024-03-25 | Stop reason: HOSPADM

## 2024-03-25 RX ORDER — SODIUM CHLORIDE, SODIUM LACTATE, POTASSIUM CHLORIDE, CALCIUM CHLORIDE 600; 310; 30; 20 MG/100ML; MG/100ML; MG/100ML; MG/100ML
INJECTION, SOLUTION INTRAVENOUS CONTINUOUS
Status: DISCONTINUED | OUTPATIENT
Start: 2024-03-25 | End: 2024-03-25 | Stop reason: HOSPADM

## 2024-03-25 RX ORDER — SODIUM CHLORIDE 9 MG/ML
INJECTION, SOLUTION INTRAVENOUS CONTINUOUS
Status: DISCONTINUED | OUTPATIENT
Start: 2024-03-25 | End: 2024-03-25 | Stop reason: HOSPADM

## 2024-03-25 RX ORDER — ONDANSETRON 2 MG/ML
4 INJECTION INTRAMUSCULAR; INTRAVENOUS
Status: DISCONTINUED | OUTPATIENT
Start: 2024-03-25 | End: 2024-03-25 | Stop reason: HOSPADM

## 2024-03-25 RX ORDER — ONDANSETRON 2 MG/ML
INJECTION INTRAMUSCULAR; INTRAVENOUS PRN
Status: DISCONTINUED | OUTPATIENT
Start: 2024-03-25 | End: 2024-03-25 | Stop reason: SURG

## 2024-03-25 RX ORDER — OXYCODONE HYDROCHLORIDE AND ACETAMINOPHEN 5; 325 MG/1; MG/1
1 TABLET ORAL EVERY 4 HOURS PRN
Status: DISCONTINUED | OUTPATIENT
Start: 2024-03-25 | End: 2024-03-25 | Stop reason: HOSPADM

## 2024-03-25 RX ORDER — FUROSEMIDE 80 MG
80 TABLET ORAL
COMMUNITY

## 2024-03-25 RX ORDER — HYDROMORPHONE HYDROCHLORIDE 1 MG/ML
0.1 INJECTION, SOLUTION INTRAMUSCULAR; INTRAVENOUS; SUBCUTANEOUS
Status: DISCONTINUED | OUTPATIENT
Start: 2024-03-25 | End: 2024-03-25 | Stop reason: HOSPADM

## 2024-03-25 RX ORDER — OXYCODONE HCL 5 MG/5 ML
5 SOLUTION, ORAL ORAL
Status: COMPLETED | OUTPATIENT
Start: 2024-03-25 | End: 2024-03-25

## 2024-03-25 RX ORDER — SODIUM CHLORIDE, SODIUM LACTATE, POTASSIUM CHLORIDE, CALCIUM CHLORIDE 600; 310; 30; 20 MG/100ML; MG/100ML; MG/100ML; MG/100ML
INJECTION, SOLUTION INTRAVENOUS
Status: DISCONTINUED | OUTPATIENT
Start: 2024-03-25 | End: 2024-03-25 | Stop reason: SURG

## 2024-03-25 RX ORDER — LABETALOL HYDROCHLORIDE 5 MG/ML
INJECTION, SOLUTION INTRAVENOUS
Status: COMPLETED
Start: 2024-03-25 | End: 2024-03-25

## 2024-03-25 RX ORDER — OXYCODONE HCL 5 MG/5 ML
10 SOLUTION, ORAL ORAL
Status: COMPLETED | OUTPATIENT
Start: 2024-03-25 | End: 2024-03-25

## 2024-03-25 RX ORDER — BUPIVACAINE HYDROCHLORIDE 2.5 MG/ML
INJECTION, SOLUTION EPIDURAL; INFILTRATION; INTRACAUDAL
Status: DISCONTINUED | OUTPATIENT
Start: 2024-03-25 | End: 2024-03-25 | Stop reason: HOSPADM

## 2024-03-25 RX ORDER — HYDROMORPHONE HYDROCHLORIDE 1 MG/ML
0.4 INJECTION, SOLUTION INTRAMUSCULAR; INTRAVENOUS; SUBCUTANEOUS
Status: DISCONTINUED | OUTPATIENT
Start: 2024-03-25 | End: 2024-03-25 | Stop reason: HOSPADM

## 2024-03-25 RX ORDER — DIPHENHYDRAMINE HYDROCHLORIDE 50 MG/ML
12.5 INJECTION INTRAMUSCULAR; INTRAVENOUS
Status: DISCONTINUED | OUTPATIENT
Start: 2024-03-25 | End: 2024-03-25 | Stop reason: HOSPADM

## 2024-03-25 RX ORDER — OXYCODONE HYDROCHLORIDE AND ACETAMINOPHEN 5; 325 MG/1; MG/1
1 TABLET ORAL EVERY 4 HOURS PRN
Qty: 10 TABLET | Refills: 0 | Status: SHIPPED | OUTPATIENT
Start: 2024-03-25 | End: 2024-03-30

## 2024-03-25 RX ADMIN — ONDANSETRON 4 MG: 2 INJECTION INTRAMUSCULAR; INTRAVENOUS at 09:54

## 2024-03-25 RX ADMIN — LABETALOL HYDROCHLORIDE 5 MG: 5 INJECTION, SOLUTION INTRAVENOUS at 10:42

## 2024-03-25 RX ADMIN — OXYCODONE HYDROCHLORIDE 10 MG: 5 SOLUTION ORAL at 10:28

## 2024-03-25 RX ADMIN — SODIUM CHLORIDE, POTASSIUM CHLORIDE, SODIUM LACTATE AND CALCIUM CHLORIDE: 600; 310; 30; 20 INJECTION, SOLUTION INTRAVENOUS at 09:37

## 2024-03-25 RX ADMIN — FENTANYL CITRATE 50 MCG: 50 INJECTION, SOLUTION INTRAMUSCULAR; INTRAVENOUS at 09:53

## 2024-03-25 RX ADMIN — FENTANYL CITRATE 25 MCG: 50 INJECTION, SOLUTION INTRAMUSCULAR; INTRAVENOUS at 10:28

## 2024-03-25 RX ADMIN — DEXAMETHASONE SODIUM PHOSPHATE 4 MG: 4 INJECTION INTRA-ARTICULAR; INTRALESIONAL; INTRAMUSCULAR; INTRAVENOUS; SOFT TISSUE at 09:54

## 2024-03-25 RX ADMIN — PROPOFOL 200 MG: 10 INJECTION, EMULSION INTRAVENOUS at 09:44

## 2024-03-25 RX ADMIN — FENTANYL CITRATE 25 MCG: 50 INJECTION, SOLUTION INTRAMUSCULAR; INTRAVENOUS at 10:30

## 2024-03-25 RX ADMIN — LABETALOL HYDROCHLORIDE 5 MG: 5 INJECTION, SOLUTION INTRAVENOUS at 10:50

## 2024-03-25 RX ADMIN — FENTANYL CITRATE 50 MCG: 50 INJECTION, SOLUTION INTRAMUSCULAR; INTRAVENOUS at 10:02

## 2024-03-25 RX ADMIN — SODIUM CHLORIDE, POTASSIUM CHLORIDE, SODIUM LACTATE AND CALCIUM CHLORIDE: 600; 310; 30; 20 INJECTION, SOLUTION INTRAVENOUS at 09:19

## 2024-03-25 RX ADMIN — GLYCOPYRROLATE 0.2 MG: 0.2 INJECTION INTRAMUSCULAR; INTRAVENOUS at 09:50

## 2024-03-25 ASSESSMENT — FIBROSIS 4 INDEX: FIB4 SCORE: 3.7

## 2024-03-25 ASSESSMENT — PAIN SCALES - GENERAL: PAIN_LEVEL: 1

## 2024-03-25 NOTE — OR NURSING
Pt arrived in phase II. Reports pain tolerable 5/10 stinging to back. Dressing with scant drainage. Iv in place.     1150 discharge instructions reviewed with pt. All questions answered. Clarified with Dr. Horne to restart xarelto 3/26. BP elevated.     1200 Pt bp below 180. Pt reports she wants to go home. Pt used her own nebulizer. Slight wheeze but oxygen sat 93%. Pt refused nebulizer treatment. States she thinks this is due to her anxiety. Dressing with unchanged drainage. Belongings returned to pt. Iv removed. Instructed to take bp med at home and recheck bp.     1210 Pt taken out via wheelchair. Per Dr. Lind ok to dc if bp <180

## 2024-03-25 NOTE — ANESTHESIA TIME REPORT
Anesthesia Start and Stop Event Times       Date Time Event    3/25/2024 0920 Ready for Procedure     0937 Anesthesia Start     1021 Anesthesia Stop          Responsible Staff  03/25/24      Name Role Begin End    Javan Lind M.D. Anesth 0937 1021          Overtime Reason:  no overtime (within assigned shift)    Comments:

## 2024-03-25 NOTE — PROGRESS NOTES
Med Rec complete per Pt  Allergies Reviewed    Pt reports taking anticoagulant in the last 14 days  Anticoagulant: XARELTO, Last dose: 3/21

## 2024-03-25 NOTE — ANESTHESIA PREPROCEDURE EVALUATION
Case: [id number] Date/Time: 03/25/24 1030    Procedure: WIDE EXCISION OF MALIGNANT MELANOMA, LEFT UPPER BACK    Pre-op diagnosis: MALIGNANT MELANOMA OF BACK    Location: [facility] OR 09 / SURGERY [facility]    Surgeons: Amy Horne M.D.            Relevant Problems   ANESTHESIA   (positive) AVERY (obstructive sleep apnea)      PULMONARY   (positive) Mild intermittent asthma without complication      CARDIAC   (positive) Atherosclerosis of aorta (CMS-HCC)   (positive) High blood pressure associated with diabetes (HCC)   (positive) Left bundle branch block   (positive) Non-rheumatic mitral regurgitation   (positive) PAF (paroxysmal atrial fibrillation) (HCC)   (positive) Stenosis of carotid artery   (positive) VPC's (ventricular premature complexes)         (positive) CKD (chronic kidney disease) stage 4, GFR 15-29 ml/min (HCC)   (positive) Microalbuminuria due to type 2 diabetes mellitus (HCC)      ENDO   (positive) Hypothyroidism due to acquired atrophy of thyroid   (positive) Type 2 diabetes mellitus with stage 4 chronic kidney disease, without long-term current use of insulin (HCC)      Other   (positive) Chronic idiopathic gout involving toe without tophus       Physical Exam    Airway   Mallampati: II  TM distance: >3 FB  Neck ROM: full       Cardiovascular - normal exam  Rhythm: regular  Rate: normal  (-) murmur     Dental - normal exam           Pulmonary - normal exam  Breath sounds clear to auscultation     Abdominal   (+) obese     Neurological - normal exam                   Anesthesia Plan    ASA 4       Plan - general       Airway plan will be LMA          Induction: intravenous    Postoperative Plan: Postoperative administration of opioids is intended.    Pertinent diagnostic labs and testing reviewed    Informed Consent:    Anesthetic plan and risks discussed with patient.    Use of blood products discussed with: patient whom consented to blood products.

## 2024-03-25 NOTE — DISCHARGE INSTRUCTIONS
HOME CARE INSTRUCTIONS    ACTIVITY: Rest and take it easy for the first 24 hours.  A responsible adult is recommended to remain with you during that time.  It is normal to feel sleepy.  We encourage you to not do anything that requires balance, judgment or coordination.    FOR 24 HOURS DO NOT:  Drive, operate machinery or run household appliances.  Drink beer or alcoholic beverages.  Make important decisions or sign legal documents.    SPECIAL INSTRUCTIONS: may remove dressing on 3/27 and then shower    DIET: To avoid nausea, slowly advance diet as tolerated, avoiding spicy or greasy foods for the first day.  Add more substantial food to your diet according to your physician's instructions.  Babies can be fed formula or breast milk as soon as they are hungry.  INCREASE FLUIDS AND FIBER TO AVOID CONSTIPATION.    MEDICATIONS: Resume taking daily medication.  Take prescribed pain medication with food.  If no medication is prescribed, you may take non-aspirin pain medication if needed.  PAIN MEDICATION CAN BE VERY CONSTIPATING.  Take a stool softener or laxative such as senokot, pericolace, or milk of magnesia if needed.    Prescription given for _Percocet_.  Last pain medication given at 10:28 am.     A follow-up appointment should be arranged with your doctor in 4/3/24; call to schedule.    You should CALL YOUR PHYSICIAN if you develop:  Fever greater than 101 degrees F.  Pain not relieved by medication, or persistent nausea or vomiting.  Excessive bleeding (blood soaking through dressing) or unexpected drainage from the wound.  Extreme redness or swelling around the incision site, drainage of pus or foul smelling drainage.  Inability to urinate or empty your bladder within 8 hours.  Problems with breathing or chest pain.    You should call 911 if you develop problems with breathing or chest pain.  If you are unable to contact your doctor or surgical center, you should go to the nearest emergency room or urgent care  center.  Physician's telephone #: [phone]    MILD FLU-LIKE SYMPTOMS ARE NORMAL.  YOU MAY EXPERIENCE GENERALIZED MUSCLE ACHES, THROAT IRRITATION, HEADACHE AND/OR SOME NAUSEA.    If any questions arise, call your doctor.  If your doctor is not available, please feel free to call the Surgical Center at [phone].  The Center is open Monday through Friday from 7AM to 7PM.      A registered nurse may call you a few days after your surgery to see how you are doing after your procedure.    You may also receive a survey in the mail within the next two weeks and we ask that you take a few moments to complete the survey and return it to us.  Our goal is to provide you with very good care and we value your comments.     Depression / Suicide Risk    As you are discharged from this [facility] facility, it is important to learn how to keep safe from harming yourself.    Recognize the warning signs:  Abrupt changes in personality, positive or negative- including increase in energy   Giving away possessions  Change in eating patterns- significant weight changes-  positive or negative  Change in sleeping patterns- unable to sleep or sleeping all the time   Unwillingness or inability to communicate  Depression  Unusual sadness, discouragement and loneliness  Talk of wanting to die  Neglect of personal appearance   Rebelliousness- reckless behavior  Withdrawal from people/activities they love  Confusion- inability to concentrate     If you or a loved one observes any of these behaviors or has concerns about self-harm, here's what you can do:  Talk about it- your feelings and reasons for harming yourself  Remove any means that you might use to hurt yourself (examples: pills, rope, extension cords, firearm)  Get professional help from the community (Mental Health, Substance Abuse, psychological counseling)  Do not be alone:Call your Safe Contact- someone whom you trust who will be there for you.  Call your local CRISIS  HOTLINE 633-4843 or 145-003-3605  Call your local Children's Mobile Crisis Response Team [place] (983) 017-1258 or www.[employer]  Call the toll free National Suicide Prevention Hotlines   National Suicide Prevention Lifeline 552-033-EMOH (8718)  National [employer] Line Network 800-SUICIDE (542-7767)    I acknowledge receipt and understanding of these Home Care instructions.

## 2024-03-25 NOTE — OP REPORT
DATE OF SERVICE:  03/25/2024     PREOPERATIVE DIAGNOSIS:  Back melanoma.     POSTOPERATIVE DIAGNOSIS:  Back melanoma.     PROCEDURE:  Wide excision of a back melanoma with a 0.2 mm thickness.     SURGEON:  Amy Ramos MD     ASSISTANT:  SVETLANA Pedraza     ANESTHESIA:  Laryngeal mask.     ANESTHESIOLOGIST:  Jaavn Lind MD     INDICATIONS:  The patient is a 79-year-old female who has a thin melanoma of   the back that needs a wide excision.  She is being brought to the operating   room at this time for excision. The indications for a surgical assistant in this surgery were indicated due to complexity of the procedure. Their role included aiding in incision, retraction, holding devices including camera for laparoscopic procedure, and closure of the wound.         FINDINGS:  A 1 cm lesion was found and 2 cm margins were taken   circumferentially.  No lymph nodes were indicated for removal.     DESCRIPTION OF PROCEDURE:  After the patient was identified and consented, she   was brought to the operating room and placed in supine position.  The patient   underwent laryngeal mask anesthetic clearance.  The patient was placed in   lateral decubitus position.  Her back was prepped and draped in sterile   fashion.  A 2 cm incision was taken circumferentially around the lesion, was   taken down to subcutaneous tissue and sent to pathology for evaluation.  The   lateral skin flaps were then developed.  They were closed with 3-0 Vicryl for   the deep layer and skin was closed with running 4-0 Vicryl in subcuticular   fashion.  Steri-Strip and dry dressing placed on the wound.  The patient was   extubated and taken to recovery room in stable condition.  All sponge and   needle counts were correct.        ______________________________  AMY RAMOS MD    [facility]/YA/MORRIS      DD:  03/25/2024 10:03  DT:  03/25/2024 10:30    Job#:  [id number]    CC:SVETLANA BOWMAN MD

## 2024-03-25 NOTE — OR NURSING
1019-Pt arrived from OR, resting calmly with even, unlabored breathing, vss, no pain, no nausea, site CDI and soft to palpation.    1036-RN discussed htn with Dr. Lind at bedside, orders rec'd and confirmed for labetalol.  Orders to maintain SBP <180.    1104-RN contacted pt's daughter and provided updates, all questions answered.    1107-RN updated Dr. Lind, pt has rec'd two doses of labetalol for a total of 10mg, HR and SBP holding with minimal change in SBP, remains in the 190's.  Pt states she is anxious and requesting Clonazepam, reports she takes 0.25-0.5 of a 0.5mg dose at home.  RN requesting further review and orders if appropriate.    1118-Dr. Lind at bedside with RN, SBP is now 170's, pt appropriate, meets Phase I requirements, no new orders at this time.  Pt to discharge to home medications.    1120-RN called report to Phase II, ADELAIDA, Domenico    1129-Pt transferred in [facility] with RN to Phase II.  Vss, no pain, no nausea, site with dressing intact and scant shadowing, soft to palpation. Pt stating mild anxiety en route to phase II and requesting her rescue inhaler.  RN discussed with Phase II RN and pt home inhaler provided at bedside in Phase II.  RN hand off complete, and pt to continue with care and treatment in phase II.

## 2024-03-25 NOTE — ANESTHESIA PROCEDURE NOTES
Airway    Date/Time: 3/25/2024 9:44 AM    Performed by: Javan Lind M.D.  Authorized by: Javan Lind M.D.    Location:  OR  Urgency:  Elective  Indications for Airway Management:  Anesthesia      Spontaneous Ventilation: absent    Sedation Level:  Deep  Preoxygenated: Yes    Final Airway Type:  Supraglottic airway  Final Supraglottic Airway:  Standard LMA    SGA Size:  4  Number of Attempts at Approach:  1

## 2024-03-25 NOTE — ANESTHESIA POSTPROCEDURE EVALUATION
Patient: Whitley Frederick    Procedure Summary       Date: 03/25/24 Room / Location: [facility] OR 09 / SURGERY [facility]    Anesthesia Start: 0937 Anesthesia Stop: 1021    Procedure: WIDE EXCISION OF MALIGNANT MELANOMA, LEFT UPPER BACK (Back) Diagnosis: (MALIGNANT MELANOMA OF BACK)    Surgeons: Amy Horne M.D. Responsible Provider: Javan Lind M.D.    Anesthesia Type: general ASA Status: 4            Final Anesthesia Type: general  Last vitals  BP   Blood Pressure : (!) 174/77    Temp   36 °C (96.8 °F)    Pulse   63   Resp   18    SpO2   97 %      Anesthesia Post Evaluation    Patient location during evaluation: PACU  Patient participation: complete - patient participated  Level of consciousness: awake and alert  Pain score: 1    Airway patency: patent  Anesthetic complications: no  Cardiovascular status: adequate and hemodynamically stable  Respiratory status: acceptable  Hydration status: acceptable    PONV: none          No notable events documented.     Nurse Pain Score: 0 (NPRS)

## 2024-03-27 RX ORDER — LORATADINE 10 MG/1
10 TABLET ORAL DAILY
Qty: 100 TABLET | Refills: 2 | Status: SHIPPED | OUTPATIENT
Start: 2024-03-27

## 2024-03-27 NOTE — PROGRESS NOTES
Three attempts at monthly outreach follow up. Left messages, will follow up at next monthly outreach.

## 2024-03-29 PROCEDURE — RXMED WILLOW AMBULATORY MEDICATION CHARGE: Performed by: NURSE PRACTITIONER

## 2024-03-31 RX ORDER — MAGNESIUM OXIDE 400 MG/1
400 TABLET ORAL DAILY
Qty: 90 TABLET | Refills: 0 | Status: SHIPPED | OUTPATIENT
Start: 2024-03-31

## 2024-03-31 RX ORDER — ALLOPURINOL 100 MG/1
TABLET ORAL
Qty: 360 TABLET | Refills: 2 | Status: SHIPPED | OUTPATIENT
Start: 2024-03-31

## 2024-03-31 NOTE — TELEPHONE ENCOUNTER
Received request via: Pharmacy    Was the patient seen in the last year in this department? Yes  LOV : 3/5/2024   Does the patient have an active prescription (recently filled or refills available) for medication(s) requested? No    Pharm[facility] Name: [facility]    Does the patient have correction Plus and need 100 day supply (blood pressure, diabetes and cholesterol meds only)?

## 2024-03-31 NOTE — TELEPHONE ENCOUNTER
Received request via: Pharmacy    Was the patient seen in the last year in this department? Yes  LOV : 3/5/2024   Does the patient have an active prescription (recently filled or refills available) for medication(s) requested? No    Pharmacy Name: [facility]     Does the patient have intermediate Plus and need 100 day supply (blood pressure, diabetes and cholesterol meds only)?

## 2024-04-01 PROCEDURE — RXMED WILLOW AMBULATORY MEDICATION CHARGE: Performed by: NURSE PRACTITIONER

## 2024-04-02 RX ORDER — MAGNESIUM OXIDE 400 MG/1
400 TABLET ORAL DAILY
Qty: 90 TABLET | Refills: 3 | Status: SHIPPED | OUTPATIENT
Start: 2024-04-02

## 2024-04-03 ENCOUNTER — PATIENT MESSAGE (OUTPATIENT)
Dept: MEDICAL GROUP | Facility: LAB | Age: 79
End: 2024-04-03
Payer: MEDICARE

## 2024-04-03 DIAGNOSIS — J32.9 CHRONIC SINUSITIS, UNSPECIFIED LOCATION: ICD-10-CM

## 2024-04-11 DIAGNOSIS — I10 ESSENTIAL HYPERTENSION: ICD-10-CM

## 2024-04-11 PROCEDURE — RXMED WILLOW AMBULATORY MEDICATION CHARGE: Performed by: INTERNAL MEDICINE

## 2024-04-11 PROCEDURE — RXMED WILLOW AMBULATORY MEDICATION CHARGE: Performed by: DERMATOLOGY

## 2024-04-11 RX ORDER — LOSARTAN POTASSIUM 50 MG/1
50 TABLET ORAL 2 TIMES DAILY
Qty: 200 TABLET | Refills: 2 | Status: SHIPPED | OUTPATIENT
Start: 2024-04-11

## 2024-04-12 ENCOUNTER — HOSPITAL ENCOUNTER (OUTPATIENT)
Dept: LAB | Facility: MEDICAL CENTER | Age: 79
End: 2024-04-12
Attending: INTERNAL MEDICINE
Payer: MEDICARE

## 2024-04-12 ENCOUNTER — HOSPITAL ENCOUNTER (OUTPATIENT)
Dept: LAB | Facility: MEDICAL CENTER | Age: 79
End: 2024-04-12
Attending: NURSE PRACTITIONER
Payer: MEDICARE

## 2024-04-12 DIAGNOSIS — E78.5 DYSLIPIDEMIA: ICD-10-CM

## 2024-04-12 DIAGNOSIS — E11.22 TYPE 2 DIABETES MELLITUS WITH STAGE 4 CHRONIC KIDNEY DISEASE, WITHOUT LONG-TERM CURRENT USE OF INSULIN (HCC): ICD-10-CM

## 2024-04-12 DIAGNOSIS — N18.4 TYPE 2 DIABETES MELLITUS WITH STAGE 4 CHRONIC KIDNEY DISEASE, WITHOUT LONG-TERM CURRENT USE OF INSULIN (HCC): ICD-10-CM

## 2024-04-12 DIAGNOSIS — N18.4 CKD (CHRONIC KIDNEY DISEASE) STAGE 4, GFR 15-29 ML/MIN (HCC): ICD-10-CM

## 2024-04-12 DIAGNOSIS — I10 PRIMARY HYPERTENSION: ICD-10-CM

## 2024-04-12 DIAGNOSIS — E03.4 HYPOTHYROIDISM DUE TO ACQUIRED ATROPHY OF THYROID: ICD-10-CM

## 2024-04-12 LAB
ALBUMIN SERPL BCP-MCNC: 4.3 G/DL (ref 3.2–4.9)
ALP SERPL-CCNC: 80 U/L (ref 30–99)
ALT SERPL-CCNC: 18 U/L (ref 2–50)
ANION GAP SERPL CALC-SCNC: 13 MMOL/L (ref 7–16)
AST SERPL-CCNC: 17 U/L (ref 12–45)
BILIRUB CONJ SERPL-MCNC: <0.2 MG/DL (ref 0.1–0.5)
BILIRUB INDIRECT SERPL-MCNC: NORMAL MG/DL (ref 0–1)
BILIRUB SERPL-MCNC: 0.8 MG/DL (ref 0.1–1.5)
BUN SERPL-MCNC: 66 MG/DL (ref 8–22)
CALCIUM SERPL-MCNC: 9.8 MG/DL (ref 8.5–10.5)
CHLORIDE SERPL-SCNC: 107 MMOL/L (ref 96–112)
CHOLEST SERPL-MCNC: 272 MG/DL (ref 100–199)
CO2 SERPL-SCNC: 19 MMOL/L (ref 20–33)
CREAT SERPL-MCNC: 2.09 MG/DL (ref 0.5–1.4)
CREAT UR-MCNC: 111.95 MG/DL
ERYTHROCYTE [DISTWIDTH] IN BLOOD BY AUTOMATED COUNT: 51.9 FL (ref 35.9–50)
GFR SERPLBLD CREATININE-BSD FMLA CKD-EPI: 24 ML/MIN/1.73 M 2
GLUCOSE SERPL-MCNC: 124 MG/DL (ref 65–99)
HCT VFR BLD AUTO: 39.5 % (ref 37–47)
HDLC SERPL-MCNC: 43 MG/DL
HGB BLD-MCNC: 12.9 G/DL (ref 12–16)
LDLC SERPL CALC-MCNC: 166 MG/DL
MCH RBC QN AUTO: 32.3 PG (ref 27–33)
MCHC RBC AUTO-ENTMCNC: 32.7 G/DL (ref 32.2–35.5)
MCV RBC AUTO: 99 FL (ref 81.4–97.8)
MICROALBUMIN UR-MCNC: 13.2 MG/DL
MICROALBUMIN/CREAT UR: 118 MG/G (ref 0–30)
PLATELET # BLD AUTO: 115 K/UL (ref 164–446)
PMV BLD AUTO: 10.9 FL (ref 9–12.9)
POTASSIUM SERPL-SCNC: 5.1 MMOL/L (ref 3.6–5.5)
PROT SERPL-MCNC: 7.4 G/DL (ref 6–8.2)
RBC # BLD AUTO: 3.99 M/UL (ref 4.2–5.4)
SODIUM SERPL-SCNC: 139 MMOL/L (ref 135–145)
T4 FREE SERPL-MCNC: 1.16 NG/DL (ref 0.93–1.7)
TRIGL SERPL-MCNC: 315 MG/DL (ref 0–149)
TSH SERPL DL<=0.005 MIU/L-ACNC: 0.79 UIU/ML (ref 0.38–5.33)
WBC # BLD AUTO: 6.9 K/UL (ref 4.8–10.8)

## 2024-04-12 PROCEDURE — 80061 LIPID PANEL: CPT

## 2024-04-12 PROCEDURE — 82043 UR ALBUMIN QUANTITATIVE: CPT

## 2024-04-12 PROCEDURE — 82570 ASSAY OF URINE CREATININE: CPT

## 2024-04-12 PROCEDURE — 85027 COMPLETE CBC AUTOMATED: CPT

## 2024-04-12 PROCEDURE — 80076 HEPATIC FUNCTION PANEL: CPT

## 2024-04-12 PROCEDURE — 84443 ASSAY THYROID STIM HORMONE: CPT

## 2024-04-12 PROCEDURE — 36415 COLL VENOUS BLD VENIPUNCTURE: CPT

## 2024-04-12 PROCEDURE — 84439 ASSAY OF FREE THYROXINE: CPT

## 2024-04-12 PROCEDURE — 80048 BASIC METABOLIC PNL TOTAL CA: CPT

## 2024-04-15 ENCOUNTER — PHARMACY VISIT (OUTPATIENT)
Dept: PHARMACY | Facility: MEDICAL CENTER | Age: 79
End: 2024-04-15
Payer: COMMERCIAL

## 2024-04-15 PROCEDURE — RXMED WILLOW AMBULATORY MEDICATION CHARGE: Performed by: INTERNAL MEDICINE

## 2024-04-16 ENCOUNTER — OFFICE VISIT (OUTPATIENT)
Dept: NEPHROLOGY | Facility: MEDICAL CENTER | Age: 79
End: 2024-04-16
Payer: MEDICARE

## 2024-04-16 ENCOUNTER — APPOINTMENT (OUTPATIENT)
Dept: CARDIOLOGY | Facility: MEDICAL CENTER | Age: 79
End: 2024-04-16
Attending: INTERNAL MEDICINE
Payer: MEDICARE

## 2024-04-16 VITALS
HEART RATE: 57 BPM | BODY MASS INDEX: 38.57 KG/M2 | HEIGHT: 66 IN | WEIGHT: 240 LBS | TEMPERATURE: 97.4 F | OXYGEN SATURATION: 99 % | SYSTOLIC BLOOD PRESSURE: 116 MMHG | DIASTOLIC BLOOD PRESSURE: 60 MMHG

## 2024-04-16 DIAGNOSIS — I10 PRIMARY HYPERTENSION: ICD-10-CM

## 2024-04-16 DIAGNOSIS — E11.22 TYPE 2 DIABETES MELLITUS WITH STAGE 4 CHRONIC KIDNEY DISEASE, WITHOUT LONG-TERM CURRENT USE OF INSULIN (HCC): ICD-10-CM

## 2024-04-16 DIAGNOSIS — N18.4 TYPE 2 DIABETES MELLITUS WITH STAGE 4 CHRONIC KIDNEY DISEASE, WITHOUT LONG-TERM CURRENT USE OF INSULIN (HCC): ICD-10-CM

## 2024-04-16 DIAGNOSIS — N18.4 CKD (CHRONIC KIDNEY DISEASE) STAGE 4, GFR 15-29 ML/MIN (HCC): ICD-10-CM

## 2024-04-16 PROCEDURE — 3074F SYST BP LT 130 MM HG: CPT | Performed by: INTERNAL MEDICINE

## 2024-04-16 PROCEDURE — 99214 OFFICE O/P EST MOD 30 MIN: CPT | Performed by: INTERNAL MEDICINE

## 2024-04-16 PROCEDURE — 3078F DIAST BP <80 MM HG: CPT | Performed by: INTERNAL MEDICINE

## 2024-04-16 ASSESSMENT — ENCOUNTER SYMPTOMS
FEVER: 0
CHILLS: 0
SHORTNESS OF BREATH: 0
NAUSEA: 0
HYPERTENSION: 1
COUGH: 0
BACK PAIN: 1
VOMITING: 0

## 2024-04-16 ASSESSMENT — FIBROSIS 4 INDEX: FIB4 SCORE: 2.72

## 2024-04-16 NOTE — PROGRESS NOTES
"Subjective     Whitley Frederick is a 79 y.o. female who presents with Chronic Kidney Disease and Hypertension            Chronic Kidney Disease  This is a chronic problem. The current episode started more than 1 year ago. The problem occurs constantly. The problem has been unchanged. Pertinent negatives include no chest pain, chills, coughing, fever, nausea, urinary symptoms or vomiting.   Hypertension  This is a chronic problem. The current episode started more than 1 year ago. The problem is unchanged. The problem is controlled. Pertinent negatives include no chest pain, malaise/fatigue, peripheral edema or shortness of breath. Risk factors for coronary artery disease include obesity, post-menopausal state and diabetes mellitus. Past treatments include angiotensin blockers. The current treatment provides significant improvement. Hypertensive end-organ damage includes kidney disease. Identifiable causes of hypertension include chronic renal disease.       Review of Systems   Constitutional:  Negative for chills, fever and malaise/fatigue.   Respiratory:  Negative for cough and shortness of breath.    Cardiovascular:  Negative for chest pain and leg swelling.   Gastrointestinal:  Negative for nausea and vomiting.   Genitourinary:  Negative for dysuria, frequency and urgency.   Musculoskeletal:  Positive for back pain.              Objective     /60 (BP Location: Right arm, Patient Position: Sitting, BP Cuff Size: Adult long)   Pulse (!) 57   Temp 36.3 °C (97.4 °F) (Temporal)   Ht 1.676 m (5' 6\")   Wt 109 kg (240 lb)   SpO2 99%   BMI 38.74 kg/m²      Physical Exam  Vitals and nursing note reviewed.   Constitutional:       General: She is not in acute distress.     Appearance: Normal appearance. She is well-developed. She is not diaphoretic.   HENT:      Head: Normocephalic and atraumatic.      Right Ear: External ear normal.      Left Ear: External ear normal.      Nose: Nose normal.   Eyes:      " General: No scleral icterus.        Right eye: No discharge.         Left eye: No discharge.      Conjunctiva/sclera: Conjunctivae normal.   Cardiovascular:      Rate and Rhythm: Normal rate and regular rhythm.      Heart sounds: No murmur heard.  Pulmonary:      Effort: Pulmonary effort is normal. No respiratory distress.      Breath sounds: Normal breath sounds.   Musculoskeletal:         General: No tenderness.      Right lower leg: No edema.      Left lower leg: No edema.   Skin:     General: Skin is warm and dry.      Findings: No erythema.   Neurological:      General: No focal deficit present.      Mental Status: She is alert and oriented to person, place, and time.      Cranial Nerves: No cranial nerve deficit.   Psychiatric:         Mood and Affect: Mood normal.         Behavior: Behavior normal.       Past Medical History:   Diagnosis Date    Allergy     Anxiety     Arrhythmia     Arthritis     Asthma     Carotid bruit     Left    CATARACT     Chest pain     DIABETES MELLITUS     Esophageal reflux     Goiter     Headache(784.0)     Heart murmur     Hyperlipidemia     Hypertension     IBD (inflammatory bowel disease)     Migraine     Silent migraine    AVERY (obstructive sleep apnea) 03/20/2024    Uses CPAP    Overweight(278.02)     Palpitations     Paroxysmal atrial fibrillation (HCC) 07/12/2013    woke with palps, AFib per ekg in am.    Pneumonia     Nov. 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 Badillo    Urinary tract infection, site not specified      Social History     Socioeconomic History    Marital status:      Spouse name: Not on file    Number of children: Not on file    Years of education: Not on file    Highest education level: Some college, no degree   Occupational History    Not on file   Tobacco Use    Smoking status: Former     Current packs/day: 0.00     Average packs/day: 1 pack/day  for 20.0 years (20.0 ttl pk-yrs)     Types: Cigarettes     Start date: 1963     Quit date: 1983     Years since quittin.3    Smokeless tobacco: Never   Vaping Use    Vaping Use: Never used   Substance and Sexual Activity    Alcohol use: No     Alcohol/week: 0.0 oz    Drug use: No    Sexual activity: Never     Partners: Male   Other Topics Concern    Not on file   Social History Narrative    Not on file     Social Determinants of Health     Financial Resource Strain: Low Risk  (2024)    Overall Financial Resource Strain (CARDIA)     Difficulty of Paying Living Expenses: Not very hard   Food Insecurity: No Food Insecurity (2024)    Hunger Vital Sign     Worried About Running Out of Food in the Last Year: Never true     Ran Out of Food in the Last Year: Never true   Transportation Needs: No Transportation Needs (2024)    PRAPARE - Transportation     Lack of Transportation (Medical): No     Lack of Transportation (Non-Medical): No   Physical Activity: Inactive (2024)    Exercise Vital Sign     Days of Exercise per Week: 0 days     Minutes of Exercise per Session: 20 min   Stress: No Stress Concern Present (2024)    [employer] [place] [employer] - Occupational Stress Questionnaire     Feeling of Stress : Only a little   Social Connections: Unknown (2024)    Social Connection and Isolation Panel [NHANES]     Frequency of Communication with Friends and Family: Twice a week     Frequency of Social Gatherings with Friends and Family: Twice a week     Attends Buddhist Services: Patient declined     Active Member of Clubs or Organizations: No     Attends Club or Organization Meetings: 1 to 4 times per year     Marital Status:    Intimate Partner Violence: Not At Risk (2022)    Humiliation, Afraid, Rape, and Kick questionnaire     Fear of Current or Ex-Partner: No     Emotionally Abused: No     Physically Abused: No     Sexually Abused: No   Housing Stability:  Low Risk  (1/17/2024)    Housing Stability Vital Sign     Unable to Pay for Housing in the Last Year: No     Number of Places Lived in the Last Year: 1     Unstable Housing in the Last Year: No     Family History   Problem Relation Age of Onset    Cancer Maternal Aunt     Diabetes Maternal Aunt     Heart Disease Maternal Aunt     Hypertension Maternal Aunt     Hyperlipidemia Maternal Aunt     Cancer Mother         Leukemia    Hypertension Mother     Diabetes Mother     Lung Disease Father     Cancer Father         Lung    Lung Disease Brother     Stroke Brother     Diabetes Brother     Heart Disease Brother     Diabetes Maternal Grandmother     Hypertension Brother     Diabetes Brother      Recent Labs     05/02/23  1424 06/13/23  1302 09/22/23  1457 10/27/23  1335 01/08/24  1311 03/22/24  1323 03/25/24  0915 04/12/24  1544 04/12/24  1546   ALBUMIN 4.2 4.1  --   --   --   --   --  4.3  --    HDL  --   --   --   --   --   --   --  43  --    TRIGLYCERIDE  --   --   --   --   --   --   --  315*  --    SODIUM 140 144   < > 139   < > 139 140  --  139   POTASSIUM 4.8 4.4   < > 4.1   < > 5.1 5.1  --  5.1   CHLORIDE 106 108   < > 101   < > 109 110  --  107   CO2 23 19*   < > 22   < > 16* 16*  --  19*   BUN 60* 83*   < > 64*   < > 73* 71*  --  66*   CREATININE 2.67* 2.98*   < > 2.80*   < > 2.01* 2.13*  --  2.09*   PHOSPHORUS 3.9 3.8  --  3.0  --   --   --   --   --     < > = values in this interval not displayed.                           Assessment & Plan        1. CKD (chronic kidney disease) stage 4, GFR 15-29 ml/min ([facility])  Stable  No uremic symptoms  Renal dose of medication  Avoid nephrotoxins  Continue same medication regimen  Patient was advised to call us if symptoms worsen      2. Primary hypertension  Controlled  Continue same medication regimen  Continue low-sodium diet      3. Type 2 diabetes mellitus with stage 4 chronic kidney disease, without long-term current use of insulin ([facility])  Continue to optimize  diabetes control for hemoglobin A1c below 7%

## 2024-04-18 ENCOUNTER — PATIENT MESSAGE (OUTPATIENT)
Dept: MEDICAL GROUP | Facility: LAB | Age: 79
End: 2024-04-18

## 2024-04-19 ENCOUNTER — PATIENT MESSAGE (OUTPATIENT)
Dept: CARDIOLOGY | Facility: MEDICAL CENTER | Age: 79
End: 2024-04-19
Payer: MEDICARE

## 2024-04-22 ENCOUNTER — PATIENT MESSAGE (OUTPATIENT)
Dept: CARDIOLOGY | Facility: MEDICAL CENTER | Age: 79
End: 2024-04-22
Payer: MEDICARE

## 2024-04-22 DIAGNOSIS — E78.5 HYPERLIPIDEMIA ASSOCIATED WITH TYPE 2 DIABETES MELLITUS (HCC): ICD-10-CM

## 2024-04-22 DIAGNOSIS — E11.69 HYPERLIPIDEMIA ASSOCIATED WITH TYPE 2 DIABETES MELLITUS (HCC): ICD-10-CM

## 2024-04-24 ENCOUNTER — PATIENT OUTREACH (OUTPATIENT)
Dept: HEALTH INFORMATION MANAGEMENT | Facility: OTHER | Age: 79
End: 2024-04-24
Payer: MEDICARE

## 2024-04-24 DIAGNOSIS — I10 ESSENTIAL HYPERTENSION: ICD-10-CM

## 2024-04-24 DIAGNOSIS — N18.4 TYPE 2 DIABETES MELLITUS WITH STAGE 4 CHRONIC KIDNEY DISEASE, WITHOUT LONG-TERM CURRENT USE OF INSULIN (HCC): ICD-10-CM

## 2024-04-24 DIAGNOSIS — E11.22 TYPE 2 DIABETES MELLITUS WITH STAGE 4 CHRONIC KIDNEY DISEASE, WITHOUT LONG-TERM CURRENT USE OF INSULIN (HCC): ICD-10-CM

## 2024-04-24 DIAGNOSIS — J45.20 MILD INTERMITTENT ASTHMA WITHOUT COMPLICATION: ICD-10-CM

## 2024-04-24 NOTE — PROGRESS NOTES
"Assessment  Spoke with Whitley for monthly outreach follow up.  She is \"okay, same old, same old.\"  Discussed recent procedure for removal of skin cancer on her back.  She states she has been referred to an ophthalmologist due to edema in her eye.  She is quite nervous about the eye appointment and fearful of needing injections in her eye.  Allowed to verbalize her feelings.  Discussed her last set of labs and nephrology follow-up.  Her labs have been stable. Discussed diet and blood sugars. Whitley denied any needs at this time. Encouraged to call with any questions or concerns.     Education  Discussed diet  Discussed nephrology follow up     Plan of Care and Goals  Continue diabetes and hypertension education     Barriers:  Age  Progression of disease process-CKD 4/DM2  Knowledge of healthcare  Habits     Progress:  Progressing     Next outreach:  1 month     "

## 2024-05-03 NOTE — PATIENT COMMUNICATION
Shipment has not arrived. Called pt to make sure she is not at risk of running out. Pt states she has an extra bottle and has enough at this time.

## 2024-05-06 NOTE — PATIENT COMMUNICATION
Phone number called: [phone]     Call outcome: Voicemail left for patient letting her know her Mult[employer] shipment has been received in office. Qwenty message sent to patient as well, awaiting patient response and will follow up as needed.

## 2024-05-07 PROCEDURE — RXMED WILLOW AMBULATORY MEDICATION CHARGE: Performed by: INTERNAL MEDICINE

## 2024-05-09 ENCOUNTER — TELEPHONE (OUTPATIENT)
Dept: HEALTH INFORMATION MANAGEMENT | Facility: OTHER | Age: 79
End: 2024-05-09
Payer: MEDICARE

## 2024-05-10 ENCOUNTER — PHARMACY VISIT (OUTPATIENT)
Dept: PHARMACY | Facility: MEDICAL CENTER | Age: 79
End: 2024-05-10
Payer: COMMERCIAL

## 2024-05-10 ENCOUNTER — APPOINTMENT (OUTPATIENT)
Dept: DERMATOLOGY | Facility: IMAGING CENTER | Age: 79
End: 2024-05-10
Payer: MEDICARE

## 2024-05-11 PROCEDURE — RXMED WILLOW AMBULATORY MEDICATION CHARGE: Performed by: NURSE PRACTITIONER

## 2024-05-13 PROCEDURE — RXMED WILLOW AMBULATORY MEDICATION CHARGE: Performed by: NURSE PRACTITIONER

## 2024-05-14 RX ORDER — FAMOTIDINE 20 MG/1
20 TABLET, FILM COATED ORAL 2 TIMES DAILY
Qty: 180 TABLET | Refills: 3 | Status: SHIPPED | OUTPATIENT
Start: 2024-05-14

## 2024-05-15 ENCOUNTER — PHARMACY VISIT (OUTPATIENT)
Dept: PHARMACY | Facility: MEDICAL CENTER | Age: 79
End: 2024-05-15
Payer: COMMERCIAL

## 2024-05-15 PROCEDURE — RXMED WILLOW AMBULATORY MEDICATION CHARGE: Performed by: NURSE PRACTITIONER

## 2024-05-16 ENCOUNTER — PHARMACY VISIT (OUTPATIENT)
Dept: PHARMACY | Facility: MEDICAL CENTER | Age: 79
End: 2024-05-16
Payer: COMMERCIAL

## 2024-05-22 ENCOUNTER — PATIENT OUTREACH (OUTPATIENT)
Dept: HEALTH INFORMATION MANAGEMENT | Facility: OTHER | Age: 79
End: 2024-05-22
Payer: MEDICARE

## 2024-05-22 DIAGNOSIS — J45.20 MILD INTERMITTENT ASTHMA WITHOUT COMPLICATION: ICD-10-CM

## 2024-05-22 DIAGNOSIS — N18.4 TYPE 2 DIABETES MELLITUS WITH STAGE 4 CHRONIC KIDNEY DISEASE, WITHOUT LONG-TERM CURRENT USE OF INSULIN (HCC): ICD-10-CM

## 2024-05-22 DIAGNOSIS — I10 ESSENTIAL HYPERTENSION: ICD-10-CM

## 2024-05-22 DIAGNOSIS — E11.22 TYPE 2 DIABETES MELLITUS WITH STAGE 4 CHRONIC KIDNEY DISEASE, WITHOUT LONG-TERM CURRENT USE OF INSULIN (HCC): ICD-10-CM

## 2024-05-24 ENCOUNTER — TELEPHONE (OUTPATIENT)
Dept: NEPHROLOGY | Facility: MEDICAL CENTER | Age: 79
End: 2024-05-24
Payer: MEDICARE

## 2024-05-24 DIAGNOSIS — N18.4 CKD (CHRONIC KIDNEY DISEASE) STAGE 4, GFR 15-29 ML/MIN (HCC): ICD-10-CM

## 2024-05-24 NOTE — TELEPHONE ENCOUNTER
Pt called and stated that Dr. Najjar wanted her to get her magnesium level checked but she didn't see an order for it. She asked if he can please send a new order for it if it hasn't been sent already.

## 2024-05-31 ENCOUNTER — APPOINTMENT (OUTPATIENT)
Dept: DERMATOLOGY | Facility: IMAGING CENTER | Age: 79
End: 2024-05-31
Payer: MEDICARE

## 2024-06-10 ENCOUNTER — OFFICE VISIT (OUTPATIENT)
Dept: CARDIOLOGY | Facility: MEDICAL CENTER | Age: 79
End: 2024-06-10
Attending: INTERNAL MEDICINE
Payer: MEDICARE

## 2024-06-10 ENCOUNTER — PATIENT MESSAGE (OUTPATIENT)
Dept: MEDICAL GROUP | Facility: LAB | Age: 79
End: 2024-06-10

## 2024-06-10 ENCOUNTER — TELEPHONE (OUTPATIENT)
Dept: CARDIOLOGY | Facility: MEDICAL CENTER | Age: 79
End: 2024-06-10

## 2024-06-10 VITALS
HEIGHT: 66 IN | BODY MASS INDEX: 38.57 KG/M2 | RESPIRATION RATE: 16 BRPM | HEART RATE: 56 BPM | OXYGEN SATURATION: 98 % | SYSTOLIC BLOOD PRESSURE: 128 MMHG | WEIGHT: 240 LBS | DIASTOLIC BLOOD PRESSURE: 54 MMHG

## 2024-06-10 DIAGNOSIS — N18.4 TYPE 2 DIABETES MELLITUS WITH STAGE 4 CHRONIC KIDNEY DISEASE, WITHOUT LONG-TERM CURRENT USE OF INSULIN (HCC): ICD-10-CM

## 2024-06-10 DIAGNOSIS — I49.3 VPC'S (VENTRICULAR PREMATURE COMPLEXES): ICD-10-CM

## 2024-06-10 DIAGNOSIS — I48.0 PAF (PAROXYSMAL ATRIAL FIBRILLATION) (HCC): ICD-10-CM

## 2024-06-10 DIAGNOSIS — I44.7 LEFT BUNDLE BRANCH BLOCK: ICD-10-CM

## 2024-06-10 DIAGNOSIS — I34.0 NON-RHEUMATIC MITRAL REGURGITATION: ICD-10-CM

## 2024-06-10 DIAGNOSIS — E11.22 TYPE 2 DIABETES MELLITUS WITH STAGE 4 CHRONIC KIDNEY DISEASE, WITHOUT LONG-TERM CURRENT USE OF INSULIN (HCC): ICD-10-CM

## 2024-06-10 DIAGNOSIS — N18.4 CKD (CHRONIC KIDNEY DISEASE) STAGE 4, GFR 15-29 ML/MIN (HCC): ICD-10-CM

## 2024-06-10 DIAGNOSIS — Z79.01 CHRONIC ANTICOAGULATION: ICD-10-CM

## 2024-06-10 PROCEDURE — 99213 OFFICE O/P EST LOW 20 MIN: CPT | Performed by: INTERNAL MEDICINE

## 2024-06-10 PROCEDURE — G2211 COMPLEX E/M VISIT ADD ON: HCPCS | Performed by: INTERNAL MEDICINE

## 2024-06-10 PROCEDURE — 3078F DIAST BP <80 MM HG: CPT | Performed by: INTERNAL MEDICINE

## 2024-06-10 PROCEDURE — 3074F SYST BP LT 130 MM HG: CPT | Performed by: INTERNAL MEDICINE

## 2024-06-10 PROCEDURE — 99214 OFFICE O/P EST MOD 30 MIN: CPT | Performed by: INTERNAL MEDICINE

## 2024-06-10 ASSESSMENT — FIBROSIS 4 INDEX: FIB4 SCORE: 2.72

## 2024-06-10 NOTE — PROGRESS NOTES
Chief Complaint   Patient presents with    Hypertension     Follow up        Subjective:   Whitley Frederick is a 79 y.o. female who presents today for for follow-up of paroxysmal atrial fibrillation PVCs anticoagulation high risk medication use and mitral regurgitation.  She has a history of paroxysmal atrial for ablation is currently not bothering her managed with dronedarone and warfarin.  She has no bleeding issues.      Doing well since last visit.  No significant atrial fibrillation tolerating oral anticoagulation and Multaq.  Suboptimal lipid profile inadvertently off statins.  No exertional symptoms.    Past Medical History:   Diagnosis Date    Allergy     Anxiety     Arrhythmia     Arthritis     Asthma     Carotid bruit     Left    CATARACT     Chest pain     DIABETES MELLITUS     Esophageal reflux     Goiter     Headache(784.0)     Heart murmur     Hyperlipidemia     Hypertension     IBD (inflammatory bowel disease)     Migraine     Silent migraine    AVERY (obstructive sleep apnea) 03/20/2024    Uses CPAP    Overweight(278.02)     Palpitations     Paroxysmal atrial fibrillation (HCC) 07/12/2013    woke with palps, AFib per ekg in am.    Pneumonia     Nov. 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 Badillo    Urinary tract infection, site not specified      Past Surgical History:   Procedure Laterality Date    WIDE EXCISION, LESION, UPPER EXTREMITY  3/25/2024    Procedure: WIDE EXCISION OF MALIGNANT MELANOMA, LEFT UPPER BACK;  Surgeon: Amy Horne M.D.;  Location: SURGERY [facility];  Service: General    CATARACT PHACO WITH IOL Right 10/25/2016    Procedure: CATARACT PHACO WITH IOL;  Surgeon: Zoran Gamble M.D.;  Location: SURGERY SAME DAY [facility];  Service:     ABDOMINAL HYSTERECTOMY TOTAL      CHOLECYSTECTOMY      TONSILLECTOMY      US-NEEDLE CORE BX-BREAST PANEL       Family History   Problem  Relation Age of Onset    Cancer Maternal Aunt     Diabetes Maternal Aunt     Heart Disease Maternal Aunt     Hypertension Maternal Aunt     Hyperlipidemia Maternal Aunt     Cancer Mother         Leukemia    Hypertension Mother     Diabetes Mother     Lung Disease Father     Cancer Father         Lung    Lung Disease Brother     Stroke Brother     Diabetes Brother     Heart Disease Brother     Diabetes Maternal Grandmother     Hypertension Brother     Diabetes Brother      Social History     Socioeconomic History    Marital status:      Spouse name: Not on file    Number of children: Not on file    Years of education: Not on file    Highest education level: Some college, no degree   Occupational History    Not on file   Tobacco Use    Smoking status: Former     Current packs/day: 0.00     Average packs/day: 1 pack/day for 20.0 years (20.0 ttl pk-yrs)     Types: Cigarettes     Start date: 1963     Quit date: 1983     Years since quittin.4    Smokeless tobacco: Never   Vaping Use    Vaping status: Never Used   Substance and Sexual Activity    Alcohol use: No     Alcohol/week: 0.0 oz    Drug use: No    Sexual activity: Never     Partners: Male   Other Topics Concern    Not on file   Social History Narrative    Not on file     Social Determinants of Health     Financial Resource Strain: Low Risk  (2024)    Overall Financial Resource Strain (CARDIA)     Difficulty of Paying Living Expenses: Not very hard   Food Insecurity: No Food Insecurity (2024)    Hunger Vital Sign     Worried About Running Out of Food in the Last Year: Never true     Ran Out of Food in the Last Year: Never true   Transportation Needs: No Transportation Needs (2024)    PRAPARE - Transportation     Lack of Transportation (Medical): No     Lack of Transportation (Non-Medical): No   Physical Activity: Inactive (2024)    Exercise Vital Sign     Days of Exercise per Week: 0 days     Minutes of Exercise per Session:  20 min   Stress: No Stress Concern Present (1/17/2024)    [employer] [place] [employer] - Occupational Stress Questionnaire     Feeling of Stress : Only a little   Social Connections: Unknown (1/17/2024)    Social Connection and Isolation Panel [NHANES]     Frequency of Communication with Friends and Family: Twice a week     Frequency of Social Gatherings with Friends and Family: Twice a week     Attends Gnosticism Services: Patient declined     Active Member of Clubs or Organizations: No     Attends Club or Organization Meetings: 1 to 4 times per year     Marital Status:    Intimate Partner Violence: Not At Risk (12/16/2022)    Humiliation, Afraid, Rape, and Kick questionnaire     Fear of Current or Ex-Partner: No     Emotionally Abused: No     Physically Abused: No     Sexually Abused: No   Housing Stability: Low Risk  (1/17/2024)    Housing Stability Vital Sign     Unable to Pay for Housing in the Last Year: No     Number of Places Lived in the Last Year: 1     Unstable Housing in the Last Year: No     Allergies   Allergen Reactions    Amlodipine Itching    Levoxyl [Levothyroxine Sodium] Unspecified     Flushing, very hot    Amaryl      GI Problems    Avandia [Rosiglitazone Maleate]      GI problems    Cipro Xr      Possibly causes Chills.    Clonidine      Rapid heart rate, swelling     Contrast Media With Iodine [Iodine] Hives    Glipizide      GI Problems    Glucophage [Metformin Hydrochloride]      dizzy    Hydralazine      Rapid heart rate, chest tightness    Levaquin      Possible allergy - chills with cipro but sick    Relafen [Nabumetone]      Itching; avoids all nsaids     Outpatient Encounter Medications as of 6/10/2024   Medication Sig Dispense Refill    losartan (COZAAR) 50 MG Tab Take 1 tablet by mouth 2 Times a Day. 200 Tablet 2    allopurinol (ZYLOPRIM) 100 MG Tab TAKE TWO TABLETS BY MOUTH TWICE DAILY 360 Tablet 2    furosemide (LASIX) 80 MG Tab Take 80 mg by mouth every 48 hours.  ON EVEN DAYS      acetaminophen (TYLENOL) 650 MG CR tablet Take 1,300 mg by mouth 2 times a day.      omeprazole (PRILOSEC) 20 MG delayed-release capsule Take 2 capsules by mouth every day. 180 Capsule 1    clobetasol (TEMOVATE) 0.05 % Ointment Apply to affected body skin twice daily as needed for severe psoriasis. Avoid use on face, axilla, groin. 60 g 2    fluocinonide (LIDEX) 0.05 % Ointment Apply to affected area body skin twice daily as needed for mild/moderate psoriasis. Avoid use on face, axilla, groin. 180 g 2    fluticasone (FLONASE) 50 MCG/ACT nasal spray USE 1-2  SPRAY(S) IN EACH NOSTRIL ONCE DAILY 30 MINUTES PRIOR TO USE OF CPAP 48 g 3    rivaroxaban (XARELTO) 15 MG Tab tablet Take 1 Tablet by mouth with dinner. 90 Tablet 2    montelukast (SINGULAIR) 10 MG Tab Take 1 Tablet by mouth at bedtime. 90 Tablet 3    rosuvastatin (CRESTOR) 10 MG Tab Take 1 tablet by mouth every evening. Stop simvastatin. 100 Tablet 3    metoprolol tartrate (LOPRESSOR) 50 MG Tab Take 1 tablet by mouth 2 times a day. 200 Tablet 3    NIFEdipine SR (PROCADIA-XL) 30 MG tablet Take 1 tablet by mouth once a day 90 Tablet 3    Semaglutide, 2 MG/DOSE, (OZEMPIC, 2 MG/DOSE,) 8 MG/3ML Solution Pen-injector Inject 2 mg under the skin every 7 days. 3 mL 11    levothyroxine (SYNTHROID) 50 MCG Tab Take 1 tablet by mouth every morning on an empty stomach. 100 Tablet 3    clonazePAM (KLONOPIN) 0.5 MG Tab TAKE 1 TABLET BY MOUTH ONCE DAILY AS NEEDED FOR 30 DAYS. ANXIETY/HEART PALPITATIONS for 30 days 30 Tablet 1    dronedarone (MULTAQ) 400 MG Tab Take 1 tablet by mouth 2 times a day with meals. 60 Tablet 0    levalbuterol (XOPENEX HFA) 45 MCG/ACT inhaler Inhale 2 Puffs every 6 hours as needed for Shortness of Breath. 15 g 11    ferrous sulfate 325 (65 Fe) MG tablet Take 325 mg by mouth every evening.      Cholecalciferol (VITAMIN D) 2000 UNITS CAPS Take 4,000 Units by mouth every day.      [DISCONTINUED] famotidine (PEPCID) 20 MG Tab Take 1 Tablet  "by mouth 2 times a day. (Patient not taking: Reported on 6/10/2024) 180 Tablet 3    [DISCONTINUED] magnesium oxide (MAG-OX) 400 MG Tab tablet Take 1 tablet by mouth every day. (Patient not taking: Reported on 6/10/2024) 90 Tablet 3    [DISCONTINUED] loratadine (CLARITIN) 10 MG Tab Take 1 tablet by mouth every day. (Patient not taking: Reported on 6/10/2024) 100 Tablet 2    triamcinolone acetonide (KENALOG) 0.1 % Cream Apply to affected areas of skin twice daily for 2 weeks alternating with 2 weeks off.  Do not use on face, armpit or groin 453.6 g 0    [DISCONTINUED] furosemide (LASIX) 80 MG Tab Take 1 Tablet by mouth 2 times a day. 180 Tablet 3     No facility-administered encounter medications on file as of 6/10/2024.     Review of Systems   All other systems reviewed and are negative.       Objective:   /54 (BP Location: Left arm, Patient Position: Sitting)   Pulse (!) 56   Resp 16   Ht 1.676 m (5' 6\")   Wt 109 kg (240 lb)   SpO2 98%   BMI 38.74 kg/m²     Physical Exam  Vitals reviewed.   Constitutional:       General: She is not in acute distress.     Appearance: She is well-developed. She is not diaphoretic.      Comments: Obese   HENT:      Head: Normocephalic and atraumatic.      Right Ear: External ear normal.      Left Ear: External ear normal.      Mouth/Throat:      Pharynx: No oropharyngeal exudate.   Eyes:      General: No scleral icterus.        Right eye: No discharge.         Left eye: No discharge.      Conjunctiva/sclera: Conjunctivae normal.      Pupils: Pupils are equal, round, and reactive to light.   Neck:      Thyroid: No thyromegaly.      Vascular: No JVD.      Trachea: No tracheal deviation.   Cardiovascular:      Rate and Rhythm: Normal rate and regular rhythm.      Chest Wall: PMI is not displaced.      Pulses:           Carotid pulses are 2+ on the right side and 2+ on the left side.       Radial pulses are 2+ on the right side and 2+ on the left side.        Popliteal pulses " are 2+ on the right side and 2+ on the left side.        Dorsalis pedis pulses are 2+ on the right side and 2+ on the left side.        Posterior tibial pulses are 2+ on the right side and 2+ on the left side.      Heart sounds: S1 normal and S2 normal. No murmur heard.     No friction rub. No gallop. No S3 or S4 sounds.   Pulmonary:      Effort: Pulmonary effort is normal. No respiratory distress.      Breath sounds: Normal breath sounds. No wheezing or rales.   Chest:      Chest wall: No tenderness.   Abdominal:      General: Bowel sounds are normal. There is no distension.      Palpations: Abdomen is soft.      Tenderness: There is no abdominal tenderness.   Musculoskeletal:         General: No tenderness. Normal range of motion.      Cervical back: Normal range of motion and neck supple.   Skin:     General: Skin is warm and dry.      Findings: No erythema or rash.   Neurological:      Mental Status: She is alert and oriented to person, place, and time.      Cranial Nerves: No cranial nerve deficit (Cranial nerves II through XII grossly intact).   Psychiatric:         Behavior: Behavior normal.         Thought Content: Thought content normal.         Judgment: Judgment normal.         LABS:  Lab Results   Component Value Date/Time    CHOLSTRLTOT 272 (H) 04/12/2024 03:44 PM     (H) 04/12/2024 03:44 PM    HDL 43 04/12/2024 03:44 PM    TRIGLYCERIDE 315 (H) 04/12/2024 03:44 PM       Lab Results   Component Value Date/Time    WBC 6.9 04/12/2024 03:46 PM    RBC 3.99 (L) 04/12/2024 03:46 PM    HEMOGLOBIN 12.9 04/12/2024 03:46 PM    HEMATOCRIT 39.5 04/12/2024 03:46 PM    MCV 99.0 (H) 04/12/2024 03:46 PM    NEUTSPOLYS 81.40 (H) 06/27/2022 03:13 PM    LYMPHOCYTES 8.90 (L) 06/27/2022 03:13 PM    MONOCYTES 8.90 06/27/2022 03:13 PM    EOSINOPHILS 0.00 06/27/2022 03:13 PM    BASOPHILS 0.60 06/27/2022 03:13 PM    HYPOCHROMIA 1+ 04/19/2014 01:23 AM     Lab Results   Component Value Date/Time    SODIUM 139 04/12/2024  "03:46 PM    POTASSIUM 5.1 04/12/2024 03:46 PM    CHLORIDE 107 04/12/2024 03:46 PM    CO2 19 (L) 04/12/2024 03:46 PM    GLUCOSE 124 (H) 04/12/2024 03:46 PM    BUN 66 (H) 04/12/2024 03:46 PM    CREATININE 2.09 (H) 04/12/2024 03:46 PM    CREATININE 1.1 04/27/2006 09:50 AM     Lab Results   Component Value Date    HBA1C 7.0 (A) 03/05/2024      Lab Results   Component Value Date/Time    ALKPHOSPHAT 80 04/12/2024 03:44 PM    ASTSGOT 17 04/12/2024 03:44 PM    ALTSGPT 18 04/12/2024 03:44 PM    TBILIRUBIN 0.8 04/12/2024 03:44 PM      Lab Results   Component Value Date/Time    BNPBTYPENAT 68 04/18/2014 10:20 AM      No results found for: \"TSH\"  Lab Results   Component Value Date/Time    PROTHROMBTM 26.0 (H) 03/01/2022 03:33 PM    INR 1.30 (A) 03/21/2022 12:00 AM      Imaging reviewed    Assessment:     1. PAF (paroxysmal atrial fibrillation) ([facility])  OVERNIGHT PULSE OXIMETRY      2. Chronic anticoagulation  Basic Metabolic Panel      3. Non-rheumatic mitral regurgitation        4. VPC's (ventricular premature complexes)        5. Left bundle branch block        6. CKD (chronic kidney disease) stage 4, GFR 15-29 ml/min ([facility])        7. Type 2 diabetes mellitus with stage 4 chronic kidney disease, without long-term current use of insulin ([facility])            Medical Decision Making:  Today's Assessment / Status / Plan:     Doing well.  Maintaining sinus rhythm without symptomatic PAF on Multaq.  Tolerating oral anticoagulation well which Ulises medication being monitored by laboratory studies ordered today.  History of abnormal LPL having trouble getting her oxygen concentrator reordered for her.  Will recheck OPO as requested by the insurance company.  It is likely that her obstructive sleep apnea did not magically disappear however we will jump through this unnecessarily cumbersome hoop put in place for purely monetary reasons by her insurance company, so she can continue to obtain her chronic medical therapy.  Otherwise she should " follow-up routinely and continue her current medical therapy.    Ms. Frederick's care is highly complex due to high risk diagnosis with either severe exacerbation, progression, or side effects of treatment and is at high risk for complication, morbidity, and mortality. We specifically discussed the need for high risk medication requiring at least quarterly testing and/or made a decision on elective/emergent major surgery with identified patient or procedure risk factors specific to Ms. Frederick. I have personally spent extra time in discussion about these facts with Ms. Frederick and reviewed and or ordered at least 3 tests, documents or other physician/KEVIN reports available including labs, imaging and EKGs as appropriate separate from today's encounter.  When relevant, I have reviewed images with Ms. Frederick and personally interpreted on this encounter day the referenced EKG, echocardiogram, stress tests, CT scan, cardiac catheterization or other cardiac imaging and my personal interpretation is what is specifically stated in this note.    () Today's E/M visit is associated with medical care services that serve as the continuing focal point for all needed health care services and/or with medical care services that  are part of ongoing care related to a patient's single, serious condition, or a complex condition: This includes  furnishing services to patients on an ongoing basis that result in care that is personalized  to the patient. The services result in a comprehensive, longitudinal, and continuous  relationship with the patient and involve delivery of team-based care that is accessible, coordinated with other practitioners and providers, and integrated with the broader health  care landscape.

## 2024-06-11 RX ORDER — ROSUVASTATIN CALCIUM 10 MG/1
10 TABLET, COATED ORAL EVERY EVENING
Qty: 100 TABLET | Refills: 3 | Status: SHIPPED | OUTPATIENT
Start: 2024-06-11

## 2024-06-17 DIAGNOSIS — I48.0 PAF (PAROXYSMAL ATRIAL FIBRILLATION) (HCC): ICD-10-CM

## 2024-06-17 PROCEDURE — RXMED WILLOW AMBULATORY MEDICATION CHARGE: Performed by: NURSE PRACTITIONER

## 2024-06-18 ENCOUNTER — PHARMACY VISIT (OUTPATIENT)
Dept: PHARMACY | Facility: MEDICAL CENTER | Age: 79
End: 2024-06-18
Payer: COMMERCIAL

## 2024-06-18 PROCEDURE — RXMED WILLOW AMBULATORY MEDICATION CHARGE: Performed by: NURSE PRACTITIONER

## 2024-06-21 ENCOUNTER — PHARMACY VISIT (OUTPATIENT)
Dept: PHARMACY | Facility: MEDICAL CENTER | Age: 79
End: 2024-06-21
Payer: COMMERCIAL

## 2024-06-25 ENCOUNTER — PATIENT OUTREACH (OUTPATIENT)
Dept: HEALTH INFORMATION MANAGEMENT | Facility: OTHER | Age: 79
End: 2024-06-25
Payer: MEDICARE

## 2024-06-25 DIAGNOSIS — N18.4 TYPE 2 DIABETES MELLITUS WITH STAGE 4 CHRONIC KIDNEY DISEASE, WITHOUT LONG-TERM CURRENT USE OF INSULIN (HCC): ICD-10-CM

## 2024-06-25 DIAGNOSIS — J45.20 MILD INTERMITTENT ASTHMA WITHOUT COMPLICATION: ICD-10-CM

## 2024-06-25 DIAGNOSIS — I10 ESSENTIAL HYPERTENSION: ICD-10-CM

## 2024-06-25 DIAGNOSIS — E11.22 TYPE 2 DIABETES MELLITUS WITH STAGE 4 CHRONIC KIDNEY DISEASE, WITHOUT LONG-TERM CURRENT USE OF INSULIN (HCC): ICD-10-CM

## 2024-06-25 NOTE — PROGRESS NOTES
Assessment  Spoke with Whitley for monthly outreach follow up. Whitley states she is doing well. She reports she follows a Nephrologist on youtube for recipes and advice on living with CKD. Discussed diabetic/renal diet. Dicussed last A1c of 7.0 on 3/5. Discussed monitoring blood sugars via finger sticks vs continuous monitoring. Reviewed last renal labs and next follow up appointments. Whitley denies any needs at this time. Encouraged to call with any questions or concerns.     Education  Discussed diabetic/renal diet  Dicussed last A1c 7.0 on 3/5  Reviewed last renal labs     Plan of Care and Goals  Continue diabetes and hypertension education     Barriers:  Age  Progression of disease process  Knowledge of healthcare  Habits     Progress:  Progressing     Next outreach:  1 month

## 2024-07-01 PROCEDURE — RXMED WILLOW AMBULATORY MEDICATION CHARGE: Performed by: INTERNAL MEDICINE

## 2024-07-03 ENCOUNTER — PHARMACY VISIT (OUTPATIENT)
Dept: PHARMACY | Facility: MEDICAL CENTER | Age: 79
End: 2024-07-03
Payer: COMMERCIAL

## 2024-07-10 ENCOUNTER — PATIENT MESSAGE (OUTPATIENT)
Dept: MEDICAL GROUP | Facility: LAB | Age: 79
End: 2024-07-10

## 2024-07-10 ENCOUNTER — TELEPHONE (OUTPATIENT)
Dept: MEDICAL GROUP | Facility: LAB | Age: 79
End: 2024-07-10
Payer: MEDICARE

## 2024-07-10 DIAGNOSIS — R19.5 POSITIVE COLORECTAL CANCER SCREENING USING COLOGUARD TEST: ICD-10-CM

## 2024-07-15 ENCOUNTER — PATIENT MESSAGE (OUTPATIENT)
Dept: CARDIOLOGY | Facility: MEDICAL CENTER | Age: 79
End: 2024-07-15
Payer: MEDICARE

## 2024-07-17 ENCOUNTER — PATIENT MESSAGE (OUTPATIENT)
Dept: CARDIOLOGY | Facility: MEDICAL CENTER | Age: 79
End: 2024-07-17
Payer: MEDICARE

## 2024-07-22 DIAGNOSIS — R19.5 POSITIVE COLORECTAL CANCER SCREENING USING COLOGUARD TEST: ICD-10-CM

## 2024-07-26 ENCOUNTER — PATIENT OUTREACH (OUTPATIENT)
Dept: HEALTH INFORMATION MANAGEMENT | Facility: OTHER | Age: 79
End: 2024-07-26
Payer: MEDICARE

## 2024-07-26 DIAGNOSIS — N18.4 TYPE 2 DIABETES MELLITUS WITH STAGE 4 CHRONIC KIDNEY DISEASE, WITHOUT LONG-TERM CURRENT USE OF INSULIN (HCC): ICD-10-CM

## 2024-07-26 DIAGNOSIS — J45.20 MILD INTERMITTENT ASTHMA WITHOUT COMPLICATION: ICD-10-CM

## 2024-07-26 DIAGNOSIS — E11.22 TYPE 2 DIABETES MELLITUS WITH STAGE 4 CHRONIC KIDNEY DISEASE, WITHOUT LONG-TERM CURRENT USE OF INSULIN (HCC): ICD-10-CM

## 2024-08-01 NOTE — PROGRESS NOTES
Three attempts at monthly outreach follow up. No answer, left messages, will follow up at next monthly outreach.     Chart reviewed for Quarterly Assessment, patient continues to qualify and would benefit from PCM program.

## 2024-08-06 ENCOUNTER — PATIENT MESSAGE (OUTPATIENT)
Dept: MEDICAL GROUP | Facility: LAB | Age: 79
End: 2024-08-06
Payer: MEDICARE

## 2024-08-06 DIAGNOSIS — F41.9 ANXIETY: ICD-10-CM

## 2024-08-06 DIAGNOSIS — G47.00 INSOMNIA: ICD-10-CM

## 2024-08-06 RX ORDER — CLONAZEPAM 0.5 MG/1
TABLET ORAL
Qty: 30 TABLET | Refills: 0 | Status: SHIPPED | OUTPATIENT
Start: 2024-08-06 | End: 2025-09-03

## 2024-08-09 ENCOUNTER — APPOINTMENT (OUTPATIENT)
Dept: DERMATOLOGY | Facility: IMAGING CENTER | Age: 79
End: 2024-08-09
Payer: MEDICARE

## 2024-08-09 DIAGNOSIS — D22.9 NEVUS: ICD-10-CM

## 2024-08-09 DIAGNOSIS — L82.1 SEBORRHEIC KERATOSIS: ICD-10-CM

## 2024-08-09 DIAGNOSIS — L40.9 PSORIASIS: ICD-10-CM

## 2024-08-09 DIAGNOSIS — Z85.820 HX OF MELANOMA OF SKIN: ICD-10-CM

## 2024-08-09 DIAGNOSIS — Z12.83 SKIN CANCER SCREENING: ICD-10-CM

## 2024-08-09 DIAGNOSIS — L81.4 LENTIGO: ICD-10-CM

## 2024-08-09 DIAGNOSIS — L90.8 SKIN AGING: ICD-10-CM

## 2024-08-09 DIAGNOSIS — D69.2 SENILE PURPURA (HCC): ICD-10-CM

## 2024-08-09 PROCEDURE — 99214 OFFICE O/P EST MOD 30 MIN: CPT | Performed by: DERMATOLOGY

## 2024-08-09 NOTE — PROGRESS NOTES
CC: JOSE    Subjective:  Previously seen patient here for skin check after melanoma excision    Pt here for JOSE. Denies sites I/B/C/B  Would like to follow up on Pso    History of skin cancer: Yes, Details: Melanoma - Left superior medial upper back01/30/2024  - had excision Feb 2024 - SCI  0.22mm  History of precancers/actinic keratoses: No  History of biopsies:Yes, Details: Pso  History of blistering/severe sunburns:Yes, Details:    Family history of skin cancer:No  Family history of atypical moles:No      ROS: no fevers/chills. No itch.  No cough. No LAD. No unexplained weight loss.  Relevant PMH: mult med comorbidities. Polyneuropathy/CKDz - T2DM  Social: FS    PE: Gen:WDWN female in NAD. Skin: Scalp/face/eyes/lips/neck/chest/back/arms/legs/hands/feet/buttocks - without suspicious lesions noted.  Genitals exam declined  -scar well healed on back, no pigment/nodularity  -scattered hyperpigmented macules/papules appearing on torso/extremities, appearing benign without suspicious features  -psorisform plaques on dorsal feet and lower legs  -scattered purpuric patches on arms>legs  -nail changes, dystrophy - mult finger/toenails  -no neck, axillary, inguinal LAD palpated    March 2024: PSO, early  Labs: April 2024: platelets - 115, WBC/H-H - WNL; BMP - glucose 124, BUN66, Cr 2.09.     A/P: Hx of skin cancer: melanoma - thin stage - back 2024  -cont'd sunprotection and skin cancer surveillance  -Q 4mo-annual exam recommended; f/u suspicious lesions PRN    Nevi: benign appearing:  -Reviewed skin cancer detection/prevention  -RTC PRN growth/changes/concerning features    Lentigos/SKs: benign  -reassurance  -reviewed skin cancer detection/prevention    Cherry angioma: benign    Senile purpura: no trx reviewed today    PSO - large plaque: approx 2% BSA - legs/feet predominant, nails-hands, likely. Neuropathy - less optimal candidate for Humira: chronic stable  -advised re: use of topicals - TAC 0.1% cream BID vs  clobetasol oint BID - worse sites  -f/u future JOSE visits    I have reviewed medications relevant to my specialty.

## 2024-08-14 ENCOUNTER — TELEPHONE (OUTPATIENT)
Dept: HEALTH INFORMATION MANAGEMENT | Facility: OTHER | Age: 79
End: 2024-08-14
Payer: MEDICARE

## 2024-08-14 PROCEDURE — RXMED WILLOW AMBULATORY MEDICATION CHARGE: Performed by: NURSE PRACTITIONER

## 2024-08-14 NOTE — TELEPHONE ENCOUNTER
"Patient office call transferred to me from [facility] for RN Triage. Pt states she has been doing acupuncture for several weeks for neck and back pain.  States today after returning from an acupuncture session she noticed 2 hematomas on her left forearm, 1 larger than the other, approximately 1\" and 1.5\".  She was unclear when questioned if they had bruised or changed colors but denied any active bleeding from the sites.  Stated she could feel a lump under her skin.  She also stated she has had small bruises on her right hand from past acupuncture sessions.    She denies any wound drainage, no fever, malaise or hematomas elsewhere.  She does take Xarelto as an anticoagulant.  She was also concerned she could get blood clots  in her system from this. She is seeking advice on what to do about this.    I assured patient systemic blood clots or emboli were highly unlikely.  I directed the patient to hold her acupuncture treatments until which time she could speak with her PCP, CASSANDRA Berger, about her pain and consider alternatives.  I asked if she'd ever had PT but she said they charged her insurance too much.  I explained contracted rates and suggested she contact her SCP associate with questions.    Pt sees PCP in September and will discuss further.  Suggested she elevate her arm and apply a cold compress for a few minutes 2-3 times this evening (protecting the skin from direct ice cold).  Discussed S/S of further bleeding and when to see ER care.    The patient agreed to the plan.  She stated she was pleased with the information and ended the call.    Thank you,  Zaina North RN      "

## 2024-08-16 PROCEDURE — RXMED WILLOW AMBULATORY MEDICATION CHARGE: Performed by: NURSE PRACTITIONER

## 2024-08-16 RX ORDER — NIFEDIPINE 30 MG/1
30 TABLET, EXTENDED RELEASE ORAL DAILY
Qty: 90 TABLET | Refills: 3 | Status: SHIPPED | OUTPATIENT
Start: 2024-08-16 | End: 2025-08-16

## 2024-08-20 ENCOUNTER — PHARMACY VISIT (OUTPATIENT)
Dept: PHARMACY | Facility: MEDICAL CENTER | Age: 79
End: 2024-08-20
Payer: COMMERCIAL

## 2024-08-20 PROCEDURE — RXMED WILLOW AMBULATORY MEDICATION CHARGE: Performed by: INTERNAL MEDICINE

## 2024-08-27 ENCOUNTER — HOSPITAL ENCOUNTER (OUTPATIENT)
Dept: LAB | Facility: MEDICAL CENTER | Age: 79
End: 2024-08-27
Attending: INTERNAL MEDICINE
Payer: MEDICARE

## 2024-08-27 ENCOUNTER — APPOINTMENT (OUTPATIENT)
Dept: LAB | Facility: MEDICAL CENTER | Age: 79
End: 2024-08-27
Attending: NURSE PRACTITIONER
Payer: MEDICARE

## 2024-08-27 ENCOUNTER — PATIENT OUTREACH (OUTPATIENT)
Dept: HEALTH INFORMATION MANAGEMENT | Facility: OTHER | Age: 79
End: 2024-08-27
Payer: MEDICARE

## 2024-08-27 DIAGNOSIS — N18.4 CKD (CHRONIC KIDNEY DISEASE) STAGE 4, GFR 15-29 ML/MIN (HCC): ICD-10-CM

## 2024-08-27 DIAGNOSIS — I10 ESSENTIAL HYPERTENSION: ICD-10-CM

## 2024-08-27 DIAGNOSIS — E11.22 TYPE 2 DIABETES MELLITUS WITH STAGE 4 CHRONIC KIDNEY DISEASE, WITHOUT LONG-TERM CURRENT USE OF INSULIN (HCC): ICD-10-CM

## 2024-08-27 DIAGNOSIS — N18.4 TYPE 2 DIABETES MELLITUS WITH STAGE 4 CHRONIC KIDNEY DISEASE, WITHOUT LONG-TERM CURRENT USE OF INSULIN (HCC): ICD-10-CM

## 2024-08-27 DIAGNOSIS — J45.20 MILD INTERMITTENT ASTHMA WITHOUT COMPLICATION: ICD-10-CM

## 2024-08-27 DIAGNOSIS — I10 PRIMARY HYPERTENSION: ICD-10-CM

## 2024-08-27 LAB
ANION GAP SERPL CALC-SCNC: 16 MMOL/L (ref 7–16)
BUN SERPL-MCNC: 61 MG/DL (ref 8–22)
CALCIUM SERPL-MCNC: 9.8 MG/DL (ref 8.5–10.5)
CHLORIDE SERPL-SCNC: 111 MMOL/L (ref 96–112)
CO2 SERPL-SCNC: 16 MMOL/L (ref 20–33)
CREAT SERPL-MCNC: 2.24 MG/DL (ref 0.5–1.4)
CREAT UR-MCNC: 148.83 MG/DL
ERYTHROCYTE [DISTWIDTH] IN BLOOD BY AUTOMATED COUNT: 55.4 FL (ref 35.9–50)
GFR SERPLBLD CREATININE-BSD FMLA CKD-EPI: 22 ML/MIN/1.73 M 2
GLUCOSE SERPL-MCNC: 126 MG/DL (ref 65–99)
HCT VFR BLD AUTO: 38.6 % (ref 37–47)
HGB BLD-MCNC: 12.7 G/DL (ref 12–16)
MCH RBC QN AUTO: 32.3 PG (ref 27–33)
MCHC RBC AUTO-ENTMCNC: 32.9 G/DL (ref 32.2–35.5)
MCV RBC AUTO: 98.2 FL (ref 81.4–97.8)
MICROALBUMIN UR-MCNC: 7.1 MG/DL
MICROALBUMIN/CREAT UR: 48 MG/G (ref 0–30)
PLATELET # BLD AUTO: 115 K/UL (ref 164–446)
PMV BLD AUTO: 11.1 FL (ref 9–12.9)
POTASSIUM SERPL-SCNC: 5 MMOL/L (ref 3.6–5.5)
RBC # BLD AUTO: 3.93 M/UL (ref 4.2–5.4)
SODIUM SERPL-SCNC: 143 MMOL/L (ref 135–145)
WBC # BLD AUTO: 7.4 K/UL (ref 4.8–10.8)

## 2024-08-27 PROCEDURE — 85027 COMPLETE CBC AUTOMATED: CPT

## 2024-08-27 PROCEDURE — 82043 UR ALBUMIN QUANTITATIVE: CPT

## 2024-08-27 PROCEDURE — 82570 ASSAY OF URINE CREATININE: CPT

## 2024-08-27 PROCEDURE — 36415 COLL VENOUS BLD VENIPUNCTURE: CPT

## 2024-08-27 PROCEDURE — 80048 BASIC METABOLIC PNL TOTAL CA: CPT

## 2024-08-27 NOTE — PROGRESS NOTES
Assessment  Spoke with Whitley for monthly outreach follow up. Whitley states she is doing okay. She reports she had a positive cologuard. A GI referral was sent and she is scheduled for October. She is getting ready to go get her labs done for prior to her next nephrology appointment. Discussed her other pending lab work. She recently tired acupuncture but ended up with two hematomas. Reviewed and discussed Xarelto. Whitley denied any needs at this time. Encouraged to call with any questions or concerns.     Education and Self Management of Care  Discussed diabetic/renal diet  Dicussed last A1c 7.0 on 3/5  Reviewed next renal labs and nephrology appointment on 9/3     Plan of Care and Goals  Continue diabetes and hypertension education      Barriers:  Age  Progression of disease process-CKD  Knowledge of healthcare  Habits      Progress:  Progressing     Next outreach:  1 month

## 2024-08-29 ENCOUNTER — PATIENT MESSAGE (OUTPATIENT)
Dept: CARDIOLOGY | Facility: MEDICAL CENTER | Age: 79
End: 2024-08-29
Payer: MEDICARE

## 2024-08-30 PROCEDURE — RXMED WILLOW AMBULATORY MEDICATION CHARGE: Performed by: INTERNAL MEDICINE

## 2024-08-30 NOTE — TELEPHONE ENCOUNTER
Received request via: Pharmacy    Was the patient seen in the last year in this department? Yes    Does the patient have an active prescription (recently filled or refills available) for medication(s) requested? No    Pharmacy Name: [facility]    Does the patient have intermediate Plus and need 100-day supply? (This applies to ALL medications)

## 2024-09-02 PROCEDURE — RXMED WILLOW AMBULATORY MEDICATION CHARGE: Performed by: NURSE PRACTITIONER

## 2024-09-02 RX ORDER — LEVOTHYROXINE SODIUM 50 UG/1
50 TABLET ORAL
Qty: 100 TABLET | Refills: 3 | Status: SHIPPED | OUTPATIENT
Start: 2024-09-02

## 2024-09-03 ENCOUNTER — PHARMACY VISIT (OUTPATIENT)
Dept: PHARMACY | Facility: MEDICAL CENTER | Age: 79
End: 2024-09-03
Payer: COMMERCIAL

## 2024-09-03 ENCOUNTER — HOSPITAL ENCOUNTER (OUTPATIENT)
Dept: LAB | Facility: MEDICAL CENTER | Age: 79
End: 2024-09-03
Attending: NURSE PRACTITIONER
Payer: MEDICARE

## 2024-09-03 ENCOUNTER — OFFICE VISIT (OUTPATIENT)
Dept: NEPHROLOGY | Facility: MEDICAL CENTER | Age: 79
End: 2024-09-03
Payer: MEDICARE

## 2024-09-03 VITALS
SYSTOLIC BLOOD PRESSURE: 116 MMHG | HEIGHT: 66 IN | TEMPERATURE: 97.4 F | HEART RATE: 58 BPM | WEIGHT: 234 LBS | DIASTOLIC BLOOD PRESSURE: 54 MMHG | BODY MASS INDEX: 37.61 KG/M2 | OXYGEN SATURATION: 98 %

## 2024-09-03 DIAGNOSIS — N18.4 CKD (CHRONIC KIDNEY DISEASE) STAGE 4, GFR 15-29 ML/MIN (HCC): ICD-10-CM

## 2024-09-03 DIAGNOSIS — E78.5 HYPERLIPIDEMIA ASSOCIATED WITH TYPE 2 DIABETES MELLITUS (HCC): ICD-10-CM

## 2024-09-03 DIAGNOSIS — I10 PRIMARY HYPERTENSION: ICD-10-CM

## 2024-09-03 DIAGNOSIS — E87.20 METABOLIC ACIDOSIS: ICD-10-CM

## 2024-09-03 DIAGNOSIS — N18.4 TYPE 2 DIABETES MELLITUS WITH STAGE 4 CHRONIC KIDNEY DISEASE, WITHOUT LONG-TERM CURRENT USE OF INSULIN (HCC): ICD-10-CM

## 2024-09-03 DIAGNOSIS — E11.22 TYPE 2 DIABETES MELLITUS WITH STAGE 4 CHRONIC KIDNEY DISEASE, WITHOUT LONG-TERM CURRENT USE OF INSULIN (HCC): ICD-10-CM

## 2024-09-03 DIAGNOSIS — E11.69 HYPERLIPIDEMIA ASSOCIATED WITH TYPE 2 DIABETES MELLITUS (HCC): ICD-10-CM

## 2024-09-03 LAB
CHOLEST SERPL-MCNC: 156 MG/DL (ref 100–199)
HDLC SERPL-MCNC: 45 MG/DL
LDLC SERPL CALC-MCNC: 70 MG/DL
TRIGL SERPL-MCNC: 205 MG/DL (ref 0–149)

## 2024-09-03 PROCEDURE — 3074F SYST BP LT 130 MM HG: CPT | Performed by: INTERNAL MEDICINE

## 2024-09-03 PROCEDURE — 99214 OFFICE O/P EST MOD 30 MIN: CPT | Performed by: INTERNAL MEDICINE

## 2024-09-03 PROCEDURE — G2211 COMPLEX E/M VISIT ADD ON: HCPCS | Performed by: INTERNAL MEDICINE

## 2024-09-03 PROCEDURE — 80061 LIPID PANEL: CPT

## 2024-09-03 PROCEDURE — 36415 COLL VENOUS BLD VENIPUNCTURE: CPT

## 2024-09-03 PROCEDURE — 3078F DIAST BP <80 MM HG: CPT | Performed by: INTERNAL MEDICINE

## 2024-09-03 PROCEDURE — RXMED WILLOW AMBULATORY MEDICATION CHARGE: Performed by: INTERNAL MEDICINE

## 2024-09-03 RX ORDER — SODIUM BICARBONATE 650 MG/1
650 TABLET ORAL 2 TIMES DAILY
Qty: 180 TABLET | Refills: 3 | Status: SHIPPED | OUTPATIENT
Start: 2024-09-03 | End: 2025-09-03

## 2024-09-03 ASSESSMENT — ENCOUNTER SYMPTOMS
CHILLS: 0
VOMITING: 0
HYPERTENSION: 1
NAUSEA: 0
COUGH: 0
FEVER: 0
DIARRHEA: 1
SHORTNESS OF BREATH: 0

## 2024-09-03 ASSESSMENT — FIBROSIS 4 INDEX: FIB4 SCORE: 2.72

## 2024-09-03 NOTE — ASSESSMENT & PLAN NOTE
Stable  No uremic symptoms  Renal dose of medication  Avoid nephrotoxins  Continue same medication regimen  Patient was advised to call us if symptoms worsen

## 2024-09-03 NOTE — PROGRESS NOTES
"Subjective     Whitley Frederick is a 79 y.o. female who presents with Chronic Kidney Disease and HTN (Controlled)            Chronic Kidney Disease  This is a chronic problem. The current episode started more than 1 year ago. The problem occurs constantly. The problem has been unchanged. Pertinent negatives include no chest pain, chills, coughing, fever, nausea, urinary symptoms or vomiting.   Hypertension  This is a chronic problem. The current episode started more than 1 year ago. The problem is unchanged. The problem is controlled. Pertinent negatives include no chest pain, malaise/fatigue, peripheral edema or shortness of breath. Risk factors for coronary artery disease include post-menopausal state and diabetes mellitus. Past treatments include angiotensin blockers. The current treatment provides significant improvement. There are no compliance problems.  Hypertensive end-organ damage includes kidney disease. Identifiable causes of hypertension include chronic renal disease.       Review of Systems   Constitutional:  Negative for chills, fever and malaise/fatigue.   Respiratory:  Negative for cough and shortness of breath.    Cardiovascular:  Negative for chest pain and leg swelling.   Gastrointestinal:  Positive for diarrhea. Negative for nausea and vomiting.   Genitourinary:  Negative for dysuria, frequency and urgency.              Objective     /54 (BP Location: Right arm, Patient Position: Sitting, BP Cuff Size: Adult)   Pulse (!) 58   Temp 36.3 °C (97.4 °F) (Temporal)   Ht 1.676 m (5' 6\")   Wt 106 kg (234 lb)   SpO2 98%   BMI 37.77 kg/m²      Physical Exam  Vitals and nursing note reviewed.   Constitutional:       General: She is not in acute distress.     Appearance: Normal appearance. She is well-developed. She is not diaphoretic.   HENT:      Head: Normocephalic and atraumatic.      Right Ear: External ear normal.      Left Ear: External ear normal.      Nose: Nose normal.   Eyes:      " General: No scleral icterus.        Right eye: No discharge.         Left eye: No discharge.      Conjunctiva/sclera: Conjunctivae normal.   Cardiovascular:      Rate and Rhythm: Normal rate and regular rhythm.      Heart sounds: No murmur heard.  Pulmonary:      Effort: Pulmonary effort is normal. No respiratory distress.      Breath sounds: Normal breath sounds.   Musculoskeletal:         General: No tenderness.      Right lower leg: No edema.      Left lower leg: No edema.   Skin:     General: Skin is warm and dry.      Findings: No erythema.   Neurological:      General: No focal deficit present.      Mental Status: She is alert and oriented to person, place, and time.      Cranial Nerves: No cranial nerve deficit.   Psychiatric:         Mood and Affect: Mood normal.         Behavior: Behavior normal.       Past Medical History:   Diagnosis Date    Allergy     Anxiety     Arrhythmia     Arthritis     Asthma     Carotid bruit     Left    CATARACT     Chest pain     DIABETES MELLITUS     Esophageal reflux     Goiter     Headache(784.0)     Heart murmur     Hyperlipidemia     Hypertension     IBD (inflammatory bowel disease)     Migraine     Silent migraine    AVERY (obstructive sleep apnea) 03/20/2024    Uses CPAP    Overweight(278.02)     Palpitations     Paroxysmal atrial fibrillation (HCC) 07/12/2013    woke with palps, AFib per ekg in am.    Pneumonia     Nov. 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 Badillo    Urinary tract infection, site not specified      Social History     Socioeconomic History    Marital status: Other     Spouse name: Not on file    Number of children: Not on file    Years of education: Not on file    Highest education level: Some college, no degree   Occupational History    Not on file   Tobacco Use    Smoking status: Former     Current packs/day: 0.00     Average packs/day: 1 pack/day for  20.0 years (20.0 ttl pk-yrs)     Types: Cigarettes     Start date: 1963     Quit date: 1983     Years since quittin.7    Smokeless tobacco: Never   Vaping Use    Vaping status: Never Used   Substance and Sexual Activity    Alcohol use: No     Alcohol/week: 0.0 oz    Drug use: No    Sexual activity: Never     Partners: Male   Other Topics Concern    Not on file   Social History Narrative    Not on file     Social Determinants of Health     Financial Resource Strain: Low Risk  (2024)    Overall Financial Resource Strain (CARDIA)     Difficulty of Paying Living Expenses: Not very hard   Food Insecurity: No Food Insecurity (2024)    Hunger Vital Sign     Worried About Running Out of Food in the Last Year: Never true     Ran Out of Food in the Last Year: Never true   Transportation Needs: No Transportation Needs (2024)    PRAPARE - Transportation     Lack of Transportation (Medical): No     Lack of Transportation (Non-Medical): No   Physical Activity: Inactive (2024)    Exercise Vital Sign     Days of Exercise per Week: 0 days     Minutes of Exercise per Session: 20 min   Stress: No Stress Concern Present (2024)    [employer] [employer] - Occupational Stress Questionnaire     Feeling of Stress : Only a little   Social Connections: Unknown (2024)    Social Connection and Isolation Panel [NHANES]     Frequency of Communication with Friends and Family: Twice a week     Frequency of Social Gatherings with Friends and Family: Twice a week     Attends Gnosticism Services: Patient declined     Active Member of Clubs or Organizations: No     Attends Club or Organization Meetings: 1 to 4 times per year     Marital Status:    Intimate Partner Violence: Not At Risk (2022)    Humiliation, Afraid, Rape, and Kick questionnaire     Fear of Current or Ex-Partner: No     Emotionally Abused: No     Physically Abused: No     Sexually Abused: No   Housing Stability:  Low Risk  (1/17/2024)    Housing Stability Vital Sign     Unable to Pay for Housing in the Last Year: No     Number of Places Lived in the Last Year: 1     Unstable Housing in the Last Year: No     Family History   Problem Relation Age of Onset    Cancer Maternal Aunt     Diabetes Maternal Aunt     Heart Disease Maternal Aunt     Hypertension Maternal Aunt     Hyperlipidemia Maternal Aunt     Cancer Mother         Leukemia    Hypertension Mother     Diabetes Mother     Lung Disease Father     Cancer Father         Lung    Lung Disease Brother     Stroke Brother     Diabetes Brother     Heart Disease Brother     Diabetes Maternal Grandmother     Hypertension Brother     Diabetes Brother      Recent Labs     10/27/23  1335 01/08/24  1311 03/25/24  0915 04/12/24  1544 04/12/24  1546 08/27/24  1714   ALBUMIN  --   --   --  4.3  --   --    HDL  --   --   --  43  --   --    TRIGLYCERIDE  --   --   --  315*  --   --    SODIUM 139   < > 140  --  139 143   POTASSIUM 4.1   < > 5.1  --  5.1 5.0   CHLORIDE 101   < > 110  --  107 111   CO2 22   < > 16*  --  19* 16*   BUN 64*   < > 71*  --  66* 61*   CREATININE 2.80*   < > 2.13*  --  2.09* 2.24*   PHOSPHORUS 3.0  --   --   --   --   --     < > = values in this interval not displayed.                             Assessment & Plan        Assessment & Plan  CKD (chronic kidney disease) stage 4, GFR 15-29 ml/min ([facility])    Stable  No uremic symptoms  Renal dose of medication  Avoid nephrotoxins  Continue same medication regimen  Patient was advised to call us if symptoms worsen      Primary hypertension  Controlled  Continue same medication regimen  Continue low-sodium diet      Metabolic acidosis  Restart sodium bicarbonate  Patient has chronic diarrhea, she is scheduled for colonoscopy    Type 2 diabetes mellitus with stage 4 chronic kidney disease, without long-term current use of insulin ([facility])

## 2024-09-05 PROCEDURE — RXMED WILLOW AMBULATORY MEDICATION CHARGE: Performed by: NURSE PRACTITIONER

## 2024-09-06 ENCOUNTER — PHARMACY VISIT (OUTPATIENT)
Dept: PHARMACY | Facility: MEDICAL CENTER | Age: 79
End: 2024-09-06
Payer: COMMERCIAL

## 2024-09-11 ENCOUNTER — TELEPHONE (OUTPATIENT)
Dept: MEDICAL GROUP | Facility: LAB | Age: 79
End: 2024-09-11

## 2024-09-11 ENCOUNTER — OFFICE VISIT (OUTPATIENT)
Dept: MEDICAL GROUP | Facility: LAB | Age: 79
End: 2024-09-11
Payer: MEDICARE

## 2024-09-11 VITALS
WEIGHT: 233 LBS | DIASTOLIC BLOOD PRESSURE: 50 MMHG | TEMPERATURE: 96.2 F | BODY MASS INDEX: 37.45 KG/M2 | HEIGHT: 66 IN | HEART RATE: 64 BPM | RESPIRATION RATE: 16 BRPM | OXYGEN SATURATION: 98 % | SYSTOLIC BLOOD PRESSURE: 120 MMHG

## 2024-09-11 DIAGNOSIS — E78.5 HYPERLIPIDEMIA ASSOCIATED WITH TYPE 2 DIABETES MELLITUS (HCC): ICD-10-CM

## 2024-09-11 DIAGNOSIS — E11.22 TYPE 2 DIABETES MELLITUS WITH STAGE 4 CHRONIC KIDNEY DISEASE, WITHOUT LONG-TERM CURRENT USE OF INSULIN (HCC): ICD-10-CM

## 2024-09-11 DIAGNOSIS — E03.4 HYPOTHYROIDISM DUE TO ACQUIRED ATROPHY OF THYROID: ICD-10-CM

## 2024-09-11 DIAGNOSIS — N18.4 TYPE 2 DIABETES MELLITUS WITH STAGE 4 CHRONIC KIDNEY DISEASE, WITHOUT LONG-TERM CURRENT USE OF INSULIN (HCC): ICD-10-CM

## 2024-09-11 DIAGNOSIS — G47.33 OSA (OBSTRUCTIVE SLEEP APNEA): Chronic | ICD-10-CM

## 2024-09-11 DIAGNOSIS — Z99.81 DEPENDENCE ON NOCTURNAL OXYGEN THERAPY: ICD-10-CM

## 2024-09-11 DIAGNOSIS — I70.0 ATHEROSCLEROSIS OF AORTA (HCC): ICD-10-CM

## 2024-09-11 DIAGNOSIS — E11.69 HYPERLIPIDEMIA ASSOCIATED WITH TYPE 2 DIABETES MELLITUS (HCC): ICD-10-CM

## 2024-09-11 DIAGNOSIS — F32.4 MAJOR DEPRESSIVE DISORDER WITH SINGLE EPISODE, IN PARTIAL REMISSION (HCC): ICD-10-CM

## 2024-09-11 DIAGNOSIS — N18.4 CKD (CHRONIC KIDNEY DISEASE) STAGE 4, GFR 15-29 ML/MIN (HCC): ICD-10-CM

## 2024-09-11 LAB
HBA1C MFR BLD: 6.2 % (ref ?–5.8)
POCT INT CON NEG: NEGATIVE
POCT INT CON POS: POSITIVE

## 2024-09-11 PROCEDURE — 83036 HEMOGLOBIN GLYCOSYLATED A1C: CPT | Performed by: NURSE PRACTITIONER

## 2024-09-11 PROCEDURE — 99215 OFFICE O/P EST HI 40 MIN: CPT | Performed by: NURSE PRACTITIONER

## 2024-09-11 PROCEDURE — 3074F SYST BP LT 130 MM HG: CPT | Performed by: NURSE PRACTITIONER

## 2024-09-11 PROCEDURE — 3078F DIAST BP <80 MM HG: CPT | Performed by: NURSE PRACTITIONER

## 2024-09-11 ASSESSMENT — FIBROSIS 4 INDEX: FIB4 SCORE: 2.72

## 2024-09-11 NOTE — PROGRESS NOTES
Verbal consent was acquired by the patient to use [employer] ambient listening note generation during this visit Yes      Subjective   Whitley Frederick is a 79 y.o. female who presents for f/u on chronic issues.   History of Present Illness  The patient is a 79-year-old established female who presents for evaluation of multiple medical concerns.    She reports a general improvement in her health, although she experiences fluctuations in her well-being.    She has been under the care of a dermatologist for psoriasis, which has shown some improvement. Psoriasis is also present on her toenails.    Her macular degeneration is followed by ophthalmology although she is bit frustrated with their office and not currently treating it.    Chronic kidney disease: Very happy with her care by Dr. Najjar, nephrology.  On sodium and bicarbonate.  Staying well-hydrated.  Blood pressure has been well-controlled.  Following a low-salt diet.    She has not used her CPAP machine for sleep apnea for some time due to discomfort and does not feel it is necessary. She reports no daytime fatigue and has not needed to nap recently.  She is also off of nocturnal oxygen.  She has had a bit of trouble obtaining her oxygen concentrator after accidentally turning it back in.  Apparently she needs to have a repeat sleep study or at least an overnight pulse oximetry study and has not heard from anyone about this.    Type 2 diabetes: She has been on Ozempic since 2018 and has adjusted the dose to 0.5 mg due to episodes of low blood sugar. She is awaiting clearance for a Dexcom device.  Due for an A1c today, last was 7.0 in April.She experiences numbness and tingling in her feet, with occasional sensation in certain areas.    Hyperlipidemia: Chronic issue.  Much improved on recent labs.  Now compliant with rosuvastatin.    She has not had a mammogram in three years.    Review of Systems  Negative except for HPI  Objective   /50 (BP  "Location: Right arm, Patient Position: Sitting, BP Cuff Size: Large adult)   Pulse 64   Temp (!) 35.7 °C (96.2 °F)   Resp 16   Ht 1.676 m (5' 6\")   Wt 106 kg (233 lb)   SpO2 98%   Physical Exam  Gen. appears healthy in no distress   Skin appropriate for sex and age   Neck trachea is midline  Lungs unlabored breathing.  Lungs are clear to auscultation bilaterally.  Heart regular rate and rhythm.  Positive murmur.  Neuro gait slight limp.  Psych appropriate, calm, interactive, well-groomed  Monofilament testing with a 10 gram force: sensation decreased to both fifth toes, dorsal aspect, otherwise intact.  Visual Inspection: Feet without maceration, ulcers, fissures.  Pedal pulses: intact bilaterally    Results  Laboratory Studies  LDL cholesterol improved from 166 to 70.       Assessment & Plan  1. Psoriasis.  Followed by dermatology.    2. Macular Degeneration.  Followed by ophthalmology.    3. Chronic Kidney Disease.  Followed by nephrology and stable.  Reviewed recent notes with her nephrologist.    4.  Seasonal allergies: May certainly add in generic OTC Claritin.    5.  Hypertension: Labile.  Continue same.    6. Sleep Apnea with previous history of nocturnal oxygen dependence.  She has not been using her CPAP machine and is reluctant to undergo another sleep study. She is informed about the option of a home sleep study, which she may consider. If she decides to proceed, a home sleep study will be ordered.  At this point she is willing to move forward only with an overnight pulse oximetry study.    7. Diabetes Mellitus.  A1c excellent today at 6.2!  Continue Ozempic and I encouraged her to consider injecting 2 mg weekly.  Discussed that GLP-1 agonist therapy does not cause hypoglycemia.  Discussed treatment of hypoglycemia.  She has seen her blood sugar drop as low as 65.  Discussed a high lean protein, vegetable based diet with plenty of water intake.  Discussed good footcare.  She is up-to-date with an " eye doctor.  Follow-up 6 months.    8. Hyperlipidemia.  Her LDL cholesterol has improved significantly from 166 to 70. She is advised to continue her rosuvastatin.  Recheck yearly.    9.  Paroxysmal atrial fibrillation: Up-to-date with cardiology, reviewed most recent notes from her cardiologist in June as well as echocardiogram.      Total face to face time over 40 minutes of which over 50% of this visit is spent in counseling, education and outlining a plan of treatment and coordination of care for the above conditions. This included but was not limited to discussion of medication options and potential risks related to the medications, referral and specialty care options. All patient questions were answered               Please note that this dictation was created using voice recognition software. I have made every reasonable attempt to correct obvious errors, but I expect that there are errors of grammar and possibly content that I did not discover before finalizing the note.

## 2024-09-24 PROCEDURE — RXMED WILLOW AMBULATORY MEDICATION CHARGE: Performed by: DERMATOLOGY

## 2024-09-24 PROCEDURE — RXMED WILLOW AMBULATORY MEDICATION CHARGE: Performed by: NURSE PRACTITIONER

## 2024-09-25 ENCOUNTER — PHARMACY VISIT (OUTPATIENT)
Dept: PHARMACY | Facility: MEDICAL CENTER | Age: 79
End: 2024-09-25
Payer: COMMERCIAL

## 2024-09-30 ENCOUNTER — PATIENT OUTREACH (OUTPATIENT)
Dept: HEALTH INFORMATION MANAGEMENT | Facility: OTHER | Age: 79
End: 2024-09-30
Payer: MEDICARE

## 2024-09-30 DIAGNOSIS — I10 ESSENTIAL HYPERTENSION: ICD-10-CM

## 2024-09-30 DIAGNOSIS — E11.22 TYPE 2 DIABETES MELLITUS WITH STAGE 4 CHRONIC KIDNEY DISEASE, WITHOUT LONG-TERM CURRENT USE OF INSULIN (HCC): ICD-10-CM

## 2024-09-30 DIAGNOSIS — N18.4 TYPE 2 DIABETES MELLITUS WITH STAGE 4 CHRONIC KIDNEY DISEASE, WITHOUT LONG-TERM CURRENT USE OF INSULIN (HCC): ICD-10-CM

## 2024-09-30 DIAGNOSIS — J45.20 MILD INTERMITTENT ASTHMA WITHOUT COMPLICATION: ICD-10-CM

## 2024-09-30 NOTE — PROGRESS NOTES
Assessment  Spoke with Whitley for monthly outreach follow up.  Whitley discussed some of her frustrations with classes and an ophthalmology referral.  She is concerned regarding the idea of needing injections in her eyes.  Allowed Whitley to verbalize her feelings. Discussed to GI for referral for colonoscopy given positive Cologuard.  Reviewed pending renal labs prior to her next appointment.  Reviewed her next PCP appointment.  Discussed Whitley's last A1c of 6.2 on 911.  Congratulated Whitley on her progress.  She denies any needs at this time.  Encouraged to call with any questions or concerns    Education and Self Management of Care  Discussed diabetic/renal diet  Dicussed last A1c 6.2 on 9/11  Reviewed pending renal labs and recommendations for next nephrology follow up   Discussed GI referral for colonoscopy      Plan of Care and Goals  Continue diabetes and hypertension education      Barriers:  Age  Progression of disease process-CKD  Knowledge of healthcare  Habits      Progress:  Progressing     Next outreach:  1 month

## 2024-10-01 ENCOUNTER — PATIENT MESSAGE (OUTPATIENT)
Dept: CARDIOLOGY | Facility: MEDICAL CENTER | Age: 79
End: 2024-10-01
Payer: MEDICARE

## 2024-10-02 DIAGNOSIS — R00.2 PALPITATIONS: ICD-10-CM

## 2024-10-02 PROCEDURE — RXMED WILLOW AMBULATORY MEDICATION CHARGE: Performed by: INTERNAL MEDICINE

## 2024-10-02 PROCEDURE — RXMED WILLOW AMBULATORY MEDICATION CHARGE: Performed by: NURSE PRACTITIONER

## 2024-10-02 RX ORDER — METOPROLOL TARTRATE 50 MG
50 TABLET ORAL 2 TIMES DAILY
Qty: 200 TABLET | Refills: 3 | Status: SHIPPED | OUTPATIENT
Start: 2024-10-02

## 2024-10-03 ENCOUNTER — TELEPHONE (OUTPATIENT)
Dept: CARDIOLOGY | Facility: MEDICAL CENTER | Age: 79
End: 2024-10-03
Payer: MEDICARE

## 2024-10-04 ENCOUNTER — PHARMACY VISIT (OUTPATIENT)
Dept: PHARMACY | Facility: MEDICAL CENTER | Age: 79
End: 2024-10-04
Payer: COMMERCIAL

## 2024-10-04 ENCOUNTER — PATIENT MESSAGE (OUTPATIENT)
Dept: CARDIOLOGY | Facility: MEDICAL CENTER | Age: 79
End: 2024-10-04
Payer: MEDICARE

## 2024-10-09 ENCOUNTER — TELEPHONE (OUTPATIENT)
Dept: CARDIOLOGY | Facility: MEDICAL CENTER | Age: 79
End: 2024-10-09

## 2024-10-09 ENCOUNTER — OFFICE VISIT (OUTPATIENT)
Dept: MEDICAL GROUP | Facility: LAB | Age: 79
End: 2024-10-09
Payer: MEDICARE

## 2024-10-09 VITALS
TEMPERATURE: 97.8 F | WEIGHT: 229 LBS | HEART RATE: 66 BPM | HEIGHT: 66 IN | SYSTOLIC BLOOD PRESSURE: 132 MMHG | BODY MASS INDEX: 36.8 KG/M2 | RESPIRATION RATE: 16 BRPM | OXYGEN SATURATION: 97 % | DIASTOLIC BLOOD PRESSURE: 58 MMHG

## 2024-10-09 DIAGNOSIS — E66.01 MORBID (SEVERE) OBESITY DUE TO EXCESS CALORIES (HCC): ICD-10-CM

## 2024-10-09 DIAGNOSIS — N25.81 SECONDARY HYPERPARATHYROIDISM OF RENAL ORIGIN (HCC): ICD-10-CM

## 2024-10-09 DIAGNOSIS — E11.22 TYPE 2 DIABETES MELLITUS WITH STAGE 4 CHRONIC KIDNEY DISEASE, WITHOUT LONG-TERM CURRENT USE OF INSULIN (HCC): ICD-10-CM

## 2024-10-09 DIAGNOSIS — E11.29 MICROALBUMINURIA DUE TO TYPE 2 DIABETES MELLITUS (HCC): ICD-10-CM

## 2024-10-09 DIAGNOSIS — L82.0 INFLAMED SEBORRHEIC KERATOSIS: ICD-10-CM

## 2024-10-09 DIAGNOSIS — J96.11 CHRONIC RESPIRATORY FAILURE WITH HYPOXIA (HCC): ICD-10-CM

## 2024-10-09 DIAGNOSIS — N18.4 TYPE 2 DIABETES MELLITUS WITH STAGE 4 CHRONIC KIDNEY DISEASE, WITHOUT LONG-TERM CURRENT USE OF INSULIN (HCC): ICD-10-CM

## 2024-10-09 DIAGNOSIS — F13.20 SEDATIVE, HYPNOTIC OR ANXIOLYTIC DEPENDENCE, UNCOMPLICATED (HCC): ICD-10-CM

## 2024-10-09 DIAGNOSIS — R80.9 MICROALBUMINURIA DUE TO TYPE 2 DIABETES MELLITUS (HCC): ICD-10-CM

## 2024-10-09 PROCEDURE — 99214 OFFICE O/P EST MOD 30 MIN: CPT | Mod: 25 | Performed by: NURSE PRACTITIONER

## 2024-10-09 PROCEDURE — 3075F SYST BP GE 130 - 139MM HG: CPT | Performed by: NURSE PRACTITIONER

## 2024-10-09 PROCEDURE — 17110 DESTRUCTION B9 LES UP TO 14: CPT | Performed by: NURSE PRACTITIONER

## 2024-10-09 PROCEDURE — 3078F DIAST BP <80 MM HG: CPT | Performed by: NURSE PRACTITIONER

## 2024-10-09 RX ORDER — ACYCLOVIR 400 MG/1
1 TABLET ORAL
Qty: 9 EACH | Refills: 3 | Status: SHIPPED | OUTPATIENT
Start: 2024-10-09

## 2024-10-09 ASSESSMENT — FIBROSIS 4 INDEX: FIB4 SCORE: 2.72

## 2024-10-09 NOTE — MR AVS SNAPSHOT
Whitley Frederick   2017 1:45 PM   Anticoagulation Visit   MRN: [id number]    Department:  Vascular Medicine   Dept Phone:  [phone]    Description:  Female : 1945   Provider:  [facility] EXAM 4           Allergies as of 2017     Allergen Noted Reactions    Amlodipine 2016   Itching    Amaryl 2011       GI Problems    Avandia [Rosiglitazone Maleate] 2011       GI problems    Cipro Xr 2011       Possibly causes Chills.    Clonidine 2017       Rapid heart rate, swelling     Contrast Media With Iodine [Iodine] 2016   Hives    Glipizide 2011       GI Problems    Glucophage [Metformin Hydrochloride] 2011       dizzy    Hydralazine 2016       Rapid heart rate, chest tightness    Levaquin 2011       Metformin 2011       Relafen [Nabumetone] 2011       Itching      You were diagnosed with     PAF (paroxysmal atrial fibrillation) (CMS-[facility])   [[id number]]         Vital Signs     Blood Pressure Pulse Smoking Status             181/57 mmHg 57 Former Smoker         Basic Information     Date Of Birth Sex Race Ethnicity Preferred Language    1945 Female White Non- English      Your appointments     2017  1:45 PM   Established Patient with [facility] EXAM 4   [facility]  (--)    [address] [place] [address]   [phone]            2017  4:00 PM   CARE MANAGEMENT OUTREACH with Cesilia Feldman R.N.   [facility] (--)    [address] [place] [id number] [phone]           ***IMPORTANT**** This is a phone visit only. Do not come into the office. The Care Manager will call you at the scheduled time for Care Manager Telephone Visit.            2017  2:00 PM   INITIAL PHARMACOTHERAPY VISIT with [facility] EXAM 5   [facility]  (--)    [address] [place] [id number] [phone]          Aug 04, 2017  1:00 PM   MA SCRN10 with [facility] MG 2   [facility] ([address])    [address]  [place] [address] [phone]           No deodorant, powder, perfume or lotion under the arm or breast area.            Aug 11, 2017  2:00 PM   New Patient with José Luis Patterson PA-C   [facility])    [address]  [place] [id number] [phone]           Please bring Photo ID, Insurance Cards, All Medication Bottles and copies of any legal documents (such as Living Will, Power of ) If speaking a language besides English please bring an adult . Please arrive 30 minutes prior for check in and registration. You will be receiving a confirmation call a few days before your appointment from our automated call confirmation system.            Aug 17, 2017  1:20 PM   Established Patient Pul with EDILMA Mosher   [facility] (--)    [address]  [place] [id number] [phone]            Aug 18, 2017 11:00 AM   FOLLOW UP with Fernando Sorto M.D.   [facility] (--)    [address]  [place] [id number] [phone]            Aug 24, 2017  1:45 PM   Established Patient with [facility] EXAM 4   [facility]  (--)    [address]  [place] [address]   [phone]              Problem List              ICD-10-CM Priority Class Noted - Resolved    Type 2 diabetes mellitus with stage 4 chronic kidney disease (CMS-HCC) E11.22, N18.4 High  Unknown - Present    Left bundle branch block I44.7 Medium  3/5/2012 - Present    PAF (paroxysmal atrial fibrillation) (CMS-[facility]) I48.0 High  7/12/2013 - Present    Hypothyroid E03.9 High  12/12/2013 - Present    Anxiety F41.9 High  1/9/2014 - Present    Carotid stenosis I65.29 Low  5/30/2014 - Present    Insulin long-term  use (CMS-HCC) Z79.4 High  4/29/2015 - Present    Atherosclerosis of aorta (CMS-HCC) I70.0 High  4/29/2015 - Present    Chronic respiratory failure (CMS-HCC) J96.10 High  4/29/2015 - Present    Long term current use of anticoagulant therapy Z79.01 High  4/29/2015 - Present    Morbid obesity with BMI of 45.0-49.9, adult (CMS-HCC) E66.01, Z68.42 High  4/29/2015 - Present    Chronic gout M1A.9XX0 Medium  4/30/2015 - Present    Multiple thyroid nodules E04.2 Low  10/7/2015 - Present    Essential hypertension I10 High  5/10/2016 - Present    AVERY (obstructive sleep apnea) G47.33 Medium  5/10/2016 - Present    Chronic anticoagulation Z79.01 Medium  6/10/2016 - Present    Mild intermittent asthma without complication J45.20 Medium  8/24/2016 - Present    Strain of lumbar paraspinal muscle S39.012A High  9/23/2016 - Present    Combined forms of age-related cataract of right eye H25.811   10/25/2016 - Present    Dyslipidemia E78.5   12/14/2016 - Present    Chronic pain of both shoulders M25.512, G89.29, M25.511   3/23/2017 - Present    Sinusitis J32.9   3/23/2017 - Present    Cellulitis L03.90   5/18/2017 - Present    Incontinence of feces R15.9   5/18/2017 - Present      Health Maintenance        Date Due Completion Dates    COLON CANCER SCREENING ANNUAL FIT 1945 ---    IMM DTaP/Tdap/Td Vaccine (1 - Tdap) 3/19/1965 ---    IMM ZOSTER VACCINE 3/19/2006 ---    COLONOSCOPY 11/30/2012 11/30/2007 (Prv Comp)    Override on 11/30/2007: Previously completed    RETINAL SCREENING 10/7/2016 10/7/2015, 3/5/2015 (Prv Comp)    Override on 3/5/2015: Previously completed    DIABETES MONOFILAMENT / LE EXAM 10/7/2016 10/7/2015 (Done)    Override on 10/7/2015: Done    IMM PNEUMOCOCCAL 65+ (ADULT) HIGH/HIGHEST RISK SERIES (2 of 2 - PPSV23) 10/19/2016 8/24/2016    MAMMOGRAM 5/13/2017 5/13/2016, 4/9/2015, 1/31/2013, 7/14/2011, 10/5/2007, 10/5/2007, 8/7/2006    IMM INFLUENZA (1) 9/1/2017 ---    A1C SCREENING 10/20/2017 4/20/2017, 1/27/2017,  1/7/2016, 10/7/2015, 5/27/2015, 1/5/2015 (Prv Comp), 1/6/2014, 5/2/2013 (Done), 3/5/2012    Override on 1/5/2015: Previously completed    Override on 5/2/2013: Done    FASTING LIPID PROFILE 1/27/2018 1/27/2017, 10/7/2015, 5/27/2015, 9/4/2014, 1/6/2014, 4/12/2013, 3/5/2012    BONE DENSITY 1/31/2018 1/31/2013    URINE ACR / MICROALBUMIN 6/12/2018 6/12/2017, 5/2/2017, 1/27/2017, 11/14/2016, 10/14/2016, 8/10/2016, 1/15/2016, 10/7/2015, 10/7/2015, 5/27/2015, 2/23/2015, 10/10/2014, 9/4/2014, 5/12/2014, 8/12/2013 (Done), 8/12/2013, 4/12/2013, 1/21/2013, 3/5/2012    Override on 8/12/2013: Done    SERUM CREATININE 6/12/2018 6/12/2017, 5/1/2017, 4/20/2017, 4/7/2017, 3/27/2017, 3/27/2017, 3/23/2017, 1/27/2017, 1/27/2017, 11/14/2016, 10/14/2016, 8/10/2016, 4/12/2016, 1/15/2016, 10/7/2015, 10/7/2015, 5/27/2015, 5/27/2015, 2/23/2015, 10/30/2014, 10/10/2014, 9/4/2014, 8/15/2014, 5/12/2014, 4/19/2014, 4/18/2014, 1/6/2014, 1/6/2014, 8/12/2013, 4/12/2013, 4/12/2013, 2/12/2013, 1/21/2013, 1/8/2013, 3/5/2012, 4/27/2006            Results     POCT Protime      Component    INR    2.2                        Current Immunizations     13-VALENT PCV PREVNAR 8/24/2016 10:45 AM    Tuberculin Skin Test 3/20/2017  4:39 PM      Below and/or attached are the medications your provider expects you to take. Review all of your home medications and newly ordered medications with your provider and/or pharmacist. Follow medication instructions as directed by your provider and/or pharmacist. Please keep your medication list with you and share with your provider. Update the information when medications are discontinued, doses are changed, or new medications (including over-the-counter products) are added; and carry medication information at all times in the event of emergency situations     Allergies:  AMLODIPINE - Itching     AMARYL - (reactions not documented)     AVANDIA - (reactions not documented)     CIPRO XR - (reactions not documented)      CLONIDINE - (reactions not documented)     CONTRAST MEDIA WITH IODINE - Hives     GLIPIZIDE - (reactions not documented)     GLUCOPHAGE - (reactions not documented)     HYDRALAZINE - (reactions not documented)     LEVAQUIN - (reactions not documented)     METFORMIN - (reactions not documented)     RELAFEN - (reactions not documented)               Medications  Valid as of: July 13, 2017 -  1:40 PM    Generic Name Brand Name Tablet Size Instructions for use    Allopurinol (Tab) ZYLOPRIM 100 MG Take 1/2 pill qd        Beclomethasone Dipropionate (Aero Soln) QVAR 40 MCG/ACT Inhale 2 Puffs by mouth 2 Times a Day. Use spacer. Rinse mouth after each use.        Blood Glucose Monitoring Suppl (Misc) Blood Glucose Monitoring Suppl SUPPLIES Test strips order: Test strips for Chely Contour meter. Sig: use tid  and prn ssx high or low sugar #100 RF x 3        Blood Glucose Monitoring Suppl (Kit) Blood Glucose Monitoring Suppl W/DEVICE 1 Each by Does not apply route 2 times a day as needed.        Blood Glucose Monitoring Suppl (Misc) Blood Glucose Monitoring Suppl SUPPLIES Test strips order: Test strips for Accucheck meter. Sig: use BID and prn ssx high or low sugar #100 RF x 3        Cholecalciferol (Cap) Vitamin D 2000 UNITS Take 4,000 Units by mouth every day.        ClonazePAM (Tab) KLONOPIN 0.5 MG Take 1 Tab by mouth 2 times a day as needed (insomnia, anxiety).        Diclofenac Sodium (Gel) Diclofenac Sodium 1 % Apply 2 g to skin as directed 4 times a day.        Dronedarone HCl (Tab) MULTAQ 400 MG TAKE ONE TABLET BY MOUTH TWICE DAILY WITH MEALS        Furosemide (Tab) LASIX 80 MG Take 80 mg by mouth every 48 hours.        Glucose Blood (Strip) CHELY CONTOUR NEXT TEST          Insulin Glargine (Solution Pen-injector) Insulin Glargine 300 UNIT/ML Inject 65 Units as instructed every day.        Insulin Pen Needle (Misc) Insulin Pen Needle 29G X 12MM USE ONE PEN NEEDLE SUBCUTANEOUSLY FOR LANTUS SOLOSTAR        Levalbuterol  Tartrate (Aerosol) XOPENEX HFA 45 MCG/ACT Inhale 2 Puffs by mouth every four hours as needed for Shortness of Breath.        Levothyroxine Sodium (Tab) SYNTHROID 50 MCG Take 1 Tab by mouth every day. Indications: Underactive Thyroid        Losartan Potassium (Tab) COZAAR 50 MG TAKE ONE TABLET BY MOUTH TWICE DAILY        Magnesium Oxide (Tab) MAG- (241.3 MG) MG Take 400 mg by mouth every day.        Metoprolol Tartrate (Tab) LOPRESSOR 50 MG TAKE ONE TABLET BY MOUTH TWICE DAILY ,  PATIENT  MAY  TAKE  AN  ADDITIONAL  HALF  TABLET  FOR  INCREASED  HEART  RATE.        Misc. Devices (Misc) Misc. Devices  Pen needles for Lantus Solstar. 29g 12mm.        Montelukast Sodium (Tab) SINGULAIR 10 MG 1 po qhs        Omeprazole (CAPSULE DELAYED RELEASE) PRILOSEC 20 MG Take 1 Cap by mouth every day.        Simvastatin (Tab) ZOCOR 20 MG TAKE ONE TABLET BY MOUTH ONCE DAILY IN THE EVENING        Terazosin HCl (Cap) HYTRIN 1 MG 1 Cap every day.        Warfarin Sodium (Tab) COUMADIN 3 MG Take 1 to 1.5 tablets by mouth daily as directed by the coumadin clinic        .                 Medicines prescribed today were sent to:     [facility] [address] [place] [address]    Phone: [phone] Fax: [phone]    Open 24 Hours?: No      Medication refill instructions:       If your prescription bottle indicates you have medication refills left, it is not necessary to call your provider’s office. Please contact your pharmacy and they will refill your medication.    If your prescription bottle indicates you do not have any refills left, you may request refills at any time through one of the following ways: The online [employer] system (except Urgent Care), by calling your provider’s office, or by asking your pharmacy to contact your provider’s office with a refill request. Medication refills are processed only during regular business hours and may not be available until the next  business day. Your provider may request additional information or to have a follow-up visit with you prior to refilling your medication.   *Please Note: Medication refills are assigned a new Rx number when refilled electronically. Your pharmacy may indicate that no refills were authorized even though a new prescription for the same medication is available at the pharmacy. Please request the medicine by name with the pharmacy before contacting your provider for a refill.        Other Notes About Your Plan     Cards: HTN, AF.        Warfarin Dosing Calendar   July 2017 Details    Sun Mon Tue Wed Thu Fri Sat           1                 2               3               4               5               6               7               8                 9               10               11               12               13   2.2   3 mg   See details      14      1.5 mg         15      3 mg           16      3 mg         17      3 mg         18      3 mg         19      3 mg         20      3 mg         21      1.5 mg         22      3 mg           23      3 mg         24      3 mg         25      3 mg         26      3 mg         27      3 mg         28      1.5 mg         29      3 mg           30      3 mg         31      3 mg               Date Details   07/13 This INR check   INR: 2.2               How to take your warfarin dose     To take:  1.5 mg Take 0.5 of a 3 mg tablet.    To take:  3 mg Take 1 of the 3 mg tablets.           Warfarin Dosing Calendar   August 2017 Details    Sun Mon Tue Wed Thu Fri Sat       1      3 mg         2      3 mg         3      3 mg         4      1.5 mg         5      3 mg           6      3 mg         7      3 mg         8      3 mg         9      3 mg         10      3 mg         11      1.5 mg         12      3 mg           13      3 mg         14      3 mg         15      3 mg         16      3 mg         17      3 mg         18      1.5 mg         19      3 mg           20      3 mg            21      3 mg         22      3 mg         23      3 mg         24      3 mg         25               26                 27               28               29               30               31                  Date Details   No additional details    Date of next INR:  8/24/2017         How to take your warfarin dose     To take:  1.5 mg Take 0.5 of a 3 mg tablet.    To take:  3 mg Take 1 of the 3 mg tablets.              [employer] Access Code: Activation code not generated  Current [employer] Status: Active           (4) rarely moist GEN: no acute respiratory distress. nontoxic, speaking comfortably in full sentences, ambulating with steady gait.  HEENT: NCAT. face symmetrical. PERRL 4mm, EOMI, MMM, oropharynx wnl.  Neck: no JVD, trachea midline, no lymphadenopathy, FROM  CV: RRR. +S1S2, no murmur. 2+ pulses in 4 extremities, cap refill <2 sec  Chest: CTA B/l. no wheezing, rales, rhonchi. no retractions. good air movement. no tenderness.   ABD: +BS, soft, non distended, non tender. No guarding/rebound. No lesions, ecchymosis,   MSK: No clubbing, cyanosis, edema. FROM of all extremities. no tenderness to palpation. No midline or paraspinal tenderness.   Neuro: AAOX3. sensation and motor grossly intact.  SKIN: No erythema, lesions or rash. No ecchymosis.    19-year-old female no significant past medical history presents after episodes of rectal bleeding with bowel movement yesterday.  Did report some abdominal cramping yesterday.  Patient earlier mention abdominal pain but upon my evaluation reports no abdominal pain today.  Reports no recurrent episodes of bleeding when having bowel movements.  Patient is on fifth day of menstrual period, reports light at this time.  Patient denies weakness, lightheadedness, palpitations, shortness of breath.  Patient denies nausea, vomiting.  Reports in general has 1 bowel movement every day, denies constipation or having to bear down strong to pass bowel movement.  Initial ED workup normal coagulation studies, CMP.  Initial hemoglobin 11.7 which then dropped on repeat to 10.9.  Patient denies any further episodes of bleeding.  Risk-benefit of CT discussed with patient and parents at bedside and agree with deferring CT at this time.  Plan: Will repeat CBC at 5 PM if additional significant decrease in H/H will keep for overnight observation and GI consult.  If hemoglobin stabilizing, however, and patient remains asymptomatic we will consider discharge with very close GI outpatient follow-up.

## 2024-10-30 ENCOUNTER — PATIENT OUTREACH (OUTPATIENT)
Dept: HEALTH INFORMATION MANAGEMENT | Facility: OTHER | Age: 79
End: 2024-10-30
Payer: MEDICARE

## 2024-10-30 DIAGNOSIS — J45.20 MILD INTERMITTENT ASTHMA WITHOUT COMPLICATION: ICD-10-CM

## 2024-10-30 DIAGNOSIS — I10 ESSENTIAL HYPERTENSION: ICD-10-CM

## 2024-10-30 DIAGNOSIS — E11.22 TYPE 2 DIABETES MELLITUS WITH STAGE 4 CHRONIC KIDNEY DISEASE, WITHOUT LONG-TERM CURRENT USE OF INSULIN (HCC): ICD-10-CM

## 2024-10-30 DIAGNOSIS — N18.4 TYPE 2 DIABETES MELLITUS WITH STAGE 4 CHRONIC KIDNEY DISEASE, WITHOUT LONG-TERM CURRENT USE OF INSULIN (HCC): ICD-10-CM

## 2024-10-31 ENCOUNTER — PATIENT MESSAGE (OUTPATIENT)
Dept: HEALTH INFORMATION MANAGEMENT | Facility: OTHER | Age: 79
End: 2024-10-31

## 2024-11-06 ENCOUNTER — APPOINTMENT (OUTPATIENT)
Dept: RADIOLOGY | Facility: MEDICAL CENTER | Age: 79
End: 2024-11-06
Attending: NURSE PRACTITIONER
Payer: MEDICARE

## 2024-11-07 ENCOUNTER — PHARMACY VISIT (OUTPATIENT)
Dept: PHARMACY | Facility: MEDICAL CENTER | Age: 79
End: 2024-11-07
Payer: COMMERCIAL

## 2024-11-07 PROCEDURE — RXMED WILLOW AMBULATORY MEDICATION CHARGE: Performed by: INTERNAL MEDICINE

## 2024-11-11 DIAGNOSIS — J45.909 UNCOMPLICATED ASTHMA, UNSPECIFIED ASTHMA SEVERITY, UNSPECIFIED WHETHER PERSISTENT: ICD-10-CM

## 2024-11-11 PROCEDURE — RXMED WILLOW AMBULATORY MEDICATION CHARGE: Performed by: NURSE PRACTITIONER

## 2024-11-11 PROCEDURE — RXMED WILLOW AMBULATORY MEDICATION CHARGE: Performed by: INTERNAL MEDICINE

## 2024-11-11 RX ORDER — MONTELUKAST SODIUM 10 MG/1
10 TABLET ORAL
Qty: 90 TABLET | Refills: 3 | Status: SHIPPED | OUTPATIENT
Start: 2024-11-11

## 2024-11-12 ENCOUNTER — PHARMACY VISIT (OUTPATIENT)
Dept: PHARMACY | Facility: MEDICAL CENTER | Age: 79
End: 2024-11-12
Payer: COMMERCIAL

## 2024-11-19 ENCOUNTER — TELEPHONE (OUTPATIENT)
Dept: CARDIOLOGY | Facility: MEDICAL CENTER | Age: 79
End: 2024-11-19
Payer: MEDICARE

## 2024-11-19 NOTE — TELEPHONE ENCOUNTER
Last OV: 06/10/2024  Proposed Surgery: Colonoscopy  Surgery Date: 01/30/2025  Requesting Office Name: [facility]  Fax Number: [phone]  Preference of Location (default is surgery center unless specified by Cardiologist or KEVIN)  Prior Clearance Addressed: No    Is this a general clearance? YES   Anticoags/Antiplatelets: Xarelto  Anticoags/Antiplatelet managed by Cardiology? YES    Outstanding Cardiac Imaging : No  Ablation, Cardioversion, Stent, Cardiac Devices, Catheterization, Watchman: No  TAVR/Valve, Mitral Clip, Watchman (including open heart),: N/A   Recent Cardiac Hospitalization: No            When: N/A  History (cardiac history):   Past Medical History:   Diagnosis Date    Allergy     Anxiety     Arrhythmia     Arthritis     Asthma     Carotid bruit     Left    CATARACT     Chest pain     DIABETES MELLITUS     Esophageal reflux     Goiter     Headache(784.0)     Heart murmur     Hyperlipidemia     Hypertension     IBD (inflammatory bowel disease)     Migraine     Silent migraine    AVERY (obstructive sleep apnea) 03/20/2024    Uses CPAP    Overweight(278.02)     Palpitations     Paroxysmal atrial fibrillation (HCC) 07/12/2013    woke with palps, AFib per ekg in am.    Pneumonia     Nov. 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 Badillo    Urinary tract infection, site not specified            Is this a dental clearance? NO  Ablation, Cardioversion, Watchman, Stents, Cath, Devices within the last 3 months? No   If yes- Send dental wait letter, do not forward to provider for review.     TAVR / Valve, Mitral clip within the last 6 months? No  If yes- Send dental wait letter, do not forward to provider for review.     If completing a general clearance, continue per protocol.           Surgical Clearance Letter Sent: YES   **Scan clearance request letter into [facility].**

## 2024-11-19 NOTE — LETTER
PROCEDURE/SURGERY CLEARANCE FORM      Encounter Date: 11/19/2024    Patient: Whitley Frederick  YOB: 1945    CARDIOLOGIST:  Tj Servin M.D.    REFERRING DOCTOR:  No ref. provider found    The following procedure/surgery: Colonoscopy                                            PROCEDURE/SURGERY CLEARANCE FORM    Date: 11/19/2024   Patient Name: Whitley Frederick    Dear Surgeon or Proceduralist,      Thank you for your request for cardiac stratification of our mutual patient Whitley Frederick 1945. We have reviewed their [facility] records; and to the best of our understanding this patient has not had stenting, ablation, watchman, cardiothoracic surgery or hospitalization for cardiovascular reasons in the past 6 months.  Whitley Frederick has been seen within the past 15 months and is considered to have non-modifiable cardiac risk for this low-risk procedure/surgery. They may proceed from a cardiovascular standpoint and may hold their antiplatelet/anticoagulation as briefly as possible. Please have patient resume this medication when hemodynamically stable to do so.     Aspirin or Prasugrel   - hold 7 days prior to procedure/surgery, resume when hemodynamically stable      Clopidrogrel or Ticagrelor  - hold 7 days for all neurological procedures, hold 5 days prior to all other procedure/surgery,  resume when hemodynamically stable     Warfarin - hold 7 days for all neurological procedures, hold 5 days prior to all other procedure/surgery and coordinate with [facility] Anticoagulation [facility] ([phone]) INR testing and dose management.      Pradaxa/Xarelto/Eliquis/Savesya - hold 1 day prior to procedure for low bleeding risk procedure, 2 days for high bleeding risk procedure, or consider holding 3 days or longer for patients with reduced kidney function (CrCl <30mL/min) or spinal/cranial surgeries/procedures.      If they have a mechanical heart valve, please coordinate with [facility]  Anticoagulation Service ([phone]) the proper management of their anticoagulant in the periprocedural or perioperative period.      Some patients have higher risk for cardiovascular complications or holding medication. If our patient has had prior complications of holding antiplatelet or anticoagulants in the past and we have seen them after these events, we have addressed these concerns with the patient. They are at an unknown degree of increased risk for recurrent complication.  You may hold anticoagulation/antiplatelets for the procedure or surgery if the benefits of the procedure or surgery outweigh this nonmodifiable risk.      If Whitley Frederick 1945 has new symptoms of heart failure decompensation, unstable arrythmia, or angina please reach out and we will assess the patient.      If you have other patient-specific concerns, please feel free to reach out to the patient's cardiologist directly at [phone].     Thank you,       [facility]

## 2024-11-27 ENCOUNTER — PATIENT OUTREACH (OUTPATIENT)
Dept: HEALTH INFORMATION MANAGEMENT | Facility: OTHER | Age: 79
End: 2024-11-27
Payer: MEDICARE

## 2024-11-27 DIAGNOSIS — J45.20 MILD INTERMITTENT ASTHMA WITHOUT COMPLICATION: ICD-10-CM

## 2024-11-27 DIAGNOSIS — E11.22 TYPE 2 DIABETES MELLITUS WITH STAGE 4 CHRONIC KIDNEY DISEASE, WITHOUT LONG-TERM CURRENT USE OF INSULIN (HCC): ICD-10-CM

## 2024-11-27 DIAGNOSIS — N18.4 TYPE 2 DIABETES MELLITUS WITH STAGE 4 CHRONIC KIDNEY DISEASE, WITHOUT LONG-TERM CURRENT USE OF INSULIN (HCC): ICD-10-CM

## 2024-11-27 DIAGNOSIS — I10 ESSENTIAL HYPERTENSION: ICD-10-CM

## 2024-11-27 PROCEDURE — 99999 PR NO CHARGE: CPT | Performed by: NURSE PRACTITIONER

## 2024-12-02 ENCOUNTER — TELEPHONE (OUTPATIENT)
Dept: HEALTH INFORMATION MANAGEMENT | Facility: OTHER | Age: 79
End: 2024-12-02
Payer: MEDICARE

## 2024-12-02 PROBLEM — E11.3312 MODERATE NONPROLIFERATIVE DIABETIC RETINOPATHY OF LEFT EYE WITH MACULAR EDEMA ASSOCIATED WITH TYPE 2 DIABETES MELLITUS (HCC): Status: ACTIVE | Noted: 2021-11-11

## 2024-12-02 NOTE — ASSESSMENT & PLAN NOTE
Chronic, stable. Found on prior imaging. Patient currently takes rosuvastatin 10 mg daily. Follow up with PCP for continued monitoring.

## 2024-12-02 NOTE — ASSESSMENT & PLAN NOTE
Chronic, stable. Has AVERY and is on CPAP at night with 3L of supplemental O2 at night, however she is not using CPAP because of insurance issues. She is working on getting the machine back. Follow up with PCP at least annually for continued monitoring and management.

## 2024-12-02 NOTE — ASSESSMENT & PLAN NOTE
Chronic, Stable. BP in office today is 128/50. She currently takes losartan 50 mg daily, metoprolol 50 mg BID, nifedipine 30 mg daily, & lasix 80 mg every other day. Follow up with PCP for continued monitoring and management.

## 2024-12-02 NOTE — ASSESSMENT & PLAN NOTE
Chronic, stable. Continue taking Multaq 400 mg BID, metoprolol 50 mg BID, and Xarelto 15 mg daily. Follows with cardiology regularly.

## 2024-12-02 NOTE — ASSESSMENT & PLAN NOTE
Chronic, stable. Weight in office today is 228 lbs. BMI 36.80 kg/m2. We discussed her current diet/exercise regimen. Encouraged to remain physically active as well as monitor intake of excess calories. Follow up with PCP for continued monitoring. Discussed balance activities.

## 2024-12-02 NOTE — ASSESSMENT & PLAN NOTE
Chronic, stable. She has atrial fibrillation and is on Xarelto 15 mg daily. Follows with cardiology.

## 2024-12-02 NOTE — ASSESSMENT & PLAN NOTE
Chronic, stable. Compliant with CPAP at night. Does require supplemental O2. Follows with pulmonology.

## 2024-12-02 NOTE — ASSESSMENT & PLAN NOTE
Chronic, stable. She maintains on levalbuterol PRN & singulair 10 mg daily. Reports this has well controlled her asthma. Follows with pulmonology regularly.

## 2024-12-02 NOTE — ASSESSMENT & PLAN NOTE
Chronic, stable. Last A1c in Sept 2024 was 6.2. She does/does not check her blood sugar at home. Currently takes Ozempic SQ once weekly. She has known complications of CKD stage 4, microabuminuria, retinopathy, and neuropathy. Last retinal screening was May 2024. Last microalbumin creat ratio in Aug was 48. Her PCP does her feet exams. Sees nephrology. She is on a statin. We discussed diet and lifestyle habits. Follow up with PCP for continued monitoring and management.

## 2024-12-02 NOTE — ASSESSMENT & PLAN NOTE
Chronic, stable. PHQ-9 in office today is 0. Currently taking Klonapin 0.5 mg PRN for anxiety. She is not interested in any antidepressants. She would be open to therapy. She has a list of therapists, however she is hesitant to make an appt given the fact it is expensive. Follow up with PCP as needed.

## 2024-12-02 NOTE — ASSESSMENT & PLAN NOTE
?? Or AVERY??   Chronic, stable. Compliant with CPAP at night. Does require supplemental O2. Follows with pulmonology.

## 2024-12-02 NOTE — ASSESSMENT & PLAN NOTE
Chronic, stable. Last GFR in Aug 2024 was 22. She does follow with nephrology. We discussed avoiding nephrotoxic medication such as NSAIDs as well as maintaining adequate hydration. Follow up with PCP for continued monitoring.

## 2024-12-02 NOTE — ASSESSMENT & PLAN NOTE
Chronic, stable. Last lipid panel in Sept 2024. She currently takes rosuvastatin 10 mg daily. We discussed her dietary/lifestyle regimen. Follow up with PCP for continued monitoring and management.  Lab Results   Component Value Date/Time    CHOLSTRLTOT 156 09/03/2024 02:00 PM    TRIGLYCERIDE 205 (H) 09/03/2024 02:00 PM    HDL 45 09/03/2024 02:00 PM    LDL 70 09/03/2024 02:00 PM

## 2024-12-03 ENCOUNTER — PATIENT MESSAGE (OUTPATIENT)
Dept: HEALTH INFORMATION MANAGEMENT | Facility: OTHER | Age: 79
End: 2024-12-03

## 2024-12-03 ENCOUNTER — OFFICE VISIT (OUTPATIENT)
Dept: FAMILY PLANNING/WOMEN'S HEALTH CLINIC | Facility: PHYSICIAN GROUP | Age: 79
End: 2024-12-03
Payer: MEDICARE

## 2024-12-03 VITALS
HEART RATE: 65 BPM | HEIGHT: 66 IN | DIASTOLIC BLOOD PRESSURE: 50 MMHG | SYSTOLIC BLOOD PRESSURE: 128 MMHG | WEIGHT: 228 LBS | BODY MASS INDEX: 36.64 KG/M2 | OXYGEN SATURATION: 95 %

## 2024-12-03 DIAGNOSIS — E11.22 TYPE 2 DIABETES MELLITUS WITH STAGE 4 CHRONIC KIDNEY DISEASE, WITHOUT LONG-TERM CURRENT USE OF INSULIN (HCC): ICD-10-CM

## 2024-12-03 DIAGNOSIS — J45.20 MILD INTERMITTENT ASTHMA WITHOUT COMPLICATION: Chronic | ICD-10-CM

## 2024-12-03 DIAGNOSIS — E66.01 CLASS 2 SEVERE OBESITY DUE TO EXCESS CALORIES WITH SERIOUS COMORBIDITY AND BODY MASS INDEX (BMI) OF 37.0 TO 37.9 IN ADULT (HCC): ICD-10-CM

## 2024-12-03 DIAGNOSIS — R80.9 MICROALBUMINURIA DUE TO TYPE 2 DIABETES MELLITUS (HCC): ICD-10-CM

## 2024-12-03 DIAGNOSIS — N18.4 TYPE 2 DIABETES MELLITUS WITH STAGE 4 CHRONIC KIDNEY DISEASE, WITHOUT LONG-TERM CURRENT USE OF INSULIN (HCC): ICD-10-CM

## 2024-12-03 DIAGNOSIS — E78.5 HYPERLIPIDEMIA ASSOCIATED WITH TYPE 2 DIABETES MELLITUS (HCC): ICD-10-CM

## 2024-12-03 DIAGNOSIS — G47.33 OSA (OBSTRUCTIVE SLEEP APNEA): Chronic | ICD-10-CM

## 2024-12-03 DIAGNOSIS — E03.4 HYPOTHYROIDISM DUE TO ACQUIRED ATROPHY OF THYROID: ICD-10-CM

## 2024-12-03 DIAGNOSIS — E11.69 HYPERLIPIDEMIA ASSOCIATED WITH TYPE 2 DIABETES MELLITUS (HCC): ICD-10-CM

## 2024-12-03 DIAGNOSIS — I70.0 ATHEROSCLEROSIS OF AORTA (HCC): ICD-10-CM

## 2024-12-03 DIAGNOSIS — E11.3312 MODERATE NONPROLIFERATIVE DIABETIC RETINOPATHY OF LEFT EYE WITH MACULAR EDEMA ASSOCIATED WITH TYPE 2 DIABETES MELLITUS (HCC): ICD-10-CM

## 2024-12-03 DIAGNOSIS — I48.0 PAF (PAROXYSMAL ATRIAL FIBRILLATION) (HCC): ICD-10-CM

## 2024-12-03 DIAGNOSIS — E11.42 DIABETIC POLYNEUROPATHY ASSOCIATED WITH TYPE 2 DIABETES MELLITUS (HCC): ICD-10-CM

## 2024-12-03 DIAGNOSIS — I15.2 HIGH BLOOD PRESSURE ASSOCIATED WITH DIABETES (HCC): ICD-10-CM

## 2024-12-03 DIAGNOSIS — E66.812 CLASS 2 SEVERE OBESITY DUE TO EXCESS CALORIES WITH SERIOUS COMORBIDITY AND BODY MASS INDEX (BMI) OF 37.0 TO 37.9 IN ADULT (HCC): ICD-10-CM

## 2024-12-03 DIAGNOSIS — E11.59 HIGH BLOOD PRESSURE ASSOCIATED WITH DIABETES (HCC): ICD-10-CM

## 2024-12-03 DIAGNOSIS — F32.4 MAJOR DEPRESSIVE DISORDER WITH SINGLE EPISODE, IN PARTIAL REMISSION (HCC): ICD-10-CM

## 2024-12-03 DIAGNOSIS — D68.69 SECONDARY HYPERCOAGULABLE STATE (HCC): ICD-10-CM

## 2024-12-03 DIAGNOSIS — J96.11 CHRONIC RESPIRATORY FAILURE WITH HYPOXIA (HCC): ICD-10-CM

## 2024-12-03 DIAGNOSIS — E11.29 MICROALBUMINURIA DUE TO TYPE 2 DIABETES MELLITUS (HCC): ICD-10-CM

## 2024-12-03 DIAGNOSIS — F41.1 GAD (GENERALIZED ANXIETY DISORDER): ICD-10-CM

## 2024-12-03 PROBLEM — E66.2 OBESITY HYPOVENTILATION SYNDROME (HCC): Status: RESOLVED | Noted: 2023-08-09 | Resolved: 2024-12-03

## 2024-12-03 PROCEDURE — RXMED WILLOW AMBULATORY MEDICATION CHARGE: Performed by: INTERNAL MEDICINE

## 2024-12-03 PROCEDURE — 3074F SYST BP LT 130 MM HG: CPT

## 2024-12-03 PROCEDURE — G0439 PPPS, SUBSEQ VISIT: HCPCS

## 2024-12-03 PROCEDURE — 1126F AMNT PAIN NOTED NONE PRSNT: CPT

## 2024-12-03 PROCEDURE — 3078F DIAST BP <80 MM HG: CPT

## 2024-12-03 SDOH — ECONOMIC STABILITY: FOOD INSECURITY: WITHIN THE PAST 12 MONTHS, YOU WORRIED THAT YOUR FOOD WOULD RUN OUT BEFORE YOU GOT THE MONEY TO BUY MORE.: NEVER TRUE

## 2024-12-03 SDOH — ECONOMIC STABILITY: HOUSING INSECURITY: AT ANY TIME IN THE PAST 12 MONTHS, WERE YOU HOMELESS OR LIVING IN A SHELTER (INCLUDING NOW)?: NO

## 2024-12-03 SDOH — ECONOMIC STABILITY: HOUSING INSECURITY: IN THE LAST 12 MONTHS, WAS THERE A TIME WHEN YOU WERE NOT ABLE TO PAY THE MORTGAGE OR RENT ON TIME?: NO

## 2024-12-03 SDOH — ECONOMIC STABILITY: HOUSING INSECURITY: IN THE PAST 12 MONTHS, HOW MANY TIMES HAVE YOU MOVED WHERE YOU WERE LIVING?: 1

## 2024-12-03 SDOH — ECONOMIC STABILITY: FOOD INSECURITY: HOW HARD IS IT FOR YOU TO PAY FOR THE VERY BASICS LIKE FOOD, HOUSING, MEDICAL CARE, AND HEATING?: NOT HARD AT ALL

## 2024-12-03 SDOH — ECONOMIC STABILITY: FOOD INSECURITY: WITHIN THE PAST 12 MONTHS, THE FOOD YOU BOUGHT JUST DIDN'T LAST AND YOU DIDN'T HAVE MONEY TO GET MORE.: NEVER TRUE

## 2024-12-03 SDOH — ECONOMIC STABILITY: TRANSPORTATION INSECURITY: IN THE PAST 12 MONTHS, HAS LACK OF TRANSPORTATION KEPT YOU FROM MEDICAL APPOINTMENTS OR FROM GETTING MEDICATIONS?: NO

## 2024-12-03 ASSESSMENT — PATIENT HEALTH QUESTIONNAIRE - PHQ9
CLINICAL INTERPRETATION OF PHQ2 SCORE: 0
2. FEELING DOWN, DEPRESSED, IRRITABLE, OR HOPELESS: NOT AT ALL
1. LITTLE INTEREST OR PLEASURE IN DOING THINGS: NOT AT ALL

## 2024-12-03 ASSESSMENT — PAIN SCALES - GENERAL: PAINLEVEL_OUTOF10: NO PAIN

## 2024-12-03 ASSESSMENT — ENCOUNTER SYMPTOMS: GENERAL WELL-BEING: FAIR

## 2024-12-03 ASSESSMENT — ACTIVITIES OF DAILY LIVING (ADL)
LACK_OF_TRANSPORTATION: NO
BATHING_REQUIRES_ASSISTANCE: 0

## 2024-12-03 ASSESSMENT — FIBROSIS 4 INDEX: FIB4 SCORE: 2.72

## 2024-12-03 NOTE — PROGRESS NOTES
Late entry: 12/2/24 3:28PM.   Assessment  Spoke with Whitley for monthly outreach follow up.  Whitley said she had a nice Thanksgiving.  She states her main focus now is getting her colonoscopy done.  It is scheduled for January 30.  She states she still has not received her oxygen concentrator and she is called preferred multiple times.  RN to follow-up with preferred as well.  Reviewed pending lab orders and next nephrology follow-up.  Discussed monitoring her blood pressure at home.  She reports she only checks it periodically.  She has been relying on her physician follow-up appointments for her blood pressure.  Encouraged Whitley to check her blood pressure at home more consistently.  She verbalized understanding.  Whitley denies any other needs at this time. Encouraged to call with any questions or concerns    Education and Self-Management of Care  Discussed diabetic/renal diet  Dicussed last A1c 6.2 on 9/11  Reviewed pending renal labs and recommendations for next nephrology follow up   Discussed colonoscopy scheduled for 1/30/25     Plan of Care and Goals  Continue diabetes and hypertension education   Annal Case review and reassessment of healthcare care goals at next monthly outreach follow up      Barriers:  Age  Progression of disease process-CKD  Knowledge of healthcare  Habits      Progress:  Progressing     Next outreach:  January 2025    Chart reviewed for Quarterly Assessment, patient continues to qualify and would benefit from PCM program.

## 2024-12-03 NOTE — PROGRESS NOTES
Comprehensive Health Assessment Program     Whitley Frederick is a 79 y.o. here for her comprehensive health assessment.    Patient Active Problem List    Diagnosis Date Noted    Secondary hyperparathyroidism of renal origin ([facility]) 10/09/2024    Chronic respiratory failure with hypoxia ([facility]) 10/09/2024    Sedative, hypnotic or anxiolytic dependence, uncomplicated ([facility]) 10/09/2024    Senile purpura ([facility]) 08/09/2024    Malignant melanoma of torso excluding breast (HCC) 03/05/2024    Psoriasis 03/05/2024    Major depressive disorder with single episode, in partial remission ([facility]) 08/09/2023    Microalbuminuria due to type 2 diabetes mellitus ([facility]) 08/09/2023    Fear of flying 06/15/2023    On economic assistance program 01/30/2023    Secondary hypercoagulable state ([facility]) 04/06/2022    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 associated with type 2 diabetes mellitus ([facility]) 11/11/2021    Diabetic polyneuropathy associated with type 2 diabetes mellitus ([facility]) 11/11/2021    Chronic neck pain 09/15/2021    DDD (degenerative disc disease), lumbar 11/02/2020    Osteoarthritis of lumbar spine 11/02/2020    Non-rheumatic mitral regurgitation 08/20/2019    Hypothyroidism due to acquired atrophy of thyroid 04/10/2018    VPC's (ventricular premature complexes) 02/23/2018    Dependence on nocturnal oxygen therapy 11/02/2017    Incontinence of feces 05/18/2017    Chronic pain of both shoulders 03/23/2017    Mild intermittent asthma without complication 08/24/2016    Chronic anticoagulation 06/10/2016    High blood pressure associated with diabetes ([facility]) 05/10/2016    AVERY (obstructive sleep apnea) - as of 9/2024 - doesn't feel she needs it 05/10/2016    Multiple thyroid nodules 10/07/2015    Chronic idiopathic gout involving toe without tophus 04/30/2015    Atherosclerosis of aorta (CMS-[facility]) 04/29/2015    Class 2 severe obesity with serious comorbidity and body mass index (BMI) of 37.0 to 37.9  in adult ([facility]) 04/29/2015    Stenosis of carotid artery 05/30/2014    NINOSKA (generalized anxiety disorder) 01/09/2014    PAF (paroxysmal atrial fibrillation) ([facility]) 07/12/2013    Left bundle branch block 03/05/2012    Type 2 diabetes mellitus with stage 4 chronic kidney disease, without long-term current use of insulin ([facility])     Hyperlipidemia associated with type 2 diabetes mellitus ([facility])     CKD (chronic kidney disease) stage 4, GFR 15-29 ml/min ([facility])        Current Outpatient Medications   Medication Sig Dispense Refill    montelukast (SINGULAIR) 10 MG Tab Take 1 Tablet by mouth at bedtime. 90 Tablet 3    Continuous Glucose Sensor (DEXCOM G7 SENSOR) Misc Use 1 sensor every 10 days. 9 Each 3    NON SPECIFIED Nocturnal oxygen at 2 L. 1 Each 1    metoprolol tartrate (LOPRESSOR) 50 MG Tab Take 1 tablet by mouth 2 times a day. 200 Tablet 3    sodium bicarbonate (SODIUM BICARBONATE) 650 MG Tab Take 1 Tablet by mouth 2 times a day. 180 Tablet 3    levothyroxine (SYNTHROID) 50 MCG Tab Take 1 tablet by mouth every morning on an empty stomach. 100 Tablet 3    NIFEdipine SR (PROCADIA-XL) 30 MG tablet Take 1 tablet by mouth once a day 90 Tablet 3    clonazePAM (KLONOPIN) 0.5 MG Tab TAKE 1 TABLET BY MOUTH ONCE DAILY AS NEEDED FOR 30 DAYS. ANXIETY/HEART PALPITATIONS for 30 days 30 Tablet 0    rivaroxaban (XARELTO) 15 MG Tab tablet Take 1 Tablet by mouth with dinner. 90 Tablet 2    rosuvastatin (CRESTOR) 10 MG Tab Take 1 tablet by mouth every evening. Stop simvastatin. 100 Tablet 3    losartan (COZAAR) 50 MG Tab Take 1 tablet by mouth 2 Times a Day. 200 Tablet 2    allopurinol (ZYLOPRIM) 100 MG Tab TAKE TWO TABLETS BY MOUTH TWICE DAILY 360 Tablet 2    furosemide (LASIX) 80 MG Tab Take 80 mg by mouth every 48 hours. ON EVEN DAYS      acetaminophen (TYLENOL) 650 MG CR tablet Take 1,300 mg by mouth 2 times a day.      omeprazole (PRILOSEC) 20 MG delayed-release capsule Take 2 capsules by mouth every day. 180 Capsule 1    clobetasol  (TEMOVATE) 0.05 % Ointment Apply to affected body skin twice daily as needed for severe psoriasis. Avoid use on face, axilla, groin. 60 g 2    fluocinonide (LIDEX) 0.05 % Ointment Apply to affected area body skin twice daily as needed for mild/moderate psoriasis. Avoid use on face, axilla, groin. 180 g 2    fluticasone (FLONASE) 50 MCG/ACT nasal spray USE 1-2  SPRAY(S) IN EACH NOSTRIL ONCE DAILY 30 MINUTES PRIOR TO USE OF CPAP 48 g 3    Semaglutide, 2 MG/DOSE, (OZEMPIC, 2 MG/DOSE,) 8 MG/3ML Solution Pen-injector Inject 2 mg under the skin every 7 days. 3 mL 11    dronedarone (MULTAQ) 400 MG Tab Take 1 tablet by mouth 2 times a day with meals. 60 Tablet 0    levalbuterol (XOPENEX HFA) 45 MCG/ACT inhaler Inhale 2 Puffs every 6 hours as needed for Shortness of Breath. 15 g 11    ferrous sulfate 325 (65 Fe) MG tablet Take 325 mg by mouth every evening.      Cholecalciferol (VITAMIN D) 2000 UNITS CAPS Take 4,000 Units by mouth every day.       No current facility-administered medications for this visit.          Current supplements as per medication list.     Allergies:   Amlodipine, Levoxyl [levothyroxine sodium], Amaryl, Avandia [rosiglitazone maleate], Cipro xr, Clonidine, Contrast media with iodine [iodine], Glipizide, Glucophage [metformin hydrochloride], Hydralazine, Levaquin, and Relafen [nabumetone]  Social History     Tobacco Use    Smoking status: Former     Current packs/day: 0.00     Average packs/day: 1 pack/day for 20.0 years (20.0 ttl pk-yrs)     Types: Cigarettes     Start date: 1963     Quit date: 1983     Years since quittin.9    Smokeless tobacco: Never   Vaping Use    Vaping status: Never Used   Substance Use Topics    Alcohol use: No     Alcohol/week: 0.0 oz    Drug use: No     Family History   Problem Relation Age of Onset    Cancer Maternal Aunt     Diabetes Maternal Aunt     Heart Disease Maternal Aunt     Hypertension Maternal Aunt     Hyperlipidemia Maternal Aunt     Cancer Mother          Leukemia    Hypertension Mother     Diabetes Mother     Lung Disease Father     Cancer Father         Lung    Lung Disease Brother     Stroke Brother     Diabetes Brother     Heart Disease Brother     Diabetes Maternal Grandmother     Hypertension Brother     Diabetes Brother      Whitley  has a past medical history of Allergy, Anxiety, Arrhythmia, Arthritis, Asthma, Carotid bruit, CATARACT, Chest pain, DIABETES MELLITUS, Esophageal reflux, Goiter, Headache(784.0), Heart murmur, Hyperlipidemia, Hypertension, IBD (inflammatory bowel disease), Migraine, AVERY (obstructive sleep apnea) (03/20/2024), Overweight(278.02), Palpitations, Paroxysmal atrial fibrillation (HCC) (07/12/2013), Pneumonia, Positive reaction to tuberculin skin test, Renal disease, and Urinary tract infection, site not specified.    She has no past medical history of Breast cancer (HCC) or Tuberculosis.   Past Surgical History:   Procedure Laterality Date    WIDE EXCISION, LESION, UPPER EXTREMITY  3/25/2024    Procedure: WIDE EXCISION OF MALIGNANT MELANOMA, LEFT UPPER BACK;  Surgeon: Amy Horne M.D.;  Location: SURGERY [facility];  Service: General    CATARACT PHACO WITH IOL Right 10/25/2016    Procedure: CATARACT PHACO WITH IOL;  Surgeon: Zoran Gamble M.D.;  Location: SURGERY SAME DAY [facility];  Service:     ABDOMINAL HYSTERECTOMY TOTAL      CHOLECYSTECTOMY      TONSILLECTOMY      US-NEEDLE CORE BX-BREAST PANEL         Screening:  In the last six months have you experienced any leakage of urine? Yes wearing a pad/liner.     Depression Screening  Little interest or pleasure in doing things?  0 - not at all  Feeling down, depressed , or hopeless? 0 - not at all  Patient Health Questionnaire Score: 0     If depressive symptoms identified deferred to follow up visit unless specifically addressed in assessment and plan.    Interpretation of PHQ-9 Total Score   Score Severity   1-4 No Depression   5-9 Mild Depression   10-14 Moderate  Depression   15-19 Moderately Severe Depression   20-27 Severe Depression    Screening for Cognitive Impairment  Do you or any of your friends or family members have any concern about your memory? No  Three Minute Recall (Leader, Season, Table) 3/3    Daron clock face with all 12 numbers and set the hands to show 10 minutes after 11.  Yes    Cognitive concerns identified deferred for follow up unless specifically addressed in assessment and plan.    Fall Risk Assessment  Has the patient had two or more falls in the last year or any fall with injury in the last year?  No    Safety Assessment  Do you always wear your seatbelt?  Yes  Any changes to home needed to function safely? No  Difficulty hearing.  No  Patient counseled about all safety risks that were identified.    Functional Assessment ADLs  Are there any barriers preventing you from cooking for yourself or meeting nutritional needs?  No.    Are there any barriers preventing you from driving safely or obtaining transportation?  No.  She does not drive anymore.   Are there any barriers preventing you from using a telephone or calling for help?  No    Are there any barriers preventing you from shopping?  No.    Are there any barriers preventing you from taking care of your own finances?  No    Are there any barriers preventing you from managing your medications?  No    Are there any barriers preventing you from showering, bathing or dressing yourself? No    Are there any barriers preventing you from doing housework or laundry? No  Are there any barriers preventing you from using the toilet?No  Are you currently engaging in any exercise or physical activity?  Yes.  Walking her dog.     Self-Assessment of Health  What is your perception of your health? Fair    Do you sleep more than six hours a night? Yes    In the past 7 days, how much did pain keep you from doing your normal work? Some    Do you spend quality time with family or friends (virtually or in person)?  Yes    Do you usually eat a heart healthy diet that constists of a variety of fruits, vegetables, whole grains and fiber? Yes    Do you eat foods high in fat and/or Fast Food more than three times per week? No    How concerned are you that your medical conditions are not being well managed? Not at all    Are you worried that in the next 2 months, you may not have stable housing that you own, rent, or stay in as part of a household? No      Advance Care Planning  Do you have an Advance Directive, Living Will, Durable Power of , or POLST? No                 Health Maintenance Summary            Postponed - Zoster (Shingles) Vaccines (1 of 2) Postponed until 2/11/2025      No completion history exists for this topic.              Postponed - COVID-19 Vaccine (3 - 2024-25 season) Postponed until 9/11/2025 02/13/2023  Imm Admin: PFIZER BAUTISTA CAP SARS-COV-2 VACCINATION (12+)    01/13/2023  Imm Admin: PFIZER BAUTISTA CAP SARS-COV-2 VACCINATION (12+)              Postponed - Influenza Vaccine (1) Postponed until 12/3/2025      10/10/2023  Imm Admin: Influenza Vaccine Adult HD    11/03/2022  Imm Admin: Influenza Vaccine Adult HD    11/11/2021  Imm Admin: Influenza Vaccine Adult HD    10/22/2020  Imm Admin: Influenza Vaccine Adult HD    10/18/2018  Imm Admin: Influenza Vaccine Adult HD    Only the first 5 history entries have been loaded, but more history exists.              A1c Screening (Every 6 Months) Next due on 3/11/2025      09/11/2024  POCT  A1C    03/05/2024  POCT  A1C    08/02/2023  POCT  A1C    09/08/2022  HEMOGLOBIN A1C    11/11/2021  POCT  A1C    Only the first 5 history entries have been loaded, but more history exists.              Diabetes: Retinopathy Screening (Yearly) Next due on 5/1/2025 05/01/2024  AMB EXTERNAL RETINAL SCREENING RESULTS    09/26/2022  AMB EXTERNAL RETINAL SCREENING RESULTS    10/07/2021  Done    08/27/2021  REFERRAL FOR RETINAL SCREENING EXAM    08/27/2021  Done    Only the  first 5 history entries have been loaded, but more history exists.              Ordered - Diabetes: Urine Protein Screening (Yearly) Ordered on 9/3/2024      08/27/2024  MICROALBUMIN CREAT RATIO URINE    04/12/2024  MICROALBUMIN CREAT RATIO URINE    01/08/2024  MICROALBUMIN CREAT RATIO URINE    10/27/2023  MICROALBUMIN CREAT RATIO URINE    09/22/2023  MICROALBUMIN CREAT RATIO URINE    Only the first 5 history entries have been loaded, but more history exists.              Ordered - SERUM CREATININE (Yearly) Ordered on 9/3/2024      08/27/2024  Basic Metabolic Panel    04/12/2024  Basic Metabolic Panel    03/25/2024  Basic Metabolic Panel    03/22/2024  Basic Metabolic Panel    01/08/2024  Basic Metabolic Panel    Only the first 5 history entries have been loaded, but more history exists.              Fasting Lipid Profile (Yearly) Next due on 9/3/2025      09/03/2024  Lipid Profile    04/12/2024  Lipid Profile    09/08/2022  Lipid Profile    12/14/2019  Lipid Profile    02/23/2019  Lipid Profile    Only the first 5 history entries have been loaded, but more history exists.              Diabetes: Monofilament / LE Exam (Yearly) Next due on 9/11/2025 09/11/2024  Diabetic Monofilament LE Exam    09/11/2024  SmartData: WORKFLOW - DIABETES - DIABETIC FOOT EXAM PERFORMED    08/02/2023  SmartData: WORKFLOW - DIABETES - DIABETIC FOOT EXAM PERFORMED    11/11/2021  SmartData: WORKFLOW - DIABETES - DIABETIC FOOT EXAM PERFORMED    06/02/2020  SmartData: WORKFLOW - DIABETES - DIABETIC FOOT EXAM PERFORMED    Only the first 5 history entries have been loaded, but more history exists.              Annual Wellness Visit (Yearly) Next due on 12/3/2025      12/03/2024  Level of Service: ME ANNUAL WELLNESS VISIT-INCLUDES PPPS SUBSEQUE*    08/09/2023  Level of Service: ME ANNUAL WELLNESS VISIT-INCLUDES PPPS SUBSEQUE*    08/31/2022  Visit Dx: Medicare annual wellness visit, subsequent    04/06/2022  Level of Service: ME ANNUAL  WELLNESS VISIT-INCLUDES PPPS SUBSEQUE*    06/02/2020  Subsequent Annual Wellness Visit - Includes PPPS ()    Only the first 5 history entries have been loaded, but more history exists.              IMM DTaP/Tdap/Td Vaccine (2 - Td or Tdap) Next due on 4/11/2029 04/11/2019  Imm Admin: Tdap Vaccine              Hepatitis C Screening  Completed      01/27/2017  Hepatitis C Antibody component of HEP C VIRUS ANTIBODY              Pneumococcal Vaccine: 65+ Years (Series Information) Completed      09/06/2017  Imm Admin: Pneumococcal polysaccharide vaccine (PPSV-23)    08/24/2016  Imm Admin: Pneumococcal Conjugate Vaccine (Prevnar/PCV-13)              Hepatitis B Vaccine (Hep B) (Series Information) Completed      05/26/2021  Imm Admin: Hepatitis B Vaccine (Adol/Adult)    10/22/2020  Imm Admin: Hepatitis B Vaccine (Adol/Adult)    06/02/2020  Imm Admin: Hepatitis B Vaccine (Adol/Adult)              Hepatitis A Vaccine (Hep A) (Series Information) Aged Out      No completion history exists for this topic.              HPV Vaccines (Series Information) Aged Out      No completion history exists for this topic.              Polio Vaccine (Inactivated Polio) (Series Information) Aged Out      No completion history exists for this topic.              Meningococcal Immunization (Series Information) Aged Out      No completion history exists for this topic.              Discontinued - Mammogram  Scheduled for 12/9/2024        Frequency changed to Never automatically (Topic No Longer Applies)    03/08/2022  MA-DIAGNOSTIC MAMMO BILAT W/TOMOSYNTHESIS W/CAD    04/07/2021  MA-SCREENING MAMMO BILAT W/TOMOSYNTHESIS W/CAD    02/26/2020  MA-SCREENING MAMMO BILAT W/TOMOSYNTHESIS W/CAD    01/18/2019  MA-SCREENING MAMMO BILAT W/TOMOSYNTHESIS W/CAD    Only the first 5 history entries have been loaded, but more history exists.              Discontinued - Bone Density Scan  Discontinued      01/18/2019  DS-BONE DENSITY STUDY (DEXA)     "01/31/2013  DS-BONE DENSITY STUDY (DEXA)              Discontinued - Colorectal Cancer Screening  Discontinued        Frequency changed to Never automatically (Topic No Longer Applies)    07/09/2024  COLOGUARD COLON CANCER SCREENING    07/03/2024  COLOGUARD RESULT component of COLOGUARD COLON CANCER SCREENING    08/03/2020  OCCULT BLOOD FECES IMMUNOASSAY    03/01/2018  OCCULT BLOOD FECES IMMUNOASSAY    Only the first 5 history entries have been loaded, but more history exists.                    Patient Care Team:  EDILMA Whelan as PCP - General (Family Medicine)  Ania Badillo M.D. as Consulting Physician (Nephrology)  [facility] as Respiratory Therapist  Tj Servin M.D. (Interventional Cardiology)  Mely Yanez C.N.A. as [facility]   Liat Treviño R.N. as RN Care Coordinator  (Registered Nurse)  Lyly Pineda M.D. (Phys Med and Rehab)  Fadi Najjar, M.D. (Nephrology)  Petra Toure M.D. (Otolaryngology)      Financial Resource Strain: Low Risk  (12/3/2024)    Overall Financial Resource Strain (CARDIA)     Difficulty of Paying Living Expenses: Not hard at all      Transportation Needs: No Transportation Needs (12/3/2024)    PRAPARE - Transportation     Lack of Transportation (Medical): No     Lack of Transportation (Non-Medical): No      Food Insecurity: No Food Insecurity (12/3/2024)    Hunger Vital Sign     Worried About Running Out of Food in the Last Year: Never true     Ran Out of Food in the Last Year: Never true        Encounter Vitals  Blood Pressure : 128/50  Pulse: 65  Pulse Oximetry: 95 %  Weight: 103 kg (228 lb)  Height: 167.6 cm (5' 6\")  BMI (Calculated): 36.8  Pain Score: No pain     Physical Exam:  Constitutional: NAD  HENMT: NC/AT, EOMI  Cardiovascular: RRR, No m/r/g  Lungs: CTAB, no w/r/r  Extremities: No c/c/e  Neurologic: Alert & oriented x3, CN II-XII grossly intact      Assessment and Plan. The following treatment and " monitoring plan is recommended:  Atherosclerosis of aorta (CMS-HCC)  Chronic, stable. Found on prior imaging. Patient currently takes rosuvastatin 10 mg daily. Follow up with PCP for continued monitoring.    Chronic respiratory failure with hypoxia ([facility])  AVERY (obstructive sleep apnea) - as of 9/2024 - doesn't feel she needs it  Chronic, stable. Has AVERY and is on CPAP at night with 3L of supplemental O2 at night, however she is not using CPAP because of insurance issues. She is working on getting the machine back. Follow up with PCP at least annually for continued monitoring and management.    Class 2 severe obesity with serious comorbidity and body mass index (BMI) of 37.0 to 37.9 in adult ([facility])  Chronic, stable. Weight in office today is 228 lbs. BMI 36.80 kg/m2. We discussed her current diet/exercise regimen. Encouraged to remain physically active as well as monitor intake of excess calories. Follow up with PCP for continued monitoring. Discussed balance activities.     Hyperlipidemia associated with type 2 diabetes mellitus ([facility])  Chronic, stable. Last lipid panel in Sept 2024. She currently takes rosuvastatin 10 mg daily. We discussed her dietary/lifestyle regimen. Follow up with PCP for continued monitoring and management.  Lab Results   Component Value Date/Time    CHOLSTRLTOT 156 09/03/2024 02:00 PM    TRIGLYCERIDE 205 (H) 09/03/2024 02:00 PM    HDL 45 09/03/2024 02:00 PM    LDL 70 09/03/2024 02:00 PM       High blood pressure associated with diabetes ([facility])  Chronic, Stable. BP in office today is 128/50. She currently takes losartan 50 mg daily, metoprolol 50 mg BID, nifedipine 30 mg daily, & lasix 80 mg every other day. Follow up with PCP for continued monitoring and management.      Hypothyroidism due to acquired atrophy of thyroid  Stable. Currently taking levothyroxine 50 mcg daily as prescribed. Denies complications. TSH stable.    Major depressive disorder with single episode, in partial remission  ([facility])  NINOSKA (generalized anxiety disorder)  Chronic, stable. PHQ-9 in office today is 0. Currently taking Klonapin 0.5 mg PRN for anxiety. She is not interested in any antidepressants. She would be open to therapy. She has a list of therapists, however she is hesitant to make an appt given the fact it is expensive. Follow up with PCP as needed.    Mild intermittent asthma without complication  Chronic, stable. She maintains on levalbuterol PRN & singulair 10 mg daily. Reports this has well controlled her asthma. Follows with pulmonology regularly.     PAF (paroxysmal atrial fibrillation) (CMS-[facility])  Chronic, stable. Continue taking Multaq 400 mg BID, metoprolol 50 mg BID, and Xarelto 15 mg daily. Follows with cardiology regularly.     Secondary hypercoagulable state ([facility])  Chronic, stable. She has atrial fibrillation and is on Xarelto 15 mg daily. Follows with cardiology.     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 Last A1c in Sept 2024 was 6.2. She does/does not check her blood sugar at home. Currently takes Ozempic SQ once weekly. She has known complications of CKD stage 4, microabuminuria, retinopathy, and neuropathy. Last retinal screening was May 2024. Last microalbumin creat ratio in Aug was 48. Her PCP does her feet exams. Sees nephrology. She is on a statin. We discussed diet and lifestyle habits. Follow up with PCP for continued monitoring and management.      Services suggested: No services needed at this time  Health Care Screening: Age-appropriate preventive services recommended by USPTF and ACIP covered by Medicare were discussed today. Services ordered if indicated and  agreed upon by the patient.  Referrals offered: Community-based lifestyle interventions to reduce health risks and promote self-management and wellness, fall prevention, nutrition, physical activity, tobacco-use cessation, weight loss, and mental health services as per orders if indicated.    Discussion today about general wellness and lifestyle habits:    Prevent falls and reduce trip hazards; Cautioned about securing or removing rugs.  Have a working fire alarm and carbon monoxide detector.  Engage in regular physical activity and social activities.    Follow-up: Return for appointment with Primary Care Provider as needed..

## 2024-12-05 PROCEDURE — RXMED WILLOW AMBULATORY MEDICATION CHARGE: Performed by: NURSE PRACTITIONER

## 2024-12-06 ENCOUNTER — PHARMACY VISIT (OUTPATIENT)
Dept: PHARMACY | Facility: MEDICAL CENTER | Age: 79
End: 2024-12-06
Payer: COMMERCIAL

## 2024-12-09 ENCOUNTER — HOSPITAL ENCOUNTER (OUTPATIENT)
Dept: RADIOLOGY | Facility: MEDICAL CENTER | Age: 79
End: 2024-12-09
Attending: NURSE PRACTITIONER
Payer: MEDICARE

## 2024-12-09 DIAGNOSIS — K21.9 GASTROESOPHAGEAL REFLUX DISEASE: ICD-10-CM

## 2024-12-10 PROCEDURE — RXMED WILLOW AMBULATORY MEDICATION CHARGE: Performed by: NURSE PRACTITIONER

## 2024-12-12 ENCOUNTER — PHARMACY VISIT (OUTPATIENT)
Dept: PHARMACY | Facility: MEDICAL CENTER | Age: 79
End: 2024-12-12
Payer: COMMERCIAL

## 2024-12-27 ENCOUNTER — TELEMEDICINE (OUTPATIENT)
Dept: MEDICAL GROUP | Facility: LAB | Age: 79
End: 2024-12-27
Payer: MEDICARE

## 2024-12-27 VITALS — BODY MASS INDEX: 36.64 KG/M2 | WEIGHT: 228 LBS | HEIGHT: 66 IN

## 2024-12-27 DIAGNOSIS — E11.22 TYPE 2 DIABETES MELLITUS WITH STAGE 4 CHRONIC KIDNEY DISEASE, WITHOUT LONG-TERM CURRENT USE OF INSULIN (HCC): ICD-10-CM

## 2024-12-27 DIAGNOSIS — E11.59 HIGH BLOOD PRESSURE ASSOCIATED WITH DIABETES (HCC): ICD-10-CM

## 2024-12-27 DIAGNOSIS — I15.2 HIGH BLOOD PRESSURE ASSOCIATED WITH DIABETES (HCC): ICD-10-CM

## 2024-12-27 DIAGNOSIS — N18.4 TYPE 2 DIABETES MELLITUS WITH STAGE 4 CHRONIC KIDNEY DISEASE, WITHOUT LONG-TERM CURRENT USE OF INSULIN (HCC): ICD-10-CM

## 2024-12-27 DIAGNOSIS — F41.1 GAD (GENERALIZED ANXIETY DISORDER): ICD-10-CM

## 2024-12-27 PROCEDURE — 99214 OFFICE O/P EST MOD 30 MIN: CPT | Mod: 95 | Performed by: NURSE PRACTITIONER

## 2024-12-27 RX ORDER — ACYCLOVIR 800 MG/1
TABLET ORAL
Qty: 2 EACH | Refills: 2 | Status: SHIPPED | OUTPATIENT
Start: 2024-12-27

## 2024-12-27 RX ORDER — ESCITALOPRAM OXALATE 5 MG/1
5 TABLET ORAL DAILY
Qty: 30 TABLET | Refills: 2 | Status: SHIPPED | OUTPATIENT
Start: 2024-12-27

## 2024-12-27 ASSESSMENT — FIBROSIS 4 INDEX: FIB4 SCORE: 2.72

## 2024-12-27 NOTE — PROGRESS NOTES
Virtual Visit: Established Patient   This visit was conducted via teams using secure and encrypted videoconferencing technology.   The patient was in their home in the state of [place].    The patient's identity was confirmed and verbal consent was obtained for this virtual visit.    Subjective:   CC:   Chief Complaint   Patient presents with    Follow-Up     Whitley Frederick is a 79 y.o. female presenting for evaluation and management of:    Anxiety:  wants to discuss tx beyond clonazepam.  Stress in family and about her health.  Also depressed but denies SI or HI, describes the depression as mild.  Dislikes leaving house.  Worries about her daughter.  Sleep is not bad.  Appetite is difficult to control - overeats chocolate / sugar.  Fearful of upsetting people.  Ready to start daily medicaiton.  States that she has been anxious since she was a child.  Has not seen psychiatry in about a year.      Type II DM with HTN:  chronic issue.  Wants to try freestyle lelia 3.   this morning.  /81 this morning with HR 76.  Injecting 2 mg ozempic weekly right now.     Current medicines (including changes today)  Current Outpatient Medications   Medication Sig Dispense Refill    omeprazole (PRILOSEC) 20 MG delayed-release capsule Take 2 capsules by mouth every day. 180 Capsule 1    montelukast (SINGULAIR) 10 MG Tab Take 1 Tablet by mouth at bedtime. 90 Tablet 3    Continuous Glucose Sensor (DEXCOM G7 SENSOR) Misc Use 1 sensor every 10 days. 9 Each 3    NON SPECIFIED Nocturnal oxygen at 2 L. 1 Each 1    metoprolol tartrate (LOPRESSOR) 50 MG Tab Take 1 tablet by mouth 2 times a day. 200 Tablet 3    sodium bicarbonate (SODIUM BICARBONATE) 650 MG Tab Take 1 Tablet by mouth 2 times a day. 180 Tablet 3    levothyroxine (SYNTHROID) 50 MCG Tab Take 1 tablet by mouth every morning on an empty stomach. 100 Tablet 3    NIFEdipine SR (PROCADIA-XL) 30 MG tablet Take 1 tablet by mouth once a day 90 Tablet 3    clonazePAM  (KLONOPIN) 0.5 MG Tab TAKE 1 TABLET BY MOUTH ONCE DAILY AS NEEDED FOR 30 DAYS. ANXIETY/HEART PALPITATIONS for 30 days 30 Tablet 0    rivaroxaban (XARELTO) 15 MG Tab tablet Take 1 Tablet by mouth with dinner. 90 Tablet 2    rosuvastatin (CRESTOR) 10 MG Tab Take 1 tablet by mouth every evening. Stop simvastatin. 100 Tablet 3    losartan (COZAAR) 50 MG Tab Take 1 tablet by mouth 2 Times a Day. 200 Tablet 2    allopurinol (ZYLOPRIM) 100 MG Tab TAKE TWO TABLETS BY MOUTH TWICE DAILY 360 Tablet 2    furosemide (LASIX) 80 MG Tab Take 80 mg by mouth every 48 hours. ON EVEN DAYS      acetaminophen (TYLENOL) 650 MG CR tablet Take 1,300 mg by mouth 2 times a day.      clobetasol (TEMOVATE) 0.05 % Ointment Apply to affected body skin twice daily as needed for severe psoriasis. Avoid use on face, axilla, groin. 60 g 2    fluocinonide (LIDEX) 0.05 % Ointment Apply to affected area body skin twice daily as needed for mild/moderate psoriasis. Avoid use on face, axilla, groin. 180 g 2    fluticasone (FLONASE) 50 MCG/ACT nasal spray USE 1-2  SPRAY(S) IN EACH NOSTRIL ONCE DAILY 30 MINUTES PRIOR TO USE OF CPAP 48 g 3    Semaglutide, 2 MG/DOSE, (OZEMPIC, 2 MG/DOSE,) 8 MG/3ML Solution Pen-injector Inject 2 mg under the skin every 7 days. 3 mL 11    dronedarone (MULTAQ) 400 MG Tab Take 1 tablet by mouth 2 times a day with meals. 60 Tablet 0    levalbuterol (XOPENEX HFA) 45 MCG/ACT inhaler Inhale 2 Puffs every 6 hours as needed for Shortness of Breath. 15 g 11    ferrous sulfate 325 (65 Fe) MG tablet Take 325 mg by mouth every evening.      Cholecalciferol (VITAMIN D) 2000 UNITS CAPS Take 4,000 Units by mouth every day.       No current facility-administered medications for this visit.       Patient Active Problem List    Diagnosis Date Noted    Secondary hyperparathyroidism of renal origin (HCC) 10/09/2024    Chronic respiratory failure with hypoxia (HCC) 10/09/2024    Sedative, hypnotic or anxiolytic dependence, uncomplicated (HCC)  10/09/2024    Senile purpura ([facility]) 08/09/2024    Malignant melanoma of torso excluding breast ([facility]) 03/05/2024    Psoriasis 03/05/2024    Major depressive disorder with single episode, in partial remission ([facility]) 08/09/2023    Microalbuminuria due to type 2 diabetes mellitus ([facility]) 08/09/2023    Fear of flying 06/15/2023    On economic assistance program 01/30/2023    Secondary hypercoagulable state ([facility]) 04/06/2022    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 associated with type 2 diabetes mellitus ([facility]) 11/11/2021    Diabetic polyneuropathy associated with type 2 diabetes mellitus ([facility]) 11/11/2021    Chronic neck pain 09/15/2021    DDD (degenerative disc disease), lumbar 11/02/2020    Osteoarthritis of lumbar spine 11/02/2020    Non-rheumatic mitral regurgitation 08/20/2019    Hypothyroidism due to acquired atrophy of thyroid 04/10/2018    VPC's (ventricular premature complexes) 02/23/2018    Dependence on nocturnal oxygen therapy 11/02/2017    Incontinence of feces 05/18/2017    Chronic pain of both shoulders 03/23/2017    Mild intermittent asthma without complication 08/24/2016    Chronic anticoagulation 06/10/2016    High blood pressure associated with diabetes ([facility]) 05/10/2016    AVERY (obstructive sleep apnea) - as of 9/2024 - doesn't feel she needs it 05/10/2016    Multiple thyroid nodules 10/07/2015    Chronic idiopathic gout involving toe without tophus 04/30/2015    Atherosclerosis of aorta ([facility]) 04/29/2015    Class 2 severe obesity with serious comorbidity and body mass index (BMI) of 37.0 to 37.9 in adult ([facility]) 04/29/2015    Stenosis of carotid artery 05/30/2014    NINOSKA (generalized anxiety disorder) 01/09/2014    PAF (paroxysmal atrial fibrillation) ([facility]) 07/12/2013    Left bundle branch block 03/05/2012    Type 2 diabetes mellitus with stage 4 chronic kidney disease, without long-term current use of insulin ([facility])     Hyperlipidemia associated with type 2 diabetes  "mellitus (HCC)     CKD (chronic kidney disease) stage 4, GFR 15-29 ml/min ([facility])         Objective:   Ht 1.676 m (5' 6\")   Wt 103 kg (228 lb)   BMI 36.80 kg/m²     Physical Exam:  Gen: NAD  Resp: nonlabored.  Able to speak in full sentences  Psy: pleasant / cooperative.   Neuro:  Alert and oriented x 3      Assessment and Plan:   The following treatment plan was discussed:   1. NINOSKA (generalized anxiety disorder)  escitalopram (LEXAPRO) 5 MG tablet      2. Type 2 diabetes mellitus with stage 4 chronic kidney disease, without long-term current use of insulin ([facility])        3. High blood pressure associated with diabetes ([facility])        We discussed preventative versus abortive treatment of anxiety.  May continue to use clonazepam as needed for panic or anxiety attacks.  Initiated Lexapro for daily preventative anxiety treatment.  Discussed length of onset efficacy as well as side effects of Lexapro.  Encouraged her to exercise daily.  Discussed caffeine limitation.  Discussed deep breathing exercises.  I will see her back in 4 weeks to discuss how she is doing on Lexapro.  Reviewed last notes with psychiatry.    Type 2 diabetes seems controlled with her home blood sugar readings.  Home blood pressure is also controlled.  Prescribed freestyle lelia per her request.  She will let me know if this is not covered or if she is having any difficulties operating the freestyle lelia.  She will have labs drawn in late February prior to her appointment with nephrology and will have an A1c as well as thyroid studies drawn as well.       "

## 2024-12-30 PROCEDURE — RXMED WILLOW AMBULATORY MEDICATION CHARGE: Performed by: NURSE PRACTITIONER

## 2025-01-02 ENCOUNTER — APPOINTMENT (OUTPATIENT)
Dept: ADMISSIONS | Facility: MEDICAL CENTER | Age: 80
End: 2025-01-02
Attending: INTERNAL MEDICINE
Payer: MEDICARE

## 2025-01-02 PROCEDURE — RXMED WILLOW AMBULATORY MEDICATION CHARGE: Performed by: INTERNAL MEDICINE

## 2025-01-03 ENCOUNTER — PHARMACY VISIT (OUTPATIENT)
Dept: PHARMACY | Facility: MEDICAL CENTER | Age: 80
End: 2025-01-03
Payer: COMMERCIAL

## 2025-01-06 ENCOUNTER — PRE-ADMISSION TESTING (OUTPATIENT)
Dept: ADMISSIONS | Facility: MEDICAL CENTER | Age: 80
End: 2025-01-06
Attending: INTERNAL MEDICINE
Payer: MEDICARE

## 2025-01-06 VITALS — HEIGHT: 66 IN | BODY MASS INDEX: 36.8 KG/M2

## 2025-01-06 DIAGNOSIS — Z01.812 PRE-OPERATIVE LABORATORY EXAMINATION: ICD-10-CM

## 2025-01-06 DIAGNOSIS — Z01.810 PRE-OPERATIVE CARDIOVASCULAR EXAMINATION: ICD-10-CM

## 2025-01-06 NOTE — PREADMIT AVS NOTE
Current Medications   Medication Instructions    escitalopram (LEXAPRO) 5 MG tablet Continue taking medication as prescribed, including morning of procedure          omeprazole (PRILOSEC) 20 MG delayed-release capsule Continue taking medication as prescribed, including morning of procedure     montelukast (SINGULAIR) 10 MG Tab Continue taking medication as prescribed, including morning of procedure          metoprolol tartrate (LOPRESSOR) 50 MG Tab Continue taking medication as prescribed, including morning of procedure     sodium bicarbonate (SODIUM BICARBONATE) 650 MG Tab Continue taking medication as prescribed, including morning of procedure     levothyroxine (SYNTHROID) 50 MCG Tab Continue taking medication as prescribed, including morning of procedure     NIFEdipine SR (PROCADIA-XL) 30 MG tablet Continue taking medication as prescribed, including morning of procedure     clonazePAM (KLONOPIN) 0.5 MG Tab As needed medication, may take if needed, including morning of procedure     rivaroxaban (XARELTO) 15 MG Tab tablet Follow instructions from surgeon or specialist.    rosuvastatin (CRESTOR) 10 MG Tab Continue taking medication as prescribed, including morning of procedure     losartan (COZAAR) 50 MG Tab Stop 24 hours before surgery    allopurinol (ZYLOPRIM) 100 MG Tab Continue taking medication as prescribed, including morning of procedure     furosemide (LASIX) 80 MG Tab Hold medication day of procedure    acetaminophen (TYLENOL) 650 MG CR tablet As needed medication, may take if needed, including morning of procedure     clobetasol (TEMOVATE) 0.05 % Ointment Hold medication day of procedure    fluocinonide (LIDEX) 0.05 % Ointment Hold medication day of procedure    fluticasone (FLONASE) 50 MCG/ACT nasal spray As needed medication, may take if needed, including morning of procedure     Semaglutide, 2 MG/DOSE, (OZEMPIC, 2 MG/DOSE,) 8 MG/3ML Solution Pen-injector Stop 7 days before surgery    dronedarone  (MULTAQ) 400 MG Tab Continue taking medication as prescribed, including morning of procedure     levalbuterol (XOPENEX HFA) 45 MCG/ACT inhaler As needed medication, may take if needed, including morning of procedure     ferrous sulfate 325 (65 Fe) MG tablet Stop 7 days before surgery    Cholecalciferol (VITAMIN D) 2000 UNITS CAPS Stop 7 days before surgery

## 2025-01-06 NOTE — PREPROCEDURE INSTRUCTIONS
PreAdmit Telephone appointment: Reviewed the Preparing for your procedure handout with patient. Patient instructed per pharmacy guidelines regarding taking, holding or contacting provider for instructions on regularly prescribed medications before surgery.    Confirmed with patient where to check in morning of surgery.     Testing appointment 1/20. Patient to call back if she needs to reschedule appt.

## 2025-01-07 ENCOUNTER — TELEPHONE (OUTPATIENT)
Dept: MEDICAL GROUP | Facility: LAB | Age: 80
End: 2025-01-07
Payer: MEDICARE
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
## 2025-01-08 ENCOUNTER — HOSPITAL ENCOUNTER (OUTPATIENT)
Dept: RADIOLOGY | Facility: MEDICAL CENTER | Age: 80
End: 2025-01-08
Attending: NURSE PRACTITIONER
Payer: MEDICARE

## 2025-01-08 DIAGNOSIS — Z12.31 VISIT FOR SCREENING MAMMOGRAM: ICD-10-CM

## 2025-01-08 PROCEDURE — 77067 SCR MAMMO BI INCL CAD: CPT

## 2025-01-09 ENCOUNTER — PATIENT MESSAGE (OUTPATIENT)
Dept: CARDIOLOGY | Facility: MEDICAL CENTER | Age: 80
End: 2025-01-09

## 2025-01-16 ENCOUNTER — TELEPHONE (OUTPATIENT)
Dept: CARDIOLOGY | Facility: MEDICAL CENTER | Age: 80
End: 2025-01-16
Payer: MEDICARE

## 2025-01-20 ENCOUNTER — PRE-ADMISSION TESTING (OUTPATIENT)
Dept: ADMISSIONS | Facility: MEDICAL CENTER | Age: 80
End: 2025-01-20
Attending: INTERNAL MEDICINE
Payer: MEDICARE

## 2025-01-20 DIAGNOSIS — Z01.812 PRE-OPERATIVE LABORATORY EXAMINATION: ICD-10-CM

## 2025-01-20 DIAGNOSIS — Z01.810 PRE-OPERATIVE CARDIOVASCULAR EXAMINATION: ICD-10-CM

## 2025-01-20 LAB
ANION GAP SERPL CALC-SCNC: 15 MMOL/L (ref 7–16)
BUN SERPL-MCNC: 56 MG/DL (ref 8–22)
CALCIUM SERPL-MCNC: 9.8 MG/DL (ref 8.4–10.2)
CHLORIDE SERPL-SCNC: 105 MMOL/L (ref 96–112)
CO2 SERPL-SCNC: 22 MMOL/L (ref 20–33)
CREAT SERPL-MCNC: 2.65 MG/DL (ref 0.5–1.4)
EKG IMPRESSION: NORMAL
ERYTHROCYTE [DISTWIDTH] IN BLOOD BY AUTOMATED COUNT: 49.7 FL (ref 35.9–50)
EST. AVERAGE GLUCOSE BLD GHB EST-MCNC: 131 MG/DL
GFR SERPLBLD CREATININE-BSD FMLA CKD-EPI: 18 ML/MIN/1.73 M 2
GLUCOSE SERPL-MCNC: 124 MG/DL (ref 65–99)
HBA1C MFR BLD: 6.2 % (ref 4–5.6)
HCT VFR BLD AUTO: 38.8 % (ref 37–47)
HGB BLD-MCNC: 12.8 G/DL (ref 12–16)
MCH RBC QN AUTO: 32.2 PG (ref 27–33)
MCHC RBC AUTO-ENTMCNC: 33 G/DL (ref 32.2–35.5)
MCV RBC AUTO: 97.7 FL (ref 81.4–97.8)
PLATELET # BLD AUTO: 119 K/UL (ref 164–446)
PMV BLD AUTO: 10.7 FL (ref 9–12.9)
POTASSIUM SERPL-SCNC: 4.6 MMOL/L (ref 3.6–5.5)
RBC # BLD AUTO: 3.97 M/UL (ref 4.2–5.4)
SODIUM SERPL-SCNC: 142 MMOL/L (ref 135–145)
WBC # BLD AUTO: 8 K/UL (ref 4.8–10.8)

## 2025-01-20 PROCEDURE — 80048 BASIC METABOLIC PNL TOTAL CA: CPT

## 2025-01-20 PROCEDURE — 85027 COMPLETE CBC AUTOMATED: CPT

## 2025-01-20 PROCEDURE — 93005 ELECTROCARDIOGRAM TRACING: CPT | Mod: TC

## 2025-01-20 PROCEDURE — 36415 COLL VENOUS BLD VENIPUNCTURE: CPT

## 2025-01-20 PROCEDURE — 93010 ELECTROCARDIOGRAM REPORT: CPT | Performed by: INTERNAL MEDICINE

## 2025-01-20 PROCEDURE — 83036 HEMOGLOBIN GLYCOSYLATED A1C: CPT

## 2025-01-21 ENCOUNTER — PATIENT OUTREACH (OUTPATIENT)
Dept: HEALTH INFORMATION MANAGEMENT | Facility: OTHER | Age: 80
End: 2025-01-21
Payer: MEDICARE

## 2025-01-21 DIAGNOSIS — I10 ESSENTIAL HYPERTENSION: ICD-10-CM

## 2025-01-21 DIAGNOSIS — E11.22 TYPE 2 DIABETES MELLITUS WITH STAGE 4 CHRONIC KIDNEY DISEASE, WITHOUT LONG-TERM CURRENT USE OF INSULIN (HCC): ICD-10-CM

## 2025-01-21 DIAGNOSIS — N18.4 TYPE 2 DIABETES MELLITUS WITH STAGE 4 CHRONIC KIDNEY DISEASE, WITHOUT LONG-TERM CURRENT USE OF INSULIN (HCC): ICD-10-CM

## 2025-01-21 DIAGNOSIS — J45.20 MILD INTERMITTENT ASTHMA WITHOUT COMPLICATION: ICD-10-CM

## 2025-01-21 PROCEDURE — RXMED WILLOW AMBULATORY MEDICATION CHARGE: Performed by: NURSE PRACTITIONER

## 2025-01-21 PROCEDURE — RXMED WILLOW AMBULATORY MEDICATION CHARGE: Performed by: INTERNAL MEDICINE

## 2025-01-22 ENCOUNTER — PHARMACY VISIT (OUTPATIENT)
Dept: PHARMACY | Facility: MEDICAL CENTER | Age: 80
End: 2025-01-22
Payer: COMMERCIAL

## 2025-01-23 ENCOUNTER — PATIENT MESSAGE (OUTPATIENT)
Dept: HEALTH INFORMATION MANAGEMENT | Facility: OTHER | Age: 80
End: 2025-01-23

## 2025-01-23 NOTE — PROGRESS NOTES
Called Whitley for monthly outreach follow up. Whitley has been with the [facility] program for approximately two years. Reviewed her healthcare goals. Her diabetes is well controlled with an A1c of 6.2. Her blood pressure is stable. She appropriately follows up with her provides and feels comfortable communication via [employer] as well. 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 Discussed that she can still call with any questions or concerns. She can also rejoin the program at any time. [employer] message sent with PCM Program contact information.

## 2025-01-28 NOTE — PATIENT COMMUNICATION
Received [address] medication via mail to office, addressed to VR, total bottles received 2 with 60 tablets each. Called Maribel to inquire about amount of meds received and cardiologist it's addressed to. Spoke to Falguni and was transferred to [address] rep. No answer and left detailed VM to return call.    [employer] message sent to darrell López  [phone] P   No

## 2025-01-29 RX ORDER — HYDRALAZINE HYDROCHLORIDE 20 MG/ML
5 INJECTION INTRAMUSCULAR; INTRAVENOUS
Status: CANCELLED | OUTPATIENT
Start: 2025-01-29

## 2025-01-30 ENCOUNTER — ANESTHESIA EVENT (OUTPATIENT)
Dept: SURGERY | Facility: MEDICAL CENTER | Age: 80
End: 2025-01-30
Payer: MEDICARE

## 2025-01-30 ENCOUNTER — ANESTHESIA (OUTPATIENT)
Dept: SURGERY | Facility: MEDICAL CENTER | Age: 80
End: 2025-01-30
Payer: MEDICARE

## 2025-01-30 ENCOUNTER — HOSPITAL ENCOUNTER (OUTPATIENT)
Facility: MEDICAL CENTER | Age: 80
End: 2025-01-30
Attending: INTERNAL MEDICINE | Admitting: INTERNAL MEDICINE
Payer: MEDICARE

## 2025-01-30 VITALS
TEMPERATURE: 97.5 F | BODY MASS INDEX: 36.56 KG/M2 | HEIGHT: 66 IN | OXYGEN SATURATION: 94 % | WEIGHT: 227.51 LBS | HEART RATE: 73 BPM | RESPIRATION RATE: 16 BRPM | DIASTOLIC BLOOD PRESSURE: 69 MMHG | SYSTOLIC BLOOD PRESSURE: 172 MMHG

## 2025-01-30 LAB
GLUCOSE BLD STRIP.AUTO-MCNC: 122 MG/DL (ref 65–99)
PATHOLOGY CONSULT NOTE: NORMAL

## 2025-01-30 PROCEDURE — 88341 IMHCHEM/IMCYTCHM EA ADD ANTB: CPT | Mod: 26,59 | Performed by: STUDENT IN AN ORGANIZED HEALTH CARE EDUCATION/TRAINING PROGRAM

## 2025-01-30 PROCEDURE — 88342 IMHCHEM/IMCYTCHM 1ST ANTB: CPT | Performed by: STUDENT IN AN ORGANIZED HEALTH CARE EDUCATION/TRAINING PROGRAM

## 2025-01-30 PROCEDURE — 88305 TISSUE EXAM BY PATHOLOGIST: CPT | Mod: 59 | Performed by: STUDENT IN AN ORGANIZED HEALTH CARE EDUCATION/TRAINING PROGRAM

## 2025-01-30 PROCEDURE — 160208 HCHG ENDO MINUTES - EA ADDL 1 MIN LEVEL 4: Performed by: INTERNAL MEDICINE

## 2025-01-30 PROCEDURE — 88312 SPECIAL STAINS GROUP 1: CPT | Mod: 26 | Performed by: STUDENT IN AN ORGANIZED HEALTH CARE EDUCATION/TRAINING PROGRAM

## 2025-01-30 PROCEDURE — 700101 HCHG RX REV CODE 250: Performed by: STUDENT IN AN ORGANIZED HEALTH CARE EDUCATION/TRAINING PROGRAM

## 2025-01-30 PROCEDURE — 82962 GLUCOSE BLOOD TEST: CPT

## 2025-01-30 PROCEDURE — 160025 RECOVERY II MINUTES (STATS): Performed by: INTERNAL MEDICINE

## 2025-01-30 PROCEDURE — 88360 TUMOR IMMUNOHISTOCHEM/MANUAL: CPT | Performed by: STUDENT IN AN ORGANIZED HEALTH CARE EDUCATION/TRAINING PROGRAM

## 2025-01-30 PROCEDURE — 160035 HCHG PACU - 1ST 60 MINS PHASE I: Performed by: INTERNAL MEDICINE

## 2025-01-30 PROCEDURE — 160002 HCHG RECOVERY MINUTES (STAT): Performed by: INTERNAL MEDICINE

## 2025-01-30 PROCEDURE — 160048 HCHG OR STATISTICAL LEVEL 1-5: Performed by: INTERNAL MEDICINE

## 2025-01-30 PROCEDURE — 160009 HCHG ANES TIME/MIN: Performed by: INTERNAL MEDICINE

## 2025-01-30 PROCEDURE — 160036 HCHG PACU - EA ADDL 30 MINS PHASE I: Performed by: INTERNAL MEDICINE

## 2025-01-30 PROCEDURE — 700102 HCHG RX REV CODE 250 W/ 637 OVERRIDE(OP): Performed by: STUDENT IN AN ORGANIZED HEALTH CARE EDUCATION/TRAINING PROGRAM

## 2025-01-30 PROCEDURE — 160046 HCHG PACU - 1ST 60 MINS PHASE II: Performed by: INTERNAL MEDICINE

## 2025-01-30 PROCEDURE — 700105 HCHG RX REV CODE 258: Performed by: INTERNAL MEDICINE

## 2025-01-30 PROCEDURE — 88305 TISSUE EXAM BY PATHOLOGIST: CPT | Mod: 26 | Performed by: STUDENT IN AN ORGANIZED HEALTH CARE EDUCATION/TRAINING PROGRAM

## 2025-01-30 PROCEDURE — 160203 HCHG ENDO MINUTES - 1ST 30 MINS LEVEL 4: Performed by: INTERNAL MEDICINE

## 2025-01-30 PROCEDURE — 700111 HCHG RX REV CODE 636 W/ 250 OVERRIDE (IP): Performed by: STUDENT IN AN ORGANIZED HEALTH CARE EDUCATION/TRAINING PROGRAM

## 2025-01-30 PROCEDURE — A9270 NON-COVERED ITEM OR SERVICE: HCPCS | Performed by: STUDENT IN AN ORGANIZED HEALTH CARE EDUCATION/TRAINING PROGRAM

## 2025-01-30 PROCEDURE — 88342 IMHCHEM/IMCYTCHM 1ST ANTB: CPT | Mod: 26,59 | Performed by: STUDENT IN AN ORGANIZED HEALTH CARE EDUCATION/TRAINING PROGRAM

## 2025-01-30 PROCEDURE — 88312 SPECIAL STAINS GROUP 1: CPT | Performed by: STUDENT IN AN ORGANIZED HEALTH CARE EDUCATION/TRAINING PROGRAM

## 2025-01-30 PROCEDURE — 88341 IMHCHEM/IMCYTCHM EA ADD ANTB: CPT | Mod: 91 | Performed by: STUDENT IN AN ORGANIZED HEALTH CARE EDUCATION/TRAINING PROGRAM

## 2025-01-30 PROCEDURE — 88360 TUMOR IMMUNOHISTOCHEM/MANUAL: CPT | Mod: 26 | Performed by: STUDENT IN AN ORGANIZED HEALTH CARE EDUCATION/TRAINING PROGRAM

## 2025-01-30 RX ORDER — LIDOCAINE HYDROCHLORIDE 20 MG/ML
INJECTION, SOLUTION EPIDURAL; INFILTRATION; INTRACAUDAL; PERINEURAL PRN
Status: DISCONTINUED | OUTPATIENT
Start: 2025-01-30 | End: 2025-01-30 | Stop reason: SURG

## 2025-01-30 RX ORDER — DEXAMETHASONE SODIUM PHOSPHATE 4 MG/ML
INJECTION, SOLUTION INTRA-ARTICULAR; INTRALESIONAL; INTRAMUSCULAR; INTRAVENOUS; SOFT TISSUE PRN
Status: DISCONTINUED | OUTPATIENT
Start: 2025-01-30 | End: 2025-01-30 | Stop reason: SURG

## 2025-01-30 RX ORDER — SODIUM CHLORIDE, SODIUM LACTATE, POTASSIUM CHLORIDE, CALCIUM CHLORIDE 600; 310; 30; 20 MG/100ML; MG/100ML; MG/100ML; MG/100ML
INJECTION, SOLUTION INTRAVENOUS CONTINUOUS
Status: ACTIVE | OUTPATIENT
Start: 2025-01-30 | End: 2025-01-30

## 2025-01-30 RX ORDER — HALOPERIDOL 5 MG/ML
1 INJECTION INTRAMUSCULAR
Status: DISCONTINUED | OUTPATIENT
Start: 2025-01-30 | End: 2025-01-30 | Stop reason: HOSPADM

## 2025-01-30 RX ORDER — LIDOCAINE HYDROCHLORIDE 20 MG/ML
15 SOLUTION OROPHARYNGEAL ONCE
Status: COMPLETED | OUTPATIENT
Start: 2025-01-30 | End: 2025-01-30

## 2025-01-30 RX ORDER — DIPHENHYDRAMINE HYDROCHLORIDE 50 MG/ML
12.5 INJECTION INTRAMUSCULAR; INTRAVENOUS
Status: DISCONTINUED | OUTPATIENT
Start: 2025-01-30 | End: 2025-01-30 | Stop reason: HOSPADM

## 2025-01-30 RX ORDER — ALBUTEROL SULFATE 5 MG/ML
2.5 SOLUTION RESPIRATORY (INHALATION)
Status: DISCONTINUED | OUTPATIENT
Start: 2025-01-30 | End: 2025-01-30 | Stop reason: HOSPADM

## 2025-01-30 RX ORDER — EPHEDRINE SULFATE 50 MG/ML
5 INJECTION, SOLUTION INTRAVENOUS
Status: DISCONTINUED | OUTPATIENT
Start: 2025-01-30 | End: 2025-01-30 | Stop reason: HOSPADM

## 2025-01-30 RX ORDER — ONDANSETRON 2 MG/ML
INJECTION INTRAMUSCULAR; INTRAVENOUS PRN
Status: DISCONTINUED | OUTPATIENT
Start: 2025-01-30 | End: 2025-01-30 | Stop reason: HOSPADM

## 2025-01-30 RX ORDER — LABETALOL HYDROCHLORIDE 5 MG/ML
5 INJECTION, SOLUTION INTRAVENOUS
Status: DISCONTINUED | OUTPATIENT
Start: 2025-01-30 | End: 2025-01-30 | Stop reason: HOSPADM

## 2025-01-30 RX ORDER — LOSARTAN POTASSIUM 50 MG/1
50 TABLET ORAL
Status: DISCONTINUED | OUTPATIENT
Start: 2025-01-30 | End: 2025-01-30 | Stop reason: HOSPADM

## 2025-01-30 RX ORDER — ONDANSETRON 2 MG/ML
4 INJECTION INTRAMUSCULAR; INTRAVENOUS
Status: DISCONTINUED | OUTPATIENT
Start: 2025-01-30 | End: 2025-01-30 | Stop reason: HOSPADM

## 2025-01-30 RX ORDER — METOPROLOL TARTRATE 1 MG/ML
1 INJECTION, SOLUTION INTRAVENOUS
Status: DISCONTINUED | OUTPATIENT
Start: 2025-01-30 | End: 2025-01-30 | Stop reason: HOSPADM

## 2025-01-30 RX ADMIN — PROPOFOL 100 MCG/KG/MIN: 10 INJECTION, EMULSION INTRAVENOUS at 08:37

## 2025-01-30 RX ADMIN — LOSARTAN POTASSIUM 50 MG: 50 TABLET, FILM COATED ORAL at 11:31

## 2025-01-30 RX ADMIN — LIDOCAINE HYDROCHLORIDE 15 ML: 20 SOLUTION ORAL at 08:18

## 2025-01-30 RX ADMIN — LABETALOL HYDROCHLORIDE 5 MG: 5 INJECTION INTRAVENOUS at 10:18

## 2025-01-30 RX ADMIN — PROPOFOL 100 MG: 10 INJECTION, EMULSION INTRAVENOUS at 08:36

## 2025-01-30 RX ADMIN — LABETALOL HYDROCHLORIDE 5 MG: 5 INJECTION INTRAVENOUS at 10:32

## 2025-01-30 RX ADMIN — ONDANSETRON 4 MG: 2 INJECTION INTRAMUSCULAR; INTRAVENOUS at 09:04

## 2025-01-30 RX ADMIN — SODIUM CHLORIDE, POTASSIUM CHLORIDE, SODIUM LACTATE AND CALCIUM CHLORIDE: 600; 310; 30; 20 INJECTION, SOLUTION INTRAVENOUS at 08:31

## 2025-01-30 RX ADMIN — DEXAMETHASONE SODIUM PHOSPHATE 8 MG: 4 INJECTION INTRA-ARTICULAR; INTRALESIONAL; INTRAMUSCULAR; INTRAVENOUS; SOFT TISSUE at 08:39

## 2025-01-30 RX ADMIN — LIDOCAINE HYDROCHLORIDE 40 MG: 20 INJECTION, SOLUTION EPIDURAL; INFILTRATION; INTRACAUDAL; PERINEURAL at 08:36

## 2025-01-30 ASSESSMENT — FIBROSIS 4 INDEX: FIB4 SCORE: 2.63

## 2025-01-30 ASSESSMENT — PAIN SCALES - GENERAL: PAIN_LEVEL: 0

## 2025-01-30 ASSESSMENT — PAIN DESCRIPTION - PAIN TYPE: TYPE: SURGICAL PAIN

## 2025-01-30 NOTE — DISCHARGE INSTRUCTIONS
Resume diet as tolerated.  Restart Xarelto in 3 days.  Avoid NSAIDs and aspirin x 7 days.  Follow-up with [facility] as scheduled.  Discussed finding with the patient's daughter.     Findings:   EGD  1. Esophagus:   -Hiatal hernia measuring 2 cm.  -Visualized portion of the esophagus appeared normal.     2. Stomach:   -Few scattered subcentimeter gastric polyps in the gastric body s/p biopsy using cold forceps.  -Gastric biopsies obtained to evaluate for H. pylori.  -Otherwise, visualized portion of the gastric body appeared normal.  -The pylorus is patent.     3. Duodenum:   -Duodenal polyp measuring 8 mm between the D1 and D2 sweep s/p biopsy using cold forceps.  -Otherwise, visualized portion of the first and second portions of duodenum appeared normal.     Colonoscopy  1.  Bowel prep: Adequate.  2.  Random colon biopsies obtained using cold forceps in the right and left colon to evaluate for microscopic colitis.  3.  8 mm sessile polyp in the ascending colon s/p cold snare with retrieval.  4.  3 mm sessile polyp in transverse colon s/p cold forceps with retrieval.  5.  Small sigmoid diverticulosis.  6.  Grade 2 internal hemorrhoids.  7.  Normal perianal exam.    What to Expect Post Anesthesia    Rest and take it easy for the first 24 hours.  A responsible adult is recommended to remain with you during that time.  It is normal to feel sleepy.  We encourage you to not do anything that requires balance, judgment or coordination.    FOR 24 HOURS DO NOT:  Drive, operate machinery or run household appliances.  Drink beer or alcoholic beverages.  Make important decisions or sign legal documents.    To avoid nausea, slowly advance diet as tolerated, avoiding spicy or greasy foods for the first day.  Add more substantial food to your diet according to your provider's instructions.  Babies can be fed formula or breast milk as soon as they are hungry.  INCREASE FLUIDS AND FIBER TO AVOID CONSTIPATION.    MILD FLU-LIKE SYMPTOMS ARE  NORMAL.  YOU MAY EXPERIENCE GENERALIZED MUSCLE ACHES, THROAT IRRITATION, HEADACHE AND/OR SOME NAUSEA.    If any questions arise, call your provider.  If your provider is not available, please feel free to call the [facility] at [phone].    MEDICATIONS: Resume taking daily medication.  Take prescribed pain medication with food.  If no medication is prescribed, you may take non-aspirin pain medication if needed.  PAIN MEDICATION CAN BE VERY CONSTIPATING.  Take a stool softener or laxative such as senokot, pericolace, or milk of magnesia if needed.    ENDOSCOPY HOME CARE INSTRUCTIONS    GASTROSCOPY OR ERCP  1. Don't eat or drink anything for about an hour after the test. You can then resume your regular diet.  2. Don't drive or drink alcohol for 24 hours. The medication you received will make you too drowsy.  3. Don't take any coffee, tea, or aspirin products until after you see your doctor. These can harm the lining of your stomach.  4. If you begin to vomit bloody material, or develop black or bloody stools, call your doctor as soon as possible.  5. If you have any neck, chest, abdominal pain or temp of 100 degrees, call your doctor.      COLONOSCOPY OR FLEXIBLE SIGMOIDOSCOPY  1. If you received a barium enema, take a mild laxative such as dulcolax, Kyleigh's M.O., or Milk of Magnesia to clean out the barium.  2. Drink plenty of fluids. Eat a diet high in fiber (whole-grain breads, fresh fruit and vegetables).  3. You may notice a few drops of blood with your first bowel movement. If you develop any large amount of bleeding, black stools, a fever, or abdominal pain, call your doctor right away.  4. No heavy lifting      You should call 911 if you develop problems with breathing or chest pain.  If any questions arise, call your doctor. If your doctor is not available, please feel free to call [phone]. You can also call the [facility] open 24 hours/day, 7 days/week and speak to a nurse at [phone], or rckz free [phone].

## 2025-01-30 NOTE — ANESTHESIA TIME REPORT
Anesthesia Start and Stop Event Times       Date Time Event    1/30/2025 0702 Ready for Procedure     0831 Anesthesia Start     0913 Anesthesia Stop          Responsible Staff  01/30/25      Name Role Begin End    Leeroy Pena M.D. Anesth 0831 0913          Overtime Reason:  no overtime (within assigned shift)    Comments:

## 2025-01-30 NOTE — PROCEDURES
Procedure Performed:   1.  EGD with gastric and duodenal biopsies  2.  Colonoscopy with cold biopsy and polypectomy     Indication for Procedure: Nasal regurgitation, chronic loose stool, positive Cologuard     Procedure Date: 1/30/2025  Performing Physician: Janey Dow D.O.     Informed Consent: Before the procedure was performed, risks, benefits, and complications of the procedure were explained in layman's terms to the patient and understood by the patient. The risks included but were not limited to the risks of anesthesia/sedation, bleeding, pneumothorax, perforation, adverse reaction to medication, acute respiratory failure and possible death. The patient fully understood the risks and benefits and requested the procedure be performed.       Consent Obtained: From the patient.           Anesthesia: Please see anesthesia records.     Findings:   EGD  1. Esophagus:   -Hiatal hernia measuring 2 cm.  -Visualized portion of the esophagus appeared normal.    2. Stomach:   -Few scattered subcentimeter gastric polyps in the gastric body s/p biopsy using cold forceps.  -Gastric biopsies obtained to evaluate for H. pylori.  -Otherwise, visualized portion of the gastric body appeared normal.  -The pylorus is patent.    3. Duodenum:   -Duodenal polyp measuring 8 mm between the D1 and D2 sweep s/p biopsy using cold forceps.  -Otherwise, visualized portion of the first and second portions of duodenum appeared normal.    Colonoscopy  1.  Bowel prep: Adequate.  2.  Random colon biopsies obtained using cold forceps in the right and left colon to evaluate for microscopic colitis.  3.  8 mm sessile polyp in the ascending colon s/p cold snare with retrieval.  4.  3 mm sessile polyp in transverse colon s/p cold forceps with retrieval.  5.  Small sigmoid diverticulosis.  6.  Grade 2 internal hemorrhoids.  7.  Normal perianal exam.    Procedure Summary: Timeout was performed. The patient was put in the left lateral decubitus  position. An Olympus adult gastroscope was introduced through the mouth and advanced until the 2nd part of duodenum. The scope was then drawn back into the stomach. Retroflexion was done to visualize the fundus and cardia of the stomach. Biopsies obtained from the antrum for H. pylori, gastric polyp and duodenal polyp using cold forceps. There was minimal blood loss. The gastroscope was then withdrawn while carefully inspecting the esophageal mucosa.     The patient's gurney was turned 180 degrees. A digital rectal exam was done. An Olympus adult colonoscope was introduced into the anal orifice and advanced all the way until the cecum, identified by the tri-radiate fold, appendiceal orifice and ileocecal valve. An 8 mm sessile polyp was removed in the ascending colon using cold snare with minimal blood loss and complete retrieval. A 3 mm sessile polyp was removed in the transverse colon using cold forceps with minimal blood loss and complete retrieval. Random colon biopsies were obtained in the right and left colon for evaluation of microscopic colitis using cold forceps. There was minimal blood loss. The colonoscope was then withdrawn while doing a careful, circumferential examination of the colonic mucosa. Continuous monitoring of pulse oximetry, heart rhythm, and blood pressure was done before, during and after the procedure. The patient left the endoscopy lab in stable condition.    Estimated Blood Loss, if any: Minimal     Unusual Events or Complications: None     Specimen(s), if any: Gastric, duodenum and colon     Disposition:   1.  Follow-up on pathology.  2.  Resume regular diet as tolerated.  3.  Hold Xarelto for 3 days. Avoid NSAIDs and aspirin for 7 days.  4.  Follow-up with [facility] as scheduled.  5.  Discharge home. Discussed recommendations with the patient and daughter.

## 2025-01-30 NOTE — ANESTHESIA PREPROCEDURE EVALUATION
Case: [id number] Date/Time: 01/30/25 0815    Procedures:       ESOPHAGOGASTRODUODENOSCOPY AND COLONOSCOPY WITH BIOPSIES      COLONOSCOPY    Pre-op diagnosis: POSITIVE COLOGUARD, DYSPHAGIA, NASAL REGURGITATION    Location:  ENDOSCOPIC ULTRASOUND ROOM / SURGERY [facility]    Surgeons: Janey Dow D.O.            Relevant Problems   ANESTHESIA   (positive) AVERY (obstructive sleep apnea) - as of 9/2024 - doesn't feel she needs it      PULMONARY   (positive) Mild intermittent asthma without complication      CARDIAC   (positive) Atherosclerosis of aorta (CMS-HCC)   (positive) High blood pressure associated with diabetes (HCC)   (positive) Left bundle branch block   (positive) Non-rheumatic mitral regurgitation   (positive) PAF (paroxysmal atrial fibrillation) ([facility])   (positive) Stenosis of carotid artery   (positive) VPC's (ventricular premature complexes)         (positive) CKD (chronic kidney disease) stage 4, GFR 15-29 ml/min ([facility])   (positive) Microalbuminuria due to type 2 diabetes mellitus (HCC)      ENDO   (positive) Hypothyroidism due to acquired atrophy of thyroid   (positive) Type 2 diabetes mellitus with stage 4 chronic kidney disease, without long-term current use of insulin (HCC)      Other   (positive) Chronic idiopathic gout involving toe without tophus     Last available EKG   Sinus rhythm   Left bundle branch block   Compared to ECG 03/22/2024 13:29:44   Sinus bradycardia no longer present     Last available TTE  Left Ventricle  Normal left ventricular size and systolic function. Mild concentric left ventricular hypertrophy. Normal regional wall motion. The left ventricular ejection fraction is visually estimated to be 55%. Normal diastolic function. Abnormal septal motion.     Right Ventricle  Normal right ventricular size and systolic function.     Right Atrium  Normal right atrial size. Normal inferior vena cava size and inspiratory collapse.     Left Atrium  Normal left atrial size. Left  atrial volume index is 17 mL/sq m.     Mitral Valve  Mild mitral annular calcification. Mild mitral regurgitation. No mitral   stenosis.     Aortic Valve  Tricuspid aortic valve. Mild aortic valve stenosis. Transvalvular gradients are - Peak: 29 mmHg, Mean:  17 mmHg. Vmax is 2.6 m/s. Aortic valve area calculated from the continuity equation is 1.4 cm2. Dimensionless index is. 36, trace aortic insufficiency.     Tricuspid Valve  Structurally normal tricuspid valve. Trace tricuspid regurgitation. No tricuspid stenosis. Right atrial pressure is estimated to be 3 mmHg. Estimated right ventricular systolic pressure is 25 mmHg.     Pulmonic Valve  Structurally normal pulmonic valve without significant stenosis or regurgitation.     Pericardium  No pericardial effusion.     Aorta  Normal aortic root for body surface area. The ascending aorta diameter is 3.0  cm.    Report   Date Value Ref Range Status   2025       [facility]    Test Date:  2025  Pt Name:    CATHERINE BROWER                Department: [facility]  MRN:        [id number]                      Room:  Gender:     Female                       Technician:   :        [id number]                   Requested By:JAMES JIMENEZ  Order #:    [id number]                    Reading MD: Thee Jarquin MD    Measurements  Intervals                                Axis  Rate:       64                           P:          38  SD:         200                          QRS:        3  QRSD:       154                          T:          76  QT:         486  QTc:        501    Interpretive Statements  Sinus rhythm  Left bundle branch block  Compared to ECG 2024 13:29:44  Sinus bradycardia no longer present  Electronically Signed On 2025 14:46:37 PST by Thee Jarquin MD         Lab Results   Component Value Date/Time    WBC 8.0 2025 01:31 PM    RBC 3.97 (L) 2025 01:31 PM    HEMOGLOBIN 12.8 2025 01:31 PM    HEMATOCRIT 38.8 2025  01:31 PM    MCV 97.7 01/20/2025 01:31 PM    MCH 32.2 01/20/2025 01:31 PM    MCHC 33.0 01/20/2025 01:31 PM    MPV 10.7 01/20/2025 01:31 PM        Lab Results   Component Value Date/Time    SODIUM 142 01/20/2025 1331    POTASSIUM 4.6 01/20/2025 1331    CHLORIDE 105 01/20/2025 1331    CO2 22 01/20/2025 1331    GLUCOSE 124 (H) 01/20/2025 1331    BUN 56 (H) 01/20/2025 1331    CREATININE 2.65 (H) 01/20/2025 1331    CALCIUM 9.8 01/20/2025 1331    ANION 15.0 01/20/2025 1331         Physical Exam    Airway   Mallampati: II  TM distance: >3 FB  Neck ROM: full       Cardiovascular - normal exam  Rhythm: regular  Rate: normal     Dental - normal exam  Comments: caps         Pulmonary - normal exam  Breath sounds clear to auscultation     Abdominal   (+) obese     Neurological - normal exam                   Anesthesia Plan    ASA 3   ASA physical status 3 criteria: diabetes - poorly controlled and respiratory insufficiency    Plan - MAC               Induction: intravenous    Postoperative Plan: Postoperative administration of opioids is intended.    Pertinent diagnostic labs and testing reviewed    Informed Consent:    Anesthetic plan and risks discussed with patient.    Use of blood products discussed with: patient whom consented to blood products.

## 2025-01-30 NOTE — OR NURSING
1115- Patient arrived to phase 2. Patient changed out of surgical gown into clothes. Patient is A&Ox4. Patient reports no pain and no nausea. Vital signs taken and patient assessed. Asked the patient if they had to use the bathroom. The patient declined.    1131- Medicated per MAR for HTN    1145- Discharge instructions read. All questions answered.    1156- IV removed. Socorro BARLOW transported the patient to her ride via wheelchair. Discharged to care of responsible adult. All belongings with the patient.

## 2025-01-30 NOTE — OR NURSING
0917- Handoff received from ADELAIDA Jacob. Pt resting comfortably, denies any pain or nausea.     0930- Pt resting comfortably on gurney, denies pain or nausea.     0945- No changes. Pt sitting up tolerating sips of water.     1000- No changes, pt resting in gurney.     1015- Pt with elevated BP, medicated with IV medication. SEE MAR    1030- Family updated.     1035- BP remains elevated, medicated with subsequent dose. SEE MAR    1045- Pt resting in gurney, no changes.     1100- Pt tolerating water and crackers. Pt reports anxiety, notified Dr. Pena.     1105- Dr. Pena to bedside, orders received.     1115- Pt leaving PACU with CNA.

## 2025-01-30 NOTE — OR NURSING
0912 Pt arrived to ENDO VSS. 6L Simple mask in place. Pt is resting comfortably. Non-labored respirations. Pt is spontaneous breathing.  NO symptoms or complaints regarding pain or nausea at this time.    0901 Handoff to ADELAIDA Trinidad

## 2025-01-30 NOTE — ANESTHESIA POSTPROCEDURE EVALUATION
Patient: Whitley Frederick    Procedure Summary       Date: 01/30/25 Room / Location:  ENDOSCOPIC ULTRASOUND ROOM / SURGERY [facility]    Anesthesia Start: 0831 Anesthesia Stop: 0913    Procedures:       ESOPHAGOGASTRODUODENOSCOPY AND COLONOSCOPY WITH BIOPSIES (Esophagus)      COLONOSCOPY (Anus) Diagnosis: (Gastric polyp, hiatal hernia, duodenal polyp, hemmoroids, Colon polyps, diverticulosis)    Surgeons: Janey Dow D.O. Responsible Provider: Leeroy Pena M.D.    Anesthesia Type: MAC ASA Status: 3            Final Anesthesia Type: MAC  Last vitals  BP   Blood Pressure : (!) 167/76    Temp   36.8 °C (98.2 °F)    Pulse   66   Resp   14    SpO2   96 %      Anesthesia Post Evaluation    Patient location during evaluation: PACU  Patient participation: complete - patient participated  Level of consciousness: awake and alert  Pain score: 0    Airway patency: patent  Anesthetic complications: no  Cardiovascular status: hemodynamically stable  Respiratory status: acceptable  Hydration status: euvolemic    PONV: none          There were no known notable events for this encounter.     Nurse Pain Score: 0 (NPRS)

## 2025-01-31 ENCOUNTER — TELEPHONE (OUTPATIENT)
Dept: CARDIOLOGY | Facility: MEDICAL CENTER | Age: 80
End: 2025-01-31
Payer: MEDICARE

## 2025-01-31 NOTE — TELEPHONE ENCOUNTER
Isabellernestina Diazpop  You22 minutes ago (11:52 AM)     APPLE  Whitley Rogers just called me  and let me know she had a colonoscopy yesterday and was given labetalol post procedure. She mentioned today, she is experiencing episodes of chest pains and is unsure if this is due to the labetalol or if this is something she should be worried about. She is requesting call back @ [phone].  Thanks!  Héctordenise Diazpop  Rx Coordinator       Phone Number Called: [phone]     Call outcome: Spoke to patient regarding message below.    Message:     Pt states she has been having chest pains since her upper endoscopy and colonoscopy yesterday. Pt states she is unable to differentiate whether musculoskeletal or cardiac in nature, but it started after her procedures yesterday. Denies SOB or dizziness. Denies fever/chills/clamminess. Endorses bloody stool.     Pt contacted GI and was advised to go to ER.     159/83  HR 77  155/105 HR 82    ER advised at this time for thorough GI and cardiac workup.

## 2025-02-01 ENCOUNTER — PATIENT MESSAGE (OUTPATIENT)
Dept: CARDIOLOGY | Facility: MEDICAL CENTER | Age: 80
End: 2025-02-01
Payer: MEDICARE

## 2025-02-02 RX ORDER — LEVALBUTEROL TARTRATE 45 UG/1
2 AEROSOL, METERED ORAL EVERY 6 HOURS PRN
Qty: 15 G | Refills: 11 | Status: SHIPPED | OUTPATIENT
Start: 2025-02-02

## 2025-02-06 PROCEDURE — RXMED WILLOW AMBULATORY MEDICATION CHARGE: Performed by: INTERNAL MEDICINE

## 2025-02-06 PROCEDURE — RXMED WILLOW AMBULATORY MEDICATION CHARGE: Performed by: NURSE PRACTITIONER

## 2025-02-07 ENCOUNTER — TELEPHONE (OUTPATIENT)
Dept: CARDIOLOGY | Facility: MEDICAL CENTER | Age: 80
End: 2025-02-07
Payer: MEDICARE

## 2025-02-10 ENCOUNTER — PHARMACY VISIT (OUTPATIENT)
Dept: PHARMACY | Facility: MEDICAL CENTER | Age: 80
End: 2025-02-10
Payer: COMMERCIAL

## 2025-02-12 ENCOUNTER — APPOINTMENT (OUTPATIENT)
Dept: ADMISSIONS | Facility: MEDICAL CENTER | Age: 80
End: 2025-02-12
Attending: INTERNAL MEDICINE
Payer: MEDICARE

## 2025-02-14 ENCOUNTER — PRE-ADMISSION TESTING (OUTPATIENT)
Dept: ADMISSIONS | Facility: MEDICAL CENTER | Age: 80
End: 2025-02-14
Attending: INTERNAL MEDICINE
Payer: MEDICARE

## 2025-02-14 VITALS — HEIGHT: 66 IN | BODY MASS INDEX: 36.72 KG/M2

## 2025-02-14 NOTE — PREPROCEDURE INSTRUCTIONS
Pt preadmitted via phone, instructions emailed. Questions answered.Patient instructed per pharmacy/anesthesia guidelines regarding taking, holding or contacting provider for instructions on regularly prescribed medications before surgery. Instructed to follow instructions as directed on after visit summary. - METs score >4. AVERY hx with CPAP and 2l supplemental oxygen. Will bring CPAP but does not have portable tank.

## 2025-02-14 NOTE — PREADMIT AVS NOTE
Current Medications   Medication Instructions    levalbuterol (XOPENEX HFA) 45 MCG/ACT inhaler As needed medication, may take if needed, including morning of procedure                    omeprazole (PRILOSEC) 20 MG delayed-release capsule Take morning of surgery with sip of water    montelukast (SINGULAIR) 10 MG Tab Continue until night before surgery         metoprolol tartrate (LOPRESSOR) 50 MG Tab Take morning of surgery with sip of water    sodium bicarbonate (SODIUM BICARBONATE) 650 MG Tab Continue until night before surgery    levothyroxine (SYNTHROID) 50 MCG Tab Take morning of surgery with sip of water    NIFEdipine SR (PROCADIA-XL) 30 MG tablet Continue until night before surgery    clonazePAM (KLONOPIN) 0.5 MG Tab As needed medication, may take if needed, including morning of procedure     rivaroxaban (XARELTO) 15 MG Tab tablet Follow instructions from surgeon or specialist.    rosuvastatin (CRESTOR) 10 MG Tab Continue until night before surgery    losartan (COZAAR) 50 MG Tab Stop 24 hours before surgery    allopurinol (ZYLOPRIM) 100 MG Tab Take morning of surgery with sip of water    furosemide (LASIX) 80 MG Tab Hold medication day of procedure    acetaminophen (TYLENOL) 650 MG CR tablet As needed medication, may take if needed, including morning of procedure     clobetasol (TEMOVATE) 0.05 % Ointment As needed medication, may take if needed, including morning of procedure          fluticasone (FLONASE) 50 MCG/ACT nasal spray Continue taking medication as prescribed, including morning of procedure     Semaglutide, 2 MG/DOSE, (OZEMPIC, 2 MG/DOSE,) 8 MG/3ML Solution Pen-injector Stop 7 days before surgery    dronedarone (MULTAQ) 400 MG Tab Take morning of surgery with sip of water    ferrous sulfate 325 (65 Fe) MG tablet Stop 7 days before surgery    Cholecalciferol (VITAMIN D) 2000 UNITS CAPS Stop 7 days before surgery

## 2025-02-17 ENCOUNTER — PATIENT MESSAGE (OUTPATIENT)
Dept: MEDICAL GROUP | Facility: LAB | Age: 80
End: 2025-02-17
Payer: MEDICARE

## 2025-02-17 NOTE — Clinical Note
[facility]  [address] [facility] [place] [address]    [id number]    Whitley Frederick  [address]    [place] [address]    February 16, 2025    Member Name: Whitley Frederick   Member Number: [id number]   Reference Number: [id number]   Approved Services: Hospital - Outpatient   Approved Service Dates: 02/16/2025 - 05/16/2025   Requesting Provider: Domenico Olea   Requested Provider: [facility]     Dear Whitleyeben Frederick:    The following medical service(s) requested by Domenico Olea have been approved:    Number of Services: 1  Description: Hospital - Outpatient  Biopsy of esophagus, stomach, and/or upper small bowel using a flexible endoscope  Ultrasound guided needle aspiration or biopsy of esophagus, stomach, and/or upper small bowel using a flexible endoscope  Diagnostic exam of esophagus, stomach, and/or upper small bowel using a flexible endoscope  Anesthesia for other procedure on esophagus, stomach, or upper small bowel using an endoscope     The services should be provided by [facility] no later than 05/16/2025. Please contact the provider listed below with any questions.     Provider Information:  [facility]  [phone]    Your plan benefit may require a deductible, co-payment or coinsurance for these services. This authorization does not guarantee [facility] will pay the claim for services that you receive. Payment by [facility] for these services is subject to the terms of your Evidence of Coverage, your eligibility at the time of service, and confirmation of benefit coverage.    For any questions or additional information, please contact Customer Service:    [facility] Toll Free: [phone]  TTY users dial: [phone]   Call Center Hours:  Oct 1 - Mar 31, Mon - Fri 7 AM to 8 PM PST  Oct 1 - Mar 31, Sat - Sun 8 AM to 8 PM PST  Apr 1 - Sep 30, Mon - Fri 7 AM to 8 PM PST    Office Hours: Mon - Fri 8 AM to 5 PM PST   E-mail: [email or web address]   Website:  [email or web address]      This information is available for free in other languages. Please contact Customer Service at the phone number above for more information. [facility] complies with applicable Federal civil rights laws and does not discriminate on the basis of race, color, national origin, age, disability or sex.    Sincerely,     Healthcare Utilization Management Department     Cc: [facility]   Domenico Olea    Multi-Language Insert  Multi- Services  English: We have free  services to answer any questions you may have about our health or drug plan.  To get an , just call us at [phone].  Someone who speaks English/Language can help you.  This is a free service.  Azeri: Tenemos servicios de intérprete sin costo alguno  para responder cualquier pregunta que pueda tener sobre nuestro plan de thor o medicamentos. Para hablar con un intérprete, por favor llame al [phone]. Alguien que hable español le podrá ayudar. Kasie es un servicio gratuito.  Chinese Mandarin: ?????????????????????????????? ???????????????? [phone]????????????????? ?????????  Chinese Cantonese: ?????????????????????????????? ????????????? [phone]???????????????????? ????????  Tagalog:  Cj jay serbisyo sa melindasashruthiing-wika riong isra friedg a carrol simsnggixiomara sa nnekapaz gill.  Tessa perales aziza rosasi  [phone]. Amrit Roland.  Alonzo rodriguez.  Nepali:  Nous proposons heaven services gratuits d'interprétation pour répondre à toutes estuardo questions relatives à notre régime de santé ou d'assurance-médicaments. Pour accéder au service d'interprétation, il vous suffit de nous appeler au [phone].  Un interlocuteur [facility] pourra vous aider. Ce service est gratuit.  Serbian:  Cristiane tôi có d?ch v? thông d?ch mi?n phí ð? tr? l?i các câu h?i v? chýõng s?c kh?e và chýõng trình thu?c men. N?u quí v? c?n thông d?ch viên dipika g?i [phone] s? có nhân viên nói ti?ng Vi?t giúp ð? quí v?. Ðây là d?ch v? mi?n phí .  Kazakh:  [facility]ser [facility] Dolmetscherservice beantwortet Ihren Fragen zu unserem Gesundheits- und Arzneimittelplan. Unsere Dolmetscher erreichen Sie [phone]. Man wird nen wolf [facility] [place]. Dieser Service ist [facility].  Croatian:  ??? ?? ?? ?? ?? ??? ?? ??? ?? ???? ?? ?? ???? ???? ????. ?? ???? ????? ?? [phone] ??? ??? ????.  ???? ?? ???? ?? ?? ????. ? ???? ??? ?????.   Irish: Inez ó âàñ âîçíèêíóò âîïðîñû îòíîñèòåëüíî ñòðàõîâîãî èëè ìåäèêàìåíòíîãî ïëàíà, âû ìîæåòå âîñïîëüçîâàòüñÿ íàøèìè áåñïëàòíûìè óñëóãàìè ïåðåâîä÷èêîâ. ×òîáû âîñïîëüçîâàòüñÿ óñëóãàìè ïåðåâîä÷èêà, ïîçâîíèòå íàì ïî òåëåôîíó [phone]. Âàì îêàæåò ïîìîùü ñîòðóäíèê, êîòîðûé ãîâîðèò ïî-póññêè. Äàííàÿ óñëóãà áåñïëàòíàÿ.  Hebrew: ÅääÇ äÞÏã ÎÏãÇÊ ÇáãÊÑÌã ÇáÝæÑí ÇáãÌÇäíÉ ááÅÌÇÈÉ Úä Ãí ÃÓÆáÉ ÊÊÚáÞ ÈÇáÕÍÉ Ãæ ÌÏæá ÇáÃÏæíÉ áÏíäÇ. ááÍÕæá Úáì ãÊÑÌã ÝæÑí¡ áíÓ Úáíß Óæì ÇáÇÊÕÇá ÈäÇ Úáì [phone] . ÓíÞæã ÔÎÕ ãÇ íÊÍÏË ÇáÚÑÈíÉ ÈãÓÇÚÏÊß. åÐå ÎÏãÉ ãÌÇäíÉ.  Alaina: ????? ????????? ?? ??? ?? ????? ?? ???? ??? ???? ???? ?? ?????? ?? ???? ???? ?? ??? ????? ??? ????? ???????? ?????? ?????? ???. ?? ???????? ??????? ???? ?? ???, ?? ???? [phone] ?? ??? ????. ??? ??????? ?? ?????? ????? ?? ???? ??? ?? ???? ??. ?? ?? ????? ???? ??.   Setswana:  È disponibile un servizio di interpretariato gratuito per rispondere a eventuali domande sul nostro piano sanitario e farmaceutico. Per un interprete, contattare il sherice [phone]. Un nostro incaricato chen parla Italianovi fornirà l'assistenza necessaria. È un servizio gratuito.  Portugués:  Dispomos de serviços de interpretação gratuitos para responder a qualquer  questão que tenha acerca do nosso plano de saúde ou de medicação. Para obter um intérprete, contacte-nos através do número [phone]. Irá encontrar alguém que fale o idioma  Português para o ajudar. Kasie serviço é gratuito.  Burkinan Creole:  Nou genyen sèvis entclarence sena shamir reponn tout kesyon ou ta genyen konsènan plan medikal oswa dwòg nou an.  Shamir jwenn yon entèprèt, jis rele nou nan [phone]. Yon moun ki pale Kreyòl kapab keven w.  Sa a se yon sèvis ki gratis.  Polish:  Umo¿liwiamy bezp³atne skorzystanie z us³ug t³umacza ustnego, który pomo¿e w uzyskaniu odpowiedzi na temat planu zdrowotnego lub dawkowania leków. Anusha skorzystaæ z pomocy t³umacza znaj¹cego christopher bates¿y zadzwoniæ pod numer [phone]. Ta us³uga jest bezp³atna.  Uruguayan: ????? ??????? ????????????????????? ??????????????????????????????????[phone] ???????????????? ? ????????????????? ?????

## 2025-02-18 RX ORDER — SEMAGLUTIDE 2.68 MG/ML
2 INJECTION, SOLUTION SUBCUTANEOUS
Qty: 3 ML | Refills: 0 | Status: SHIPPED | OUTPATIENT
Start: 2025-02-18

## 2025-02-19 NOTE — PROGRESS NOTES
Sudha Davidson was seen and treated in our emergency department on 2/19/2025.                Diagnosis: Medical condition    Sudha  .    She may return on this date: 02/21/2025         If you have any questions or concerns, please don't hesitate to call.      Meaghan Tomlinson PA-C    ______________________________           _______________          _______________  Hospital Representative                              Date                                Time Pt reports high costs affording medications and co pays. Previous med rec done by [facility] and no additional medications can be removed or combined. Pt’s highest costs are Ozempic, Qvar and Multaq. Pt is highly reliant on community resources such as [facility] and [facility]. Pt educated on ability to obtain $200 a year rather than $100 due to dx of diabetes at [facility]. Pt identifies use of [facility] as primary source of meeting medication needs; pt reiterated this is not a reliable source.    Assessment completed as follows:   Financial limitations/difficulties:  SS $1081.50 - $135.50 (Medicare) = $946.00/mo  PERS $748.42/mo  Established with [facility] and [facility].  Pt has 4 specialists to pay and testing recommendations which have been avoided (ex. thyroid ultrasound).   Food insecurities:  Denied running out of food but reported time where she only had $30 left in her account to afford living for 1 week.  Lack of housing/environmental issues:  Pt resides in low income senior living. Rent is aprox $650/mo.  Transportation:  Pt is active  and maintains vehicles. Pt reports need for vehicle repairs have previously affected transportation but this not a regular issue.  Social isolation/support/(NOK) next-of-kin:  (Deferred)  Loss of independence-need assistance with ADLs/IADLs:  (Deferred)  Advanced life planning:  Not on file. Pt agreeable to receiving flier.  Assessments completed:  Food insecurity  Review of General and ADL  Goals   Plan  Complete fall risk assessment  Route message to providers regarding PAP application for medications  Mailed Food is Medicine  Mailed POA hazel

## 2025-02-21 PROCEDURE — RXMED WILLOW AMBULATORY MEDICATION CHARGE: Performed by: NURSE PRACTITIONER

## 2025-02-25 ENCOUNTER — PHARMACY VISIT (OUTPATIENT)
Dept: PHARMACY | Facility: MEDICAL CENTER | Age: 80
End: 2025-02-25
Payer: COMMERCIAL

## 2025-02-25 ENCOUNTER — TELEPHONE (OUTPATIENT)
Dept: MEDICAL GROUP | Facility: LAB | Age: 80
End: 2025-02-25
Payer: MEDICARE

## 2025-02-25 NOTE — TELEPHONE ENCOUNTER
Received Ozempic from patient asst program. Placed in vaccine adult refrigerator / patient notified via my chart

## 2025-02-28 PROCEDURE — RXMED WILLOW AMBULATORY MEDICATION CHARGE: Performed by: INTERNAL MEDICINE

## 2025-03-03 ENCOUNTER — PHARMACY VISIT (OUTPATIENT)
Dept: PHARMACY | Facility: MEDICAL CENTER | Age: 80
End: 2025-03-03
Payer: COMMERCIAL

## 2025-03-04 ENCOUNTER — HOSPITAL ENCOUNTER (OUTPATIENT)
Dept: LAB | Facility: MEDICAL CENTER | Age: 80
End: 2025-03-04
Attending: NURSE PRACTITIONER
Payer: MEDICARE

## 2025-03-04 ENCOUNTER — HOSPITAL ENCOUNTER (OUTPATIENT)
Dept: LAB | Facility: MEDICAL CENTER | Age: 80
End: 2025-03-04
Attending: INTERNAL MEDICINE
Payer: MEDICARE

## 2025-03-04 DIAGNOSIS — E87.20 METABOLIC ACIDOSIS: ICD-10-CM

## 2025-03-04 DIAGNOSIS — I10 PRIMARY HYPERTENSION: ICD-10-CM

## 2025-03-04 DIAGNOSIS — E11.22 TYPE 2 DIABETES MELLITUS WITH STAGE 4 CHRONIC KIDNEY DISEASE, WITHOUT LONG-TERM CURRENT USE OF INSULIN (HCC): ICD-10-CM

## 2025-03-04 DIAGNOSIS — N18.4 TYPE 2 DIABETES MELLITUS WITH STAGE 4 CHRONIC KIDNEY DISEASE, WITHOUT LONG-TERM CURRENT USE OF INSULIN (HCC): ICD-10-CM

## 2025-03-04 DIAGNOSIS — N18.4 CKD (CHRONIC KIDNEY DISEASE) STAGE 4, GFR 15-29 ML/MIN (HCC): ICD-10-CM

## 2025-03-04 DIAGNOSIS — E03.4 HYPOTHYROIDISM DUE TO ACQUIRED ATROPHY OF THYROID: ICD-10-CM

## 2025-03-04 LAB
ERYTHROCYTE [DISTWIDTH] IN BLOOD BY AUTOMATED COUNT: 50.1 FL (ref 35.9–50)
EST. AVERAGE GLUCOSE BLD GHB EST-MCNC: 134 MG/DL
HBA1C MFR BLD: 6.3 % (ref 4–5.6)
HCT VFR BLD AUTO: 37.1 % (ref 37–47)
HGB BLD-MCNC: 12.4 G/DL (ref 12–16)
MCH RBC QN AUTO: 32.5 PG (ref 27–33)
MCHC RBC AUTO-ENTMCNC: 33.4 G/DL (ref 32.2–35.5)
MCV RBC AUTO: 97.4 FL (ref 81.4–97.8)
PLATELET # BLD AUTO: 111 K/UL (ref 164–446)
PMV BLD AUTO: 11.2 FL (ref 9–12.9)
RBC # BLD AUTO: 3.81 M/UL (ref 4.2–5.4)
WBC # BLD AUTO: 6.4 K/UL (ref 4.8–10.8)

## 2025-03-04 PROCEDURE — 84439 ASSAY OF FREE THYROXINE: CPT

## 2025-03-04 PROCEDURE — 84443 ASSAY THYROID STIM HORMONE: CPT

## 2025-03-04 PROCEDURE — 83735 ASSAY OF MAGNESIUM: CPT

## 2025-03-04 PROCEDURE — 82043 UR ALBUMIN QUANTITATIVE: CPT

## 2025-03-04 PROCEDURE — 85027 COMPLETE CBC AUTOMATED: CPT

## 2025-03-04 PROCEDURE — 83036 HEMOGLOBIN GLYCOSYLATED A1C: CPT

## 2025-03-04 PROCEDURE — 80048 BASIC METABOLIC PNL TOTAL CA: CPT

## 2025-03-04 PROCEDURE — 82570 ASSAY OF URINE CREATININE: CPT

## 2025-03-04 PROCEDURE — 36415 COLL VENOUS BLD VENIPUNCTURE: CPT

## 2025-03-05 ENCOUNTER — RESULTS FOLLOW-UP (OUTPATIENT)
Dept: MEDICAL GROUP | Facility: LAB | Age: 80
End: 2025-03-05

## 2025-03-05 LAB
ANION GAP SERPL CALC-SCNC: 11 MMOL/L (ref 7–16)
BUN SERPL-MCNC: 58 MG/DL (ref 8–22)
CALCIUM SERPL-MCNC: 9.4 MG/DL (ref 8.5–10.5)
CHLORIDE SERPL-SCNC: 107 MMOL/L (ref 96–112)
CO2 SERPL-SCNC: 22 MMOL/L (ref 20–33)
CREAT SERPL-MCNC: 2.62 MG/DL (ref 0.5–1.4)
CREAT UR-MCNC: 109 MG/DL
GFR SERPLBLD CREATININE-BSD FMLA CKD-EPI: 18 ML/MIN/1.73 M 2
GLUCOSE SERPL-MCNC: 159 MG/DL (ref 65–99)
MAGNESIUM SERPL-MCNC: 2.3 MG/DL (ref 1.5–2.5)
MICROALBUMIN UR-MCNC: 2.7 MG/DL
MICROALBUMIN/CREAT UR: 25 MG/G (ref 0–30)
POTASSIUM SERPL-SCNC: 4.8 MMOL/L (ref 3.6–5.5)
SODIUM SERPL-SCNC: 140 MMOL/L (ref 135–145)
T4 FREE SERPL-MCNC: 1.03 NG/DL (ref 0.93–1.7)
TSH SERPL-ACNC: 1.56 UIU/ML (ref 0.35–5.5)

## 2025-03-06 ENCOUNTER — ANESTHESIA EVENT (OUTPATIENT)
Dept: SURGERY | Facility: MEDICAL CENTER | Age: 80
End: 2025-03-06
Payer: MEDICARE

## 2025-03-06 ENCOUNTER — HOSPITAL ENCOUNTER (OUTPATIENT)
Facility: MEDICAL CENTER | Age: 80
End: 2025-03-06
Attending: INTERNAL MEDICINE | Admitting: INTERNAL MEDICINE
Payer: MEDICARE

## 2025-03-06 ENCOUNTER — ANESTHESIA (OUTPATIENT)
Dept: SURGERY | Facility: MEDICAL CENTER | Age: 80
End: 2025-03-06
Payer: MEDICARE

## 2025-03-06 VITALS
SYSTOLIC BLOOD PRESSURE: 169 MMHG | DIASTOLIC BLOOD PRESSURE: 66 MMHG | WEIGHT: 232.81 LBS | TEMPERATURE: 96.8 F | RESPIRATION RATE: 16 BRPM | BODY MASS INDEX: 37.58 KG/M2 | OXYGEN SATURATION: 96 % | HEART RATE: 62 BPM

## 2025-03-06 LAB — GLUCOSE BLD STRIP.AUTO-MCNC: 158 MG/DL (ref 65–99)

## 2025-03-06 PROCEDURE — 88305 TISSUE EXAM BY PATHOLOGIST: CPT | Mod: 59 | Performed by: PATHOLOGY

## 2025-03-06 PROCEDURE — 88360 TUMOR IMMUNOHISTOCHEM/MANUAL: CPT | Mod: 26 | Performed by: PATHOLOGY

## 2025-03-06 PROCEDURE — 160009 HCHG ANES TIME/MIN: Performed by: INTERNAL MEDICINE

## 2025-03-06 PROCEDURE — 88342 IMHCHEM/IMCYTCHM 1ST ANTB: CPT | Mod: 26,59 | Performed by: PATHOLOGY

## 2025-03-06 PROCEDURE — 160048 HCHG OR STATISTICAL LEVEL 1-5: Performed by: INTERNAL MEDICINE

## 2025-03-06 PROCEDURE — 88360 TUMOR IMMUNOHISTOCHEM/MANUAL: CPT | Mod: 91 | Performed by: PATHOLOGY

## 2025-03-06 PROCEDURE — 160203 HCHG ENDO MINUTES - 1ST 30 MINS LEVEL 4: Performed by: INTERNAL MEDICINE

## 2025-03-06 PROCEDURE — 88341 IMHCHEM/IMCYTCHM EA ADD ANTB: CPT | Mod: 26,59 | Performed by: PATHOLOGY

## 2025-03-06 PROCEDURE — 82962 GLUCOSE BLOOD TEST: CPT

## 2025-03-06 PROCEDURE — A9270 NON-COVERED ITEM OR SERVICE: HCPCS | Performed by: STUDENT IN AN ORGANIZED HEALTH CARE EDUCATION/TRAINING PROGRAM

## 2025-03-06 PROCEDURE — 160046 HCHG PACU - 1ST 60 MINS PHASE II: Performed by: INTERNAL MEDICINE

## 2025-03-06 PROCEDURE — 160015 HCHG STAT PREOP MINUTES: Performed by: INTERNAL MEDICINE

## 2025-03-06 PROCEDURE — 160035 HCHG PACU - 1ST 60 MINS PHASE I: Performed by: INTERNAL MEDICINE

## 2025-03-06 PROCEDURE — 700101 HCHG RX REV CODE 250: Performed by: INTERNAL MEDICINE

## 2025-03-06 PROCEDURE — 160036 HCHG PACU - EA ADDL 30 MINS PHASE I: Performed by: INTERNAL MEDICINE

## 2025-03-06 PROCEDURE — 700102 HCHG RX REV CODE 250 W/ 637 OVERRIDE(OP): Performed by: STUDENT IN AN ORGANIZED HEALTH CARE EDUCATION/TRAINING PROGRAM

## 2025-03-06 PROCEDURE — 700105 HCHG RX REV CODE 258: Performed by: INTERNAL MEDICINE

## 2025-03-06 PROCEDURE — 88305 TISSUE EXAM BY PATHOLOGIST: CPT | Mod: 26 | Performed by: PATHOLOGY

## 2025-03-06 PROCEDURE — 160208 HCHG ENDO MINUTES - EA ADDL 1 MIN LEVEL 4: Performed by: INTERNAL MEDICINE

## 2025-03-06 PROCEDURE — 700101 HCHG RX REV CODE 250: Performed by: STUDENT IN AN ORGANIZED HEALTH CARE EDUCATION/TRAINING PROGRAM

## 2025-03-06 PROCEDURE — 88342 IMHCHEM/IMCYTCHM 1ST ANTB: CPT | Mod: XU | Performed by: PATHOLOGY

## 2025-03-06 PROCEDURE — 160025 RECOVERY II MINUTES (STATS): Performed by: INTERNAL MEDICINE

## 2025-03-06 PROCEDURE — 160002 HCHG RECOVERY MINUTES (STAT): Performed by: INTERNAL MEDICINE

## 2025-03-06 PROCEDURE — 700111 HCHG RX REV CODE 636 W/ 250 OVERRIDE (IP): Performed by: STUDENT IN AN ORGANIZED HEALTH CARE EDUCATION/TRAINING PROGRAM

## 2025-03-06 PROCEDURE — 88341 IMHCHEM/IMCYTCHM EA ADD ANTB: CPT | Mod: 91,XU | Performed by: PATHOLOGY

## 2025-03-06 RX ORDER — HYDROMORPHONE HYDROCHLORIDE 1 MG/ML
0.4 INJECTION, SOLUTION INTRAMUSCULAR; INTRAVENOUS; SUBCUTANEOUS
Status: DISCONTINUED | OUTPATIENT
Start: 2025-03-06 | End: 2025-03-06 | Stop reason: HOSPADM

## 2025-03-06 RX ORDER — OXYCODONE HCL 5 MG/5 ML
5 SOLUTION, ORAL ORAL
Refills: 0 | Status: DISCONTINUED | OUTPATIENT
Start: 2025-03-06 | End: 2025-03-06 | Stop reason: HOSPADM

## 2025-03-06 RX ORDER — LIDOCAINE HYDROCHLORIDE 20 MG/ML
SOLUTION OROPHARYNGEAL PRN
Status: DISCONTINUED | OUTPATIENT
Start: 2025-03-06 | End: 2025-03-06 | Stop reason: SURG

## 2025-03-06 RX ORDER — EPHEDRINE SULFATE 50 MG/ML
5 INJECTION, SOLUTION INTRAVENOUS
Status: DISCONTINUED | OUTPATIENT
Start: 2025-03-06 | End: 2025-03-06 | Stop reason: HOSPADM

## 2025-03-06 RX ORDER — ONDANSETRON 2 MG/ML
INJECTION INTRAMUSCULAR; INTRAVENOUS PRN
Status: DISCONTINUED | OUTPATIENT
Start: 2025-03-06 | End: 2025-03-06 | Stop reason: SURG

## 2025-03-06 RX ORDER — OXYCODONE HCL 5 MG/5 ML
10 SOLUTION, ORAL ORAL
Refills: 0 | Status: DISCONTINUED | OUTPATIENT
Start: 2025-03-06 | End: 2025-03-06 | Stop reason: HOSPADM

## 2025-03-06 RX ORDER — HALOPERIDOL 5 MG/ML
1 INJECTION INTRAMUSCULAR
Status: DISCONTINUED | OUTPATIENT
Start: 2025-03-06 | End: 2025-03-06 | Stop reason: HOSPADM

## 2025-03-06 RX ORDER — DIPHENHYDRAMINE HYDROCHLORIDE 50 MG/ML
12.5 INJECTION, SOLUTION INTRAMUSCULAR; INTRAVENOUS
Status: DISCONTINUED | OUTPATIENT
Start: 2025-03-06 | End: 2025-03-06 | Stop reason: HOSPADM

## 2025-03-06 RX ORDER — ONDANSETRON 2 MG/ML
4 INJECTION INTRAMUSCULAR; INTRAVENOUS
Status: DISCONTINUED | OUTPATIENT
Start: 2025-03-06 | End: 2025-03-06 | Stop reason: HOSPADM

## 2025-03-06 RX ORDER — HYDROMORPHONE HYDROCHLORIDE 1 MG/ML
0.2 INJECTION, SOLUTION INTRAMUSCULAR; INTRAVENOUS; SUBCUTANEOUS
Status: DISCONTINUED | OUTPATIENT
Start: 2025-03-06 | End: 2025-03-06 | Stop reason: HOSPADM

## 2025-03-06 RX ORDER — HYDROMORPHONE HYDROCHLORIDE 1 MG/ML
0.1 INJECTION, SOLUTION INTRAMUSCULAR; INTRAVENOUS; SUBCUTANEOUS
Status: DISCONTINUED | OUTPATIENT
Start: 2025-03-06 | End: 2025-03-06 | Stop reason: HOSPADM

## 2025-03-06 RX ORDER — LOSARTAN POTASSIUM 50 MG/1
50 TABLET ORAL
Status: DISCONTINUED | OUTPATIENT
Start: 2025-03-06 | End: 2025-03-06 | Stop reason: HOSPADM

## 2025-03-06 RX ORDER — ALBUTEROL SULFATE 5 MG/ML
2.5 SOLUTION RESPIRATORY (INHALATION)
Status: DISCONTINUED | OUTPATIENT
Start: 2025-03-06 | End: 2025-03-06 | Stop reason: HOSPADM

## 2025-03-06 RX ORDER — DEXAMETHASONE SODIUM PHOSPHATE 4 MG/ML
INJECTION, SOLUTION INTRA-ARTICULAR; INTRALESIONAL; INTRAMUSCULAR; INTRAVENOUS; SOFT TISSUE PRN
Status: DISCONTINUED | OUTPATIENT
Start: 2025-03-06 | End: 2025-03-06 | Stop reason: SURG

## 2025-03-06 RX ORDER — LIDOCAINE HYDROCHLORIDE 20 MG/ML
INJECTION, SOLUTION EPIDURAL; INFILTRATION; INTRACAUDAL; PERINEURAL PRN
Status: DISCONTINUED | OUTPATIENT
Start: 2025-03-06 | End: 2025-03-06 | Stop reason: SURG

## 2025-03-06 RX ORDER — LABETALOL HYDROCHLORIDE 5 MG/ML
5 INJECTION, SOLUTION INTRAVENOUS
Status: DISCONTINUED | OUTPATIENT
Start: 2025-03-06 | End: 2025-03-06 | Stop reason: HOSPADM

## 2025-03-06 RX ORDER — LIDOCAINE HYDROCHLORIDE 20 MG/ML
SOLUTION OROPHARYNGEAL
Status: COMPLETED
Start: 2025-03-06 | End: 2025-03-06

## 2025-03-06 RX ORDER — SODIUM CHLORIDE, SODIUM LACTATE, POTASSIUM CHLORIDE, CALCIUM CHLORIDE 600; 310; 30; 20 MG/100ML; MG/100ML; MG/100ML; MG/100ML
INJECTION, SOLUTION INTRAVENOUS CONTINUOUS
Status: ACTIVE | OUTPATIENT
Start: 2025-03-06 | End: 2025-03-06

## 2025-03-06 RX ADMIN — LABETALOL HYDROCHLORIDE 5 MG: 5 INJECTION INTRAVENOUS at 12:49

## 2025-03-06 RX ADMIN — FENTANYL CITRATE 100 MCG: 50 INJECTION, SOLUTION INTRAMUSCULAR; INTRAVENOUS at 10:29

## 2025-03-06 RX ADMIN — PROPOFOL 100 MG: 10 INJECTION, EMULSION INTRAVENOUS at 10:29

## 2025-03-06 RX ADMIN — ROCURONIUM BROMIDE 30 MG: 10 INJECTION INTRAVENOUS at 10:29

## 2025-03-06 RX ADMIN — SODIUM CHLORIDE, POTASSIUM CHLORIDE, SODIUM LACTATE AND CALCIUM CHLORIDE: 600; 310; 30; 20 INJECTION, SOLUTION INTRAVENOUS at 08:31

## 2025-03-06 RX ADMIN — LIDOCAINE HYDROCHLORIDE 10 ML: 20 SOLUTION ORAL; TOPICAL at 10:21

## 2025-03-06 RX ADMIN — DEXAMETHASONE SODIUM PHOSPHATE 8 MG: 4 INJECTION INTRA-ARTICULAR; INTRALESIONAL; INTRAMUSCULAR; INTRAVENOUS; SOFT TISSUE at 10:29

## 2025-03-06 RX ADMIN — LOSARTAN POTASSIUM 50 MG: 50 TABLET, FILM COATED ORAL at 12:10

## 2025-03-06 RX ADMIN — ONDANSETRON 4 MG: 2 INJECTION INTRAMUSCULAR; INTRAVENOUS at 10:56

## 2025-03-06 RX ADMIN — SUGAMMADEX 200 MG: 100 INJECTION, SOLUTION INTRAVENOUS at 10:56

## 2025-03-06 RX ADMIN — LIDOCAINE HYDROCHLORIDE 40 MG: 20 INJECTION, SOLUTION EPIDURAL; INFILTRATION; INTRACAUDAL; PERINEURAL at 10:29

## 2025-03-06 RX ADMIN — LIDOCAINE HYDROCHLORIDE 0.5 ML: 10 INJECTION, SOLUTION EPIDURAL; INFILTRATION; INTRACAUDAL; PERINEURAL at 08:32

## 2025-03-06 RX ADMIN — PROPOFOL 50 MCG/KG/MIN: 10 INJECTION, EMULSION INTRAVENOUS at 10:32

## 2025-03-06 ASSESSMENT — PAIN DESCRIPTION - PAIN TYPE
TYPE: SURGICAL PAIN

## 2025-03-06 ASSESSMENT — FIBROSIS 4 INDEX: FIB4 SCORE: 2.82

## 2025-03-06 ASSESSMENT — PAIN SCALES - GENERAL: PAIN_LEVEL: 0

## 2025-03-06 NOTE — DISCHARGE INSTRUCTIONS
If any questions arise, call your provider.  If your provider is not available, please feel free to call the Surgical Center at [phone].    MEDICATIONS: Resume taking daily medication.  Take prescribed pain medication with food.  If no medication is prescribed, you may take non-aspirin pain medication if needed.  PAIN MEDICATION CAN BE VERY CONSTIPATING.  Take a stool softener or laxative such as senokot, pericolace, or milk of magnesia if needed.    Last pain medication given at N/A    What to Expect Post Anesthesia    Rest and take it easy for the first 24 hours.  A responsible adult is recommended to remain with you during that time.  It is normal to feel sleepy.  We encourage you to not do anything that requires balance, judgment or coordination.    FOR 24 HOURS DO NOT:  Drive, operate machinery or run household appliances.  Drink beer or alcoholic beverages.  Make important decisions or sign legal documents.    To avoid nausea, slowly advance diet as tolerated, avoiding spicy or greasy foods for the first day.  Add more substantial food to your diet according to your provider's instructions.  Babies can be fed formula or breast milk as soon as they are hungry.  INCREASE FLUIDS AND FIBER TO AVOID CONSTIPATION.    MILD FLU-LIKE SYMPTOMS ARE NORMAL.  YOU MAY EXPERIENCE GENERALIZED MUSCLE ACHES, THROAT IRRITATION, HEADACHE AND/OR SOME NAUSEA.    Diet    Resume pre-operative diet upon discharge from the hospital. Depending on how you are feeling and whether you have nausea or not, you may wish to stay with a bland diet for the first few days. However, you can advance your diet as quickly as you feel ready.      ENDOSCOPY HOME CARE INSTRUCTIONS    GASTROSCOPY OR ERCP  1. Don't eat or drink anything for about an hour after the test. You can then resume your regular diet.  2. Don't drive or drink alcohol for 24 hours. The medication you received will make you too drowsy.  3. Don't take any coffee, tea, or  aspirin products until after you see your doctor. These can harm the lining of your stomach.  4. If you begin to vomit bloody material, or develop black or bloody stools, call your doctor as soon as possible.  5. If you have any neck, chest, abdominal pain or temp of 100 degrees, call your doctor.      COLONOSCOPY OR FLEXIBLE SIGMOIDOSCOPY  1. If you received a barium enema, take a mild laxative such as dulcolax, Kyleigh's M.O., or Milk of Magnesia to clean out the barium.  2. Drink plenty of fluids. Eat a diet high in fiber (whole-grain breads, fresh fruit and vegetables).  3. You may notice a few drops of blood with your first bowel movement. If you develop any large amount of bleeding, black stools, a fever, or abdominal pain, call your doctor right away.  4. Your doctor will call you with the results.  5. Don't drive or drink alcohol for 24 hours. The medication you received will make you too drowsy.  6. No heavy lifting, no Aspirin products or Aspirin for 5 days     You should call 911 if you develop problems with breathing or chest pain.  If any questions arise, call your doctor. If your doctor is not available, please feel free to call [phone]. You can also call the [facility] HOTLINE open 24 hours/day, 7 days/week and speak to a nurse at [phone], or toll free [phone].

## 2025-03-06 NOTE — OR NURSING
1111 Pt arrived to [facility] from OR via gurney. Report received from anesthesia and RN. Respirations are spontaneous and unlabored. VSS on 6L. No c/o pain.     1129 Family updated.    1300 Pt meets criteria for transfer to stage 2.

## 2025-03-06 NOTE — ANESTHESIA POSTPROCEDURE EVALUATION
Patient: Whitley Frederick    Procedure Summary       Date: 03/06/25 Room / Location:  ENDOSCOPIC ULTRASOUND ROOM / SURGERY [facility]    Anesthesia Start: 1025 Anesthesia Stop: 1117    Procedures:       ESOPHAGOGASTRODUODENOSCOPY AND UPPER ENDOSCOPIC ULTRASOUND WITH BIOPSY (Abdomen)      EGD, WITH ENDOSCOPIC US (Abdomen) Diagnosis: (GASTRIC NEUROENDOCRINE TUMOR)    Surgeons: Domenico Olea M.D. Responsible Provider: Leeroy Pena M.D.    Anesthesia Type: general, MAC ASA Status: 3            Final Anesthesia Type: general, MAC  Last vitals  BP   Blood Pressure : (!) 173/72    Temp   36.6 °C (97.9 °F)    Pulse   60   Resp   12    SpO2   96 %      Anesthesia Post Evaluation    Patient location during evaluation: PACU  Patient participation: complete - patient participated  Level of consciousness: awake and alert  Pain score: 0    Airway patency: patent  Anesthetic complications: no  Cardiovascular status: hemodynamically stable  Respiratory status: acceptable  Hydration status: euvolemic    PONV: none          No notable events documented.     Nurse Pain Score: 0 (NPRS)

## 2025-03-06 NOTE — ANESTHESIA TIME REPORT
Anesthesia Start and Stop Event Times       Date Time Event    3/6/2025 0833 Ready for Procedure     1025 Anesthesia Start     1117 Anesthesia Stop          Responsible Staff  03/06/25      Name Role Begin End    Leeroy Pena M.D. Anesth 1025 1117          Anesthesia Time Report

## 2025-03-06 NOTE — OR NURSING
1259 Received report from RN.     1303 Pt arrived to phase II from PACU via gurney. Respirations are spontaneous and unlabored. VSS on RA.     1310 Family is chair side. Pt is dressed and in recliner chair.     1315 Patient education completed, pt and family denies any further questions. PIV removed with tip intact. DC'd to care of responsible adult.  All personal belongings are with pt and family. Phase II uneventful stay.     1327 Transported pt via wheelchair to awaiting vehicle. Pt discharged to home.

## 2025-03-06 NOTE — ANESTHESIA PROCEDURE NOTES
Airway    Date/Time: 3/6/2025 10:31 AM    Performed by: Leeroy Pena M.D.  Authorized by: Leeroy Pena M.D.    Location:  OR  Urgency:  Elective  Difficult Airway: No    Indications for Airway Management:  Anesthesia      Spontaneous Ventilation: absent    Sedation Level:  Deep  Preoxygenated: Yes    Patient Position:  Sniffing  Mask Difficulty Assessment:  0 - not attempted  Final Airway Type:  Endotracheal airway  Final Endotracheal Airway:  ETT  Cuffed: Yes    Technique Used for Successful ETT Placement:  Direct laryngoscopy    Insertion Site:  Oral  Blade Type:  Eneida  Laryngoscope Blade/Videolaryngoscope Blade Size:  3  ETT Size (mm):  7.0  Measured from:  Teeth  ETT to Teeth (cm):  21  Placement Verified by: auscultation and capnometry    Cormack-Lehane Classification:  Grade I - full view of glottis  Number of Attempts at Approach:  1  Number of Other Approaches Attempted:  0

## 2025-03-06 NOTE — OR NURSING
Patient allergies and NPO status verified, home medication reconciliation completed, belongings secured. Patient verbalizes understanding of pain scale, expected course of stay and plan of care. Surgical site verified with patient, IV access established. Pt awaiting procedure.

## 2025-03-06 NOTE — ANESTHESIA PREPROCEDURE EVALUATION
Case: [id number] Date/Time: 03/06/25 0904    Procedures:       ESOPHAGOGASTRODUODENOSCOPY AND UPPER ENDOSCOPIC ULTRASOUND WITH POSSIBLE FINE NEEDLE ASPIRATION      EGD, WITH ENDOSCOPIC US    Pre-op diagnosis: GASTRIC NEUROENDOCRINE TUMOR    Location:  ENDOSCOPIC ULTRASOUND ROOM / SURGERY [facility]    Surgeons: Domenico Olea M.D.            Relevant Problems   ANESTHESIA   (positive) AVERY (obstructive sleep apnea) - as of 9/2024 - doesn't feel she needs it      PULMONARY   (positive) Mild intermittent asthma without complication      CARDIAC   (positive) Atherosclerosis of aorta (CMS-HCC)   (positive) High blood pressure associated with diabetes (HCC)   (positive) Left bundle branch block   (positive) Non-rheumatic mitral regurgitation   (positive) PAF (paroxysmal atrial fibrillation) (HCC)   (positive) Stenosis of carotid artery   (positive) VPC's (ventricular premature complexes)         (positive) CKD (chronic kidney disease) stage 4, GFR 15-29 ml/min (HCC)   (positive) Microalbuminuria due to type 2 diabetes mellitus (HCC)      ENDO   (positive) Hypothyroidism due to acquired atrophy of thyroid   (positive) Type 2 diabetes mellitus with stage 4 chronic kidney disease, without long-term current use of insulin (HCC)      Other   (positive) Chronic idiopathic gout involving toe without tophus     Most Recent TTE:  Left Ventricle  Normal left ventricular size and systolic function. Mild concentric   left ventricular hypertrophy. Normal regional wall motion. The left   ventricular ejection fraction is visually estimated to be 55%. Normal   diastolic function. Abnormal septal motion.     Right Ventricle  Normal right ventricular size and systolic function.     Right Atrium  Normal right atrial size. Normal inferior vena cava size and   inspiratory collapse.     Left Atrium  Normal left atrial size. Left atrial volume index is 17 mL/sq m.     Mitral Valve  Mild mitral annular calcification. Mild mitral  regurgitation. No mitral   stenosis.     Aortic Valve  Tricuspid aortic valve. Mild aortic valve stenosis. Transvalvular   gradients are - Peak: 29 mmHg, Mean:  17 mmHg. Vmax is 2.6 m/s. Aortic   valve area calculated from the continuity equation is 1.4 cm2.   Dimensionless index is. 36, trace aortic insufficiency.     Tricuspid Valve  Structurally normal tricuspid valve. Trace tricuspid regurgitation. No   tricuspid stenosis. Right atrial pressure is estimated to be 3 mmHg.   Estimated right ventricular systolic pressure is 25 mmHg.     Pulmonic Valve  Structurally normal pulmonic valve without significant stenosis or   regurgitation.     Pericardium  No pericardial effusion.     Aorta  Normal aortic root for body surface area. The ascending aorta diameter   is 3.0  cm.    Most Recent EKG:  Sinus w LBBB    Physical Exam    Airway   Mallampati: II  TM distance: >3 FB  Neck ROM: full       Cardiovascular - normal exam  Rhythm: regular  Rate: normal     Dental - normal exam           Pulmonary - normal exam  Breath sounds clear to auscultation     Abdominal   (+) obese     Neurological - normal exam                 Anesthesia Plan    ASA 3   ASA physical status 3 criteria: diabetes - poorly controlled    Plan - general       Airway plan will be ETT          Induction: intravenous    Postoperative Plan: Postoperative administration of opioids is intended.    Pertinent diagnostic labs and testing reviewed    Informed Consent:    Anesthetic plan and risks discussed with patient.    Use of blood products discussed with: patient whom consented to blood products.

## 2025-03-06 NOTE — OP REPORT
DATE OF SERVICE:  03/06/2025     PROCEDURES PERFORMED:  1.  Esophagogastroduodenoscopy.  2.  Endoscopic ultrasound of the stomach.  3.  Endoscopic mucosal resection of 4 separate gastric nodule.     PREPROCEDURE DIAGNOSIS:  Neuroendocrine tumor.     POSTPROCEDURE DIAGNOSES:  1.  Normal endoscopic ultrasound of the stomach, pancreas and biliary system.  2.  Esophagogastroduodenoscopy within normal limits with exception of 4   erythematous nodules located in the stomach at 39 cm, again at 39 cm, 42 cm,   and at 50 cm, which were removed utilizing biopsy forceps and/or cold snare   resection.  3.  Bleeding control after cold snare resection utilizing a clip placement.     INDICATIONS:  Procedure risks and benefits reviewed thoroughly with the   patient.  Risks including but not limited to bleeding, perforation, side   effects of medication were informed.  The patient voiced understanding and   agreed to proceed.     DESCRIPTION OF PROCEDURE:  The patient was placed in the left lateral   decubitus position.  After intubation and sedation, a forward viewing   gastroscope was passed carefully and easily under direct visualization into   the esophagus.  All esophageal views were normal.  ____ the stomach, retroflex   views of the stomach revealed 2 distinct erythematous nodules measuring   approximately 3-5 mm at the gastric side of the GE junction.  Forward view   examination of the stomach at 42 cm revealed a 10 mm erythematous nodule and   another at 50 cm, measuring approximately 5-7 mm.  The rest of the stomach was   without abnormality as was the duodenal views duodenal bulb, second and third   portions of duodenum.  The scope was removed.     ENDOSCOPIC ULTRASOUND:  A side-viewing radial echoendoscope was passed   carefully and easily under indirect visualization into the esophagus, passed   to the GE junction station.  There, the aorta, celiac artery, celiac axis,   divergence of the celiac artery to the hepatic  and splenic arteries, which   allowed for examination of the splenic vessels, body and tail of the pancreas,   spleen and liver, all of which were within normal limits.  The scope was   advanced to duodenal station.  There, the head of the pancreas was within   normal limits as was the convergence of the common bile duct and pancreatic   duct to form the ampulla.  The scope was then retracted into the stomach.   Examination globally outside the stomach revealed no evidence for perigastric   lymphadenopathy with examination by retracting from the pylorus to the GE   junction and then by advancing from the GE junction to the pylorus and then   ____ examination at 39 cm, 42 cm and 50 cm to examine the perigastric regions   where these nodules were identified by endoscopic view, I did not reveal any   lymphadenopathy or wall enhancement, effacement or thickening.  All the wall   layers were diffusely regular in appearance without any irregularity.  The   scope was then removed.     Gastroscopy with resection of the nodules.  The scope was advanced back.  The   forward viewing scope was advanced back into the stomach.  The larger nodule   at 42 cm was reviewed with the resected with a cold snare with complete   resection appreciated.  There was post-polypectomy bleeding, which was   controlled with single clip placement.  The 7-8 mm polyp at 50 cm was removed   with biopsy forceps that also revealed a diffuse oozing and was controlled   with single clip placement.  The 2 smaller polyps and nodules at the 39 cm   location were also removed utilizing biopsy forcep.  One was best appreciated   at the 7 o'clock position on forward view and the other was appreciated on   retroflex views on the gastric side of the GE junction at 39 cm.  ____ all the   nodules were removed completely and replaced in their respective separate   bottles.  The stomach was suctioned, desufflated and scope was removed.     COMPLICATIONS:  None.      BLOOD LOSS:  35 mL.     SPECIMENS:  Obtained.     RECOMMENDATIONS:  Further recommendations to follow review of histopathology.    Likely repeat EGD in 3-6 months' time at [facility] for   surveillance.  The above was reviewed with the patient's daughter.  All   questions were answered to her satisfaction.        ______________________________  MD KARLOS Lugo/ROLO    DD:  03/06/2025 11:08  DT:  03/06/2025 14:30    Job#:  [id number]

## 2025-03-07 ENCOUNTER — PATIENT MESSAGE (OUTPATIENT)
Dept: CARDIOLOGY | Facility: MEDICAL CENTER | Age: 80
End: 2025-03-07
Payer: MEDICARE

## 2025-03-11 ENCOUNTER — TELEMEDICINE (OUTPATIENT)
Dept: NEPHROLOGY | Facility: MEDICAL CENTER | Age: 80
End: 2025-03-11
Payer: MEDICARE

## 2025-03-11 VITALS
BODY MASS INDEX: 36.96 KG/M2 | TEMPERATURE: 97.4 F | OXYGEN SATURATION: 98 % | DIASTOLIC BLOOD PRESSURE: 68 MMHG | HEIGHT: 66 IN | WEIGHT: 230 LBS | HEART RATE: 62 BPM | SYSTOLIC BLOOD PRESSURE: 120 MMHG

## 2025-03-11 DIAGNOSIS — E11.22 TYPE 2 DIABETES MELLITUS WITH STAGE 4 CHRONIC KIDNEY DISEASE, WITHOUT LONG-TERM CURRENT USE OF INSULIN (HCC): ICD-10-CM

## 2025-03-11 DIAGNOSIS — I10 PRIMARY HYPERTENSION: ICD-10-CM

## 2025-03-11 DIAGNOSIS — N18.4 CKD (CHRONIC KIDNEY DISEASE) STAGE 4, GFR 15-29 ML/MIN (HCC): ICD-10-CM

## 2025-03-11 DIAGNOSIS — E87.20 METABOLIC ACIDOSIS: ICD-10-CM

## 2025-03-11 DIAGNOSIS — N18.4 TYPE 2 DIABETES MELLITUS WITH STAGE 4 CHRONIC KIDNEY DISEASE, WITHOUT LONG-TERM CURRENT USE OF INSULIN (HCC): ICD-10-CM

## 2025-03-11 PROCEDURE — 99214 OFFICE O/P EST MOD 30 MIN: CPT | Mod: 95 | Performed by: INTERNAL MEDICINE

## 2025-03-11 PROCEDURE — G2211 COMPLEX E/M VISIT ADD ON: HCPCS | Mod: 95 | Performed by: INTERNAL MEDICINE

## 2025-03-11 ASSESSMENT — ENCOUNTER SYMPTOMS
COUGH: 0
VOMITING: 0
CHILLS: 0
NAUSEA: 0
SHORTNESS OF BREATH: 0
FEVER: 0

## 2025-03-11 ASSESSMENT — FIBROSIS 4 INDEX: FIB4 SCORE: 2.82

## 2025-03-11 NOTE — PROGRESS NOTES
Telemedicine: Established Patient   This evaluation was conducted via Teams using secure and encrypted videoconferencing technology. The patient was in their home in the [facility] [place].    The patient's identity was confirmed and verbal consent was obtained for this virtual visit.    Subjective:   CC:   Chief Complaint   Patient presents with    Chronic Kidney Disease       Whitley Frederick is a 79 y.o. female presenting for evaluation and management of:  CKD  Has chronic kidney disease stage IV, creatinine has been stable, no uremic symptoms  She also has a history of hypertension, blood pressure is under good control  Patient has no chest pain or shortness of breath    Review of Systems   Constitutional:  Negative for chills, fever and malaise/fatigue.   Respiratory:  Negative for cough and shortness of breath.    Cardiovascular:  Negative for chest pain and leg swelling.   Gastrointestinal:  Negative for nausea and vomiting.   Genitourinary:  Negative for dysuria, frequency and urgency.         Allergies   Allergen Reactions    Amlodipine Itching    Hydralazine      Rapid heart rate, chest tightness    Amaryl      GI Problems    Avandia [Rosiglitazone Maleate]      GI problems    Glipizide      GI Problems    Levoxyl [Levothyroxine Sodium] Unspecified     Flushing, very hot    Relafen [Nabumetone]      Itching; avoids all nsaids    Clonidine      Rapid heart rate, swelling Pt unsure of rxn    Cipro Xr      Possibly causes Chills.    Contrast Media With Iodine [Iodine] Hives    Glucophage [Metformin Hydrochloride]      dizzy    Levaquin      Possible allergy - chills with cipro but sick       Current medicines (including changes today)  Current Outpatient Medications   Medication Sig Dispense Refill    Semaglutide, 2 MG/DOSE, (OZEMPIC, 2 MG/DOSE,) 8 MG/3ML Solution Pen-injector Inject 2 mg under the skin every 7 days. Fridays 3 mL 0    levalbuterol (XOPENEX HFA) 45 MCG/ACT inhaler Inhale 2 Puffs every 6 hours  as needed for Shortness of Breath. 15 g 11    Continuous Glucose Sensor (FREESTYLE YANG 3 SENSOR) Misc Use to continuously monitor blood sugar.  Change every 14 days. 2 Each 2    omeprazole (PRILOSEC) 20 MG delayed-release capsule Take 2 capsules by mouth every day. 180 Capsule 1    montelukast (SINGULAIR) 10 MG Tab Take 1 Tablet by mouth at bedtime. 90 Tablet 3    NON SPECIFIED Nocturnal oxygen at 2 L. 1 Each 1    metoprolol tartrate (LOPRESSOR) 50 MG Tab Take 1 tablet by mouth 2 times a day. 200 Tablet 3    sodium bicarbonate (SODIUM BICARBONATE) 650 MG Tab Take 1 Tablet by mouth 2 times a day. 180 Tablet 3    levothyroxine (SYNTHROID) 50 MCG Tab Take 1 tablet by mouth every morning on an empty stomach. 100 Tablet 3    NIFEdipine SR (PROCADIA-XL) 30 MG tablet Take 1 tablet by mouth once a day 90 Tablet 3    clonazePAM (KLONOPIN) 0.5 MG Tab TAKE 1 TABLET BY MOUTH ONCE DAILY AS NEEDED FOR 30 DAYS. ANXIETY/HEART PALPITATIONS for 30 days 30 Tablet 0    rivaroxaban (XARELTO) 15 MG Tab tablet Take 1 Tablet by mouth with dinner. (Patient taking differently: Take 15 mg by mouth every evening.) 90 Tablet 2    rosuvastatin (CRESTOR) 10 MG Tab Take 1 tablet by mouth every evening. Stop simvastatin. 100 Tablet 3    losartan (COZAAR) 50 MG Tab Take 1 tablet by mouth 2 Times a Day. 200 Tablet 2    allopurinol (ZYLOPRIM) 100 MG Tab TAKE TWO TABLETS BY MOUTH TWICE DAILY 360 Tablet 2    furosemide (LASIX) 80 MG Tab Take 80 mg by mouth every 48 hours. ON EVEN DAYS      acetaminophen (TYLENOL) 650 MG CR tablet Take 1,300 mg by mouth 2 times a day.      clobetasol (TEMOVATE) 0.05 % Ointment Apply to affected body skin twice daily as needed for severe psoriasis. Avoid use on face, axilla, groin. 60 g 2    fluocinonide (LIDEX) 0.05 % Ointment Apply to affected area body skin twice daily as needed for mild/moderate psoriasis. Avoid use on face, axilla, groin. 180 g 2    fluticasone (FLONASE) 50 MCG/ACT nasal spray USE 1-2  SPRAY(S) IN  EACH NOSTRIL ONCE DAILY 30 MINUTES PRIOR TO USE OF CPAP 48 g 3    dronedarone (MULTAQ) 400 MG Tab Take 1 tablet by mouth 2 times a day with meals. 60 Tablet 0    ferrous sulfate 325 (65 Fe) MG tablet Take 325 mg by mouth every evening.      Cholecalciferol (VITAMIN D) 2000 UNITS CAPS Take 4,000 Units by mouth every day.      polyethylene glycol-electrolytes (GOLYTELY) 236 GM Recon Soln Take as directed by office. 4000 mL 0    escitalopram (LEXAPRO) 5 MG tablet Take 1 tablet by mouth every day. (Patient not taking: Reported on 1/6/2025) 30 Tablet 2     No current facility-administered medications for this visit.       Patient Active Problem List    Diagnosis Date Noted    Secondary hyperparathyroidism of renal origin ([facility]) 10/09/2024    Chronic respiratory failure with hypoxia ([facility]) 10/09/2024    Sedative, hypnotic or anxiolytic dependence, uncomplicated ([facility]) 10/09/2024    Senile purpura ([facility]) 08/09/2024    Malignant melanoma of torso excluding breast ([facility]) 03/05/2024    Psoriasis 03/05/2024    Major depressive disorder with single episode, in partial remission ([facility]) 08/09/2023    Microalbuminuria due to type 2 diabetes mellitus ([facility]) 08/09/2023    Fear of flying 06/15/2023    On economic assistance program 01/30/2023    Secondary hypercoagulable state ([facility]) 04/06/2022    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 associated with type 2 diabetes mellitus ([facility]) 11/11/2021    Diabetic polyneuropathy associated with type 2 diabetes mellitus ([facility]) 11/11/2021    Chronic neck pain 09/15/2021    DDD (degenerative disc disease), lumbar 11/02/2020    Osteoarthritis of lumbar spine 11/02/2020    Non-rheumatic mitral regurgitation 08/20/2019    Hypothyroidism due to acquired atrophy of thyroid 04/10/2018    VPC's (ventricular premature complexes) 02/23/2018    Dependence on nocturnal oxygen therapy 11/02/2017    Incontinence of feces 05/18/2017    Chronic pain of both shoulders 03/23/2017     Mild intermittent asthma without complication 08/24/2016    Chronic anticoagulation 06/10/2016    High blood pressure associated with diabetes ([facility]) 05/10/2016    AVERY (obstructive sleep apnea) - as of 9/2024 - doesn't feel she needs it 05/10/2016    Multiple thyroid nodules 10/07/2015    Chronic idiopathic gout involving toe without tophus 04/30/2015    Atherosclerosis of aorta ([facility]) 04/29/2015    Class 2 severe obesity with serious comorbidity and body mass index (BMI) of 37.0 to 37.9 in adult ([facility]) 04/29/2015    Stenosis of carotid artery 05/30/2014    NINOSKA (generalized anxiety disorder) 01/09/2014    PAF (paroxysmal atrial fibrillation) ([facility]) 07/12/2013    Left bundle branch block 03/05/2012    Type 2 diabetes mellitus with stage 4 chronic kidney disease, without long-term current use of insulin ([facility])     Hyperlipidemia associated with type 2 diabetes mellitus ([facility])     CKD (chronic kidney disease) stage 4, GFR 15-29 ml/min ([facility])        Family History   Problem Relation Age of Onset    Cancer Maternal Aunt     Diabetes Maternal Aunt     Heart Disease Maternal Aunt     Hypertension Maternal Aunt     Hyperlipidemia Maternal Aunt     Cancer Mother         Leukemia    Hypertension Mother     Diabetes Mother     Lung Disease Father     Cancer Father         Lung    Lung Disease Brother     Stroke Brother     Diabetes Brother     Heart Disease Brother     Diabetes Maternal Grandmother     Hypertension Brother     Diabetes Brother        She  has a past medical history of Allergy, Anemia, Anxiety, Arrhythmia, Arthritis, Asthma, Atherosclerosis of aorta ([facility]), Bowel habit changes, Carotid bruit, CATARACT, Chest pain, Chronic kidney failure, stage 4 (severe) ([facility]), Chronic neck pain, Chronic respiratory failure with hypoxia ([facility]), Chronic respiratory failure with hypoxia ([facility]), DDD (degenerative disc disease), lumbar, DIABETES MELLITUS, Diabetic polyneuropathy associated with type 2 diabetes mellitus ([facility]), Esophageal  "reflux, NINOSKA (generalized anxiety disorder), Headache(784.0), Heart murmur, Heart valve disease, Hiatus hernia syndrome, Hyperlipidemia, Hypertension, Hypothyroid, IBD (inflammatory bowel disease), Left bundle branch block, Migraine, Mild intermittent asthma without complication, Multiple thyroid nodules, AVERY (obstructive sleep apnea) (03/20/2024), Overweight(278.02), Palpitations, Paroxysmal atrial fibrillation (HCC) (07/12/2013), Pneumonia, Positive reaction to tuberculin skin test, Psoriasis, PVC (premature ventricular contraction), Renal disease, Secondary hyperparathyroidism of renal origin (HCC), Stenosis of carotid artery, and Urinary tract infection, site not specified.    She has no past medical history of Breast cancer (HCC) or Tuberculosis.  She  has a past surgical history that includes tonsillectomy; cholecystectomy; abdominal hysterectomy total; us-needle core bx-breast panel; cataract phaco with iol (Right, 10/25/2016); wide excision, lesion, upper extremity (03/25/2024); colonoscopy; pr upper gi endoscopy,diagnosis (1/30/2025); pr colonoscopy-flexible (N/A, 1/30/2025); pr upper gi endoscopy,diagnosis (N/A, 3/6/2025); and egd w/endoscopic ultrasound (N/A, 3/6/2025).       Objective:   /68 (BP Location: Left arm, Patient Position: Sitting, BP Cuff Size: Adult)   Pulse 62   Temp 36.3 °C (97.4 °F) (Temporal)   Ht 1.676 m (5' 6\")   Wt 104 kg (230 lb)   SpO2 98%   BMI 37.12 kg/m²     Physical Exam  Constitutional:       General: She is not in acute distress.     Appearance: Normal appearance. She is not ill-appearing.   HENT:      Head: Normocephalic and atraumatic.      Nose: Nose normal.   Eyes:      General: No scleral icterus.     Conjunctiva/sclera: Conjunctivae normal.   Cardiovascular:      Rate and Rhythm: Normal rate.   Pulmonary:      Effort: Pulmonary effort is normal.      Breath sounds: Normal breath sounds.   Musculoskeletal:      Right lower leg: No edema.      Left lower leg: No " edema.   Skin:     General: Skin is warm.      Coloration: Skin is not jaundiced or pale.   Neurological:      General: No focal deficit present.      Mental Status: She is alert and oriented to person, place, and time. Mental status is at baseline.   Psychiatric:         Mood and Affect: Mood normal.         Behavior: Behavior normal.         Thought Content: Thought content normal.       Past Medical History:   Diagnosis Date    Allergy     seasonal, frequent sinus problems    Anemia     Anxiety     Arrhythmia     PVC's    Arthritis     osthma    Asthma     Atherosclerosis of aorta (HCC)     Bowel habit changes     IBS    Carotid bruit     Left    CATARACT     Right IOL    Chest pain     Chronic kidney failure, stage 4 (severe) ([facility])     Dr. Najjar    Chronic neck pain     Chronic respiratory failure with hypoxia (HCC)     Chronic respiratory failure with hypoxia (HCC)     2l NC at HS (Preferred)    DDD (degenerative disc disease), lumbar     DIABETES MELLITUS     Diabetic polyneuropathy associated with type 2 diabetes mellitus (HCC)     Esophageal reflux     NINOSKA (generalized anxiety disorder)     Headache(784.0)     Heart murmur     Heart valve disease     Non-rheumatic mitral regurgitation    Hiatus hernia syndrome     Hyperlipidemia     Hypertension     Hypothyroid     IBD (inflammatory bowel disease)     Left bundle branch block     Migraine     Silent migraine    Mild intermittent asthma without complication     Multiple thyroid nodules     AVERY (obstructive sleep apnea) 03/20/2024    Uses CPAP with 2 L O2    Overweight(278.02)     Palpitations     Paroxysmal atrial fibrillation (HCC) 07/12/2013    woke with palps, AFib per ekg in am.- Dr. Jalloh [facility]    Pneumonia     Nov. 2015    Positive reaction to tuberculin skin test     Psoriasis     PVC (premature ventricular contraction)     Renal disease     Mild diabetic renal disease with mild proteinuria.Moderate Stage 4    Secondary hyperparathyroidism of  renal origin (HCC)     Stenosis of carotid artery     Urinary tract infection, site not specified      Social History     Socioeconomic History    Marital status:      Spouse name: Not on file    Number of children: Not on file    Years of education: Not on file    Highest education level: Some college, no degree   Occupational History    Not on file   Tobacco Use    Smoking status: Former     Current packs/day: 0.00     Average packs/day: 1 pack/day for 20.0 years (20.0 ttl pk-yrs)     Types: Cigarettes     Start date: 1963     Quit date: 1983     Years since quittin.2    Smokeless tobacco: Never   Vaping Use    Vaping status: Never Used   Substance and Sexual Activity    Alcohol use: No     Alcohol/week: 0.0 oz    Drug use: No    Sexual activity: Never     Partners: Male   Other Topics Concern    Not on file   Social History Narrative    Not on file     Social Drivers of Health     Financial Resource Strain: Low Risk  (12/3/2024)    Overall Financial Resource Strain (CARDIA)     Difficulty of Paying Living Expenses: Not hard at all   Food Insecurity: No Food Insecurity (12/3/2024)    Hunger Vital Sign     Worried About Running Out of Food in the Last Year: Never true     Ran Out of Food in the Last Year: Never true   Transportation Needs: No Transportation Needs (12/3/2024)    PRAPARE - Transportation     Lack of Transportation (Medical): No     Lack of Transportation (Non-Medical): No   Physical Activity: Inactive (2024)    Exercise Vital Sign     Days of Exercise per Week: 0 days     Minutes of Exercise per Session: 20 min   Stress: No Stress Concern Present (2024)    [employer] - Occupational Stress Questionnaire     Feeling of Stress : Only a little   Social Connections: Unknown (2024)    Social Connection and Isolation Panel [NHANES]     Frequency of Communication with Friends and Family: Twice a week     Frequency of Social Gatherings with  Friends and Family: Twice a week     Attends Orthodoxy Services: Patient declined     Active Member of Clubs or Organizations: No     Attends Club or Organization Meetings: 1 to 4 times per year     Marital Status:    Intimate Partner Violence: Not At Risk (12/16/2022)    Humiliation, Afraid, Rape, and Kick questionnaire     Fear of Current or Ex-Partner: No     Emotionally Abused: No     Physically Abused: No     Sexually Abused: No   Housing Stability: Low Risk  (12/3/2024)    Housing Stability Vital Sign     Unable to Pay for Housing in the Last Year: No     Number of Times Moved in the Last Year: 1     Homeless in the Last Year: No     Family History   Problem Relation Age of Onset    Cancer Maternal Aunt     Diabetes Maternal Aunt     Heart Disease Maternal Aunt     Hypertension Maternal Aunt     Hyperlipidemia Maternal Aunt     Cancer Mother         Leukemia    Hypertension Mother     Diabetes Mother     Lung Disease Father     Cancer Father         Lung    Lung Disease Brother     Stroke Brother     Diabetes Brother     Heart Disease Brother     Diabetes Maternal Grandmother     Hypertension Brother     Diabetes Brother      Recent Labs     04/12/24  1544 04/12/24  1546 08/27/24  1714 09/03/24  1400 01/20/25  1331 03/04/25  1321   ALBUMIN 4.3  --   --   --   --   --    HDL 43  --   --  45  --   --    TRIGLYCERIDE 315*  --   --  205*  --   --    SODIUM  --    < > 143  --  142 140   POTASSIUM  --    < > 5.0  --  4.6 4.8   CHLORIDE  --    < > 111  --  105 107   CO2  --    < > 16*  --  22 22   BUN  --    < > 61*  --  56* 58*   CREATININE  --    < > 2.24*  --  2.65* 2.62*    < > = values in this interval not displayed.         Assessment and Plan:   The following treatment plan was discussed:     1. CKD (chronic kidney disease) stage 4, GFR 15-29 ml/min ([facility])  Stable  No uremic symptoms  Renal dose of medication  Avoid nephrotoxins  Continue same medication regimen  Patient was advised to call us if  symptoms worsen    - Basic Metabolic Panel; Future  - CBC WITHOUT DIFFERENTIAL; Future  - MICROALB/CREAT RATIO RAND. UR    2. Primary hypertension  Controlled  Continue same medication regimen  Continue low-sodium diet    - Basic Metabolic Panel; Future  - CBC WITHOUT DIFFERENTIAL; Future  - MICROALB/CREAT RATIO RAND. UR    3. Metabolic acidosis  Improved  Continue sodium bicarbonate  4. Type 2 diabetes mellitus with stage 4 chronic kidney disease, without long-term current use of insulin (HCC)        Follow-up: Return in about 5 months (around 8/11/2025).

## 2025-03-12 PROCEDURE — RXMED WILLOW AMBULATORY MEDICATION CHARGE: Performed by: NURSE PRACTITIONER

## 2025-03-13 ENCOUNTER — PHARMACY VISIT (OUTPATIENT)
Dept: PHARMACY | Facility: MEDICAL CENTER | Age: 80
End: 2025-03-13
Payer: COMMERCIAL

## 2025-03-17 ENCOUNTER — DOCUMENTATION (OUTPATIENT)
Dept: CARDIOLOGY | Facility: MEDICAL CENTER | Age: 80
End: 2025-03-17
Payer: MEDICARE

## 2025-03-20 ENCOUNTER — PATIENT MESSAGE (OUTPATIENT)
Dept: HEALTH INFORMATION MANAGEMENT | Facility: OTHER | Age: 80
End: 2025-03-20

## 2025-03-20 PROCEDURE — RXMED WILLOW AMBULATORY MEDICATION CHARGE: Performed by: NURSE PRACTITIONER

## 2025-03-24 ENCOUNTER — PHARMACY VISIT (OUTPATIENT)
Dept: PHARMACY | Facility: MEDICAL CENTER | Age: 80
End: 2025-03-24
Payer: COMMERCIAL

## 2025-04-03 ENCOUNTER — TELEPHONE (OUTPATIENT)
Dept: CARDIOLOGY | Facility: MEDICAL CENTER | Age: 80
End: 2025-04-03
Payer: MEDICARE

## 2025-04-04 DIAGNOSIS — I48.0 PAF (PAROXYSMAL ATRIAL FIBRILLATION) (HCC): ICD-10-CM

## 2025-04-04 PROCEDURE — RXMED WILLOW AMBULATORY MEDICATION CHARGE: Performed by: NURSE PRACTITIONER

## 2025-04-04 NOTE — TELEPHONE ENCOUNTER
Is the patient due for a refill? Yes    Was the patient seen the last 15 months? Yes    Date of last office visit: 6/10/2024    Does the patient have an upcoming appointment?  No   If yes, When? none    Provider to refill:TW    Does the patient have MCFP Plus and need 100-day supply? (This applies to ALL medications) Yes, quantity updated to 100 days

## 2025-04-07 ENCOUNTER — PHARMACY VISIT (OUTPATIENT)
Dept: PHARMACY | Facility: MEDICAL CENTER | Age: 80
End: 2025-04-07
Payer: COMMERCIAL

## 2025-04-07 NOTE — TELEPHONE ENCOUNTER
TW: Please approve RX if appropriate. Last OV 6/2024. Labs dated 3/4/25 outside of normal range. Unable to fill under RN protocol. Thank you.

## 2025-04-08 ENCOUNTER — TELEPHONE (OUTPATIENT)
Dept: CARDIOLOGY | Facility: MEDICAL CENTER | Age: 80
End: 2025-04-08
Payer: MEDICARE

## 2025-04-08 NOTE — LETTER
PROCEDURE/SURGERY CLEARANCE FORM      Encounter Date: 4/8/2025    Patient: Whitley Frederick  YOB: 1945    CARDIOLOGIST:   FELECIA NESS    REFERRING DOCTOR:  No ref. provider found      The following procedure/surgery: Upper Endoscopy with Biospy         PROCEDURE/SURGERY CLEARANCE FORM    Date: 4/8/2025   Patient Name: Whitley Frederick    Dear Surgeon or Proceduralist,      Thank you for your request for cardiac stratification of our mutual patient Whitley Frederick 1945. We have reviewed their [facility] records; and to the best of our understanding this patient has not had stenting, ablation, watchman, cardiothoracic surgery or hospitalization for cardiovascular reasons in the past 6 months.  Whitley Frederick has been seen within the past 15 months and is considered to have non-modifiable cardiac risk for this low-risk procedure/surgery. They may proceed from a cardiovascular standpoint and may hold their antiplatelet/anticoagulation as briefly as possible. Please have patient resume this medication when hemodynamically stable to do so.     Aspirin or Prasugrel   - hold 7 days prior to procedure/surgery, resume when hemodynamically stable      Clopidrogrel or Ticagrelor  - hold 7 days for all neurological procedures, hold 5 days prior to all other procedure/surgery,  resume when hemodynamically stable     Warfarin - hold 7 days for all neurological procedures, hold 5 days prior to all other procedure/surgery and coordinate with [facility] ([phone]) INR testing and dose management.      Pradaxa/Xarelto/Eliquis/Savesya - hold 1 day prior to procedure for low bleeding risk procedure, 2 days for high bleeding risk procedure, or consider holding 3 days or longer for patients with reduced kidney function (CrCl <30mL/min) or spinal/cranial surgeries/procedures.      If they have a mechanical heart valve, please coordinate with [facility]  ([phone]) the proper management of their anticoagulant in the periprocedural or perioperative period.      Some patients have higher risk for cardiovascular complications or holding medication. If our patient has had prior complications of holding antiplatelet or anticoagulants in the past and we have seen them after these events, we have addressed these concerns with the patient. They are at an unknown degree of increased risk for recurrent complication.  You may hold anticoagulation/antiplatelets for the procedure or surgery if the benefits of the procedure or surgery outweigh this nonmodifiable risk.      If Whitley Frederick 1945 has new symptoms of heart failure decompensation, unstable arrythmia, or angina please reach out and we will assess the patient.      If you have other patient-specific concerns, please feel free to reach out to the patient's cardiologist directly at [phone].     Thank you,       [facility]

## 2025-04-08 NOTE — TELEPHONE ENCOUNTER
Last OV: 06/10/24  Proposed Surgery: Upper Endoscopy with Biospy  Surgery Date: 06/12/25  Requesting Office Name: [facility]  Fax Number: [phone]  Preference of Location (default is surgery center unless specified by Cardiologist or KEVIN)  Prior Clearance Addressed: No    Is this a general clearance? YES   Anticoags/Antiplatelets: Xarelto  Anticoags/Antiplatelet managed by Cardiology? YES    Outstanding Cardiac Imaging : No  Ablation, Cardioversion, Stent, Cardiac Devices, Catheterization, Watchman: No  TAVR/Valve, Mitral Clip, Watchman (including open heart),: N/A   Recent Cardiac Hospitalization: No            When: N/A  History (cardiac history):   Past Medical History:   Diagnosis Date    Allergy     seasonal, frequent sinus problems    Anemia     Anxiety     Arrhythmia     PVC's    Arthritis     osthma    Asthma     Atherosclerosis of aorta (HCC)     Bowel habit changes     IBS    Carotid bruit     Left    CATARACT     Right IOL    Chest pain     Chronic kidney failure, stage 4 (severe) (HCC)     Dr. Najjar    Chronic neck pain     Chronic respiratory failure with hypoxia (HCC)     Chronic respiratory failure with hypoxia (HCC)     2l NC at HS (Preferred)    DDD (degenerative disc disease), lumbar     DIABETES MELLITUS     Diabetic polyneuropathy associated with type 2 diabetes mellitus (HCC)     Esophageal reflux     NINOSKA (generalized anxiety disorder)     Headache(784.0)     Heart murmur     Heart valve disease     Non-rheumatic mitral regurgitation    Hiatus hernia syndrome     Hyperlipidemia     Hypertension     Hypothyroid     IBD (inflammatory bowel disease)     Left bundle branch block     Migraine     Silent migraine    Mild intermittent asthma without complication     Multiple thyroid nodules     AVERY (obstructive sleep apnea) 03/20/2024    Uses CPAP with 2 L O2    Overweight(278.02)     Palpitations     Paroxysmal atrial fibrillation (HCC) 07/12/2013    woke with palps, AFib  per ekg in am.- Dr. Jalloh [facility]    Pneumonia     Nov. 2015    Positive reaction to tuberculin skin test     Psoriasis     PVC (premature ventricular contraction)     Renal disease     Mild diabetic renal disease with mild proteinuria.Moderate Stage 4    Secondary hyperparathyroidism of renal origin (HCC)     Stenosis of carotid artery     Urinary tract infection, site not specified            Is this a dental clearance? NO  Ablation, Cardioversion, Watchman, Stents, Cath, Devices within the last 3 months? No   If yes- Send dental wait letter, do not forward to provider for review.     TAVR / Valve, Mitral clip within the last 6 months? No  If yes- Send dental wait letter, do not forward to provider for review.     If completing a general clearance, continue per protocol.           Surgical Clearance Letter Sent: YES   **Scan clearance request letter into [employer].**

## 2025-04-29 DIAGNOSIS — R09.81 SINUS CONGESTION: ICD-10-CM

## 2025-04-29 DIAGNOSIS — I10 ESSENTIAL HYPERTENSION: ICD-10-CM

## 2025-04-29 PROCEDURE — RXMED WILLOW AMBULATORY MEDICATION CHARGE: Performed by: NURSE PRACTITIONER

## 2025-04-29 RX ORDER — LOSARTAN POTASSIUM 50 MG/1
50 TABLET ORAL 2 TIMES DAILY
Qty: 200 TABLET | Refills: 2 | Status: SHIPPED | OUTPATIENT
Start: 2025-04-29

## 2025-04-29 RX ORDER — FLUTICASONE PROPIONATE 50 MCG
SPRAY, SUSPENSION (ML) NASAL
Qty: 48 G | Refills: 3 | Status: SHIPPED | OUTPATIENT
Start: 2025-04-29

## 2025-05-01 ENCOUNTER — PHARMACY VISIT (OUTPATIENT)
Dept: PHARMACY | Facility: MEDICAL CENTER | Age: 80
End: 2025-05-01
Payer: COMMERCIAL

## 2025-05-07 PROCEDURE — RXMED WILLOW AMBULATORY MEDICATION CHARGE: Performed by: NURSE PRACTITIONER

## 2025-05-07 PROCEDURE — RXMED WILLOW AMBULATORY MEDICATION CHARGE: Performed by: INTERNAL MEDICINE

## 2025-05-12 ENCOUNTER — PHARMACY VISIT (OUTPATIENT)
Dept: PHARMACY | Facility: MEDICAL CENTER | Age: 80
End: 2025-05-12
Payer: COMMERCIAL

## 2025-05-14 ENCOUNTER — APPOINTMENT (OUTPATIENT)
Dept: ADMISSIONS | Facility: MEDICAL CENTER | Age: 80
End: 2025-05-14
Attending: INTERNAL MEDICINE
Payer: MEDICARE

## 2025-05-16 ENCOUNTER — PRE-ADMISSION TESTING (OUTPATIENT)
Dept: ADMISSIONS | Facility: MEDICAL CENTER | Age: 80
End: 2025-05-16
Attending: INTERNAL MEDICINE
Payer: MEDICARE

## 2025-05-16 VITALS — HEIGHT: 66 IN | BODY MASS INDEX: 37.12 KG/M2

## 2025-05-16 DIAGNOSIS — Z01.812 PRE-OPERATIVE LABORATORY EXAMINATION: Primary | ICD-10-CM

## 2025-05-16 NOTE — PREPROCEDURE INSTRUCTIONS
PreAdmit Telephone Appointment: Reviewed the Preparing for your procedure handout with patient over the phone. Patient instructed per pharmacy guidelines regarding taking, holding or contacting provider for instructions on regularly prescribed medications before surgery.     Confirmed with patient where to check in morning of surgery. AVS sent to patient's My Chart.    Pt instructed to bring CPAP day of surgery.

## 2025-05-16 NOTE — PREADMIT AVS NOTE
Current Medications   Medication Instructions    losartan (COZAAR) 50 MG Tab Stop 24 hours before surgery    fluticasone (FLONASE) 50 MCG/ACT nasal spray Continue taking as prescribed.    rivaroxaban (XARELTO) 15 MG Tab tablet Follow instructions from surgeon or specialist.    Semaglutide, 2 MG/DOSE, (OZEMPIC, 2 MG/DOSE,) 8 MG/3ML Solution Pen-injector Stop 7 days before surgery    levalbuterol (XOPENEX HFA) 45 MCG/ACT inhaler Continue taking as prescribed.         omeprazole (PRILOSEC) 20 MG delayed-release capsule Continue taking as prescribed.    montelukast (SINGULAIR) 10 MG Tab Continue taking as prescribed.         metoprolol tartrate (LOPRESSOR) 50 MG Tab Continue taking as prescribed.    sodium bicarbonate (SODIUM BICARBONATE) 650 MG Tab Continue taking as prescribed.    levothyroxine (SYNTHROID) 50 MCG Tab Continue taking as prescribed.    NIFEdipine SR (PROCARDIA-XL) 30 MG tablet Continue taking as prescribed.    clonazePAM (KLONOPIN) 0.5 MG Tab As needed medication, may take if needed, including morning of procedure     rosuvastatin (CRESTOR) 10 MG Tab Continue taking as prescribed.    allopurinol (ZYLOPRIM) 100 MG Tab Continue taking as prescribed.    furosemide (LASIX) 80 MG Tab Hold medication day of procedure    acetaminophen (TYLENOL) 650 MG CR tablet As needed medication, may take if needed, including morning of procedure               dronedarone (MULTAQ) 400 MG Tab Continue taking as prescribed.    ferrous sulfate 325 (65 Fe) MG tablet Stop 7 days before surgery    Cholecalciferol (VITAMIN D) 2000 UNITS CAPS Stop 7 days before surgery

## 2025-05-19 ENCOUNTER — OFFICE VISIT (OUTPATIENT)
Dept: DERMATOLOGY | Facility: IMAGING CENTER | Age: 80
End: 2025-05-19
Payer: MEDICARE

## 2025-05-19 DIAGNOSIS — L82.1 SEBORRHEIC KERATOSIS: ICD-10-CM

## 2025-05-19 DIAGNOSIS — D22.9 MULTIPLE NEVI: ICD-10-CM

## 2025-05-19 DIAGNOSIS — L57.8 OTHER SKIN CHANGES DUE TO CHRONIC EXPOSURE TO NONIONIZING RADIATION: ICD-10-CM

## 2025-05-19 DIAGNOSIS — D18.01 CHERRY ANGIOMA: ICD-10-CM

## 2025-05-19 DIAGNOSIS — Z85.820 PERSONAL HISTORY OF MALIGNANT MELANOMA OF SKIN: ICD-10-CM

## 2025-05-19 DIAGNOSIS — L82.0 INFLAMED SEBORRHEIC KERATOSIS: ICD-10-CM

## 2025-05-19 PROCEDURE — 17110 DESTRUCTION B9 LES UP TO 14: CPT | Performed by: STUDENT IN AN ORGANIZED HEALTH CARE EDUCATION/TRAINING PROGRAM

## 2025-05-19 PROCEDURE — 99213 OFFICE O/P EST LOW 20 MIN: CPT | Mod: 25 | Performed by: STUDENT IN AN ORGANIZED HEALTH CARE EDUCATION/TRAINING PROGRAM

## 2025-05-19 NOTE — PROGRESS NOTES
[facility] DERMATOLOGY CLINIC NOTE         HPI:    Whitley Frederick is a 80 y.o. female here for evaluation of above, JOSE.     Today notes following concern:   - No concerns, but some keratoses are irritating on face.    No other symptomatic (itching, painful, burning) or changing lesions.       Dermatology History:      - Prior history of skin cancer: yes melanoma L superior medial upper back s/p excision 02/2024     - Family history of skin cancer: no    Review of Systems: No fevers, chill. Pertinent positives and negatives above.       Medications, Medical History, Surgical History, Family History & Allergies:  Reviewed in the chart, relevant history noted above.       PHYSICAL EXAM  Full body skin check of the scalp, face, neck, trunk, groin,  extremities was performed.   Skin type 2    - scattered melanotic macules and papules on the trunk and extremities  - scattered tan macules without surface scale on sun exposed areas of face, neck, upper chest, arms  - scattered tan waxy to gray stuck on papules and plaques on the trunk and extremities  - scattered 2-3mm red papules on trunk, extremities    - tan to erythematous scaly stuck on papule x3 on face    - lt superior medial upper back well heal scar , NER  - no palpable lymph nodes at the axillary and inguinal regions    ASSESSMENT & PLAN    # Inflamed seborrheic keratosis  - reassured benign, but given lesion(s) symptomatic, treatment with cryotherapy discussed and patient amenable.  CRYOTHERAPY:  Risks (including, but not limited to: hypo or hyperpigmentation, redness, blister, blood blister, recurrence, need for further treatment, infection, scar) and benefits of cryotherapy discussed. Patient verbally agreed to proceed with treatment. Cryotherapy freeze thaw cycles of 5-10 seconds were applied.  - LN2 x 3 lesion(s).  Patient tolerated procedure well. Aftercare instructions given.      # Melanotic nevi  - clinically benign appearing today  - ABCDEs of  atypical appearing moles discussed.     # History of Melanoma L superior medial upper back, pT1a, s/p WLE 02/2024  - no evidence of recurrence today, no palpable lymph nodes  - continue routine skin checks    # Lentigines  - discussed this is a result of sun exposure, photodamage  - regular sun protective practices with hats/clothing, SPF 30+ with regular application and reapplication recommended    # Seborrheic keratosis  # Cherry angioma  - reassure benign, no treatment needed      Skin Cancer Prevention Counseling   Advise regular sun protection/sunscreen use, SPF 30 or greater, recommend mineral sunscreen, and to reapply every  minutes.   Recommend broad brimmed hats, UPF sun protective clothing when outdoors for extended periods of time   Recommend self checks at home,  ABCDEs of melanoma discussed       Return in about 4 months (around 9/19/2025) for Skin check.      Clarisa Flynn MD  [facility]

## 2025-05-20 DIAGNOSIS — G47.00 INSOMNIA: ICD-10-CM

## 2025-05-20 DIAGNOSIS — F41.9 ANXIETY: ICD-10-CM

## 2025-05-20 PROCEDURE — RXMED WILLOW AMBULATORY MEDICATION CHARGE: Performed by: INTERNAL MEDICINE

## 2025-05-21 ENCOUNTER — PATIENT MESSAGE (OUTPATIENT)
Dept: CARDIOLOGY | Facility: MEDICAL CENTER | Age: 80
End: 2025-05-21
Payer: MEDICARE

## 2025-05-21 PROCEDURE — RXMED WILLOW AMBULATORY MEDICATION CHARGE: Performed by: NURSE PRACTITIONER

## 2025-05-21 RX ORDER — CLONAZEPAM 0.5 MG/1
TABLET ORAL
Qty: 15 TABLET | Refills: 0 | Status: SHIPPED | OUTPATIENT
Start: 2025-05-21 | End: 2026-06-17

## 2025-05-23 ENCOUNTER — PHARMACY VISIT (OUTPATIENT)
Dept: PHARMACY | Facility: MEDICAL CENTER | Age: 80
End: 2025-05-23
Payer: COMMERCIAL

## 2025-05-26 ENCOUNTER — PATIENT MESSAGE (OUTPATIENT)
Dept: CARDIOLOGY | Facility: MEDICAL CENTER | Age: 80
End: 2025-05-26
Payer: MEDICARE

## 2025-05-27 ENCOUNTER — PRE-ADMISSION TESTING (OUTPATIENT)
Dept: ADMISSIONS | Facility: MEDICAL CENTER | Age: 80
End: 2025-05-27
Attending: INTERNAL MEDICINE
Payer: MEDICARE

## 2025-05-27 DIAGNOSIS — Z01.812 PRE-OPERATIVE LABORATORY EXAMINATION: ICD-10-CM

## 2025-05-27 LAB
ANION GAP SERPL CALC-SCNC: 13 MMOL/L (ref 7–16)
BUN SERPL-MCNC: 82 MG/DL (ref 8–22)
CALCIUM SERPL-MCNC: 9 MG/DL (ref 8.5–10.5)
CHLORIDE SERPL-SCNC: 103 MMOL/L (ref 96–112)
CO2 SERPL-SCNC: 22 MMOL/L (ref 20–33)
CREAT SERPL-MCNC: 2.86 MG/DL (ref 0.5–1.4)
ERYTHROCYTE [DISTWIDTH] IN BLOOD BY AUTOMATED COUNT: 54 FL (ref 35.9–50)
GFR SERPLBLD CREATININE-BSD FMLA CKD-EPI: 16 ML/MIN/1.73 M 2
GLUCOSE SERPL-MCNC: 113 MG/DL (ref 65–99)
HCT VFR BLD AUTO: 27.7 % (ref 37–47)
HGB BLD-MCNC: 8.8 G/DL (ref 12–16)
MCH RBC QN AUTO: 32.7 PG (ref 27–33)
MCHC RBC AUTO-ENTMCNC: 31.8 G/DL (ref 32.2–35.5)
MCV RBC AUTO: 103 FL (ref 81.4–97.8)
PLATELET # BLD AUTO: 129 K/UL (ref 164–446)
PMV BLD AUTO: 10.9 FL (ref 9–12.9)
POTASSIUM SERPL-SCNC: 5.2 MMOL/L (ref 3.6–5.5)
RBC # BLD AUTO: 2.69 M/UL (ref 4.2–5.4)
SODIUM SERPL-SCNC: 138 MMOL/L (ref 135–145)
WBC # BLD AUTO: 9 K/UL (ref 4.8–10.8)

## 2025-05-27 PROCEDURE — 85027 COMPLETE CBC AUTOMATED: CPT

## 2025-05-27 PROCEDURE — 36415 COLL VENOUS BLD VENIPUNCTURE: CPT

## 2025-05-27 PROCEDURE — 80048 BASIC METABOLIC PNL TOTAL CA: CPT

## 2025-05-28 ENCOUNTER — TELEPHONE (OUTPATIENT)
Dept: NEPHROLOGY | Facility: MEDICAL CENTER | Age: 80
End: 2025-05-28
Payer: MEDICARE

## 2025-05-28 NOTE — TELEPHONE ENCOUNTER
PT called and stated that she got her pre-admit labs done for a procedure and her numbers are lower so she would like Dr. Najjar's opinion as to what she needs to do next? She has a lot of concerns and would like to have a conversation.      5/29/25 @ 2:48 pm. Pt called back stating that she got the message from Dr. Najjar and she had a question. She asked how many ounces of fluids should she be drinking a day? She did ask for a return phone call but to please call after 2:00 pm.

## 2025-06-02 ENCOUNTER — APPOINTMENT (OUTPATIENT)
Dept: MEDICAL GROUP | Facility: LAB | Age: 80
End: 2025-06-02
Payer: MEDICARE

## 2025-06-02 ENCOUNTER — APPOINTMENT (OUTPATIENT)
Dept: RADIOLOGY | Facility: MEDICAL CENTER | Age: 80
DRG: 812 | End: 2025-06-02
Attending: EMERGENCY MEDICINE
Payer: MEDICARE

## 2025-06-02 ENCOUNTER — HOSPITAL ENCOUNTER (INPATIENT)
Facility: MEDICAL CENTER | Age: 80
LOS: 3 days | DRG: 812 | End: 2025-06-06
Attending: EMERGENCY MEDICINE | Admitting: HOSPITALIST
Payer: MEDICARE

## 2025-06-02 ENCOUNTER — TELEPHONE (OUTPATIENT)
Dept: NEPHROLOGY | Facility: MEDICAL CENTER | Age: 80
End: 2025-06-02

## 2025-06-02 ENCOUNTER — TELEPHONE (OUTPATIENT)
Dept: CARDIOLOGY | Facility: MEDICAL CENTER | Age: 80
End: 2025-06-02

## 2025-06-02 DIAGNOSIS — K21.9 GASTROESOPHAGEAL REFLUX DISEASE: ICD-10-CM

## 2025-06-02 DIAGNOSIS — D64.9 ANEMIA, UNSPECIFIED TYPE: Primary | ICD-10-CM

## 2025-06-02 DIAGNOSIS — D50.9 IRON DEFICIENCY ANEMIA, UNSPECIFIED IRON DEFICIENCY ANEMIA TYPE: ICD-10-CM

## 2025-06-02 DIAGNOSIS — R42 LIGHTHEADEDNESS: ICD-10-CM

## 2025-06-02 LAB
ABO + RH BLD: NORMAL
ABO GROUP BLD: NORMAL
ALBUMIN SERPL BCP-MCNC: 3.5 G/DL (ref 3.2–4.9)
ALBUMIN/GLOB SERPL: 1.7 G/DL
ALP SERPL-CCNC: 48 U/L (ref 30–99)
ALT SERPL-CCNC: 32 U/L (ref 2–50)
ANION GAP SERPL CALC-SCNC: 13 MMOL/L (ref 7–16)
APPEARANCE UR: CLEAR
AST SERPL-CCNC: 34 U/L (ref 12–45)
BACTERIA #/AREA URNS HPF: ABNORMAL /HPF
BARCODED ABORH UBTYP: 6200
BARCODED PRD CODE UBPRD: NORMAL
BARCODED UNIT NUM UBUNT: NORMAL
BASOPHILS # BLD AUTO: 0.6 % (ref 0–1.8)
BASOPHILS # BLD: 0.04 K/UL (ref 0–0.12)
BILIRUB SERPL-MCNC: 0.5 MG/DL (ref 0.1–1.5)
BILIRUB UR QL STRIP.AUTO: NEGATIVE
BLD GP AB SCN SERPL QL: NORMAL
BUN SERPL-MCNC: 94 MG/DL (ref 8–22)
CALCIUM ALBUM COR SERPL-MCNC: 9.2 MG/DL (ref 8.5–10.5)
CALCIUM SERPL-MCNC: 8.8 MG/DL (ref 8.5–10.5)
CASTS URNS QL MICRO: ABNORMAL /LPF (ref 0–2)
CHLORIDE SERPL-SCNC: 107 MMOL/L (ref 96–112)
CO2 SERPL-SCNC: 20 MMOL/L (ref 20–33)
COLOR UR: YELLOW
COMPONENT R 8504R: NORMAL
CREAT SERPL-MCNC: 2.66 MG/DL (ref 0.5–1.4)
EOSINOPHIL # BLD AUTO: 0.04 K/UL (ref 0–0.51)
EOSINOPHIL NFR BLD: 0.6 % (ref 0–6.9)
EPITHELIAL CELLS 1715: ABNORMAL /HPF (ref 0–5)
ERYTHROCYTE [DISTWIDTH] IN BLOOD BY AUTOMATED COUNT: 58.2 FL (ref 35.9–50)
GFR SERPLBLD CREATININE-BSD FMLA CKD-EPI: 18 ML/MIN/1.73 M 2
GLOBULIN SER CALC-MCNC: 2.1 G/DL (ref 1.9–3.5)
GLUCOSE BLD STRIP.AUTO-MCNC: 164 MG/DL (ref 65–99)
GLUCOSE SERPL-MCNC: 216 MG/DL (ref 65–99)
GLUCOSE UR STRIP.AUTO-MCNC: 100 MG/DL
HCT VFR BLD AUTO: 20.2 % (ref 37–47)
HGB BLD-MCNC: 6.4 G/DL (ref 12–16)
IMM GRANULOCYTES # BLD AUTO: 0.03 K/UL (ref 0–0.11)
IMM GRANULOCYTES NFR BLD AUTO: 0.4 % (ref 0–0.9)
KETONES UR STRIP.AUTO-MCNC: NEGATIVE MG/DL
LEUKOCYTE ESTERASE UR QL STRIP.AUTO: ABNORMAL
LYMPHOCYTES # BLD AUTO: 1.38 K/UL (ref 1–4.8)
LYMPHOCYTES NFR BLD: 20.5 % (ref 22–41)
MCH RBC QN AUTO: 33.3 PG (ref 27–33)
MCHC RBC AUTO-ENTMCNC: 31.7 G/DL (ref 32.2–35.5)
MCV RBC AUTO: 105.2 FL (ref 81.4–97.8)
MICRO URNS: ABNORMAL
MONOCYTES # BLD AUTO: 0.34 K/UL (ref 0–0.85)
MONOCYTES NFR BLD AUTO: 5.1 % (ref 0–13.4)
NEUTROPHILS # BLD AUTO: 4.9 K/UL (ref 1.82–7.42)
NEUTROPHILS NFR BLD: 72.8 % (ref 44–72)
NITRITE UR QL STRIP.AUTO: NEGATIVE
NRBC # BLD AUTO: 0.02 K/UL
NRBC BLD-RTO: 0.3 /100 WBC (ref 0–0.2)
NT-PROBNP SERPL IA-MCNC: 1103 PG/ML (ref 0–125)
PH UR STRIP.AUTO: 5 [PH] (ref 5–8)
PLATELET # BLD AUTO: 127 K/UL (ref 164–446)
PMV BLD AUTO: 11.3 FL (ref 9–12.9)
POTASSIUM SERPL-SCNC: 4.1 MMOL/L (ref 3.6–5.5)
PRODUCT TYPE UPROD: NORMAL
PROT SERPL-MCNC: 5.6 G/DL (ref 6–8.2)
PROT UR QL STRIP: NEGATIVE MG/DL
RBC # BLD AUTO: 1.92 M/UL (ref 4.2–5.4)
RBC # URNS HPF: ABNORMAL /HPF
RBC UR QL AUTO: NEGATIVE
RH BLD: NORMAL
SODIUM SERPL-SCNC: 140 MMOL/L (ref 135–145)
SP GR UR STRIP.AUTO: 1.01
TROPONIN T SERPL-MCNC: 22 NG/L (ref 6–19)
UNIT STATUS USTAT: NORMAL
UROBILINOGEN UR STRIP.AUTO-MCNC: 0.2 EU/DL
WBC # BLD AUTO: 6.7 K/UL (ref 4.8–10.8)
WBC #/AREA URNS HPF: ABNORMAL /HPF

## 2025-06-02 PROCEDURE — 83880 ASSAY OF NATRIURETIC PEPTIDE: CPT

## 2025-06-02 PROCEDURE — 36415 COLL VENOUS BLD VENIPUNCTURE: CPT

## 2025-06-02 PROCEDURE — 80053 COMPREHEN METABOLIC PANEL: CPT

## 2025-06-02 PROCEDURE — 94760 N-INVAS EAR/PLS OXIMETRY 1: CPT

## 2025-06-02 PROCEDURE — G0378 HOSPITAL OBSERVATION PER HR: HCPCS

## 2025-06-02 PROCEDURE — 85025 COMPLETE CBC W/AUTO DIFF WBC: CPT

## 2025-06-02 PROCEDURE — 99223 1ST HOSP IP/OBS HIGH 75: CPT | Performed by: HOSPITALIST

## 2025-06-02 PROCEDURE — 86900 BLOOD TYPING SEROLOGIC ABO: CPT

## 2025-06-02 PROCEDURE — 86923 COMPATIBILITY TEST ELECTRIC: CPT

## 2025-06-02 PROCEDURE — 36430 TRANSFUSION BLD/BLD COMPNT: CPT

## 2025-06-02 PROCEDURE — 81001 URINALYSIS AUTO W/SCOPE: CPT

## 2025-06-02 PROCEDURE — 700102 HCHG RX REV CODE 250 W/ 637 OVERRIDE(OP): Performed by: HOSPITALIST

## 2025-06-02 PROCEDURE — 84484 ASSAY OF TROPONIN QUANT: CPT

## 2025-06-02 PROCEDURE — 86850 RBC ANTIBODY SCREEN: CPT

## 2025-06-02 PROCEDURE — P9016 RBC LEUKOCYTES REDUCED: HCPCS

## 2025-06-02 PROCEDURE — 30233N1 TRANSFUSION OF NONAUTOLOGOUS RED BLOOD CELLS INTO PERIPHERAL VEIN, PERCUTANEOUS APPROACH: ICD-10-PCS | Performed by: EMERGENCY MEDICINE

## 2025-06-02 PROCEDURE — 71045 X-RAY EXAM CHEST 1 VIEW: CPT

## 2025-06-02 PROCEDURE — 86901 BLOOD TYPING SEROLOGIC RH(D): CPT

## 2025-06-02 PROCEDURE — A9270 NON-COVERED ITEM OR SERVICE: HCPCS | Performed by: HOSPITALIST

## 2025-06-02 PROCEDURE — 82962 GLUCOSE BLOOD TEST: CPT

## 2025-06-02 PROCEDURE — 99285 EMERGENCY DEPT VISIT HI MDM: CPT

## 2025-06-02 RX ORDER — SODIUM BICARBONATE 650 MG/1
650 TABLET ORAL 2 TIMES DAILY
Status: DISCONTINUED | OUTPATIENT
Start: 2025-06-02 | End: 2025-06-06 | Stop reason: HOSPADM

## 2025-06-02 RX ORDER — INSULIN LISPRO 100 [IU]/ML
1-6 INJECTION, SOLUTION INTRAVENOUS; SUBCUTANEOUS
Status: DISCONTINUED | OUTPATIENT
Start: 2025-06-02 | End: 2025-06-06 | Stop reason: HOSPADM

## 2025-06-02 RX ORDER — NIFEDIPINE 30 MG/1
30 TABLET, EXTENDED RELEASE ORAL DAILY
Status: DISCONTINUED | OUTPATIENT
Start: 2025-06-03 | End: 2025-06-06 | Stop reason: HOSPADM

## 2025-06-02 RX ORDER — ONDANSETRON 4 MG/1
4 TABLET, ORALLY DISINTEGRATING ORAL EVERY 4 HOURS PRN
Status: DISCONTINUED | OUTPATIENT
Start: 2025-06-02 | End: 2025-06-06 | Stop reason: HOSPADM

## 2025-06-02 RX ORDER — LEVOTHYROXINE SODIUM 50 UG/1
50 TABLET ORAL
Status: DISCONTINUED | OUTPATIENT
Start: 2025-06-03 | End: 2025-06-06 | Stop reason: HOSPADM

## 2025-06-02 RX ORDER — CLONAZEPAM 0.5 MG/1
.125-.25 TABLET ORAL 2 TIMES DAILY PRN
Status: DISCONTINUED | OUTPATIENT
Start: 2025-06-02 | End: 2025-06-06 | Stop reason: HOSPADM

## 2025-06-02 RX ORDER — OXYCODONE HYDROCHLORIDE 5 MG/1
2.5 TABLET ORAL
Refills: 0 | Status: DISCONTINUED | OUTPATIENT
Start: 2025-06-02 | End: 2025-06-06 | Stop reason: HOSPADM

## 2025-06-02 RX ORDER — MORPHINE SULFATE 4 MG/ML
2 INJECTION INTRAVENOUS
Status: DISCONTINUED | OUTPATIENT
Start: 2025-06-02 | End: 2025-06-06 | Stop reason: HOSPADM

## 2025-06-02 RX ORDER — DEXTROSE MONOHYDRATE 25 G/50ML
25 INJECTION, SOLUTION INTRAVENOUS
Status: DISCONTINUED | OUTPATIENT
Start: 2025-06-02 | End: 2025-06-06 | Stop reason: HOSPADM

## 2025-06-02 RX ORDER — ROSUVASTATIN CALCIUM 10 MG/1
10 TABLET, COATED ORAL EVERY EVENING
Status: DISCONTINUED | OUTPATIENT
Start: 2025-06-03 | End: 2025-06-06 | Stop reason: HOSPADM

## 2025-06-02 RX ORDER — ONDANSETRON 2 MG/ML
4 INJECTION INTRAMUSCULAR; INTRAVENOUS EVERY 4 HOURS PRN
Status: DISCONTINUED | OUTPATIENT
Start: 2025-06-02 | End: 2025-06-06 | Stop reason: HOSPADM

## 2025-06-02 RX ORDER — LOSARTAN POTASSIUM 50 MG/1
50 TABLET ORAL 2 TIMES DAILY
Status: DISCONTINUED | OUTPATIENT
Start: 2025-06-02 | End: 2025-06-06 | Stop reason: HOSPADM

## 2025-06-02 RX ORDER — OMEPRAZOLE 20 MG/1
20 CAPSULE, DELAYED RELEASE ORAL 2 TIMES DAILY
Status: DISCONTINUED | OUTPATIENT
Start: 2025-06-02 | End: 2025-06-06 | Stop reason: HOSPADM

## 2025-06-02 RX ORDER — LABETALOL HYDROCHLORIDE 5 MG/ML
10 INJECTION, SOLUTION INTRAVENOUS EVERY 4 HOURS PRN
Status: DISCONTINUED | OUTPATIENT
Start: 2025-06-02 | End: 2025-06-06 | Stop reason: HOSPADM

## 2025-06-02 RX ORDER — ALLOPURINOL 100 MG/1
100 TABLET ORAL 2 TIMES DAILY
Status: DISCONTINUED | OUTPATIENT
Start: 2025-06-02 | End: 2025-06-06 | Stop reason: HOSPADM

## 2025-06-02 RX ORDER — POLYETHYLENE GLYCOL 3350 17 G/17G
1 POWDER, FOR SOLUTION ORAL
Status: DISCONTINUED | OUTPATIENT
Start: 2025-06-02 | End: 2025-06-06 | Stop reason: HOSPADM

## 2025-06-02 RX ORDER — FUROSEMIDE 40 MG/1
80 TABLET ORAL
Status: DISCONTINUED | OUTPATIENT
Start: 2025-06-03 | End: 2025-06-06 | Stop reason: HOSPADM

## 2025-06-02 RX ORDER — LEVALBUTEROL INHALATION SOLUTION 0.63 MG/3ML
0.63 SOLUTION RESPIRATORY (INHALATION)
Status: DISCONTINUED | OUTPATIENT
Start: 2025-06-02 | End: 2025-06-06 | Stop reason: HOSPADM

## 2025-06-02 RX ORDER — OXYCODONE HYDROCHLORIDE 5 MG/1
5 TABLET ORAL
Refills: 0 | Status: DISCONTINUED | OUTPATIENT
Start: 2025-06-02 | End: 2025-06-06 | Stop reason: HOSPADM

## 2025-06-02 RX ORDER — METOPROLOL TARTRATE 50 MG
50 TABLET ORAL 2 TIMES DAILY
Status: DISCONTINUED | OUTPATIENT
Start: 2025-06-02 | End: 2025-06-06 | Stop reason: HOSPADM

## 2025-06-02 RX ORDER — MONTELUKAST SODIUM 10 MG/1
10 TABLET ORAL
Status: DISCONTINUED | OUTPATIENT
Start: 2025-06-03 | End: 2025-06-06 | Stop reason: HOSPADM

## 2025-06-02 RX ORDER — AMOXICILLIN 250 MG
2 CAPSULE ORAL EVERY EVENING
Status: DISCONTINUED | OUTPATIENT
Start: 2025-06-02 | End: 2025-06-06 | Stop reason: HOSPADM

## 2025-06-02 RX ADMIN — LOSARTAN POTASSIUM 50 MG: 50 TABLET, FILM COATED ORAL at 18:49

## 2025-06-02 RX ADMIN — SODIUM BICARBONATE 650 MG: 650 TABLET ORAL at 18:49

## 2025-06-02 RX ADMIN — DRONEDARONE 400 MG: 400 TABLET, FILM COATED ORAL at 18:49

## 2025-06-02 RX ADMIN — OMEPRAZOLE 20 MG: 20 CAPSULE, DELAYED RELEASE ORAL at 18:49

## 2025-06-02 RX ADMIN — ALLOPURINOL 100 MG: 100 TABLET ORAL at 18:49

## 2025-06-02 RX ADMIN — METOPROLOL TARTRATE 50 MG: 50 TABLET, FILM COATED ORAL at 18:49

## 2025-06-02 ASSESSMENT — SOCIAL DETERMINANTS OF HEALTH (SDOH)

## 2025-06-02 ASSESSMENT — ENCOUNTER SYMPTOMS
CHILLS: 0
HEARTBURN: 0
DIAPHORESIS: 0
PALPITATIONS: 1
DOUBLE VISION: 0
NAUSEA: 0
DIZZINESS: 1
BLOOD IN STOOL: 0
MUSCULOSKELETAL NEGATIVE: 1
WEAKNESS: 1
RESPIRATORY NEGATIVE: 1
LOSS OF CONSCIOUSNESS: 0
HEADACHES: 0
BRUISES/BLEEDS EASILY: 0
GASTROINTESTINAL NEGATIVE: 1
EYES NEGATIVE: 1
SEIZURES: 0
WHEEZING: 0
PSYCHIATRIC NEGATIVE: 1
HEMOPTYSIS: 0
FEVER: 0
VOMITING: 0
ABDOMINAL PAIN: 0
FOCAL WEAKNESS: 0
COUGH: 0
DIARRHEA: 0
CONSTIPATION: 0
NERVOUS/ANXIOUS: 0

## 2025-06-02 ASSESSMENT — LIFESTYLE VARIABLES
ON A TYPICAL DAY WHEN YOU DRINK ALCOHOL HOW MANY DRINKS DO YOU HAVE: 0
AVERAGE NUMBER OF DAYS PER WEEK YOU HAVE A DRINK CONTAINING ALCOHOL: 0
TOTAL SCORE: 0
TOTAL SCORE: 0
EVER FELT BAD OR GUILTY ABOUT YOUR DRINKING: NO
HAVE YOU EVER FELT YOU SHOULD CUT DOWN ON YOUR DRINKING: NO
ALCOHOL_USE: NO
CONSUMPTION TOTAL: NEGATIVE
TOTAL SCORE: 0
EVER HAD A DRINK FIRST THING IN THE MORNING TO STEADY YOUR NERVES TO GET RID OF A HANGOVER: NO
HAVE PEOPLE ANNOYED YOU BY CRITICIZING YOUR DRINKING: NO
HOW MANY TIMES IN THE PAST YEAR HAVE YOU HAD 5 OR MORE DRINKS IN A DAY: 0

## 2025-06-02 ASSESSMENT — COGNITIVE AND FUNCTIONAL STATUS - GENERAL
DAILY ACTIVITIY SCORE: 24
SUGGESTED CMS G CODE MODIFIER MOBILITY: CH
SUGGESTED CMS G CODE MODIFIER DAILY ACTIVITY: CH
MOBILITY SCORE: 24

## 2025-06-02 ASSESSMENT — PAIN DESCRIPTION - PAIN TYPE
TYPE: ACUTE PAIN
TYPE: ACUTE PAIN

## 2025-06-02 ASSESSMENT — CHA2DS2 SCORE
DIABETES: YES
CHA2DS2 VASC SCORE: 5
CHF OR LEFT VENTRICULAR DYSFUNCTION: NO
SEX: FEMALE
AGE 75 OR GREATER: YES
HYPERTENSION: YES
VASCULAR DISEASE: NO
AGE 65 TO 74: NO
PRIOR STROKE OR TIA OR THROMBOEMBOLISM: NO

## 2025-06-02 ASSESSMENT — PATIENT HEALTH QUESTIONNAIRE - PHQ9
2. FEELING DOWN, DEPRESSED, IRRITABLE, OR HOPELESS: NOT AT ALL
SUM OF ALL RESPONSES TO PHQ9 QUESTIONS 1 AND 2: 0
1. LITTLE INTEREST OR PLEASURE IN DOING THINGS: NOT AT ALL

## 2025-06-02 ASSESSMENT — COPD QUESTIONNAIRES
COPD SCREENING SCORE: 2
DO YOU EVER COUGH UP ANY MUCUS OR PHLEGM?: NO/ONLY WITH OCCASIONAL COLDS OR INFECTIONS
DURING THE PAST 4 WEEKS HOW MUCH DID YOU FEEL SHORT OF BREATH: NONE/LITTLE OF THE TIME
HAVE YOU SMOKED AT LEAST 100 CIGARETTES IN YOUR ENTIRE LIFE: NO/DON'T KNOW

## 2025-06-02 ASSESSMENT — VISUAL ACUITY: OU: 1

## 2025-06-02 ASSESSMENT — FIBROSIS 4 INDEX
FIB4 SCORE: 2.45
FIB4 SCORE: 3.74

## 2025-06-02 NOTE — ED PROVIDER NOTES
CHIEF COMPLAINT  Chief Complaint   Patient presents with    Weakness     Weakness and fatigue for 7-10 days    Irregular Heart Beat     States she is having heart palpitation, hx of Afib       LIMITATION TO HISTORY   None    HPI    Whitley Frederick is a 80 y.o. female with a history of A-fib  Who also states that her hemoglobin is dropped recently  And history of chronic renal disease  Comes in today with weakness.  She describes the weakness as feeling lightheaded like she is going to fall down when she gets up.  There is no alleviating factors.  She states exacerbated when she gets up.  And duration has been about 7 to 10 days.  She states that she would have the symptoms before but what would happen is that she would it happening for a day or 2 and then go away.  However because it has been going on for about 7 to 10 days she is concerned.    She does age have irregular heartbeats and she does atrial fibrillation due to low lungs the chronic conditions listed below.    She may be dehydrated.  Family members been giving her lots of Gatorade without sugar.  She patient is a diabetic    OUTSIDE HISTORIAN(S):  None    EXTERNAL RECORDS REVIEWED  Reviewed the patient's past medical history.  The patient appears to have multiple medical issues.  Left bundle branch block.  Seen by cardiology.  Last echocardiogram in March 2023 shows an echocardiogram with ejection fraction by 55%    PAST MEDICAL HISTORY  Past Medical History[1]    FAMILY HISTORY  Family History   Problem Relation Age of Onset    Cancer Maternal Aunt     Diabetes Maternal Aunt     Heart Disease Maternal Aunt     Hypertension Maternal Aunt     Hyperlipidemia Maternal Aunt     Cancer Mother         Leukemia    Hypertension Mother     Diabetes Mother     Lung Disease Father     Cancer Father         Lung    Lung Disease Brother     Stroke Brother     Diabetes Brother     Heart Disease Brother     Diabetes Maternal Grandmother     Hypertension Brother   "   Diabetes Brother        SOCIAL HISTORY  Social History[2]  Social History     Substance and Sexual Activity   Drug Use Yes    Types: Marijuana    Comment: CBD/THC topical       SURGICAL HISTORY  Past Surgical History[3]    CURRENT MEDICATIONS  Current Medications[4]    ALLERGIES  Allergies[5]    PHYSICAL EXAM  VITAL SIGNS: BP (!) 169/73   Pulse 72   Resp 16   Ht 1.676 m (5' 6\")   Wt 104 kg (230 lb)   SpO2 97%   BMI 37.12 kg/m²   Reviewed and noted.  Patient is hypertensive but not tachycardic  Constitutional: Well developed, Well nourished, pale appearing.  HENT: Normocephalic, atraumatic, bilateral external ears normal, No intraoral erythema, edema, exudate  Eyes: PERRLA, conjunctiva pink, no scleral icterus.   Cardiovascular: Regular rate and rhythm.   2 out of 6 systolic murmur on the left side sternal border.  Bilateral pedal edema  Respiratory: Lungs clear to auscultation bilaterally. No wheezes, rales, or rhonchi.  Abdominal:  Abdomen soft, non-tender, non distended. No rebound, or guarding.    Skin: No erythema, no rash. No wounds or bruising.  Genitourinary: No costovertebral angle tenderness.   Musculoskeletal: no deformities.   Neurologic: Alert, no facial droop noted. All extra ocular muscles intact. Moves all extremities with out weakness noted  Psychiatric: Affect normal, Judgment normal, Mood normal.       PROBLEMS EVALUATED THIS VISIT:  Weakness.  History of weakness but has been going on for 7 days.  She describes it orthostatic in nature here the patient is slightly elevated blood pressure looks pale.  Murmur noted.  And edema    MEDICAL DECISION MAKING:  Patient is weakness of the differential is quite high history and physical is not point any infectious cause.  Metabolic/hematologic is possible along with infectious along with others.  Neurologic Domenico reveals no gross stroke and so it is unlikely this is intracranial in nature.    PLAN:    CBC  Metabolic panel  COD  Troponin  EKG  Chest " x-ray  Urinalysis    RESULTS    LABS Ordered and Reviewed by Me:  Results for orders placed or performed in visit on 05/27/25   CBC Without Differential    Collection Time: 05/27/25  3:30 PM   Result Value Ref Range    WBC 9.0 4.8 - 10.8 K/uL    RBC 2.69 (L) 4.20 - 5.40 M/uL    Hemoglobin 8.8 (L) 12.0 - 16.0 g/dL    Hematocrit 27.7 (L) 37.0 - 47.0 %    .0 (H) 81.4 - 97.8 fL    MCH 32.7 27.0 - 33.0 pg    MCHC 31.8 (L) 32.2 - 35.5 g/dL    RDW 54.0 (H) 35.9 - 50.0 fL    Platelet Count 129 (L) 164 - 446 K/uL    MPV 10.9 9.0 - 12.9 fL   Basic Metabolic Panel    Collection Time: 05/27/25  3:30 PM   Result Value Ref Range    Sodium 138 135 - 145 mmol/L    Potassium 5.2 3.6 - 5.5 mmol/L    Chloride 103 96 - 112 mmol/L    Co2 22 20 - 33 mmol/L    Glucose 113 (H) 65 - 99 mg/dL    Bun 82 (H) 8 - 22 mg/dL    Creatinine 2.86 (H) 0.50 - 1.40 mg/dL    Calcium 9.0 8.5 - 10.5 mg/dL    Anion Gap 13.0 7.0 - 16.0   ESTIMATED GFR    Collection Time: 05/27/25  3:30 PM   Result Value Ref Range    GFR (CKD-EPI) 16 (A) >60 mL/min/1.73 m 2     *Note: Due to a large number of results and/or encounters for the requested time period, some results have not been displayed. A complete set of results can be found in Results Review.               RISK:  High risk we needed to the hospital for further treatment  Patient require IV blood transfusion      ED COURSE:    Patient received CBC metabolic panel.    Is noted the patient to have anemia    Red blood cells were then ordered.    And we went ahead and admitted the patient for IV blood transfusion as well as workup for this anemia.    INTERVENTIONS BY ME:  PRBCs were ordered.    In addition patient had lab work done      Patient was informed of the results..    CONSULTANTS/OTHER GROUPS CONTACTED    Hospitalist was notified and we discussed the case.    FINAL DISPO PLAN   Summary this a very pleasant 80-year female with chronic kidney disease who has been feeling weak with near syncopal  episodes appear to be orthostatic in nature on exam there were no specific findings she was hypertensive.  And hemoglobin showed that she had new onset anemia that required transfusions.  This point in discussion with the hospitalist he will continue the workup upstairs a unit of red blood cells were ordered.  She admitted in guarded condition    FINAL IMPRESSION  1. Anemia, unspecified type    2. Lightheadedness                      [1]   Past Medical History:  Diagnosis Date    Allergy     seasonal, frequent sinus problems    Anemia     Anxiety     Arrhythmia     PVC's    Arthritis     osthma    Asthma     Atherosclerosis of aorta (HCC)     Bowel habit changes     IBS    Carotid bruit     Left    CATARACT     Right IOL    Chest pain     Chronic kidney failure, stage 4 (severe) ([facility])     Dr. Najjar    Chronic neck pain     Chronic respiratory failure with hypoxia ([facility])     2l NC at HS (Preferred)    DDD (degenerative disc disease), lumbar     DIABETES MELLITUS     Diabetic polyneuropathy associated with type 2 diabetes mellitus (HCC)     Esophageal reflux     NINOSKA (generalized anxiety disorder)     Headache(784.0)     Heart murmur     Heart valve disease     Non-rheumatic mitral regurgitation    Hiatus hernia syndrome     Hyperlipidemia     Hypertension     Hypothyroid     IBD (inflammatory bowel disease)     LBBB (left bundle branch block)     per EKGs    Left bundle branch block     Migraine     Silent migraine    Mild intermittent asthma without complication     Multiple thyroid nodules     AVERY (obstructive sleep apnea) 03/20/2024    Uses CPAP with 2 L O2    Overweight(278.02)     Palpitations     Paroxysmal atrial fibrillation (HCC) 07/12/2013    woke with palps, AFib per ekg in am.- Dr. Jalloh [facility]    Pneumonia     Nov. 2015    Positive reaction to tuberculin skin test     Psoriasis     PVC (premature ventricular contraction)     Renal disease     Mild diabetic renal disease with mild proteinuria.Moderate  Stage 4    Secondary hyperparathyroidism of renal origin (HCC)     Stenosis of carotid artery     Urinary tract infection, site not specified    [2]   Social History  Tobacco Use    Smoking status: Former     Current packs/day: 0.00     Average packs/day: 1 pack/day for 20.0 years (20.0 ttl pk-yrs)     Types: Cigarettes     Start date: 1963     Quit date: 1983     Years since quittin.4    Smokeless tobacco: Never   Vaping Use    Vaping status: Never Used   Substance Use Topics    Alcohol use: No     Alcohol/week: 0.0 oz    Drug use: Yes     Types: Marijuana     Comment: CBD/THC topical   [3]   Past Surgical History:  Procedure Laterality Date    NC UPPER GI ENDOSCOPY,DIAGNOSIS N/A 2025    Procedure: ESOPHAGOGASTRODUODENOSCOPY AND UPPER ENDOSCOPIC ULTRASOUND WITH BIOPSY;  Surgeon: Domenico Olea M.D.;  Location: [facility];  Service: EUS    EGD W/ENDOSCOPIC ULTRASOUND N/A 2025    Procedure: EGD, WITH ENDOSCOPIC US;  Surgeon: Domenico Olea M.D.;  Location: [facility];  Service: EUS    NC UPPER GI ENDOSCOPY,DIAGNOSIS  2025    Procedure: ESOPHAGOGASTRODUODENOSCOPY AND COLONOSCOPY WITH BIOPSIES;  Surgeon: Janey Dow D.O.;  Location: [facility];  Service: Gastroenterology    NC COLONOSCOPY-FLEXIBLE N/A 2025    Procedure: COLONOSCOPY;  Surgeon: Janey Dow D.O.;  Location: [facility];  Service: Gastroenterology    WIDE EXCISION, LESION, UPPER EXTREMITY  2024    Procedure: WIDE EXCISION OF MALIGNANT MELANOMA, LEFT UPPER BACK;  Surgeon: Amy Horne M.D.;  Location: SURGERY [facility];  Service: General    CATARACT PHACO WITH IOL Right 10/25/2016    Procedure: CATARACT PHACO WITH IOL;  Surgeon: Zoran Gamble M.D.;  Location: SURGERY SAME DAY [facility];  Service:     ABDOMINAL HYSTERECTOMY TOTAL      CHOLECYSTECTOMY      COLONOSCOPY      x2    TONSILLECTOMY      US-NEEDLE CORE BX-BREAST PANEL Left    [4] No current  facility-administered medications for this encounter.    Current Outpatient Medications:     clonazePAM (KLONOPIN) 0.5 MG Tab, TAKE 1 TABLET BY MOUTH ONCE DAILY AS NEEDED FOR 30 DAYS. ANXIETY/HEART PALPITATIONS for 30 days, Disp: 15 Tablet, Rfl: 0    losartan (COZAAR) 50 MG Tab, Take 1 tablet by mouth 2 Times a Day., Disp: 200 Tablet, Rfl: 2    fluticasone (FLONASE) 50 MCG/ACT nasal spray, USE 1-2  SPRAY(S) IN EACH NOSTRIL ONCE DAILY 30 MINUTES PRIOR TO USE OF CPAP, Disp: 48 g, Rfl: 3    rivaroxaban (XARELTO) 15 MG Tab tablet, Take 1 Tablet by mouth with dinner., Disp: 100 Tablet, Rfl: 0    Semaglutide, 2 MG/DOSE, (OZEMPIC, 2 MG/DOSE,) 8 MG/3ML Solution Pen-injector, Inject 2 mg under the skin every 7 days. Fridays, Disp: 3 mL, Rfl: 0    levalbuterol (XOPENEX HFA) 45 MCG/ACT inhaler, Inhale 2 Puffs every 6 hours as needed for Shortness of Breath., Disp: 15 g, Rfl: 11    Continuous Glucose Sensor (FREESTYLE YANG 3 SENSOR) [facility] Use to continuously monitor blood sugar.  Change every 14 days., Disp: 2 Each, Rfl: 2    omeprazole (PRILOSEC) 20 MG delayed-release capsule, Take 2 capsules by mouth every day., Disp: 180 Capsule, Rfl: 1    montelukast (SINGULAIR) 10 MG Tab, Take 1 Tablet by mouth at bedtime., Disp: 90 Tablet, Rfl: 3    NON SPECIFIED, Nocturnal oxygen at 2 L., Disp: 1 Each, Rfl: 1    metoprolol tartrate (LOPRESSOR) 50 MG Tab, Take 1 tablet by mouth 2 times a day., Disp: 200 Tablet, Rfl: 3    sodium bicarbonate (SODIUM BICARBONATE) 650 MG Tab, Take 1 Tablet by mouth 2 times a day., Disp: 180 Tablet, Rfl: 3    levothyroxine (SYNTHROID) 50 MCG Tab, Take 1 tablet by mouth every morning on an empty stomach., Disp: 100 Tablet, Rfl: 3    NIFEdipine SR (PROCARDIA-XL) 30 MG tablet, Take 1 tablet by mouth once a day, Disp: 90 Tablet, Rfl: 3    rosuvastatin (CRESTOR) 10 MG Tab, Take 1 tablet by mouth every evening. Stop simvastatin., Disp: 100 Tablet, Rfl: 3    allopurinol (ZYLOPRIM) 100 MG Tab, TAKE TWO TABLETS BY  MOUTH TWICE DAILY, Disp: 360 Tablet, Rfl: 2    furosemide (LASIX) 80 MG Tab, Take 80 mg by mouth every 48 hours. ON EVEN DAYS, Disp: , Rfl:     acetaminophen (TYLENOL) 650 MG CR tablet, Take 650 mg by mouth 2 times a day., Disp: , Rfl:     clobetasol (TEMOVATE) 0.05 % Ointment, Apply to affected body skin twice daily as needed for severe psoriasis. Avoid use on face, axilla, groin., Disp: 60 g, Rfl: 2    fluocinonide (LIDEX) 0.05 % Ointment, Apply to affected area body skin twice daily as needed for mild/moderate psoriasis. Avoid use on face, axilla, groin., Disp: 180 g, Rfl: 2    dronedarone (MULTAQ) 400 MG Tab, Take 1 tablet by mouth 2 times a day with meals., Disp: 60 Tablet, Rfl: 0    ferrous sulfate 325 (65 Fe) MG tablet, Take 325 mg by mouth every evening., Disp: , Rfl:     Cholecalciferol (VITAMIN D) 2000 UNITS CAPS, Take 4,000 Units by mouth every day., Disp: , Rfl:   [5]   Allergies  Allergen Reactions    Amlodipine Itching    Contrast Media With Iodine [Iodine] Hives    Hydralazine      Rapid heart rate, chest tightness    Amaryl      GI Problems    Avandia [Rosiglitazone Maleate]      GI problems    Glipizide      GI Problems    Levoxyl [Levothyroxine Sodium] Unspecified     Flushing, very hot    Relafen [Nabumetone]      Itching; avoids all nsaids    Clonidine      Rapid heart rate, swelling Pt unsure of rxn    Cipro Xr      Possibly causes Chills.    Glucophage [Metformin Hydrochloride]      dizzy    Levaquin      Possible allergy - chills with cipro but sick

## 2025-06-02 NOTE — ED TRIAGE NOTES
"Chief Complaint   Patient presents with    Weakness     Weakness and fatigue for 7-10 days    Irregular Heart Beat     States she is having heart palpitation, hx of Afib     BP (!) 169/73   Pulse 72   Resp 16   Ht 1.676 m (5' 6\")   Wt 104 kg (230 lb)   SpO2 97%   BMI 37.12 kg/m²     "

## 2025-06-02 NOTE — TELEPHONE ENCOUNTER
Pt called and stated that she wanted to let Dr. Najjar know that she is currently in the hospital. She asked for him to let her know if there was anything he needed her to do? I informed her that another provider would be called for a consult as she is the on-call Dr but she still wanted Dr. Najjar's insight.

## 2025-06-02 NOTE — ED NOTES
Medication history reviewed with pt. Med rec is complete.  Allergies reviewed, per pt    Pt reports that she takes her ALLOPURINOL 100MG 1 tablet BID, not 2 tablets BID.  Pt reports that she took all her medications on 6/2/2025 at 0001    Patient has not had any outpatient antibiotics in the last 30 days.    Pt takes XARELTO 15MG, last dose was taken on 6/2/2025 at 0001    Dispense history available in EPIC? Some medications

## 2025-06-03 ENCOUNTER — APPOINTMENT (OUTPATIENT)
Dept: RADIOLOGY | Facility: MEDICAL CENTER | Age: 80
DRG: 812 | End: 2025-06-03
Attending: INTERNAL MEDICINE
Payer: MEDICARE

## 2025-06-03 ENCOUNTER — TELEPHONE (OUTPATIENT)
Dept: MEDICAL GROUP | Facility: LAB | Age: 80
End: 2025-06-03
Payer: MEDICARE

## 2025-06-03 PROBLEM — D62 ACUTE BLOOD LOSS ANEMIA: Status: ACTIVE | Noted: 2025-06-02

## 2025-06-03 LAB
ANION GAP SERPL CALC-SCNC: 15 MMOL/L (ref 7–16)
BUN SERPL-MCNC: 88 MG/DL (ref 8–22)
CALCIUM SERPL-MCNC: 8.8 MG/DL (ref 8.5–10.5)
CHLORIDE SERPL-SCNC: 108 MMOL/L (ref 96–112)
CHOLEST SERPL-MCNC: 118 MG/DL (ref 100–199)
CO2 SERPL-SCNC: 19 MMOL/L (ref 20–33)
CREAT SERPL-MCNC: 2.38 MG/DL (ref 0.5–1.4)
ERYTHROCYTE [DISTWIDTH] IN BLOOD BY AUTOMATED COUNT: 61.7 FL (ref 35.9–50)
ERYTHROCYTE [DISTWIDTH] IN BLOOD BY AUTOMATED COUNT: 65 FL (ref 35.9–50)
GFR SERPLBLD CREATININE-BSD FMLA CKD-EPI: 20 ML/MIN/1.73 M 2
GLUCOSE BLD STRIP.AUTO-MCNC: 125 MG/DL (ref 65–99)
GLUCOSE BLD STRIP.AUTO-MCNC: 142 MG/DL (ref 65–99)
GLUCOSE BLD STRIP.AUTO-MCNC: 159 MG/DL (ref 65–99)
GLUCOSE BLD STRIP.AUTO-MCNC: 168 MG/DL (ref 65–99)
GLUCOSE SERPL-MCNC: 146 MG/DL (ref 65–99)
HCT VFR BLD AUTO: 20.8 % (ref 37–47)
HCT VFR BLD AUTO: 25 % (ref 37–47)
HDLC SERPL-MCNC: 32 MG/DL
HEMOCCULT STL QL: POSITIVE
HGB BLD-MCNC: 6.9 G/DL (ref 12–16)
HGB BLD-MCNC: 8 G/DL (ref 12–16)
HGB RETIC QN AUTO: 34.1 PG/CELL (ref 29–35)
IMM RETICS NFR: 36.6 % (ref 2.6–16.1)
IRON SATN MFR SERPL: 12 % (ref 15–55)
IRON SERPL-MCNC: 38 UG/DL (ref 40–170)
LDLC SERPL CALC-MCNC: 35 MG/DL
MCH RBC QN AUTO: 31.5 PG (ref 27–33)
MCH RBC QN AUTO: 33.5 PG (ref 27–33)
MCHC RBC AUTO-ENTMCNC: 32 G/DL (ref 32.2–35.5)
MCHC RBC AUTO-ENTMCNC: 33.2 G/DL (ref 32.2–35.5)
MCV RBC AUTO: 101 FL (ref 81.4–97.8)
MCV RBC AUTO: 98.4 FL (ref 81.4–97.8)
PLATELET # BLD AUTO: 124 K/UL (ref 164–446)
PLATELET # BLD AUTO: 139 K/UL (ref 164–446)
PMV BLD AUTO: 11.2 FL (ref 9–12.9)
PMV BLD AUTO: 11.5 FL (ref 9–12.9)
POTASSIUM SERPL-SCNC: 4.4 MMOL/L (ref 3.6–5.5)
RBC # BLD AUTO: 2.06 M/UL (ref 4.2–5.4)
RBC # BLD AUTO: 2.54 M/UL (ref 4.2–5.4)
RETICS # AUTO: 0.13 M/UL (ref 0.04–0.12)
RETICS/RBC NFR: 6.2 % (ref 0.8–2.6)
SODIUM SERPL-SCNC: 142 MMOL/L (ref 135–145)
TIBC SERPL-MCNC: 330 UG/DL (ref 250–450)
TRIGL SERPL-MCNC: 255 MG/DL (ref 0–149)
UIBC SERPL-MCNC: 292 UG/DL (ref 110–370)
VIT B12 SERPL-MCNC: 648 PG/ML (ref 211–911)
WBC # BLD AUTO: 7.3 K/UL (ref 4.8–10.8)
WBC # BLD AUTO: 8.1 K/UL (ref 4.8–10.8)

## 2025-06-03 PROCEDURE — 80048 BASIC METABOLIC PNL TOTAL CA: CPT

## 2025-06-03 PROCEDURE — 82607 VITAMIN B-12: CPT

## 2025-06-03 PROCEDURE — 36415 COLL VENOUS BLD VENIPUNCTURE: CPT

## 2025-06-03 PROCEDURE — 74176 CT ABD & PELVIS W/O CONTRAST: CPT

## 2025-06-03 PROCEDURE — 94760 N-INVAS EAR/PLS OXIMETRY 1: CPT

## 2025-06-03 PROCEDURE — 700102 HCHG RX REV CODE 250 W/ 637 OVERRIDE(OP): Performed by: HOSPITALIST

## 2025-06-03 PROCEDURE — 99233 SBSQ HOSP IP/OBS HIGH 50: CPT | Performed by: HOSPITALIST

## 2025-06-03 PROCEDURE — A9270 NON-COVERED ITEM OR SERVICE: HCPCS | Performed by: HOSPITALIST

## 2025-06-03 PROCEDURE — 82272 OCCULT BLD FECES 1-3 TESTS: CPT

## 2025-06-03 PROCEDURE — P9016 RBC LEUKOCYTES REDUCED: HCPCS

## 2025-06-03 PROCEDURE — 85046 RETICYTE/HGB CONCENTRATE: CPT

## 2025-06-03 PROCEDURE — 770001 HCHG ROOM/CARE - MED/SURG/GYN PRIV*

## 2025-06-03 PROCEDURE — 36430 TRANSFUSION BLD/BLD COMPNT: CPT

## 2025-06-03 PROCEDURE — 86923 COMPATIBILITY TEST ELECTRIC: CPT

## 2025-06-03 PROCEDURE — 80061 LIPID PANEL: CPT

## 2025-06-03 PROCEDURE — 85027 COMPLETE CBC AUTOMATED: CPT | Mod: 91

## 2025-06-03 PROCEDURE — 82962 GLUCOSE BLOOD TEST: CPT | Mod: 91

## 2025-06-03 PROCEDURE — 83550 IRON BINDING TEST: CPT

## 2025-06-03 PROCEDURE — 83540 ASSAY OF IRON: CPT

## 2025-06-03 RX ORDER — ACETAMINOPHEN 325 MG/1
650 TABLET ORAL EVERY 6 HOURS PRN
Status: DISCONTINUED | OUTPATIENT
Start: 2025-06-03 | End: 2025-06-06 | Stop reason: HOSPADM

## 2025-06-03 RX ADMIN — DRONEDARONE 400 MG: 400 TABLET, FILM COATED ORAL at 08:32

## 2025-06-03 RX ADMIN — OMEPRAZOLE 20 MG: 20 CAPSULE, DELAYED RELEASE ORAL at 05:05

## 2025-06-03 RX ADMIN — LEVOTHYROXINE SODIUM 50 MCG: 0.05 TABLET ORAL at 05:06

## 2025-06-03 RX ADMIN — DRONEDARONE 400 MG: 400 TABLET, FILM COATED ORAL at 17:32

## 2025-06-03 RX ADMIN — LOSARTAN POTASSIUM 50 MG: 50 TABLET, FILM COATED ORAL at 05:06

## 2025-06-03 RX ADMIN — NIFEDIPINE 30 MG: 30 TABLET, FILM COATED, EXTENDED RELEASE ORAL at 05:06

## 2025-06-03 RX ADMIN — ALLOPURINOL 100 MG: 100 TABLET ORAL at 17:24

## 2025-06-03 RX ADMIN — METOPROLOL TARTRATE 50 MG: 50 TABLET, FILM COATED ORAL at 05:05

## 2025-06-03 RX ADMIN — ALLOPURINOL 100 MG: 100 TABLET ORAL at 05:06

## 2025-06-03 RX ADMIN — ACETAMINOPHEN 650 MG: 325 TABLET ORAL at 16:38

## 2025-06-03 RX ADMIN — SODIUM BICARBONATE 650 MG: 650 TABLET ORAL at 17:32

## 2025-06-03 RX ADMIN — ROSUVASTATIN CALCIUM 10 MG: 10 TABLET, FILM COATED ORAL at 17:25

## 2025-06-03 RX ADMIN — CLONAZEPAM 0.12 MG: 0.5 TABLET ORAL at 09:51

## 2025-06-03 RX ADMIN — SODIUM BICARBONATE 650 MG: 650 TABLET ORAL at 05:10

## 2025-06-03 RX ADMIN — MONTELUKAST 10 MG: 10 TABLET, FILM COATED ORAL at 20:12

## 2025-06-03 RX ADMIN — LOSARTAN POTASSIUM 50 MG: 50 TABLET, FILM COATED ORAL at 17:25

## 2025-06-03 RX ADMIN — OMEPRAZOLE 20 MG: 20 CAPSULE, DELAYED RELEASE ORAL at 17:24

## 2025-06-03 RX ADMIN — FUROSEMIDE 80 MG: 40 TABLET ORAL at 12:42

## 2025-06-03 RX ADMIN — METOPROLOL TARTRATE 50 MG: 50 TABLET, FILM COATED ORAL at 17:25

## 2025-06-03 ASSESSMENT — ENCOUNTER SYMPTOMS
NAUSEA: 0
HEMOPTYSIS: 0
SPUTUM PRODUCTION: 0
CHILLS: 0
ORTHOPNEA: 0
PALPITATIONS: 0
COUGH: 0
DIZZINESS: 0
VOMITING: 0

## 2025-06-03 ASSESSMENT — PATIENT HEALTH QUESTIONNAIRE - PHQ9
1. LITTLE INTEREST OR PLEASURE IN DOING THINGS: NOT AT ALL
2. FEELING DOWN, DEPRESSED, IRRITABLE, OR HOPELESS: NOT AT ALL
SUM OF ALL RESPONSES TO PHQ9 QUESTIONS 1 AND 2: 0

## 2025-06-03 ASSESSMENT — PAIN DESCRIPTION - PAIN TYPE: TYPE: ACUTE PAIN

## 2025-06-03 NOTE — HOSPITAL COURSE
Whitley Frederick has past medical history was includes atrial fibrillation, diabetes, hypertension, and chronic kidney disease.  She is treated with anticoagulation for atrial fibrillation.  P gastrointestinal history includes presence of a hiatal hernia, gastric polyps, and duodenal polyps.  She underwent EGD and EUS in 2025 and she has well-differentiated neuroendocrine gastric tumors.  The patient presented to the emergency room on 6/2/2025 with complaints of weakness and irregular heartbeat.  Patient was found to have anemia with a hemoglobin of 6.4.  She is transfused packed red blood cells.  Hemoglobin level improved to 6.9.

## 2025-06-03 NOTE — PROGRESS NOTES
Report received from Maricel SON, assumed care of pt at 0700.   POC and medications reviewed with pt. Pt verbalized understanding.   AOx4  C/o nothing at this time.  Denies pain, SOB, or dizziness at this time.   Safety measures in place.  Hourly rounding in place.

## 2025-06-03 NOTE — PROGRESS NOTES
4 Eyes Skin Assessment Completed by ADELAIDA Connelly and ADELAIDA Cheney.    Skin assessment is primarily focused on high risk bony prominences. Pay special attention to skin beneath and around medical devices, high risk bony prominences, skin to skin areas and areas where the patient lacks sensation to feel pain and areas where the patient previously had breakdown.     Head (Occipital):  WDL   Ears (Under Medical Devices): WDL   Nose (Under Medical Devices): WDL   Mouth:  WDL   Neck: Scab/Melanomas   Breast/Chest:  Scab/Melanomas   Shoulder Blades:  Scab/Melanomas   Spine:   Scab/Melanomas   (R) Arm/Elbow/Hand: Scab and Bruising   (L) Arm/Elbow/Hand: Scab and Bruising   Abdomen: Scab/Melanomas   Pannus/Groin:  Scab/Melanomas   Sacrum/Coccyx:   Scab   (R) Ischial Tuberosity (Sit Bones):  WDL   (L) Ischial Tuberosity (Sit Bones):  WDL   (R) Leg:  Scab and Bruising   (L) Leg:  Scab and Bruising   (R) Heel:  Scab   (R) Foot/Toe: Scab, Varicose veins   (L) Heel: Scab   (L) Foot/Toe:  Scab, varicose veins       DEVICES IN USE:   Respiratory Devices:  NA, patient on room air  Feeding Devices:  N/A   Lines & BP Monitoring Devices:  Peripheral IV in Right AC   Orthopedic Devices:  N/A  Miscellaneous Devices:  N/A    PROTOCOL INTERVENTIONS:   Standard/Trauma Bed:  Already in place    WOUND PHOTOS:   N/A no wounds identified    WOUND CONSULT:   N/A, no advanced wound care needs identified

## 2025-06-03 NOTE — H&P
Hospital Medicine History & Physical Note    Date of Service  6/2/2025    Primary Care Physician  EDILMA Whelan    Consultants  None    Specialist Names: None    Code Status  Prior    Chief Complaint  Chief Complaint   Patient presents with    Weakness     Weakness and fatigue for 7-10 days    Irregular Heart Beat     States she is having heart palpitation, hx of Afib       History of Presenting Illness  Whitley Frederick is a 80 y.o. female who presented 6/2/2025 with diabetes and hypertension who comes in with palpitations, dizziness and generalized weakness.  The patient has a new anemia secondary to her kidney disease which is second to the diabetes and hypertension.  The patient will be given a unit of blood to be transfused and will repeat H&H in the morning and then she should be able to discharge if that is stable..    I discussed the plan of care with patient, bedside RN, and emergency room physician Dr. Jeffry Raman.    Review of Systems  Review of Systems   Constitutional:  Positive for malaise/fatigue. Negative for chills, diaphoresis and fever.   HENT: Negative.     Eyes: Negative.  Negative for double vision.   Respiratory: Negative.  Negative for cough, hemoptysis and wheezing.    Cardiovascular:  Positive for palpitations. Negative for chest pain and leg swelling.   Gastrointestinal: Negative.  Negative for abdominal pain, blood in stool, constipation, diarrhea, heartburn, nausea and vomiting.   Genitourinary: Negative.  Negative for frequency, hematuria and urgency.   Musculoskeletal: Negative.  Negative for joint pain.   Skin: Negative.  Negative for itching and rash.   Neurological:  Positive for dizziness and weakness. Negative for focal weakness, seizures, loss of consciousness and headaches.   Endo/Heme/Allergies: Negative.  Does not bruise/bleed easily.   Psychiatric/Behavioral: Negative.  Negative for suicidal ideas. The patient is not nervous/anxious.    All other  systems reviewed and are negative.      Past Medical History   has a past medical history of Allergy, Anemia, Anxiety, Arrhythmia, Arthritis, Asthma, Atherosclerosis of aorta (HCC), Bowel habit changes, Carotid bruit, CATARACT, Chest pain, Chronic kidney failure, stage 4 (severe) (HCC), Chronic neck pain, Chronic respiratory failure with hypoxia (HCC), DDD (degenerative disc disease), lumbar, DIABETES MELLITUS, Diabetic polyneuropathy associated with type 2 diabetes mellitus (HCC), Esophageal reflux, NINOSKA (generalized anxiety disorder), Headache(784.0), Heart murmur, Heart valve disease, Hiatus hernia syndrome, Hyperlipidemia, Hypertension, Hypothyroid, IBD (inflammatory bowel disease), LBBB (left bundle branch block), Left bundle branch block, Migraine, Mild intermittent asthma without complication, Multiple thyroid nodules, AVERY (obstructive sleep apnea) (03/20/2024), Overweight(278.02), Palpitations, Paroxysmal atrial fibrillation (HCC) (07/12/2013), Pneumonia, Positive reaction to tuberculin skin test, Psoriasis, PVC (premature ventricular contraction), Renal disease, Secondary hyperparathyroidism of renal origin (HCC), Stenosis of carotid artery, and Urinary tract infection, site not specified.    Surgical History   has a past surgical history that includes tonsillectomy; cholecystectomy; abdominal hysterectomy total; us-needle core bx-breast panel (Left); cataract phaco with iol (Right, 10/25/2016); wide excision, lesion, upper extremity (03/25/2024); colonoscopy; pr upper gi endoscopy,diagnosis (01/30/2025); pr colonoscopy-flexible (N/A, 01/30/2025); pr upper gi endoscopy,diagnosis (N/A, 03/06/2025); and egd w/endoscopic ultrasound (N/A, 03/06/2025).     Family History  family history includes Cancer in her father, maternal aunt, and mother; Diabetes in her brother, brother, maternal aunt, maternal grandmother, and mother; Heart Disease in her brother and maternal aunt; Hyperlipidemia in her maternal aunt;  Hypertension in her brother, maternal aunt, and mother; Lung Disease in her brother and father; Stroke in her brother.   Family history reviewed with patient. There is no family history that is pertinent to the chief complaint.     Social History   reports that she quit smoking about 42 years ago. Her smoking use included cigarettes. She started smoking about 62 years ago. She has a 20 pack-year smoking history. She has never used smokeless tobacco. She reports current drug use. Drug: Marijuana. She reports that she does not drink alcohol.    Allergies  Allergies[1]    Medications  Prior to Admission Medications   Prescriptions Last Dose Informant Patient Reported? Taking?   Alpha-D-Galactosidase (ANTI-GAS PO) 5/19/2025 Patient Yes Yes   Sig: Take 3 Tablets by mouth 2 times a day as needed (For gas pain).   Cholecalciferol (D3 PO) 6/2/2025 at 12:01 AM Patient Yes Yes   Sig: Take 2 Capsules by mouth every evening.   Continuous Glucose Sensor (FREESTYLE YANG 3 SENSOR) [facility] [place]  Patient No No   Sig: Use to continuously monitor blood sugar.  Change every 14 days.   Ferrous Sulfate (IRON PO) 6/2/2025 at 12:01 AM Patient Yes Yes   Sig: Take 1 Tablet by mouth every evening. (OTC)   Loperamide HCl (IMODIUM PO) 5/19/2025 Patient Yes Yes   Sig: Take 3 Tablets by mouth 2 times a day as needed (For diarrhea).      NIFEdipine SR (PROCARDIA-XL) 30 MG tablet 6/2/2025 at 12:01 AM Patient No Yes   Sig: Take 1 tablet by mouth once a day   NON SPECIFIED  Patient No No   Sig: Nocturnal oxygen at 2 L.   Semaglutide, 2 MG/DOSE, (OZEMPIC, 2 MG/DOSE,) 8 MG/3ML Solution Pen-injector 5/30/2025 Patient No Yes   Sig: Inject 2 mg under the skin every 7 days. Fridays   acetaminophen (TYLENOL) 650 MG CR tablet 6/2/2025 at 12:01 AM Patient Yes Yes   Sig: Take 1,300 mg by mouth 2 times a day.   allopurinol (ZYLOPRIM) 100 MG Tab 6/2/2025 at 12:01 AM Patient No Yes   Sig: TAKE TWO TABLETS BY MOUTH TWICE DAILY   Patient taking differently: Take 100 mg by  mouth 2 times a day. Pt takes 100MG BID   clobetasol (TEMOVATE) 0.05 % Ointment Not Taking Patient No No   Sig: Apply to affected body skin twice daily as needed for severe psoriasis. Avoid use on face, axilla, groin.   Patient not taking: Reported on 6/2/2025   clonazePAM (KLONOPIN) 0.5 MG Tab 6/2/2025 at  8:30 AM Patient No Yes   Sig: TAKE 1 TABLET BY MOUTH ONCE DAILY AS NEEDED FOR 30 DAYS. ANXIETY/HEART PALPITATIONS for 30 days   Patient taking differently: Take 0.125-0.25 mg by mouth 2 times a day as needed. TAKE 1 TABLET BY MOUTH ONCE DAILY AS NEEDED FOR 30 DAYS. ANXIETY/HEART PALPITATIONS for 30 days  Pt takes half tablet (0.25MG) or one fourth (0.125MG)  Indications: Feeling Anxious   dronedarone (MULTAQ) 400 MG Tab 6/2/2025 at 12:01 AM Patient No Yes   Sig: Take 1 tablet by mouth 2 times a day with meals.   fluocinonide (LIDEX) 0.05 % Ointment Not Taking Patient No No   Sig: Apply to affected area body skin twice daily as needed for mild/moderate psoriasis. Avoid use on face, axilla, groin.   Patient not taking: Reported on 6/2/2025   fluticasone (FLONASE) 50 MCG/ACT nasal spray 6/2/2025 at 12:01 AM Patient No Yes   Sig: USE 1-2  SPRAY(S) IN EACH NOSTRIL ONCE DAILY 30 MINUTES PRIOR TO USE OF CPAP   Patient taking differently: Administer 2 Sprays into affected nostril(S) every evening.   furosemide (LASIX) 80 MG Tab 6/1/2025 at  1:00 PM Patient Yes Yes   Sig: Take 80 mg by mouth every 48 hours.   levalbuterol (XOPENEX HFA) 45 MCG/ACT inhaler Not Taking Patient No No   Sig: Inhale 2 Puffs every 6 hours as needed for Shortness of Breath.   Patient not taking: Reported on 6/2/2025   levothyroxine (SYNTHROID) 50 MCG Tab 6/1/2025 at  1:00 PM Patient No Yes   Sig: Take 1 tablet by mouth every morning on an empty stomach.   losartan (COZAAR) 50 MG Tab 6/2/2025 at 12:01 AM Patient No Yes   Sig: Take 1 tablet by mouth 2 Times a Day.   metoprolol tartrate (LOPRESSOR) 50 MG Tab 6/2/2025 at 12:01 AM Patient No Yes   Sig:  Take 1 tablet by mouth 2 times a day.   montelukast (SINGULAIR) 10 MG Tab 6/2/2025 at 12:01 AM Patient No Yes   Sig: Take 1 Tablet by mouth at bedtime.   omeprazole (PRILOSEC) 20 MG delayed-release capsule 6/2/2025 at 12:01 AM Patient No Yes   Sig: Take 2 capsules by mouth every day.   Patient taking differently: Take 20 mg by mouth 2 times a day.   rivaroxaban (XARELTO) 15 MG Tab tablet 6/2/2025 at 12:01 AM Patient No Yes   Sig: Take 1 Tablet by mouth with dinner.   rosuvastatin (CRESTOR) 10 MG Tab 6/2/2025 at 12:00 AM Patient No Yes   Sig: Take 1 tablet by mouth every evening. Stop simvastatin.   sodium bicarbonate (SODIUM BICARBONATE) 650 MG Tab 6/2/2025 at 12:01 AM Patient No Yes   Sig: Take 1 Tablet by mouth 2 times a day.      Facility-Administered Medications: None       Physical Exam  Temp:  [36.6 °C (97.8 °F)] 36.6 °C (97.8 °F)  Pulse:  [67-72] 67  Resp:  [16-17] 17  BP: (166-193)/(68-82) 193/82  SpO2:  [97 %-98 %] 97 %  Blood Pressure : (!) 193/82   Temperature: 36.6 °C (97.8 °F)   Pulse: 67   Respiration: 17   Pulse Oximetry: 97 %       Physical Exam  Vitals and nursing note reviewed. Exam conducted with a chaperone present.   Constitutional:       General: She is awake.      Appearance: Normal appearance. She is well-developed and well-groomed. She is obese. She is ill-appearing.   HENT:      Head: Normocephalic and atraumatic.      Jaw: There is normal jaw occlusion. No trismus.      Salivary Glands: Right salivary gland is not tender. Left salivary gland is not tender.      Right Ear: External ear normal.      Left Ear: External ear normal.      Nose: Nose normal.      Mouth/Throat:      Mouth: Mucous membranes are dry.      Pharynx: Oropharynx is clear.   Eyes:      General: Lids are normal. Vision grossly intact.      Extraocular Movements: Extraocular movements intact.      Conjunctiva/sclera: Conjunctivae normal.      Right eye: Right conjunctiva is not injected. No exudate.     Left eye: Left  conjunctiva is not injected. No exudate.     Pupils: Pupils are equal, round, and reactive to light.   Neck:      Thyroid: No thyroid mass.      Vascular: No carotid bruit, hepatojugular reflux or JVD.      Trachea: No abnormal tracheal secretions or tracheal deviation.   Cardiovascular:      Rate and Rhythm: Normal rate. Rhythm irregular. Occasional Extrasystoles are present.     Pulses: Normal pulses.      Heart sounds: Normal heart sounds. No murmur heard.     No friction rub.   Pulmonary:      Effort: Pulmonary effort is normal.      Breath sounds: Normal breath sounds. No wheezing or rhonchi.   Abdominal:      General: Abdomen is flat. Bowel sounds are normal.      Palpations: Abdomen is soft.      Tenderness: There is no abdominal tenderness. There is no right CVA tenderness or left CVA tenderness.      Hernia: No hernia is present.   Musculoskeletal:      Cervical back: Full passive range of motion without pain, normal range of motion and neck supple. No rigidity. No muscular tenderness.      Right lower le+ Edema present.      Left lower le+ Edema present.   Lymphadenopathy:      Head:      Right side of head: No submental adenopathy.      Left side of head: No submental adenopathy.      Cervical:      Right cervical: No superficial cervical adenopathy.     Left cervical: No superficial cervical adenopathy.      Upper Body:      Right upper body: No supraclavicular adenopathy.      Left upper body: No supraclavicular adenopathy.   Skin:     General: Skin is warm and dry.      Capillary Refill: Capillary refill takes less than 2 seconds.      Coloration: Skin is not cyanotic or pale.      Findings: No abrasion or bruising.   Neurological:      General: No focal deficit present.      Mental Status: She is alert and oriented to person, place, and time. Mental status is at baseline.      GCS: GCS eye subscore is 4. GCS verbal subscore is 5. GCS motor subscore is 6.      Cranial Nerves: No cranial nerve  deficit.      Sensory: No sensory deficit.      Motor: Weakness present.      Deep Tendon Reflexes:      Reflex Scores:       Tricep reflexes are 2+ on the right side and 2+ on the left side.       Bicep reflexes are 2+ on the right side and 2+ on the left side.       Brachioradialis reflexes are 2+ on the right side and 2+ on the left side.       Patellar reflexes are 2+ on the right side and 2+ on the left side.       Achilles reflexes are 2+ on the right side and 2+ on the left side.  Psychiatric:         Attention and Perception: Attention and perception normal.         Mood and Affect: Mood normal.         Speech: Speech normal.         Behavior: Behavior normal. Behavior is cooperative.         Thought Content: Thought content normal.         Cognition and Memory: Cognition and memory normal.         Judgment: Judgment normal.         Laboratory:  Recent Labs     06/02/25  1540   WBC 6.7   RBC 1.92*   HEMOGLOBIN 6.4*   HEMATOCRIT 20.2*   .2*   MCH 33.3*   MCHC 31.7*   RDW 58.2*   PLATELETCT 127*   MPV 11.3     Recent Labs     06/02/25  1540   SODIUM 140   POTASSIUM 4.1   CHLORIDE 107   CO2 20   GLUCOSE 216*   BUN 94*   CREATININE 2.66*   CALCIUM 8.8     Recent Labs     06/02/25  1540   ALTSGPT 32   ASTSGOT 34   ALKPHOSPHAT 48   TBILIRUBIN 0.5   GLUCOSE 216*         Recent Labs     06/02/25  1540   NTPROBNP 1103*         Recent Labs     06/02/25  1540   TROPONINT 22*       Imaging:  DX-CHEST-PORTABLE (1 VIEW)   Final Result      No evidence of acute cardiopulmonary process.          X-Ray:  I have personally reviewed the images and compared with prior images.  EKG:  I have personally reviewed the images and compared with prior images.    Assessment/Plan:  Justification for Admission Status  I anticipate this patient is appropriate for observation status at this time because patient has symptomatic anemia will require overnight stay.    Patient will need a Med/Surg bed on MEDICAL service .  The need is  secondary to symptomatic anemia.    * Symptomatic anemia- (present on admission)  Assessment & Plan  Because of the chronic renal failure patient has developed symptomatic anemia  Currently patient is dizzy, lightheaded, nauseous, malaised, and has felt occasional palpitations  Patient be transfused 1 unit of packed red blood cells  Patient will require overnight stay in admission and should be able to discharge tomorrow.    High blood pressure associated with diabetes ([facility])- (present on admission)  Assessment & Plan  Optimize blood pressure management keep systolic blood pressure less than 140 diastolic under 90  Patient's blood pressure currently elevated she has not taken her medications today.  Continue with nifedipine metoprolol losartan and Multaq    PAF (paroxysmal atrial fibrillation) ([facility])- (present on admission)  Assessment & Plan  Continue with rate control using metoprolol and Multaq and Procardia  Anticoagulated with Xarelto which probably does not help her anemia.    CKD (chronic kidney disease) stage 4, GFR 15-29 ml/min ([facility])- (present on admission)  Assessment & Plan  Monitor renal functions and avoid nephrotoxic medications  Fluid resuscitation as needed    Type 2 diabetes mellitus with stage 4 chronic kidney disease, without long-term current use of insulin ([facility])- (present on admission)  Assessment & Plan  Accu-Cheks with sliding scale coverage  Most recent hemoglobin A1c 6.3  Hold semaglutide while she is here.    Psoriasis- (present on admission)  Assessment & Plan  Chronic psoriasis topical creams being utilized.    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  Weight loss management program lifestyle modification highly recommended    Hyperlipidemia associated with type 2 diabetes mellitus (HCC)- (present on admission)  Assessment & Plan  Low-fat low-cholesterol diet  Continue Crestor 10 mg nightly  Fasting lipid panel        VTE prophylaxis: SCDs/TEDs Xarelto       [1]   Allergies  Allergen Reactions    Amlodipine Itching    Contrast Media With Iodine [Iodine] Hives    Hydralazine      Rapid heart rate, chest tightness    Amaryl      GI Problems    Avandia [Rosiglitazone Maleate]      GI problems    Glipizide      GI Problems    Levoxyl [Levothyroxine Sodium] Unspecified     Flushing, very hot    Relafen [Nabumetone]      Itching; avoids all nsaids    Clonidine      Rapid heart rate, swelling Pt unsure of rxn    Cipro Xr      Possibly causes Chills.    Glucophage [Metformin Hydrochloride]      dizzy    Levaquin      Possible allergy - chills with cipro but sick

## 2025-06-03 NOTE — ASSESSMENT & PLAN NOTE
Continue with rate control using metoprolol and Multaq and Procardia    6/5:  Hold Xarelto for active GI bleed

## 2025-06-03 NOTE — CONSULTS
Gastroenterology Consult Note     Date of Consult: 6/3/2025    Requesting Physician: Kenji Dye MD     Reason for consult: Anemia     History of Present Illness: This is a 79-year-old female with history of AVERY on CPAP, type 2 diabetes, CKD stage 4, atrial fibrillation on Eliquis, secondary hyperparathyroidism and hypothyroidism who presented with generalized weakness for over a week.  Patient was found to have macrocytic anemia with H&H 6.4/20.2 and .2.  Her H&H in 3/2025 were 12.4/37.1.  Patient takes iron supplements for years and has melenic appearing for years.  No hematochezia.    Patient had an EGD and colonoscopy on 1/30/2025 for nasal regurgitation, chronic loose stool and positive Cologuard.  EGD revealed a 2 cm hiatal hernia, gastric polyps and duodenal polyps.  Gastric polyp revealed well-differentiated neuroendocrine tumor grade 1 and chronic inactive gastritis without intestinal metaplasia or H. pylori.  Duodenal polyp revealed duodenal mucosa demonstrating foveolar metaplasia and peptic duodenitis.  Colonoscopy revealed 8 mm ascending tubular adenoma, 3 mm transverse tubular adenoma, sigmoid diverticulosis and grade 2 internal hemorrhoids.  Random colon biopsy was unremarkable for microscopic colitis.  She underwent an upper EUS which revealed 4 erythematous nodules look in the stomach at 39 cm, 42 cm and 50 cm.  Pathology revealed well-differentiated neuroendocrine tumor grade 1 at 42 cm, well-differentiated neuroendocrine tumor grade 1 at 50 cm and superficial gastric mucosa demonstrate foveolar hyperplasia without neuroendocrine tumor.  A repeat upper EUS is scheduled on 6/12/2025.     Past Medical History:  Past Medical History[1]     Past Surgical History:   Past Surgical History[2]     Allergies  Amlodipine, Contrast media with iodine [iodine], Hydralazine, Amaryl, Avandia [rosiglitazone maleate], Glipizide, Levoxyl [levothyroxine sodium],  Relafen [nabumetone], Clonidine, Cipro xr, Glucophage [metformin hydrochloride], and Levaquin     Social History:   Social History     Socioeconomic History    Marital status:      Spouse name: Not on file    Number of children: Not on file    Years of education: Not on file    Highest education level: Some college, no degree   Occupational History    Not on file   Tobacco Use    Smoking status: Former     Current packs/day: 0.00     Average packs/day: 1 pack/day for 20.0 years (20.0 ttl pk-yrs)     Types: Cigarettes     Start date: 1963     Quit date: 1983     Years since quittin.4    Smokeless tobacco: Never   Vaping Use    Vaping status: Never Used   Substance and Sexual Activity    Alcohol use: No     Alcohol/week: 0.0 oz    Drug use: Yes     Types: Marijuana     Comment: CBD/THC topical    Sexual activity: Never     Partners: Male   Other Topics Concern    Not on file   Social History Narrative    Not on file     Social Drivers of Health     Financial Resource Strain: Low Risk  (12/3/2024)    Overall Financial Resource Strain (CARDIA)     Difficulty of Paying Living Expenses: Not hard at all   Food Insecurity: No Food Insecurity (2025)    Hunger Vital Sign     Worried About Running Out of Food in the Last Year: Never true     Ran Out of Food in the Last Year: Never true   Transportation Needs: No Transportation Needs (2025)    PRAPARE - Transportation     Lack of Transportation (Medical): No     Lack of Transportation (Non-Medical): No   Physical Activity: Inactive (2024)    Exercise Vital Sign     Days of Exercise per Week: 0 days     Minutes of Exercise per Session: 20 min   Stress: No Stress Concern Present (2024)    [employer] [place] [employer] - Occupational Stress Questionnaire     Feeling of Stress : Only a little   Social Connections: Unknown (2024)    Social Connection and Isolation Panel [NHANES]     Frequency of Communication with Friends and  Family: Twice a week     Frequency of Social Gatherings with Friends and Family: Twice a week     Attends Congregational Services: Patient declined     Active Member of Clubs or Organizations: No     Attends Club or Organization Meetings: 1 to 4 times per year     Marital Status:    Intimate Partner Violence: Not At Risk (6/2/2025)    Humiliation, Afraid, Rape, and Kick questionnaire     Fear of Current or Ex-Partner: No     Emotionally Abused: No     Physically Abused: No     Sexually Abused: No   Housing Stability: Low Risk  (6/2/2025)    Housing Stability Vital Sign     Unable to Pay for Housing in the Last Year: No     Number of Times Moved in the Last Year: 1     Homeless in the Last Year: No        Family History:   Family History   Problem Relation Age of Onset    Cancer Maternal Aunt     Diabetes Maternal Aunt     Heart Disease Maternal Aunt     Hypertension Maternal Aunt     Hyperlipidemia Maternal Aunt     Cancer Mother         Leukemia    Hypertension Mother     Diabetes Mother     Lung Disease Father     Cancer Father         Lung    Lung Disease Brother     Stroke Brother     Diabetes Brother     Heart Disease Brother     Diabetes Maternal Grandmother     Hypertension Brother     Diabetes Brother        Home Medications:  Home Medications   Medication Sig Taking? Last Dose Authorizing Provider   Cholecalciferol (D3 PO) Take 2 Capsules by mouth every evening. Yes 6/2/2025 at 12:01 AM Physician Outpatient   Loperamide HCl (IMODIUM PO) Take 3 Tablets by mouth 2 times a day as needed (For diarrhea).    Yes 5/19/2025 Physician Outpatient   Alpha-D-Galactosidase (ANTI-GAS PO) Take 3 Tablets by mouth 2 times a day as needed (For gas pain). Yes 5/19/2025 Physician Outpatient   Ferrous Sulfate (IRON PO) Take 1 Tablet by mouth every evening. (OTC) Yes 6/2/2025 at 12:01 AM Physician Outpatient   clonazePAM (KLONOPIN) 0.5 MG Tab TAKE 1 TABLET BY MOUTH ONCE DAILY AS NEEDED FOR 30 DAYS. ANXIETY/HEART PALPITATIONS  for 30 days  Patient taking differently: Take 0.125-0.25 mg by mouth 2 times a day as needed. TAKE 1 TABLET BY MOUTH ONCE DAILY AS NEEDED FOR 30 DAYS. ANXIETY/HEART PALPITATIONS for 30 days  Pt takes half tablet (0.25MG) or one fourth (0.125MG)  Indications: Feeling Anxious Yes 6/2/2025 at  8:30 AM Juana Berger A.P.N.   losartan (COZAAR) 50 MG Tab Take 1 tablet by mouth 2 Times a Day. Yes 6/2/2025 at 12:01 AM Juana Berger, A.P.N.   fluticasone (FLONASE) 50 MCG/ACT nasal spray USE 1-2  SPRAY(S) IN EACH NOSTRIL ONCE DAILY 30 MINUTES PRIOR TO USE OF CPAP  Patient taking differently: Administer 2 Sprays into affected nostril(S) every evening. Yes 6/2/2025 at 12:01 AM Juana Berger, A.P.N.   rivaroxaban (XARELTO) 15 MG Tab tablet Take 1 Tablet by mouth with dinner. Yes 6/2/2025 at 12:01 AM Tj Servin M.D.   Semaglutide, 2 MG/DOSE, (OZEMPIC, 2 MG/DOSE,) 8 MG/3ML Solution Pen-injector Inject 2 mg under the skin every 7 days. Fridays Yes 5/30/2025 Juana Berger, A.P.N.   omeprazole (PRILOSEC) 20 MG delayed-release capsule Take 2 capsules by mouth every day.  Patient taking differently: Take 20 mg by mouth 2 times a day. Yes 6/2/2025 at 12:01 AM Juana Berger, A.P.N.   montelukast (SINGULAIR) 10 MG Tab Take 1 Tablet by mouth at bedtime. Yes 6/2/2025 at 12:01 AM Juana Berger, A.P.N.   metoprolol tartrate (LOPRESSOR) 50 MG Tab Take 1 tablet by mouth 2 times a day. Yes 6/2/2025 at 12:01 AM Juana Berger, A.P.N.   sodium bicarbonate (SODIUM BICARBONATE) 650 MG Tab Take 1 Tablet by mouth 2 times a day. Yes 6/2/2025 at 12:01 AM Fadi Najjar, M.D.   levothyroxine (SYNTHROID) 50 MCG Tab Take 1 tablet by mouth every morning on an empty stomach. Yes 6/1/2025 at  1:00 PM EDILMA Whelan   NIFEdipine SR (PROCARDIA-XL) 30 MG tablet Take 1 tablet by mouth once a day Yes 6/2/2025 at 12:01 AM Fadi Najjar, M.D.   rosuvastatin (CRESTOR) 10 MG Tab Take 1 tablet by mouth every  evening. Stop simvastatin. Yes 6/2/2025 at 12:00 AM Juana Berger, A.P.N.   allopurinol (ZYLOPRIM) 100 MG Tab TAKE TWO TABLETS BY MOUTH TWICE DAILY  Patient taking differently: Take 100 mg by mouth 2 times a day. Pt takes 100MG BID Yes 6/2/2025 at 12:01 AM Juana Berger, A.P.N.   furosemide (LASIX) 80 MG Tab Take 80 mg by mouth every 48 hours. Yes 6/1/2025 at  1:00 PM Physician Outpatient   acetaminophen (TYLENOL) 650 MG CR tablet Take 1,300 mg by mouth 2 times a day. Yes 6/2/2025 at 12:01 AM Physician Outpatient   dronedarone (MULTAQ) 400 MG Tab Take 1 tablet by mouth 2 times a day with meals. Yes 6/2/2025 at 12:01 AM Tj Servin M.D.   levalbuterol (XOPENEX HFA) 45 MCG/ACT inhaler Inhale 2 Puffs every 6 hours as needed for Shortness of Breath.  Patient not taking: Reported on 6/2/2025  Not Taking Juana Mayse, A.P.N.   Continuous Glucose Sensor (FREESTYLE YANG 3 SENSOR) Misc Use to continuously monitor blood sugar.  Change every 14 days.   Juana Berger, A.P.N.   NON SPECIFIED Nocturnal oxygen at 2 L.   Juana Berger, A.P.N.   clobetasol (TEMOVATE) 0.05 % Ointment Apply to affected body skin twice daily as needed for severe psoriasis. Avoid use on face, axilla, groin.  Patient not taking: Reported on 6/2/2025  Not Taking Karena Harrison M.D.   fluocinonide (LIDEX) 0.05 % Ointment Apply to affected area body skin twice daily as needed for mild/moderate psoriasis. Avoid use on face, axilla, groin.  Patient not taking: Reported on 6/2/2025  Not Taking Karena Harrison M.D.         Review of Systems:  General: No fevers, chills, unintentional weight loss. (+) Weakness.  HEENT: No scleral icterus, gum bleed, dysphagia, odynophagia.  Cardiology: No chest pain, palpitations, orthopnea.  Respiratory: No dyspnea, cough, wheezing.  Gastrointestinal: No abdominal pain, nausea, vomiting, changes in bowel habits, rectal bleed.  Genitourinary: No hematuria, dysuria, urgency.  Neurologic:  No changes in memory, confusion, gait instability.  Skin: No ecchymosis, jaundice, telangiectasia.    Physical Exam:  Vitals:    06/03/25 0441 06/03/25 1043 06/03/25 1115 06/03/25 1128   BP: (!) 154/61 (!) 148/99 (!) 158/55    Pulse: 79 61 67    Resp: 18 17  18   Temp: 36.5 °C (97.7 °F) 36.5 °C (97.7 °F) 36.7 °C (98.1 °F)    TempSrc: Temporal Temporal Oral    SpO2: 95% 97% 98%    Weight:       Height:         General: No acute cardiopulmonary distress.  Head: Normocephalic.  EENT: Scleral anicterus. Mucosa moist.   Respiratory: Breath sounds present. Symmetrical rise of anterior thorax.  Cardiovascular: Normal S1, S2.  Gastrointestinal: Soft, nontender, nondistended. Normoactive bowel sounds. No guarding.  Neurologic: Alert and oriented.  Skin: Warm and dry.      LABS:  Lab Results   Component Value Date/Time    SODIUM 142 06/03/2025 01:36 AM    POTASSIUM 4.4 06/03/2025 01:36 AM    CHLORIDE 108 06/03/2025 01:36 AM    CO2 19 (L) 06/03/2025 01:36 AM    GLUCOSE 146 (H) 06/03/2025 01:36 AM    BUN 88 (H) 06/03/2025 01:36 AM    CREATININE 2.38 (H) 06/03/2025 01:36 AM    CREATININE 1.1 04/27/2006 09:50 AM      Lab Results   Component Value Date/Time    WBC 8.1 06/03/2025 01:36 AM    RBC 2.06 (L) 06/03/2025 01:36 AM    HEMOGLOBIN 6.9 (L) 06/03/2025 01:36 AM    HEMATOCRIT 20.8 (L) 06/03/2025 01:36 AM    .0 (H) 06/03/2025 01:36 AM    MCH 33.5 (H) 06/03/2025 01:36 AM    MCHC 33.2 06/03/2025 01:36 AM    MPV 11.5 06/03/2025 01:36 AM    NEUTSPOLYS 72.80 (H) 06/02/2025 03:40 PM    LYMPHOCYTES 20.50 (L) 06/02/2025 03:40 PM    MONOCYTES 5.10 06/02/2025 03:40 PM    EOSINOPHILS 0.60 06/02/2025 03:40 PM    BASOPHILS 0.60 06/02/2025 03:40 PM    HYPOCHROMIA 1+ 04/19/2014 01:23 AM        Lab Results   Component Value Date/Time    PROTHROMBTM 26.0 (H) 03/01/2022 03:33 PM    INR 1.30 (A) 03/21/2022 12:00 AM      Recent Labs     06/02/25  1540   ASTSGOT 34   ALTSGPT 32   TBILIRUBIN 0.5   GLOBULIN 2.1       IMAGING: Reviewed personally  and summarized in HPI.        ASSESSMENT:   - Macrocytic anemia  - Gastric neuroendocrine tumor  - Atrial fibrillation on Eliquis (last dose on 6/1)  - CKD stage IV       PLAN:   This is a 80-year-old female who is consulted for macrocytic anemia.  Patient denies alcohol use.  Iron panel reveals iron deficiency with macrocytosis.  Check reticulocyte, LDH and haptoglobin.  Plan for an EGD tomorrow to rule out upper GI bleed, such as post-polypectomy bleed, angiodysplasia and peptic ulcer disease in the setting of anticoagulation.  Obtain a CT abdomen/pelvis with oral contrast only as patient has IV contrast allergy and CKD.  Consider a colonoscopy if EGD is unremarkable; however, her last colonoscopy was approximately 5 months ago which did not show any bleeding stigmata.      Thank you for the consultation. Please call with any questions or concerns.    Janey Dow D.O.  Gastroenterology  [facility]  [phone]          [1]   Past Medical History:  Diagnosis Date    Allergy     seasonal, frequent sinus problems    Anemia     Anxiety     Arrhythmia     PVC's    Arthritis     osthma    Asthma     Atherosclerosis of aorta (HCC)     Bowel habit changes     IBS    Carotid bruit     Left    CATARACT     Right IOL    Chest pain     Chronic kidney failure, stage 4 (severe) (HCC)     Dr. Najjar    Chronic neck pain     Chronic respiratory failure with hypoxia (HCC)     2l NC at [facility] (Preferred)    DDD (degenerative disc disease), lumbar     DIABETES MELLITUS     Diabetic polyneuropathy associated with type 2 diabetes mellitus (HCC)     Esophageal reflux     NINOSKA (generalized anxiety disorder)     Headache(784.0)     Heart murmur     Heart valve disease     Non-rheumatic mitral regurgitation    Hiatus hernia syndrome     Hyperlipidemia     Hypertension     Hypothyroid     IBD (inflammatory bowel disease)     LBBB (left bundle branch block)     per EKGs    Left bundle branch block     Migraine     Silent  migraine    Mild intermittent asthma without complication     Multiple thyroid nodules     AVERY (obstructive sleep apnea) 03/20/2024    Uses CPAP with 2 L O2    Overweight(278.02)     Palpitations     Paroxysmal atrial fibrillation (HCC) 07/12/2013    woke with palps, AFib per ekg in am.- Dr. Jalloh [facility]    Pneumonia     Nov. 2015    Positive reaction to tuberculin skin test     Psoriasis     PVC (premature ventricular contraction)     Renal disease     Mild diabetic renal disease with mild proteinuria.Moderate Stage 4    Secondary hyperparathyroidism of renal origin (HCC)     Stenosis of carotid artery     Urinary tract infection, site not specified    [2]   Past Surgical History:  Procedure Laterality Date    LA UPPER GI ENDOSCOPY,DIAGNOSIS N/A 03/06/2025    Procedure: ESOPHAGOGASTRODUODENOSCOPY AND UPPER ENDOSCOPIC ULTRASOUND WITH BIOPSY;  Surgeon: Domenico Olea M.D.;  Location: [facility];  Service: EUS    EGD W/ENDOSCOPIC ULTRASOUND N/A 03/06/2025    Procedure: EGD, WITH ENDOSCOPIC US;  Surgeon: Domenico Olea M.D.;  Location: [facility];  Service: EUS    LA UPPER GI ENDOSCOPY,DIAGNOSIS  01/30/2025    Procedure: ESOPHAGOGASTRODUODENOSCOPY AND COLONOSCOPY WITH BIOPSIES;  Surgeon: Janey Dow D.O.;  Location: [facility];  Service: Gastroenterology    LA COLONOSCOPY-FLEXIBLE N/A 01/30/2025    Procedure: COLONOSCOPY;  Surgeon: Janey Dow D.O.;  Location: [facility];  Service: Gastroenterology    WIDE EXCISION, LESION, UPPER EXTREMITY  03/25/2024    Procedure: WIDE EXCISION OF MALIGNANT MELANOMA, LEFT UPPER BACK;  Surgeon: Amy Horne M.D.;  Location: SURGERY [facility];  Service: General    CATARACT PHACO WITH IOL Right 10/25/2016    Procedure: CATARACT PHACO WITH IOL;  Surgeon: Zoran Gamble M.D.;  Location: SURGERY SAME DAY [facility];  Service:     ABDOMINAL HYSTERECTOMY TOTAL      CHOLECYSTECTOMY      COLONOSCOPY      x2    TONSILLECTOMY       US-NEEDLE CORE BX-BREAST PANEL Left

## 2025-06-03 NOTE — TELEPHONE ENCOUNTER
Patient informed via my chart Ozempic ready for  from Patient asst program. Ozempic was received and added 2 patient identifiers/ placed in vaccine adult fridge.

## 2025-06-03 NOTE — CARE PLAN
The patient is Watcher - Medium risk of patient condition declining or worsening    Shift Goals  Clinical Goals: monitor H&H levels, no falls  Patient Goals: discuss with MD about potential source of low Hgb up to 6.9 from 6.4 last night. Received x1 unit of PRBC overnight. Pt had no falls. No complaints of pain. Patient expressing concerns about the cause of her anemia. Patient has no signs of bleeding.    Progress made toward(s) clinical / shift goals:  Hgb this morning     Patient is not progressing towards the following goals:

## 2025-06-03 NOTE — PROGRESS NOTES
Patient arrived from ER via gurney with x1PRBC infusing. Patient ambulatory from hallway to bed easily with no difficulties. Assisted to bathroom and then back into bed, welcome guide given, admission profile completed, introduced patient to unit and discussed plan of care of the night with patient. Patient states her daughter is coming by later to drop off a  for her phone. Patient has no complaints of pain at this time. 2 RN skin check completed. Patient's personal belongings are within reach and call light also within patient's reach.

## 2025-06-03 NOTE — ASSESSMENT & PLAN NOTE
Optimize blood pressure management keep systolic blood pressure less than 140 diastolic under 90  Continue with nifedipine metoprolol losartan and Multaq

## 2025-06-03 NOTE — CARE PLAN
The patient is Stable - Low risk of patient condition declining or worsening    Shift Goals  Clinical Goals: Monitor H&H, remain free from falls throughout shift.  Patient Goals: Talk to Doc about condition    Progress made toward(s) clinical / shift goals:  Patient remained free from falls throughout shift with fall precautions including pt education, call light within reach, bed in locked and lowest position.    Patient is not progressing towards the following goals:

## 2025-06-03 NOTE — ASSESSMENT & PLAN NOTE
Baseline hemoglobin somewhere around 12    6/5:  Has now required 3 units PRBC's since admission   I have ordered serial hemoglobin checks  Transfuse packed red blood cells to maintain hemoglobin greater than 7  I ordered iron replacement  Will need outpatient capsule endoscopy

## 2025-06-04 ENCOUNTER — ANESTHESIA EVENT (OUTPATIENT)
Dept: SURGERY | Facility: MEDICAL CENTER | Age: 80
DRG: 812 | End: 2025-06-04
Payer: MEDICARE

## 2025-06-04 ENCOUNTER — ANESTHESIA (OUTPATIENT)
Dept: SURGERY | Facility: MEDICAL CENTER | Age: 80
DRG: 812 | End: 2025-06-04
Payer: MEDICARE

## 2025-06-04 PROBLEM — R01.1 MURMUR: Status: ACTIVE | Noted: 2025-06-04

## 2025-06-04 PROBLEM — Z92.84 HISTORY OF UNINTENDED AWARENESS UNDER GENERAL ANESTHESIA: Status: ACTIVE | Noted: 2025-06-04

## 2025-06-04 LAB
ERYTHROCYTE [DISTWIDTH] IN BLOOD BY AUTOMATED COUNT: 65.3 FL (ref 35.9–50)
ERYTHROCYTE [DISTWIDTH] IN BLOOD BY AUTOMATED COUNT: 66.4 FL (ref 35.9–50)
ERYTHROCYTE [DISTWIDTH] IN BLOOD BY AUTOMATED COUNT: 66.5 FL (ref 35.9–50)
GLUCOSE BLD STRIP.AUTO-MCNC: 133 MG/DL (ref 65–99)
GLUCOSE BLD STRIP.AUTO-MCNC: 140 MG/DL (ref 65–99)
GLUCOSE BLD STRIP.AUTO-MCNC: 161 MG/DL (ref 65–99)
GLUCOSE BLD STRIP.AUTO-MCNC: 172 MG/DL (ref 65–99)
HCT VFR BLD AUTO: 22.3 % (ref 37–47)
HCT VFR BLD AUTO: 22.6 % (ref 37–47)
HCT VFR BLD AUTO: 23.5 % (ref 37–47)
HGB BLD-MCNC: 7.3 G/DL (ref 12–16)
HGB BLD-MCNC: 7.3 G/DL (ref 12–16)
HGB BLD-MCNC: 7.7 G/DL (ref 12–16)
HGB RETIC QN AUTO: 32.3 PG/CELL (ref 29–35)
IMM RETICS NFR: 34.8 % (ref 2.6–16.1)
LDH SERPL L TO P-CCNC: 126 U/L (ref 107–266)
MCH RBC QN AUTO: 32.4 PG (ref 27–33)
MCH RBC QN AUTO: 32.4 PG (ref 27–33)
MCH RBC QN AUTO: 32.9 PG (ref 27–33)
MCHC RBC AUTO-ENTMCNC: 32.3 G/DL (ref 32.2–35.5)
MCHC RBC AUTO-ENTMCNC: 32.7 G/DL (ref 32.2–35.5)
MCHC RBC AUTO-ENTMCNC: 32.8 G/DL (ref 32.2–35.5)
MCV RBC AUTO: 100.4 FL (ref 81.4–97.8)
MCV RBC AUTO: 100.4 FL (ref 81.4–97.8)
MCV RBC AUTO: 99.1 FL (ref 81.4–97.8)
PLATELET # BLD AUTO: 118 K/UL (ref 164–446)
PLATELET # BLD AUTO: 121 K/UL (ref 164–446)
PLATELET # BLD AUTO: 125 K/UL (ref 164–446)
PMV BLD AUTO: 11 FL (ref 9–12.9)
PMV BLD AUTO: 11.2 FL (ref 9–12.9)
PMV BLD AUTO: 11.3 FL (ref 9–12.9)
RBC # BLD AUTO: 2.25 M/UL (ref 4.2–5.4)
RBC # BLD AUTO: 2.25 M/UL (ref 4.2–5.4)
RBC # BLD AUTO: 2.34 M/UL (ref 4.2–5.4)
RETICS # AUTO: 0.14 M/UL (ref 0.04–0.12)
RETICS/RBC NFR: 6.2 % (ref 0.8–2.6)
VIT B12 SERPL-MCNC: 570 PG/ML (ref 211–911)
WBC # BLD AUTO: 6.8 K/UL (ref 4.8–10.8)
WBC # BLD AUTO: 7 K/UL (ref 4.8–10.8)
WBC # BLD AUTO: 7.2 K/UL (ref 4.8–10.8)

## 2025-06-04 PROCEDURE — 85027 COMPLETE CBC AUTOMATED: CPT

## 2025-06-04 PROCEDURE — 700105 HCHG RX REV CODE 258: Performed by: INTERNAL MEDICINE

## 2025-06-04 PROCEDURE — 700101 HCHG RX REV CODE 250: Performed by: INTERNAL MEDICINE

## 2025-06-04 PROCEDURE — 700101 HCHG RX REV CODE 250: Performed by: ANESTHESIOLOGY

## 2025-06-04 PROCEDURE — 83010 ASSAY OF HAPTOGLOBIN QUANT: CPT

## 2025-06-04 PROCEDURE — A9270 NON-COVERED ITEM OR SERVICE: HCPCS | Performed by: HOSPITALIST

## 2025-06-04 PROCEDURE — 160015 HCHG STAT PREOP MINUTES: Performed by: INTERNAL MEDICINE

## 2025-06-04 PROCEDURE — 83615 LACTATE (LD) (LDH) ENZYME: CPT

## 2025-06-04 PROCEDURE — 160009 HCHG ANES TIME/MIN: Performed by: INTERNAL MEDICINE

## 2025-06-04 PROCEDURE — 700102 HCHG RX REV CODE 250 W/ 637 OVERRIDE(OP): Performed by: HOSPITALIST

## 2025-06-04 PROCEDURE — 160002 HCHG RECOVERY MINUTES (STAT): Performed by: INTERNAL MEDICINE

## 2025-06-04 PROCEDURE — 85046 RETICYTE/HGB CONCENTRATE: CPT

## 2025-06-04 PROCEDURE — 36415 COLL VENOUS BLD VENIPUNCTURE: CPT

## 2025-06-04 PROCEDURE — 160048 HCHG OR STATISTICAL LEVEL 1-5: Performed by: INTERNAL MEDICINE

## 2025-06-04 PROCEDURE — 700111 HCHG RX REV CODE 636 W/ 250 OVERRIDE (IP): Mod: JZ | Performed by: ANESTHESIOLOGY

## 2025-06-04 PROCEDURE — 94760 N-INVAS EAR/PLS OXIMETRY 1: CPT

## 2025-06-04 PROCEDURE — 160202 HCHG ENDO MINUTES - 1ST 30 MINS LEVEL 3: Performed by: INTERNAL MEDICINE

## 2025-06-04 PROCEDURE — 0DJ08ZZ INSPECTION OF UPPER INTESTINAL TRACT, VIA NATURAL OR ARTIFICIAL OPENING ENDOSCOPIC: ICD-10-PCS | Performed by: INTERNAL MEDICINE

## 2025-06-04 PROCEDURE — 770001 HCHG ROOM/CARE - MED/SURG/GYN PRIV*

## 2025-06-04 PROCEDURE — 82607 VITAMIN B-12: CPT

## 2025-06-04 PROCEDURE — 82962 GLUCOSE BLOOD TEST: CPT

## 2025-06-04 PROCEDURE — 160035 HCHG PACU - 1ST 60 MINS PHASE I: Performed by: INTERNAL MEDICINE

## 2025-06-04 PROCEDURE — 99232 SBSQ HOSP IP/OBS MODERATE 35: CPT | Performed by: HOSPITALIST

## 2025-06-04 RX ORDER — HALOPERIDOL 5 MG/ML
1 INJECTION INTRAMUSCULAR
Status: DISCONTINUED | OUTPATIENT
Start: 2025-06-04 | End: 2025-06-04 | Stop reason: HOSPADM

## 2025-06-04 RX ORDER — LIDOCAINE HYDROCHLORIDE 20 MG/ML
INJECTION, SOLUTION EPIDURAL; INFILTRATION; INTRACAUDAL; PERINEURAL PRN
Status: DISCONTINUED | OUTPATIENT
Start: 2025-06-04 | End: 2025-06-04 | Stop reason: SURG

## 2025-06-04 RX ORDER — SODIUM CHLORIDE, SODIUM LACTATE, POTASSIUM CHLORIDE, CALCIUM CHLORIDE 600; 310; 30; 20 MG/100ML; MG/100ML; MG/100ML; MG/100ML
INJECTION, SOLUTION INTRAVENOUS CONTINUOUS
Status: ACTIVE | OUTPATIENT
Start: 2025-06-04 | End: 2025-06-04

## 2025-06-04 RX ORDER — METOCLOPRAMIDE HYDROCHLORIDE 5 MG/ML
INJECTION INTRAMUSCULAR; INTRAVENOUS PRN
Status: DISCONTINUED | OUTPATIENT
Start: 2025-06-04 | End: 2025-06-04 | Stop reason: SURG

## 2025-06-04 RX ORDER — ONDANSETRON 2 MG/ML
4 INJECTION INTRAMUSCULAR; INTRAVENOUS
Status: DISCONTINUED | OUTPATIENT
Start: 2025-06-04 | End: 2025-06-04 | Stop reason: HOSPADM

## 2025-06-04 RX ORDER — DIPHENHYDRAMINE HYDROCHLORIDE 50 MG/ML
12.5 INJECTION, SOLUTION INTRAMUSCULAR; INTRAVENOUS
Status: DISCONTINUED | OUTPATIENT
Start: 2025-06-04 | End: 2025-06-04 | Stop reason: HOSPADM

## 2025-06-04 RX ORDER — MIDAZOLAM HYDROCHLORIDE 1 MG/ML
INJECTION INTRAMUSCULAR; INTRAVENOUS PRN
Status: DISCONTINUED | OUTPATIENT
Start: 2025-06-04 | End: 2025-06-04 | Stop reason: HOSPADM

## 2025-06-04 RX ORDER — SODIUM CHLORIDE, SODIUM LACTATE, POTASSIUM CHLORIDE, CALCIUM CHLORIDE 600; 310; 30; 20 MG/100ML; MG/100ML; MG/100ML; MG/100ML
INJECTION, SOLUTION INTRAVENOUS CONTINUOUS
Status: DISCONTINUED | OUTPATIENT
Start: 2025-06-04 | End: 2025-06-04 | Stop reason: HOSPADM

## 2025-06-04 RX ADMIN — SODIUM BICARBONATE 650 MG: 650 TABLET ORAL at 06:06

## 2025-06-04 RX ADMIN — METOPROLOL TARTRATE 50 MG: 50 TABLET, FILM COATED ORAL at 18:34

## 2025-06-04 RX ADMIN — ALLOPURINOL 100 MG: 100 TABLET ORAL at 18:34

## 2025-06-04 RX ADMIN — DRONEDARONE 400 MG: 400 TABLET, FILM COATED ORAL at 08:26

## 2025-06-04 RX ADMIN — SENNOSIDES AND DOCUSATE SODIUM 2 TABLET: 50; 8.6 TABLET ORAL at 18:34

## 2025-06-04 RX ADMIN — OMEPRAZOLE 20 MG: 20 CAPSULE, DELAYED RELEASE ORAL at 18:35

## 2025-06-04 RX ADMIN — DRONEDARONE 400 MG: 400 TABLET, FILM COATED ORAL at 18:34

## 2025-06-04 RX ADMIN — PROPOFOL 30 MG: 10 INJECTION, EMULSION INTRAVENOUS at 15:45

## 2025-06-04 RX ADMIN — LIDOCAINE HYDROCHLORIDE 50 MG: 20 INJECTION, SOLUTION EPIDURAL; INFILTRATION; INTRACAUDAL; PERINEURAL at 15:45

## 2025-06-04 RX ADMIN — ALLOPURINOL 100 MG: 100 TABLET ORAL at 06:06

## 2025-06-04 RX ADMIN — SODIUM BICARBONATE 650 MG: 650 TABLET ORAL at 18:34

## 2025-06-04 RX ADMIN — METOCLOPRAMIDE HYDROCHLORIDE 10 MG: 5 INJECTION INTRAMUSCULAR; INTRAVENOUS at 15:50

## 2025-06-04 RX ADMIN — ACETAMINOPHEN 650 MG: 325 TABLET ORAL at 12:31

## 2025-06-04 RX ADMIN — MIDAZOLAM HYDROCHLORIDE 1 MG: 1 INJECTION, SOLUTION INTRAMUSCULAR; INTRAVENOUS at 15:41

## 2025-06-04 RX ADMIN — MIDAZOLAM HYDROCHLORIDE 1 MG: 1 INJECTION, SOLUTION INTRAMUSCULAR; INTRAVENOUS at 15:44

## 2025-06-04 RX ADMIN — OMEPRAZOLE 20 MG: 20 CAPSULE, DELAYED RELEASE ORAL at 06:06

## 2025-06-04 RX ADMIN — ROSUVASTATIN CALCIUM 10 MG: 10 TABLET, FILM COATED ORAL at 18:34

## 2025-06-04 RX ADMIN — LEVOTHYROXINE SODIUM 50 MCG: 0.05 TABLET ORAL at 06:06

## 2025-06-04 RX ADMIN — MONTELUKAST 10 MG: 10 TABLET, FILM COATED ORAL at 20:19

## 2025-06-04 RX ADMIN — SODIUM CHLORIDE, POTASSIUM CHLORIDE, SODIUM LACTATE AND CALCIUM CHLORIDE: 600; 310; 30; 20 INJECTION, SOLUTION INTRAVENOUS at 15:40

## 2025-06-04 RX ADMIN — LOSARTAN POTASSIUM 50 MG: 50 TABLET, FILM COATED ORAL at 06:06

## 2025-06-04 RX ADMIN — LOSARTAN POTASSIUM 50 MG: 50 TABLET, FILM COATED ORAL at 18:34

## 2025-06-04 RX ADMIN — POLYETHYLENE GLYCOL 3350, SODIUM SULFATE ANHYDROUS, SODIUM BICARBONATE, SODIUM CHLORIDE, POTASSIUM CHLORIDE 4 L: 236; 22.74; 6.74; 5.86; 2.97 POWDER, FOR SOLUTION ORAL at 18:35

## 2025-06-04 RX ADMIN — PROPOFOL 30 MG: 10 INJECTION, EMULSION INTRAVENOUS at 15:46

## 2025-06-04 RX ADMIN — METOPROLOL TARTRATE 50 MG: 50 TABLET, FILM COATED ORAL at 06:06

## 2025-06-04 RX ADMIN — NIFEDIPINE 30 MG: 30 TABLET, FILM COATED, EXTENDED RELEASE ORAL at 06:06

## 2025-06-04 RX ADMIN — LIDOCAINE HYDROCHLORIDE 50 MG: 20 INJECTION, SOLUTION EPIDURAL; INFILTRATION; INTRACAUDAL; PERINEURAL at 15:46

## 2025-06-04 RX ADMIN — CLONAZEPAM 0.12 MG: 0.5 TABLET ORAL at 12:33

## 2025-06-04 ASSESSMENT — PAIN DESCRIPTION - PAIN TYPE
TYPE: ACUTE PAIN;SURGICAL PAIN
TYPE: SURGICAL PAIN
TYPE: ACUTE PAIN
TYPE: SURGICAL PAIN

## 2025-06-04 ASSESSMENT — ENCOUNTER SYMPTOMS
CHILLS: 0
SPUTUM PRODUCTION: 0
VOMITING: 0
HEMOPTYSIS: 0
ORTHOPNEA: 0
NAUSEA: 0
DIZZINESS: 0
PALPITATIONS: 0
COUGH: 0

## 2025-06-04 ASSESSMENT — PAIN SCALES - GENERAL: PAIN_LEVEL: 0

## 2025-06-04 NOTE — PROGRESS NOTES
[facility] Medicine Daily Progress Note    Date of Service  6/4/2025    Chief Complaint  Whitley Frederick is a 80 y.o. female admitted 6/2/2025 with weakness    Hospital Course  Whitley Frederick has past medical history was includes atrial fibrillation, diabetes, hypertension, and chronic kidney disease.  She is treated with anticoagulation for atrial fibrillation.  P gastrointestinal history includes presence of a hiatal hernia, gastric polyps, and duodenal polyps.  She underwent EGD and EUS in 2025 and she has well-differentiated neuroendocrine gastric tumors.  The patient presented to the emergency room on 6/2/2025 with complaints of weakness and irregular heartbeat.  Patient was found to have anemia with a hemoglobin of 6.4.  She is transfused packed red blood cells.  Hemoglobin level improved to 6.9.    Interval Problem Update  No signs of overt GI bleed since yesterday  Denies abdominal pain  Breathing is near baseline  Agreeable to EGD which is scheduled for today    I have discussed this patient's plan of care and discharge plan at IDT rounds today with Case Management, Nursing, Nursing leadership, and other members of the IDT team.    Consultants/Specialty  GI    Code Status  Full Code    Disposition  Medically Cleared  I have placed the appropriate orders for post-discharge needs.    Review of Systems  Review of Systems   Constitutional:  Negative for chills and malaise/fatigue.   Respiratory:  Negative for cough, hemoptysis and sputum production.    Cardiovascular:  Negative for chest pain, palpitations and orthopnea.   Gastrointestinal:  Negative for nausea and vomiting.   Skin:  Negative for itching and rash.   Neurological:  Negative for dizziness.   All other systems reviewed and are negative.       Physical Exam  Temp:  [36.1 °C (97 °F)-36.7 °C (98.1 °F)] 36.1 °C (97 °F)  Pulse:  [58-75] 75  Resp:  [17-18] 17  BP: (136-158)/(44-99) 141/51  SpO2:  [97 %-98 %] 98 %    Physical Exam  Constitutional:        General: She is not in acute distress.     Appearance: Normal appearance. She is normal weight.   HENT:      Head: Normocephalic and atraumatic.      Right Ear: External ear normal.      Left Ear: External ear normal.      Nose: Nose normal.   Eyes:      Extraocular Movements: Extraocular movements intact.   Cardiovascular:      Rate and Rhythm: Normal rate and regular rhythm.      Pulses: Normal pulses.   Pulmonary:      Effort: Pulmonary effort is normal.      Breath sounds: Normal breath sounds.   Abdominal:      General: Abdomen is flat. Bowel sounds are normal. There is no distension.      Palpations: Abdomen is soft.      Tenderness: There is no abdominal tenderness.   Musculoskeletal:         General: Normal range of motion.      Cervical back: Normal range of motion and neck supple.   Skin:     General: Skin is warm and dry.   Neurological:      General: No focal deficit present.      Mental Status: She is alert and oriented to person, place, and time.      Cranial Nerves: No cranial nerve deficit.   Psychiatric:         Mood and Affect: Mood normal.         Behavior: Behavior normal.         Fluids    Intake/Output Summary (Last 24 hours) at 6/4/2025 1025  Last data filed at 6/4/2025 0613  Gross per 24 hour   Intake 320 ml   Output --   Net 320 ml        Laboratory  Recent Labs     06/03/25  2004 06/04/25  0126 06/04/25  0752   WBC 7.3 7.0 7.2   RBC 2.54* 2.25* 2.25*   HEMOGLOBIN 8.0* 7.3* 7.3*   HEMATOCRIT 25.0* 22.3* 22.6*   MCV 98.4* 99.1* 100.4*   MCH 31.5 32.4 32.4   MCHC 32.0* 32.7 32.3   RDW 65.0* 66.5* 66.4*   PLATELETCT 139* 118* 121*   MPV 11.2 11.2 11.0     Recent Labs     06/02/25  1540 06/03/25  0136   SODIUM 140 142   POTASSIUM 4.1 4.4   CHLORIDE 107 108   CO2 20 19*   GLUCOSE 216* 146*   BUN 94* 88*   CREATININE 2.66* 2.38*   CALCIUM 8.8 8.8             Recent Labs     06/03/25  0136   TRIGLYCERIDE 255*   HDL 32*   LDL 35       Imaging  CT-ABDOMEN-PELVIS W/O   Final Result      1.  No acute  abnormalities are identified in the abdomen or pelvis.      2.  No adenopathy or mass identified.      3.  Metallic foreign body noted in the gastric wall.         DX-CHEST-PORTABLE (1 VIEW)   Final Result      No evidence of acute cardiopulmonary process.           Assessment/Plan  * Acute blood loss anemia- (present on admission)  Assessment & Plan  Baseline hemoglobin somewhere around 12    6/4:  Required 2 units packed red blood cells since admission  Gastroenterology consulted, EGD is planned  Continue intravenous proton pump inhibitor      PAF (paroxysmal atrial fibrillation) ([facility])- (present on admission)  Assessment & Plan  Continue with rate control using metoprolol and Multaq and Procardia    6/4:  Hold Xarelto for active GI bleed    Class 2 severe obesity with serious comorbidity and body mass index (BMI) of 37.0 to 37.9 in adult ([facility])- (present on admission)  Assessment & Plan  Body mass index is 36.31 kg/m².      CKD (chronic kidney disease) stage 4, GFR 15-29 ml/min ([facility])- (present on admission)  Assessment & Plan  Monitor renal functions and avoid nephrotoxic medications  Fluid resuscitation as needed    Psoriasis- (present on admission)  Assessment & Plan  Home topical treatment    AVERY (obstructive sleep apnea) - as of 9/2024 - doesn't feel she needs it- (present on admission)  Assessment & Plan  CPAP at night    High blood pressure associated with diabetes ([facility])- (present on admission)  Assessment & Plan  Optimize blood pressure management keep systolic blood pressure less than 140 diastolic under 90  Continue with nifedipine metoprolol losartan and Multaq    Chronic idiopathic gout involving toe without tophus- (present on admission)  Assessment & Plan  Allopurinol    Hyperlipidemia associated with type 2 diabetes mellitus ([facility])- (present on admission)  Assessment & Plan  Crestor    Type 2 diabetes mellitus with stage 4 chronic kidney disease, without long-term current use of insulin ([facility])- (present  on admission)  Assessment & Plan  Accu-Cheks with sliding scale coverage         VTE prophylaxis:   SCDs/TEDs      I have performed a physical exam and reviewed and updated ROS and Plan today (6/4/2025). In review of yesterday's note (6/3/2025), there are no changes except as documented above.

## 2025-06-04 NOTE — OR NURSING
1553: Patient arrived from [facility] via gurney.    Stop Bang per protocol for diagnosis of sleep apnea.    Sedation/Resp Status: Eye opening to verbal.  Respirations spontaneous and non-labored.    HR: 55; VSS on 6L 02 via mask.    1640: Called report to ADELAIDA Suárez on [facility]. Patient placed on transport.     1700: Updated family on patient status.    1717: Patient to [facility].

## 2025-06-04 NOTE — PROGRESS NOTES
Hospital Medicine Daily Progress Note    Date of Service  6/3/2025    Chief Complaint  Whitley Frederick is a 80 y.o. female admitted 6/2/2025 with weakness    Hospital Course  Whitley Frederick has past medical history was includes atrial fibrillation, diabetes, hypertension, and chronic kidney disease.  The patient presented to the emergency room on 6/2/2025 with complaints of weakness and irregular heartbeat.  Patient was found to have anemia with a hemoglobin of 6.4.  She is transfused packed red blood cells.  Hemoglobin level improved to 6.9.    Interval Problem Update  Patient seen and examined, she is getting second blood transfusion  States that she feels stronger after receiving blood  Reports that she has dark-colored stools, she thinks this is from iron supplement rather than bleeding  Patient reports that she has had multiple EGDs and is followed by Dr. Calix  I consulted gastroenterology    I have discussed this patient's plan of care and discharge plan at IDT rounds today with Case Management, Nursing, Nursing leadership, and other members of the IDT team.    Consultants/Specialty  GI    Code Status  Full Code    Disposition  The patient is not medically cleared for discharge to home or a post-acute facility.  Anticipate discharge to: home with close outpatient follow-up    I have placed the appropriate orders for post-discharge needs.    Review of Systems  Review of Systems   Constitutional:  Negative for chills and malaise/fatigue.   Respiratory:  Negative for cough, hemoptysis and sputum production.    Cardiovascular:  Negative for chest pain, palpitations and orthopnea.   Gastrointestinal:  Negative for nausea and vomiting.   Skin:  Negative for itching and rash.   Neurological:  Negative for dizziness.   All other systems reviewed and are negative.       Physical Exam  Temp:  [36.5 °C (97.7 °F)-36.7 °C (98.1 °F)] 36.7 °C (98.1 °F)  Pulse:  [61-79] 67  Resp:  [16-18] 18  BP: (148-168)/(55-99)  158/55  SpO2:  [95 %-98 %] 98 %    Physical Exam  Constitutional:       General: She is not in acute distress.     Appearance: Normal appearance. She is normal weight.   HENT:      Head: Normocephalic and atraumatic.      Right Ear: External ear normal.      Left Ear: External ear normal.      Nose: Nose normal.   Eyes:      Extraocular Movements: Extraocular movements intact.   Cardiovascular:      Rate and Rhythm: Normal rate and regular rhythm.      Pulses: Normal pulses.   Pulmonary:      Effort: Pulmonary effort is normal.      Breath sounds: Normal breath sounds.   Abdominal:      General: Abdomen is flat. Bowel sounds are normal. There is no distension.      Palpations: Abdomen is soft.      Tenderness: There is no abdominal tenderness.   Musculoskeletal:         General: Normal range of motion.      Cervical back: Normal range of motion and neck supple.   Skin:     General: Skin is warm and dry.   Neurological:      General: No focal deficit present.      Mental Status: She is alert and oriented to person, place, and time.      Cranial Nerves: No cranial nerve deficit.   Psychiatric:         Mood and Affect: Mood normal.         Behavior: Behavior normal.         Fluids    Intake/Output Summary (Last 24 hours) at 6/3/2025 1844  Last data filed at 6/3/2025 1600  Gross per 24 hour   Intake 542.5 ml   Output --   Net 542.5 ml        Laboratory  Recent Labs     06/02/25  1540 06/03/25  0136   WBC 6.7 8.1   RBC 1.92* 2.06*   HEMOGLOBIN 6.4* 6.9*   HEMATOCRIT 20.2* 20.8*   .2* 101.0*   MCH 33.3* 33.5*   MCHC 31.7* 33.2   RDW 58.2* 61.7*   PLATELETCT 127* 124*   MPV 11.3 11.5     Recent Labs     06/02/25  1540 06/03/25  0136   SODIUM 140 142   POTASSIUM 4.1 4.4   CHLORIDE 107 108   CO2 20 19*   GLUCOSE 216* 146*   BUN 94* 88*   CREATININE 2.66* 2.38*   CALCIUM 8.8 8.8             Recent Labs     06/03/25  0136   TRIGLYCERIDE 255*   HDL 32*   LDL 35       Imaging  DX-CHEST-PORTABLE (1 VIEW)   Final Result       No evidence of acute cardiopulmonary process.      CT-ABDOMEN-PELVIS W/O    (Results Pending)        Assessment/Plan  * Acute blood loss anemia- (present on admission)  Assessment & Plan  Baseline hemoglobin somewhere around 12    6/3:  Presents with hemoglobin 6.4, she has had an inadequate response to transfusion therapy and I have ordered second unit packed red blood cells  I ordered occult blood, iron studies, vitamin B12, reticulocyte count    PAF (paroxysmal atrial fibrillation) ([facility])- (present on admission)  Assessment & Plan  Continue with rate control using metoprolol and Multaq and Procardia    6/3:  Hold Xarelto    Class 2 severe obesity with serious comorbidity and body mass index (BMI) of 37.0 to 37.9 in adult ([facility])- (present on admission)  Assessment & Plan  Body mass index is 36.31 kg/m².      CKD (chronic kidney disease) stage 4, GFR 15-29 ml/min ([facility])- (present on admission)  Assessment & Plan  Monitor renal functions and avoid nephrotoxic medications  Fluid resuscitation as needed    Psoriasis- (present on admission)  Assessment & Plan  Home topical treatment    AVERY (obstructive sleep apnea) - as of 9/2024 - doesn't feel she needs it- (present on admission)  Assessment & Plan  CPAP at night    High blood pressure associated with diabetes ([facility])- (present on admission)  Assessment & Plan  Optimize blood pressure management keep systolic blood pressure less than 140 diastolic under 90  Continue with nifedipine metoprolol losartan and Multaq    Chronic idiopathic gout involving toe without tophus- (present on admission)  Assessment & Plan  Allopurinol    Hyperlipidemia associated with type 2 diabetes mellitus ([facility])- (present on admission)  Assessment & Plan  Crestor    Type 2 diabetes mellitus with stage 4 chronic kidney disease, without long-term current use of insulin ([facility])- (present on admission)  Assessment & Plan  Accu-Cheks with sliding scale coverage         VTE prophylaxis:   SCDs/TEDs      I  have performed a physical exam and reviewed and updated ROS and Plan today (6/3/2025). In review of yesterday's note (6/2/2025), there are no changes except as documented above.

## 2025-06-04 NOTE — PROCEDURES
Procedure Performed: EGD      Indication for Procedure: Anemia, melena on iron supplement, history of gastric neuroendocrine tumor     Procedure Date: 6/4/2025    Performing Physician: Janey Dow D.O.     Informed Consent: Before the procedure was performed, risks, benefits, and complications of the procedure were explained in layman's terms to the patient and understood by the patient. The risks included but were not limited to the risks of anesthesia/sedation, bleeding, pneumothorax, perforation, adverse reaction to medication, acute respiratory failure and possible death. The patient fully understood the risks and benefits and requested the procedure be performed.       Consent Obtained: From the patient.           Anesthesia: Please see anesthesia records.     Findings:   EGD  1. Esophagus:   -Hiatal hernia measuring 2 cm.  -Visualized portion of the esophagus appeared normal.     2. Stomach:   -Few scattered subcentimeter gastric polyps.  -Previous placed endoclip in-situ at the greater curvature of the stomach.  -Otherwise, visualized portion of the gastric body appeared normal.  -The pylorus is patent.     3. Duodenum:   -Duodenal nodularity D1 and D2 sweep   -Otherwise, visualized portion of the first and second portions of duodenum appeared normal.  -The fresh or old blood seen anywhere on endoscopic exam.     Procedure Summary: Timeout was performed. The patient was put in the left lateral decubitus position. An Olympus adult gastroscope was introduced through the mouth and advanced until the 2nd part of duodenum. The scope was then drawn back into the stomach. Retroflexion was done to visualize the fundus and cardia of the stomach. The gastroscope was then withdrawn while carefully inspecting the esophageal mucosa.      Continuous monitoring of pulse oximetry, heart rhythm, and blood pressure was done before, during and after the procedure. The patient left the endoscopy lab in stable condition.      Estimated Blood Loss, if any: None     Unusual Events or Complications: None     Specimen(s), if any: None    Disposition:   - No bleeding stigmata seen on endoscopic exam.  - Colonoscopy with possible bleeding therapy and possible biopsy tomorrow. Clear liquid diet today and start bowel prep at 5 PM. - Risks and benefits of a colonoscopy discussed and patient agreed to proceed.   - Discussed recommendations with patient. All questions and concerns addressed.

## 2025-06-04 NOTE — ANESTHESIA PREPROCEDURE EVALUATION
Case: [id number] Date/Time: 06/04/25 1515    Procedure: GASTROSCOPY (Abdomen)    Anesthesia type: MAC    Pre-op diagnosis: anemia    Location:  ENDOSCOPIC ULTRASOUND ROOM / SURGERY [facility]    Surgeons: Janey Dow D.O.          81yo F with  AVERY on CPAP (not using)  Afib  DM2  HTN  CKD stage 4  Anemia due to GI bleed    EKG 1/20/25 LBBB  Echo 3/6/23 EF 55%, mild AS, known murmur    Relevant Problems   ANESTHESIA  Unintended awareness under MAC for colonoscopy - disturbing to pt   (positive) History of unintended awareness under general anesthesia   (positive) AVERY (obstructive sleep apnea) - as of 9/2024 - doesn't feel she needs it      PULMONARY   (positive) Mild intermittent asthma without complication      CARDIAC   (positive) Atherosclerosis of aorta (CMS-HCC)   (positive) High blood pressure associated with diabetes (HCC)   (positive) Left bundle branch block   (positive) Murmur   (positive) Non-rheumatic mitral regurgitation   (positive) PAF (paroxysmal atrial fibrillation) ([facility])   (positive) Stenosis of carotid artery   (positive) VPC's (ventricular premature complexes)         (positive) CKD (chronic kidney disease) stage 4, GFR 15-29 ml/min (HCC)   (positive) Microalbuminuria due to type 2 diabetes mellitus (HCC)      ENDO   (positive) Hypothyroidism due to acquired atrophy of thyroid   (positive) Type 2 diabetes mellitus with stage 4 chronic kidney disease, without long-term current use of insulin (HCC)       Physical Exam    Airway   Mallampati: IV  TM distance: >3 FB  Neck ROM: full       Cardiovascular - normal exam  Rhythm: regular  Rate: normal    (+) murmur     Dental - normal exam  Comments: Cracked molars both sides      Very poor dentition     Pulmonary - normal examBreath sounds clear to auscultation     Abdominal    Neurological - normal exam                   Anesthesia Plan    ASA 3   ASA physical status 3 criteria: diabetes - poorly controlled and other (comment)    Plan - general        Airway plan will be natural airway          Induction: intravenous      Pertinent diagnostic labs and testing reviewed    Informed Consent:    Anesthetic plan and risks discussed with patient.

## 2025-06-04 NOTE — ANESTHESIA TIME REPORT
Anesthesia Start and Stop Event Times       Date Time Event    6/4/2025 1536 Ready for Procedure     1540 Anesthesia Start     1556 Anesthesia Stop          Responsible Staff  06/04/25      Name Role Begin End    Renetta Collier M.D. Anesth 1540 1552          Overtime Reason:  per meaghan, locums, etc.    Comments:

## 2025-06-04 NOTE — PROGRESS NOTES
Assumed care of pt at 1900, bedside report with ADELAIDA Suárez. Pt is AAOx 4, resting comfortably in bed with NADN, pain currently 0/10.    Pt using call light appropriately and to get up with assistance. Discussed plan of care for the night with patient, bed in lowest position, call light in reach, personal belongings in reach. No complaints at this time.

## 2025-06-04 NOTE — ANESTHESIA POSTPROCEDURE EVALUATION
Patient: Whitley Frederick    Procedure Summary       Date: 06/04/25 Room / Location:  ENDOSCOPIC ULTRASOUND ROOM / SURGERY [facility]    Anesthesia Start: 1540 Anesthesia Stop: 1556    Procedure: GASTROSCOPY (Abdomen) Diagnosis:       Gastric polyp      (Gastric polyp [[id number]])    Surgeons: Janey Dow D.O. Responsible Provider: Renetta Collier M.D.    Anesthesia Type: general ASA Status: 3            Final Anesthesia Type: general  Last vitals  BP   Blood Pressure : (!) 145/79    Temp   36.6 °C (97.8 °F)    Pulse   (!) 56   Resp   16    SpO2   97 %      Anesthesia Post Evaluation    Patient location during evaluation: PACU  Patient participation: complete - patient participated  Level of consciousness: awake and sleepy but conscious  Pain score: 0    Airway patency: patent  Anesthetic complications: no  Cardiovascular status: hemodynamically stable  Respiratory status: acceptable  Hydration status: euvolemic    PONV: none          No notable events documented.     Nurse Pain Score: 2 (NPRS)

## 2025-06-04 NOTE — CARE PLAN
The patient is Watcher - Medium risk of patient condition declining or worsening    Shift Goals  Clinical Goals: CT results, no falls, NPO at midnight for EGD tomorrow  Patient Goals: rest and maintain comfort    Progress made toward(s) clinical / shift goals:  Patient expressing concerns over results of CT scan and foreign metal object. Patient prepared for EGD this morning and has been NPO with sips for meds since midnight. Pt had no falls and has ambulated to bathroom overnight with no difficulties with staff SBA.     Hgb down from 8.0 to 7.3 this morning.

## 2025-06-05 ENCOUNTER — SURGERY (OUTPATIENT)
Age: 80
End: 2025-06-05
Payer: MEDICARE

## 2025-06-05 ENCOUNTER — ANESTHESIA (OUTPATIENT)
Dept: SURGERY | Facility: MEDICAL CENTER | Age: 80
DRG: 812 | End: 2025-06-05
Payer: MEDICARE

## 2025-06-05 LAB
BASOPHILS # BLD AUTO: 0.7 % (ref 0–1.8)
BASOPHILS # BLD: 0.05 K/UL (ref 0–0.12)
EOSINOPHIL # BLD AUTO: 0.16 K/UL (ref 0–0.51)
EOSINOPHIL NFR BLD: 2.4 % (ref 0–6.9)
ERYTHROCYTE [DISTWIDTH] IN BLOOD BY AUTOMATED COUNT: 62.9 FL (ref 35.9–50)
FERRITIN SERPL-MCNC: 42.4 NG/ML (ref 10–291)
GLUCOSE BLD STRIP.AUTO-MCNC: 108 MG/DL (ref 65–99)
GLUCOSE BLD STRIP.AUTO-MCNC: 185 MG/DL (ref 65–99)
GLUCOSE BLD STRIP.AUTO-MCNC: 232 MG/DL (ref 65–99)
GLUCOSE BLD STRIP.AUTO-MCNC: 270 MG/DL (ref 65–99)
HAPTOGLOB SERPL-MCNC: 54 MG/DL (ref 30–200)
HCT VFR BLD AUTO: 20.9 % (ref 37–47)
HGB BLD-MCNC: 6.9 G/DL (ref 12–16)
HGB BLD-MCNC: 8.8 G/DL (ref 12–16)
HGB RETIC QN AUTO: 33.4 PG/CELL (ref 29–35)
IMM GRANULOCYTES # BLD AUTO: 0.03 K/UL (ref 0–0.11)
IMM GRANULOCYTES NFR BLD AUTO: 0.4 % (ref 0–0.9)
IMM RETICS NFR: 32.7 % (ref 2.6–16.1)
IRON SATN MFR SERPL: 8 % (ref 15–55)
IRON SERPL-MCNC: 24 UG/DL (ref 40–170)
LYMPHOCYTES # BLD AUTO: 2.36 K/UL (ref 1–4.8)
LYMPHOCYTES NFR BLD: 35.2 % (ref 22–41)
MCH RBC QN AUTO: 32.9 PG (ref 27–33)
MCHC RBC AUTO-ENTMCNC: 33 G/DL (ref 32.2–35.5)
MCV RBC AUTO: 99.5 FL (ref 81.4–97.8)
MONOCYTES # BLD AUTO: 0.43 K/UL (ref 0–0.85)
MONOCYTES NFR BLD AUTO: 6.4 % (ref 0–13.4)
NEUTROPHILS # BLD AUTO: 3.68 K/UL (ref 1.82–7.42)
NEUTROPHILS NFR BLD: 54.9 % (ref 44–72)
NRBC # BLD AUTO: 0 K/UL
NRBC BLD-RTO: 0 /100 WBC (ref 0–0.2)
PLATELET # BLD AUTO: 121 K/UL (ref 164–446)
PMV BLD AUTO: 11.4 FL (ref 9–12.9)
RBC # BLD AUTO: 2.1 M/UL (ref 4.2–5.4)
RETICS # AUTO: 0.15 M/UL (ref 0.04–0.12)
RETICS/RBC NFR: 6.5 % (ref 0.8–2.6)
TIBC SERPL-MCNC: 319 UG/DL (ref 250–450)
UIBC SERPL-MCNC: 295 UG/DL (ref 110–370)
WBC # BLD AUTO: 6.7 K/UL (ref 4.8–10.8)

## 2025-06-05 PROCEDURE — A9270 NON-COVERED ITEM OR SERVICE: HCPCS | Performed by: HOSPITALIST

## 2025-06-05 PROCEDURE — 94760 N-INVAS EAR/PLS OXIMETRY 1: CPT

## 2025-06-05 PROCEDURE — 85046 RETICYTE/HGB CONCENTRATE: CPT

## 2025-06-05 PROCEDURE — 99233 SBSQ HOSP IP/OBS HIGH 50: CPT | Performed by: HOSPITALIST

## 2025-06-05 PROCEDURE — 86923 COMPATIBILITY TEST ELECTRIC: CPT

## 2025-06-05 PROCEDURE — 88305 TISSUE EXAM BY PATHOLOGIST: CPT | Performed by: PATHOLOGY

## 2025-06-05 PROCEDURE — 85025 COMPLETE CBC W/AUTO DIFF WBC: CPT

## 2025-06-05 PROCEDURE — 36430 TRANSFUSION BLD/BLD COMPNT: CPT

## 2025-06-05 PROCEDURE — 700111 HCHG RX REV CODE 636 W/ 250 OVERRIDE (IP): Performed by: STUDENT IN AN ORGANIZED HEALTH CARE EDUCATION/TRAINING PROGRAM

## 2025-06-05 PROCEDURE — 700111 HCHG RX REV CODE 636 W/ 250 OVERRIDE (IP): Mod: JZ | Performed by: HOSPITALIST

## 2025-06-05 PROCEDURE — 160036 HCHG PACU - EA ADDL 30 MINS PHASE I: Performed by: INTERNAL MEDICINE

## 2025-06-05 PROCEDURE — 82941 ASSAY OF GASTRIN: CPT

## 2025-06-05 PROCEDURE — 83550 IRON BINDING TEST: CPT

## 2025-06-05 PROCEDURE — 700102 HCHG RX REV CODE 250 W/ 637 OVERRIDE(OP): Performed by: HOSPITALIST

## 2025-06-05 PROCEDURE — 88305 TISSUE EXAM BY PATHOLOGIST: CPT | Mod: 26 | Performed by: PATHOLOGY

## 2025-06-05 PROCEDURE — P9016 RBC LEUKOCYTES REDUCED: HCPCS

## 2025-06-05 PROCEDURE — 82728 ASSAY OF FERRITIN: CPT

## 2025-06-05 PROCEDURE — 700105 HCHG RX REV CODE 258: Performed by: STUDENT IN AN ORGANIZED HEALTH CARE EDUCATION/TRAINING PROGRAM

## 2025-06-05 PROCEDURE — 160048 HCHG OR STATISTICAL LEVEL 1-5: Performed by: INTERNAL MEDICINE

## 2025-06-05 PROCEDURE — 160208 HCHG ENDO MINUTES - EA ADDL 1 MIN LEVEL 4: Performed by: INTERNAL MEDICINE

## 2025-06-05 PROCEDURE — 770001 HCHG ROOM/CARE - MED/SURG/GYN PRIV*

## 2025-06-05 PROCEDURE — 160015 HCHG STAT PREOP MINUTES: Performed by: INTERNAL MEDICINE

## 2025-06-05 PROCEDURE — 85018 HEMOGLOBIN: CPT

## 2025-06-05 PROCEDURE — 700105 HCHG RX REV CODE 258: Performed by: HOSPITALIST

## 2025-06-05 PROCEDURE — 700101 HCHG RX REV CODE 250: Performed by: STUDENT IN AN ORGANIZED HEALTH CARE EDUCATION/TRAINING PROGRAM

## 2025-06-05 PROCEDURE — 160203 HCHG ENDO MINUTES - 1ST 30 MINS LEVEL 4: Performed by: INTERNAL MEDICINE

## 2025-06-05 PROCEDURE — 82962 GLUCOSE BLOOD TEST: CPT | Mod: 91

## 2025-06-05 PROCEDURE — 36415 COLL VENOUS BLD VENIPUNCTURE: CPT

## 2025-06-05 PROCEDURE — 160009 HCHG ANES TIME/MIN: Performed by: INTERNAL MEDICINE

## 2025-06-05 PROCEDURE — 83540 ASSAY OF IRON: CPT

## 2025-06-05 PROCEDURE — 0DBL8ZZ EXCISION OF TRANSVERSE COLON, VIA NATURAL OR ARTIFICIAL OPENING ENDOSCOPIC: ICD-10-PCS | Performed by: INTERNAL MEDICINE

## 2025-06-05 PROCEDURE — 160002 HCHG RECOVERY MINUTES (STAT): Performed by: INTERNAL MEDICINE

## 2025-06-05 PROCEDURE — 160035 HCHG PACU - 1ST 60 MINS PHASE I: Performed by: INTERNAL MEDICINE

## 2025-06-05 RX ORDER — SODIUM CHLORIDE, SODIUM LACTATE, POTASSIUM CHLORIDE, CALCIUM CHLORIDE 600; 310; 30; 20 MG/100ML; MG/100ML; MG/100ML; MG/100ML
INJECTION, SOLUTION INTRAVENOUS CONTINUOUS
Status: DISCONTINUED | OUTPATIENT
Start: 2025-06-05 | End: 2025-06-05 | Stop reason: HOSPADM

## 2025-06-05 RX ORDER — LABETALOL HYDROCHLORIDE 5 MG/ML
5 INJECTION, SOLUTION INTRAVENOUS
Status: DISCONTINUED | OUTPATIENT
Start: 2025-06-05 | End: 2025-06-05 | Stop reason: HOSPADM

## 2025-06-05 RX ORDER — DIPHENHYDRAMINE HYDROCHLORIDE 50 MG/ML
12.5 INJECTION, SOLUTION INTRAMUSCULAR; INTRAVENOUS
Status: DISCONTINUED | OUTPATIENT
Start: 2025-06-05 | End: 2025-06-05 | Stop reason: HOSPADM

## 2025-06-05 RX ORDER — SODIUM CHLORIDE, SODIUM LACTATE, POTASSIUM CHLORIDE, CALCIUM CHLORIDE 600; 310; 30; 20 MG/100ML; MG/100ML; MG/100ML; MG/100ML
INJECTION, SOLUTION INTRAVENOUS
Status: DISCONTINUED | OUTPATIENT
Start: 2025-06-05 | End: 2025-06-05 | Stop reason: SURG

## 2025-06-05 RX ORDER — ONDANSETRON 2 MG/ML
INJECTION INTRAMUSCULAR; INTRAVENOUS PRN
Status: DISCONTINUED | OUTPATIENT
Start: 2025-06-05 | End: 2025-06-05 | Stop reason: SURG

## 2025-06-05 RX ORDER — EPHEDRINE SULFATE 50 MG/ML
INJECTION, SOLUTION INTRAVENOUS PRN
Status: DISCONTINUED | OUTPATIENT
Start: 2025-06-05 | End: 2025-06-05 | Stop reason: SURG

## 2025-06-05 RX ORDER — HALOPERIDOL 5 MG/ML
1 INJECTION INTRAMUSCULAR
Status: DISCONTINUED | OUTPATIENT
Start: 2025-06-05 | End: 2025-06-05 | Stop reason: HOSPADM

## 2025-06-05 RX ORDER — OXYCODONE HCL 5 MG/5 ML
5 SOLUTION, ORAL ORAL
Status: DISCONTINUED | OUTPATIENT
Start: 2025-06-05 | End: 2025-06-05 | Stop reason: HOSPADM

## 2025-06-05 RX ORDER — ONDANSETRON 2 MG/ML
4 INJECTION INTRAMUSCULAR; INTRAVENOUS
Status: DISCONTINUED | OUTPATIENT
Start: 2025-06-05 | End: 2025-06-05 | Stop reason: HOSPADM

## 2025-06-05 RX ORDER — HYDROMORPHONE HYDROCHLORIDE 1 MG/ML
0.2 INJECTION, SOLUTION INTRAMUSCULAR; INTRAVENOUS; SUBCUTANEOUS
Status: DISCONTINUED | OUTPATIENT
Start: 2025-06-05 | End: 2025-06-05 | Stop reason: HOSPADM

## 2025-06-05 RX ORDER — SODIUM CHLORIDE 9 MG/ML
INJECTION, SOLUTION INTRAVENOUS
Status: DISCONTINUED | OUTPATIENT
Start: 2025-06-05 | End: 2025-06-05 | Stop reason: SURG

## 2025-06-05 RX ORDER — GLYCOPYRROLATE 0.2 MG/ML
INJECTION INTRAMUSCULAR; INTRAVENOUS PRN
Status: DISCONTINUED | OUTPATIENT
Start: 2025-06-05 | End: 2025-06-05 | Stop reason: SURG

## 2025-06-05 RX ORDER — SODIUM CHLORIDE, SODIUM LACTATE, POTASSIUM CHLORIDE, CALCIUM CHLORIDE 600; 310; 30; 20 MG/100ML; MG/100ML; MG/100ML; MG/100ML
INJECTION, SOLUTION INTRAVENOUS CONTINUOUS
Status: ACTIVE | OUTPATIENT
Start: 2025-06-05 | End: 2025-06-05

## 2025-06-05 RX ORDER — HYDROMORPHONE HYDROCHLORIDE 1 MG/ML
0.4 INJECTION, SOLUTION INTRAMUSCULAR; INTRAVENOUS; SUBCUTANEOUS
Status: DISCONTINUED | OUTPATIENT
Start: 2025-06-05 | End: 2025-06-05 | Stop reason: HOSPADM

## 2025-06-05 RX ORDER — MIDAZOLAM HYDROCHLORIDE 1 MG/ML
INJECTION INTRAMUSCULAR; INTRAVENOUS PRN
Status: DISCONTINUED | OUTPATIENT
Start: 2025-06-05 | End: 2025-06-05 | Stop reason: SURG

## 2025-06-05 RX ORDER — HYDROMORPHONE HYDROCHLORIDE 1 MG/ML
0.1 INJECTION, SOLUTION INTRAMUSCULAR; INTRAVENOUS; SUBCUTANEOUS
Status: DISCONTINUED | OUTPATIENT
Start: 2025-06-05 | End: 2025-06-05 | Stop reason: HOSPADM

## 2025-06-05 RX ORDER — EPHEDRINE SULFATE 50 MG/ML
5 INJECTION, SOLUTION INTRAVENOUS
Status: DISCONTINUED | OUTPATIENT
Start: 2025-06-05 | End: 2025-06-05 | Stop reason: HOSPADM

## 2025-06-05 RX ORDER — DEXAMETHASONE SODIUM PHOSPHATE 4 MG/ML
INJECTION, SOLUTION INTRA-ARTICULAR; INTRALESIONAL; INTRAMUSCULAR; INTRAVENOUS; SOFT TISSUE PRN
Status: DISCONTINUED | OUTPATIENT
Start: 2025-06-05 | End: 2025-06-05 | Stop reason: SURG

## 2025-06-05 RX ORDER — ACETAMINOPHEN 325 MG/1
650 TABLET ORAL EVERY 4 HOURS PRN
Status: DISCONTINUED | OUTPATIENT
Start: 2025-06-05 | End: 2025-06-05

## 2025-06-05 RX ORDER — OXYCODONE HCL 5 MG/5 ML
10 SOLUTION, ORAL ORAL
Status: DISCONTINUED | OUTPATIENT
Start: 2025-06-05 | End: 2025-06-05 | Stop reason: HOSPADM

## 2025-06-05 RX ADMIN — SODIUM BICARBONATE 650 MG: 650 TABLET ORAL at 17:09

## 2025-06-05 RX ADMIN — INSULIN LISPRO 3 UNITS: 100 INJECTION, SOLUTION INTRAVENOUS; SUBCUTANEOUS at 20:44

## 2025-06-05 RX ADMIN — PROPOFOL 20 MG: 10 INJECTION, EMULSION INTRAVENOUS at 12:03

## 2025-06-05 RX ADMIN — ACETAMINOPHEN 650 MG: 325 TABLET ORAL at 06:20

## 2025-06-05 RX ADMIN — CLONAZEPAM 0.25 MG: 0.5 TABLET ORAL at 06:19

## 2025-06-05 RX ADMIN — OMEPRAZOLE 20 MG: 20 CAPSULE, DELAYED RELEASE ORAL at 17:09

## 2025-06-05 RX ADMIN — ROSUVASTATIN CALCIUM 10 MG: 10 TABLET, FILM COATED ORAL at 17:09

## 2025-06-05 RX ADMIN — OMEPRAZOLE 20 MG: 20 CAPSULE, DELAYED RELEASE ORAL at 06:09

## 2025-06-05 RX ADMIN — ACETAMINOPHEN 650 MG: 325 TABLET ORAL at 17:17

## 2025-06-05 RX ADMIN — METOPROLOL TARTRATE 50 MG: 50 TABLET, FILM COATED ORAL at 06:09

## 2025-06-05 RX ADMIN — ALLOPURINOL 100 MG: 100 TABLET ORAL at 17:09

## 2025-06-05 RX ADMIN — METOPROLOL TARTRATE 50 MG: 50 TABLET, FILM COATED ORAL at 17:09

## 2025-06-05 RX ADMIN — DEXAMETHASONE SODIUM PHOSPHATE 4 MG: 4 INJECTION INTRA-ARTICULAR; INTRALESIONAL; INTRAMUSCULAR; INTRAVENOUS; SOFT TISSUE at 11:52

## 2025-06-05 RX ADMIN — PROPOFOL 20 MG: 10 INJECTION, EMULSION INTRAVENOUS at 12:09

## 2025-06-05 RX ADMIN — Medication 1 LOZENGE: at 20:25

## 2025-06-05 RX ADMIN — SODIUM CHLORIDE: 9 INJECTION, SOLUTION INTRAVENOUS at 11:44

## 2025-06-05 RX ADMIN — LOSARTAN POTASSIUM 50 MG: 50 TABLET, FILM COATED ORAL at 17:10

## 2025-06-05 RX ADMIN — ONDANSETRON 4 MG: 2 INJECTION INTRAMUSCULAR; INTRAVENOUS at 11:52

## 2025-06-05 RX ADMIN — ALLOPURINOL 100 MG: 100 TABLET ORAL at 06:08

## 2025-06-05 RX ADMIN — SODIUM BICARBONATE 650 MG: 650 TABLET ORAL at 06:08

## 2025-06-05 RX ADMIN — NIFEDIPINE 30 MG: 30 TABLET, FILM COATED, EXTENDED RELEASE ORAL at 06:09

## 2025-06-05 RX ADMIN — PROPOFOL 20 MG: 10 INJECTION, EMULSION INTRAVENOUS at 11:53

## 2025-06-05 RX ADMIN — MIDAZOLAM HYDROCHLORIDE 2 MG: 1 INJECTION, SOLUTION INTRAMUSCULAR; INTRAVENOUS at 11:45

## 2025-06-05 RX ADMIN — GLYCOPYRROLATE 0.1 MG: 0.2 INJECTION INTRAMUSCULAR; INTRAVENOUS at 12:08

## 2025-06-05 RX ADMIN — MONTELUKAST 10 MG: 10 TABLET, FILM COATED ORAL at 20:24

## 2025-06-05 RX ADMIN — DRONEDARONE 400 MG: 400 TABLET, FILM COATED ORAL at 17:10

## 2025-06-05 RX ADMIN — LEVOTHYROXINE SODIUM 50 MCG: 0.05 TABLET ORAL at 06:08

## 2025-06-05 RX ADMIN — SODIUM CHLORIDE 250 MG: 9 INJECTION, SOLUTION INTRAVENOUS at 20:28

## 2025-06-05 RX ADMIN — EPHEDRINE SULFATE 10 MG: 50 INJECTION, SOLUTION INTRAVENOUS at 12:03

## 2025-06-05 RX ADMIN — PROPOFOL 50 MG: 10 INJECTION, EMULSION INTRAVENOUS at 11:49

## 2025-06-05 RX ADMIN — PROPOFOL 20 MG: 10 INJECTION, EMULSION INTRAVENOUS at 11:58

## 2025-06-05 ASSESSMENT — ENCOUNTER SYMPTOMS
DIZZINESS: 0
HEMOPTYSIS: 0
ORTHOPNEA: 0
COUGH: 0
PALPITATIONS: 0
CHILLS: 0
NAUSEA: 0
VOMITING: 0
SPUTUM PRODUCTION: 0

## 2025-06-05 ASSESSMENT — PAIN DESCRIPTION - PAIN TYPE
TYPE: SURGICAL PAIN
TYPE: ACUTE PAIN;SURGICAL PAIN
TYPE: ACUTE PAIN
TYPE: SURGICAL PAIN
TYPE: ACUTE PAIN
TYPE: SURGICAL PAIN
TYPE: SURGICAL PAIN
TYPE: ACUTE PAIN
TYPE: SURGICAL PAIN

## 2025-06-05 NOTE — PROGRESS NOTES
[facility] Medicine Daily Progress Note    Date of Service  6/4/2025    Chief Complaint  Whitley Frederick is a 80 y.o. female admitted 6/2/2025 with weakness    Hospital Course  Whitley Frederick has past medical history was includes atrial fibrillation, diabetes, hypertension, and chronic kidney disease.  She is treated with anticoagulation for atrial fibrillation.  P gastrointestinal history includes presence of a hiatal hernia, gastric polyps, and duodenal polyps.  She underwent EGD and EUS in 2025 and she has well-differentiated neuroendocrine gastric tumors.  The patient presented to the emergency room on 6/2/2025 with complaints of weakness and irregular heartbeat.  Patient was found to have anemia with a hemoglobin of 6.4.  She is transfused packed red blood cells.  Hemoglobin level improved to 6.9.    Interval Problem Update  Hemoglobin dropped to 6.9 this morning, requiring blood transfusion  States that she feels stronger than admission, concerned about ongoing needs for transfusion  Underwent colonoscopy earlier today: IMPRESSION:  1.  colon polyps  2. Diverticulosis  3. No obvious blood or bleeding noted  We discussed plans to replace iron intravenously       I have discussed this patient's plan of care and discharge plan at IDT rounds today with Case Management, Nursing, Nursing leadership, and other members of the IDT team.    Consultants/Specialty  GI    Code Status  Full Code    Disposition  The patient is not medically cleared for discharge to home or a post-acute facility.  Anticipate discharge to: home with close outpatient follow-up    I have placed the appropriate orders for post-discharge needs.    Review of Systems  Review of Systems   Constitutional:  Negative for chills and malaise/fatigue.   Respiratory:  Negative for cough, hemoptysis and sputum production.    Cardiovascular:  Negative for chest pain, palpitations and orthopnea.   Gastrointestinal:  Negative for nausea and vomiting.   Skin:   Negative for itching and rash.   Neurological:  Negative for dizziness.   All other systems reviewed and are negative.       Physical Exam  Temp:  [36.1 °C (97 °F)-36.7 °C (98.1 °F)] 36.1 °C (97 °F)  Pulse:  [58-75] 75  Resp:  [17-18] 17  BP: (136-158)/(44-99) 141/51  SpO2:  [97 %-98 %] 98 %    Physical Exam  Constitutional:       General: She is not in acute distress.     Appearance: Normal appearance. She is normal weight.   HENT:      Head: Normocephalic and atraumatic.      Nose: Nose normal.   Eyes:      Extraocular Movements: Extraocular movements intact.   Cardiovascular:      Rate and Rhythm: Normal rate and regular rhythm.      Pulses: Normal pulses.   Pulmonary:      Effort: Pulmonary effort is normal.      Breath sounds: Normal breath sounds.   Abdominal:      General: Abdomen is flat. Bowel sounds are normal. There is no distension.      Palpations: Abdomen is soft.      Tenderness: There is no abdominal tenderness.   Musculoskeletal:         General: Normal range of motion.      Cervical back: Normal range of motion and neck supple.   Skin:     General: Skin is warm and dry.   Neurological:      General: No focal deficit present.      Mental Status: She is alert and oriented to person, place, and time.      Cranial Nerves: No cranial nerve deficit.   Psychiatric:         Mood and Affect: Mood normal.         Behavior: Behavior normal.         Fluids    Intake/Output Summary (Last 24 hours) at 6/4/2025 1025  Last data filed at 6/4/2025 0613  Gross per 24 hour   Intake 320 ml   Output --   Net 320 ml        Laboratory  Recent Labs     06/03/25  2004 06/04/25  0126 06/04/25  0752   WBC 7.3 7.0 7.2   RBC 2.54* 2.25* 2.25*   HEMOGLOBIN 8.0* 7.3* 7.3*   HEMATOCRIT 25.0* 22.3* 22.6*   MCV 98.4* 99.1* 100.4*   MCH 31.5 32.4 32.4   MCHC 32.0* 32.7 32.3   RDW 65.0* 66.5* 66.4*   PLATELETCT 139* 118* 121*   MPV 11.2 11.2 11.0     Recent Labs     06/02/25  1540 06/03/25  0136   SODIUM 140 142   POTASSIUM 4.1 4.4    CHLORIDE 107 108   CO2 20 19*   GLUCOSE 216* 146*   BUN 94* 88*   CREATININE 2.66* 2.38*   CALCIUM 8.8 8.8             Recent Labs     06/03/25  0136   TRIGLYCERIDE 255*   HDL 32*   LDL 35       Imaging  CT-ABDOMEN-PELVIS W/O   Final Result      1.  No acute abnormalities are identified in the abdomen or pelvis.      2.  No adenopathy or mass identified.      3.  Metallic foreign body noted in the gastric wall.         DX-CHEST-PORTABLE (1 VIEW)   Final Result      No evidence of acute cardiopulmonary process.           Assessment/Plan  * Acute blood loss anemia- (present on admission)  Assessment & Plan  Baseline hemoglobin somewhere around 12    6/4:  Required 2 units packed red blood cells since admission  Gastroenterology consulted, EGD is planned  Continue intravenous proton pump inhibitor      PAF (paroxysmal atrial fibrillation) ([facility])- (present on admission)  Assessment & Plan  Continue with rate control using metoprolol and Multaq and Procardia    6/4:  Hold Xarelto for active GI bleed    Class 2 severe obesity with serious comorbidity and body mass index (BMI) of 37.0 to 37.9 in adult ([facility])- (present on admission)  Assessment & Plan  Body mass index is 36.31 kg/m².      CKD (chronic kidney disease) stage 4, GFR 15-29 ml/min ([facility])- (present on admission)  Assessment & Plan  Monitor renal functions and avoid nephrotoxic medications  Fluid resuscitation as needed    Psoriasis- (present on admission)  Assessment & Plan  Home topical treatment    AVERY (obstructive sleep apnea) - as of 9/2024 - doesn't feel she needs it- (present on admission)  Assessment & Plan  CPAP at night    High blood pressure associated with diabetes ([facility])- (present on admission)  Assessment & Plan  Optimize blood pressure management keep systolic blood pressure less than 140 diastolic under 90  Continue with nifedipine metoprolol losartan and Multaq    Chronic idiopathic gout involving toe without tophus- (present on  admission)  Assessment & Plan  Allopurinol    Hyperlipidemia associated with type 2 diabetes mellitus (HCC)- (present on admission)  Assessment & Plan  Crestor    Type 2 diabetes mellitus with stage 4 chronic kidney disease, without long-term current use of insulin (HCC)- (present on admission)  Assessment & Plan  Accu-Cheks with sliding scale coverage         VTE prophylaxis:   SCDs/TEDs      I have performed a physical exam and reviewed and updated ROS and Plan today (6/4/2025). In review of yesterday's note (6/3/2025), there are no changes except as documented above.

## 2025-06-05 NOTE — ANESTHESIA POSTPROCEDURE EVALUATION
Patient: Whitley Frederick    Procedure Summary       Date: 06/05/25 Room / Location:  ENDOSCOPIC ULTRASOUND ROOM / SURGERY [facility]    Anesthesia Start: 1144 Anesthesia Stop: 1224    Procedure: COLONOSCOPY (Abdomen) Diagnosis: (Macrocytic anemia)    Surgeons: Wayne Mendoza M.D. Responsible Provider: Gauri Oconnor M.D.    Anesthesia Type: MAC ASA Status: 3            Final Anesthesia Type: MAC  Last vitals  BP   Blood Pressure : 137/80    Temp   36.5 °C (97.7 °F)    Pulse   (!) 59   Resp   19    SpO2   97 %      Anesthesia Post Evaluation    Patient location during evaluation: PACU  Patient participation: complete - patient participated  Level of consciousness: awake and alert    Airway patency: patent  Anesthetic complications: no  Cardiovascular status: hemodynamically stable  Respiratory status: acceptable  Hydration status: euvolemic    PONV: none        No notable events documented.     Nurse Pain Score: 0 (NPRS)

## 2025-06-05 NOTE — PROGRESS NOTES
Bedside report received from ADELAIDA Suárez. Patient resting in bed. Call bell within reach, bed alarm on and in place. All belongings within reach for patient. No further needs at this time. Patient refused CPAP for sleep. Education provided.

## 2025-06-05 NOTE — CARE PLAN
The patient is Stable - Low risk of patient condition declining or worsening    Shift Goals  Clinical Goals: Patient will prepare for colonoscopy in AM, tolerate NPO sips at 0000, and remain free from falls during shift  Patient Goals: Rest  Family Goals: CHARLIE    Progress made toward(s) clinical / shift goals: Patient tolerating NPO sips since 0000 for colonoscopy procedure in AM. No falls sustained.    Patient is not progressing towards the following goals: N/A

## 2025-06-05 NOTE — ANESTHESIA TIME REPORT
Anesthesia Start and Stop Event Times       Date Time Event    6/5/2025 1125 Ready for Procedure     1144 Anesthesia Start     1224 Anesthesia Stop          Responsible Staff  06/05/25      Name Role Begin End    Gauri Oconnor M.D. Anesth 1144 1224          Overtime Reason:  no overtime (within assigned shift)    Comments:

## 2025-06-05 NOTE — OR NURSING
1218[facility] and [facility], respirations spontaneous and non-labored via mask with O2 at 6LPM.     1238- Blood transfusion stopped    1240- Pt sitting up in bed, tolerating sips of water.    1245- updated family.     1330- Pt meets criteria to transfer to room. Report given to Stacie SON.    1340- Report given to Martha SON

## 2025-06-05 NOTE — PROGRESS NOTES
GI Update:    Patient was seen in PACU.  Discussed recommendations with patient and daughter.  She will follow-up with [facility] as an outpatient for video capsule endoscopy.  Recommend follow-up with hematology for PHILIP management.  Pending gastrin, methylmalonic acid level, anti-IF and antiparietal cells antibodies.      Janey Dow D.O.  Gastroenterology  [facility]

## 2025-06-05 NOTE — OP REPORT
DATE OF SERVICE:  06/05/2025     INDICATION FOR PROCEDURE:  chronic iron deficiency anemia, melena     PROCEDURE PERFORMED: Colonoscopy with polypectomy     CONSENT:  Informed consent was obtained directly from the patient after benefits, risks and possible alternatives were discussed.     MEDICATIONS:  The patient was given Propofol for this procedure. Please see the anesthesia record for details     PROCEDURE DESCRIPTION:  The patient was placed in the left lateral decubitus position and provided with supplemental oxygen via nasal cannula.  Vital signs were monitored continuously throughout the procedure. After appropriate sedation, CARLOS ALBERTO was performed. The colonoscope was then introduced into the rectum through the anus. The colonoscope was advanced through the colon under direct visualization to the cecum. The scope was then withdrawn and mucosa examined. Retroflexion was performed in the rectum. The patients toleration of this procedure was excellent. The prep was adequate.      FINDINGS:    1)  normal TI  2) 3 polyps measuring 2-5mm in size were found in the transverse colon and removed with cold snare  3) sigmoid diverticulosis noted  4) brown liquid stool throughout colon. No old blood was identified        COMPLICATIONS:  No complications or blood loss during or in the immediate postoperative period.     IMPRESSION:  1.  colon polyps  2. Diverticulosis  3. No obvious blood or bleeding noted     RECOMMENDATION:    1) restart diet  2) outpatient capsule endoscopy  3) Recommend checking a gastrin level and methylmalonic acid level. Its possible this patient has hypergastrinemia secondary to either PPI or atrophic gastritis leading to iron malabsorption

## 2025-06-05 NOTE — DISCHARGE INSTRUCTIONS
You have been given a referral to hematology    If any questions arise, call your provider.  If your provider is not available, please feel free to call the [facility] at [phone].    MEDICATIONS: Resume taking daily medication.  Take prescribed pain medication with food.  If no medication is prescribed, you may take non-aspirin pain medication if needed.  PAIN MEDICATION CAN BE VERY CONSTIPATING.  Take a stool softener or laxative such as senokot, pericolace, or milk of magnesia if needed.    Last pain medication given at     What to Expect Post Anesthesia    Rest and take it easy for the first 24 hours.  A responsible adult is recommended to remain with you during that time.  It is normal to feel sleepy.  We encourage you to not do anything that requires balance, judgment or coordination.    FOR 24 HOURS DO NOT:  Drive, operate machinery or run household appliances.  Drink beer or alcoholic beverages.  Make important decisions or sign legal documents.    To avoid nausea, slowly advance diet as tolerated, avoiding spicy or greasy foods for the first day.  Add more substantial food to your diet according to your provider's instructions.  Babies can be fed formula or breast milk as soon as they are hungry.  INCREASE FLUIDS AND FIBER TO AVOID CONSTIPATION.    MILD FLU-LIKE SYMPTOMS ARE NORMAL.  YOU MAY EXPERIENCE GENERALIZED MUSCLE ACHES, THROAT IRRITATION, HEADACHE AND/OR SOME NAUSEA.

## 2025-06-05 NOTE — CARE PLAN
The patient is Stable - Low risk of patient condition declining or worsening    Shift Goals  Clinical Goals: Remain NPO sips until EGD, remain free from falls throughout shift.  Patient Goals: Rest comfortably    Progress made toward(s) clinical / shift goals:  Patient remained mNPO and free from falls throughout shift with fall precautions including pt education, call light within reach.    Patient is not progressing towards the following goals:

## 2025-06-05 NOTE — ANESTHESIA PREPROCEDURE EVALUATION
Case: [id number] Date/Time: 06/05/25 1106    Procedure: COLONOSCOPY    Location:  ENDOSCOPIC ULTRASOUND ROOM / SURGERY [facility]    Surgeons: Wayne Mendoza M.D.            Relevant Problems   ANESTHESIA   (positive) History of unintended awareness under general anesthesia (During MAC for colonoscopy)   (positive) AVERY (obstructive sleep apnea) - as of 9/2024 - doesn't feel she needs it      PULMONARY   (positive) Mild intermittent asthma without complication      CARDIAC   (positive) Atherosclerosis of aorta (CMS-HCC)   (positive) High blood pressure associated with diabetes (HCC)   (positive) Left bundle branch block   (positive) Murmur   (positive) Non-rheumatic mitral regurgitation   (positive) PAF (paroxysmal atrial fibrillation) (HCC)   (positive) Stenosis of carotid artery   (positive) VPC's (ventricular premature complexes)         (positive) CKD (chronic kidney disease) stage 4, GFR 15-29 ml/min (HCC)   (positive) Microalbuminuria due to type 2 diabetes mellitus (HCC)      ENDO   (positive) Hypothyroidism due to acquired atrophy of thyroid   (positive) Type 2 diabetes mellitus with stage 4 chronic kidney disease, without long-term current use of insulin ([facility])     79yo F with  AVERY on CPAP (not using)  Afib  DM2  HTN  CKD stage 4  Anemia due to GI bleed     EKG 1/20/25 LBBB  Echo 3/6/23 EF 55%, mild AS, known murmur    Physical Exam    Airway   Mallampati: II  TM distance: >3 FB  Neck ROM: full       Cardiovascular - normal exam  Rhythm: regular  Rate: normal    (-) murmur     Dental - normal exam           Pulmonary - normal examBreath sounds clear to auscultation     Abdominal    Neurological - normal exam                 Anesthesia Plan    ASA 3   ASA physical status 3 criteria: diabetes - poorly controlled    Plan - MAC               Induction: intravenous    Postoperative Plan: Postoperative administration of opioids is intended.    Pertinent diagnostic labs and testing reviewed    Informed  Consent:    Anesthetic plan and risks discussed with patient.    Use of blood products discussed with: patient whom consented to blood products.

## 2025-06-05 NOTE — OR NURSING
Patient arrived in Pre-op. Confirmed name, , and surgery to be performed. Verified allergies. Patient is A&OX4. IV removed because it was leaking. New IV started and patency confirmed. Informed the patient that  will not be performing the surgery anymore because  is here and can perform the procedure. Also informed her that the procedure would be done in the OR since the procedure room was booked and they wanted to get her procedure done in a timely manner. Informed the patient if she didn't feel comfortable with these changes that she has the right to say no and refuse the procedure. The patient  stated that she needed to get it done and agreed to go forward with the procedure because she had already taken the prep. Educated the patient that the surgeon and anesthesiologist would come by before the procedure and answer any questions she had. Spoke via the patients daughter and she brought up frustrations that  had not given her updates after the previous procedures. Updated  via voalte and in person asking him to give the patient and family updates after.    1118- Blood transfusion given per  orders. Consent signed.

## 2025-06-06 ENCOUNTER — PHARMACY VISIT (OUTPATIENT)
Dept: PHARMACY | Facility: MEDICAL CENTER | Age: 80
End: 2025-06-06
Payer: COMMERCIAL

## 2025-06-06 VITALS
BODY MASS INDEX: 36.14 KG/M2 | TEMPERATURE: 98.7 F | DIASTOLIC BLOOD PRESSURE: 55 MMHG | RESPIRATION RATE: 16 BRPM | SYSTOLIC BLOOD PRESSURE: 137 MMHG | HEIGHT: 66 IN | HEART RATE: 62 BPM | WEIGHT: 224.87 LBS | OXYGEN SATURATION: 95 %

## 2025-06-06 PROBLEM — D62 ACUTE BLOOD LOSS ANEMIA: Status: RESOLVED | Noted: 2025-06-02 | Resolved: 2025-06-06

## 2025-06-06 LAB
ANION GAP SERPL CALC-SCNC: 16 MMOL/L (ref 7–16)
BUN SERPL-MCNC: 66 MG/DL (ref 8–22)
CALCIUM SERPL-MCNC: 8.6 MG/DL (ref 8.5–10.5)
CHLORIDE SERPL-SCNC: 103 MMOL/L (ref 96–112)
CO2 SERPL-SCNC: 20 MMOL/L (ref 20–33)
CREAT SERPL-MCNC: 2.47 MG/DL (ref 0.5–1.4)
ERYTHROCYTE [DISTWIDTH] IN BLOOD BY AUTOMATED COUNT: 63.7 FL (ref 35.9–50)
GFR SERPLBLD CREATININE-BSD FMLA CKD-EPI: 19 ML/MIN/1.73 M 2
GLUCOSE BLD STRIP.AUTO-MCNC: 178 MG/DL (ref 65–99)
GLUCOSE SERPL-MCNC: 225 MG/DL (ref 65–99)
HCT VFR BLD AUTO: 24.9 % (ref 37–47)
HGB BLD-MCNC: 8.2 G/DL (ref 12–16)
HGB BLD-MCNC: 8.7 G/DL (ref 12–16)
MCH RBC QN AUTO: 31.8 PG (ref 27–33)
MCHC RBC AUTO-ENTMCNC: 32.9 G/DL (ref 32.2–35.5)
MCV RBC AUTO: 96.5 FL (ref 81.4–97.8)
PATHOLOGY CONSULT NOTE: NORMAL
PLATELET # BLD AUTO: 142 K/UL (ref 164–446)
PMV BLD AUTO: 11.4 FL (ref 9–12.9)
POTASSIUM SERPL-SCNC: 4.4 MMOL/L (ref 3.6–5.5)
RBC # BLD AUTO: 2.58 M/UL (ref 4.2–5.4)
SODIUM SERPL-SCNC: 139 MMOL/L (ref 135–145)
WBC # BLD AUTO: 4.5 K/UL (ref 4.8–10.8)

## 2025-06-06 PROCEDURE — 700102 HCHG RX REV CODE 250 W/ 637 OVERRIDE(OP): Performed by: HOSPITALIST

## 2025-06-06 PROCEDURE — 85018 HEMOGLOBIN: CPT

## 2025-06-06 PROCEDURE — 82962 GLUCOSE BLOOD TEST: CPT

## 2025-06-06 PROCEDURE — 99239 HOSP IP/OBS DSCHRG MGMT >30: CPT | Performed by: HOSPITALIST

## 2025-06-06 PROCEDURE — 700111 HCHG RX REV CODE 636 W/ 250 OVERRIDE (IP): Mod: JZ | Performed by: HOSPITALIST

## 2025-06-06 PROCEDURE — A9270 NON-COVERED ITEM OR SERVICE: HCPCS | Performed by: HOSPITALIST

## 2025-06-06 PROCEDURE — 86340 INTRINSIC FACTOR ANTIBODY: CPT

## 2025-06-06 PROCEDURE — RXMED WILLOW AMBULATORY MEDICATION CHARGE: Performed by: HOSPITALIST

## 2025-06-06 PROCEDURE — 85027 COMPLETE CBC AUTOMATED: CPT

## 2025-06-06 PROCEDURE — 83516 IMMUNOASSAY NONANTIBODY: CPT

## 2025-06-06 PROCEDURE — 700105 HCHG RX REV CODE 258: Performed by: HOSPITALIST

## 2025-06-06 PROCEDURE — 36415 COLL VENOUS BLD VENIPUNCTURE: CPT

## 2025-06-06 PROCEDURE — 80048 BASIC METABOLIC PNL TOTAL CA: CPT

## 2025-06-06 RX ORDER — OMEPRAZOLE 40 MG/1
40 CAPSULE, DELAYED RELEASE ORAL 2 TIMES DAILY
Qty: 15 CAPSULE | Refills: 2 | Status: SHIPPED | OUTPATIENT
Start: 2025-06-06

## 2025-06-06 RX ADMIN — SODIUM CHLORIDE 250 MG: 9 INJECTION, SOLUTION INTRAVENOUS at 09:28

## 2025-06-06 RX ADMIN — ACETAMINOPHEN 650 MG: 325 TABLET ORAL at 06:02

## 2025-06-06 RX ADMIN — ALLOPURINOL 100 MG: 100 TABLET ORAL at 06:04

## 2025-06-06 RX ADMIN — LEVOTHYROXINE SODIUM 50 MCG: 0.05 TABLET ORAL at 06:04

## 2025-06-06 RX ADMIN — CLONAZEPAM 0.25 MG: 0.5 TABLET ORAL at 06:01

## 2025-06-06 RX ADMIN — METOPROLOL TARTRATE 50 MG: 50 TABLET, FILM COATED ORAL at 06:05

## 2025-06-06 RX ADMIN — INSULIN LISPRO 1 UNITS: 100 INJECTION, SOLUTION INTRAVENOUS; SUBCUTANEOUS at 06:15

## 2025-06-06 RX ADMIN — LOSARTAN POTASSIUM 50 MG: 50 TABLET, FILM COATED ORAL at 06:02

## 2025-06-06 RX ADMIN — OMEPRAZOLE 20 MG: 20 CAPSULE, DELAYED RELEASE ORAL at 06:04

## 2025-06-06 RX ADMIN — DRONEDARONE 400 MG: 400 TABLET, FILM COATED ORAL at 09:34

## 2025-06-06 RX ADMIN — Medication 1 LOZENGE: at 06:17

## 2025-06-06 RX ADMIN — NIFEDIPINE 30 MG: 30 TABLET, FILM COATED, EXTENDED RELEASE ORAL at 06:04

## 2025-06-06 RX ADMIN — Medication 1 LOZENGE: at 01:55

## 2025-06-06 RX ADMIN — SODIUM BICARBONATE 650 MG: 650 TABLET ORAL at 06:04

## 2025-06-06 ASSESSMENT — PATIENT HEALTH QUESTIONNAIRE - PHQ9
SUM OF ALL RESPONSES TO PHQ9 QUESTIONS 1 AND 2: 0
2. FEELING DOWN, DEPRESSED, IRRITABLE, OR HOPELESS: NOT AT ALL
1. LITTLE INTEREST OR PLEASURE IN DOING THINGS: NOT AT ALL

## 2025-06-06 ASSESSMENT — PAIN DESCRIPTION - PAIN TYPE: TYPE: ACUTE PAIN

## 2025-06-06 NOTE — PROGRESS NOTES
Bedside report received from ADELAIDA Crouch. Patient resting in bed. Call bell within reach. All belongings within reach for patient. No further needs at this time.

## 2025-06-06 NOTE — PROGRESS NOTES
Bedside report received from ADELAIDA Toure. Assumed care of patient. Daily plan of care discussed. Pt resting comfortably in bed with no signs of distress noted. Breathing even and unlabored. Patient waiting on colonoscopy this morning. No needs at this time. Call light within reach. Bed locked in lowest position. Plan of care on going.

## 2025-06-06 NOTE — CARE PLAN
The patient is Stable - Low risk of patient condition declining or worsening    Shift Goals  Clinical Goals: Monitor H&H and remain free from falls during shift  Patient Goals: Update on POC  Family Goals: CHARLIE    Progress made toward(s) clinical / shift goals: Patient updated on H&H lab result. No falls sustained.    Patient is not progressing towards the following goals: N/A

## 2025-06-06 NOTE — DISCHARGE SUMMARY
Discharge Summary    CHIEF COMPLAINT ON ADMISSION  Chief Complaint   Patient presents with    Weakness     Weakness and fatigue for 7-10 days    Irregular Heart Beat     States she is having heart palpitation, hx of Afib       Reason for Admission  ems     Admission Date  6/2/2025    CODE STATUS  Full Code    HPI & HOSPITAL COURSE  This is a 80 y.o. female here with weakness    Whitley Frederick has past medical history that  includes atrial fibrillation, diabetes, hypertension, and chronic kidney disease.  She is treated with anticoagulation for atrial fibrillation.  Gastrointestinal history includes presence of a hiatal hernia, gastric polyps, and duodenal polyps.  She underwent EGD and EUS in 2025 and she has well-differentiated neuroendocrine gastric tumors.      The patient presented to the emergency room on 6/2/2025 with complaints of weakness and irregular heartbeat.  Patient was found to have anemia with a hemoglobin of 6.4.  The patient required transfusion of 3 units packed red blood cells.  She underwent EGD and colonoscopy.  No obvious source of significant bleeding was identified.    She was noted to have profound iron deficiency.  She received intravenous iron replacement during her hospital stay.  I have encouraged her to continue taking her iron at home at discharge    Patient's hemoglobin has stabilized she can be discharged home today.    Recommend that she resume her Xarelto.  Ordered labs to be drawn in 6/10/2025.  This is 1 day prior to her follow-up appointment with primary care provider    In addition the patient will need to follow-up with digestive health for capsule endoscopy.  Gastroenterology has ordered intrinsic factor antibody, parietal cell antibody, methylmalonic acid, and gastrin.  These labs are in process and she will need to follow-up to determine if any action is necessary.  In addition a capsule endoscopy is recommended    It may be beneficial for the patient to reduce PPI use to  help allow her to absorb iron.  At this time I do recommend that she take omeprazole once a day rather than twice a day.  If this is tolerated she can consider transitioning to Pepcid or stopping altogether.      Lastly, I have placed a referral for hematology oncology for further evaluation of the patient's anemia.  It is noted that in addition to anemia, the patient has mild thrombocytopenia.    Therefore, she is discharged in good and stable condition to home with close outpatient follow-up.    The patient met 2-midnight criteria for an inpatient stay at the time of discharge.    Discharge Date  6/6/2025    FOLLOW UP ITEMS POST DISCHARGE  Follow-up with primary care, follow-up with digestive health, follow-up with hematology    DISCHARGE DIAGNOSES  Principal Problem (Resolved):    Acute blood loss anemia (POA: Yes)  Active Problems:    PAF (paroxysmal atrial fibrillation) (HCC) (POA: Yes)    CKD (chronic kidney disease) stage 4, GFR 15-29 ml/min (HCC) (POA: Yes)      Overview: Dr. Badillo, Nephrology    Class 2 severe obesity with serious comorbidity and body mass index (BMI) of 37.0 to 37.9 in adult (HCC) (POA: Yes)      Overview: Uncontrolled. Referred to health management.    History of unintended awareness under general anesthesia (POA: Yes)    Murmur (POA: Yes)    Type 2 diabetes mellitus with stage 4 chronic kidney disease, without long-term current use of insulin (HCC) (POA: Yes)    Hyperlipidemia associated with type 2 diabetes mellitus (HCC) (POA: Yes)    Chronic idiopathic gout involving toe without tophus (POA: Yes)      Overview: allopurinol 100mg     High blood pressure associated with diabetes (HCC) (POA: Yes)    AVERY (obstructive sleep apnea) - as of 9/2024 - doesn't feel she needs it (Chronic) (POA: Yes)    Psoriasis (POA: Yes)      FOLLOW UP  Future Appointments   Date Time Provider Department Center   6/11/2025  2:40 PM EDILMA Whelan [facility]   8/20/2025 10:00 AM Fadi Najjar,  M.D. [facility] [address]   9/22/2025  4:45 PM Clarisa Flynn M.D. [facility]     No follow-up provider specified.    MEDICATIONS ON DISCHARGE     Medication List        CHANGE how you take these medications        Instructions   allopurinol 100 MG Tabs  What changed:   how much to take  how to take this  when to take this  additional instructions  Commonly known as: Zyloprim   TAKE TWO TABLETS BY MOUTH TWICE DAILY     clonazePAM 0.5 MG Tabs  What changed:   how much to take  how to take this  when to take this  reasons to take this  additional instructions  Commonly known as: KlonoPIN   TAKE 1 TABLET BY MOUTH ONCE DAILY AS NEEDED FOR 30 DAYS. ANXIETY/HEART PALPITATIONS for 30 days     fluticasone 50 MCG/ACT nasal spray  What changed:   how much to take  how to take this  when to take this  additional instructions  Commonly known as: Flonase   Doctor's comments: Please fill 3 bottles please  USE 1-2  SPRAY(S) IN EACH NOSTRIL ONCE DAILY 30 MINUTES PRIOR TO USE OF CPAP     omeprazole 20 MG delayed-release capsule  What changed: when to take this  Commonly known as: PriLOSEC   Take 2 Capsules by mouth 2 times a day.  Dose: 40 mg            CONTINUE taking these medications        Instructions   acetaminophen 650 MG CR tablet  Commonly known as: Tylenol   Take 1,300 mg by mouth 2 times a day.  Dose: 1,300 mg     ANTI-GAS PO   Take 3 Tablets by mouth 2 times a day as needed (For gas pain).  Dose: 3 Tablet     D3 PO   Take 2 Capsules by mouth every evening.  Dose: 2 Capsule     FreeStyle Angel 3 Sensor Misc   Doctor's comments: Do not fill until after 1/1/2025.  Use to continuously monitor blood sugar.  Change every 14 days.     furosemide 80 MG Tabs  Commonly known as: Lasix   Take 80 mg by mouth every 48 hours.  Dose: 80 mg     IMODIUM PO   Take 3 Tablets by mouth 2 times a day as needed (For diarrhea).   Dose: 3 Tablet     IRON PO   Take 1 Tablet by mouth every evening. (OTC)  Dose: 1 Tablet     levothyroxine 50 MCG  Tabs  Commonly known as: Synthroid   Take 1 tablet by mouth every morning on an empty stomach.  Dose: 50 mcg     losartan 50 MG Tabs  Commonly known as: Cozaar   Take 1 tablet by mouth 2 Times a Day.  Dose: 50 mg     metoprolol tartrate 50 MG Tabs  Commonly known as: Lopressor   Take 1 tablet by mouth 2 times a day.  Dose: 50 mg     montelukast 10 MG Tabs  Commonly known as: Singulair   Take 1 Tablet by mouth at bedtime.  Dose: 10 mg     Multaq 400 MG Tabs  Generic drug: dronedarone   Take 1 tablet by mouth 2 times a day with meals.  Dose: 400 mg     NIFEdipine SR 30 MG tablet  Commonly known as: Procardia-XL   Doctor's comments: USE Rx DISCOUNT CARD: $10.6,  BIN:[id number],  PCN:[id number],  Group:[id number],  ID:[id number]  Take 1 tablet by mouth once a day  Dose: 30 mg     NON SPECIFIED   Doctor's comments: Patient wants blue tank.  Preferred home care  Nocturnal oxygen at 2 L.     Ozempic (2 MG/DOSE) 8 MG/3ML Sopn  Generic drug: Semaglutide (2 MG/DOSE)   Doctor's comments: Coupon scanned in media  Inject 2 mg under the skin every 7 days. Fridays  Dose: 2 mg     rosuvastatin 10 MG Tabs  Commonly known as: Crestor   Take 1 tablet by mouth every evening. Stop simvastatin.  Dose: 10 mg     sodium bicarbonate 650 MG Tabs  Commonly known as: Sodium Bicarbonate   Take 1 Tablet by mouth 2 times a day.  Dose: 650 mg     Xarelto 15 MG Tabs tablet  Generic drug: rivaroxaban   Take 1 Tablet by mouth with dinner.  Dose: 15 mg            ASK your doctor about these medications        Instructions   clobetasol 0.05 % Oint  Commonly known as: Temovate   Apply to affected body skin twice daily as needed for severe psoriasis. Avoid use on face, axilla, groin.     fluocinonide 0.05 % Oint  Commonly known as: Lidex   Apply to affected area body skin twice daily as needed for mild/moderate psoriasis. Avoid use on face, axilla, groin.     levalbuterol 45 MCG/ACT inhaler  Commonly known as: Xopenex HFA   Inhale 2 Puffs every 6 hours as needed for  Shortness of Breath.  Dose: 2 Puff              Allergies  Allergies[1]    DIET  Orders Placed This Encounter   Procedures    Diet Order Diet: Consistent CHO (Diabetic)     Standing Status:   Standing     Number of Occurrences:   1     Diet::   Consistent CHO (Diabetic) [4]       ACTIVITY  As tolerated.  Weight bearing as tolerated    CONSULTATIONS  Gastroenterology    PROCEDURES  Colonoscopy 6/5/2025:       DATE OF SERVICE:  06/05/2025     INDICATION FOR PROCEDURE:  chronic iron deficiency anemia, melena     PROCEDURE PERFORMED: Colonoscopy with polypectomy     CONSENT:  Informed consent was obtained directly from the patient after benefits, risks and possible alternatives were discussed.     MEDICATIONS:  The patient was given Propofol for this procedure. Please see the anesthesia record for details     PROCEDURE DESCRIPTION:  The patient was placed in the left lateral decubitus position and provided with supplemental oxygen via nasal cannula.  Vital signs were monitored continuously throughout the procedure. After appropriate sedation, CARLOS ALBERTO was performed. The colonoscope was then introduced into the rectum through the anus. The colonoscope was advanced through the colon under direct visualization to the cecum. The scope was then withdrawn and mucosa examined. Retroflexion was performed in the rectum. The patients toleration of this procedure was excellent. The prep was adequate.      FINDINGS:    1)  normal TI  2) 3 polyps measuring 2-5mm in size were found in the transverse colon and removed with cold snare  3) sigmoid diverticulosis noted  4) brown liquid stool throughout colon. No old blood was identified        COMPLICATIONS:  No complications or blood loss during or in the immediate postoperative period.     IMPRESSION:  1.  colon polyps  2. Diverticulosis  3. No obvious blood or bleeding noted     RECOMMENDATION:    1) restart diet  2) outpatient capsule endoscopy  3) Recommend checking a gastrin level and  methylmalonic acid level. Its possible this patient has hypergastrinemia secondary to either PPI or atrophic gastritis leading to iron malabsorption          EGD 6/4/2025:  Findings:   EGD  1. Esophagus:   -Hiatal hernia measuring 2 cm.  -Visualized portion of the esophagus appeared normal.     2. Stomach:   -Few scattered subcentimeter gastric polyps.  -Previous placed endoclip in-situ at the greater curvature of the stomach.  -Otherwise, visualized portion of the gastric body appeared normal.  -The pylorus is patent.     3. Duodenum:   -Duodenal nodularity D1 and D2 sweep   -Otherwise, visualized portion of the first and second portions of duodenum appeared normal.  -The fresh or old blood seen anywhere on endoscopic exam.     LABORATORY  Lab Results   Component Value Date    SODIUM 139 06/06/2025    POTASSIUM 4.4 06/06/2025    CHLORIDE 103 06/06/2025    CO2 20 06/06/2025    GLUCOSE 225 (H) 06/06/2025    BUN 66 (H) 06/06/2025    CREATININE 2.47 (H) 06/06/2025    CREATININE 1.1 04/27/2006        Lab Results   Component Value Date    WBC 4.5 (L) 06/06/2025    HEMOGLOBIN 8.7 (L) 06/06/2025    HEMATOCRIT 24.9 (L) 06/06/2025    PLATELETCT 142 (L) 06/06/2025        Total time of the discharge process exceeds 38 minutes.       [1]   Allergies  Allergen Reactions    Amlodipine Itching    Contrast Media With Iodine [Iodine] Hives    Hydralazine      Rapid heart rate, chest tightness    Amaryl      GI Problems    Avandia [Rosiglitazone Maleate]      GI problems    Glipizide      GI Problems    Levoxyl [Levothyroxine Sodium] Unspecified     Flushing, very hot    Relafen [Nabumetone]      Itching; avoids all nsaids    Clonidine      Rapid heart rate, swelling Pt unsure of rxn    Cipro Xr      Possibly causes Chills.    Glucophage [Metformin Hydrochloride]      dizzy    Levaquin      Possible allergy - chills with cipro but sick

## 2025-06-06 NOTE — PROGRESS NOTES
4 Eyes Skin Assessment Completed by ADELAIDA Crouch and Tenisha ANDERSON RN.    Skin assessment is primarily focused on high risk bony prominences. Pay special attention to skin beneath and around medical devices, high risk bony prominences, skin to skin areas and areas where the patient lacks sensation to feel pain and areas where the patient previously had breakdown.     Head (Occipital):  WDL   Ears (Under Medical Devices): WDL   Nose (Under Medical Devices): WDL   Mouth:  WDL    Neck: WDL   Breast/Chest:  scattered melanomas/scabs    Shoulder Blades:  scattered melanomas/scabs       Spine:   scattered melanomas/scabs       (R) Arm/Elbow/Hand: Scab and Bruising and melanomas    (L) Arm/Elbow/Hand: Scab and Bruising and melanomas   Abdomen: scattered melanomas/scabs       Pannus/Groin:  scattered melanomas/scabs       Sacrum/Coccyx:   Pink and Blanching   (R) Ischial Tuberosity (Sit Bones):  WDL scattered melanomas/scabs   (L) Ischial Tuberosity (Sit Bones):  WDL scattered melanomas/scabs   (R) Leg:   Discoloration and swelling   (L) Leg:  Discoloration and swelling   (R) Heel:  Discoloration and swelling   (R) Foot/Toe: Discoloration and swelling,  varicose veins    (L) Heel: Discoloration and swelling    (L) Foot/Toe:  Discoloration and swelling,  varicose vein       DEVICES IN USE:   Respiratory Devices:  NA, patient on room air  Feeding Devices:  N/A   Lines & BP Monitoring Devices:  Peripheral IV  Right AC  Orthopedic Devices:  N/A  Miscellaneous Devices:  N/A    PROTOCOL INTERVENTIONS:   Standard/Trauma Bed:  Already in place    WOUND PHOTOS:   N/A no wounds identified    WOUND CONSULT:   N/A, no advanced wound care needs identified

## 2025-06-06 NOTE — CARE PLAN
The patient is Stable - Low risk of patient condition declining or worsening    Shift Goals  Clinical Goals: Monitor for signs of bleeding  Patient Goals: sleep then go get colonoscopy  Family Goals: CHARLIE    Progress made toward(s) clinical / shift goals:  No signs of active bleeding. Tolerating diet. No pain reported.     Patient is not progressing towards the following goals:

## 2025-06-08 LAB
GASTRIN SERPL-MCNC: 134 PG/ML (ref 0–100)
IF BLOCK AB SER QL RIA: NEGATIVE

## 2025-06-09 ENCOUNTER — PATIENT OUTREACH (OUTPATIENT)
Dept: MEDICAL GROUP | Facility: LAB | Age: 80
End: 2025-06-09
Payer: MEDICARE

## 2025-06-09 ENCOUNTER — DOCUMENTATION (OUTPATIENT)
Dept: HEALTH INFORMATION MANAGEMENT | Facility: OTHER | Age: 80
End: 2025-06-09
Payer: MEDICARE

## 2025-06-09 LAB — PCA IGG SER-ACNC: 1 UNITS (ref 0–24.9)

## 2025-06-10 ENCOUNTER — TELEPHONE (OUTPATIENT)
Dept: HEALTH INFORMATION MANAGEMENT | Facility: OTHER | Age: 80
End: 2025-06-10
Payer: MEDICARE

## 2025-06-10 PROCEDURE — RXMED WILLOW AMBULATORY MEDICATION CHARGE: Performed by: NURSE PRACTITIONER

## 2025-06-10 NOTE — PROGRESS NOTES
"Transitional Care Management  TCM Outreach Date and Time: Filed (6/9/2025 12:03 PM)    Discharge Questions  Actual Discharge Date: 06/06/25  Now that you are home, how are you feeling?: Good (\"taking a bit to get back on track, still having palpaltations in AM, but that's normal\" Feels like she has had to take clonazepam more often)  Did you receive any new prescriptions?: Yes (change omeprazole)  Were you able to get them filled?: Yes  Meds to Bed or Pharmacy filled?: Pharmacy  Do you have any questions about your current medications or new medications (Review Med Rec)?: No  Did you have any durable medical equipment ordered?: No  Do you have a follow up appointment scheduled with your PCP?: Yes  Appointment Date: 06/11/25  Appointment Time: 1425  Any issues or paperwork you wish to discuss with your PCP?: Yes (Please specify) (still feels \"wheezy and coughing\"; wants to discuss EGD on 6/12)  Are you (patient) able to get to the appointment?: Yes  If Home Health was ordered, have they contacted you (Patient): Not Applicable  Did you have enough support after your last discharge?: Yes  Does this patient qualify for the CCM program?: Yes (referred to Shirin Aguilar RN)    Transitional Care  Number of attempts made to contact patient: 2  Current or previous attempts completed within two business days of discharge? : Yes  Provided education regarding treatment plan, medications, self-management, ADLs?: Yes (ED precautions; instructed to call 's office to ask for the necessity of the outpatient EGD 6/12 since pt had EGD inpatient)  Has patient completed an Advanced Directive?: No  Has the Care Manager's phone number provided?: Yes  Is there anything else I can help you with?: No    Discharge Summary  Chief Complaint: Weakness        Weakness and fatigue for 7-10 days    Irregular Heart Beat        States she is having heart palpitation, hx of Afib  Admitting Diagnosis: EMS  Discharge Diagnosis: Acute blood " loss anemia

## 2025-06-11 ENCOUNTER — PHARMACY VISIT (OUTPATIENT)
Dept: PHARMACY | Facility: MEDICAL CENTER | Age: 80
End: 2025-06-11
Payer: COMMERCIAL

## 2025-06-11 ENCOUNTER — OFFICE VISIT (OUTPATIENT)
Dept: MEDICAL GROUP | Facility: LAB | Age: 80
End: 2025-06-11
Payer: MEDICARE

## 2025-06-11 ENCOUNTER — HOSPITAL ENCOUNTER (OUTPATIENT)
Dept: LAB | Facility: MEDICAL CENTER | Age: 80
End: 2025-06-11
Attending: NURSE PRACTITIONER
Payer: MEDICARE

## 2025-06-11 VITALS
WEIGHT: 236 LBS | DIASTOLIC BLOOD PRESSURE: 48 MMHG | OXYGEN SATURATION: 95 % | TEMPERATURE: 97.7 F | BODY MASS INDEX: 37.93 KG/M2 | HEART RATE: 72 BPM | HEIGHT: 66 IN | SYSTOLIC BLOOD PRESSURE: 120 MMHG | RESPIRATION RATE: 20 BRPM

## 2025-06-11 DIAGNOSIS — D50.9 IRON DEFICIENCY ANEMIA, UNSPECIFIED IRON DEFICIENCY ANEMIA TYPE: ICD-10-CM

## 2025-06-11 DIAGNOSIS — I65.23 BILATERAL CAROTID ARTERY STENOSIS: ICD-10-CM

## 2025-06-11 DIAGNOSIS — D3A.8 NEUROENDOCRINE TUMOR: ICD-10-CM

## 2025-06-11 DIAGNOSIS — E11.69 HYPERLIPIDEMIA ASSOCIATED WITH TYPE 2 DIABETES MELLITUS (HCC): ICD-10-CM

## 2025-06-11 DIAGNOSIS — Z09 HOSPITAL DISCHARGE FOLLOW-UP: ICD-10-CM

## 2025-06-11 DIAGNOSIS — E78.5 HYPERLIPIDEMIA ASSOCIATED WITH TYPE 2 DIABETES MELLITUS (HCC): ICD-10-CM

## 2025-06-11 DIAGNOSIS — I35.0 MILD AORTIC STENOSIS: ICD-10-CM

## 2025-06-11 DIAGNOSIS — I49.3 VPC'S (VENTRICULAR PREMATURE COMPLEXES): ICD-10-CM

## 2025-06-11 PROBLEM — Z92.84 HISTORY OF UNINTENDED AWARENESS UNDER GENERAL ANESTHESIA: Status: RESOLVED | Noted: 2025-06-04 | Resolved: 2025-06-11

## 2025-06-11 LAB
ERYTHROCYTE [DISTWIDTH] IN BLOOD BY AUTOMATED COUNT: 62.3 FL (ref 35.9–50)
HCT VFR BLD AUTO: 28.1 % (ref 37–47)
HGB BLD-MCNC: 9 G/DL (ref 12–16)
MCH RBC QN AUTO: 31.7 PG (ref 27–33)
MCHC RBC AUTO-ENTMCNC: 32 G/DL (ref 32.2–35.5)
MCV RBC AUTO: 98.9 FL (ref 81.4–97.8)
PLATELET # BLD AUTO: 163 K/UL (ref 164–446)
PMV BLD AUTO: 11.1 FL (ref 9–12.9)
RBC # BLD AUTO: 2.84 M/UL (ref 4.2–5.4)
WBC # BLD AUTO: 8.1 K/UL (ref 4.8–10.8)

## 2025-06-11 PROCEDURE — 36415 COLL VENOUS BLD VENIPUNCTURE: CPT

## 2025-06-11 PROCEDURE — 83540 ASSAY OF IRON: CPT

## 2025-06-11 PROCEDURE — 83550 IRON BINDING TEST: CPT

## 2025-06-11 PROCEDURE — 82728 ASSAY OF FERRITIN: CPT

## 2025-06-11 PROCEDURE — 85027 COMPLETE CBC AUTOMATED: CPT

## 2025-06-11 ASSESSMENT — FIBROSIS 4 INDEX: FIB4 SCORE: 3.34

## 2025-06-11 NOTE — LETTER
[facility]  Juana Berger, A.P.N.  [address] [facility] [address]  [place] [address]  Fax: [phone]   Authorization for Release/Disclosure of   Protected Health Information   Name: WHITLEY BROWER : 1945 SSN: [id number]   Address: [address] [place] [address] [address]  [place] [address] Phone:    There are no phone numbers on file.   I authorize the entity listed below to release/disclose the PHI below to:   [facility]/Juana Berger, A.P.N. and Juana Berger A.P.N.   Provider or Entity Name:  [facility]    Address   City, State, [facility]   Phone:      Fax:  [phone]   Reason for request: continuity of care   Information to be released:    [  ] LAST COLONOSCOPY,  including any PATH REPORT and follow-up  [  ] LAST FIT/COLOGUARD RESULT [  ] LAST DEXA  [  ] LAST MAMMOGRAM  [  ] LAST PAP  [  ] LAST LABS [ XX ] RETINA EXAM REPORT  [  ] IMMUNIZATION RECORDS  [  ] Release all info      [  ] Check here and initial the line next to each item to release ALL health information INCLUDING  _____ Care and treatment for drug and / or alcohol abuse  _____ HIV testing, infection status, or AIDS  _____ Genetic Testing    DATES OF SERVICE OR TIME PERIOD TO BE DISCLOSED: _____________  I understand and acknowledge that:  * This Authorization may be revoked at any time by you in writing, except if your health information has already been used or disclosed.  * Your health information that will be used or disclosed as a result of you signing this authorization could be re-disclosed by the recipient. If this occurs, your re-disclosed health information may no longer be protected by State or Federal laws.  * You may refuse to sign this Authorization. Your refusal will not affect your ability to obtain treatment.  * This Authorization becomes effective upon signing and will  on (date) __________.      If no date is indicated, this Authorization will  one (1) year from the signature date.    Name: Whitley  Laureen Frederick  Signature: Date:   6/11/2025     PLEASE FAX REQUESTED RECORDS BACK TO: [phone]

## 2025-06-12 ENCOUNTER — ANESTHESIA (OUTPATIENT)
Dept: SURGERY | Facility: MEDICAL CENTER | Age: 80
End: 2025-06-12
Payer: MEDICARE

## 2025-06-12 ENCOUNTER — RESULTS FOLLOW-UP (OUTPATIENT)
Dept: MEDICAL GROUP | Facility: LAB | Age: 80
End: 2025-06-12

## 2025-06-12 ENCOUNTER — TELEPHONE (OUTPATIENT)
Dept: HEALTH INFORMATION MANAGEMENT | Facility: OTHER | Age: 80
End: 2025-06-12
Payer: MEDICARE

## 2025-06-12 ENCOUNTER — HOSPITAL ENCOUNTER (OUTPATIENT)
Facility: MEDICAL CENTER | Age: 80
End: 2025-06-12
Attending: INTERNAL MEDICINE | Admitting: INTERNAL MEDICINE
Payer: MEDICARE

## 2025-06-12 ENCOUNTER — ANESTHESIA EVENT (OUTPATIENT)
Dept: SURGERY | Facility: MEDICAL CENTER | Age: 80
End: 2025-06-12
Payer: MEDICARE

## 2025-06-12 VITALS
OXYGEN SATURATION: 92 % | BODY MASS INDEX: 38.92 KG/M2 | HEART RATE: 72 BPM | SYSTOLIC BLOOD PRESSURE: 141 MMHG | RESPIRATION RATE: 16 BRPM | HEIGHT: 66 IN | TEMPERATURE: 98.4 F | DIASTOLIC BLOOD PRESSURE: 55 MMHG | WEIGHT: 242.18 LBS

## 2025-06-12 LAB
FERRITIN SERPL-MCNC: 279 NG/ML (ref 10–291)
GLUCOSE BLD STRIP.AUTO-MCNC: 148 MG/DL (ref 65–99)
IRON SATN MFR SERPL: 9 % (ref 15–55)
IRON SERPL-MCNC: 27 UG/DL (ref 40–170)
PATHOLOGY CONSULT NOTE: NORMAL
TIBC SERPL-MCNC: 312 UG/DL (ref 250–450)
UIBC SERPL-MCNC: 285 UG/DL (ref 110–370)

## 2025-06-12 PROCEDURE — 160197 HCHG MAC ANESTHESIA: Performed by: INTERNAL MEDICINE

## 2025-06-12 PROCEDURE — 88342 IMHCHEM/IMCYTCHM 1ST ANTB: CPT | Mod: 26 | Performed by: PATHOLOGY

## 2025-06-12 PROCEDURE — 700102 HCHG RX REV CODE 250 W/ 637 OVERRIDE(OP)

## 2025-06-12 PROCEDURE — 160002 HCHG RECOVERY MINUTES (STAT): Performed by: INTERNAL MEDICINE

## 2025-06-12 PROCEDURE — 700105 HCHG RX REV CODE 258: Performed by: INTERNAL MEDICINE

## 2025-06-12 PROCEDURE — 160193 HCHG PACU STANDARD - 1ST 60 MINS: Performed by: INTERNAL MEDICINE

## 2025-06-12 PROCEDURE — 88305 TISSUE EXAM BY PATHOLOGIST: CPT | Mod: 26 | Performed by: PATHOLOGY

## 2025-06-12 PROCEDURE — 700101 HCHG RX REV CODE 250: Performed by: STUDENT IN AN ORGANIZED HEALTH CARE EDUCATION/TRAINING PROGRAM

## 2025-06-12 PROCEDURE — 82962 GLUCOSE BLOOD TEST: CPT | Performed by: INTERNAL MEDICINE

## 2025-06-12 PROCEDURE — 160015 HCHG STAT PREOP MINUTES: Performed by: INTERNAL MEDICINE

## 2025-06-12 PROCEDURE — 700111 HCHG RX REV CODE 636 W/ 250 OVERRIDE (IP): Performed by: STUDENT IN AN ORGANIZED HEALTH CARE EDUCATION/TRAINING PROGRAM

## 2025-06-12 PROCEDURE — 160025 RECOVERY II MINUTES (STATS): Performed by: INTERNAL MEDICINE

## 2025-06-12 PROCEDURE — 88342 IMHCHEM/IMCYTCHM 1ST ANTB: CPT | Performed by: PATHOLOGY

## 2025-06-12 PROCEDURE — 700101 HCHG RX REV CODE 250: Mod: JZ | Performed by: STUDENT IN AN ORGANIZED HEALTH CARE EDUCATION/TRAINING PROGRAM

## 2025-06-12 PROCEDURE — 160046 HCHG PACU - 1ST 60 MINS PHASE II: Performed by: INTERNAL MEDICINE

## 2025-06-12 PROCEDURE — 160203 HCHG ENDO MINUTES - 1ST 30 MINS LEVEL 4: Performed by: INTERNAL MEDICINE

## 2025-06-12 PROCEDURE — A9270 NON-COVERED ITEM OR SERVICE: HCPCS

## 2025-06-12 PROCEDURE — 160048 HCHG OR STATISTICAL LEVEL 1-5: Performed by: INTERNAL MEDICINE

## 2025-06-12 PROCEDURE — 88305 TISSUE EXAM BY PATHOLOGIST: CPT | Mod: 59 | Performed by: PATHOLOGY

## 2025-06-12 RX ORDER — ALBUTEROL SULFATE 5 MG/ML
SOLUTION RESPIRATORY (INHALATION)
Status: DISCONTINUED
Start: 2025-06-12 | End: 2025-06-12

## 2025-06-12 RX ORDER — DIPHENHYDRAMINE HYDROCHLORIDE 50 MG/ML
12.5 INJECTION, SOLUTION INTRAMUSCULAR; INTRAVENOUS
Status: DISCONTINUED | OUTPATIENT
Start: 2025-06-12 | End: 2025-06-12 | Stop reason: HOSPADM

## 2025-06-12 RX ORDER — ALBUTEROL SULFATE 5 MG/ML
2.5 SOLUTION RESPIRATORY (INHALATION)
Status: DISCONTINUED | OUTPATIENT
Start: 2025-06-12 | End: 2025-06-12 | Stop reason: HOSPADM

## 2025-06-12 RX ORDER — IPRATROPIUM BROMIDE AND ALBUTEROL SULFATE 2.5; .5 MG/3ML; MG/3ML
SOLUTION RESPIRATORY (INHALATION)
Status: DISCONTINUED
Start: 2025-06-12 | End: 2025-06-12

## 2025-06-12 RX ORDER — ALBUTEROL SULFATE 5 MG/ML
2.5 SOLUTION RESPIRATORY (INHALATION)
Status: CANCELLED | OUTPATIENT
Start: 2025-06-12

## 2025-06-12 RX ORDER — LABETALOL HYDROCHLORIDE 5 MG/ML
5 INJECTION, SOLUTION INTRAVENOUS
Status: DISCONTINUED | OUTPATIENT
Start: 2025-06-12 | End: 2025-06-12 | Stop reason: HOSPADM

## 2025-06-12 RX ORDER — EPHEDRINE SULFATE 50 MG/ML
5 INJECTION, SOLUTION INTRAVENOUS
Status: DISCONTINUED | OUTPATIENT
Start: 2025-06-12 | End: 2025-06-12 | Stop reason: HOSPADM

## 2025-06-12 RX ORDER — DEXAMETHASONE SODIUM PHOSPHATE 4 MG/ML
INJECTION, SOLUTION INTRA-ARTICULAR; INTRALESIONAL; INTRAMUSCULAR; INTRAVENOUS; SOFT TISSUE PRN
Status: DISCONTINUED | OUTPATIENT
Start: 2025-06-12 | End: 2025-06-12 | Stop reason: SURG

## 2025-06-12 RX ORDER — METOPROLOL TARTRATE 1 MG/ML
1 INJECTION, SOLUTION INTRAVENOUS
Status: DISCONTINUED | OUTPATIENT
Start: 2025-06-12 | End: 2025-06-12 | Stop reason: HOSPADM

## 2025-06-12 RX ORDER — HALOPERIDOL 5 MG/ML
1 INJECTION INTRAMUSCULAR
Status: DISCONTINUED | OUTPATIENT
Start: 2025-06-12 | End: 2025-06-12 | Stop reason: HOSPADM

## 2025-06-12 RX ORDER — LEVALBUTEROL INHALATION SOLUTION 0.63 MG/3ML
0.63 SOLUTION RESPIRATORY (INHALATION) ONCE
Status: COMPLETED | OUTPATIENT
Start: 2025-06-12 | End: 2025-06-12

## 2025-06-12 RX ORDER — LIDOCAINE HYDROCHLORIDE 20 MG/ML
INJECTION, SOLUTION EPIDURAL; INFILTRATION; INTRACAUDAL; PERINEURAL PRN
Status: DISCONTINUED | OUTPATIENT
Start: 2025-06-12 | End: 2025-06-12 | Stop reason: SURG

## 2025-06-12 RX ORDER — ONDANSETRON 2 MG/ML
4 INJECTION INTRAMUSCULAR; INTRAVENOUS
Status: DISCONTINUED | OUTPATIENT
Start: 2025-06-12 | End: 2025-06-12 | Stop reason: HOSPADM

## 2025-06-12 RX ORDER — ONDANSETRON 2 MG/ML
INJECTION INTRAMUSCULAR; INTRAVENOUS PRN
Status: DISCONTINUED | OUTPATIENT
Start: 2025-06-12 | End: 2025-06-12 | Stop reason: SURG

## 2025-06-12 RX ORDER — SODIUM CHLORIDE, SODIUM LACTATE, POTASSIUM CHLORIDE, CALCIUM CHLORIDE 600; 310; 30; 20 MG/100ML; MG/100ML; MG/100ML; MG/100ML
INJECTION, SOLUTION INTRAVENOUS CONTINUOUS
Status: ACTIVE | OUTPATIENT
Start: 2025-06-12 | End: 2025-06-12

## 2025-06-12 RX ORDER — MIDAZOLAM HYDROCHLORIDE 1 MG/ML
INJECTION INTRAMUSCULAR; INTRAVENOUS PRN
Status: DISCONTINUED | OUTPATIENT
Start: 2025-06-12 | End: 2025-06-12 | Stop reason: SURG

## 2025-06-12 RX ORDER — LEVALBUTEROL INHALATION SOLUTION 1.25 MG/3ML
1.25 SOLUTION RESPIRATORY (INHALATION)
Status: COMPLETED | OUTPATIENT
Start: 2025-06-12 | End: 2025-06-12

## 2025-06-12 RX ADMIN — ONDANSETRON 4 MG: 2 INJECTION INTRAMUSCULAR; INTRAVENOUS at 10:11

## 2025-06-12 RX ADMIN — SODIUM CHLORIDE, POTASSIUM CHLORIDE, SODIUM LACTATE AND CALCIUM CHLORIDE: 600; 310; 30; 20 INJECTION, SOLUTION INTRAVENOUS at 10:11

## 2025-06-12 RX ADMIN — SODIUM CHLORIDE, POTASSIUM CHLORIDE, SODIUM LACTATE AND CALCIUM CHLORIDE: 600; 310; 30; 20 INJECTION, SOLUTION INTRAVENOUS at 08:03

## 2025-06-12 RX ADMIN — LEVALBUTEROL HYDROCHLORIDE 0.63 MG: 0.63 SOLUTION RESPIRATORY (INHALATION) at 08:00

## 2025-06-12 RX ADMIN — LIDOCAINE HYDROCHLORIDE 50 MG: 20 INJECTION, SOLUTION EPIDURAL; INFILTRATION; INTRACAUDAL; PERINEURAL at 10:11

## 2025-06-12 RX ADMIN — PROPOFOL 100 MCG/KG/MIN: 10 INJECTION, EMULSION INTRAVENOUS at 10:11

## 2025-06-12 RX ADMIN — LEVALBUTEROL HYDROCHLORIDE 1.25 MG: 1.25 SOLUTION RESPIRATORY (INHALATION) at 11:15

## 2025-06-12 RX ADMIN — DEXAMETHASONE SODIUM PHOSPHATE 8 MG: 4 INJECTION INTRA-ARTICULAR; INTRALESIONAL; INTRAMUSCULAR; INTRAVENOUS; SOFT TISSUE at 10:11

## 2025-06-12 RX ADMIN — MIDAZOLAM HYDROCHLORIDE 2 MG: 1 INJECTION, SOLUTION INTRAMUSCULAR; INTRAVENOUS at 07:45

## 2025-06-12 RX ADMIN — PROPOFOL 50 MG: 10 INJECTION, EMULSION INTRAVENOUS at 10:12

## 2025-06-12 ASSESSMENT — PAIN DESCRIPTION - PAIN TYPE
TYPE: SURGICAL PAIN
TYPE: ACUTE PAIN;SURGICAL PAIN
TYPE: ACUTE PAIN
TYPE: SURGICAL PAIN
TYPE: SURGICAL PAIN
TYPE: ACUTE PAIN
TYPE: ACUTE PAIN

## 2025-06-12 ASSESSMENT — FIBROSIS 4 INDEX: FIB4 SCORE: 2.91

## 2025-06-12 ASSESSMENT — PAIN SCALES - GENERAL: PAIN_LEVEL: 0

## 2025-06-12 NOTE — ANESTHESIA PREPROCEDURE EVALUATION
Case: [id number] Date/Time: 06/12/25 0715    Procedure: ESOPHAGOGASTRODUODENOSCOPY WITH BIOPSIES (Throat)    Anesthesia type: General    Pre-op diagnosis: NEUROENDOCRINE TUMOR    Location:  ENDOSCOPIC ULTRASOUND ROOM / SURGERY [facility]    Surgeons: Domenico Olea M.D.            Relevant Problems   ANESTHESIA   (positive) AVERY (obstructive sleep apnea) - as of 9/2024 - doesn't feel she needs it      PULMONARY   (positive) Mild intermittent asthma without complication      CARDIAC   (positive) Atherosclerosis of aorta (CMS-HCC)   (positive) High blood pressure associated with diabetes (HCC)   (positive) Left bundle branch block   (positive) Murmur   (positive) Non-rheumatic mitral regurgitation   (positive) PAF (paroxysmal atrial fibrillation) (HCC)   (positive) Stenosis of carotid artery   (positive) VPC's (ventricular premature complexes)         (positive) CKD (chronic kidney disease) stage 4, GFR 15-29 ml/min (HCC)   (positive) Microalbuminuria due to type 2 diabetes mellitus (HCC)      ENDO   (positive) Hypothyroidism due to acquired atrophy of thyroid   (positive) Type 2 diabetes mellitus with stage 4 chronic kidney disease, without long-term current use of insulin (HCC)       Physical Exam    Airway   Mallampati: III  TM distance: >3 FB  Neck ROM: full       Cardiovascular - normal exam  Rhythm: irregular  Rate: normal     Dental - normal exam           Pulmonary - normal examBreath sounds clear to auscultation     Abdominal (+) obese     Neurological - normal exam                   Anesthesia Plan    ASA 3   ASA physical status 3 criteria: diabetes - poorly controlled, hypertension - poorly controlled and CAD (>3 months)    Plan - MAC               Induction: intravenous    Postoperative Plan: Postoperative administration of opioids is intended.    Pertinent diagnostic labs and testing reviewed    Informed Consent:    Anesthetic plan and risks discussed with patient.    Use of blood products discussed  with: patient whom consented to blood products.

## 2025-06-12 NOTE — OR NURSING
1052 Assumed pt care from ADELAIDA Duron. Pt has no c/o pain or nausea. MD and anesthesia contacted for orders.     1059 Family updated.    1115 Nebulizer administered    1204 Pt meets criteria for transfer to stage 2.

## 2025-06-12 NOTE — ANESTHESIA TIME REPORT
Anesthesia Start and Stop Event Times       Date Time Event    6/12/2025 0709 Ready for Procedure     1006 Anesthesia Start     1036 Anesthesia Stop          Responsible Staff  06/12/25      Name Role Begin End    Leeroy Pena M.D. Anesth 1006 1036          Overtime Reason:  no overtime (within assigned shift)    Comments:

## 2025-06-12 NOTE — OR NURSING
1204- Received report from PACU RN. Patient arrived to Stage 2 via gurney. Dressed and up to chair. Vital signs stable on room air.     1217- Patient's daughter updated that patient is in Stage 2 Recovery area.     1226- Family present. Discharge education provided to patient and family, both verbalized understanding. Peripheral IV removed.     1235- Discharged to care of family post uneventful stay in PACU 2. Assisted out to car in wheelchair.

## 2025-06-12 NOTE — PROGRESS NOTES
"Subjective:     Whitley Frederick is a 80 y.o. female who presents for Hospital Follow-up.    Transitional Care Management  TCM Outreach Date and Time: Filed (6/9/2025 12:03 PM)    Discharge Questions  Actual Discharge Date: 06/06/25  Now that you are home, how are you feeling?: Good (\"taking a bit to get back on track, still having palpaltations in AM, but that's normal\" Feels like she has had to take clonazepam more often)  Did you receive any new prescriptions?: Yes (change omeprazole)  Were you able to get them filled?: Yes  Meds to Bed or Pharmacy filled?: Pharmacy  Do you have any questions about your current medications or new medications (Review Med Rec)?: No  Did you have any durable medical equipment ordered?: No  Do you have a follow up appointment scheduled with your PCP?: Yes  Appointment Date: 06/11/25  Appointment Time: 1425  Any issues or paperwork you wish to discuss with your PCP?: Yes (Please specify) (still feels \"wheezy and coughing\"; wants to discuss EGD on 6/12)  Are you (patient) able to get to the appointment?: Yes  If Home Health was ordered, have they contacted you (Patient): Not Applicable  Did you have enough support after your last discharge?: Yes  Does this patient qualify for the CCM program?: Yes (referred to Shirin Aguilar RN)    Transitional Care  Number of attempts made to contact patient: 2  Current or previous attempts completed within two business days of discharge? : Yes  Provided education regarding treatment plan, medications, self-management, ADLs?: Yes (ED precautions; instructed to call 's office to ask for the necessity of the outpatient EGD 6/12 since pt had EGD inpatient)  Has patient completed an Advanced Directive?: No  Has the Care Manager's phone number provided?: Yes  Is there anything else I can help you with?: No    Discharge Summary  Chief Complaint: Weakness        Weakness and fatigue for 7-10 days    Irregular Heart Beat        States she is " having heart palpitation, hx of Afib  Admitting Diagnosis: EMS  Discharge Diagnosis: Acute blood loss anemia      HPI:  Verbal consent was acquired by the patient to use PureHistory Copilot ambient listening note generation during this visit Yes     History of Present Illness  The patient is a 80-year-old established female here for a hospital follow-up. The primary reason for this visit is to follow up after a recent hospitalization due to weakness. She went to the ER and underwent endoscopy and colonoscopy, which did not reveal any source of bleeding. She was diagnosed with iron deficiency and received two iron infusions and three blood transfusions.    She reports experiencing palpitations, particularly in the morning, with a frequency of 7 to 10 per minute. The palpitations appear to subside after taking her morning medications, which include metoprolol and Multaq, both administered twice daily. She has been withholding losartan in preparation for tomorrow's procedure. She also takes Procardia and uses a pulse oximeter, which has recorded heart rates in the 70s. She has resumed using her CPAP machine. She has discontinued clonazepam due to rebound anxiety and palpitations when not taken consistently. She expresses fear of a potential heart attack and aneurysm, citing various head pains and nerve pain as causes for concern. She requests a carotid ultrasound and echocardiogram, noting that her last tests were conducted 2 and 3 years ago, respectively. She underwent a cardiac catheterization in 2006, which revealed clear coronary arteries. She recalls being informed of compromised carotid arteries, leading to audible heartbeats in her ears. She experienced weakness and fatigue to the point of requiring ambulance transport to the hospital on 06/02/2025, where she was discharged on 06/06/2025. She would also like to repeat her carotid artery ultrasound.  She is on a statin.  She is followed by cardiology.    She has a  chronic past medical history of generalized anxiety disorder.  She has not yet started taking Lexapro due to concerns about potential interactions with gabapentin, which she has since discontinued. She experiences adrenaline rushes, which cause fear and anxiety, and are only controlled by clonazepam.     She has been advised to resume Xarelto following hospitalization although discontinued it for her procedure tomorrow.  She has been taking Prilosec for 31 years and was recently prescribed an increased dose of 80 mg daily as she is only taking 40 mg a day.     She has chronic kidney disease.  She is due to see her nephrologist in August.  Last GFR was just below 20.    She has gained weight since discontinuing Ozempic on 05/13/2025 due to nausea. She plans to resume Ozempic soon.    A swallow capsule endoscopy is planned but not yet scheduled. She has a hematology appointment with Dr. Meehan next month. An ultrasound EGD is scheduled for tomorrow with Dr. Olea to follow up on her tumors, not related to her recent hospitalization.    Currently taking 65 mg elemental iron daily.         Current medicines (including reconciliation performed today)  Current Outpatient Medications   Medication Sig Dispense Refill    omeprazole (PRILOSEC) 40 MG delayed-release capsule Take 1 Capsule by mouth 2 times a day. 15 Capsule 2    Cholecalciferol (D3 PO) Take 2 Capsules by mouth every evening.      Loperamide HCl (IMODIUM PO) Take 3 Tablets by mouth 2 times a day as needed (For diarrhea).         Alpha-D-Galactosidase (ANTI-GAS PO) Take 3 Tablets by mouth 2 times a day as needed (For gas pain).      Ferrous Sulfate (IRON PO) Take 1 Tablet by mouth every evening. (OTC)      clonazePAM (KLONOPIN) 0.5 MG Tab TAKE 1 TABLET BY MOUTH ONCE DAILY AS NEEDED FOR 30 DAYS. ANXIETY/HEART PALPITATIONS for 30 days (Patient taking differently: Take 0.125-0.25 mg by mouth 2 times a day as needed. TAKE 1 TABLET BY MOUTH ONCE DAILY AS NEEDED FOR  30 DAYS. ANXIETY/HEART PALPITATIONS for 30 days  Pt takes half tablet (0.25MG) or one fourth (0.125MG)  Indications: Feeling Anxious) 15 Tablet 0    losartan (COZAAR) 50 MG Tab Take 1 tablet by mouth 2 Times a Day. 200 Tablet 2    fluticasone (FLONASE) 50 MCG/ACT nasal spray USE 1-2  SPRAY(S) IN EACH NOSTRIL ONCE DAILY 30 MINUTES PRIOR TO USE OF CPAP (Patient taking differently: Administer 2 Sprays into affected nostril(S) every evening.) 48 g 3    rivaroxaban (XARELTO) 15 MG Tab tablet Take 1 Tablet by mouth with dinner. 100 Tablet 0    Semaglutide, 2 MG/DOSE, (OZEMPIC, 2 MG/DOSE,) 8 MG/3ML Solution Pen-injector Inject 2 mg under the skin every 7 days. Fridays 3 mL 0    levalbuterol (XOPENEX HFA) 45 MCG/ACT inhaler Inhale 2 Puffs every 6 hours as needed for Shortness of Breath. 15 g 11    Continuous Glucose Sensor (FREESTYLE YANG 3 SENSOR) Misc Use to continuously monitor blood sugar.  Change every 14 days. 2 Each 2    montelukast (SINGULAIR) 10 MG Tab Take 1 Tablet by mouth at bedtime. 90 Tablet 3    NON SPECIFIED Nocturnal oxygen at 2 L. 1 Each 1    metoprolol tartrate (LOPRESSOR) 50 MG Tab Take 1 tablet by mouth 2 times a day. 200 Tablet 3    sodium bicarbonate (SODIUM BICARBONATE) 650 MG Tab Take 1 Tablet by mouth 2 times a day. 180 Tablet 3    levothyroxine (SYNTHROID) 50 MCG Tab Take 1 tablet by mouth every morning on an empty stomach. 100 Tablet 3    NIFEdipine SR (PROCARDIA-XL) 30 MG tablet Take 1 tablet by mouth once a day 90 Tablet 3    rosuvastatin (CRESTOR) 10 MG Tab Take 1 tablet by mouth every evening. Stop simvastatin. 100 Tablet 3    allopurinol (ZYLOPRIM) 100 MG Tab TAKE TWO TABLETS BY MOUTH TWICE DAILY (Patient taking differently: Take 100 mg by mouth 2 times a day. Pt takes 100MG BID) 360 Tablet 2    furosemide (LASIX) 80 MG Tab Take 80 mg by mouth every 48 hours.      acetaminophen (TYLENOL) 650 MG CR tablet Take 1,300 mg by mouth 2 times a day.      clobetasol (TEMOVATE) 0.05 % Ointment Apply  "to affected body skin twice daily as needed for severe psoriasis. Avoid use on face, axilla, groin. 60 g 2    fluocinonide (LIDEX) 0.05 % Ointment Apply to affected area body skin twice daily as needed for mild/moderate psoriasis. Avoid use on face, axilla, groin. 180 g 2    dronedarone (MULTAQ) 400 MG Tab Take 1 tablet by mouth 2 times a day with meals. 60 Tablet 0     No current facility-administered medications for this visit.       Allergies:   Amlodipine, Contrast media with iodine [iodine], Hydralazine, Amaryl, Avandia [rosiglitazone maleate], Glipizide, Levoxyl [levothyroxine sodium], Relafen [nabumetone], Clonidine, Cipro xr, Glucophage [metformin hydrochloride], and Levaquin    Social History     Tobacco Use    Smoking status: Former     Current packs/day: 0.00     Average packs/day: 1 pack/day for 20.0 years (20.0 ttl pk-yrs)     Types: Cigarettes     Start date: 1963     Quit date: 1983     Years since quittin.4    Smokeless tobacco: Never   Vaping Use    Vaping status: Never Used   Substance Use Topics    Alcohol use: No     Alcohol/week: 0.0 oz    Drug use: Yes     Types: Marijuana     Comment: CBD/THC topical       Objective:     Vitals:    25 1423   BP: 120/48   BP Location: Left arm   Patient Position: Sitting   BP Cuff Size: Large adult   Pulse: 72   Resp: 20   Temp: 36.5 °C (97.7 °F)   SpO2: 95%   Weight: 107 kg (236 lb)   Height: 1.676 m (5' 6\")     Body mass index is 38.09 kg/m².    Physical Exam:  Gen: NAD  Resp: nonlabored.  Able to speak in full sentences  Psy: pleasant / cooperative.   Neuro:  Alert and oriented x 3      Assessment and Plan:   \"  1. Hospital discharge follow-up        2. Iron deficiency anemia, unspecified iron deficiency anemia type  CBC WITHOUT DIFFERENTIAL    IRON/TOTAL IRON BIND    FERRITIN    EC-ECHOCARDIOGRAM COMPLETE W/O CONT      3. VPC's (ventricular premature complexes)  EC-ECHOCARDIOGRAM COMPLETE W/O CONT      4. Mild aortic stenosis  " EC-ECHOCARDIOGRAM COMPLETE W/O CONT      5. Bilateral carotid artery stenosis  US-CAROTID DOPPLER BILAT      6. Hyperlipidemia associated with type 2 diabetes mellitus (HCC)  CT-CARDIAC SCORING      7. Neuroendocrine tumor        She will have a CBC drawn in our lobby today.  She has GI procedure scheduled tomorrow with gastroenterology.  Awaiting capsule endoscopy.  Scheduled with hematology.  Taking iron daily.  Will restart her Xarelto 24 hours after GI procedure tomorrow.  Encouraged her to restart semaglutide for weight, diabetes, kidney and heart protection.  She requested updated echocardiogram, carotid artery ultrasound and CT calcium scoring prior to seeing her cardiologist.  She is on a statin.  She was encouraged to check her blood pressure, heart rate and oxygen saturation daily, letting me know if her blood pressures consistently greater than 130/85, heart rate consistently below 60 or above 110 and oxygen consistently less than 92 on room air.  Encouraged her to utilize her CPAP nightly.  Strongly encouraged her to start Lexapro as she certainly has uncontrolled anxiety.  She is considering starting Lexapro.  We discussed how Lexapro works.  She has discontinued regular use of clonazepam.  I would like to see her back here in 1 to 2 months.    - Chart and discharge summary were reviewed.   - Hospitalization and results reviewed with patient.   - Medications reviewed including instructions regarding high risk medications, dosing and side effects.  - Recommended Services: No services needed at this time  - Advance directive/POLST on file?  Yes    Follow-up: 1 to 2 months    Face-to-face transitional care management services with MODERATE (today's visit is within 14 days post discharge & LACE+ score of 28-58) medical decision complexity were provided.

## 2025-06-12 NOTE — OP REPORT
DATE OF SERVICE:  06/12/2025     PROCEDURES PERFORMED:  1.  Esophagogastroduodenoscopy with biopsy.  2.  Endoscopic ultrasound of the stomach.     PHYSICIAN:  Domenico Olea MD     ANESTHESIOLOGIST:  Leeroy Pena MD     MEDICATION:  Deep sedation.     DESCRIPTION OF PROCEDURE:  Procedure risks and benefits reviewed thoroughly   with the patient, the patient's daughter.  Risks including but not limited to   bleeding, perforation, side effects, and indications were informed.  The   patient voiced understanding and agreed to proceed.     Esophagogastroduodenoscopy with biopsy:  A forward viewing gastroscope was   passed carefully and easily under direct visualization in the esophagus.  All   esophagus views were normal as were the GE junction view.  ____ was regular.    Upon entry into the stomach, the mucosa of the gastric lining appeared   relatively normal and homogeneous.  There was 3 foci of hyperplastic growth,   one in the fundus and this was removed by biopsy forceps measuring   approximately 5-7 mm in the proximal body, there was hyperplastic growth   around clip that area was biopsied and removed as well and then in the distal   stomach, there was 3-4 mm nodule that was also biopsied and removed.  The   pyloric channel, pyloric valve, duodenal bulb, duodenal sweep all within   normal limits.  The scope was removed.     Endoscopic ultrasound:  A side-viewing radial echoendoscope was passed   carefully and easily under indirect visualization into the esophagus, passed   to the GE junction station.  There, the aorta, celiac artery, celiac axis   appeared normal as the genu, body and tail of the pancreas.  Examination of   the stomach was performed both proximally to distally and then from distally   to proximally.  The gastric lumen was completely suctioned, desufflated, and   the gastric wall was examined.  The mucosa, submucosa, muscularis propria, and   serosal wall layers are well uniform,  non-thickened and no evidence for mass   effect.  The perigastric views revealed no evidence for lymphadenopathy.  The   stomach was suctioned, desufflated, the scope was removed.     COMPLICATIONS:  None.     BLOOD LOSS:  None.     SPECIMENS:  Obtained.     RECOMMENDATIONS:  Review results of histopathology if positive for recurrent   neuroendocrine tumor, then just repeat EGD at [facility] in 3-6   months.  The above was reviewed with the patient's daughter over the phone.    She voiced understanding.  Further recommendations to follow.  Review   histopathology.        ______________________________  MD KARLOS Lugo/ROLO    DD:  06/12/2025 11:19  DT:  06/12/2025 16:02    Job#:  [id number]

## 2025-06-12 NOTE — DISCHARGE INSTRUCTIONS
If any questions arise, call your provider.  If your provider is not available, please feel free to call the Surgical Center at [phone].    MEDICATIONS: Resume taking daily medication.  Take prescribed pain medication with food.  If no medication is prescribed, you may take non-aspirin pain medication if needed.  PAIN MEDICATION CAN BE VERY CONSTIPATING.  Take a stool softener or laxative such as senokot, pericolace, or milk of magnesia if needed.    Last pain medication given at N/A    What to Expect Post Anesthesia    Rest and take it easy for the first 24 hours.  A responsible adult is recommended to remain with you during that time.  It is normal to feel sleepy.  We encourage you to not do anything that requires balance, judgment or coordination.    FOR 24 HOURS DO NOT:  Drive, operate machinery or run household appliances.  Drink beer or alcoholic beverages.  Make important decisions or sign legal documents.    To avoid nausea, slowly advance diet as tolerated, avoiding spicy or greasy foods for the first day.  Add more substantial food to your diet according to your provider's instructions.  Babies can be fed formula or breast milk as soon as they are hungry.  INCREASE FLUIDS AND FIBER TO AVOID CONSTIPATION.    MILD FLU-LIKE SYMPTOMS ARE NORMAL.  YOU MAY EXPERIENCE GENERALIZED MUSCLE ACHES, THROAT IRRITATION, HEADACHE AND/OR SOME NAUSEA.    Diet    Resume pre-operative diet upon discharge from the hospital. Depending on how you are feeling and whether you have nausea or not, you may wish to stay with a bland diet for the first few days. However, you can advance your diet as quickly as you feel ready.

## 2025-06-12 NOTE — ANESTHESIA POSTPROCEDURE EVALUATION
Patient: Whitley Frederick    Procedure Summary       Date: 06/12/25 Room / Location:  ENDOSCOPIC ULTRASOUND ROOM / SURGERY [facility]    Anesthesia Start: 1006 Anesthesia Stop: 1036    Procedure: ESOPHAGOGASTRODUODENOSCOPY WITH BIOPSIES (Throat) Diagnosis: (pending lab results)    Surgeons: Domenico Olea M.D. Responsible Provider: Leeroy Pena M.D.    Anesthesia Type: MAC ASA Status: 3            Final Anesthesia Type: MAC  Last vitals  BP   Blood Pressure : 134/73    Temp   36.6 °C (97.9 °F)    Pulse   64   Resp   18    SpO2   97 %      Anesthesia Post Evaluation    Patient location during evaluation: PACU  Patient participation: complete - patient participated  Level of consciousness: awake and alert  Pain score: 0    Airway patency: patent  Anesthetic complications: no  Cardiovascular status: hemodynamically stable  Respiratory status: acceptable  Hydration status: euvolemic    PONV: none          No notable events documented.     Nurse Pain Score: 0 (NPRS)

## 2025-06-12 NOTE — OR NURSING
0745: Pt becoming anxious/sob/hx asthma, Did not bring home inhalers here, Xopenex breathing treatment ordered in PreOp, Anesthesia at bedside  0815: Lungs clear, Pt calm now, Family remains by pt's side

## 2025-06-16 ENCOUNTER — PATIENT MESSAGE (OUTPATIENT)
Dept: MEDICAL GROUP | Facility: LAB | Age: 80
End: 2025-06-16
Payer: MEDICARE

## 2025-06-16 DIAGNOSIS — J45.20 MILD INTERMITTENT ASTHMA WITHOUT COMPLICATION: Primary | Chronic | ICD-10-CM

## 2025-06-16 RX ORDER — LEVALBUTEROL INHALATION SOLUTION 0.63 MG/3ML
0.63 SOLUTION RESPIRATORY (INHALATION) EVERY 4 HOURS PRN
Qty: 75 ML | Refills: 4 | Status: SHIPPED | OUTPATIENT
Start: 2025-06-16

## 2025-06-16 NOTE — Clinical Note
[facility]  [address] [place] [address]    [id number]    Whitley Frederick  [address]    [place] [address]    June 16, 2025    Member Name: Whitley Frederick   Member Number: [id number]   Reference Number: [id number]   Approved Services: Echos and EKG   Approved Service Dates: 06/16/2025 - 10/14/2025   Requesting Provider: Juana Berger   Requested Provider: [facility]     Dear Whitley Frederick:    The following medical service(s) requested by Juana Berger have been approved:    Procedure Code Procedure Code Name Requested Quantity Approved Quantity Status   66103 (CPT®) ME ECHO HEART XTHORACIC,COMPLETE W DOPPLER 1 1 Authorized       Approved Quantity means the number of visits approved for medication treatments and/or medical services.    The services should be provided by [facility] no later than 10/14/2025. Please contact the provider listed below with any questions.     Provider Information:  [facility]  [phone]    Your plan benefit may require a deductible, co-payment or coinsurance for these services. This authorization does not guarantee [facility] will pay the claim for services that you receive. Payment by [facility] for these services is subject to the terms of your Evidence of Coverage, your eligibility at the time of service, and confirmation of benefit coverage.    For any questions or additional information, please contact Customer Service:    [facility] Toll Free: [phone]  TTY users dial: [phone]   Call Center Hours:  Oct 1 - Mar 31, Mon - Fri 7 AM to 8 PM PST  Oct 1 - Mar 31, Sat - Sun 8 AM to 8 PM PST  Apr 1 - Sep 30, Mon - Fri 7 AM to 8 PM PST   Office Hours: Mon - Fri 8 AM to 5 PM PST   E-mail: [email or web address]   Website:  [email or web address]      This information is available for free in other languages. Please contact Customer  Service at the phone number above for more information. [facility] complies with applicable Federal civil rights laws and does not discriminate on the basis of race, color, national origin, age, disability or sex.    Sincerely,     Healthcare Utilization Management Department     Cc: [facility]    Multi-Language Insert  Multi- Services  English: We have free  services to answer any questions you may have about our health or drug plan.  To get an , just call us at [phone].  Someone who speaks English/Language can help you.  This is a free service.  Icelandic: Tenemos servicios de intérprete sin costo alguno  para responder cualquier pregunta que pueda tener sobre nuestro plan de thor o medicamentos. Para hablar con un intérprete, por favor llame al [phone]. Alguien que hable español le podrá ayudar. Kasie es un servicio gratuito.  Chinese Mandarin: ?????????????????????????????? ???????????????? [phone]????????????????? ?????????  Chinese Cantonese: ?????????????????????????????? ????????????? [phone]???????????????????? ????????  Tagalog:  Cj jay serbisyo sa lawrencesaradha juniora carrol simsnggixiomara cooley o panggamot.  [place]  [phone]. Maaari kayong kapil Roland.  Alonzo rodriguez.  Persian:  Nous proposons heaven services gratuits d'interprétation pour répondre à toutes estuardo questions relatives à notre régime de santé ou d'assurance-médicaments. Pour accéder au service d'interprétation, il vous suffit de nous appeler au [phone]. Un interlocuteur parMarshfield Medical Center - Ladysmith Rusk Countyjeronimo Françs pourra vous aider. Ce service est gratuit.  Pashto:  Cristiane hollowayi có d?ch v? thông d?ch mi?n phí ð? tr? l?i các câu h?i v? chýõng s?c kh?e và chýõng trình thu?c men. N?u quí v?  c?n thông d?ch viên dipika g?i [phone] s? có nhân viên nói ti?ng Vi?t giúp ð? quí v?. Ðây là d?ch v? mi?n phí .  Prydeinig:  Unser kostenser Dolmetscherservice beantwortet Ihren Fragen zu unserem Gesundheits- und Arzneimittelplan. Unsere Dolmetscher erreichen Sie [phone]. Man wird Ihnen wolf auf [facility]. Dieser Service ist anamikaAcadia Healthcare.  Amharic:  ??? ?? ?? ?? ?? ??? ?? ??? ?? ???? ?? ?? ???? ???? ????. ?? ???? ????? ?? [phone] ??? ??? ????.  ???? ?? ???? ?? ?? ????. ? ???? ??? ?????.   Maldivian: Åñëè ó âàñ âîçíèêíóò âîïðîñû îòíîñèòåëüíî ñòðàõîâîãî èëè ìåäèêàìåíòíîãî ïëàíà, âû ìîæåòå âîñïîëüçîâàòüñÿ íàøèìè áåñïëàòíûìè óñëóãàìè ïåðåâîä÷èêîâ. ×òîáû âîñïîëüçîâàòüñÿ óñëóãàìè ïåðåâîä÷èêà, ïîçâîíèòå íàì ïî òåëåôîíó [phone]. Âàì îêàæåò ïîìîùü ñîòðóäíèê, êîòîðûé ãîâîðèò ïî-póññêè. Äàííàÿ óñëóãà áåñïëàòíàÿ.  Frisian: ÅääÇ äÞÏã ÎÏãÇÊ ÇáãÊÑÌã ÇáÝæÑí ÇáãÌÇäíÉ ááÅÌÇÈÉ Úä Ãí ÃÓÆáÉ ÊÊÚáÞ ÈÇáÕÍÉ Ãæ ÌÏæá ÇáÃÏæíÉ áÏíäÇ. ááÍÕæá Úáì ãÊÑÌã ÝæÑí¡ áíÓ Úáíß Óæì ÇáÇÊÕÇá ÈäÇ Úáì [phone] . ÓíÞæã ÔÎÕ ãÇ íÊÍÏË ÇáÚÑÈíÉ ÈãÓÇÚÏÊß. åÐå ÎÏãÉ ãÌÇäíÉ.  Alaina: ????? ????????? ?? ??? ?? ????? ?? ???? ??? ???? ???? ?? ?????? ?? ???? ???? ?? ??? ????? ??? ????? ???????? ?????? ?????? ???. ?? ???????? ??????? ???? ?? ???, ?? ???? [phone] ?? ??? ????. ??? ??????? ?? ?????? ????? ?? ???? ??? ?? ???? ??. ?? ?? ????? ???? ??.   Yakut:  È disponibile un servizio di interpretariato gratuito per rispondere a eventuali domande sul nostro piano sanitario e farmaceutico. Per un interprete, contattare il sherice [phone]. Un nostro incaricato chen parla Italianovi fornirà l'assistenza necessaria. È un servizio gratuito.  Portugués:  Dispomos de serviços de interpretação gratuitos para responder a qualquer questão que tenha acerca do nosso plano de saúde ou de medicação. Para obter um intérprete, contacte-nos através do número [phone]. Irá encontrar alguém que fale o idioma  Português para o ajudar. Kasie  serviço é gratuito.  Wolof Creole:  Nou genyen sèvis entèprèt gratis shamir reponn tout kesyon ou ta genyen konsènan plan medikal oswa dwòg nou an.  Shamir jwenn yon entèprèt, jis rele nou nan [phone]. Yon moun ki pale Kreyòl kapab keven w.  Sa a se yon sèvis ki gratis.  Polish:  Umo¿liwiamy bezp³atne skorzystanie z us³ug t³umacza ustnego, który pomo¿e w uzyskaniu odpowiedzi na temat planu zdrowotnego lub dawdevan [place]. Anusha skorzystaæ z pomocy t³umacza znaj¹cego christopher bates¿y zadzwoniæ pod numer [phone]. Ta us³uga jest bezp³atna.  Wallisian: ????? ??????? ????????????????????? ??????????????????????????????????[phone] ???????????????? ? ????????????????? ?????

## 2025-06-17 ENCOUNTER — TELEPHONE (OUTPATIENT)
Dept: MEDICAL GROUP | Facility: LAB | Age: 80
End: 2025-06-17
Payer: MEDICARE

## 2025-06-17 ENCOUNTER — PHARMACY VISIT (OUTPATIENT)
Dept: PHARMACY | Facility: MEDICAL CENTER | Age: 80
End: 2025-06-17
Payer: COMMERCIAL

## 2025-06-17 PROCEDURE — RXMED WILLOW AMBULATORY MEDICATION CHARGE: Performed by: NURSE PRACTITIONER

## 2025-06-19 ENCOUNTER — HOSPITAL ENCOUNTER (OUTPATIENT)
Dept: HEMATOLOGY ONCOLOGY | Facility: MEDICAL CENTER | Age: 80
End: 2025-06-19
Attending: STUDENT IN AN ORGANIZED HEALTH CARE EDUCATION/TRAINING PROGRAM
Payer: MEDICARE

## 2025-06-19 VITALS
WEIGHT: 233.36 LBS | TEMPERATURE: 97.9 F | HEART RATE: 60 BPM | BODY MASS INDEX: 37.5 KG/M2 | HEIGHT: 66 IN | OXYGEN SATURATION: 96 % | DIASTOLIC BLOOD PRESSURE: 72 MMHG | SYSTOLIC BLOOD PRESSURE: 126 MMHG

## 2025-06-19 DIAGNOSIS — N18.5 ANEMIA DUE TO STAGE 5 CHRONIC KIDNEY DISEASE, NOT ON CHRONIC DIALYSIS (HCC): Primary | ICD-10-CM

## 2025-06-19 DIAGNOSIS — D50.9 IRON DEFICIENCY ANEMIA, UNSPECIFIED IRON DEFICIENCY ANEMIA TYPE: ICD-10-CM

## 2025-06-19 DIAGNOSIS — D63.1 ANEMIA DUE TO STAGE 5 CHRONIC KIDNEY DISEASE, NOT ON CHRONIC DIALYSIS (HCC): Primary | ICD-10-CM

## 2025-06-19 DIAGNOSIS — N18.4 CKD (CHRONIC KIDNEY DISEASE) STAGE 4, GFR 15-29 ML/MIN (HCC): ICD-10-CM

## 2025-06-19 PROCEDURE — 99204 OFFICE O/P NEW MOD 45 MIN: CPT | Performed by: STUDENT IN AN ORGANIZED HEALTH CARE EDUCATION/TRAINING PROGRAM

## 2025-06-19 PROCEDURE — 99212 OFFICE O/P EST SF 10 MIN: CPT | Performed by: STUDENT IN AN ORGANIZED HEALTH CARE EDUCATION/TRAINING PROGRAM

## 2025-06-19 ASSESSMENT — FIBROSIS 4 INDEX: FIB4 SCORE: 2.91

## 2025-06-20 ASSESSMENT — ENCOUNTER SYMPTOMS
VOMITING: 0
BRUISES/BLEEDS EASILY: 0
SENSORY CHANGE: 0
NAUSEA: 0
WEIGHT LOSS: 0
DIZZINESS: 0
FEVER: 0
MEMORY LOSS: 0
ABDOMINAL PAIN: 0
CHILLS: 0
HEARTBURN: 0
NECK PAIN: 0
FOCAL WEAKNESS: 0
HEADACHES: 0
COUGH: 0
WHEEZING: 0
SPUTUM PRODUCTION: 0
TINGLING: 0
PALPITATIONS: 0
ORTHOPNEA: 0
BLURRED VISION: 0
SHORTNESS OF BREATH: 0
TREMORS: 0
DEPRESSION: 0
SORE THROAT: 0

## 2025-06-20 NOTE — PROGRESS NOTES
Consult Note: Hematology/Oncology     Primary Care:  EDILMA Whelan    Chief Complaint   Patient presents with    New Patient     Iron deficiency anemia      Subjective:   History of Presenting Illness:  Whitley Frederick is a 80 y.o. female with a past medical history of type 2 diabetes, asthma, sleep apnea, CKD, neuroendocrine tumor who presents today with iron deficiency anemia.    Patient reports that she was diagnosed with iron deficiency anemia on top of CKD.  She was recently admitted to the hospital and required as well as 2 infusions of IV iron.  3 units of packed red blood     With note she also reports that she was diagnosed with a neuroendocrine tumor, grade 1, Ki-67 less than 3%.  She had an EGD and the swallow capsule endoscopy for bleeding.    She currently takes oral iron      Past Medical History[1]     Past Surgical History[2]    Social History[3]     Family History   Problem Relation Age of Onset    Cancer Maternal Aunt     Diabetes Maternal Aunt     Heart Disease Maternal Aunt     Hypertension Maternal Aunt     Hyperlipidemia Maternal Aunt     Cancer Mother         Leukemia    Hypertension Mother     Diabetes Mother     Lung Disease Father     Cancer Father         Lung    Lung Disease Brother     Stroke Brother     Diabetes Brother     Heart Disease Brother     Diabetes Maternal Grandmother     Hypertension Brother     Diabetes Brother        Allergies[4]    Current Medications[5]    Review of Systems   Constitutional:  Positive for malaise/fatigue. Negative for chills, fever and weight loss.   HENT:  Negative for congestion, ear pain, nosebleeds and sore throat.    Eyes:  Negative for blurred vision.   Respiratory:  Negative for cough, sputum production, shortness of breath and wheezing.    Cardiovascular:  Negative for chest pain, palpitations, orthopnea and leg swelling.   Gastrointestinal:  Negative for abdominal pain, heartburn, nausea and vomiting.   Genitourinary:   "Negative for dysuria, frequency and urgency.   Musculoskeletal:  Negative for neck pain.   Neurological:  Negative for dizziness, tingling, tremors, sensory change, focal weakness and headaches.   Endo/Heme/Allergies:  Does not bruise/bleed easily.   Psychiatric/Behavioral:  Negative for depression, memory loss and suicidal ideas.    All other systems reviewed and are negative.      Problem list, medications, and allergies reviewed by myself today in Epic.     Objective:     Vitals:    06/19/25 1052   BP: 126/72   BP Location: Left arm   Patient Position: Sitting   BP Cuff Size: Large adult   Pulse: 60   Temp: 36.6 °C (97.9 °F)   TempSrc: Temporal   SpO2: 96%   Weight: 106 kg (233 lb 5.7 oz)   Height: 1.676 m (5' 5.98\")       DESC; KARNOFSKY SCALE WITH ECOG EQUIVALENT: 100, Fully active, able to carry on all pre-disease performed without restriction (ECOG equivalent 0)    DISTRESS LEVEL: no apparent distress    Physical Exam  Constitutional:       General: She is not in acute distress.     Appearance: Normal appearance. She is not ill-appearing.   HENT:      Head: Normocephalic and atraumatic.      Nose: Nose normal.      Mouth/Throat:      Mouth: Mucous membranes are moist.      Pharynx: No oropharyngeal exudate or posterior oropharyngeal erythema.   Eyes:      General: No scleral icterus.     Conjunctiva/sclera: Conjunctivae normal.      Pupils: Pupils are equal, round, and reactive to light.   Cardiovascular:      Rate and Rhythm: Normal rate and regular rhythm.      Pulses: Normal pulses.      Heart sounds: Normal heart sounds. No murmur heard.     No friction rub. No gallop.   Pulmonary:      Effort: Pulmonary effort is normal. No respiratory distress.      Breath sounds: Normal breath sounds. No stridor. No wheezing, rhonchi or rales.   Chest:      Chest wall: No tenderness.   Abdominal:      General: Abdomen is flat. Bowel sounds are normal. There is no distension.      Palpations: Abdomen is soft. There is " no mass.      Tenderness: There is no abdominal tenderness. There is no guarding.   Musculoskeletal:         General: No swelling, tenderness or deformity. Normal range of motion.      Cervical back: Normal range of motion and neck supple. No rigidity or tenderness.      Right lower leg: No edema.      Left lower leg: No edema.   Skin:     General: Skin is warm and dry.      Coloration: Skin is not jaundiced or pale.      Findings: No bruising, erythema or rash.   Neurological:      General: No focal deficit present.      Mental Status: She is alert and oriented to person, place, and time. Mental status is at baseline.      Sensory: No sensory deficit.      Motor: No weakness.      Coordination: Coordination normal.      Gait: Gait normal.   Psychiatric:         Mood and Affect: Mood normal.         Behavior: Behavior normal.         Thought Content: Thought content normal.         Judgment: Judgment normal.         Labs:   Most recent labs reviewed.  Please see the lab tab of chart review    Imaging:   Most recent images below have been independently reviewed by me.  Please see the imaging tab of chart review      Assessment/Plan:       Ms. Frederick is a 80-year-old female with iron deficiency anemia on the background of CKD.  We discussed that I would like to replete her iron and bring her iron saturation up Prior to trying EPO.  We will repeat her labs and determine what is needed going forward.  1. Anemia due to stage 5 chronic kidney disease, not on chronic dialysis (HCC)  - CBC WITH DIFFERENTIAL; Future  - IRON/TOTAL IRON BIND; Future  - FERRITIN; Future  - Comp Metabolic Panel; Future  - Referral to Genetic Research Studies    2.  Neuroendocrine tumor.  On review of patient's path report she had a well-differentiated neuroendocrine tumor grade one of the gastric nodule at 42 cm, 50 cm at 39 cm.  - She is currently being monitored by GI.    3. Afib   - Continue with Xarelto    4. T2DM  -Management per  primary    5. Research:  Discussed [employer] [place] [facility].  Patient interested in a referral.  Referral to [facility] placed.      Will see patient back in 1 month to review labs and determine if she needs further IV iron.    Patient has multiple chronic medical conditions.  She had 1 acute illness  causing symptoms.  I interpreted 3+ labs ordered by another physician.  I interpreted pathology report and imaging ordered by an outside physician.    Any questions and concerns raised by the patient were addressed and answered. Patient denies any further questions.  Patient encouraged to call the office with any concerns or issues.     Elizabeth Meehan M.D.  Hematology/Oncology                   [1]   Past Medical History:  Diagnosis Date    Allergy     seasonal, frequent sinus problems    Anemia     Anxiety     Arrhythmia     PVC's    Arthritis     osthma    Asthma     Atherosclerosis of aorta (HCC)     Bowel habit changes     IBS    Carotid bruit     Left    CATARACT     Right IOL    Chest pain     Chronic kidney failure, stage 4 (severe) (HCC)     Dr. Najjar    Chronic neck pain     Chronic respiratory failure with hypoxia (HCC)     2l NC at [facility] (Preferred)    DDD (degenerative disc disease), lumbar     DIABETES MELLITUS     Diabetic polyneuropathy associated with type 2 diabetes mellitus (HCC)     Esophageal reflux     NINOSKA (generalized anxiety disorder)     Headache(784.0)     Heart murmur     Heart valve disease     Non-rheumatic mitral regurgitation    Hiatus hernia syndrome     Hyperlipidemia     Hypertension     Hypothyroid     IBD (inflammatory bowel disease)     LBBB (left bundle branch block)     per EKGs    Left bundle branch block     Migraine     Silent migraine    Mild intermittent asthma without complication     Multiple thyroid nodules     AVERY (obstructive sleep apnea) 03/20/2024    Uses CPAP with 2 L O2    Overweight(278.02)     Palpitations     Paroxysmal atrial fibrillation (HCC) 07/12/2013    woke with  palps, AFib per ekg in am.- Dr. Jalloh [facility]    Pneumonia     Nov. 2015    Positive reaction to tuberculin skin test     Psoriasis     PVC (premature ventricular contraction)     Renal disease     Mild diabetic renal disease with mild proteinuria.Moderate Stage 4    Secondary hyperparathyroidism of renal origin (HCC)     Stenosis of carotid artery     Urinary tract infection, site not specified    [2]   Past Surgical History:  Procedure Laterality Date    IA UPPER GI ENDOSCOPY,DIAGNOSIS N/A 6/12/2025    Procedure: ESOPHAGOGASTRODUODENOSCOPY WITH BIOPSIES;  Surgeon: Domenico Olea M.D.;  Location: [facility];  Service: Gastroenterology    EGD W/ENDOSCOPIC ULTRASOUND N/A 6/12/2025    Procedure: EGD, WITH ENDOSCOPIC US;  Surgeon: Domenico Olea M.D.;  Location: [facility];  Service: Gastroenterology    COLONOSCOPY N/A 6/5/2025    Procedure: COLONOSCOPY;  Surgeon: Wayne Mendoza M.D.;  Location: [facility];  Service: Gastroenterology    IA UPPER GI ENDOSCOPY,DIAGNOSIS N/A 6/4/2025    Procedure: GASTROSCOPY;  Surgeon: Janey Dow D.O.;  Location: [facility];  Service: Gastroenterology    IA UPPER GI ENDOSCOPY,DIAGNOSIS N/A 03/06/2025    Procedure: ESOPHAGOGASTRODUODENOSCOPY AND UPPER ENDOSCOPIC ULTRASOUND WITH BIOPSY;  Surgeon: Domenico Olea M.D.;  Location: [facility];  Service: EUS    EGD W/ENDOSCOPIC ULTRASOUND N/A 03/06/2025    Procedure: EGD, WITH ENDOSCOPIC US;  Surgeon: Domenico Olea M.D.;  Location: [facility];  Service: EUS    IA UPPER GI ENDOSCOPY,DIAGNOSIS  01/30/2025    Procedure: ESOPHAGOGASTRODUODENOSCOPY AND COLONOSCOPY WITH BIOPSIES;  Surgeon: Janey Dow D.O.;  Location: [facility];  Service: Gastroenterology    IA COLONOSCOPY-FLEXIBLE N/A 01/30/2025    Procedure: COLONOSCOPY;  Surgeon: Janey Dow D.O.;  Location: [facility];  Service: Gastroenterology    WIDE EXCISION, LESION, UPPER EXTREMITY   2024    Procedure: WIDE EXCISION OF MALIGNANT MELANOMA, LEFT UPPER BACK;  Surgeon: Amy Horne M.D.;  [facility]: [facility]ERY [facility];  Service: General    CATARACT PHACO WITH IOL Right 10/25/2016    Procedure: CATARACT PHACO WITH IOL;  Surgeon: Zoran Gamble M.D.;  Location: SURGERY SAME DAY [facility];  Service:     ABDOMINAL HYSTERECTOMY TOTAL      CHOLECYSTECTOMY      COLONOSCOPY      x2    TONSILLECTOMY      US-NEEDLE CORE BX-BREAST PANEL Left    [3]   Social History  Tobacco Use    Smoking status: Former     Current packs/day: 0.00     Average packs/day: 1 pack/day for 20.0 years (20.0 ttl pk-yrs)     Types: Cigarettes     Start date: 1963     Quit date: 1983     Years since quittin.4    Smokeless tobacco: Never   Vaping Use    Vaping status: Never Used   Substance Use Topics    Alcohol use: No     Alcohol/week: 0.0 oz    Drug use: Yes     Types: Marijuana     Comment: CBD/THC topical   [4]   Allergies  Allergen Reactions    Amlodipine Itching    Contrast Media With Iodine [Iodine] Hives    Hydralazine      Rapid heart rate, chest tightness    Amaryl      GI Problems    Avandia [Rosiglitazone Maleate]      GI problems    Glipizide      GI Problems    Levoxyl [Levothyroxine Sodium] Unspecified     Flushing, very hot    Relafen [Nabumetone]      Itching; avoids all nsaids    Clonidine      Rapid heart rate, swelling Pt unsure of rxn    Cipro Xr      Possibly causes Chills.    Glucophage [Metformin Hydrochloride]      dizzy    Levaquin      Possible allergy - chills with cipro but sick   [5]   Current Outpatient Medications   Medication Sig Dispense Refill    levalbuterol (XOPENEX) 0.63 MG/3ML Nebu Soln Inhale 3 mL by nebulization every four hours as needed for Shortness of Breath. 75 mL 4    omeprazole (PRILOSEC) 40 MG delayed-release capsule Take 1 Capsule by mouth 2 times a day. 15 Capsule 2    Cholecalciferol (D3 PO) Take 2 Capsules by mouth every evening.      Loperamide HCl  (IMODIUM PO) Take 3 Tablets by mouth 2 times a day as needed (For diarrhea).         Alpha-D-Galactosidase (ANTI-GAS PO) Take 3 Tablets by mouth 2 times a day as needed (For gas pain).      Ferrous Sulfate (IRON PO) Take 1 Tablet by mouth every evening. (OTC)      clonazePAM (KLONOPIN) 0.5 MG Tab TAKE 1 TABLET BY MOUTH ONCE DAILY AS NEEDED FOR 30 DAYS. ANXIETY/HEART PALPITATIONS for 30 days (Patient taking differently: Take 0.125-0.25 mg by mouth 2 times a day as needed. TAKE 1 TABLET BY MOUTH ONCE DAILY AS NEEDED FOR 30 DAYS. ANXIETY/HEART PALPITATIONS for 30 days  Pt takes half tablet (0.25MG) or one fourth (0.125MG)  Indications: Feeling Anxious) 15 Tablet 0    losartan (COZAAR) 50 MG Tab Take 1 tablet by mouth 2 Times a Day. 200 Tablet 2    fluticasone (FLONASE) 50 MCG/ACT nasal spray USE 1-2  SPRAY(S) IN EACH NOSTRIL ONCE DAILY 30 MINUTES PRIOR TO USE OF CPAP (Patient taking differently: Administer 2 Sprays into affected nostril(S) every evening.) 48 g 3    rivaroxaban (XARELTO) 15 MG Tab tablet Take 1 Tablet by mouth with dinner. 100 Tablet 0    Semaglutide, 2 MG/DOSE, (OZEMPIC, 2 MG/DOSE,) 8 MG/3ML Solution Pen-injector Inject 2 mg under the skin every 7 days. Fridays 3 mL 0    levalbuterol (XOPENEX HFA) 45 MCG/ACT inhaler Inhale 2 Puffs every 6 hours as needed for Shortness of Breath. 15 g 11    Continuous Glucose Sensor (FREESTYLE YANG 3 SENSOR) Misc Use to continuously monitor blood sugar.  Change every 14 days. 2 Each 2    montelukast (SINGULAIR) 10 MG Tab Take 1 Tablet by mouth at bedtime. 90 Tablet 3    NON SPECIFIED Nocturnal oxygen at 2 L. 1 Each 1    metoprolol tartrate (LOPRESSOR) 50 MG Tab Take 1 tablet by mouth 2 times a day. 200 Tablet 3    sodium bicarbonate (SODIUM BICARBONATE) 650 MG Tab Take 1 Tablet by mouth 2 times a day. 180 Tablet 3    levothyroxine (SYNTHROID) 50 MCG Tab Take 1 tablet by mouth every morning on an empty stomach. 100 Tablet 3    NIFEdipine SR (PROCARDIA-XL) 30 MG tablet  Take 1 tablet by mouth once a day 90 Tablet 3    rosuvastatin (CRESTOR) 10 MG Tab Take 1 tablet by mouth every evening. Stop simvastatin. 100 Tablet 3    allopurinol (ZYLOPRIM) 100 MG Tab TAKE TWO TABLETS BY MOUTH TWICE DAILY (Patient taking differently: Take 100 mg by mouth 2 times a day. Pt takes 100MG BID) 360 Tablet 2    furosemide (LASIX) 80 MG Tab Take 80 mg by mouth every 48 hours.      acetaminophen (TYLENOL) 650 MG CR tablet Take 1,300 mg by mouth 2 times a day.      clobetasol (TEMOVATE) 0.05 % Ointment Apply to affected body skin twice daily as needed for severe psoriasis. Avoid use on face, axilla, groin. 60 g 2    fluocinonide (LIDEX) 0.05 % Ointment Apply to affected area body skin twice daily as needed for mild/moderate psoriasis. Avoid use on face, axilla, groin. 180 g 2    dronedarone (MULTAQ) 400 MG Tab Take 1 tablet by mouth 2 times a day with meals. 60 Tablet 0     No current facility-administered medications for this encounter.

## 2025-06-21 NOTE — ADDENDUM NOTE
Encounter addended by: Oliver Galvez on: 6/20/2025 5:19 PM   Actions taken: Charge Capture section accepted

## 2025-06-25 ENCOUNTER — PATIENT MESSAGE (OUTPATIENT)
Dept: MEDICAL GROUP | Facility: LAB | Age: 80
End: 2025-06-25
Payer: MEDICARE

## 2025-06-25 DIAGNOSIS — G47.33 OSA (OBSTRUCTIVE SLEEP APNEA): Primary | Chronic | ICD-10-CM

## 2025-06-25 DIAGNOSIS — Z99.81 DEPENDENCE ON NOCTURNAL OXYGEN THERAPY: ICD-10-CM

## 2025-06-25 DIAGNOSIS — J45.20 MILD INTERMITTENT ASTHMA WITHOUT COMPLICATION: Chronic | ICD-10-CM

## 2025-06-26 ENCOUNTER — PATIENT MESSAGE (OUTPATIENT)
Dept: MEDICAL GROUP | Facility: LAB | Age: 80
End: 2025-06-26
Payer: MEDICARE

## 2025-06-26 DIAGNOSIS — J45.20 MILD INTERMITTENT ASTHMA WITHOUT COMPLICATION: Primary | Chronic | ICD-10-CM

## 2025-06-26 DIAGNOSIS — Z99.81 DEPENDENCE ON NOCTURNAL OXYGEN THERAPY: ICD-10-CM

## 2025-06-26 DIAGNOSIS — J45.20 MILD INTERMITTENT ASTHMA WITHOUT COMPLICATION: Primary | ICD-10-CM

## 2025-06-27 NOTE — TELEPHONE ENCOUNTER
":  1993  MRN:  [id number]  Visit Number:  [id number]      Date of Admission:2025   Date of Discharge:  2025    Discharge Diagnosis:  Principal Problem:    Opioid use disorder, severe, dependence  Active Problems:    Cocaine use disorder, severe, dependence        Admission Diagnosis:  Polysubstance abuse [F19.10]     BECK Poe is a 32 y.o. male who was admitted on 2025 with complaints of opioid and cocaine use and withdrawals.   For details please see H&P dated 25.     Hospital Course  Patient is a 32 y.o. male presented with opioid and cocaine use and withdrawals. The patient was admitted to the [facility] detox recovery unit for safety, further evaluation and treatment.  The patient was started on clonidine detox regimen and a short Subutex detox was added to help with the severity of the withdrawal symptoms.   The patient was also able to take part in individual and group counseling sessions and work on appropriate coping skills.  The patient made steady improvement in his withdrawals and mood and expressed feeling more positive and hopeful about future. Sleep and appetite were improved.  The day of discharge the patient was calm, cooperative and pleasant. Mood was reported to be good, and denied SI/HI/AVH. Also reported no medication side effects.        Mental Status Exam upon discharge:   Mood \"good\"   Affect-congruent, appropriate, stable  Thought Content-goal directed, no delusional material present  Thought process-linear, organized.  Suicidality: No SI  Homicidality: No HI  Perception: No AH/    Procedures Performed         Consults:   Consults       No orders found from 2025 to 2025.            Pertinent Test Results:   Admission on 2025   Component Date Value Ref Range Status    Hepatitis B Surface Ag 2025 Non-Reactive  Non-Reactive Final    Hep A IgM 2025 Non-Reactive  Non-Reactive Final    Hep B C IgM 2025 Non-Reactive  " EOS printed and given to EP MDs to review for JI on 3/26/18 br   Non-Reactive Final    Hepatitis C Ab 06/23/2025 Non-Reactive  Non-Reactive Final   Admission on 06/23/2025, Discharged on 06/23/2025   Component Date Value Ref Range Status    Glucose 06/23/2025 99  65 - 99 mg/dL Final    BUN 06/23/2025 6.5  6.0 - 20.0 mg/dL Final    Creatinine 06/23/2025 0.75 (L)  0.76 - 1.27 mg/dL Final    Sodium 06/23/2025 140  136 - 145 mmol/L Final    Potassium 06/23/2025 4.5  3.5 - 5.2 mmol/L Final    Chloride 06/23/2025 104  98 - 107 mmol/L Final    CO2 06/23/2025 26.9  22.0 - 29.0 mmol/L Final    Calcium 06/23/2025 9.3  8.6 - 10.5 mg/dL Final    Total Protein 06/23/2025 7.0  6.0 - 8.5 g/dL Final    Albumin 06/23/2025 4.3  3.5 - 5.2 g/dL Final    ALT (SGPT) 06/23/2025 8  1 - 41 U/L Final    AST (SGOT) 06/23/2025 16  1 - 40 U/L Final    Alkaline Phosphatase 06/23/2025 72  39 - 117 U/L Final    Total Bilirubin 06/23/2025 0.4  0.0 - 1.2 mg/dL Final    Globulin 06/23/2025 2.7  gm/dL Final    A/G Ratio 06/23/2025 1.6  g/dL Final    BUN/Creatinine Ratio 06/23/2025 8.7  7.0 - 25.0 Final    Anion Gap 06/23/2025 9.1  5.0 - 15.0 mmol/L Final    eGFR 06/23/2025 123.7  >60.0 mL/min/1.73 Final    Color, UA 06/23/2025 Yellow  Yellow, Straw Final    Appearance, UA 06/23/2025 Clear  Clear Final    pH, UA 06/23/2025 >9.0 (H)  5.0 - 8.0 Final    Specific Gravity, UA 06/23/2025 1.018  1.005 - 1.030 Final    Glucose, UA 06/23/2025 Negative  Negative Final    Ketones, UA 06/23/2025 Negative  Negative Final    Bilirubin, UA 06/23/2025 Negative  Negative Final    Blood, UA 06/23/2025 Negative  Negative Final    Protein, UA 06/23/2025 Negative  Negative Final    Leuk Esterase, UA 06/23/2025 Negative  Negative Final    Nitrite, UA 06/23/2025 Negative  Negative Final    Urobilinogen, UA 06/23/2025 1.0 E.U./dL  0.2 - 1.0 E.U./dL Final    THC, Screen, Urine 06/23/2025 Negative  Negative Final    Phencyclidine (PCP), Urine 06/23/2025 Negative  Negative Final    Cocaine Screen, Urine 06/23/2025 Positive (A)  Negative  Final    Methamphetamine, Ur 06/23/2025 Negative  Negative Final    Opiate Screen 06/23/2025 Negative  Negative Final    Amphetamine Screen, Urine 06/23/2025 Negative  Negative Final    Benzodiazepine Screen, Urine 06/23/2025 Negative  Negative Final    Tricyclic Antidepressants Screen 06/23/2025 Negative  Negative Final    Methadone Screen, Urine 06/23/2025 Negative  Negative Final    Barbiturates Screen, Urine 06/23/2025 Negative  Negative Final    Oxycodone Screen, Urine 06/23/2025 Negative  Negative Final    Buprenorphine, Screen, Urine 06/23/2025 Negative  Negative Final    Magnesium 06/23/2025 2.0  1.6 - 2.6 mg/dL Final    Ethanol 06/23/2025 <10  0 - 10 mg/dL Final    Ethanol % 06/23/2025 <0.010  % Final    WBC 06/23/2025 5.95  3.40 - 10.80 10*3/mm3 Final    RBC 06/23/2025 4.98  4.14 - 5.80 10*6/mm3 Final    Hemoglobin 06/23/2025 13.6  13.0 - 17.7 g/dL Final    Hematocrit 06/23/2025 43.1  37.5 - 51.0 % Final    MCV 06/23/2025 86.5  79.0 - 97.0 fL Final    MCH 06/23/2025 27.3  26.6 - 33.0 pg Final    MCHC 06/23/2025 31.6  31.5 - 35.7 g/dL Final    RDW 06/23/2025 14.0  12.3 - 15.4 % Final    RDW-SD 06/23/2025 44.4  37.0 - 54.0 fl Final    MPV 06/23/2025 9.0  6.0 - 12.0 fL Final    Platelets 06/23/2025 239  140 - 450 10*3/mm3 Final    Neutrophil % 06/23/2025 61.5  42.7 - 76.0 % Final    Lymphocyte % 06/23/2025 31.1  19.6 - 45.3 % Final    Monocyte % 06/23/2025 4.4 (L)  5.0 - 12.0 % Final    Eosinophil % 06/23/2025 2.5  0.3 - 6.2 % Final    Basophil % 06/23/2025 0.2  0.0 - 1.5 % Final    Immature Grans % 06/23/2025 0.3  0.0 - 0.5 % Final    Neutrophils, Absolute 06/23/2025 3.66  1.70 - 7.00 10*3/mm3 Final    Lymphocytes, Absolute 06/23/2025 1.85  0.70 - 3.10 10*3/mm3 Final    Monocytes, Absolute 06/23/2025 0.26  0.10 - 0.90 10*3/mm3 Final    Eosinophils, Absolute 06/23/2025 0.15  0.00 - 0.40 10*3/mm3 Final    Basophils, Absolute 06/23/2025 0.01  0.00 - 0.20 10*3/mm3 Final    Immature Grans, Absolute 06/23/2025  0.02  0.00 - 0.05 10*3/mm3 Final    nRBC 06/23/2025 0.0  0.0 - 0.2 /100 WBC Final    QT Interval 06/23/2025 356  ms Final    QTC Interval 06/23/2025 378  ms Final    Fentanyl, Urine 06/23/2025 Positive (A)  Negative Final        Condition on Discharge:  improved    Vital Signs  Temp:  [97.7 °F (36.5 °C)-98.5 °F (36.9 °C)] 97.7 °F (36.5 °C)  Heart Rate:  [63-99] 91  Resp:  [16-18] 16  BP: (103-129)/(72-98) 103/80      Discharge Disposition:  Rehab Facility or Unit (DC - External)    Discharge Medications:     Discharge Medications      Patient Not Prescribed Medications Upon Discharge         Discharge Diet: Regular     Activity at Discharge: As tolerated     Follow-up Appointments  The Next Chapter      Time: I spent  < 30  minutes on this discharge activity which included: face-to-face encounter with the patient, reviewing the data in the system, coordination of the care with the nursing staff as well as consultants, documentation, and entering orders.        Clinician:   Keily Resendiz MD  06/27/25  11:40 EDT

## 2025-06-30 ENCOUNTER — PATIENT MESSAGE (OUTPATIENT)
Dept: CARDIOLOGY | Facility: MEDICAL CENTER | Age: 80
End: 2025-06-30

## 2025-06-30 ENCOUNTER — HOSPITAL ENCOUNTER (OUTPATIENT)
Dept: CARDIOLOGY | Facility: MEDICAL CENTER | Age: 80
End: 2025-06-30
Attending: NURSE PRACTITIONER
Payer: MEDICARE

## 2025-06-30 DIAGNOSIS — D50.9 IRON DEFICIENCY ANEMIA, UNSPECIFIED IRON DEFICIENCY ANEMIA TYPE: ICD-10-CM

## 2025-06-30 DIAGNOSIS — I35.0 MILD AORTIC STENOSIS: ICD-10-CM

## 2025-06-30 DIAGNOSIS — K21.9 GASTROESOPHAGEAL REFLUX DISEASE: ICD-10-CM

## 2025-06-30 DIAGNOSIS — I49.3 VPC'S (VENTRICULAR PREMATURE COMPLEXES): ICD-10-CM

## 2025-06-30 LAB
LV EJECT FRACT MOD 2C 99903: 50.89
LV EJECT FRACT MOD 4C 99902: 49.71
LV EJECT FRACT MOD BP 99901: 51.61

## 2025-06-30 PROCEDURE — RXMED WILLOW AMBULATORY MEDICATION CHARGE: Performed by: PHYSICIAN ASSISTANT

## 2025-06-30 PROCEDURE — 93306 TTE W/DOPPLER COMPLETE: CPT

## 2025-06-30 PROCEDURE — 93306 TTE W/DOPPLER COMPLETE: CPT | Mod: 26 | Performed by: INTERNAL MEDICINE

## 2025-06-30 RX ORDER — OMEPRAZOLE 20 MG/1
40 CAPSULE, DELAYED RELEASE ORAL DAILY
Qty: 180 CAPSULE | Refills: 1 | Status: CANCELLED | OUTPATIENT
Start: 2025-06-30

## 2025-06-30 RX ORDER — OMEPRAZOLE 20 MG/1
40 CAPSULE, DELAYED RELEASE ORAL DAILY
Qty: 180 CAPSULE | Refills: 1 | Status: SHIPPED | OUTPATIENT
Start: 2025-06-30

## 2025-06-30 NOTE — TELEPHONE ENCOUNTER
Received request via: Pharmacy    Was the patient seen in the last year in this department? Yes    Does the patient have an active prescription (recently filled or refills available) for medication(s) requested? No    Pharmacy Name: [facility]    Does the patient have correction Plus and need 100-day supply? (This applies to ALL medications) Yes, quantity updated to 100 days   W Plasty Text: The lesion was extirpated to the level of the fat with a #15 scalpel blade.  Given the location of the defect, shape of the defect and the proximity to free margins a W-plasty was deemed most appropriate for repair.  Using a sterile surgical marker, the appropriate transposition arms of the W-plasty were drawn incorporating the defect and placing the expected incisions within the relaxed skin tension lines where possible.    The area thus outlined was incised deep to adipose tissue with a #15 scalpel blade.  The skin margins were undermined to an appropriate distance in all directions utilizing iris scissors.  The opposing transposition arms were then transposed into place in opposite direction and anchored with interrupted buried subcutaneous sutures.

## 2025-07-01 ENCOUNTER — PHARMACY VISIT (OUTPATIENT)
Dept: PHARMACY | Facility: MEDICAL CENTER | Age: 80
End: 2025-07-01
Payer: COMMERCIAL

## 2025-07-03 NOTE — Clinical Note
REFERRAL APPROVAL NOTICE         Sent on July 3, 2025                   Whitley Frederick  [address]  [place] [address]                   Dear Ms. Frederick,    After a careful review of the medical information and benefit coverage, [facility] has processed your referral. See below for additional details.    If applicable, you must be actively enrolled with your insurance for coverage of the authorized service. If you have any questions regarding your coverage, please contact your insurance directly.    REFERRAL INFORMATION   Referral #:  [id number]  Referred-To Department    Referred-By Provider:  Pulmonary and Sleep Medicine    Ade Sesay M.D.   Pulmonary/sleep [facility]      [address]  [address]  [place] [id number] [phone] [address] [facility]  [place] [address] [phone]    Referral Start Date:  06/26/2025  Referral End Date:   06/26/2026           SCHEDULING  If you do not already have an appointment, please call [phone] to make an appointment.   MORE INFORMATION  As a reminder, [facility] ownership has changed, meaning this location is now owned and operated by [facility]. As such, we want to clarify that our patients should expect to receive two separate bills for the services received at [facility] - one representing the [facility] facility fees as the owner of the establishment, and the other to represent the physician's services and subsequent fees. You can speak with your insurance carrier for a pricing estimate by calling the customer service number on the back of your card and ask about charges for a hospital outpatient visit.  If you do not already have a [employer] account, sign up at: [email or web address]  You can access your medical information, make appointments, see lab results,  billing information, and more.  If you have questions regarding this referral, please contact  the [facility] department at:             [phone]. Monday - Friday 7:30AM - 5:00PM.      Sincerely,  [facility]

## 2025-07-07 PROCEDURE — RXMED WILLOW AMBULATORY MEDICATION CHARGE: Performed by: NURSE PRACTITIONER

## 2025-07-09 ENCOUNTER — PHARMACY VISIT (OUTPATIENT)
Dept: PHARMACY | Facility: MEDICAL CENTER | Age: 80
End: 2025-07-09
Payer: COMMERCIAL

## 2025-07-11 ENCOUNTER — TELEPHONE (OUTPATIENT)
Dept: CARDIOLOGY | Facility: MEDICAL CENTER | Age: 80
End: 2025-07-11
Payer: MEDICARE

## 2025-07-11 NOTE — TELEPHONE ENCOUNTER
Our Structural Heart Program has implemented a quality initiative aimed at identifying patients with valvular heart disease and confirming a clinical plan for their care upon diagnosis.     Your patient has been flagged through our echocardiogram surveillance with the following echocardiogram results:    Moderate Aortic Stenosis/Severe Mitral Regurgitation    Cardiologist: Dr. Servin    Current Plan of Care for Structural Heart Disease: unknown    Next Echo date: none    Next Follow up appointment: 8/26/2025 with Dr. Servin    Please place Referral to [facility], sub-specialty Structural Heart Program, if warranted for consult and treatment.

## 2025-07-18 NOTE — Clinical Note
REFERRAL APPROVAL NOTICE         Sent on July 18, 2025                   Whitley Frederick  [address]  [place] [address]                   Dear Ms. Frederick,    After a careful review of the medical information and benefit coverage, [facility] has processed your referral. See below for additional details.    If applicable, you must be actively enrolled with your insurance for coverage of the authorized service. If you have any questions regarding your coverage, please contact your insurance directly.    REFERRAL INFORMATION   Referral #:  [id number]  Referred-To Department    Referred-By Provider:  Pulmonary and Sleep Medicine    EDILMA Whelan   Pulmonary Rehab [facility]      [address]  [address]  [place] [address]  [phone] [address]  [place] [address]  [phone]    Referral Start Date:  07/17/2025  Referral End Date:   07/17/2026             SCHEDULING  If you do not already have an appointment, please call [phone] to make an appointment.     MORE INFORMATION  If you do not already have a [facility] management account, sign up at: [email or web address]  You can access your medical information, make appointments, see lab results, billing information, and more.  If you have questions regarding this referral, please contact  the [facility] Referrals department at:             [phone]. Monday - Friday 8:00AM - 5:00PM.     Sincerely,    [facility]

## 2025-07-20 RX ORDER — ROSUVASTATIN CALCIUM 10 MG/1
10 TABLET, COATED ORAL EVERY EVENING
Qty: 100 TABLET | Refills: 3 | Status: SHIPPED | OUTPATIENT
Start: 2025-07-20

## 2025-07-21 PROCEDURE — RXMED WILLOW AMBULATORY MEDICATION CHARGE: Performed by: NURSE PRACTITIONER

## 2025-07-22 ENCOUNTER — PHARMACY VISIT (OUTPATIENT)
Dept: PHARMACY | Facility: MEDICAL CENTER | Age: 80
End: 2025-07-22
Payer: COMMERCIAL

## 2025-07-23 ENCOUNTER — TELEPHONE (OUTPATIENT)
Dept: CARDIOLOGY | Facility: MEDICAL CENTER | Age: 80
End: 2025-07-23
Payer: MEDICARE

## 2025-07-23 ENCOUNTER — PATIENT MESSAGE (OUTPATIENT)
Dept: CARDIOLOGY | Facility: MEDICAL CENTER | Age: 80
End: 2025-07-23
Payer: MEDICARE

## 2025-07-24 ENCOUNTER — TELEPHONE (OUTPATIENT)
Dept: CARDIOLOGY | Facility: MEDICAL CENTER | Age: 80
End: 2025-07-24
Payer: MEDICARE

## 2025-07-26 ENCOUNTER — HOSPITAL ENCOUNTER (OUTPATIENT)
Facility: MEDICAL CENTER | Age: 80
End: 2025-07-26
Attending: STUDENT IN AN ORGANIZED HEALTH CARE EDUCATION/TRAINING PROGRAM
Payer: MEDICARE

## 2025-07-26 DIAGNOSIS — N18.5 ANEMIA DUE TO STAGE 5 CHRONIC KIDNEY DISEASE, NOT ON CHRONIC DIALYSIS (HCC): ICD-10-CM

## 2025-07-26 DIAGNOSIS — D63.1 ANEMIA DUE TO STAGE 5 CHRONIC KIDNEY DISEASE, NOT ON CHRONIC DIALYSIS (HCC): ICD-10-CM

## 2025-07-26 LAB
ALBUMIN SERPL BCP-MCNC: 3.9 G/DL (ref 3.2–4.9)
ALBUMIN/GLOB SERPL: 1.6 G/DL
ALP SERPL-CCNC: 69 U/L (ref 30–99)
ALT SERPL-CCNC: 14 U/L (ref 2–50)
ANION GAP SERPL CALC-SCNC: 15 MMOL/L (ref 7–16)
AST SERPL-CCNC: 18 U/L (ref 12–45)
BASOPHILS # BLD AUTO: 1.1 % (ref 0–1.8)
BASOPHILS # BLD: 0.07 K/UL (ref 0–0.12)
BILIRUB SERPL-MCNC: 0.6 MG/DL (ref 0.1–1.5)
BUN SERPL-MCNC: 58 MG/DL (ref 8–22)
CALCIUM ALBUM COR SERPL-MCNC: 9.1 MG/DL (ref 8.5–10.5)
CALCIUM SERPL-MCNC: 9 MG/DL (ref 8.5–10.5)
CHLORIDE SERPL-SCNC: 109 MMOL/L (ref 96–112)
CO2 SERPL-SCNC: 18 MMOL/L (ref 20–33)
CREAT SERPL-MCNC: 2.54 MG/DL (ref 0.5–1.4)
EOSINOPHIL # BLD AUTO: 0.2 K/UL (ref 0–0.51)
EOSINOPHIL NFR BLD: 3.2 % (ref 0–6.9)
ERYTHROCYTE [DISTWIDTH] IN BLOOD BY AUTOMATED COUNT: 57 FL (ref 35.9–50)
FASTING STATUS PATIENT QL REPORTED: NORMAL
FERRITIN SERPL-MCNC: 69.9 NG/ML (ref 10–291)
GFR SERPLBLD CREATININE-BSD FMLA CKD-EPI: 19 ML/MIN/1.73 M 2
GLOBULIN SER CALC-MCNC: 2.5 G/DL (ref 1.9–3.5)
GLUCOSE SERPL-MCNC: 173 MG/DL (ref 65–99)
HCT VFR BLD AUTO: 30.6 % (ref 37–47)
HGB BLD-MCNC: 9.4 G/DL (ref 12–16)
IMM GRANULOCYTES # BLD AUTO: 0.02 K/UL (ref 0–0.11)
IMM GRANULOCYTES NFR BLD AUTO: 0.3 % (ref 0–0.9)
IRON SATN MFR SERPL: 29 % (ref 15–55)
IRON SERPL-MCNC: 90 UG/DL (ref 40–170)
LYMPHOCYTES # BLD AUTO: 1.79 K/UL (ref 1–4.8)
LYMPHOCYTES NFR BLD: 28.5 % (ref 22–41)
MCH RBC QN AUTO: 30.7 PG (ref 27–33)
MCHC RBC AUTO-ENTMCNC: 30.7 G/DL (ref 32.2–35.5)
MCV RBC AUTO: 100 FL (ref 81.4–97.8)
MONOCYTES # BLD AUTO: 0.41 K/UL (ref 0–0.85)
MONOCYTES NFR BLD AUTO: 6.5 % (ref 0–13.4)
NEUTROPHILS # BLD AUTO: 3.78 K/UL (ref 1.82–7.42)
NEUTROPHILS NFR BLD: 60.4 % (ref 44–72)
NRBC # BLD AUTO: 0 K/UL
NRBC BLD-RTO: 0 /100 WBC (ref 0–0.2)
PLATELET # BLD AUTO: 132 K/UL (ref 164–446)
PMV BLD AUTO: 10.6 FL (ref 9–12.9)
POTASSIUM SERPL-SCNC: 5.5 MMOL/L (ref 3.6–5.5)
PROT SERPL-MCNC: 6.4 G/DL (ref 6–8.2)
RBC # BLD AUTO: 3.06 M/UL (ref 4.2–5.4)
SODIUM SERPL-SCNC: 142 MMOL/L (ref 135–145)
TIBC SERPL-MCNC: 313 UG/DL (ref 250–450)
UIBC SERPL-MCNC: 223 UG/DL (ref 110–370)
WBC # BLD AUTO: 6.3 K/UL (ref 4.8–10.8)

## 2025-07-26 PROCEDURE — 82728 ASSAY OF FERRITIN: CPT

## 2025-07-26 PROCEDURE — 83550 IRON BINDING TEST: CPT

## 2025-07-26 PROCEDURE — 83540 ASSAY OF IRON: CPT

## 2025-07-26 PROCEDURE — 80053 COMPREHEN METABOLIC PANEL: CPT

## 2025-07-26 PROCEDURE — 85025 COMPLETE CBC W/AUTO DIFF WBC: CPT

## 2025-07-26 PROCEDURE — 36415 COLL VENOUS BLD VENIPUNCTURE: CPT

## 2025-07-28 ENCOUNTER — HOSPITAL ENCOUNTER (OUTPATIENT)
Dept: RADIOLOGY | Facility: MEDICAL CENTER | Age: 80
End: 2025-07-28
Attending: NURSE PRACTITIONER
Payer: MEDICARE

## 2025-07-28 DIAGNOSIS — E11.69 HYPERLIPIDEMIA ASSOCIATED WITH TYPE 2 DIABETES MELLITUS (HCC): ICD-10-CM

## 2025-07-28 DIAGNOSIS — I65.23 BILATERAL CAROTID ARTERY STENOSIS: ICD-10-CM

## 2025-07-28 DIAGNOSIS — E78.5 HYPERLIPIDEMIA ASSOCIATED WITH TYPE 2 DIABETES MELLITUS (HCC): ICD-10-CM

## 2025-07-28 PROCEDURE — 93880 EXTRACRANIAL BILAT STUDY: CPT

## 2025-07-28 PROCEDURE — 4410556 CT-CARDIAC SCORING (SELF PAY ONLY)

## 2025-07-31 ENCOUNTER — HOSPITAL ENCOUNTER (OUTPATIENT)
Dept: HEMATOLOGY ONCOLOGY | Facility: MEDICAL CENTER | Age: 80
End: 2025-07-31
Attending: STUDENT IN AN ORGANIZED HEALTH CARE EDUCATION/TRAINING PROGRAM
Payer: MEDICARE

## 2025-07-31 ENCOUNTER — OFFICE VISIT (OUTPATIENT)
Dept: CARDIOLOGY | Facility: MEDICAL CENTER | Age: 80
End: 2025-07-31
Attending: INTERNAL MEDICINE
Payer: MEDICARE

## 2025-07-31 VITALS
RESPIRATION RATE: 16 BRPM | HEART RATE: 62 BPM | HEIGHT: 66 IN | DIASTOLIC BLOOD PRESSURE: 58 MMHG | BODY MASS INDEX: 38.41 KG/M2 | WEIGHT: 239 LBS | OXYGEN SATURATION: 98 % | SYSTOLIC BLOOD PRESSURE: 142 MMHG

## 2025-07-31 DIAGNOSIS — N18.5 ANEMIA DUE TO STAGE 5 CHRONIC KIDNEY DISEASE, NOT ON CHRONIC DIALYSIS (HCC): Primary | ICD-10-CM

## 2025-07-31 DIAGNOSIS — N18.4 CKD (CHRONIC KIDNEY DISEASE) STAGE 4, GFR 15-29 ML/MIN (HCC): ICD-10-CM

## 2025-07-31 DIAGNOSIS — I44.7 LEFT BUNDLE BRANCH BLOCK: ICD-10-CM

## 2025-07-31 DIAGNOSIS — D62 ACUTE BLOOD LOSS ANEMIA: ICD-10-CM

## 2025-07-31 DIAGNOSIS — E78.5 HYPERLIPIDEMIA ASSOCIATED WITH TYPE 2 DIABETES MELLITUS (HCC): ICD-10-CM

## 2025-07-31 DIAGNOSIS — I34.0 NON-RHEUMATIC MITRAL REGURGITATION: ICD-10-CM

## 2025-07-31 DIAGNOSIS — I48.0 PAF (PAROXYSMAL ATRIAL FIBRILLATION) (HCC): Primary | ICD-10-CM

## 2025-07-31 DIAGNOSIS — E11.22 TYPE 2 DIABETES MELLITUS WITH STAGE 4 CHRONIC KIDNEY DISEASE, WITHOUT LONG-TERM CURRENT USE OF INSULIN (HCC): ICD-10-CM

## 2025-07-31 DIAGNOSIS — G47.33 OSA (OBSTRUCTIVE SLEEP APNEA): Chronic | ICD-10-CM

## 2025-07-31 DIAGNOSIS — Z79.01 CHRONIC ANTICOAGULATION: ICD-10-CM

## 2025-07-31 DIAGNOSIS — N18.4 TYPE 2 DIABETES MELLITUS WITH STAGE 4 CHRONIC KIDNEY DISEASE, WITHOUT LONG-TERM CURRENT USE OF INSULIN (HCC): ICD-10-CM

## 2025-07-31 DIAGNOSIS — D63.1 ANEMIA DUE TO STAGE 5 CHRONIC KIDNEY DISEASE, NOT ON CHRONIC DIALYSIS (HCC): Primary | ICD-10-CM

## 2025-07-31 DIAGNOSIS — E11.69 HYPERLIPIDEMIA ASSOCIATED WITH TYPE 2 DIABETES MELLITUS (HCC): ICD-10-CM

## 2025-07-31 RX ORDER — LORATADINE 10 MG/1
10 TABLET ORAL DAILY
COMMUNITY

## 2025-07-31 ASSESSMENT — ENCOUNTER SYMPTOMS
SPUTUM PRODUCTION: 0
MEMORY LOSS: 0
SHORTNESS OF BREATH: 0
HEADACHES: 0
NECK PAIN: 0
VOMITING: 0
BLURRED VISION: 0
HEARTBURN: 0
TINGLING: 0
WEIGHT LOSS: 0
CHILLS: 0
NAUSEA: 0
ABDOMINAL PAIN: 0
COUGH: 0
FEVER: 0
BRUISES/BLEEDS EASILY: 0
TREMORS: 0
FOCAL WEAKNESS: 0
PALPITATIONS: 0
ORTHOPNEA: 0
SENSORY CHANGE: 0
WHEEZING: 0
SORE THROAT: 0
DEPRESSION: 0
DIZZINESS: 0

## 2025-07-31 ASSESSMENT — FIBROSIS 4 INDEX: FIB4 SCORE: 2.88

## 2025-07-31 NOTE — PROGRESS NOTES
Consult Note: Hematology/Oncology     Primary Care:  EDILMA Whelan    Chief Complaint   Patient presents with    Follow-Up     Iron deficiency anemia      Subjective:   History of Presenting Illness:  Whitley Frederick is a 80 y.o. female with a past medical history of type 2 diabetes, asthma, sleep apnea, CKD, neuroendocrine tumor who presents today with iron deficiency anemia.    Patient reports that she was diagnosed with iron deficiency anemia on top of CKD.  She was recently admitted to the hospital and required as well as 2 infusions of IV iron.  3 units of packed red blood     With note she also reports that she was diagnosed with a neuroendocrine tumor, grade 1, Ki-67 less than 3%.  She had an EGD and the swallow capsule endoscopy for bleeding.    She currently takes oral iron    Interval History  Patient reports no new symptoms.  She reports that she feels that she is doing well.  She continues with her iron every other day and we discussed how her anemia over the past month has trended up significantly.  It is gone from 8.2- to 8.7 to 9 to 9.4.      Past Medical History[1]     Past Surgical History[2]    Social History[3]     Family History   Problem Relation Age of Onset    Cancer Maternal Aunt     Diabetes Maternal Aunt     Heart Disease Maternal Aunt     Hypertension Maternal Aunt     Hyperlipidemia Maternal Aunt     Cancer Mother         Leukemia    Hypertension Mother     Diabetes Mother     Lung Disease Father     Cancer Father         Lung    Lung Disease Brother     Stroke Brother     Diabetes Brother     Heart Disease Brother     Diabetes Maternal Grandmother     Hypertension Brother     Diabetes Brother        Allergies[4]    Current Medications[5]    Review of Systems   Constitutional:  Positive for malaise/fatigue. Negative for chills, fever and weight loss.   HENT:  Negative for congestion, ear pain, nosebleeds and sore throat.    Eyes:  Negative for blurred vision.    Respiratory:  Negative for cough, sputum production, shortness of breath and wheezing.    Cardiovascular:  Negative for chest pain, palpitations, orthopnea and leg swelling.   Gastrointestinal:  Negative for abdominal pain, heartburn, nausea and vomiting.   Genitourinary:  Negative for dysuria, frequency and urgency.   Musculoskeletal:  Negative for neck pain.   Neurological:  Negative for dizziness, tingling, tremors, sensory change, focal weakness and headaches.   Endo/Heme/Allergies:  Does not bruise/bleed easily.   Psychiatric/Behavioral:  Negative for depression, memory loss and suicidal ideas.    All other systems reviewed and are negative.      Problem list, medications, and allergies reviewed by myself today in Epic.     Objective:     There were no vitals filed for this visit.      DESC; KARNOFSKY SCALE WITH ECOG EQUIVALENT: 100, Fully active, able to carry on all pre-disease performed without restriction (ECOG equivalent 0)    DISTRESS LEVEL: no apparent distress    Physical Exam  Deferred due to virtual visit  Labs:   Most recent labs reviewed.  Please see the lab tab of chart review    Imaging:   Most recent images below have been independently reviewed by me.  Please see the imaging tab of chart review      Assessment/Plan:       Ms. Frederick is a 80-year-old female with iron deficiency anemia on the background of CKD.      Anemia   It appears that she is doing very well on iron every other day.  Her hemoglobin has improved roughly 1 point in the past month.  Her TSAT is also improved.  I recommended that she continue with this treatment and monitor her hemoglobin.  We also discussed her coming back to hematology versus follow-up with her PCP and nephrologist.  She has opted to follow with her PCP and nephrologist.  I informed her that I will see her at any time if needed.      2.  Neuroendocrine tumor.  On review of patient's path report she had a well-differentiated neuroendocrine tumor grade one of  the gastric nodule at 42 cm, 50 cm at 39 cm.  - She is currently being monitored by GI.    3. Afib   - Continue with Xarelto    4. T2DM  -Management per primary    5. Research:  Discussed [facility].  Patient interested in a referral.  Referral to [facility] placed.      Elizabeth Meehan M.D.  Hematology/Oncology    Billing  Patient has multiple chronic medical conditions, see above. I interpreted 3+ labs \      This evaluation was conducted via Teams using secure and encrypted videoconferencing technology. The patient was in their home in the [facility] [place].    The patient's identity was confirmed and verbal consent was obtained for this virtual visit.                   [1]   Past Medical History:  Diagnosis Date    Allergy     seasonal, frequent sinus problems    Anemia     Anxiety     Arrhythmia     PVC's    Arthritis     osthma    Asthma     Atherosclerosis of aorta (HCC)     Bowel habit changes     IBS    Carotid bruit     Left    CATARACT     Right IOL    Chest pain     Chronic kidney failure, stage 4 (severe) ([facility])     Dr. Najjar    Chronic neck pain     Chronic respiratory failure with hypoxia (HCC)     2l NC at [facility] (Preferred)    DDD (degenerative disc disease), lumbar     DIABETES MELLITUS     Diabetic polyneuropathy associated with type 2 diabetes mellitus (HCC)     Esophageal reflux     NINOSKA (generalized anxiety disorder)     Headache(784.0)     Heart murmur     Heart valve disease     Non-rheumatic mitral regurgitation    Hiatus hernia syndrome     Hyperlipidemia     Hypertension     Hypothyroid     IBD (inflammatory bowel disease)     LBBB (left bundle branch block)     per EKGs    Left bundle branch block     Migraine     Silent migraine    Mild intermittent asthma without complication     Multiple thyroid nodules     AVERY (obstructive sleep apnea) 03/20/2024    Uses CPAP with 2 L O2    Overweight(278.02)     Palpitations     Paroxysmal atrial fibrillation (HCC) 07/12/2013    woke with palps,  AFib per ekg in am.- Dr. Jalloh [facility]    Pneumonia     Nov. 2015    Positive reaction to tuberculin skin test     Psoriasis     PVC (premature ventricular contraction)     Renal disease     Mild diabetic renal disease with mild proteinuria.Moderate Stage 4    Secondary hyperparathyroidism of renal origin (HCC)     Stenosis of carotid artery     Urinary tract infection, site not specified    [2]   Past Surgical History:  Procedure Laterality Date    ND UPPER GI ENDOSCOPY,DIAGNOSIS N/A 6/12/2025    Procedure: ESOPHAGOGASTRODUODENOSCOPY WITH BIOPSIES;  Surgeon: Domenico Olea M.D.;  Location: [facility];  Service: Gastroenterology    EGD W/ENDOSCOPIC ULTRASOUND N/A 6/12/2025    Procedure: EGD, WITH ENDOSCOPIC US;  Surgeon: Domenico Olea M.D.;  Location: [facility];  Service: Gastroenterology    COLONOSCOPY N/A 6/5/2025    Procedure: COLONOSCOPY;  Surgeon: Wayne Mendoza M.D.;  Location: [facility];  Service: Gastroenterology    ND UPPER GI ENDOSCOPY,DIAGNOSIS N/A 6/4/2025    Procedure: GASTROSCOPY;  Surgeon: Janey Dow D.O.;  Location: [facility];  Service: Gastroenterology    ND UPPER GI ENDOSCOPY,DIAGNOSIS N/A 03/06/2025    Procedure: ESOPHAGOGASTRODUODENOSCOPY AND UPPER ENDOSCOPIC ULTRASOUND WITH BIOPSY;  Surgeon: Domenico Olea M.D.;  Location: [facility];  Service: EUS    EGD W/ENDOSCOPIC ULTRASOUND N/A 03/06/2025    Procedure: EGD, WITH ENDOSCOPIC US;  Surgeon: Domenico Olea M.D.;  Location: [facility];  Service: EUS    ND UPPER GI ENDOSCOPY,DIAGNOSIS  01/30/2025    Procedure: ESOPHAGOGASTRODUODENOSCOPY AND COLONOSCOPY WITH BIOPSIES;  Surgeon: Janey Dow D.O.;  Location: [facility];  Service: Gastroenterology    ND COLONOSCOPY-FLEXIBLE N/A 01/30/2025    Procedure: COLONOSCOPY;  Surgeon: Janey Dow D.O.;  Location: [facility];  Service: Gastroenterology    WIDE EXCISION, LESION, UPPER EXTREMITY  03/25/2024     Procedure: WIDE EXCISION OF MALIGNANT MELANOMA, LEFT UPPER BACK;  Surgeon: Amy Horne M.D.;  Location: SURGERY [facility];  Service: General    CATARACT PHACO WITH IOL Right 10/25/2016    Procedure: CATARACT PHACO WITH IOL;  Surgeon: Zoran Gamble M.D.;  Location: SURGERY SAME DAY [facility];  Service:     ABDOMINAL HYSTERECTOMY TOTAL      CHOLECYSTECTOMY      COLONOSCOPY      x2    TONSILLECTOMY      US-NEEDLE CORE BX-BREAST PANEL Left    [3]   Social History  Tobacco Use    Smoking status: Former     Current packs/day: 0.00     Average packs/day: 1 pack/day for 20.0 years (20.0 ttl pk-yrs)     Types: Cigarettes     Start date: 1963     Quit date: 1983     Years since quittin.6    Smokeless tobacco: Never   Vaping Use    Vaping status: Never Used   Substance Use Topics    Alcohol use: No     Alcohol/week: 0.0 oz    Drug use: Not Currently     Types: Marijuana     Comment: CBD/THC topical   [4]   Allergies  Allergen Reactions    Amlodipine Itching    Contrast Media With Iodine [Iodine] Hives    Hydralazine      Rapid heart rate, chest tightness    Amaryl      GI Problems    Avandia [Rosiglitazone Maleate]      GI problems    Glipizide      GI Problems    Levoxyl [Levothyroxine Sodium] Unspecified     Flushing, very hot    Relafen [Nabumetone]      Itching; avoids all nsaids    Clonidine      Rapid heart rate, swelling Pt unsure of rxn    Cipro Xr      Possibly causes Chills.    Glucophage [Metformin Hydrochloride]      dizzy    Levaquin      Possible allergy - chills with cipro but sick   [5]   Current Outpatient Medications   Medication Sig Dispense Refill    loratadine (CLARITIN) 10 MG Tab Take 10 mg by mouth every day.      rosuvastatin (CRESTOR) 10 MG Tab Take 1 tablet by mouth every evening. Stop simvastatin. (Patient taking differently: Take 20 mg by mouth every evening.) 100 Tablet 3    omeprazole (PRILOSEC) 20 MG delayed-release capsule Take 2 capsules by mouth every day. 180  Capsule 1    levalbuterol (XOPENEX) 0.63 MG/3ML Nebu Soln Inhale 3 mL by nebulization every four hours as needed for Shortness of Breath. 75 mL 4    Cholecalciferol (D3 PO) Take 2 Capsules by mouth every evening.      Loperamide HCl (IMODIUM PO) Take 3 Tablets by mouth 2 times a day as needed (For diarrhea).         Alpha-D-Galactosidase (ANTI-GAS PO) Take 3 Tablets by mouth 2 times a day as needed (For gas pain).      Ferrous Sulfate (IRON PO) Take 1 Tablet by mouth every evening. (OTC) (Patient taking differently: Take 1 Tablet by mouth every 48 hours. (OTC))      clonazePAM (KLONOPIN) 0.5 MG Tab TAKE 1 TABLET BY MOUTH ONCE DAILY AS NEEDED FOR 30 DAYS. ANXIETY/HEART PALPITATIONS for 30 days 15 Tablet 0    losartan (COZAAR) 50 MG Tab Take 1 tablet by mouth 2 Times a Day. 200 Tablet 2    fluticasone (FLONASE) 50 MCG/ACT nasal spray USE 1-2  SPRAY(S) IN EACH NOSTRIL ONCE DAILY 30 MINUTES PRIOR TO USE OF CPAP 48 g 3    rivaroxaban (XARELTO) 15 MG Tab tablet Take 1 Tablet by mouth with dinner. 100 Tablet 0    Semaglutide, 2 MG/DOSE, (OZEMPIC, 2 MG/DOSE,) 8 MG/3ML Solution Pen-injector Inject 2 mg under the skin every 7 days. Fridays 3 mL 0    levalbuterol (XOPENEX HFA) 45 MCG/ACT inhaler Inhale 2 Puffs every 6 hours as needed for Shortness of Breath. 15 g 11    Continuous Glucose Sensor (FREESTYLE YANG 3 SENSOR) Misc Use to continuously monitor blood sugar.  Change every 14 days. 2 Each 2    montelukast (SINGULAIR) 10 MG Tab Take 1 Tablet by mouth at bedtime. 90 Tablet 3    NON SPECIFIED Nocturnal oxygen at 2 L. 1 Each 1    metoprolol tartrate (LOPRESSOR) 50 MG Tab Take 1 tablet by mouth 2 times a day. 200 Tablet 3    sodium bicarbonate (SODIUM BICARBONATE) 650 MG Tab Take 1 Tablet by mouth 2 times a day. 180 Tablet 3    levothyroxine (SYNTHROID) 50 MCG Tab Take 1 tablet by mouth every morning on an empty stomach. 100 Tablet 3    NIFEdipine SR (PROCARDIA-XL) 30 MG tablet Take 1 tablet by mouth once a day 90 Tablet 3     allopurinol (ZYLOPRIM) 100 MG Tab TAKE TWO TABLETS BY MOUTH TWICE DAILY 360 Tablet 2    furosemide (LASIX) 80 MG Tab Take 80 mg by mouth every 48 hours.      acetaminophen (TYLENOL) 650 MG CR tablet Take 1,300 mg by mouth 2 times a day.      clobetasol (TEMOVATE) 0.05 % Ointment Apply to affected body skin twice daily as needed for severe psoriasis. Avoid use on face, axilla, groin. 60 g 2    dronedarone (MULTAQ) 400 MG Tab Take 1 tablet by mouth 2 times a day with meals. 60 Tablet 0    omeprazole (PRILOSEC) 40 MG delayed-release capsule Take 1 Capsule by mouth 2 times a day. (Patient not taking: Reported on 7/31/2025) 15 Capsule 2    fluocinonide (LIDEX) 0.05 % Ointment Apply to affected area body skin twice daily as needed for mild/moderate psoriasis. Avoid use on face, axilla, groin. (Patient not taking: Reported on 7/31/2025) 180 g 2     No current facility-administered medications for this encounter.

## 2025-08-03 PROCEDURE — RXMED WILLOW AMBULATORY MEDICATION CHARGE: Performed by: NURSE PRACTITIONER

## 2025-08-07 ENCOUNTER — PHARMACY VISIT (OUTPATIENT)
Dept: PHARMACY | Facility: MEDICAL CENTER | Age: 80
End: 2025-08-07
Payer: COMMERCIAL

## 2025-08-12 ENCOUNTER — SPECIALTY PHARMACY (OUTPATIENT)
Dept: CARDIOLOGY | Facility: MEDICAL CENTER | Age: 80
End: 2025-08-12
Payer: MEDICARE

## 2025-08-12 ENCOUNTER — HOSPITAL ENCOUNTER (OUTPATIENT)
Dept: LAB | Facility: MEDICAL CENTER | Age: 80
End: 2025-08-12
Attending: INTERNAL MEDICINE
Payer: MEDICARE

## 2025-08-12 DIAGNOSIS — I48.0 PAF (PAROXYSMAL ATRIAL FIBRILLATION) (HCC): ICD-10-CM

## 2025-08-12 DIAGNOSIS — R00.2 PALPITATIONS: ICD-10-CM

## 2025-08-12 DIAGNOSIS — I10 PRIMARY HYPERTENSION: ICD-10-CM

## 2025-08-12 DIAGNOSIS — N18.4 CKD (CHRONIC KIDNEY DISEASE) STAGE 4, GFR 15-29 ML/MIN (HCC): ICD-10-CM

## 2025-08-12 LAB
ANION GAP SERPL CALC-SCNC: 12 MMOL/L (ref 7–16)
BUN SERPL-MCNC: 77 MG/DL (ref 8–22)
CALCIUM SERPL-MCNC: 9.3 MG/DL (ref 8.5–10.5)
CHLORIDE SERPL-SCNC: 106 MMOL/L (ref 96–112)
CO2 SERPL-SCNC: 20 MMOL/L (ref 20–33)
CREAT SERPL-MCNC: 2.79 MG/DL (ref 0.5–1.4)
CREAT UR-MCNC: 120 MG/DL
ERYTHROCYTE [DISTWIDTH] IN BLOOD BY AUTOMATED COUNT: 51.8 FL (ref 35.9–50)
GFR SERPLBLD CREATININE-BSD FMLA CKD-EPI: 17 ML/MIN/1.73 M 2
GLUCOSE SERPL-MCNC: 182 MG/DL (ref 65–99)
HCT VFR BLD AUTO: 30.8 % (ref 37–47)
HGB BLD-MCNC: 10 G/DL (ref 12–16)
MCH RBC QN AUTO: 31 PG (ref 27–33)
MCHC RBC AUTO-ENTMCNC: 32.5 G/DL (ref 32.2–35.5)
MCV RBC AUTO: 95.4 FL (ref 81.4–97.8)
MICROALBUMIN UR-MCNC: 5.6 MG/DL
MICROALBUMIN/CREAT UR: 47 MG/G (ref 0–30)
PLATELET # BLD AUTO: 138 K/UL (ref 164–446)
PMV BLD AUTO: 11.2 FL (ref 9–12.9)
POTASSIUM SERPL-SCNC: 4.8 MMOL/L (ref 3.6–5.5)
RBC # BLD AUTO: 3.23 M/UL (ref 4.2–5.4)
SODIUM SERPL-SCNC: 138 MMOL/L (ref 135–145)
WBC # BLD AUTO: 6.4 K/UL (ref 4.8–10.8)

## 2025-08-12 PROCEDURE — 85027 COMPLETE CBC AUTOMATED: CPT

## 2025-08-12 PROCEDURE — 36415 COLL VENOUS BLD VENIPUNCTURE: CPT

## 2025-08-12 PROCEDURE — RXMED WILLOW AMBULATORY MEDICATION CHARGE: Performed by: NURSE PRACTITIONER

## 2025-08-12 PROCEDURE — 82043 UR ALBUMIN QUANTITATIVE: CPT

## 2025-08-12 PROCEDURE — RXMED WILLOW AMBULATORY MEDICATION CHARGE: Performed by: INTERNAL MEDICINE

## 2025-08-12 PROCEDURE — 80048 BASIC METABOLIC PNL TOTAL CA: CPT

## 2025-08-12 PROCEDURE — 82570 ASSAY OF URINE CREATININE: CPT

## 2025-08-13 ENCOUNTER — PHARMACY VISIT (OUTPATIENT)
Dept: PHARMACY | Facility: MEDICAL CENTER | Age: 80
End: 2025-08-13
Payer: COMMERCIAL

## 2025-08-13 ENCOUNTER — PATIENT MESSAGE (OUTPATIENT)
Dept: CARDIOLOGY | Facility: MEDICAL CENTER | Age: 80
End: 2025-08-13
Payer: MEDICARE

## 2025-08-13 RX ORDER — METOPROLOL TARTRATE 50 MG
50 TABLET ORAL 2 TIMES DAILY
Qty: 200 TABLET | Refills: 3 | Status: SHIPPED | OUTPATIENT
Start: 2025-08-13

## 2025-08-13 RX ORDER — LEVOTHYROXINE SODIUM 50 UG/1
50 TABLET ORAL
Qty: 100 TABLET | Refills: 3 | Status: SHIPPED | OUTPATIENT
Start: 2025-08-13

## 2025-08-18 PROCEDURE — RXMED WILLOW AMBULATORY MEDICATION CHARGE: Performed by: INTERNAL MEDICINE

## 2025-08-18 RX ORDER — NIFEDIPINE 30 MG/1
30 TABLET, EXTENDED RELEASE ORAL DAILY
Qty: 90 TABLET | Refills: 3 | Status: SHIPPED | OUTPATIENT
Start: 2025-08-18 | End: 2026-08-18

## 2025-08-19 ENCOUNTER — PHARMACY VISIT (OUTPATIENT)
Dept: PHARMACY | Facility: MEDICAL CENTER | Age: 80
End: 2025-08-19
Payer: COMMERCIAL

## 2025-08-20 ENCOUNTER — TELEMEDICINE (OUTPATIENT)
Dept: NEPHROLOGY | Facility: MEDICAL CENTER | Age: 80
End: 2025-08-20
Payer: MEDICARE

## 2025-08-20 VITALS
SYSTOLIC BLOOD PRESSURE: 138 MMHG | TEMPERATURE: 97.3 F | OXYGEN SATURATION: 100 % | DIASTOLIC BLOOD PRESSURE: 70 MMHG | WEIGHT: 238 LBS | HEART RATE: 61 BPM | BODY MASS INDEX: 38.25 KG/M2 | HEIGHT: 66 IN

## 2025-08-20 DIAGNOSIS — I10 PRIMARY HYPERTENSION: ICD-10-CM

## 2025-08-20 DIAGNOSIS — N18.4 CKD (CHRONIC KIDNEY DISEASE) STAGE 4, GFR 15-29 ML/MIN (HCC): Primary | ICD-10-CM

## 2025-08-20 DIAGNOSIS — D64.9 ANEMIA, UNSPECIFIED TYPE: ICD-10-CM

## 2025-08-20 PROCEDURE — 99214 OFFICE O/P EST MOD 30 MIN: CPT | Mod: 95 | Performed by: INTERNAL MEDICINE

## 2025-08-20 PROCEDURE — G2211 COMPLEX E/M VISIT ADD ON: HCPCS | Mod: 95 | Performed by: INTERNAL MEDICINE

## 2025-08-20 ASSESSMENT — PATIENT HEALTH QUESTIONNAIRE - PHQ9
5. POOR APPETITE OR OVEREATING: NOT AT ALL
4. FEELING TIRED OR HAVING LITTLE ENERGY: SEVERAL DAYS
SUM OF ALL RESPONSES TO PHQ QUESTIONS 1-9: 4
8. MOVING OR SPEAKING SO SLOWLY THAT OTHER PEOPLE COULD HAVE NOTICED. OR THE OPPOSITE, BEING SO FIGETY OR RESTLESS THAT YOU HAVE BEEN MOVING AROUND A LOT MORE THAN USUAL: NOT AT ALL
SUM OF ALL RESPONSES TO PHQ9 QUESTIONS 1 AND 2: 0
6. FEELING BAD ABOUT YOURSELF - OR THAT YOU ARE A FAILURE OR HAVE LET YOURSELF OR YOUR FAMILY DOWN: NOT AL ALL
7. TROUBLE CONCENTRATING ON THINGS, SUCH AS READING THE NEWSPAPER OR WATCHING TELEVISION: NOT AT ALL
9. THOUGHTS THAT YOU WOULD BE BETTER OFF DEAD, OR OF HURTING YOURSELF: NOT AT ALL
2. FEELING DOWN, DEPRESSED, IRRITABLE, OR HOPELESS: NOT AT ALL
1. LITTLE INTEREST OR PLEASURE IN DOING THINGS: NOT AT ALL
3. TROUBLE FALLING OR STAYING ASLEEP OR SLEEPING TOO MUCH: NEARLY EVERY DAY

## 2025-08-20 ASSESSMENT — ENCOUNTER SYMPTOMS
SHORTNESS OF BREATH: 0
FEVER: 0
COUGH: 0
NAUSEA: 0
CHILLS: 0
VOMITING: 0

## 2025-08-20 ASSESSMENT — LIFESTYLE VARIABLES
HOW OFTEN DO YOU HAVE SIX OR MORE DRINKS ON ONE OCCASION: NEVER
SKIP TO QUESTIONS 9-10: 1
AUDIT-C TOTAL SCORE: 0
HOW MANY STANDARD DRINKS CONTAINING ALCOHOL DO YOU HAVE ON A TYPICAL DAY: PATIENT DOES NOT DRINK
HOW OFTEN DO YOU HAVE A DRINK CONTAINING ALCOHOL: NEVER

## 2025-08-20 ASSESSMENT — FIBROSIS 4 INDEX: FIB4 SCORE: 2.75

## 2025-08-25 ENCOUNTER — TELEPHONE (OUTPATIENT)
Dept: CARDIOLOGY | Facility: MEDICAL CENTER | Age: 80
End: 2025-08-25
Payer: MEDICARE

## 2025-08-27 ENCOUNTER — PATIENT MESSAGE (OUTPATIENT)
Dept: CARDIOLOGY | Facility: MEDICAL CENTER | Age: 80
End: 2025-08-27
Payer: MEDICARE

## 2025-08-28 ENCOUNTER — HOSPITAL ENCOUNTER (OUTPATIENT)
Dept: PULMONOLOGY | Facility: MEDICAL CENTER | Age: 80
End: 2025-08-28
Attending: NURSE PRACTITIONER
Payer: MEDICARE

## 2025-08-28 DIAGNOSIS — J45.20 MILD INTERMITTENT ASTHMA WITHOUT COMPLICATION: Chronic | ICD-10-CM

## 2025-08-28 PROCEDURE — 94726 PLETHYSMOGRAPHY LUNG VOLUMES: CPT

## 2025-08-28 PROCEDURE — 94060 EVALUATION OF WHEEZING: CPT

## 2025-08-28 PROCEDURE — 700101 HCHG RX REV CODE 250: Performed by: NURSE PRACTITIONER

## 2025-08-28 PROCEDURE — 94729 DIFFUSING CAPACITY: CPT

## 2025-08-28 RX ORDER — LEVALBUTEROL INHALATION SOLUTION 1.25 MG/3ML
1.25 SOLUTION RESPIRATORY (INHALATION)
Status: DISCONTINUED | OUTPATIENT
Start: 2025-08-28 | End: 2025-08-29 | Stop reason: HOSPADM

## 2025-08-28 RX ADMIN — LEVALBUTEROL 1.25 MG: 1.25 SOLUTION RESPIRATORY (INHALATION) at 14:00

## (undated) DEVICE — SET LEADWIRE 5 LEAD BEDSIDE DISPOSABLE ECG (1SET OF 5/EA)

## (undated) DEVICE — CUFF BP ADULT LARGE DISPOSABLE (20EA/CA)

## (undated) DEVICE — TUBING CLEARLINK DUO-VENT - C-FLO (48EA/CA)

## (undated) DEVICE — CANNULA O2 COMFORT SOFT EAR ADULT 7 FT TUBING (50/CA)

## (undated) DEVICE — DEVICE HEMOSTATIC CLIPPING RESOLUTION 360 DEGREES (20EA/BX)

## (undated) DEVICE — SYRINGE SAFETY 3 ML 18 GA X 1 1/2 BLUNT LL (100/BX 8BX/CA)

## (undated) DEVICE — GOWN SURGEONS LARGE - (32/CA)

## (undated) DEVICE — STAPLER SKIN DISP - (6/BX 10BX/CA) VISISTAT

## (undated) DEVICE — SYRINGE SAFETY 10 ML 18 GA X 1 1/2 BLUNT LL (100/BX 4BX/CA)

## (undated) DEVICE — CATHETER IV SAFETY 22 GA X 1 (50EA/BX)

## (undated) DEVICE — COVER LIGHT HANDLE FLEXIBLE - SOFT (2EA/PK 80PK/CA)

## (undated) DEVICE — SET EXTENSION WITH 2 PORTS (48EA/CA) ***PART #2C8610 IS A SUBSTITUTE*****

## (undated) DEVICE — DRAPE LAPAROTOMY T SHEET - (12EA/CA)

## (undated) DEVICE — TOWEL STOP TIMEOUT SAFETY FLAG (40EA/CA)

## (undated) DEVICE — SODIUM CHL IRRIGATION 0.9% 1000ML (12EA/CA)

## (undated) DEVICE — SENSOR OXIMETER ADULT SPO2 RD SET (20EA/BX)

## (undated) DEVICE — KIT CUSTOM PROCEDURE SINGLE FOR ENDO (15/CA)

## (undated) DEVICE — TUBE CONNECTING SUCTION - CLEAR PLASTIC STERILE 72 IN (50EA/CA)

## (undated) DEVICE — LACTATED RINGERS INJ 1000 ML - (14EA/CA 60CA/PF)

## (undated) DEVICE — BITE BLOCK ADULT 60FR (100EA/CA)

## (undated) DEVICE — TUBE E-T HI-LO CUFF 6.5MM (10EA/BX)

## (undated) DEVICE — TUBE E-T HI-LO CUFF 7.0MM (10EA/PK)

## (undated) DEVICE — TUBE SUCTION YANKAUER 1/4 X 6FT (50EA/CA)"

## (undated) DEVICE — WATER IRRIGATION STERILE 1000ML (12EA/CA)

## (undated) DEVICE — TUBE E-T HI-LO CUFF 8.5MM (10EA/PK)

## (undated) DEVICE — CHLORAPREP 26 ML APPLICATOR - ORANGE TINT(25/CA)

## (undated) DEVICE — TUBE E-T HI-LO CUFF 7.5MM (10EA/PK)

## (undated) DEVICE — CANISTER SUCTION RIGID RED 1500CC (40EA/CA)

## (undated) DEVICE — ELECTRODE DUAL RETURN W/ CORD - (50/PK)

## (undated) DEVICE — SOD. CHL. INJ. 0.9% 1000 ML - (14EA/CA 60CA/PF)

## (undated) DEVICE — CATHETER IV SAFETY 20 GA X 1-1/4 (50/BX)

## (undated) DEVICE — SPONGE GAUZE NON-STERILE 4X4 - (2000/CA 10PK/CA)

## (undated) DEVICE — SLEEVE, VASO, THIGH, MED

## (undated) DEVICE — GOWN WARMING STANDARD FLEX - (30/CA)

## (undated) DEVICE — MASK O2 VNYL ADLT RBRTH HI - (50/CS)

## (undated) DEVICE — SYRINGE SAFETY 5 ML 18 GA X 1-1/2 BLUNT LL (100/BX 4BX/CA)

## (undated) DEVICE — FORCEP RADIAL JAW 4 STANDARD CAPACITY W/NEEDLE 240CM (40EA/BX)

## (undated) DEVICE — MASK WITH FACE SHIELD (25/BX 4BX/CA)

## (undated) DEVICE — SYRINGE DISP. 60 CC LL - (30/BX, 12BX/CA)**WHEN THESE ARE GONE ORDER #500206**

## (undated) DEVICE — SUTURE GENERAL

## (undated) DEVICE — SUCTION INSTRUMENT YANKAUER BULBOUS TIP W/O VENT (50EA/CA)

## (undated) DEVICE — PAD PREP 24 X 48 CUFFED - (100/CA)

## (undated) DEVICE — KIT  I.V. START (100EA/CA)

## (undated) DEVICE — TUBE E-T HI-LO CUFF 8.0MM (10EA/PK)

## (undated) DEVICE — COVER LIGHT HANDLE ALC PLUS DISP (18EA/BX)

## (undated) DEVICE — BLOCK BITE ENDOSCOPIC 2809 - (100/BX) INTERMEDIATE

## (undated) DEVICE — PACK MINOR BASIN - (2EA/CA)

## (undated) DEVICE — CANISTER SUCTION 3000ML MECHANICAL FILTER AUTO SHUTOFF MEDI-VAC NONSTERILE LF DISP  (40EA/CA)

## (undated) DEVICE — CAPTIVATOR COLD 10MM